# Patient Record
Sex: FEMALE | Race: WHITE | Employment: OTHER | URBAN - NONMETROPOLITAN AREA
[De-identification: names, ages, dates, MRNs, and addresses within clinical notes are randomized per-mention and may not be internally consistent; named-entity substitution may affect disease eponyms.]

---

## 2017-01-13 ENCOUNTER — ANESTHESIA EVENT (OUTPATIENT)
Dept: ENDOSCOPY | Age: 72
End: 2017-01-13
Payer: MEDICARE

## 2017-01-19 ENCOUNTER — SURGERY (OUTPATIENT)
Age: 72
End: 2017-01-19

## 2017-01-19 ENCOUNTER — ANESTHESIA (OUTPATIENT)
Dept: ENDOSCOPY | Age: 72
End: 2017-01-19
Payer: MEDICARE

## 2017-01-19 VITALS
SYSTOLIC BLOOD PRESSURE: 130 MMHG | DIASTOLIC BLOOD PRESSURE: 80 MMHG | OXYGEN SATURATION: 92 % | RESPIRATION RATE: 32 BRPM

## 2017-01-19 PROBLEM — R12 HEARTBURN: Status: ACTIVE | Noted: 2017-01-19

## 2017-01-19 PROCEDURE — 6360000002 HC RX W HCPCS: Performed by: NURSE ANESTHETIST, CERTIFIED REGISTERED

## 2017-01-19 PROCEDURE — 2580000003 HC RX 258: Performed by: NURSE ANESTHETIST, CERTIFIED REGISTERED

## 2017-01-19 PROCEDURE — 2500000003 HC RX 250 WO HCPCS: Performed by: NURSE ANESTHETIST, CERTIFIED REGISTERED

## 2017-01-19 RX ORDER — MIDAZOLAM HYDROCHLORIDE 1 MG/ML
INJECTION INTRAMUSCULAR; INTRAVENOUS PRN
Status: DISCONTINUED | OUTPATIENT
Start: 2017-01-19 | End: 2017-01-19 | Stop reason: SDUPTHER

## 2017-01-19 RX ORDER — LIDOCAINE HYDROCHLORIDE 20 MG/ML
INJECTION, SOLUTION INFILTRATION; PERINEURAL PRN
Status: DISCONTINUED | OUTPATIENT
Start: 2017-01-19 | End: 2017-01-19 | Stop reason: SDUPTHER

## 2017-01-19 RX ORDER — PROPOFOL 10 MG/ML
INJECTION, EMULSION INTRAVENOUS PRN
Status: DISCONTINUED | OUTPATIENT
Start: 2017-01-19 | End: 2017-01-19 | Stop reason: SDUPTHER

## 2017-01-19 RX ADMIN — PROPOFOL 400 MG: 10 INJECTION, EMULSION INTRAVENOUS at 09:57

## 2017-01-19 RX ADMIN — SODIUM CHLORIDE, SODIUM LACTATE, POTASSIUM CHLORIDE, AND CALCIUM CHLORIDE: 600; 310; 30; 20 INJECTION, SOLUTION INTRAVENOUS at 09:55

## 2017-01-19 RX ADMIN — LIDOCAINE HYDROCHLORIDE 40 MG: 20 INJECTION, SOLUTION INFILTRATION; PERINEURAL at 09:57

## 2017-01-19 RX ADMIN — MIDAZOLAM HYDROCHLORIDE 2 MG: 1 INJECTION, SOLUTION INTRAMUSCULAR; INTRAVENOUS at 09:59

## 2017-04-06 ENCOUNTER — APPOINTMENT (OUTPATIENT)
Dept: GENERAL RADIOLOGY | Facility: HOSPITAL | Age: 72
End: 2017-04-06

## 2017-04-06 ENCOUNTER — HOSPITAL ENCOUNTER (INPATIENT)
Facility: HOSPITAL | Age: 72
LOS: 4 days | Discharge: HOME HEALTH | End: 2017-04-10
Attending: EMERGENCY MEDICINE | Admitting: FAMILY MEDICINE

## 2017-04-06 ENCOUNTER — APPOINTMENT (OUTPATIENT)
Dept: CT IMAGING | Facility: HOSPITAL | Age: 72
End: 2017-04-06

## 2017-04-06 DIAGNOSIS — T79.6XXA TRAUMATIC RHABDOMYOLYSIS, INITIAL ENCOUNTER (HCC): Primary | ICD-10-CM

## 2017-04-06 DIAGNOSIS — Z74.09 IMPAIRED FUNCTIONAL MOBILITY AND ACTIVITY TOLERANCE: ICD-10-CM

## 2017-04-06 LAB
ALBUMIN SERPL-MCNC: 3.8 G/DL (ref 3.5–5)
ALBUMIN/GLOB SERPL: 1.1 G/DL (ref 1.1–2.5)
ALP SERPL-CCNC: 107 U/L (ref 24–120)
ALT SERPL W P-5'-P-CCNC: 40 U/L (ref 0–54)
AMYLASE SERPL-CCNC: 47 U/L (ref 30–110)
ANION GAP SERPL CALCULATED.3IONS-SCNC: 20 MMOL/L (ref 4–13)
APTT PPP: 30.9 SECONDS (ref 24.1–34.8)
AST SERPL-CCNC: 113 U/L (ref 7–45)
BASOPHILS # BLD AUTO: 0.03 10*3/MM3 (ref 0–0.2)
BASOPHILS NFR BLD AUTO: 0.2 % (ref 0–2)
BILIRUB SERPL-MCNC: 0.8 MG/DL (ref 0.1–1)
BILIRUB UR QL STRIP: NEGATIVE
BUN BLD-MCNC: 33 MG/DL (ref 5–21)
BUN/CREAT SERPL: 38.4 (ref 7–25)
CALCIUM SPEC-SCNC: 9.4 MG/DL (ref 8.4–10.4)
CHLORIDE SERPL-SCNC: 101 MMOL/L (ref 98–110)
CK SERPL-CCNC: 2778 U/L (ref 0–203)
CLARITY UR: CLEAR
CO2 SERPL-SCNC: 21 MMOL/L (ref 24–31)
COLOR UR: ABNORMAL
CREAT BLD-MCNC: 0.86 MG/DL (ref 0.5–1.4)
D-LACTATE SERPL-SCNC: 1.5 MMOL/L (ref 0.5–2)
DEPRECATED RDW RBC AUTO: 49.2 FL (ref 40–54)
EOSINOPHIL # BLD AUTO: 0.01 10*3/MM3 (ref 0–0.7)
EOSINOPHIL NFR BLD AUTO: 0.1 % (ref 0–4)
ERYTHROCYTE [DISTWIDTH] IN BLOOD BY AUTOMATED COUNT: 13.7 % (ref 12–15)
GFR SERPL CREATININE-BSD FRML MDRD: 65 ML/MIN/1.73
GLOBULIN UR ELPH-MCNC: 3.5 GM/DL
GLUCOSE BLD-MCNC: 75 MG/DL (ref 70–100)
GLUCOSE UR STRIP-MCNC: NEGATIVE MG/DL
HCT VFR BLD AUTO: 41.9 % (ref 37–47)
HGB BLD-MCNC: 15 G/DL (ref 12–16)
HGB UR QL STRIP.AUTO: NEGATIVE
IMM GRANULOCYTES # BLD: 0.05 10*3/MM3 (ref 0–0.03)
IMM GRANULOCYTES NFR BLD: 0.4 % (ref 0–5)
INR PPP: 1.01 (ref 0.91–1.09)
KETONES UR QL STRIP: ABNORMAL
LEUKOCYTE ESTERASE UR QL STRIP.AUTO: NEGATIVE
LIPASE SERPL-CCNC: 27 U/L (ref 23–203)
LYMPHOCYTES # BLD AUTO: 2.05 10*3/MM3 (ref 0.72–4.86)
LYMPHOCYTES NFR BLD AUTO: 16.9 % (ref 15–45)
MCH RBC QN AUTO: 35 PG (ref 28–32)
MCHC RBC AUTO-ENTMCNC: 35.8 G/DL (ref 33–36)
MCV RBC AUTO: 97.9 FL (ref 82–98)
MONOCYTES # BLD AUTO: 1 10*3/MM3 (ref 0.19–1.3)
MONOCYTES NFR BLD AUTO: 8.2 % (ref 4–12)
MYOGLOBIN SERPL-MCNC: 1125 NG/ML (ref 0–110)
NEUTROPHILS # BLD AUTO: 9.01 10*3/MM3 (ref 1.87–8.4)
NEUTROPHILS NFR BLD AUTO: 74.2 % (ref 39–78)
NITRITE UR QL STRIP: NEGATIVE
NT-PROBNP SERPL-MCNC: 389 PG/ML (ref 0–900)
PH UR STRIP.AUTO: <=5 [PH] (ref 5–8)
PLATELET # BLD AUTO: 270 10*3/MM3 (ref 130–400)
PMV BLD AUTO: 10.8 FL (ref 6–12)
POTASSIUM BLD-SCNC: 4.1 MMOL/L (ref 3.5–5.3)
PROCALCITONIN SERPL-MCNC: <0.25 NG/ML
PROT SERPL-MCNC: 7.3 G/DL (ref 6.3–8.7)
PROT UR QL STRIP: NEGATIVE
PROTHROMBIN TIME: 13.6 SECONDS (ref 11.9–14.6)
RBC # BLD AUTO: 4.28 10*6/MM3 (ref 4.2–5.4)
SODIUM BLD-SCNC: 142 MMOL/L (ref 135–145)
SP GR UR STRIP: 1.02 (ref 1–1.03)
TROPONIN I SERPL-MCNC: 0.05 NG/ML (ref 0–0.07)
TROPONIN I SERPL-MCNC: <0.012 NG/ML (ref 0–0.03)
UROBILINOGEN UR QL STRIP: ABNORMAL
WBC NRBC COR # BLD: 12.15 10*3/MM3 (ref 4.8–10.8)

## 2017-04-06 PROCEDURE — 81003 URINALYSIS AUTO W/O SCOPE: CPT | Performed by: EMERGENCY MEDICINE

## 2017-04-06 PROCEDURE — 83874 ASSAY OF MYOGLOBIN: CPT | Performed by: EMERGENCY MEDICINE

## 2017-04-06 PROCEDURE — 82550 ASSAY OF CK (CPK): CPT | Performed by: EMERGENCY MEDICINE

## 2017-04-06 PROCEDURE — 71010 HC CHEST PA OR AP: CPT

## 2017-04-06 PROCEDURE — 94640 AIRWAY INHALATION TREATMENT: CPT

## 2017-04-06 PROCEDURE — 83605 ASSAY OF LACTIC ACID: CPT | Performed by: EMERGENCY MEDICINE

## 2017-04-06 PROCEDURE — 83690 ASSAY OF LIPASE: CPT | Performed by: EMERGENCY MEDICINE

## 2017-04-06 PROCEDURE — 72125 CT NECK SPINE W/O DYE: CPT

## 2017-04-06 PROCEDURE — 85730 THROMBOPLASTIN TIME PARTIAL: CPT | Performed by: EMERGENCY MEDICINE

## 2017-04-06 PROCEDURE — 80053 COMPREHEN METABOLIC PANEL: CPT | Performed by: EMERGENCY MEDICINE

## 2017-04-06 PROCEDURE — 82150 ASSAY OF AMYLASE: CPT | Performed by: EMERGENCY MEDICINE

## 2017-04-06 PROCEDURE — 99285 EMERGENCY DEPT VISIT HI MDM: CPT

## 2017-04-06 PROCEDURE — 84484 ASSAY OF TROPONIN QUANT: CPT

## 2017-04-06 PROCEDURE — 94799 UNLISTED PULMONARY SVC/PX: CPT

## 2017-04-06 PROCEDURE — 93005 ELECTROCARDIOGRAM TRACING: CPT | Performed by: EMERGENCY MEDICINE

## 2017-04-06 PROCEDURE — 36415 COLL VENOUS BLD VENIPUNCTURE: CPT | Performed by: EMERGENCY MEDICINE

## 2017-04-06 PROCEDURE — 70450 CT HEAD/BRAIN W/O DYE: CPT

## 2017-04-06 PROCEDURE — 85610 PROTHROMBIN TIME: CPT | Performed by: EMERGENCY MEDICINE

## 2017-04-06 PROCEDURE — 87040 BLOOD CULTURE FOR BACTERIA: CPT | Performed by: EMERGENCY MEDICINE

## 2017-04-06 PROCEDURE — 84484 ASSAY OF TROPONIN QUANT: CPT | Performed by: EMERGENCY MEDICINE

## 2017-04-06 PROCEDURE — 83880 ASSAY OF NATRIURETIC PEPTIDE: CPT | Performed by: EMERGENCY MEDICINE

## 2017-04-06 PROCEDURE — 93005 ELECTROCARDIOGRAM TRACING: CPT | Performed by: FAMILY MEDICINE

## 2017-04-06 PROCEDURE — 85025 COMPLETE CBC W/AUTO DIFF WBC: CPT | Performed by: EMERGENCY MEDICINE

## 2017-04-06 PROCEDURE — P9612 CATHETERIZE FOR URINE SPEC: HCPCS

## 2017-04-06 PROCEDURE — 84145 PROCALCITONIN (PCT): CPT | Performed by: EMERGENCY MEDICINE

## 2017-04-06 RX ORDER — SODIUM CHLORIDE 0.9 % (FLUSH) 0.9 %
1-10 SYRINGE (ML) INJECTION AS NEEDED
Status: DISCONTINUED | OUTPATIENT
Start: 2017-04-06 | End: 2017-04-10 | Stop reason: HOSPADM

## 2017-04-06 RX ORDER — ALPRAZOLAM 0.25 MG/1
0.25 TABLET ORAL EVERY 8 HOURS PRN
Status: DISCONTINUED | OUTPATIENT
Start: 2017-04-06 | End: 2017-04-07

## 2017-04-06 RX ORDER — ATORVASTATIN CALCIUM 10 MG/1
20 TABLET, FILM COATED ORAL NIGHTLY
Status: DISCONTINUED | OUTPATIENT
Start: 2017-04-06 | End: 2017-04-07

## 2017-04-06 RX ORDER — PANTOPRAZOLE SODIUM 40 MG/1
40 TABLET, DELAYED RELEASE ORAL
Status: DISCONTINUED | OUTPATIENT
Start: 2017-04-07 | End: 2017-04-10 | Stop reason: HOSPADM

## 2017-04-06 RX ORDER — LISINOPRIL 20 MG/1
20 TABLET ORAL
Status: DISCONTINUED | OUTPATIENT
Start: 2017-04-07 | End: 2017-04-07

## 2017-04-06 RX ORDER — PANTOPRAZOLE SODIUM 40 MG/1
40 TABLET, DELAYED RELEASE ORAL DAILY
COMMUNITY
End: 2018-07-20

## 2017-04-06 RX ORDER — AMLODIPINE BESYLATE 5 MG/1
5 TABLET ORAL DAILY
COMMUNITY
End: 2017-04-10 | Stop reason: HOSPADM

## 2017-04-06 RX ORDER — SODIUM CHLORIDE 9 MG/ML
100 INJECTION, SOLUTION INTRAVENOUS CONTINUOUS
Status: DISCONTINUED | OUTPATIENT
Start: 2017-04-06 | End: 2017-04-09

## 2017-04-06 RX ORDER — LISINOPRIL 20 MG/1
20 TABLET ORAL DAILY
COMMUNITY

## 2017-04-06 RX ORDER — ASPIRIN 81 MG/1
81 TABLET ORAL DAILY
Status: DISCONTINUED | OUTPATIENT
Start: 2017-04-07 | End: 2017-04-07

## 2017-04-06 RX ORDER — ALBUTEROL SULFATE 2.5 MG/3ML
2.5 SOLUTION RESPIRATORY (INHALATION) ONCE
Status: COMPLETED | OUTPATIENT
Start: 2017-04-06 | End: 2017-04-06

## 2017-04-06 RX ORDER — FUROSEMIDE 40 MG/1
40 TABLET ORAL EVERY MORNING
COMMUNITY

## 2017-04-06 RX ORDER — METOPROLOL SUCCINATE 50 MG/1
50 TABLET, EXTENDED RELEASE ORAL DAILY
COMMUNITY

## 2017-04-06 RX ORDER — MELOXICAM 15 MG/1
15 TABLET ORAL DAILY
COMMUNITY
End: 2017-04-10 | Stop reason: HOSPADM

## 2017-04-06 RX ORDER — ACETAMINOPHEN 325 MG/1
650 TABLET ORAL EVERY 6 HOURS PRN
Status: DISCONTINUED | OUTPATIENT
Start: 2017-04-06 | End: 2017-04-07

## 2017-04-06 RX ORDER — HYDROCODONE BITARTRATE AND ACETAMINOPHEN 5; 325 MG/1; MG/1
1 TABLET ORAL EVERY 6 HOURS PRN
Status: DISCONTINUED | OUTPATIENT
Start: 2017-04-06 | End: 2017-04-10 | Stop reason: HOSPADM

## 2017-04-06 RX ORDER — CLOPIDOGREL BISULFATE 75 MG/1
75 TABLET ORAL DAILY
Status: DISCONTINUED | OUTPATIENT
Start: 2017-04-07 | End: 2017-04-07

## 2017-04-06 RX ORDER — ASPIRIN 81 MG/1
81 TABLET ORAL DAILY
COMMUNITY

## 2017-04-06 RX ORDER — CLOPIDOGREL BISULFATE 75 MG/1
75 TABLET ORAL DAILY
COMMUNITY
End: 2019-12-06

## 2017-04-06 RX ORDER — METOPROLOL SUCCINATE 50 MG/1
50 TABLET, EXTENDED RELEASE ORAL
Status: DISCONTINUED | OUTPATIENT
Start: 2017-04-07 | End: 2017-04-10 | Stop reason: HOSPADM

## 2017-04-06 RX ORDER — SIMVASTATIN 20 MG
20 TABLET ORAL NIGHTLY
COMMUNITY

## 2017-04-06 RX ORDER — AMLODIPINE BESYLATE 5 MG/1
5 TABLET ORAL
Status: DISCONTINUED | OUTPATIENT
Start: 2017-04-07 | End: 2017-04-07

## 2017-04-06 RX ORDER — ONDANSETRON 2 MG/ML
4 INJECTION INTRAMUSCULAR; INTRAVENOUS EVERY 6 HOURS PRN
Status: DISCONTINUED | OUTPATIENT
Start: 2017-04-06 | End: 2017-04-10 | Stop reason: HOSPADM

## 2017-04-06 RX ORDER — ALBUTEROL SULFATE 90 UG/1
1 AEROSOL, METERED RESPIRATORY (INHALATION) DAILY
COMMUNITY
End: 2019-10-08 | Stop reason: SDUPTHER

## 2017-04-06 RX ORDER — LORAZEPAM 2 MG/1
2 TABLET ORAL 3 TIMES DAILY PRN
Status: ON HOLD | COMMUNITY
End: 2017-04-10

## 2017-04-06 RX ORDER — IPRATROPIUM BROMIDE AND ALBUTEROL SULFATE 2.5; .5 MG/3ML; MG/3ML
3 SOLUTION RESPIRATORY (INHALATION) EVERY 4 HOURS PRN
Status: DISCONTINUED | OUTPATIENT
Start: 2017-04-06 | End: 2017-04-10 | Stop reason: HOSPADM

## 2017-04-06 RX ADMIN — ALPRAZOLAM 0.25 MG: 0.25 TABLET ORAL at 23:37

## 2017-04-06 RX ADMIN — ALBUTEROL SULFATE 2.5 MG: 2.5 SOLUTION RESPIRATORY (INHALATION) at 18:55

## 2017-04-06 RX ADMIN — SODIUM CHLORIDE 500 ML: 0.9 INJECTION, SOLUTION INTRAVENOUS at 17:23

## 2017-04-06 RX ADMIN — SODIUM CHLORIDE 125 ML/HR: 9 INJECTION, SOLUTION INTRAVENOUS at 23:39

## 2017-04-06 NOTE — ED PROVIDER NOTES
Subjective fall  History of Present Illness  The pt was found at home on the floor after having been down for two days.  Her daughter states that she  Usually calls her.  The pt is been calling her daughter regularly since her was taken ill.  She did not call her yesterday but her daughter states she really was not worried.  However today she called Mrs. Mohamud several times and she did not answer her phone.  She thereby became concerned and went to her house.  It is at this time and she noted that her mom was on the floor.  911 was called and the patient was brought to the emergency room for evaluation.  The pt tells me that she has been unable to get up off the floor.  She was able to scoot into the bathroom but could not get onto the toilet when she attempted do so.  She ultimately weight of her daughter to come to her house and find her.    Review of Systems   HENT: Negative.    Eyes: Negative.    Respiratory: Negative.    Cardiovascular: Negative.    Gastrointestinal: Negative.    Endocrine: Negative.    Genitourinary: Negative.    Musculoskeletal: Negative.    Skin: Negative.    Allergic/Immunologic: Negative.    Neurological: Positive for weakness.   Hematological: Negative.    Psychiatric/Behavioral: Negative.    All other systems reviewed and are negative.      Past Medical History:   Diagnosis Date   • Cancer     HODGKINS LYMPHOMA   • Disease of thyroid gland    • History of transfusion    • Hyperlipidemia    • Hypertension        Allergies   Allergen Reactions   • Penicillins        Past Surgical History:   Procedure Laterality Date   • APPENDECTOMY     • APPENDECTOMY     • CHOLECYSTECTOMY     • CORONARY ANGIOPLASTY WITH STENT PLACEMENT     • LIVER BIOPSY     • SPLENECTOMY         History reviewed. No pertinent family history.    Social History     Social History   • Marital status:      Spouse name: N/A   • Number of children: N/A   • Years of education: N/A     Social History Main Topics   •  Smoking status: Current Every Day Smoker     Packs/day: 0.50     Types: Cigarettes   • Smokeless tobacco: None   • Alcohol use No   • Drug use: None   • Sexual activity: Not Asked     Other Topics Concern   • None     Social History Narrative   • None           Objective   Physical Exam   Constitutional: She is oriented to person, place, and time. She appears well-developed and well-nourished.   HENT:   Head: Normocephalic and atraumatic.   Right Ear: External ear normal.   Left Ear: External ear normal.   Nose: Nose normal.   Mouth/Throat: Oropharynx is clear and moist.   Eyes: Conjunctivae and EOM are normal. Pupils are equal, round, and reactive to light. Right eye exhibits no discharge. Left eye exhibits no discharge.   Neck: Normal range of motion. Neck supple. No thyromegaly present.   Cardiovascular: Normal rate, regular rhythm, normal heart sounds and intact distal pulses.  Exam reveals no friction rub.    No murmur heard.  Pulmonary/Chest: Effort normal and breath sounds normal. No respiratory distress.   Abdominal: Soft. Bowel sounds are normal. She exhibits no distension. There is no tenderness.   Musculoskeletal: Normal range of motion. She exhibits no edema or deformity.   Neurological: She is alert and oriented to person, place, and time. She has normal reflexes. No cranial nerve deficit.   Skin: Skin is warm and dry. No rash noted.   Psychiatric: Judgment normal.   Nursing note and vitals reviewed.      Procedures         ED Course  ED Course   Comment By Time   Patient admitted to the service of [facility] in stable condition. Susan Limon DO 04/06 1953   CT scan of C-spine and head negative for acute disease. Susan Limon DO 04/06 1953                  MDM  Number of Diagnoses or Management Options  Traumatic rhabdomyolysis, initial encounter: new and requires workup     Amount and/or Complexity of Data Reviewed  Clinical lab tests: ordered and reviewed  Tests in the radiology section of  CPT®: ordered and reviewed  Discuss the patient with other providers: yes    Risk of Complications, Morbidity, and/or Mortality  Presenting problems: high  Diagnostic procedures: high  Management options: high    Patient Progress  Patient progress: stable      Final diagnoses:   Traumatic rhabdomyolysis, initial encounter            Pilar Martino MD  04/13/17 0637

## 2017-04-07 ENCOUNTER — APPOINTMENT (OUTPATIENT)
Dept: CARDIOLOGY | Facility: HOSPITAL | Age: 72
End: 2017-04-07
Attending: INTERNAL MEDICINE

## 2017-04-07 ENCOUNTER — APPOINTMENT (OUTPATIENT)
Dept: ULTRASOUND IMAGING | Facility: HOSPITAL | Age: 72
End: 2017-04-07

## 2017-04-07 ENCOUNTER — APPOINTMENT (OUTPATIENT)
Dept: MRI IMAGING | Facility: HOSPITAL | Age: 72
End: 2017-04-07

## 2017-04-07 LAB
ANION GAP SERPL CALCULATED.3IONS-SCNC: 12 MMOL/L (ref 4–13)
APAP SERPL-MCNC: <10 MCG/ML (ref 10–30)
BUN BLD-MCNC: 37 MG/DL (ref 5–21)
BUN/CREAT SERPL: 52.9 (ref 7–25)
CALCIUM SPEC-SCNC: 8.4 MG/DL (ref 8.4–10.4)
CHLORIDE SERPL-SCNC: 106 MMOL/L (ref 98–110)
CK SERPL-CCNC: 1512 U/L (ref 0–203)
CO2 SERPL-SCNC: 21 MMOL/L (ref 24–31)
CREAT BLD-MCNC: 0.7 MG/DL (ref 0.5–1.4)
DEPRECATED RDW RBC AUTO: 49 FL (ref 40–54)
ERYTHROCYTE [DISTWIDTH] IN BLOOD BY AUTOMATED COUNT: 13.7 % (ref 12–15)
GFR SERPL CREATININE-BSD FRML MDRD: 82 ML/MIN/1.73
GLUCOSE BLD-MCNC: 83 MG/DL (ref 70–100)
HCT VFR BLD AUTO: 35.7 % (ref 37–47)
HGB BLD-MCNC: 12.3 G/DL (ref 12–16)
MAGNESIUM SERPL-MCNC: 2 MG/DL (ref 1.4–2.2)
MCH RBC QN AUTO: 33.4 PG (ref 28–32)
MCHC RBC AUTO-ENTMCNC: 34.5 G/DL (ref 33–36)
MCV RBC AUTO: 97 FL (ref 82–98)
PLATELET # BLD AUTO: 254 10*3/MM3 (ref 130–400)
PMV BLD AUTO: 11 FL (ref 6–12)
POTASSIUM BLD-SCNC: 3.3 MMOL/L (ref 3.5–5.3)
RBC # BLD AUTO: 3.68 10*6/MM3 (ref 4.2–5.4)
SALICYLATES SERPL-MCNC: <1 MG/DL (ref 15–30)
SODIUM BLD-SCNC: 139 MMOL/L (ref 135–145)
TROPONIN I SERPL-MCNC: <0.012 NG/ML (ref 0–0.03)
TSH SERPL DL<=0.05 MIU/L-ACNC: 1.65 MIU/ML (ref 0.47–4.68)
WBC NRBC COR # BLD: 10.25 10*3/MM3 (ref 4.8–10.8)

## 2017-04-07 PROCEDURE — G8978 MOBILITY CURRENT STATUS: HCPCS

## 2017-04-07 PROCEDURE — 93010 ELECTROCARDIOGRAM REPORT: CPT | Performed by: INTERNAL MEDICINE

## 2017-04-07 PROCEDURE — 99222 1ST HOSP IP/OBS MODERATE 55: CPT | Performed by: NURSE PRACTITIONER

## 2017-04-07 PROCEDURE — 82550 ASSAY OF CK (CPK): CPT | Performed by: INTERNAL MEDICINE

## 2017-04-07 PROCEDURE — 94760 N-INVAS EAR/PLS OXIMETRY 1: CPT

## 2017-04-07 PROCEDURE — 93005 ELECTROCARDIOGRAM TRACING: CPT | Performed by: INTERNAL MEDICINE

## 2017-04-07 PROCEDURE — 80048 BASIC METABOLIC PNL TOTAL CA: CPT | Performed by: INTERNAL MEDICINE

## 2017-04-07 PROCEDURE — 94640 AIRWAY INHALATION TREATMENT: CPT

## 2017-04-07 PROCEDURE — 84443 ASSAY THYROID STIM HORMONE: CPT | Performed by: NURSE PRACTITIONER

## 2017-04-07 PROCEDURE — 80307 DRUG TEST PRSMV CHEM ANLYZR: CPT | Performed by: INTERNAL MEDICINE

## 2017-04-07 PROCEDURE — 84484 ASSAY OF TROPONIN QUANT: CPT | Performed by: INTERNAL MEDICINE

## 2017-04-07 PROCEDURE — 70551 MRI BRAIN STEM W/O DYE: CPT

## 2017-04-07 PROCEDURE — 85027 COMPLETE CBC AUTOMATED: CPT | Performed by: INTERNAL MEDICINE

## 2017-04-07 PROCEDURE — C8929 TTE W OR WO FOL WCON,DOPPLER: HCPCS

## 2017-04-07 PROCEDURE — 93880 EXTRACRANIAL BILAT STUDY: CPT

## 2017-04-07 PROCEDURE — 25010000002 ENOXAPARIN PER 10 MG: Performed by: INTERNAL MEDICINE

## 2017-04-07 PROCEDURE — 97162 PT EVAL MOD COMPLEX 30 MIN: CPT

## 2017-04-07 PROCEDURE — G8979 MOBILITY GOAL STATUS: HCPCS

## 2017-04-07 PROCEDURE — 93306 TTE W/DOPPLER COMPLETE: CPT | Performed by: INTERNAL MEDICINE

## 2017-04-07 PROCEDURE — 83735 ASSAY OF MAGNESIUM: CPT | Performed by: NURSE PRACTITIONER

## 2017-04-07 PROCEDURE — 25010000002 PERFLUTREN (DEFINITY) 8.476 MG IN SODIUM CHLORIDE 10 ML INJECTION: Performed by: INTERNAL MEDICINE

## 2017-04-07 RX ORDER — LISINOPRIL 20 MG/1
20 TABLET ORAL DAILY
Status: DISCONTINUED | OUTPATIENT
Start: 2017-04-07 | End: 2017-04-10 | Stop reason: HOSPADM

## 2017-04-07 RX ORDER — CLOPIDOGREL BISULFATE 75 MG/1
75 TABLET ORAL 3 TIMES WEEKLY
Status: DISCONTINUED | OUTPATIENT
Start: 2017-04-07 | End: 2017-04-10 | Stop reason: HOSPADM

## 2017-04-07 RX ORDER — METOPROLOL TARTRATE 5 MG/5ML
5 INJECTION INTRAVENOUS EVERY 6 HOURS PRN
Status: DISCONTINUED | OUTPATIENT
Start: 2017-04-07 | End: 2017-04-10 | Stop reason: HOSPADM

## 2017-04-07 RX ORDER — ASPIRIN 81 MG/1
81 TABLET ORAL DAILY
Status: DISCONTINUED | OUTPATIENT
Start: 2017-04-07 | End: 2017-04-10 | Stop reason: HOSPADM

## 2017-04-07 RX ORDER — POTASSIUM CHLORIDE 750 MG/1
20 CAPSULE, EXTENDED RELEASE ORAL 2 TIMES DAILY WITH MEALS
Status: DISCONTINUED | OUTPATIENT
Start: 2017-04-07 | End: 2017-04-10 | Stop reason: HOSPADM

## 2017-04-07 RX ORDER — METOPROLOL SUCCINATE 50 MG/1
50 TABLET, EXTENDED RELEASE ORAL DAILY
Status: DISCONTINUED | OUTPATIENT
Start: 2017-04-07 | End: 2017-04-07

## 2017-04-07 RX ORDER — LORAZEPAM 1 MG/1
1 TABLET ORAL 3 TIMES DAILY PRN
Status: DISCONTINUED | OUTPATIENT
Start: 2017-04-07 | End: 2017-04-10 | Stop reason: HOSPADM

## 2017-04-07 RX ORDER — AMLODIPINE BESYLATE 5 MG/1
5 TABLET ORAL DAILY
Status: DISCONTINUED | OUTPATIENT
Start: 2017-04-07 | End: 2017-04-10

## 2017-04-07 RX ORDER — PANTOPRAZOLE SODIUM 40 MG/1
40 TABLET, DELAYED RELEASE ORAL DAILY
Status: DISCONTINUED | OUTPATIENT
Start: 2017-04-07 | End: 2017-04-07

## 2017-04-07 RX ADMIN — HYDROCODONE BITARTRATE AND ACETAMINOPHEN 1 TABLET: 5; 325 TABLET ORAL at 20:43

## 2017-04-07 RX ADMIN — CLOPIDOGREL BISULFATE 75 MG: 75 TABLET, FILM COATED ORAL at 09:56

## 2017-04-07 RX ADMIN — ATORVASTATIN CALCIUM 20 MG: 10 TABLET, FILM COATED ORAL at 00:20

## 2017-04-07 RX ADMIN — POTASSIUM CHLORIDE 20 MEQ: 750 CAPSULE, EXTENDED RELEASE ORAL at 09:56

## 2017-04-07 RX ADMIN — AMLODIPINE BESYLATE 5 MG: 5 TABLET ORAL at 11:02

## 2017-04-07 RX ADMIN — IPRATROPIUM BROMIDE AND ALBUTEROL SULFATE 3 ML: .5; 3 SOLUTION RESPIRATORY (INHALATION) at 04:32

## 2017-04-07 RX ADMIN — LORAZEPAM 1 MG: 1 TABLET ORAL at 16:56

## 2017-04-07 RX ADMIN — METOPROLOL SUCCINATE 50 MG: 50 TABLET, FILM COATED, EXTENDED RELEASE ORAL at 09:56

## 2017-04-07 RX ADMIN — SODIUM CHLORIDE 125 ML/HR: 9 INJECTION, SOLUTION INTRAVENOUS at 16:56

## 2017-04-07 RX ADMIN — POTASSIUM CHLORIDE 20 MEQ: 750 CAPSULE, EXTENDED RELEASE ORAL at 19:07

## 2017-04-07 RX ADMIN — PHENOL 2 SPRAY: 1.5 LIQUID ORAL at 16:47

## 2017-04-07 RX ADMIN — LISINOPRIL 20 MG: 20 TABLET ORAL at 09:56

## 2017-04-07 RX ADMIN — ENOXAPARIN SODIUM 40 MG: 40 INJECTION SUBCUTANEOUS at 09:55

## 2017-04-07 RX ADMIN — SODIUM CHLORIDE 5 ML: 9 INJECTION INTRAMUSCULAR; INTRAVENOUS; SUBCUTANEOUS at 11:11

## 2017-04-07 RX ADMIN — PANTOPRAZOLE SODIUM 40 MG: 40 TABLET, DELAYED RELEASE ORAL at 09:55

## 2017-04-07 RX ADMIN — ASPIRIN 81 MG: 81 TABLET ORAL at 09:56

## 2017-04-07 RX ADMIN — SODIUM CHLORIDE 125 ML/HR: 9 INJECTION, SOLUTION INTRAVENOUS at 22:23

## 2017-04-07 RX ADMIN — SODIUM CHLORIDE 125 ML/HR: 9 INJECTION, SOLUTION INTRAVENOUS at 07:58

## 2017-04-07 NOTE — CONSULTS
Patient Care Team:  Cayetano Crews MD as PCP - General  Cayetano Crews MD as PCP - Family Medicine  Lui Rick MD  REASON FOR REFERRAL: tachycardia   Chief complaint: found down at home     Subjective     Patient is a 71 y.o. female presents after being found down at home. The patient states the last thing she remembers is going to bed Tuesday night. She woke up in the floor. She was later found by her family. She has no recollection of how she ended up on the floor. She admits some recent fatigue. She also admits recent dizziness and pre syncope- she relates this to taking celebrex, and states she stopped this 1 week ago. She admits a chronic cough and mild dyspnea, but there has been no recent change. She denies chest pain, palpitations, edema, or weight gain. She does take xanax at home. She reports stent placement by Dr. Broadbent several years ago. She denies any other cardiac history. She states she only takes plavix every other day due to bruising. She had an essentially normal echo in 2009. Cardiology consultation is obtained this admission for tachycardia. Review of EKGs reveals sinus tachycardia, no acute STT changes, HR up to 150s. She has been without her home beta blocker this admission. She is being treated by the hosptialist team for her rhabdomyolysis. Her rhythm did appear irregular briefly on telemetry- will continue to [facility].     Review of Systems   Review of Systems   Constitutional: Negative for diaphoresis, fatigue, fever and unexpected weight change.   HENT: Negative for nosebleeds.    Respiratory: Positive for cough (chronic ). Negative for apnea, chest tightness and wheezing. Shortness of breath: chronic     Cardiovascular: Negative for chest pain, palpitations and leg swelling.   Gastrointestinal: Negative for abdominal distention, nausea and vomiting.   Genitourinary: Negative for hematuria.   Musculoskeletal: Negative for gait problem.   Skin: Negative for color change.    Neurological: Positive for dizziness, syncope, weakness and light-headedness.       History  Past Medical History:   Diagnosis Date   • Cancer     HODGKINS LYMPHOMA   • Disease of thyroid gland    • History of transfusion    • Hyperlipidemia    • Hypertension      Past Surgical History:   Procedure Laterality Date   • APPENDECTOMY     • APPENDECTOMY     • CHOLECYSTECTOMY     • CORONARY ANGIOPLASTY WITH STENT PLACEMENT     • LIVER BIOPSY     • SPLENECTOMY       History reviewed. No pertinent family history.  Social History   Substance Use Topics   • Smoking status: Current Every Day Smoker     Packs/day: 0.50     Types: Cigarettes   • Smokeless tobacco: None   • Alcohol use No     Prescriptions Prior to Admission   Medication Sig Dispense Refill Last Dose   • albuterol (PROVENTIL HFA;VENTOLIN HFA) 108 (90 BASE) MCG/ACT inhaler Inhale 2 puffs 2 (Two) Times a Day As Needed for Wheezing.   4/4/2017 at 2000   • amLODIPine (NORVASC) 5 MG tablet Take 5 mg by mouth Daily.   4/4/2017 at 0800   • aspirin 81 MG EC tablet Take 81 mg by mouth Daily.   4/4/2017 at 0800   • calcium citrate-vitamin d (CALCITRATE) 315-250 MG-UNIT tablet tablet Take 1 tablet by mouth Daily.   4/4/2017 at 0800   • clopidogrel (PLAVIX) 75 MG tablet Take 75 mg by mouth 3 (Three) Times a Week. Monday, Wednesday, and Friday   4/3/2017 at 0800   • furosemide (LASIX) 20 MG tablet Take 20 mg by mouth Daily As Needed (for swelling).   4/4/2017 at 0800   • lisinopril (PRINIVIL,ZESTRIL) 20 MG tablet Take 20 mg by mouth Daily.   4/4/2017 at 0800   • LORazepam (ATIVAN) 2 MG tablet Take 2 mg by mouth 3 (Three) Times a Day As Needed for Anxiety.   4/4/2017 at 1200   • meloxicam (MOBIC) 15 MG tablet Take 15 mg by mouth Daily.   4/4/2017 at 0800   • metoprolol succinate XL (TOPROL-XL) 50 MG 24 hr tablet Take 50 mg by mouth Daily.   4/4/2017 at 0800   • pantoprazole (PROTONIX) 40 MG EC tablet Take 40 mg by mouth Daily.   4/4/2017 at 0800   • simvastatin (ZOCOR) 20  MG tablet Take 20 mg by mouth Daily.   4/4/2017 at 2000       Current Facility-Administered Medications:   •  amLODIPine (NORVASC) tablet 5 mg, 5 mg, Oral, Daily, Nancie Fam MD  •  aspirin EC tablet 81 mg, 81 mg, Oral, Daily, Nancie Fam MD  •  clopidogrel (PLAVIX) tablet 75 mg, 75 mg, Oral, Once per day on Mon Wed Fri, Nancie Fam MD  •  enoxaparin (LOVENOX) syringe 40 mg, 40 mg, Subcutaneous, Daily, Lui Rick MD  •  HYDROcodone-acetaminophen (NORCO) 5-325 MG per tablet 1 tablet, 1 tablet, Oral, Q6H PRN, Lui Rick MD  •  ipratropium-albuterol (DUO-NEB) nebulizer solution 3 mL, 3 mL, Nebulization, Q4H PRN, Lui Rick MD, 3 mL at 04/07/17 0432  •  lisinopril (PRINIVIL,ZESTRIL) tablet 20 mg, 20 mg, Oral, Daily, Nancie Fam MD  •  LORazepam (ATIVAN) tablet 1 mg, 1 mg, Oral, TID PRN, Nancie Fam MD  •  metoprolol succinate XL (TOPROL-XL) 24 hr tablet 50 mg, 50 mg, Oral, Q24H, Lui Rick MD  •  metoprolol tartrate (LOPRESSOR) injection 5 mg, 5 mg, Intravenous, Q6H PRN, ORLIN Nichols  •  ondansetron (ZOFRAN) injection 4 mg, 4 mg, Intravenous, Q6H PRN, Lui Rick MD  •  pantoprazole (PROTONIX) EC tablet 40 mg, 40 mg, Oral, Q AM, Lui Rick MD  •  potassium chloride (MICRO-K) CR capsule 20 mEq, 20 mEq, Oral, BID With Meals, Lui Rick MD  •  sodium chloride 0.9 % flush 1-10 mL, 1-10 mL, Intravenous, PRN, Lui Rick MD  •  sodium chloride 0.9 % infusion, 125 mL/hr, Intravenous, Continuous, Pilar Martino MD, Last Rate: 125 mL/hr at 04/07/17 0758, 125 mL/hr at 04/07/17 0758  Allergies:  Penicillins    Objective     Vital Signs  Temp:  [97.5 °F (36.4 °C)-99.8 °F (37.7 °C)] 98.4 °F (36.9 °C)  Heart Rate:  [] 109  Resp:  [16-24] 20  BP: (125-164)/(41-79) 144/50    Physical Exam:   Physical Exam   Constitutional: She is oriented to person, place, and time. She appears well-developed and well-nourished. No distress.   HENT:   Head: Normocephalic and atraumatic.   Eyes: Pupils  are equal, round, and reactive to light.   Neck: Normal range of motion. Neck supple. No JVD present. No thyromegaly present.   Cardiovascular: Regular rhythm, normal heart sounds and intact distal pulses.  Tachycardia present.  Exam reveals no gallop and no friction rub.    No murmur heard.  Pulses:       Dorsalis pedis pulses are 2+ on the right side, and 2+ on the left side.   Pulmonary/Chest: Effort normal and breath sounds normal. No respiratory distress. She has no wheezes. She has no rales. She exhibits no tenderness.   Abdominal: Soft. Bowel sounds are normal. She exhibits no distension. There is no tenderness.   Musculoskeletal: Normal range of motion. She exhibits no edema.   Neurological: She is alert and oriented to person, place, and time. No cranial nerve deficit.   Skin: Skin is warm and dry. She is not diaphoretic.   Psychiatric: She has a normal mood and affect. Her behavior is normal.     Results Review:     Lab Results (last 72 hours)     Procedure Component Value Units Date/Time    Urinalysis With / Culture If Indicated [[id number]]  (Abnormal) Collected:  04/06/17 1724    Specimen:  Urine from Urine, Catheter Updated:  04/06/17 1738     Color, UA Dark Yellow (A)     Appearance, UA Clear     pH, UA <=5.0     Specific Gravity, UA 1.017     Glucose, UA Negative     Ketones, UA 40 mg/dL (2+) (A)     Bilirubin, UA Negative     Blood, UA Negative     Protein, UA Negative     Leuk Esterase, UA Negative     Nitrite, UA Negative     Urobilinogen, UA 1.0 E.U./dL    Narrative:       Urine microscopic not indicated.    CBC & Differential [[id number]] Collected:  04/06/17 1743    Specimen:  Blood Updated:  04/06/17 1756    Narrative:       The following orders were created for panel order CBC & Differential.  Procedure                               Abnormality         Status                     ---------                               -----------         ------                     CBC Auto Differential[[id number]]          Abnormal            Final result                 Please view results for these tests on the individual orders.    CBC Auto Differential [[id number]]  (Abnormal) Collected:  04/06/17 1743    Specimen:  Blood Updated:  04/06/17 1756     WBC 12.15 (H) 10*3/mm3      RBC 4.28 10*6/mm3      Hemoglobin 15.0 g/dL      Hematocrit 41.9 %      MCV 97.9 fL      MCH 35.0 (H) pg      MCHC 35.8 g/dL      RDW 13.7 %      RDW-SD 49.2 fl      MPV 10.8 fL      Platelets 270 10*3/mm3      Neutrophil % 74.2 %      Lymphocyte % 16.9 %      Monocyte % 8.2 %      Eosinophil % 0.1 %      Basophil % 0.2 %      Immature Grans % 0.4 %      Neutrophils, Absolute 9.01 (H) 10*3/mm3      Lymphocytes, Absolute 2.05 10*3/mm3      Monocytes, Absolute 1.00 10*3/mm3      Eosinophils, Absolute 0.01 10*3/mm3      Basophils, Absolute 0.03 10*3/mm3      Immature Grans, Absolute 0.05 (H) 10*3/mm3     Protime-INR [[id number]]  (Normal) Collected:  04/06/17 1743    Specimen:  Blood Updated:  04/06/17 1804     Protime 13.6 Seconds      INR 1.01    aPTT [[id number]]  (Normal) Collected:  04/06/17 1743    Specimen:  Blood Updated:  04/06/17 1804     PTT 30.9 seconds     Comprehensive Metabolic Panel [[id number]]  (Abnormal) Collected:  04/06/17 1743    Specimen:  Blood Updated:  04/06/17 1821     Glucose 75 mg/dL      BUN 33 (H) mg/dL      Creatinine 0.86 mg/dL      Sodium 142 mmol/L      Potassium 4.1 mmol/L      Chloride 101 mmol/L      CO2 21.0 (L) mmol/L      Calcium 9.4 mg/dL      Total Protein 7.3 g/dL      Albumin 3.80 g/dL      ALT (SGPT) 40 U/L      AST (SGOT) 113 (H) U/L      Alkaline Phosphatase 107 U/L      Total Bilirubin 0.8 mg/dL      eGFR Non African Amer 65 mL/min/1.73      Globulin 3.5 gm/dL      A/G Ratio 1.1 g/dL      BUN/Creatinine Ratio 38.4 (H)     Anion Gap 20.0 (H) mmol/L     Narrative:       The MDRD GFR formula is only valid for adults with stable renal function between ages 18 and 70.    Amylase [[id number]]  (Normal) Collected:   04/06/17 1743    Specimen:  Blood Updated:  04/06/17 1821     Amylase 47 U/L     Lipase [[id number]]  (Normal) Collected:  04/06/17 1743    Specimen:  Blood Updated:  04/06/17 1821     Lipase 27 U/L     BNP [[id number]]  (Normal) Collected:  04/06/17 1743    Specimen:  Blood Updated:  04/06/17 1826     proBNP 389.0 pg/mL     Myoglobin, Serum [[id number]]  (Abnormal) Collected:  04/06/17 1743    Specimen:  Blood Updated:  04/06/17 1826     Myoglobin 1125.0 (H) ng/mL     CK [[id number]]  (Abnormal) Collected:  04/06/17 1743    Specimen:  Blood Updated:  04/06/17 1830     Creatine Kinase 2778 (H) U/L     Troponin [[id number]]  (Normal) Collected:  04/06/17 1743    Specimen:  Blood Updated:  04/06/17 1835     Troponin I <0.012 ng/mL     Lactic Acid, Plasma [[id number]]  (Normal) Collected:  04/06/17 1743    Specimen:  Blood Updated:  04/06/17 1841     Lactate 1.5 mmol/L     Procalcitonin [[id number]]  (Normal) Collected:  04/06/17 1743    Specimen:  Blood Updated:  04/06/17 1922     Procalcitonin <0.25 ng/mL     Narrative:       SIRS, sepsis, severe sepsis, and septic shock are categorized according to the criteria of the consensus conference of the American College of Chest Physicians/Society of Critical Care Medicine.    PCT < 0.5 ng/mL     Systemic infection (sepsis) is not likely.    PCT >0.5 and < 2.0 ng/mL Systemic infection (sepsis) is possible, but other conditions are known to elevate PCT as well.    PCT > 2.0 ng/mL     Systemic infection (sepsis) is likely, unless other causes are known.      PCT > 10.0 ng/mL    Important systemic inflammatory response, almost exclusively due to severe bacterial sepsis or septic shock.    PCT values of < 0.5 ng/mL do not exclude an infection, because localized infections (without systemic signs) may be associated with such low concentrations, or a systemic infection in its initial stages (<6 hours).  Increased PCT can occur without infection.  PCT concentrations between 0.5 and 2.0 ng/mL  should be interpreted taking into account the patients history.  It is recommended to retest PCT within 6-24 hours if any concentrations < 2.0 ng/mL are obtained.    POC Troponin, Rapid [[id number]]  (Normal) Collected:  04/06/17 2059    Specimen:  Blood Updated:  04/06/17 2115     Troponin I 0.05 ng/mL       Serial Number: [id number]    : [id number]       Blood Culture [[id number]]  (Normal) Collected:  04/06/17 1743    Specimen:  Blood from Arm, Left Updated:  04/07/17 0601     Blood Culture No growth at less than 24 hours    Blood Culture [[id number]]  (Normal) Collected:  04/06/17 1743    Specimen:  Blood from Arm, Right Updated:  04/07/17 0601     Blood Culture No growth at less than 24 hours    Basic Metabolic Panel [[id number]]  (Abnormal) Collected:  04/07/17 0519    Specimen:  Blood Updated:  04/07/17 0619     Glucose 83 mg/dL      BUN 37 (H) mg/dL      Creatinine 0.70 mg/dL      Sodium 139 mmol/L      Potassium 3.3 (L) mmol/L      Chloride 106 mmol/L      CO2 21.0 (L) mmol/L      Calcium 8.4 mg/dL      eGFR Non African Amer 82 mL/min/1.73      BUN/Creatinine Ratio 52.9 (H)     Anion Gap 12.0 mmol/L     Narrative:       The MDRD GFR formula is only valid for adults with stable renal function between ages 18 and 70.    CK [[id number]]  (Abnormal) Collected:  04/07/17 0519    Specimen:  Blood Updated:  04/07/17 0619     Creatine Kinase 1512 (H) U/L     CBC (No Diff) [[id number]]  (Abnormal) Collected:  04/07/17 0519    Specimen:  Blood Updated:  04/07/17 0622     WBC 10.25 10*3/mm3      RBC 3.68 (L) 10*6/mm3      Hemoglobin 12.3 g/dL      Hematocrit 35.7 (L) %      MCV 97.0 fL      MCH 33.4 (H) pg      MCHC 34.5 g/dL      RDW 13.7 %      RDW-SD 49.0 fl      MPV 11.0 fL      Platelets 254 10*3/mm3     Acetaminophen Level [[id number]]  (Abnormal) Collected:  04/07/17 0850    Specimen:  Blood Updated:  04/07/17 0924     Acetaminophen <10.0 (L) mcg/mL     Salicylate Level [[id number]]  (Abnormal) Collected:  04/07/17 0831     Specimen:  Blood Updated:  04/07/17 0924     Salicylate <1.0 (L) mg/dL     Troponin [[id number]]  (Normal) Collected:  04/07/17 0850    Specimen:  Blood Updated:  04/07/17 0934     Troponin I <0.012 ng/mL           Assessment/Plan     Active Problems:     Rhabdomyolysis  Sinus tachycardia currently   Tobacco use  Chronic obstructive pulmonary disease  Coronary artery disease- stent placement per Dr. Broadbent over 1 year ago per patient report  Hypertension  Hyperlipidemia  History of hodgkin's lymphoma     Plan-  Resume home oral beta blocker- patient has not had a dose since admission  PRN IV lopressor for heart rate sustained above 120  Telemetry  Replace potassium-ordered per primary team   Check TSH, mag  Echo pending  On asa plavix (every other day) for CAD history  Statin on hold due to rhabdomyolysis  Minimize beta agonists- no albuterol   Obtain cath report/ cardiology records from Dr. Broadbent   Further recommendations per Dr. Monteiro     I discussed the patients findings and my recommendations with patient and nursing staff.     Mary Sneed, APRN  04/07/17  9:41 AM

## 2017-04-07 NOTE — DISCHARGE PLACEMENT REQUEST
"To: [facility]      From: Antonina Perales [phone]      Kenyon Mohamud (71 y.o. Female)     Date of Birth Social Security Number Address Home Phone MRN    1945  [address]  [facility] [address] [phone] [id number]    Church Marital Status          [employer]        Admission Date Admission Type Admitting Provider Attending Provider Department, Room/Bed    17 Emergency Nancie Fam MD Hall, Alice R, MD [facility] INTENSIVE CARE, I008/    Discharge Date Discharge Disposition Discharge Destination                      Attending Provider: Nancie Fam MD     Allergies:  Penicillins    Isolation:  None   Infection:  None   Code Status:  FULL    Ht:  62\" (157.5 cm)   Wt:  183 lb 8 oz (83.2 kg)    Admission Cmt:  None   Principal Problem:  None                Active Insurance as of 2017     Primary Coverage     Payor Plan Insurance Group Employer/Plan Group    MEDICARE MEDICARE A & B      Payor Plan Address Payor Plan Phone Number Effective From Effective To    [address] [phone] 2009     [place] [address]       Subscriber Name Subscriber Birth Date Member KENYON REID 1945 [id number]           Secondary Coverage     Payor Plan Insurance Group Employer/Plan Group    [employer] [place] [employer] [place]      Payor Plan Address Payor Plan Phone Number Effective From Effective To    [employer] [place] [phone] [address]     [place] [address]       Subscriber Name Subscriber Birth Date Member KENYON REID 1945 [id number]                 Emergency Contacts      (Rel.) Home Phone Work Phone Mobile Phone    Farnc Egan (Daughter) -- -- [phone]        [facility]  [address] [place] [id number]  Phone: [phone]  Fax:         Patient:     Kenyon Mohamud MRN: [id number]   [address] [place] [address] : 1945  SSN:    Phone: [phone] Sex: F "      INSURANCE PAYOR PLAN GROUP # SUBSCRIBER ID   Primary:  Secondary: MEDICARE [employer] [place] [id number]  [id number]   [id number]  [id number]   Admitting Diagnosis: Traumatic rhabdomyolysis, initial encounter [T79.6XXA]  Order Date: Apr 7, 2017               Inpatient Consult to [facility]     (Order ID: [id number])   Diagnosis:       Priority: Routine Expected Date:   Expiration Date:        Interval: Once Count:    Reason for Consult? HOME HEALTH TO EVAL AND TREAT     Specimen Type:   Specimen Source:   Specimen Taken Date:   Specimen Taken Time:                         Verbal Order Mode: Verbal with readback [101]  Authorizing Provider: Nancie Cervantes MD  Authorizing Provider's NPI: [id number]     Order Entered By: Marlena Isaacs RN 4/7/2017 3:05 PM     Electronically signed by: NANCIE CERVANTES 4/7/2017 3:31 PM          Insurance Information                MEDICARE/MEDICARE A & B Phone: [phone]    Subscriber: Josi Mohamud Subscriber#: [id number]    Group#:  Precert#:         [facility] Phone: [phone]    Subscriber: Josi Mohamud Subscriber#: [id number]    Group#:  Precert#:

## 2017-04-07 NOTE — PLAN OF CARE
Problem: Patient Care Overview (Adult)  Goal: Plan of Care Review  Outcome: Ongoing (interventions implemented as appropriate)    04/07/17 0951   Coping/Psychosocial Response Interventions   Plan Of Care Reviewed With patient   Outcome Evaluation   Outcome Summary/Follow up Plan PT eval completed. Pt in a-fib at rest with HR between 109-155 bpm. Pt was min assist for bed mobility and CGA with standing and ambulation. Gait distance limited by HR reaching 188 although pt remained aymptomatic, HR at 153 bpm post activity. Pt instructed to use RW with ambulation. Pt will benefit from continued PT intervention to improve functional strength, endurance, and activity tolerance. Anticipate d/c to home with assist vs home with HH pending progress.          Problem: Inpatient Physical Therapy  Goal: Bed Mobility Goal LTG- PT  Outcome: Ongoing (interventions implemented as appropriate)    04/07/17 0951   Bed Mobility PT LTG   Bed Mobility PT LTG, Date Established 04/07/17   Bed Mobility PT LTG, Time to Achieve by discharge   Bed Mobility PT LTG, Activity Type supine to sit/sit to supine   Bed Mobility PT LTG, [place] Level contact guard assist       Goal: Transfer Training Goal 1 LTG- PT  Outcome: Ongoing (interventions implemented as appropriate)    04/07/17 0951   Transfer Training PT LTG   Transfer Training PT LTG, Date Established 04/07/17   Transfer Training PT LTG, Time to Achieve by discharge   Transfer Training PT LTG, Activity Type sit to stand/stand to sit   Transfer Training PT LTG, [place] Level supervision required   Transfer Training PT LTG, Assist Device walker, rolling       Goal: Gait Training Goal LTG- PT  Outcome: Ongoing (interventions implemented as appropriate)    04/07/17 0951   Gait Training PT LTG   Gait Training Goal PT LTG, Date Established 04/07/17   Gait Training Goal PT LTG, Time to Achieve by discharge   Gait Training Goal PT LTG, [place] Level supervision required   Gait Training  Goal PT LTG, Assist Device walker, rolling   Gait Training Goal PT LTG, Distance to Achieve 150

## 2017-04-07 NOTE — PLAN OF CARE
Problem: Patient Care Overview (Adult)  Goal: Plan of Care Review  Outcome: Ongoing (interventions implemented as appropriate)    04/07/17 0330   Coping/Psychosocial Response Interventions   Plan Of Care Reviewed With patient   Patient Care Overview   Progress progress toward functional goals as expected   Outcome Evaluation   Outcome Summary/Follow up Plan Patient admitted from ER. No complaints of pain as of 0333. Telemetry placed with patient running sinus tachycardia. ERINN hose placed per MD order. IV fluids infusing. Will continue to monitor.        Goal: Adult Individualization and Mutuality  Outcome: Ongoing (interventions implemented as appropriate)    04/07/17 0330   Individualization   Patient Specific Preferences No specific preferences at this time.    Patient Specific Goals No specific goals at this time.    Patient Specific Interventions Patient is given privacy when up to bedside commode.    Mutuality/Individual Preferences   What Anxieties, Fears or Concerns Do You Have About Your Health or Care? None at this time.    What Questions Do You Have About Your Health or Care? None at this time.    What Information Would Help Us Give You More Personalized Care? Patient had a recent fall.        Goal: Discharge Needs Assessment  Outcome: Ongoing (interventions implemented as appropriate)    04/07/17 0330   Discharge Needs Assessment   Concerns To Be Addressed no discharge needs identified   Readmission Within The Last 30 Days no previous admission in last 30 days         Problem: Fall Risk (Adult)  Goal: Identify Related Risk Factors and Signs and Symptoms  Outcome: Ongoing (interventions implemented as appropriate)    04/07/17 0330   Fall Risk   Fall Risk: Related Risk Factors age-related changes;history of falls;environment unfamiliar   Fall Risk: Signs and Symptoms presence of risk factors       Goal: Absence of Falls  Outcome: Ongoing (interventions implemented as appropriate)    04/07/17 0330   Fall  Risk (Adult)   Absence of Falls making progress toward outcome         Problem: Skin Integrity Impairment, Risk/Actual (Adult)  Goal: Identify Related Risk Factors and Signs and Symptoms  Outcome: Ongoing (interventions implemented as appropriate)    04/07/17 0330   Skin Integrity Impairment, Risk/Actual   Skin Integrity Impairment, Risk/Actual: Related Risk Factors age extremes;traumatic injury   Signs and Symptoms (Skin Integrity Impairment) other (see comments)  (Right elbow abrasion)       Goal: Skin Integrity/Wound Healing  Outcome: Ongoing (interventions implemented as appropriate)    04/07/17 0330   Skin Integrity Impairment, Risk/Actual (Adult)   Skin Integrity/Wound Healing making progress toward outcome

## 2017-04-07 NOTE — DISCHARGE PLACEMENT REQUEST
"To: [facility]    From: Antonina Perales [phone]        Kenyon Phillips (71 y.o. Female)     Date of Birth Social Security Number Address Home Phone MRN    1945  [id number]  [facility] [place] [address] [phone] [id number]    Religious Marital Status          Yazidi        Admission Date Admission Type Admitting Provider Attending Provider Department, Room/Bed    4/6/17 Emergency Nancie Fam MD Hall, Alice R, MD [facility] INTENSIVE CARE, I008/1    Discharge Date Discharge Disposition Discharge Destination                      Attending Provider: Nancie Fam MD     Allergies:  Penicillins    Isolation:  None   Infection:  None   Code Status:  FULL    Ht:  62\" (157.5 cm)   Wt:  183 lb 8 oz (83.2 kg)    Admission Cmt:  None   Principal Problem:  None                Active Insurance as of 4/6/2017     Primary Coverage     Payor Plan Insurance Group Employer/Plan Group    MEDICARE MEDICARE A & B      Payor Plan Address Payor Plan Phone Number Effective From Effective To    [address] [phone] 12/1/2009     [place] [address]       Subscriber Name Subscriber Birth Date Member KENYON REID 1945 [id number]           Secondary Coverage     Payor Plan Insurance Group Employer/Plan Group    [employer] [place] [employer] [place]      Payor Plan Address Payor Plan Phone Number Effective From Effective To    [employer] [place] [phone] 12/1/2009     [place] [address]       Subscriber Name Subscriber Birth Date Member ID       KENYON PHILLIPS 1945 [id number]                 Emergency Contacts      (Rel.) Home Phone Work Phone Mobile Phone    Franc Egan (Daughter) -- -- [phone]               History & Physical      H&P signed by Lui Rick MD at 4/7/2017  3:18 AM              TIME: 10:30 p.m.    PRIMARY CARE PHYSICIAN: Cayetano Crews MD    HISTORY OF PRESENT ILLNESS: Ms. Phillips is a 71-year-old  female who presents to [facility] " [facility] after being found down at home. Ms. Mohamud relates that she went to bed 2 nights ago and woke up in the floor the following morning. She was able to crawl to the bathroom but then became entrapped and was unable to call for help. She was found down in her home by her family this afternoon. Ms. Mohamud has no recall of events. She has no idea if she fell or, perhaps, experienced a syncopal episode. She denies pain at this time. In the emergency department, her laboratory and imaging studies proved essentially unremarkable except for evidence rhabdomyolysis. She will require admission to the hospital for further evaluation and treatment. Note that her family relates that she has been experiencing progressive fatigue, malaise, lethargy, and generalized weakness. She has an unsteady gait, but has not been using her walker as directed. Ms. Mohamud relates a long history of recurring respiratory tract infection. She relates that she has been diagnosed with chronic bronchitis by Dr. Crews. I suspect she also has underlying component of COPD due to her lifelong smoking habit. Presently, she is resting comfortably and is in no distress. She has family present. They have purchased Chinese food for the evening.     REVIEW OF SYSTEMS: Otherwise unremarkable from a cardiovascular, pulmonary, gastrointestinal, genitourinary, neurologic, metabolic and constitutional standpoint except as noted. She relates progressive fatigue, malaise, lethargy, and generalized weakness. These are chronic and worsening problems. She has had no fevers, but relates experiencing chills. She has had no sweats. Her appetite is good. Her weight is stable. She has had no chest pain, chest palpitations, shortness of breath, lower extremity edema, orthopnea, cough, wheezing, or hemoptysis. She has had no abdominal pain, nausea, vomiting, diarrhea, constipation, dyspepsia, dysphagia, odynophagia, hematemesis, hematochezia, or melena. Her family  relates that she has a long history of recurring diarrhea due to irritable bowel syndrome. She has had no pelvic pain, flank pain, hematuria, or dysuria. She has had no skin rashes, arthralgias, myalgias, or swollen joints. She has had no headache, confusion, memory deficits. She has had no recent changes in her vision or hearing. She has had no acute motor or sensory deficits. As noted, she has an unsteady gait. She has no tremors.     PAST MEDICAL HISTORY:  1.  Hypertension.   2.  Dyslipidemia.   3.  Hodgkin's lymphoma.   4.  Chronic bronchitis.   5.  COPD.   6.  “Thyroid problems”?  7.  Diarrhea predominant, irritable bowel syndrome.   8.  Anxiety disorder.   9.  Chronic benzodiazepine use.   10.  Coronary artery disease.   11.  Osteoarthritis.     PAST SURGICAL HISTORY:  1.  Status post splenectomy.   2.  Status post liver biopsy.   3.  Status post appendectomy.   4.  Status post cholecystectomy.   5.  Status post cervical lymph node biopsy.   6.  Status post cardiac stent.   7.  Status post bilateral tubal ligation.     ALLERGIES: PENICILLIN.     HOME MEDICATIONS:  1.  Proventil 2 inhalations b.i.d. p.r.n. for shortness of breath and/or wheezing.   2.  Norvasc 5 mg p.o. daily.   3.  Aspirin 81 mg p.o. daily.   4.  Calcitrate 1 p.o. daily.   5.  Plavix 75 mg p.o. 3 times weekly.   6.  Lasix 20 mg p.o. daily.   7.  Lisinopril 20 mg p.o. daily.   8.  Ativan 2 mg p.o. t.i.d. p.r.n. for anxiety.   9.  Mobic 15 mg p.o. daily.   10.  Toprol-XL 50 mg p.o. daily.   11.  Protonix 40 mg p.o. daily.   12.  Simvastatin 20 mg p.o. daily.     SOCIAL HISTORY: Significant for being a resident of [place], [place]. She lives alone. She is . She has 2 sons and a daughter in good health. She is a retired homemaker. She has a 9th grade education. She smokes half to a pack of cigarettes per day and has smoked her entire adult life. She drinks alcohol on occasion. She has no history of illicit drug use. She is [employer]. She has  no recent history of travel outside this region.     She designates her daughter Franc Egan to serve as a surrogate for healthcare matters should such become necessary.     FAMILY HISTORY: Significant for having 5 brothers and a sister. Two brothers are now ; 1  due to lung cancer, a 2nd brother  due to esophageal cancer. Her surviving 3 brothers and a sister are in apparent good health. Her father is  due to aplastic anemia. Her mother is  due to ruptured brain aneurysm.     PHYSICAL EXAMINATION:  VITAL SIGNS: Temperature is 98.6, pulse is 122, respirations are 16 and unlabored, blood pressure is 164/68, O2 saturations 95% breathing ambient air, weight 180 pounds.     GENERAL: This is a 71-year-old  female appearing her documented age. She is resting comfortably in bed. She is in no apparent distress. She is articulate in her speech. She is interactive and cooperative. She proves to be a fairly good historian.     HEAD AND NECK: Essentially unremarkable except as noted. I see no signs of acute trauma. Eyes, nose, and throat appear grossly unremarkable. Sclerae are clear. There is no discharge from the nostrils. Mucous membranes are moist. Neck is supple. She has no cervical or clavicular adenopathy. She has no definite carotid bruits. There are no masses of the head or neck. Neck veins do not appear pathologically distended. Head and neck exam is suboptimal due to the patient's body habitus.     CARDIAC: Reveals S1 and S2 with a regular rhythm. She has no definite murmurs, rubs, or gallops.     LUNGS: Reveals bilateral breath sounds that are clear to auscultation throughout. She has no rales, wheezes, or rhonchi.     ABDOMEN: Reveals bowel sounds to be present. Her abdomen is nontender, nondistended and soft. She is obese.     EXTREMITIES: No lower extremity edema, erythema or calf tenderness.     NEUROLOGIC: Reveals the patient to be awake and alert. She seems oriented to  person, place, time and situation. Cranial nerves II-XII are grossly intact. She exhibits no definite focal, motor or sensory deficits. She seems able to move all extremities without difficulty and at will. Her gait was not tested.     PSYCHIATRIC: Reveals her mood to be stable. Affect seems appropriate. Thought processes are organized in that she is able to answer questions appropriately and provide a coherent history. Speech is fluent. There is no flight of ideas. There are no obvious short-term or long-term memory deficits.     DIAGNOSTIC DATA: Complete metabolic panel is essentially unremarkable except for an AST of 113. BUN is 33.     CBC is likewise unremarkable except for a white blood cell count of 12.2.     Prothrombin time and PTT are unremarkable.     ProBNP is 389.     Amylase and lipase are within normal limits.     Creatinine kinase is 2778. Myoglobin 1125.     Lactic acid is 1.5.     Urinalysis is essentially unremarkable except for trace ketones.     Chest x-ray demonstrates no acute abnormalities.     CT study of the cervical spine demonstrates degenerative changes with no acute abnormalities.     CT of the head without contrast demonstrates atrophy with extensive white matter disease. No acute intracranial abnormalities are noted.     EKG demonstrates sinus rhythm of 117 beats per minute.     IMPRESSION:  1.  Fall due to uncertain causes.  2.  Rhabdomyolysis.   3.  Hypertension.   4.  Dyslipidemia.   5.  Coronary artery disease.   6.  Anxiety disorder.   7.  Chronic benzodiazepine use.     PLAN: At this time, Ms. Mohamud will be admitted to [facility] for further evaluation and treatment. Her admitting diagnoses are as noted. Her condition at this time is judged to be stable. She will be placed on telemetry.     I have asked the nursing staff to obtain vital signs per protocol. She will be confined to bedrest with bathroom privileges with assistance. As noted, she is allergic or  intolerant to PENICILLIN. I have asked the nursing staff to monitor input and output. Daily weights will be obtained. Neurologic checks will be obtained every 4 hours. She will be maintained on a regular diet as tolerated. IV fluids will consist of normal saline at 125 mL/h. Oxygen will be used as needed to maintain her O2 saturations greater than 92%. She is a FULL CODE. Fall precautions are to be instituted.     INITIAL ADMITTING MEDICATIONS:   1.  Lovenox 40 mg subcutaneous daily.   2.  Norvasc 5 mg p.o. daily.   3.  Aspirin 81 mg p.o. daily.   4.  Plavix 75 mg p.o. daily.   5.  Lisinopril 20 mg p.o. daily.   6.  Toprol-XL 50 mg p.o. daily.   7.  Zocor 20 mg p.o. daily.   8.  Protonix 40 mg p.o. daily.   9.  Tylenol 650 mg p.o. q.6 h. p.r.n. for fever and/or discomfort.   10.  Norco 5 mg 1 p.o. q.6 h. p.r.n. for pain.   11.  Xanax 0.25 mg p.o. q.8 h. p.r.n. for anxiety.   12.  DuoNeb 1 unit q.4 h. p.r.n. for shortness of breath.   13.  Zofran 4 mg IV q.6 h. p.r.n. for nausea and vomiting.     I will obtain followup laboratory studies in the morning.     I will obtain a cardiac echocardiogram and carotid Doppler study.     I will continue to follow Ms. Mohamud closely through the night pending return of the hospitalist team in the morning. The nursing staff may call should they have any questions or concerns. Please refer to the medical record for additional information, orders and/or comments.       cc:  SB BOURGEOIS M.D. [id number]  D:  04/06/2017 23:46:44(Eastern Time)  T:  04/07/2017 00:11:04(Eastern Time)  Voice ID:  [id number]/Document ID:  [id number]       Electronically signed by Lui Rick MD at 4/7/2017  3:18 AM        Hospital Medications (active)       Dose Frequency Start End    albuterol (PROVENTIL) nebulizer solution 0.083% 2.5 mg/3mL 2.5 mg Once 4/6/2017 4/6/2017    Sig - Route: Take 2.5 mg by nebulization 1 (One) Time. - Nebulization    Cosign for Ordering: Accepted  by Pilar Martino MD on 4/6/2017  6:19 PM    amLODIPine (NORVASC) tablet 5 mg 5 mg Daily 4/7/2017     Sig - Route: Take 1 tablet by mouth Daily. - Oral    aspirin EC tablet 81 mg 81 mg Daily 4/7/2017     Sig - Route: Take 1 tablet by mouth Daily. - Oral    clopidogrel (PLAVIX) tablet 75 mg 75 mg 3 Times Weekly 4/7/2017     Sig - Route: Take 1 tablet by mouth Once per day on Mon Wed Fri. - Oral    enoxaparin (LOVENOX) syringe 40 mg 40 mg Daily 4/7/2017     Sig - Route: Inject 0.4 mL under the skin Daily. - Subcutaneous    HYDROcodone-acetaminophen (NORCO) 5-325 MG per tablet 1 tablet 1 tablet Every 6 Hours PRN 4/6/2017 4/16/2017    Sig - Route: Take 1 tablet by mouth Every 6 (Six) Hours As Needed for Moderate Pain (4-6). - Oral    ipratropium-albuterol (DUO-NEB) nebulizer solution 3 mL 3 mL Every 4 Hours PRN 4/6/2017     Sig - Route: Take 3 mL by nebulization Every 4 (Four) Hours As Needed for Wheezing or Shortness of Air. - Nebulization    lisinopril (PRINIVIL,ZESTRIL) tablet 20 mg 20 mg Daily 4/7/2017     Sig - Route: Take 1 tablet by mouth Daily. - Oral    LORazepam (ATIVAN) tablet 1 mg 1 mg 3 Times Daily PRN 4/7/2017     Sig - Route: Take 1 tablet by mouth 3 (Three) Times a Day As Needed for Anxiety. - Oral    metoprolol succinate XL (TOPROL-XL) 24 hr tablet 50 mg 50 mg Every 24 Hours Scheduled 4/7/2017     Sig - Route: Take 1 tablet by mouth Daily. - Oral    metoprolol tartrate (LOPRESSOR) injection 5 mg 5 mg Every 6 Hours PRN 4/7/2017     Sig - Route: Infuse 5 mL into a venous catheter Every 6 (Six) Hours As Needed (for HR sustained above 120). - Intravenous    ondansetron (ZOFRAN) injection 4 mg 4 mg Every 6 Hours PRN 4/6/2017     Sig - Route: Infuse 2 mL into a venous catheter Every 6 (Six) Hours As Needed for Nausea or Vomiting. - Intravenous    pantoprazole (PROTONIX) EC tablet 40 mg 40 mg Every Early Morning 4/7/2017     Sig - Route: Take 1 tablet by mouth Every Morning. - Oral    perflutren  (DEFINITY) 8.476 mg in sodium chloride 10 mL injection 5 mL Once 4/7/2017 4/7/2017    Sig - Route: Infuse 5 mL into a venous catheter 1 (One) Time. - Intravenous    phenol (CHLORASEPTIC) 1.4 % liquid 2 spray 2 spray Every 2 Hours PRN 4/7/2017     Sig - Route: Apply 2 sprays to the mouth or throat Every 2 (Two) Hours As Needed for Sore Throat. - Mouth/Throat    potassium chloride (MICRO-K) CR capsule 20 mEq 20 mEq 2 Times Daily With Meals 4/7/2017     Sig - Route: Take 2 capsules by mouth 2 (Two) Times a Day With Meals. - Oral    sodium chloride 0.9 % bolus 500 mL 500 mL Once 4/6/2017 4/6/2017    Sig - Route: Infuse 500 mL into a venous catheter 1 (One) Time. - Intravenous    sodium chloride 0.9 % flush 1-10 mL 1-10 mL As Needed 4/6/2017     Sig - Route: Infuse 1-10 mL into a venous catheter As Needed for Line Care. - Intravenous    sodium chloride 0.9 % infusion 125 mL/hr Continuous 4/6/2017     Sig - Route: Infuse 125 mL/hr into a venous catheter Continuous. - Intravenous    acetaminophen (TYLENOL) tablet 650 mg (Discontinued) 650 mg Every 6 Hours PRN 4/6/2017 4/7/2017    Sig - Route: Take 2 tablets by mouth Every 6 (Six) Hours As Needed for Mild Pain (1-3) or Fever. - Oral    ALPRAZolam (XANAX) tablet 0.25 mg (Discontinued) 0.25 mg Every 8 Hours PRN 4/6/2017 4/7/2017    Sig - Route: Take 1 tablet by mouth Every 8 (Eight) Hours As Needed for Anxiety. - Oral    amLODIPine (NORVASC) tablet 5 mg (Discontinued) 5 mg Every 24 Hours Scheduled 4/7/2017 4/7/2017    Sig - Route: Take 1 tablet by mouth Daily. - Oral    aspirin EC tablet 81 mg (Discontinued) 81 mg Daily 4/7/2017 4/7/2017    Sig - Route: Take 1 tablet by mouth Daily. - Oral    atorvastatin (LIPITOR) tablet 20 mg (Discontinued) 20 mg Nightly 4/6/2017 4/7/2017    Sig - Route: Take 2 tablets by mouth Every Night. - Oral    clopidogrel (PLAVIX) tablet 75 mg (Discontinued) 75 mg Daily 4/7/2017 4/7/2017    Sig - Route: Take 1 tablet by mouth Daily. - Oral     lisinopril (PRINIVIL,ZESTRIL) tablet 20 mg (Discontinued) 20 mg Every 24 Hours Scheduled 2017    Sig - Route: Take 1 tablet by mouth Daily. - Oral    metoprolol succinate XL (TOPROL-XL) 24 hr tablet 50 mg (Discontinued) 50 mg Daily 2017    Sig - Route: Take 1 tablet by mouth Daily. - Oral    pantoprazole (PROTONIX) EC tablet 40 mg (Discontinued) 40 mg Daily 2017    Sig - Route: Take 1 tablet by mouth Daily. - Oral          Orders (last 24 hrs)     Start     Ordered    17 0600  CBC (No Diff)  Morning Draw      17 0802    17 0600  Basic Metabolic Panel  Morning Draw      17 0802    17 0600  CK  Morning Draw      17 0802    17 1519  phenol (CHLORASEPTIC) 1.4 % liquid 2 spray  Every 2 Hours PRN      17 1519    17 1506  Inpatient Consult to Case Management   Once     Provider:  (Not yet assigned)    17 1505    17 1332  PT Plan of Care Cert / Re-Cert  Once     Comments:  Physical Therapy Plan of Care  Initial Certification  Certification Period: 2017 - 2017    Patient Name: Josi Mohamud  : 1945    (T79.6XXA) Traumatic rhabdomyolysis, initial encounter  (primary encounter diagnosis)  (Z74.09) Impaired functional mobility and activity tolerance                  Assessment                 IP PT Goals       17 0951          Bed Mobility PT LTG    Bed Mobility PT LTG, Date Established 17  -SUBHASH (r) TS (t) SUBHASH (c)      Bed Mobility PT LTG, Time to Achieve by discharge  -SUBHASH (r) TS (t) SUBHASH (c)        Bed Mobility PT LTG, Activity Type supine to sit/sit to supine  -SUBHASH (r)   TS (t) SUBHASH (c)      Bed Mobility PT LTG, [place] Level contact guard assist  -SUBHASH (r) TS   (t) SUBHASH (c)      Transfer Training PT LTG    Transfer Training PT LTG, Date Established 17  -SUBHASH (r) TS (t) SUBHASH   (c)      Transfer Training PT LTG, Time to Achieve by discharge  -SUBHASH (r) TS (t) SUBHASH   (c)      Transfer  Training PT LTG, Activity Type sit to stand/stand to sit  -SUBHASH   (r) TS (t) SUBHASH (c)      Transfer Training PT LTG, [place] Level supervision required  -SUBHASH   (r) TS (t) SUBHASH (c)      Transfer Training PT LTG, Assist Device walker, rolling  -SUBHASH (r) TS (t)   SUBHASH (c)      Gait Training PT LTG    Gait Training Goal PT LTG, Date Established 04/07/17  -SUBHASH (r) TS (t) SUBHASH   (c)      Gait Training Goal PT LTG, Time to Achieve by discharge  -SUBHASH (r) TS (t)   SUBHASH (c)      Gait Training Goal PT LTG, [place] Level supervision required  -SUBHASH   (r) TS (t) SUBHASH (c)      Gait Training Goal PT LTG, Assist Device walker, rolling  -SUBHASH (r) TS (t)   SUBHASH (c)      Gait Training Goal PT LTG, Distance to Achieve 150  -SUBHASH (r) TS (t) SUHBASH (c)          User Key  (r) = Recorded By, (t) = Taken By, (c) = Cosigned By    Initials Name Provider Type    SUBHASH Moe, PT DPT Physical Therapist    GREER Bates, PT Student PT Student            PT OP Goals       04/07/17 1000       Time Calculation    PT Goal Re-Cert Due Date 04/17/17  -SUBHASH (r) TS (t) SUBHASH (c)       User Key  (r) = Recorded By, (t) = Taken By, (c) = Cosigned By    Initials Name Provider Type    SUBHASH Moe, PT DPT Physical Therapist    GREER Bates, PT Student PT Student            Plan  PT Plan  PT Frequency: daily, 2 times/day  Predicted Duration of Therapy Intervention (days/wks): until d/c  Planned Therapy Interventions: balance training, bed mobility training, gait training, home exercise program, patient/family education, strengthening, transfer training  Outcome Summary/Follow up Plan: PT eval completed. Pt in a-fib at rest with HR between 109-155 bpm. Pt was min assist for bed mobility and CGA with standing and ambulation. Gait distance limited by HR reaching 188 although pt remained aymptomatic, HR at 153 bpm post activity. Pt instructed to use RW with ambulation. Pt will benefit from continued PT intervention to improve functional strength, endurance, and  activity tolerance. Anticipate d/c to home with assist vs home with HH pending progress.         Alexandre Moe, PT DPT  4/7/2017            By cosigning this order, either electronically or physically, I certify that the therapy services are furnished while this patient is under my care, the services outline above are required by this patient, and will be reviewed every 30 days.        M.D.:__________________________________________ Date: ______________    04/07/17 1332    04/07/17 1145  perflutren (DEFINITY) 8.476 mg in sodium chloride 10 mL injection  Once      04/07/17 1111    04/07/17 1111  Adult Transthoracic Echo Complete With Contrast  Once      04/06/17 2248    04/07/17 1013  TSH  Once      04/07/17 0950    04/07/17 0952  Please obtain cath report/other cardiology testing or notes from Dr. Broadbent. Thanks.  Once     Comments:  Please obtain cath report/other cardiology testing or notes from Dr. Broadbent. Thanks.    04/07/17 0952    04/07/17 0951  Magnesium  Once      04/07/17 0950    04/07/17 0940  metoprolol tartrate (LOPRESSOR) injection 5 mg  Every 6 Hours PRN      04/07/17 0940    04/07/17 0925  ECG 12 Lead  STAT      04/07/17 0924    04/07/17 0900  enoxaparin (LOVENOX) syringe 40 mg  Daily      04/06/17 2153    04/07/17 0900  aspirin EC tablet 81 mg  Daily,   Status:  Discontinued      04/06/17 2248 04/07/17 0900  clopidogrel (PLAVIX) tablet 75 mg  Daily,   Status:  Discontinued      04/06/17 2248 04/07/17 0900  amLODIPine (NORVASC) tablet 5 mg  Every 24 Hours Scheduled,   Status:  Discontinued      04/06/17 2248 04/07/17 0900  lisinopril (PRINIVIL,ZESTRIL) tablet 20 mg  Every 24 Hours Scheduled,   Status:  Discontinued      04/06/17 2248 04/07/17 0900  metoprolol succinate XL (TOPROL-XL) 24 hr tablet 50 mg  Every 24 Hours Scheduled      04/06/17 2248 04/07/17 0900  amLODIPine (NORVASC) tablet 5 mg  Daily      04/07/17 0757    04/07/17 0900  aspirin EC tablet 81 mg  Daily       04/07/17 0757    04/07/17 0900  clopidogrel (PLAVIX) tablet 75 mg  Once per day on Mon Wed Fri 04/07/17 0757    04/07/17 0900  lisinopril (PRINIVIL,ZESTRIL) tablet 20 mg  Daily      04/07/17 0757    04/07/17 0900  metoprolol succinate XL (TOPROL-XL) 24 hr tablet 50 mg  Daily,   Status:  Discontinued      04/07/17 0757    04/07/17 0900  pantoprazole (PROTONIX) EC tablet 40 mg  Daily,   Status:  Discontinued      04/07/17 0757    04/07/17 0802  Acetaminophen Level  STAT      04/07/17 0802    04/07/17 0802  Salicylate Level  STAT      04/07/17 0802    04/07/17 0802  Urine Drug Screen  STAT      04/07/17 0802    04/07/17 0802  MRI Brain Without Contrast  1 Time Imaging      04/07/17 0802    04/07/17 0802  Troponin  STAT      04/07/17 0802    04/07/17 0800  potassium chloride (MICRO-K) CR capsule 20 mEq  2 Times Daily With Meals      04/07/17 0724    04/07/17 0757  LORazepam (ATIVAN) tablet 1 mg  3 Times Daily PRN      04/07/17 0757    04/07/17 0723  Inpatient Consult to Cardiology  Once     Specialty:  Cardiology  Provider:  Jose Monteiro MD    04/07/17 0722    04/07/17 0658  ECG 12 Lead  STAT      04/07/17 0658    04/07/17 0611  ECG 12 Lead  STAT      04/07/17 0611    04/07/17 0600  Basic Metabolic Panel  Morning Draw      04/06/17 1958 04/07/17 0600  CBC (No Diff)  Morning Draw      04/06/17 1958 04/07/17 0600  CK  Morning Draw      04/06/17 1958 04/07/17 0600  pantoprazole (PROTONIX) EC tablet 40 mg  Every Early Morning      04/06/17 2246    04/07/17 0000  Vital Signs  Every 4 Hours     Comments:  Per per hospital policy    04/06/17 2153    04/07/17 0000  Commode Chair      04/07/17 1507    04/06/17 2330  atorvastatin (LIPITOR) tablet 20 mg  Nightly,   Status:  Discontinued      04/06/17 2248    04/06/17 2250  PT Consult: Eval & Treat  Once      04/06/17 2249 04/06/17 2249  Adult Transthoracic Echo Complete  Once,   Status:  Canceled      04/06/17 2248 04/06/17 2249  US Carotid Bilateral  1 Time  Imaging      04/06/17 2248 04/06/17 2245  ALPRAZolam (XANAX) tablet 0.25 mg  Every 8 Hours PRN,   Status:  Discontinued      04/06/17 2245 04/06/17 2200  Strict Intake and Output  Every Hour      04/06/17 2153 04/06/17 2200  Incentive Spirometry  Every 4 Hours While Awake      04/06/17 2153 04/06/17 2154  Weigh patient  Once      04/06/17 2153 04/06/17 2154  Daily Weights  Daily      04/06/17 2153 04/06/17 2154  Fall Precautions  Continuous      04/06/17 2153 04/06/17 2154  Oxygen Therapy-  Continuous      04/06/17 2153 04/06/17 2154  Insert Peripheral IV  Once      04/06/17 2153 04/06/17 2154  Saline Lock & Maintain IV Access  Continuous      04/06/17 2153 04/06/17 2154  Full Code  Continuous      04/06/17 2153 04/06/17 2154  VTE Risk Assessment - Moderate Risk  Once      04/06/17 2153 04/06/17 2154  Place Compression Stockings (TEDs)  Once      04/06/17 2153 04/06/17 2154  Remove & Replace Compression Stockings (TEDS) Daily  Daily      04/06/17 2153 04/06/17 2154  Cardiac Monitoring  Continuous      04/06/17 2153 04/06/17 2154  Activity - Bed Rest With Exceptions (specify)  Until Discontinued      04/06/17 2153 04/06/17 2154  Diet Regular  Diet Effective Now      04/06/17 2153 04/06/17 2153  sodium chloride 0.9 % flush 1-10 mL  As Needed      04/06/17 2153 04/06/17 2116  POC Troponin, Rapid  Once      04/06/17 2115 04/06/17 2023  ECG 12 Lead  Once      04/06/17 2023 04/06/17 2000  Advance Diet As Tolerated  Until Discontinued      04/06/17 1959 04/06/17 1958  acetaminophen (TYLENOL) tablet 650 mg  Every 6 Hours PRN,   Status:  Discontinued      04/06/17 1958 04/06/17 1958  ondansetron (ZOFRAN) injection 4 mg  Every 6 Hours PRN      04/06/17 1958 04/06/17 1958  HYDROcodone-acetaminophen (NORCO) 5-325 MG per tablet 1 tablet  Every 6 Hours PRN      04/06/17 1958 04/06/17 1958  ipratropium-albuterol (DUO-NEB) nebulizer solution 3 mL  Every 4 Hours  PRN      04/06/17 1958    04/06/17 1956  Inpatient Admission  Once      04/06/17 1955 04/06/17 1754  albuterol (PROVENTIL) nebulizer solution 0.083% 2.5 mg/3mL  Once      04/06/17 1752    04/06/17 1753  Troponin  Once      04/06/17 1752    04/06/17 1624  sodium chloride 0.9 % bolus 500 mL  Once      04/06/17 1622    04/06/17 1624  sodium chloride 0.9 % infusion  Continuous      04/06/17 1622    04/06/17 1622  CT Head Without Contrast  1 Time Imaging      04/06/17 1622    04/06/17 1622  CT Cervical Spine Without Contrast  1 Time Imaging      04/06/17 1622    04/06/17 1622  XR Chest 1 View  1 Time Imaging      04/06/17 1622    04/06/17 1622  Apply collar  Once      04/06/17 1622    04/06/17 1622  NPO Diet  Diet Effective Now,   Status:  Canceled      04/06/17 1622    04/06/17 1622  Cardiac monitoring  Once      04/06/17 1622    04/06/17 1622  Pulse Oximetry, Continuous  Per Hospital Policy      04/06/17 1622    04/06/17 1622  Monitor Blood Pressure  Per Hospital Policy      04/06/17 1622    04/06/17 1622  Vital signs  Per Hospital Policy      04/06/17 1622    04/06/17 1622  POCT Troponin, rapid  Now Then Every 3 Hours      04/06/17 1622    04/06/17 1621  CBC & Differential  Once      04/06/17 1622 04/06/17 1621  Comprehensive Metabolic Panel  Once      04/06/17 1622    04/06/17 1621  Protime-INR  Once      04/06/17 1622    04/06/17 1621  aPTT  Once      04/06/17 1622    04/06/17 1621  BNP  Once      04/06/17 1622 04/06/17 1621  Amylase  Once      04/06/17 1622    04/06/17 1621  Lipase  Once      04/06/17 1622    04/06/17 1621  Urinalysis With / Culture If Indicated  Once      04/06/17 1622    04/06/17 1621  CK  Once      04/06/17 1622    04/06/17 1621  Myoglobin, Serum  Once      04/06/17 1622    04/06/17 1621  Procalcitonin  Once      04/06/17 1622    04/06/17 1621  Lactic Acid, Plasma  Once      04/06/17 1622    04/06/17 1621  Blood Culture  Once      04/06/17 1622    04/06/17 1621  Blood Culture  Once       04/06/17 1622    04/06/17 1621  ECG 12 Lead  Once      04/06/17 1622    04/06/17 1621  CBC Auto Differential  PROCEDURE ONCE      04/06/17 1622    Unscheduled  Oxygen Therapy- Nasal Cannula; 2 LPM; Titrate for SPO2: equal to or greater than, 92%  As Needed      04/06/17 1622    --  metoprolol succinate XL (TOPROL-XL) 50 MG 24 hr tablet  Daily      04/06/17 2109    --  albuterol (PROVENTIL HFA;VENTOLIN HFA) 108 (90 BASE) MCG/ACT inhaler  2 Times Daily PRN      04/06/17 2109    --  amLODIPine (NORVASC) 5 MG tablet  Daily      04/06/17 2109    --  lisinopril (PRINIVIL,ZESTRIL) 20 MG tablet  Daily      04/06/17 2109    --  clopidogrel (PLAVIX) 75 MG tablet  3 Times Weekly      04/06/17 2109    --  meloxicam (MOBIC) 15 MG tablet  Daily      04/06/17 2109    --  calcium citrate-vitamin d (CALCITRATE) 315-250 MG-UNIT tablet tablet  Daily      04/06/17 2109    --  aspirin 81 MG EC tablet  Daily      04/06/17 2109    --  furosemide (LASIX) 20 MG tablet  Daily PRN      04/06/17 2109    --  pantoprazole (PROTONIX) 40 MG EC tablet  Daily      04/06/17 2109    --  simvastatin (ZOCOR) 20 MG tablet  Daily      04/06/17 2109    --  LORazepam (ATIVAN) 2 MG tablet  3 Times Daily PRN      04/06/17 2109    --  SCANNED - TELEMETRY        04/06/17 0000          Physical Therapy Notes (all)     No notes of this type exist for this encounter.

## 2017-04-07 NOTE — PROGRESS NOTES
Acute Care - Physical Therapy Initial Evaluation  [facility]     Patient Name: Josi Mohamud  : 1945  MRN: [id number]  Today's Date: 2017   Onset of Illness/Injury or Date of Surgery Date: 17  Date of Referral to PT: 17  Referring Physician: Dr. Rick (Weakness, unsteady gait)      Admit Date: 2017     Visit Dx:    ICD-10-CM ICD-9-CM   1. Traumatic rhabdomyolysis, initial encounter T79.6XXA 958.6   2. Impaired functional mobility and activity tolerance Z74.09 V49.89     Patient Active Problem List   Diagnosis   • Traumatic rhabdomyolysis     Past Medical History:   Diagnosis Date   • Cancer     HODGKINS LYMPHOMA   • Disease of thyroid gland    • History of transfusion    • Hyperlipidemia    • Hypertension      Past Surgical History:   Procedure Laterality Date   • APPENDECTOMY     • APPENDECTOMY     • CHOLECYSTECTOMY     • CORONARY ANGIOPLASTY WITH STENT PLACEMENT     • LIVER BIOPSY     • SPLENECTOMY            PT ASSESSMENT (last 72 hours)      PT Evaluation       17 0817 17 2306    Rehab Evaluation    Document Type evaluation   See MAR  -SUBHASH (r) TS (t) SUBHASH (c)     Subjective Information agree to therapy;complains of;pain;weakness;fatigue;dyspnea  -SUBHASH (r) TS (t) SUBHASH (c)     Patient Effort, Rehab Treatment good  -SUBHASH (r) TS (t) SUBHASH (c)     General Information    Patient Profile Review yes  -SUBHASH (r) TS (t) SUBHASH (c)     Onset of Illness/Injury or Date of Surgery Date 17  -SUBHASH (r) TS (t) SUBHASH (c)     Referring Physician Dr. Rick   Weakness, unsteady gait  -SUBHASH (r) TS (t) SUBHASH (c)     General Observations fowlers, IV, alert talking on the phone  -SUBHASH (r) TS (t) SUBHASH (c)     Pertinent History Of Current Problem Pt found down at home by family and unable to recall events that lead to this; Dx: Traumatic Rhabdomyolysis  -SUBHASH (r) TS (t) SUBHASH (c)     Precautions/Limitations fall precautions  -SUBHASH (r) TS (t) SUBHASH (c)     Prior Level of Function independent:;all household mobility;community  mobility;bathing;dressing;feeding;driving   pt only able to walk short distances  -SUBHASH (r) GREER (t) SUBHASH (c)     Equipment Currently Used at Home walker, rolling;shower chair;cane, straight  -SUBHASH (r) GREER (t) SUBHASH (c)     Plans/Goals Discussed With patient;agreed upon  -SUBHASH (r) GREER (t) SUBHASH (c)     Risks Reviewed patient:;LOB;nausea/vomiting;dizziness;change in vital signs  -SUBHASH (r) GREER (t) SUBHASH (c)     Benefits Reviewed patient:;improve function;increase independence;increase strength;increase balance;increase knowledge  -SUBHASH (r) TS (t) SUBHASH (c)     Barriers to Rehab none identified  -SUBHASH (r) TS (t) SUBHASH (c)     Living Environment    Lives With alone  -SUBHASH (r) GREER (t) SUBHASH (c) alone  -BC    Living Arrangements house  -SUBHASH (r) GREER (t) SUBHASH (c) house  -BC    Home Accessibility bed and bath on same level  -SUBHASH (r) GREER (t) SUBAHSH (c) no concerns  -BC    Stair Railings at Home  none  -BC    Type of Financial/Environmental Concern  none  -BC    Transportation Available  car;family or friend will provide  -BC    Living Environment Comment  n/a  -BC    Clinical Impression    Date of Referral to PT 04/06/17  -SUBHASH (r) GREER (t) SUBHASH (c)     PT Diagnosis Impaired mobility  -SUBHASH (r) GREER (t) SUBHASH (c)     Functional Level At Time Of Evaluation ambulated 60' with RW and CGA  -SUBHASH (r) GREER (t) SUBHASH (c)     Patient/Family Goals Statement to get home  -SUBHASH (r) GREER (t) SUBHASH (c)     Criteria for Skilled Therapeutic Interventions Met yes;treatment indicated  -SUBHASH (r) GREER (t) SUBHASH (c)     Impairments Found (describe specific impairments) gait, locomotion, and balance  -SUBHASH (r) TS (t) SUBHASH (c)     Functional Limitations in Following Categories (Describe Specific Limitations) self-care;home management;community/leisure  -SUBHASH (r) TS (t) SUBHASH (c)     Disability: Inability to Perform Actions/Activities of Required Roles (describe specific disability) community/leisure  -SUBHASH (r) GREER (t) SUBHASH (c)     Rehab Potential good, to achieve stated therapy goals  -SUBHASH (r) GREER (t) SUBHASH (c)     Predicted Duration of Therapy  Intervention (days/wks) until d/c  -SUBHASH (r) TS (t) SUBHASH (c)     Vital Signs    Pre Systolic BP Rehab 153  -SUBHASH (r) TS (t) SUBHASH (c)     Pre Treatment Diastolic   -SUBHASH (r) TS (t) SUBHASH (c)     Post Systolic BP Rehab 125  -SUBHASH (r) TS (t) SUBHASH (c)     Post Treatment Diastolic BP 62  -SUBHASH (r) TS (t) SUBHASH (c)     Pretreatment Heart Rate (beats/min) 109  -SUBHASH (r) TS (t) SUBHASH (c)     Intratreatment Heart Rate (beats/min) 155   150-188 with activity  -SUBHASH (r) TS (t) SUBHASH (c)     Posttreatment Heart Rate (beats/min) 153  -SUBHASH (r) TS (t) SUBHASH (c)     Pre SpO2 (%) 95  -SUBHASH (r) TS (t) SUBHASH (c)     O2 Delivery Pre Treatment room air  -SUBHASH (r) TS (t) SUBHASH (c)     Post SpO2 (%) 96  -SUBHASH (r) TS (t) SUBHASH (c)     O2 Delivery Post Treatment room air  -SUBHASH (r) TS (t) SUBHASH (c)     Pre Patient Position Supine  -SUBHASH (r) TS (t) SUBHASH (c)     Post Patient Position Sitting  -SUBHASH     Pain Assessment    Pain Assessment 0-10  -SUBHASH (r) TS (t) SUBHASH (c)     Pain Score 5  -SUBHASH (r) TS (t) SUBHASH (c)     Pain Location Generalized  -SUBHASH (r) TS (t) SUBHASH (c)     Pain Intervention(s) Repositioned;Ambulation/increased activity  -SUBHASH (r) TS (t) SUBHASH (c)     Response to Interventions tolerated  -SUBHASH (r) TS (t) SUBHASH (c)     Vision Assessment/Intervention    Visual Impairment WFL with corrective lenses   pt reports macular degeneration  -SUBHASH (r) TS (t) SUBHASH (c)     Cognitive Assessment/Intervention    Current Cognitive/Communication Assessment functional  -SUBHASH (r) TS (t) SUBHASH (c)     Orientation Status oriented x 4  -SUBHASH (r) TS (t) SUBHASH (c)     Follows Commands/Answers Questions 100% of the time;able to follow multi-step instructions;needs cueing;needs increased time  -SUBHASH (r) TS (t) SUBHASH (c)     Personal Safety WNL/WFL  -SUBHASH (r) TS (t) SUBHASH (c)     Personal Safety Interventions gait belt;fall prevention program maintained;nonskid shoes/slippers when out of bed;supervised activity  -SUBHASH (r) TS (t) SUBHASH (c)     ROM (Range of Motion)    General ROM Detail B shoulder AROM limited 50%, elbows WFL; B hip flexion AROM limited 50%,  knees and ankles WFL  -SUBHASH (r) TS (t) SUBHASH (c)     MMT (Manual Muscle Testing)    General MMT Assessment Detail B hip flexion 2/5, knee ext 4-/5, ankle DF 4/5, hip abduction 4-/5  -SUBHASH (r) TS (t) SUBHASH (c)     Bed Mobility, Assessment/Treatment    Bed Mobility, Assistive Device bed rails;head of bed elevated  -SUBHASH (r) TS (t) SUBHASH (c)     Bed Mob, Supine to Sit, [place] verbal cues required;minimum assist (75% patient effort)  -SUBHASH (r) TS (t) SUBHASH (c)     Bed Mobility, Safety Issues decreased use of arms for pushing/pulling  -SUBHASH (r) TS (t) SUBHASH (c)     Bed Mobility, Impairments strength decreased  -SUBHASH (r) TS (t) SUBHASH (c)     Bed Mobility, Comment Pt demonstrated adequate static sitting balance on EOB reaching outside of base of support with no LOB  -SUBHASH (r) TS (t) SUBHASH (c)     Transfer Assessment/Treatment    Transfers, Sit-Stand [place] contact guard assist;verbal cues required  -SUBHASH (r) TS (t) SUBHASH (c)     Transfers, Stand-Sit [place] contact guard assist;verbal cues required  -SUBHASH (r) TS (t) SUBHASH (c)     Transfers, Sit-Stand-Sit, Assist Device rolling walker  -SUBHASH (r) TS (t) SUBHASH (c)     Toilet Transfer, [place] contact guard assist;verbal cues required  -SUBHASH (r) TS (t) SUBHASH (c)     Toilet Transfer, Assistive Device rolling walker  -SUBHASH (r) TS (t) SUBHASH (c)     Transfer, Safety Issues step length decreased  -SUBHASH (r) TS (t) SUBHASH (c)     Transfer, Impairments strength decreased  -SUBHASH (r) TS (t) SUBHASH (c)     Gait Assessment/Treatment    Gait, [place] Level verbal cues required;contact guard assist  -SUBHASH (r) TS (t) SUBHASH (c)     Gait, Assistive Device rolling walker  -SUBHASH (r) TS (t) SUBHASH (c)     Gait, Distance (Feet) 60  -SUBHASH (r) TS (t) SUBHASH (c)     Gait, Gait Pattern Analysis swing-to gait  -SUBHASH (r) TS (t) SUBHASH (c)     Gait, Gait Deviations stacey decreased;forward flexed posture;narrow base;step length decreased;stride length decreased;decreased heel strike  -SUBHASH (r) TS (t) SUBHASH (c)     Gait, Safety Issues step length decreased  -SUBHASH (r) TS (t) SUBHASH  (c)     Gait, Impairments strength decreased  -SUBHASH (r) TS (t) SUBHASH (c)     Gait, Comment Gait distance by HR reaching 188 with ambulation, pt remained asymptomatic  -SUBHASH (r) TS (t) SUBHASH (c)     Motor Skills/Interventions    Additional Documentation Balance Skills Training (Group)  -SUBHASH (r) TS (t) SUBHASH (c)     Balance Skills Training    Sitting-Level of Assistance Distant supervision  -SUBHASH (r) TS (t) SUBHASH (c)     Sitting-Balance Support Right upper extremity supported;Left upper extremity supported;Feet supported  -SUBHASH (r) TS (t) SUBHASH (c)     Standing-Level of Assistance Contact guard  -SUBHASH (r) TS (t) SUBHASH (c)     Static Standing Balance Support assistive device  -SUBHASH (r) TS (t) SUBHASH (c)     Gait Balance-Level of Assistance Contact guard  -SUBHASH (r) TS (t) SUBHASH (c)     Gait Balance Support assistive device  -SUBHASH (r) TS (t) SUBHASH (c)     Sensory Assessment/Intervention    Sensory Impairment --   no deficits found  -SUBHASH (r) TS (t) SUBHASH (c)     Positioning and Restraints    Pre-Treatment Position in bed  -SUBHASH (r) TS (t) SUBHASH (c)     Post Treatment Position chair  -SUBHASH (r) TS (t) SUBHASH (c)     In Chair reclined;call light within reach;encouraged to call for assist;patient within staff view  -SUBHASH (r) TS (t) SUBHASH (c)       04/06/17 9979       General Information    Equipment Currently Used at Home walker, rolling;cane, straight  -BC       User Key  (r) = Recorded By, (t) = Taken By, (c) = Cosigned By    Initials Name Provider Type    SUBHASH Moe, PT DPT Physical Therapist    BC Angie Preciado, RN Registered Nurse    GREER Bates, PT Student PT Student          Physical Therapy Education     Title: PT OT SLP Therapies (Active)     Topic: Physical Therapy (Active)     Point: Mobility training (Done)    Learning Progress Summary    Learner Readiness Method Response Comment Documented by Status   Patient Acceptance E VU Pt educated on benefits of mobility and PT plan of care.  04/07/17 0959 Done                      User Key     Initials Effective  Dates Name Provider Type Discipline     03/09/17 -  Javier Bates PT Student PT Student PT                PT Recommendation and Plan  Anticipated Discharge Disposition: home with assist, home with home health  Planned Therapy Interventions: balance training, bed mobility training, gait training, home exercise program, patient/family education, strengthening, transfer training  PT Frequency: daily, 2 times/day  Plan of Care Review  Plan Of Care Reviewed With: patient  Outcome Summary/Follow up Plan: PT eval completed. Pt in a-fib at rest with HR between 109-155 bpm. Pt was min assist for bed mobility and CGA with standing and ambulation. Gait distance limited by HR reaching 188 although pt remained aymptomatic, HR at 153 bpm post activity. Pt instructed to use RW with ambulation. Pt will benefit from continued PT intervention to improve functional strength, endurance, and activity tolerance. Anticipate d/c to home with assist vs home with HH pending progress.           IP PT Goals       04/07/17 0951          Bed Mobility PT LTG    Bed Mobility PT LTG, Date Established 04/07/17  -SUBHASH (r) TS (t) SUBHASH (c)      Bed Mobility PT LTG, Time to Achieve by discharge  -SUBHASH (r) TS (t) SUBHASH (c)      Bed Mobility PT LTG, Activity Type supine to sit/sit to supine  -SUBHASH (r) TS (t) SUBHASH (c)      Bed Mobility PT LTG, [place] Level contact guard assist  -SUBHASH (r) TS (t) SUBHASH (c)      Transfer Training PT LTG    Transfer Training PT LTG, Date Established 04/07/17  -SUBHASH (r) TS (t) SUBHASH (c)      Transfer Training PT LTG, Time to Achieve by discharge  -SUBHASH (r) TS (t) SUBHASH (c)      Transfer Training PT LTG, Activity Type sit to stand/stand to sit  -SUBHASH (r) TS (t) SUBHASH (c)      Transfer Training PT LTG, [place] Level supervision required  -SUBHASH (r) TS (t) SUBHASH (c)      Transfer Training PT LTG, Assist Device walker, rolling  -SUBHASH (r) TS (t) SUBHASH (c)      Gait Training PT LTG    Gait Training Goal PT LTG, Date Established 04/07/17  -SUBHASH (r) TS (t) SUBHASH (c)       Gait Training Goal PT LTG, Time to Achieve by discharge  -SUBHASH (r) TS (t) SUBHASH (c)      Gait Training Goal PT LTG, [place] Level supervision required  -SUBHASH (r) TS (t) SUBHASH (c)      Gait Training Goal PT LTG, Assist Device walker, rolling  -SUBHASH (r) TS (t) SUBHASH (c)      Gait Training Goal PT LTG, Distance to Achieve 150  -SUBHASH (r) TS (t) SUBHASH (c)        User Key  (r) = Recorded By, (t) = Taken By, (c) = Cosigned By    Initials Name Provider Type    SUBHASH Moe, PT DPT Physical Therapist    GREER Bates, PT Student PT Student                Outcome Measures       04/07/17 0817          How much help from another person do you currently need...    Turning from your back to your side while in flat bed without using bedrails? 3  -SUBHASH (r) TS (t) SUBHASH (c)      Moving from lying on back to sitting on the side of a flat bed without bedrails? 3  -SUBHASH (r) TS (t) SUBHASH (c)      Moving to and from a bed to a chair (including a wheelchair)? 3  -SUBHASH (r) TS (t) SUBHASH (c)      Standing up from a chair using your arms (e.g., wheelchair, bedside chair)? 3  -SUBHASH (r) TS (t) SUBHASH (c)      Climbing 3-5 steps with a railing? 2  -SUBHASH (r) TS (t) SUBHASH (c)      To walk in hospital room? 3  -SUBHASH (r) TS (t) SUBHASH (c)      AM-PAC 6 Clicks Score 17  -SUBHASH (r) TS (t)      Functional Assessment    Outcome Measure Options AM-PAC 6 Clicks Basic Mobility (PT)  -SUBHASH (r) TS (t) SUBHASH (c)        User Key  (r) = Recorded By, (t) = Taken By, (c) = Cosigned By    Initials Name Provider Type    SUBHASH Moe, PT DPT Physical Therapist    GREER Bates, PT Student PT Student           Time Calculation:         PT Charges       04/07/17 1000          Time Calculation    Start Time 0817  -SUBHASH (r) TS (t) SUBHASH (c)      Stop Time 0920  -SUBHASH (r) TS (t) SUBHASH (c)      Time Calculation (min) 63 min  -SUBHASH (r) TS (t)      PT Received On 04/07/17  -SUBHASH (r) TS (t) SUBHASH (c)      PT Goal Re-Cert Due Date 04/17/17  -SUBHASH (r) TS (t) SUBHASH (c)        User Key  (r) = Recorded By, (t) = Taken By, (c) =  Cosigned By    Initials Name Provider Type    SUBHASH Moe, PT DPT Physical Therapist    TS Javier Bates, PT Student PT Student          Therapy Charges for Today     Code Description Service Date Service Provider Modifiers Qty    90337344310 HC PT EVAL MOD COMPLEXITY 4 4/7/2017 Javier Bates PT Student GP 1          PT G-Codes  Outcome Measure Options: AM-PAC 6 Clicks Basic Mobility (PT)  Score: 17  Functional Limitation: Mobility: Walking and moving around  Mobility: Walking and Moving Around Current Status (): At least 40 percent but less than 60 percent impaired, limited or restricted  Mobility: Walking and Moving Around Goal Status (): At least 20 percent but less than 40 percent impaired, limited or restricted      Javier Bates PT Student  4/7/2017

## 2017-04-07 NOTE — H&P
TIME: 10:30 p.m.    PRIMARY CARE PHYSICIAN: Cayetano Crews MD    HISTORY OF PRESENT ILLNESS: Ms. Mohamud is a 71-year-old  female who presents to [facility] after being found down at home. Ms. Mohamud relates that she went to bed 2 nights ago and woke up in the floor the following morning. She was able to crawl to the bathroom but then became entrapped and was unable to call for help. She was found down in her home by her family this afternoon. Ms. Mohamud has no recall of events. She has no idea if she fell or, perhaps, experienced a syncopal episode. She denies pain at this time. In the emergency department, her laboratory and imaging studies proved essentially unremarkable except for evidence rhabdomyolysis. She will require admission to the hospital for further evaluation and treatment. Note that her family relates that she has been experiencing progressive fatigue, malaise, lethargy, and generalized weakness. She has an unsteady gait, but has not been using her walker as directed. Ms. Mohamud relates a long history of recurring respiratory tract infection. She relates that she has been diagnosed with chronic bronchitis by Dr. Crews. I suspect she also has underlying component of COPD due to her lifelong smoking habit. Presently, she is resting comfortably and is in no distress. She has family present. They have purchased Chinese food for the evening.     REVIEW OF SYSTEMS: Otherwise unremarkable from a cardiovascular, pulmonary, gastrointestinal, genitourinary, neurologic, metabolic and constitutional standpoint except as noted. She relates progressive fatigue, malaise, lethargy, and generalized weakness. These are chronic and worsening problems. She has had no fevers, but relates experiencing chills. She has had no sweats. Her appetite is good. Her weight is stable. She has had no chest pain, chest palpitations, shortness of breath, lower extremity edema, orthopnea, cough, wheezing, or  hemoptysis. She has had no abdominal pain, nausea, vomiting, diarrhea, constipation, dyspepsia, dysphagia, odynophagia, hematemesis, hematochezia, or melena. Her family relates that she has a long history of recurring diarrhea due to irritable bowel syndrome. She has had no pelvic pain, flank pain, hematuria, or dysuria. She has had no skin rashes, arthralgias, myalgias, or swollen joints. She has had no headache, confusion, memory deficits. She has had no recent changes in her vision or hearing. She has had no acute motor or sensory deficits. As noted, she has an unsteady gait. She has no tremors.     PAST MEDICAL HISTORY:  1.  Hypertension.   2.  Dyslipidemia.   3.  Hodgkin's lymphoma.   4.  Chronic bronchitis.   5.  COPD.   6.  “Thyroid problems”?  7.  Diarrhea predominant, irritable bowel syndrome.   8.  Anxiety disorder.   9.  Chronic benzodiazepine use.   10.  Coronary artery disease.   11.  Osteoarthritis.     PAST SURGICAL HISTORY:  1.  Status post splenectomy.   2.  Status post liver biopsy.   3.  Status post appendectomy.   4.  Status post cholecystectomy.   5.  Status post cervical lymph node biopsy.   6.  Status post cardiac stent.   7.  Status post bilateral tubal ligation.     ALLERGIES: PENICILLIN.     HOME MEDICATIONS:  1.  Proventil 2 inhalations b.i.d. p.r.n. for shortness of breath and/or wheezing.   2.  Norvasc 5 mg p.o. daily.   3.  Aspirin 81 mg p.o. daily.   4.  Calcitrate 1 p.o. daily.   5.  Plavix 75 mg p.o. 3 times weekly.   6.  Lasix 20 mg p.o. daily.   7.  Lisinopril 20 mg p.o. daily.   8.  Ativan 2 mg p.o. t.i.d. p.r.n. for anxiety.   9.  Mobic 15 mg p.o. daily.   10.  Toprol-XL 50 mg p.o. daily.   11.  Protonix 40 mg p.o. daily.   12.  Simvastatin 20 mg p.o. daily.     SOCIAL HISTORY: Significant for being a resident of [place], [place]. She lives alone. She is . She has 2 sons and a daughter in good health. She is a retired homemaker. She has a 9th grade education. She smokes  half to a pack of cigarettes per day and has smoked her entire adult life. She drinks alcohol on occasion. She has no history of illicit drug use. She is [employer]. She has no recent history of travel outside this region.     She designates her daughter Franc Egan to serve as a surrogate for healthcare matters should such become necessary.     FAMILY HISTORY: Significant for having 5 brothers and a sister. Two brothers are now ; 1  due to lung cancer, a 2nd brother  due to esophageal cancer. Her surviving 3 brothers and a sister are in apparent good health. Her father is  due to aplastic anemia. Her mother is  due to ruptured brain aneurysm.     PHYSICAL EXAMINATION:  VITAL SIGNS: Temperature is 98.6, pulse is 122, respirations are 16 and unlabored, blood pressure is 164/68, O2 saturations 95% breathing ambient air, weight 180 pounds.     GENERAL: This is a 71-year-old  female appearing her documented age. She is resting comfortably in bed. She is in no apparent distress. She is articulate in her speech. She is interactive and cooperative. She proves to be a fairly good historian.     HEAD AND NECK: Essentially unremarkable except as noted. I see no signs of acute trauma. Eyes, nose, and throat appear grossly unremarkable. Sclerae are clear. There is no discharge from the nostrils. Mucous membranes are moist. Neck is supple. She has no cervical or clavicular adenopathy. She has no definite carotid bruits. There are no masses of the head or neck. Neck veins do not appear pathologically distended. Head and neck exam is suboptimal due to the patient's body habitus.     CARDIAC: Reveals S1 and S2 with a regular rhythm. She has no definite murmurs, rubs, or gallops.     LUNGS: Reveals bilateral breath sounds that are clear to auscultation throughout. She has no rales, wheezes, or rhonchi.     ABDOMEN: Reveals bowel sounds to be present. Her abdomen is nontender, nondistended and  soft. She is obese.     EXTREMITIES: No lower extremity edema, erythema or calf tenderness.     NEUROLOGIC: Reveals the patient to be awake and alert. She seems oriented to person, place, time and situation. Cranial nerves II-XII are grossly intact. She exhibits no definite focal, motor or sensory deficits. She seems able to move all extremities without difficulty and at will. Her gait was not tested.     PSYCHIATRIC: Reveals her mood to be stable. Affect seems appropriate. Thought processes are organized in that she is able to answer questions appropriately and provide a coherent history. Speech is fluent. There is no flight of ideas. There are no obvious short-term or long-term memory deficits.     DIAGNOSTIC DATA: Complete metabolic panel is essentially unremarkable except for an AST of 113. BUN is 33.     CBC is likewise unremarkable except for a white blood cell count of 12.2.     Prothrombin time and PTT are unremarkable.     ProBNP is 389.     Amylase and lipase are within normal limits.     Creatinine kinase is 2778. Myoglobin 1125.     Lactic acid is 1.5.     Urinalysis is essentially unremarkable except for trace ketones.     Chest x-ray demonstrates no acute abnormalities.     CT study of the cervical spine demonstrates degenerative changes with no acute abnormalities.     CT of the head without contrast demonstrates atrophy with extensive white matter disease. No acute intracranial abnormalities are noted.     EKG demonstrates sinus rhythm of 117 beats per minute.     IMPRESSION:  1.  Fall due to uncertain causes.  2.  Rhabdomyolysis.   3.  Hypertension.   4.  Dyslipidemia.   5.  Coronary artery disease.   6.  Anxiety disorder.   7.  Chronic benzodiazepine use.     PLAN: At this time, Ms. Mohamud will be admitted to [facility] for further evaluation and treatment. Her admitting diagnoses are as noted. Her condition at this time is judged to be stable. She will be placed on telemetry.     I  have asked the nursing staff to obtain vital signs per protocol. She will be confined to bedrest with bathroom privileges with assistance. As noted, she is allergic or intolerant to PENICILLIN. I have asked the nursing staff to monitor input and output. Daily weights will be obtained. Neurologic checks will be obtained every 4 hours. She will be maintained on a regular diet as tolerated. IV fluids will consist of normal saline at 125 mL/h. Oxygen will be used as needed to maintain her O2 saturations greater than 92%. She is a FULL CODE. Fall precautions are to be instituted.     INITIAL ADMITTING MEDICATIONS:   1.  Lovenox 40 mg subcutaneous daily.   2.  Norvasc 5 mg p.o. daily.   3.  Aspirin 81 mg p.o. daily.   4.  Plavix 75 mg p.o. daily.   5.  Lisinopril 20 mg p.o. daily.   6.  Toprol-XL 50 mg p.o. daily.   7.  Zocor 20 mg p.o. daily.   8.  Protonix 40 mg p.o. daily.   9.  Tylenol 650 mg p.o. q.6 h. p.r.n. for fever and/or discomfort.   10.  Norco 5 mg 1 p.o. q.6 h. p.r.n. for pain.   11.  Xanax 0.25 mg p.o. q.8 h. p.r.n. for anxiety.   12.  DuoNeb 1 unit q.4 h. p.r.n. for shortness of breath.   13.  Zofran 4 mg IV q.6 h. p.r.n. for nausea and vomiting.     I will obtain followup laboratory studies in the morning.     I will obtain a cardiac echocardiogram and carotid Doppler study.     I will continue to follow Ms. Mohamud closely through the night pending return of the hospitalist team in the morning. The nursing staff may call should they have any questions or concerns. Please refer to the medical record for additional information, orders and/or comments.       cc:  SB BOURGEOIS M.D. [id number]  D:  04/06/2017 23:46:44(Eastern Time)  T:  04/07/2017 00:11:04(Eastern Time)  Voice ID:  [id number]/Document ID:  [id number]

## 2017-04-07 NOTE — PLAN OF CARE
Problem: Patient Care Overview (Adult)  Goal: Plan of Care Review  Outcome: Ongoing (interventions implemented as appropriate)    04/07/17 0951   Coping/Psychosocial Response Interventions   Plan Of Care Reviewed With patient   Outcome Evaluation   Outcome Summary/Follow up Plan PT eval completed. Pt in a-fib at rest with HR between 109-155 bpm. Pt was min assist for bed mobility and CGA with standing and ambulation. Gait distance limited by HR reaching 188 although pt remained aymptomatic, HR at 153 bpm post activity. Pt instructed to use RW with ambulation. Pt will benefit from continued PT intervention to improve functional strength, endurance, and activity tolerance. Anticipate d/c to home with assist vs home with HH pending progress.          Problem: Inpatient Physical Therapy  Goal: Bed Mobility Goal LTG- PT  Outcome: Ongoing (interventions implemented as appropriate)    04/07/17 0951   Bed Mobility PT LTG   Bed Mobility PT LTG, Date Established 04/07/17   Bed Mobility PT LTG, Time to Achieve by discharge   Bed Mobility PT LTG, Activity Type supine to sit/sit to supine   Bed Mobility PT LTG, [place] Level contact guard assist       Goal: Transfer Training Goal 1 LTG- PT  Outcome: Ongoing (interventions implemented as appropriate)    04/07/17 0951   Transfer Training PT LTG   Transfer Training PT LTG, Date Established 04/07/17   Transfer Training PT LTG, Time to Achieve by discharge   Transfer Training PT LTG, Activity Type sit to stand/stand to sit   Transfer Training PT LTG, [place] Level supervision required   Transfer Training PT LTG, Assist Device walker, rolling       Goal: Gait Training Goal LTG- PT  Outcome: Ongoing (interventions implemented as appropriate)    04/07/17 0951   Gait Training PT LTG   Gait Training Goal PT LTG, Date Established 04/07/17   Gait Training Goal PT LTG, Time to Achieve by discharge   Gait Training Goal PT LTG, [place] Level supervision required   Gait Training  Goal PT LTG, Assist Device walker, rolling   Gait Training Goal PT LTG, Distance to Achieve 150

## 2017-04-07 NOTE — PROGRESS NOTES
[facility] Medicine Services  INPATIENT PROGRESS NOTE    Length of Stay: 1  Date of Admission: 4/6/2017  Primary Care Physician: Cayetano Crews MD    Subjective   Chief Complaint: Follow-up for possible SVT  HPI   Patient in the unit because of concern for SVT.  EKG and telemetry now look more like sinus tach.  She is medically stable.  Denies any chest pain.  Wants to eat.  Asking for sore throat spray.  Cardiology to see.    Review of Systems   All pertinent negatives and positives are as above. All other systems have been reviewed and are negative unless otherwise stated.     Objective    Temp:  [97.5 °F (36.4 °C)-99.8 °F (37.7 °C)] 98.2 °F (36.8 °C)  Heart Rate:  [] 97  Resp:  [16-24] 20  BP: (125-164)/(41-79) 130/54  Physical Exam  Gen.: Patient awake, alert, no distress  Heart: S1-S2, no murmur, gallop, or rub  Lungs: Clear to auscultation bilaterally, normal respiratory effort  Abdomen: Soft, nontender and nondistended, positive bowel sounds, no mass  Extremities: No edema    Results Review:  I have reviewed the labs, radiology results, and diagnostic studies.    Laboratory Data:     Results from last 7 days  Lab Units 04/07/17  0519 04/06/17  1743   WBC 10*3/mm3 10.25 12.15*   HEMOGLOBIN g/dL 12.3 15.0   HEMATOCRIT % 35.7* 41.9   PLATELETS 10*3/mm3 254 270          Results from last 7 days  Lab Units 04/07/17  0519 04/06/17  1743   SODIUM mmol/L 139 142   POTASSIUM mmol/L 3.3* 4.1   CHLORIDE mmol/L 106 101   TOTAL CO2 mmol/L 21.0* 21.0*   BUN mg/dL 37* 33*   CREATININE mg/dL 0.70 0.86   CALCIUM mg/dL 8.4 9.4   BILIRUBIN mg/dL  --  0.8   ALK PHOS U/L  --  107   ALT (SGPT) U/L  --  40   AST (SGOT) U/L  --  113*   GLUCOSE mg/dL 83 75       Culture Data:   Blood Culture   Date Value Ref Range Status   04/06/2017 No growth at less than 24 hours  Preliminary   04/06/2017 No growth at less than 24 hours  Preliminary       Radiology Data:   Imaging Results (last 24  hours)     Procedure Component Value Units Date/Time    XR Chest 1 View [[id number]] Collected:  04/06/17 1637     Updated:  04/06/17 1641    Narrative:       Comparison: 08/23/2016     HISTORY:  Trauma     One-view chest: Upright frontal projection of the chest is obtained. The  lungs are clear with no evidence of acute parenchymal  consolidation. No  pleural effusion or free air is observed. The  mediastinal contours are  within normal limits.   A calcified AP window lymph node is present  related to old healed granulomatous disease.                                                                                                                       Impression:       Impression: No evidence of acute cardiopulmonary disease.  This report was finalized on 04/06/2017 16:38 by Dr. Aric Rosen MD.    CT Cervical Spine Without Contrast [[id number]] Collected:  04/06/17 1821     Updated:  04/06/17 1828    Narrative:       CT CERVICAL SPINE without contrast dated 4/6/2017 5:38 PM CDT     HISTORY: Trauma     COMPARISON: None      DOSE LENGTH PRODUCT: 241 mGy cm     TECHNIQUE: Serial helical tomographic images of the cervical spine were  obtained without the use of intravenous contrast. Additionally, sagittal  and coronal reformatted images were also provided for review.      FINDINGS:   Multilevel degenerative changes are seen in the cervical spine. There is  no evidence of acute fracture or subluxation. The prevertebral soft  tissues are within normal limits. The posterior elements are intact.  Vertebral body heights are maintained.     The visualized skull base is intact. The visualized thorax demonstrates  no acute abnormality.       Impression:       1. Degenerative changes in the cervical spine without evidence of acute  osseous injury.     This report was finalized on 04/06/2017 18:25 by Dr. Frank Pack MD.    CT Head Without Contrast [[id number]] Collected:  04/06/17 1820     Updated:  04/06/17 1835     Narrative:       CT BRAIN WITHOUT CONTRAST 4/6/2017 5:38 PM CDT     HISTORY: Fall.     COMPARISON: None      DLP: 697 mGy cm     TECHNIQUE: Serial axial tomographic images of the brain were obtained  without the use of intravenous contrast.      FINDINGS:   There is atrophy. There are extensive white matter changes in the  subcortical and periventricular white matter. No abnormal extra-axial  blood products are identified on this examination. No mass effect or  midline shift. Atherosclerosis in the intracranial portions of the  carotid arteries.     The surrounding soft tissue structures demonstrate no acute findings.  The visualized osseous structures are without fractures. The mastoid air  cells and paranasal sinuses appear clear.       Impression:       1. Atrophy and extensive white matter changes in the subcortical and  periventricular white matter. This is nonspecific and may be related to  chronic microvascular ischemia.  2. No definite evidence of acute intracranial process. No abnormal  extra-axial blood products.  This report was finalized on 04/06/2017 18:32 by Dr. Frank Pack MD.         Ref. Range 4/6/2017 17:43 4/7/2017 05:19   Creatine Kinase Latest Ref Range: 0 - 203 U/L 2778 (H) 1512 (H)       I have reviewed the patient current medications.     Assessment/Plan     Hospital Problem List     Traumatic rhabdomyolysis        1.traumatic rhabdomyolysis-after fall.  IV fluid hydration, continue to trend CK.  Not sure why she fell-she is on a significant amount of Ativan, will reduce.  Check tox screen, Tylenol/salicylate levels.  Check MRI brain.  Hold statin for now.    2.concern for SVT-looks like sinus tach at this time.  IV fluid hydration.  Cardiology to see, recheck troponin as troponin is negative ×2 so far.  Resume beta blocker    3.hypertension-home meds resumed    4.COPD-no exacerbation, nebs as needed    Patient clinically looks well, to floor if heart rate is under better control    Nancie  TISH Fam MD   04/07/17   7:51 AM

## 2017-04-07 NOTE — PROGRESS NOTES
Discharge Planning Assessment  [facility]     Patient Name: Josi Mohamud  MRN: [id number]  Today's Date: 4/7/2017    Admit Date: 4/6/2017          Discharge Needs Assessment       04/07/17 1539    Living Environment    Lives With alone    Living Arrangements house    Provides Primary Care For no one    Quality Of Family Relationships supportive    Able to Return to Prior Living Arrangements yes    Discharge Needs Assessment    Concerns To Be Addressed care coordination/care conferences;discharge planning concerns    Readmission Within The Last 30 Days no previous admission in last 30 days    Anticipated Changes Related to Illness other (see comments)   Patient will now require assistance at home from family and HH.    Equipment Needed After Discharge commode    Discharge Facility/Level Of Care Needs home with home health    Transportation Available car;family or friend will provide    Current Discharge Risk lives alone    Discharge Planning Comments Spoke to patient regarding discharge plan/needs.  Daughter was at bedside.  It was agreed patient would go home with .  Patient has a great deal of family support.  Patient and daughter have requested a BSC.  An order for BSC has been provided but will need to be arranged closer to discharge as patient is still in ICU.  Order for HH obtained and patient prefers [facility] as her granddaughter works there.  Referral called to Silvia at [facility] and requested records faxed.  Patient's son also spoke with MIKEY and requested phone number for [employer] Whe[employer].  MIKEY provided son with number to [employer].  [facility] will need to be notified of patient's discharge at [phone].            Discharge Plan     None        Discharge Placement     No information found                Demographic Summary     None            Functional Status     None            Psychosocial     None            Abuse/Neglect     None            Legal     None            Substance Abuse      None            Patient Forms     None          CARLOS Mccall

## 2017-04-08 LAB
AMPHET+METHAMPHET UR QL: NEGATIVE
ANION GAP SERPL CALCULATED.3IONS-SCNC: 11 MMOL/L (ref 4–13)
BARBITURATES UR QL SCN: NEGATIVE
BENZODIAZ UR QL SCN: NEGATIVE
BH CV ECHO MEAS - AO MAX PG (FULL): 2 MMHG
BH CV ECHO MEAS - AO MAX PG: 7.1 MMHG
BH CV ECHO MEAS - AO MEAN PG (FULL): 1 MMHG
BH CV ECHO MEAS - AO MEAN PG: 4 MMHG
BH CV ECHO MEAS - AO ROOT AREA (BSA CORRECTED): 1.6
BH CV ECHO MEAS - AO ROOT AREA: 7.1 CM^2
BH CV ECHO MEAS - AO ROOT DIAM: 3 CM
BH CV ECHO MEAS - AO V2 MAX: 133 CM/SEC
BH CV ECHO MEAS - AO V2 MEAN: 97.4 CM/SEC
BH CV ECHO MEAS - AO V2 VTI: 23.2 CM
BH CV ECHO MEAS - AVA(I,A): 2.6 CM^2
BH CV ECHO MEAS - AVA(I,D): 2.6 CM^2
BH CV ECHO MEAS - AVA(V,A): 2.7 CM^2
BH CV ECHO MEAS - AVA(V,D): 2.7 CM^2
BH CV ECHO MEAS - BSA(HAYCOCK): 1.9 M^2
BH CV ECHO MEAS - BSA: 1.8 M^2
BH CV ECHO MEAS - BZI_BMI: 33.5 KILOGRAMS/M^2
BH CV ECHO MEAS - BZI_METRIC_HEIGHT: 157.5 CM
BH CV ECHO MEAS - BZI_METRIC_WEIGHT: 83 KG
BH CV ECHO MEAS - CONTRAST EF 4CH: 65.1 ML/M^2
BH CV ECHO MEAS - EDV(CUBED): 50.2 ML
BH CV ECHO MEAS - EDV(MOD-SP4): 105 ML
BH CV ECHO MEAS - EDV(TEICH): 57.8 ML
BH CV ECHO MEAS - EF(CUBED): 71.4 %
BH CV ECHO MEAS - EF(MOD-SP4): 65.1 %
BH CV ECHO MEAS - EF(TEICH): 64 %
BH CV ECHO MEAS - ESV(CUBED): 14.3 ML
BH CV ECHO MEAS - ESV(MOD-SP4): 36.6 ML
BH CV ECHO MEAS - ESV(TEICH): 20.8 ML
BH CV ECHO MEAS - FS: 34.1 %
BH CV ECHO MEAS - IVS/LVPW: 0.98
BH CV ECHO MEAS - IVSD: 1.2 CM
BH CV ECHO MEAS - LA DIMENSION: 3.8 CM
BH CV ECHO MEAS - LA/AO: 1.3
BH CV ECHO MEAS - LAT PEAK E' VEL: 14.9 CM/SEC
BH CV ECHO MEAS - LV DIASTOLIC VOL/BSA (35-75): 57 ML/M^2
BH CV ECHO MEAS - LV MASS(C)D: 150.4 GRAMS
BH CV ECHO MEAS - LV MASS(C)DI: 81.7 GRAMS/M^2
BH CV ECHO MEAS - LV MAX PG: 5.1 MMHG
BH CV ECHO MEAS - LV MEAN PG: 3 MMHG
BH CV ECHO MEAS - LV SYSTOLIC VOL/BSA (12-30): 19.9 ML/M^2
BH CV ECHO MEAS - LV V1 MAX: 113 CM/SEC
BH CV ECHO MEAS - LV V1 MEAN: 78.6 CM/SEC
BH CV ECHO MEAS - LV V1 VTI: 19 CM
BH CV ECHO MEAS - LVIDD: 3.7 CM
BH CV ECHO MEAS - LVIDS: 2.4 CM
BH CV ECHO MEAS - LVLD AP4: 7.6 CM
BH CV ECHO MEAS - LVLS AP4: 6.5 CM
BH CV ECHO MEAS - LVOT AREA (M): 3.1 CM^2
BH CV ECHO MEAS - LVOT AREA: 3.1 CM^2
BH CV ECHO MEAS - LVOT DIAM: 2 CM
BH CV ECHO MEAS - LVPWD: 1.2 CM
BH CV ECHO MEAS - MED PEAK E' VEL: 9.79 CM/SEC
BH CV ECHO MEAS - MV DEC TIME: 0.11 SEC
BH CV ECHO MEAS - MV E MAX VEL: 134 CM/SEC
BH CV ECHO MEAS - RAP SYSTOLE: 10 MMHG
BH CV ECHO MEAS - RVSP: 32.8 MMHG
BH CV ECHO MEAS - SI(AO): 89.1 ML/M^2
BH CV ECHO MEAS - SI(CUBED): 19.5 ML/M^2
BH CV ECHO MEAS - SI(LVOT): 32.4 ML/M^2
BH CV ECHO MEAS - SI(MOD-SP4): 37.2 ML/M^2
BH CV ECHO MEAS - SI(TEICH): 20.1 ML/M^2
BH CV ECHO MEAS - SV(AO): 164 ML
BH CV ECHO MEAS - SV(CUBED): 35.9 ML
BH CV ECHO MEAS - SV(LVOT): 59.7 ML
BH CV ECHO MEAS - SV(MOD-SP4): 68.4 ML
BH CV ECHO MEAS - SV(TEICH): 37 ML
BH CV ECHO MEAS - TR MAX VEL: 239 CM/SEC
BUN BLD-MCNC: 15 MG/DL (ref 5–21)
BUN/CREAT SERPL: 27.3 (ref 7–25)
CALCIUM SPEC-SCNC: 8.9 MG/DL (ref 8.4–10.4)
CANNABINOIDS SERPL QL: NEGATIVE
CHLORIDE SERPL-SCNC: 109 MMOL/L (ref 98–110)
CK SERPL-CCNC: 1404 U/L (ref 0–203)
CO2 SERPL-SCNC: 25 MMOL/L (ref 24–31)
COCAINE UR QL: NEGATIVE
CREAT BLD-MCNC: 0.55 MG/DL (ref 0.5–1.4)
DEPRECATED RDW RBC AUTO: 49.6 FL (ref 40–54)
ERYTHROCYTE [DISTWIDTH] IN BLOOD BY AUTOMATED COUNT: 13.8 % (ref 12–15)
GFR SERPL CREATININE-BSD FRML MDRD: 109 ML/MIN/1.73
GLUCOSE BLD-MCNC: 73 MG/DL (ref 70–100)
HCT VFR BLD AUTO: 42.6 % (ref 37–47)
HGB BLD-MCNC: 14.8 G/DL (ref 12–16)
LEFT ATRIUM VOLUME INDEX: 20.8 ML/M2
LEFT ATRIUM VOLUME: 38.3 CM3
LV EF 2D ECHO EST: 65 %
MCH RBC QN AUTO: 34.3 PG (ref 28–32)
MCHC RBC AUTO-ENTMCNC: 34.7 G/DL (ref 33–36)
MCV RBC AUTO: 98.8 FL (ref 82–98)
METHADONE UR QL SCN: NEGATIVE
OPIATES UR QL: POSITIVE
PCP UR QL SCN: NEGATIVE
PLATELET # BLD AUTO: 294 10*3/MM3 (ref 130–400)
PMV BLD AUTO: 11.1 FL (ref 6–12)
POTASSIUM BLD-SCNC: 4 MMOL/L (ref 3.5–5.3)
RBC # BLD AUTO: 4.31 10*6/MM3 (ref 4.2–5.4)
SODIUM BLD-SCNC: 145 MMOL/L (ref 135–145)
WBC NRBC COR # BLD: 7.48 10*3/MM3 (ref 4.8–10.8)

## 2017-04-08 PROCEDURE — 80307 DRUG TEST PRSMV CHEM ANLYZR: CPT | Performed by: INTERNAL MEDICINE

## 2017-04-08 PROCEDURE — 99231 SBSQ HOSP IP/OBS SF/LOW 25: CPT | Performed by: INTERNAL MEDICINE

## 2017-04-08 PROCEDURE — 85027 COMPLETE CBC AUTOMATED: CPT | Performed by: INTERNAL MEDICINE

## 2017-04-08 PROCEDURE — 80048 BASIC METABOLIC PNL TOTAL CA: CPT | Performed by: INTERNAL MEDICINE

## 2017-04-08 PROCEDURE — 97110 THERAPEUTIC EXERCISES: CPT

## 2017-04-08 PROCEDURE — 97116 GAIT TRAINING THERAPY: CPT

## 2017-04-08 PROCEDURE — 82550 ASSAY OF CK (CPK): CPT | Performed by: INTERNAL MEDICINE

## 2017-04-08 PROCEDURE — 25010000002 ENOXAPARIN PER 10 MG: Performed by: INTERNAL MEDICINE

## 2017-04-08 RX ORDER — GUAIFENESIN 600 MG/1
1200 TABLET, EXTENDED RELEASE ORAL 2 TIMES DAILY PRN
Status: DISCONTINUED | OUTPATIENT
Start: 2017-04-08 | End: 2017-04-10 | Stop reason: HOSPADM

## 2017-04-08 RX ADMIN — ASPIRIN 81 MG: 81 TABLET ORAL at 09:12

## 2017-04-08 RX ADMIN — PANTOPRAZOLE SODIUM 40 MG: 40 TABLET, DELAYED RELEASE ORAL at 05:29

## 2017-04-08 RX ADMIN — AMLODIPINE BESYLATE 5 MG: 5 TABLET ORAL at 09:12

## 2017-04-08 RX ADMIN — LORAZEPAM 1 MG: 1 TABLET ORAL at 09:17

## 2017-04-08 RX ADMIN — ENOXAPARIN SODIUM 40 MG: 40 INJECTION SUBCUTANEOUS at 09:12

## 2017-04-08 RX ADMIN — POTASSIUM CHLORIDE 20 MEQ: 750 CAPSULE, EXTENDED RELEASE ORAL at 17:08

## 2017-04-08 RX ADMIN — METOPROLOL SUCCINATE 50 MG: 50 TABLET, FILM COATED, EXTENDED RELEASE ORAL at 09:12

## 2017-04-08 RX ADMIN — POTASSIUM CHLORIDE 20 MEQ: 750 CAPSULE, EXTENDED RELEASE ORAL at 09:12

## 2017-04-08 RX ADMIN — SODIUM CHLORIDE 125 ML/HR: 9 INJECTION, SOLUTION INTRAVENOUS at 05:29

## 2017-04-08 RX ADMIN — LISINOPRIL 20 MG: 20 TABLET ORAL at 09:12

## 2017-04-08 NOTE — PLAN OF CARE
Problem: Patient Care Overview (Adult)  Goal: Plan of Care Review  Outcome: Ongoing (interventions implemented as appropriate)    04/08/17 0411   Coping/Psychosocial Response Interventions   Plan Of Care Reviewed With patient   Patient Care Overview   Progress improving   Outcome Evaluation   Outcome Summary/Follow up Plan Oriented patient to room, A/O, ruuningsinus tach on monitor, no c/o pain or discomfort. Cont. with IV fluids, amb to Bathroom x2 with assist of one and a walker. Will continue to monitor

## 2017-04-08 NOTE — PLAN OF CARE
Problem: Patient Care Overview (Adult)  Goal: Plan of Care Review  Outcome: Ongoing (interventions implemented as appropriate)    04/08/17 0414   Coping/Psychosocial Response Interventions   Plan Of Care Reviewed With patient   Patient Care Overview   Progress improving         Problem: Fall Risk (Adult)  Goal: Identify Related Risk Factors and Signs and Symptoms  Outcome: Ongoing (interventions implemented as appropriate)  Goal: Absence of Falls  Outcome: Ongoing (interventions implemented as appropriate)    Problem: Skin Integrity Impairment, Risk/Actual (Adult)  Goal: Identify Related Risk Factors and Signs and Symptoms  Outcome: Ongoing (interventions implemented as appropriate)  Goal: Skin Integrity/Wound Healing  Outcome: Ongoing (interventions implemented as appropriate)

## 2017-04-08 NOTE — PROGRESS NOTES
"[facility], [facility] Progress Note     LOS: 2 days   Patient Care Team:  Cayetano Crews MD as PCP - General  Cayetano Crews MD as PCP - Family Medicine    Chief Complaint:  Syncope, rhabdo, ST    Subjective     Interval History:     Patient was unavailable at time of visit.  D/w nurse.  No cardiac c/o.    Review of Systems:   A 10-point review of systems is obtained and negative except for otherwise mentioned above.    Objective     Vital Sign Min/Max for last 24 hours  Temp  Min: 97.7 °F (36.5 °C)  Max: 98.3 °F (36.8 °C)   BP  Min: 138/79  Max: 166/110   Pulse  Min: 89  Max: 126   Resp  Min: 18  Max: 29   SpO2  Min: 81 %  Max: 97 %   No Data Recorded   Weight  Min: 183 lb (83 kg)  Max: 183 lb (83 kg)     Flowsheet Rows         First Filed Value    Admission Height  62\" (157.5 cm) Documented at 04/06/2017 1619    Admission Weight  180 lb (81.6 kg) Documented at 04/06/2017 1619          Physical Exam:    Results Review:     I reviewed the patient's new clinical results.      Results from last 7 days  Lab Units 04/08/17  0507   WBC 10*3/mm3 7.48   HEMOGLOBIN g/dL 14.8   HEMATOCRIT % 42.6   PLATELETS 10*3/mm3 294       Results from last 7 days  Lab Units 04/08/17  0507   SODIUM mmol/L 145   POTASSIUM mmol/L 4.0   CHLORIDE mmol/L 109   TOTAL CO2 mmol/L 25.0   BUN mg/dL 15   CREATININE mg/dL 0.55   GLUCOSE mg/dL 73   CALCIUM mg/dL 8.9       Results from last 7 days  Lab Units 04/08/17  0507  04/06/17  1743   SODIUM mmol/L 145  < > 142   POTASSIUM mmol/L 4.0  < > 4.1   CHLORIDE mmol/L 109  < > 101   TOTAL CO2 mmol/L 25.0  < > 21.0*   BUN mg/dL 15  < > 33*   CREATININE mg/dL 0.55  < > 0.86   CALCIUM mg/dL 8.9  < > 9.4   BILIRUBIN mg/dL  --   --  0.8   ALK PHOS U/L  --   --  107   ALT (SGPT) U/L  --   --  40   AST (SGOT) U/L  --   --  113*   GLUCOSE mg/dL 73  < > 75   < > = values in this interval not displayed.    Results from last 7 days  Lab Units 04/08/17  0507 " 04/07/17  0850 04/07/17  0519 04/06/17  1743   CK TOTAL U/L 1404*  --  1512* 2778*   TROPONIN I ng/mL  --  <0.012  --  <0.012       Results from last 7 days  Lab Units 04/07/17  0850   TSH mIU/mL 1.650           Medication Review: yes    Assessment/Plan     Active Problems:    Traumatic rhabdomyolysis  Sinus tachycardia  Tobacco use  Chronic obstructive pulmonary disease  Coronary artery disease- stent placement per Dr. Broadbent over 1 year ago per patient report  Hypertension  Hyperlipidemia    Sinus tachycardia noted on telemetry. Continue to monitor.     Kellie Kim PA-C  04/08/17  11:16 AM      Patient was seen, evaluated, and examined with NYDIA.  I agree with assessment and plan as documented.  This note compliments hers.       CC: tachycardia    Vital signs reviewed:  Gen: A&Ox3, NAD  CV: RRR w/o m/r/g  Chest: CTA bilateral, normal respiratory effort    Labs and diagnostic studies reviewed    Assessment:  1.  Tachycardia: Sinus tachycardia only noted from telemetry.  No significant dysrhythmias.  His now normal sinus rhythm.  2.  Rhabdomyolysis  3.  Tobacco abuse  4.  Chronic obstructive pulmonary disease  5.  History of coronary artery disease: Stable      Plan:  - Remained stable from a cardiac standpoint  - Continue current medications  - Please call if additional cardiac issues over the weekend

## 2017-04-08 NOTE — PROGRESS NOTES
Continued Stay Note   Pankaj     Patient Name: Josi Mohamud  MRN: [id number]  Today's Date: 4/8/2017    Admit Date: 4/6/2017          Discharge Plan       04/08/17 1314    Case Management/Social Work Plan    Plan Referral for hh.  Antonina has already set this up with [facility].  They will need to be notified at dc and faxed dc summary/meds.  Phone:  [phone], fax: [phone].  CARLOS Cam.    Patient/Family In Agreement With Plan yes              Discharge Codes     None            CARLOS Flanagan

## 2017-04-08 NOTE — PROGRESS NOTES
[facility] Medicine Services  INPATIENT PROGRESS NOTE    Length of Stay: 2  Date of Admission: 4/6/2017  Primary Care Physician: Cayetano Crews MD    Subjective   Chief Complaint: Follow-up rhabdomyolysis  HPI   Patient sinus rhythm to sinus tach 110s on telemetry. Participating with therapy. Getting up to bathroom, just had bath. Had BM.  Tolerates oral intake.  Patient reports that cardiology is going to put a recorder in her, and that is not mentioned in the formal comments at this time.    Review of Systems   All pertinent negatives and positives are as above. All other systems have been reviewed and are negative unless otherwise stated.     Objective    Temp:  [97.7 °F (36.5 °C)-98.3 °F (36.8 °C)] 98.3 °F (36.8 °C)  Heart Rate:  [] 93  Resp:  [18-29] 18  BP: (138-166)/() 162/73  Physical Exam  Gen.: Patient awake, alert, no distress  Heart: S1-S2, no murmur, gallop, or rub  Lungs: Clear to auscultation bilaterally, normal respiratory effort  Abdomen: Soft, nontender and nondistended, positive bowel sounds, no mass (standing)  Extremities: No edema     Results Review:  I have reviewed the labs, radiology results, and diagnostic studies.    Laboratory Data:     Results from last 7 days  Lab Units 04/08/17  0507 04/07/17 0519 04/06/17  1743   WBC 10*3/mm3 7.48 10.25 12.15*   HEMOGLOBIN g/dL 14.8 12.3 15.0   HEMATOCRIT % 42.6 35.7* 41.9   PLATELETS 10*3/mm3 294 254 270      Ref. Range 4/8/2017 05:07   Creatine Kinase Latest Ref Range: 0 - 203 U/L 1404 (H)          Results from last 7 days  Lab Units 04/08/17  0507 04/07/17  0519 04/06/17  1743   SODIUM mmol/L 145 139 142   POTASSIUM mmol/L 4.0 3.3* 4.1   CHLORIDE mmol/L 109 106 101   TOTAL CO2 mmol/L 25.0 21.0* 21.0*   BUN mg/dL 15 37* 33*   CREATININE mg/dL 0.55 0.70 0.86   CALCIUM mg/dL 8.9 8.4 9.4   BILIRUBIN mg/dL  --   --  0.8   ALK PHOS U/L  --   --  107   ALT (SGPT) U/L  --   --  40   AST (SGOT) U/L  --    --  113*   GLUCOSE mg/dL 73 83 75       Culture Data:   Blood Culture   Date Value Ref Range Status   04/06/2017 No growth at 24 hours  Preliminary   04/06/2017 No growth at 24 hours  Preliminary     ECHO    · Left ventricular wall thickness is consistent with mild concentric hypertrophy.  · Left ventricular function is normal. Estimated EF = 65%.  · Estimated right ventricular systolic pressure from tricuspid regurgitation is normal (<35 mmHg).          I have reviewed the patient current medications.     Assessment/Plan     Hospital Problem List     Traumatic rhabdomyolysis      1.traumatic rhabdomyolysis-after fall. IV fluid hydration, continue to trend CK. Not sure why she fell-she is on a significant amount of Ativan, will reduce.Syncope workup so far.     2.concern for SVT-NSR/ST on tele.Continue beat blocker, Cardio follows.     3.hypertension-home meds resumed, continue to monitor     4.COPD-no exacerbation, nebs as needed    Disposition-hopefully home soon with home health, once rhabdomyolysis significantly resolved and workup negative for cardio.    Nancie Fam MD   04/08/17   2:27 PM

## 2017-04-09 LAB
ANION GAP SERPL CALCULATED.3IONS-SCNC: 11 MMOL/L (ref 4–13)
BUN BLD-MCNC: 10 MG/DL (ref 5–21)
BUN/CREAT SERPL: 16.4 (ref 7–25)
CALCIUM SPEC-SCNC: 9.3 MG/DL (ref 8.4–10.4)
CHLORIDE SERPL-SCNC: 104 MMOL/L (ref 98–110)
CK SERPL-CCNC: 880 U/L (ref 0–203)
CO2 SERPL-SCNC: 28 MMOL/L (ref 24–31)
CREAT BLD-MCNC: 0.61 MG/DL (ref 0.5–1.4)
DEPRECATED RDW RBC AUTO: 49 FL (ref 40–54)
ERYTHROCYTE [DISTWIDTH] IN BLOOD BY AUTOMATED COUNT: 13.6 % (ref 12–15)
GFR SERPL CREATININE-BSD FRML MDRD: 96 ML/MIN/1.73
GLUCOSE BLD-MCNC: 98 MG/DL (ref 70–100)
HCT VFR BLD AUTO: 39.7 % (ref 37–47)
HGB BLD-MCNC: 13.8 G/DL (ref 12–16)
MAGNESIUM SERPL-MCNC: 1.6 MG/DL (ref 1.4–2.2)
MCH RBC QN AUTO: 34.2 PG (ref 28–32)
MCHC RBC AUTO-ENTMCNC: 34.8 G/DL (ref 33–36)
MCV RBC AUTO: 98.5 FL (ref 82–98)
PLATELET # BLD AUTO: 311 10*3/MM3 (ref 130–400)
PMV BLD AUTO: 10.9 FL (ref 6–12)
POTASSIUM BLD-SCNC: 4.1 MMOL/L (ref 3.5–5.3)
RBC # BLD AUTO: 4.03 10*6/MM3 (ref 4.2–5.4)
SODIUM BLD-SCNC: 143 MMOL/L (ref 135–145)
WBC NRBC COR # BLD: 7.33 10*3/MM3 (ref 4.8–10.8)

## 2017-04-09 PROCEDURE — 97116 GAIT TRAINING THERAPY: CPT

## 2017-04-09 PROCEDURE — 80048 BASIC METABOLIC PNL TOTAL CA: CPT | Performed by: INTERNAL MEDICINE

## 2017-04-09 PROCEDURE — 97110 THERAPEUTIC EXERCISES: CPT

## 2017-04-09 PROCEDURE — 83735 ASSAY OF MAGNESIUM: CPT | Performed by: INTERNAL MEDICINE

## 2017-04-09 PROCEDURE — 85027 COMPLETE CBC AUTOMATED: CPT | Performed by: INTERNAL MEDICINE

## 2017-04-09 PROCEDURE — 82550 ASSAY OF CK (CPK): CPT | Performed by: INTERNAL MEDICINE

## 2017-04-09 PROCEDURE — 25010000002 ENOXAPARIN PER 10 MG: Performed by: INTERNAL MEDICINE

## 2017-04-09 RX ORDER — FUROSEMIDE 10 MG/ML
20 INJECTION INTRAMUSCULAR; INTRAVENOUS ONCE
Status: DISCONTINUED | OUTPATIENT
Start: 2017-04-09 | End: 2017-04-09

## 2017-04-09 RX ORDER — FUROSEMIDE 20 MG/1
20 TABLET ORAL ONCE
Status: COMPLETED | OUTPATIENT
Start: 2017-04-09 | End: 2017-04-09

## 2017-04-09 RX ADMIN — ASPIRIN 81 MG: 81 TABLET ORAL at 08:11

## 2017-04-09 RX ADMIN — METOPROLOL SUCCINATE 50 MG: 50 TABLET, FILM COATED, EXTENDED RELEASE ORAL at 08:12

## 2017-04-09 RX ADMIN — FUROSEMIDE 20 MG: 20 TABLET ORAL at 17:44

## 2017-04-09 RX ADMIN — LORAZEPAM 1 MG: 1 TABLET ORAL at 08:20

## 2017-04-09 RX ADMIN — METOPROLOL TARTRATE 5 MG: 5 INJECTION INTRAVENOUS at 00:40

## 2017-04-09 RX ADMIN — ENOXAPARIN SODIUM 40 MG: 40 INJECTION SUBCUTANEOUS at 08:12

## 2017-04-09 RX ADMIN — LISINOPRIL 20 MG: 20 TABLET ORAL at 08:11

## 2017-04-09 RX ADMIN — PANTOPRAZOLE SODIUM 40 MG: 40 TABLET, DELAYED RELEASE ORAL at 06:35

## 2017-04-09 RX ADMIN — SODIUM CHLORIDE 100 ML/HR: 9 INJECTION, SOLUTION INTRAVENOUS at 08:09

## 2017-04-09 RX ADMIN — POTASSIUM CHLORIDE 20 MEQ: 750 CAPSULE, EXTENDED RELEASE ORAL at 17:44

## 2017-04-09 RX ADMIN — AMLODIPINE BESYLATE 5 MG: 5 TABLET ORAL at 08:11

## 2017-04-09 RX ADMIN — POTASSIUM CHLORIDE 20 MEQ: 750 CAPSULE, EXTENDED RELEASE ORAL at 08:11

## 2017-04-09 NOTE — PLAN OF CARE
Problem: Patient Care Overview (Adult)  Goal: Plan of Care Review  Outcome: Ongoing (interventions implemented as appropriate)    04/09/17 1052   Coping/Psychosocial Response Interventions   Plan Of Care Reviewed With patient   Patient Care Overview   Progress progress toward functional goals as expected   Outcome Evaluation   Outcome Summary/Follow up Plan Pt. agreeable to therapy with no c/o's pain. Pt. was stand by assist for supine to sit EOB and sit to stand t/f's. Pt. ambulated 350' with RW and CGA. Required v/cues to increase step length during ambulation. Pt. needed min assist of LE's for EOB to supine t/f. Will continue to benefit from strengthening, endurance, and balance activities.

## 2017-04-09 NOTE — PLAN OF CARE
Problem: Patient Care Overview (Adult)  Goal: Plan of Care Review  Outcome: Ongoing (interventions implemented as appropriate)    04/09/17 2788   Coping/Psychosocial Response Interventions   Plan Of Care Reviewed With patient   Patient Care Overview   Progress improving   Outcome Evaluation   Outcome Summary/Follow up Plan pt denies any pain or discomfort. Maintains saftey. Iv access lost and unable to regain another, MD aware.          Problem: Fall Risk (Adult)  Goal: Identify Related Risk Factors and Signs and Symptoms  Outcome: Ongoing (interventions implemented as appropriate)  Goal: Absence of Falls  Outcome: Ongoing (interventions implemented as appropriate)    Problem: Skin Integrity Impairment, Risk/Actual (Adult)  Goal: Identify Related Risk Factors and Signs and Symptoms  Outcome: Ongoing (interventions implemented as appropriate)  Goal: Skin Integrity/Wound Healing  Outcome: Ongoing (interventions implemented as appropriate)

## 2017-04-09 NOTE — PROGRESS NOTES
[facility] Medicine Services  INPATIENT PROGRESS NOTE    Length of Stay: 3  Date of Admission: 4/6/2017  Primary Care Physician: Cayetano Crews MD    Subjective   Chief Complaint: F/U tachycardia  HPI   Patient has sinus tachycardia last night and is now been in sinus rhythm since then.  Does not feel well.  Eats very well.  Able to bathe self.  Starting to become edematous.    Review of Systems   All pertinent negatives and positives are as above. All other systems have been reviewed and are negative unless otherwise stated.     Objective    Temp:  [97.9 °F (36.6 °C)-98.5 °F (36.9 °C)] 98.3 °F (36.8 °C)  Heart Rate:  [] 95  Resp:  [18-20] 20  BP: (125-168)/() 125/81  Physical Exam  Gen.: Patient awake, alert, no distress  Heart: S1-S2, no murmur, gallop, or rub  Lungs: Clear to auscultation bilaterally, normal respiratory effort  Abdomen: Soft, nontender and nondistended, positive bowel sounds, no mass   Extremities: +1 edema left leg     Results Review:  I have reviewed the labs, radiology results, and diagnostic studies.    Laboratory Data:     Results from last 7 days  Lab Units 04/09/17  0534 04/08/17  0507 04/07/17  0519   WBC 10*3/mm3 7.33 7.48 10.25   HEMOGLOBIN g/dL 13.8 14.8 12.3   HEMATOCRIT % 39.7 42.6 35.7*   PLATELETS 10*3/mm3 311 294 254          Results from last 7 days  Lab Units 04/09/17  0534 04/08/17  0507 04/07/17  0519 04/06/17  1743   SODIUM mmol/L 143 145 139 142   POTASSIUM mmol/L 4.1 4.0 3.3* 4.1   CHLORIDE mmol/L 104 109 106 101   TOTAL CO2 mmol/L 28.0 25.0 21.0* 21.0*   BUN mg/dL 10 15 37* 33*   CREATININE mg/dL 0.61 0.55 0.70 0.86   CALCIUM mg/dL 9.3 8.9 8.4 9.4   BILIRUBIN mg/dL  --   --   --  0.8   ALK PHOS U/L  --   --   --  107   ALT (SGPT) U/L  --   --   --  40   AST (SGOT) U/L  --   --   --  113*   GLUCOSE mg/dL 98 73 83 75       Culture Data:   Blood Culture   Date Value Ref Range Status   04/06/2017 No growth at 2 days   Preliminary   04/06/2017 No growth at 2 days  Preliminary      Ref. Range 4/9/2017 05:34   Creatine Kinase Latest Ref Range: 0 - 203 U/L 880 (H)       I have reviewed the patient current medications.     Assessment/Plan     Hospital Problem List     Traumatic rhabdomyolysis      1.traumatic rhabdomyolysis-after fall. DC IV fluid hydration and dose Lasix x 1, continue to trend CK. Not sure why she fell-she is on a significant amount of Ativan, have reduced.Syncope workup unrevealing so far.      2.concern for SVT-NSR/ST on tele.Continue beat blocker, Cardio follows.      3.hypertension-home meds resumed, continue to monitor      4.COPD-no exacerbation, nebs as needed     Disposition-hopefully home tomorrow with      Nancie Fam MD   04/09/17   3:18 PM

## 2017-04-10 VITALS
RESPIRATION RATE: 18 BRPM | HEIGHT: 62 IN | DIASTOLIC BLOOD PRESSURE: 71 MMHG | HEART RATE: 91 BPM | OXYGEN SATURATION: 91 % | SYSTOLIC BLOOD PRESSURE: 173 MMHG | BODY MASS INDEX: 33.97 KG/M2 | TEMPERATURE: 98.4 F | WEIGHT: 184.6 LBS

## 2017-04-10 LAB
ANION GAP SERPL CALCULATED.3IONS-SCNC: 13 MMOL/L (ref 4–13)
BUN BLD-MCNC: 13 MG/DL (ref 5–21)
BUN/CREAT SERPL: 21.3 (ref 7–25)
CALCIUM SPEC-SCNC: 9.3 MG/DL (ref 8.4–10.4)
CHLORIDE SERPL-SCNC: 101 MMOL/L (ref 98–110)
CK SERPL-CCNC: 341 U/L (ref 0–203)
CO2 SERPL-SCNC: 26 MMOL/L (ref 24–31)
CREAT BLD-MCNC: 0.61 MG/DL (ref 0.5–1.4)
DEPRECATED RDW RBC AUTO: 47.8 FL (ref 40–54)
ERYTHROCYTE [DISTWIDTH] IN BLOOD BY AUTOMATED COUNT: 13.5 % (ref 12–15)
GFR SERPL CREATININE-BSD FRML MDRD: 96 ML/MIN/1.73
GLUCOSE BLD-MCNC: 88 MG/DL (ref 70–100)
HCT VFR BLD AUTO: 36.5 % (ref 37–47)
HGB BLD-MCNC: 12.6 G/DL (ref 12–16)
MAGNESIUM SERPL-MCNC: 1.6 MG/DL (ref 1.4–2.2)
MCH RBC QN AUTO: 33.7 PG (ref 28–32)
MCHC RBC AUTO-ENTMCNC: 34.5 G/DL (ref 33–36)
MCV RBC AUTO: 97.6 FL (ref 82–98)
PLATELET # BLD AUTO: 289 10*3/MM3 (ref 130–400)
PMV BLD AUTO: 10.6 FL (ref 6–12)
POTASSIUM BLD-SCNC: 4.2 MMOL/L (ref 3.5–5.3)
RBC # BLD AUTO: 3.74 10*6/MM3 (ref 4.2–5.4)
S PYO AG THROAT QL: NEGATIVE
SODIUM BLD-SCNC: 140 MMOL/L (ref 135–145)
WBC NRBC COR # BLD: 8.17 10*3/MM3 (ref 4.8–10.8)

## 2017-04-10 PROCEDURE — 83735 ASSAY OF MAGNESIUM: CPT | Performed by: INTERNAL MEDICINE

## 2017-04-10 PROCEDURE — 82550 ASSAY OF CK (CPK): CPT | Performed by: INTERNAL MEDICINE

## 2017-04-10 PROCEDURE — 99233 SBSQ HOSP IP/OBS HIGH 50: CPT | Performed by: NURSE PRACTITIONER

## 2017-04-10 PROCEDURE — 97110 THERAPEUTIC EXERCISES: CPT

## 2017-04-10 PROCEDURE — 97530 THERAPEUTIC ACTIVITIES: CPT

## 2017-04-10 PROCEDURE — 87880 STREP A ASSAY W/OPTIC: CPT | Performed by: FAMILY MEDICINE

## 2017-04-10 PROCEDURE — 80048 BASIC METABOLIC PNL TOTAL CA: CPT | Performed by: INTERNAL MEDICINE

## 2017-04-10 PROCEDURE — 87070 CULTURE OTHR SPECIMN AEROBIC: CPT | Performed by: FAMILY MEDICINE

## 2017-04-10 PROCEDURE — 85027 COMPLETE CBC AUTOMATED: CPT | Performed by: INTERNAL MEDICINE

## 2017-04-10 PROCEDURE — 97116 GAIT TRAINING THERAPY: CPT

## 2017-04-10 PROCEDURE — 25010000002 ENOXAPARIN PER 10 MG: Performed by: INTERNAL MEDICINE

## 2017-04-10 RX ORDER — LORAZEPAM 2 MG/1
1 TABLET ORAL 3 TIMES DAILY PRN
Refills: 0
Start: 2017-04-10 | End: 2019-01-24 | Stop reason: HOSPADM

## 2017-04-10 RX ORDER — AMLODIPINE BESYLATE 10 MG/1
10 TABLET ORAL DAILY
Qty: 30 TABLET | Refills: 0 | Status: ON HOLD | OUTPATIENT
Start: 2017-04-11 | End: 2019-01-23 | Stop reason: DRUGHIGH

## 2017-04-10 RX ORDER — POTASSIUM CHLORIDE 750 MG/1
10 TABLET, FILM COATED, EXTENDED RELEASE ORAL 2 TIMES DAILY
Qty: 30 TABLET | Refills: 0 | Status: ON HOLD | OUTPATIENT
Start: 2017-04-10 | End: 2019-01-23

## 2017-04-10 RX ORDER — LORAZEPAM 1 MG/1
1 TABLET ORAL 3 TIMES DAILY PRN
Qty: 10 TABLET | Refills: 0 | Status: SHIPPED | OUTPATIENT
Start: 2017-04-10 | End: 2017-04-10 | Stop reason: HOSPADM

## 2017-04-10 RX ORDER — GUAIFENESIN 600 MG/1
1200 TABLET, EXTENDED RELEASE ORAL 2 TIMES DAILY PRN
Qty: 15 TABLET | Refills: 0 | Status: SHIPPED | OUTPATIENT
Start: 2017-04-10

## 2017-04-10 RX ORDER — AMLODIPINE BESYLATE 10 MG/1
10 TABLET ORAL DAILY
Status: DISCONTINUED | OUTPATIENT
Start: 2017-04-11 | End: 2017-04-10 | Stop reason: HOSPADM

## 2017-04-10 RX ADMIN — LISINOPRIL 20 MG: 20 TABLET ORAL at 08:39

## 2017-04-10 RX ADMIN — LORAZEPAM 1 MG: 1 TABLET ORAL at 08:42

## 2017-04-10 RX ADMIN — AMLODIPINE BESYLATE 5 MG: 5 TABLET ORAL at 08:39

## 2017-04-10 RX ADMIN — ASPIRIN 81 MG: 81 TABLET ORAL at 08:39

## 2017-04-10 RX ADMIN — POTASSIUM CHLORIDE 20 MEQ: 750 CAPSULE, EXTENDED RELEASE ORAL at 08:39

## 2017-04-10 RX ADMIN — ENOXAPARIN SODIUM 40 MG: 40 INJECTION SUBCUTANEOUS at 08:39

## 2017-04-10 RX ADMIN — PANTOPRAZOLE SODIUM 40 MG: 40 TABLET, DELAYED RELEASE ORAL at 05:35

## 2017-04-10 RX ADMIN — CLOPIDOGREL BISULFATE 75 MG: 75 TABLET, FILM COATED ORAL at 08:39

## 2017-04-10 RX ADMIN — METOPROLOL SUCCINATE 50 MG: 50 TABLET, FILM COATED, EXTENDED RELEASE ORAL at 08:39

## 2017-04-10 NOTE — PROGRESS NOTES
[facility] -  Progress Note     LOS: 4 days   Patient Care Team:  Cayetano Crews MD as PCP - General  Cayetano Crews MD as PCP - Family Medicine    Chief Complaint: syncope    Subjective     Interval History:     Patient Complaints: No current complaints. Denies chest pain, dyspnea, edema, palpitations, syncope, pre syncope         Review of Systems:     Review of Systems   Constitutional: Negative for diaphoresis, fatigue, fever and unexpected weight change.   HENT: Negative for nosebleeds.    Respiratory: Negative for apnea, cough, chest tightness, shortness of breath and wheezing.    Cardiovascular: Negative for chest pain, palpitations and leg swelling.   Gastrointestinal: Negative for abdominal distention, nausea and vomiting.   Genitourinary: Negative for hematuria.   Musculoskeletal: Negative for gait problem.   Skin: Negative for color change.   Neurological: Negative for dizziness, syncope, weakness and light-headedness.     Objective     Vital Sign Min/Max for last 24 hours  Temp  Min: 98.4 °F (36.9 °C)  Max: 99.3 °F (37.4 °C)   BP  Min: 145/72  Max: 176/85   Pulse  Min: 91  Max: 105   Resp  Min: 18  Max: 20   SpO2  Min: 91 %  Max: 96 %   No Data Recorded   Weight  Min: 184 lb 9.6 oz (83.7 kg)  Max: 184 lb 9.6 oz (83.7 kg)     Last Weight    04/09/17  2103   Weight: 184 lb 9.6 oz (83.7 kg)       Physical Exam:    Physical Exam   Constitutional: She is oriented to person, place, and time. She appears well-developed and well-nourished. No distress.   HENT:   Head: Normocephalic and atraumatic.   Eyes: Pupils are equal, round, and reactive to light.   Neck: Normal range of motion. Neck supple. No JVD present. No thyromegaly present.   Cardiovascular: Normal rate, regular rhythm, normal heart sounds and intact distal pulses.  Exam reveals no gallop and no friction rub.    No murmur heard.  Pulses:       Dorsalis pedis pulses are 2+ on the right side, and 2+ on the left side.    Pulmonary/Chest: Effort normal and breath sounds normal. No respiratory distress. She has no wheezes. She has no rales. She exhibits no tenderness.   Abdominal: Soft. Bowel sounds are normal. She exhibits no distension. There is no tenderness.   Musculoskeletal: Normal range of motion. She exhibits no edema.   Neurological: She is alert and oriented to person, place, and time. No cranial nerve deficit.   Skin: Skin is warm and dry. She is not diaphoretic.   Psychiatric: She has a normal mood and affect. Her behavior is normal.     Results Review:   Lab Results (last 72 hours)     Procedure Component Value Units Date/Time    CK [[id number]]  (Abnormal) Collected:  04/08/17 0507    Specimen:  Blood Updated:  04/08/17 0619     Creatine Kinase 1404 (H) U/L     Basic Metabolic Panel [[id number]]  (Abnormal) Collected:  04/08/17 0507    Specimen:  Blood Updated:  04/08/17 0622     Glucose 73 mg/dL      BUN 15 mg/dL      Creatinine 0.55 mg/dL      Sodium 145 mmol/L      Potassium 4.0 mmol/L      Chloride 109 mmol/L      CO2 25.0 mmol/L      Calcium 8.9 mg/dL      eGFR Non African Amer 109 mL/min/1.73      BUN/Creatinine Ratio 27.3 (H)     Anion Gap 11.0 mmol/L     Narrative:       The MDRD GFR formula is only valid for adults with stable renal function between ages 18 and 70.    CBC (No Diff) [[id number]]  (Abnormal) Collected:  04/08/17 0507    Specimen:  Blood Updated:  04/08/17 0658     WBC 7.48 10*3/mm3      RBC 4.31 10*6/mm3      Hemoglobin 14.8 g/dL      Hematocrit 42.6 %      MCV 98.8 (H) fL      MCH 34.3 (H) pg      MCHC 34.7 g/dL      RDW 13.8 %      RDW-SD 49.6 fl      MPV 11.1 fL      Platelets 294 10*3/mm3     Urine Drug Screen [[id number]]  (Abnormal) Collected:  04/08/17 0722    Specimen:  Urine from Urine, Clean Catch Updated:  04/08/17 0851     Amphetamine Screen, Urine Negative     Barbiturates Screen, Urine Negative     Benzodiazepine Screen, Urine Negative     Cocaine Screen, Urine Negative     Methadone  Screen, Urine Negative     Opiate Screen Positive (A)     Phencyclidine (PCP), Urine Negative     THC, Screen, Urine Negative    Narrative:       Negative Thresholds For Drugs Screened in Urine:    Amphetamines          500 ng/ml  Barbiturates          200 ng/ml  Benzodiazepines       200 ng/ml  Cocaine               150 ng/ml  Methadone             150 ng/ml  Opiates               300 ng/ml  Phencyclidine         25 ng/ml  THC                      50 ng/ml    The normal value for all drugs tested is negative. This report includes final unconfirmed screening results.  A positive result by this assay can be, at your request, sent to the Reference Lab for confirmation by gas chromatography. Unconfirmed results must not be used for non-medical purposes, such as employment or legal testing. Clinical consideration should be applied to any drug of abuse test result, particularly when unconfirmed results are used.    CBC (No Diff) [[id number]]  (Abnormal) Collected:  04/09/17 0534    Specimen:  Blood Updated:  04/09/17 0610     WBC 7.33 10*3/mm3      RBC 4.03 (L) 10*6/mm3      Hemoglobin 13.8 g/dL      Hematocrit 39.7 %      MCV 98.5 (H) fL      MCH 34.2 (H) pg      MCHC 34.8 g/dL      RDW 13.6 %      RDW-SD 49.0 fl      MPV 10.9 fL      Platelets 311 10*3/mm3     Magnesium [[id number]]  (Normal) Collected:  04/09/17 0534    Specimen:  Blood Updated:  04/09/17 0622     Magnesium 1.6 mg/dL     Basic Metabolic Panel [[id number]]  (Normal) Collected:  04/09/17 0534    Specimen:  Blood Updated:  04/09/17 0622     Glucose 98 mg/dL      BUN 10 mg/dL      Creatinine 0.61 mg/dL      Sodium 143 mmol/L      Potassium 4.1 mmol/L      Chloride 104 mmol/L      CO2 28.0 mmol/L      Calcium 9.3 mg/dL      eGFR Non African Amer 96 mL/min/1.73      BUN/Creatinine Ratio 16.4     Anion Gap 11.0 mmol/L     Narrative:       The MDRD GFR formula is only valid for adults with stable renal function between ages 18 and 70.    CK [[id number]]  (Abnormal)  Collected:  04/09/17 0534    Specimen:  Blood Updated:  04/09/17 0622     Creatine Kinase 880 (H) U/L     Blood Culture [[id number]]  (Normal) Collected:  04/06/17 1743    Specimen:  Blood from Arm, Left Updated:  04/09/17 1801     Blood Culture No growth at 3 days    Blood Culture [[id number]]  (Normal) Collected:  04/06/17 1743    Specimen:  Blood from Arm, Right Updated:  04/09/17 1801     Blood Culture No growth at 3 days    CBC (No Diff) [[id number]]  (Abnormal) Collected:  04/10/17 0556    Specimen:  Blood Updated:  04/10/17 0619     WBC 8.17 10*3/mm3      RBC 3.74 (L) 10*6/mm3      Hemoglobin 12.6 g/dL      Hematocrit 36.5 (L) %      MCV 97.6 fL      MCH 33.7 (H) pg      MCHC 34.5 g/dL      RDW 13.5 %      RDW-SD 47.8 fl      MPV 10.6 fL      Platelets 289 10*3/mm3     CK [[id number]]  (Abnormal) Collected:  04/10/17 0556    Specimen:  Blood Updated:  04/10/17 0651     Creatine Kinase 341 (H) U/L     Basic Metabolic Panel [[id number]]  (Normal) Collected:  04/10/17 0713    Specimen:  Blood Updated:  04/10/17 0758     Glucose 88 mg/dL      BUN 13 mg/dL      Creatinine 0.61 mg/dL      Sodium 140 mmol/L      Potassium 4.2 mmol/L      Chloride 101 mmol/L      CO2 26.0 mmol/L      Calcium 9.3 mg/dL      eGFR Non African Amer 96 mL/min/1.73      BUN/Creatinine Ratio 21.3     Anion Gap 13.0 mmol/L     Narrative:       The MDRD GFR formula is only valid for adults with stable renal function between ages 18 and 70.    Magnesium [[id number]]  (Normal) Collected:  04/10/17 0713    Specimen:  Blood Updated:  04/10/17 0758     Magnesium 1.6 mg/dL     Throat Culture [[id number]] Collected:  04/10/17 1211    Specimen:  Swab from Throat Updated:  04/10/17 1214              Echo EF Estimated  Lab Results   Component Value Date    ECHOEFEST 65 04/07/2017         Cath Ejection Fraction Quantitative  No results found for: CATHEF        Medication Review: yes  Current Facility-Administered Medications   Medication Dose Route Frequency Provider  Last Rate Last Dose   • amLODIPine (NORVASC) tablet 5 mg  5 mg Oral Daily Nancie Fam MD   5 mg at 04/10/17 0839   • aspirin EC tablet 81 mg  81 mg Oral Daily Nancie Fam MD   81 mg at 04/10/17 0839   • clopidogrel (PLAVIX) tablet 75 mg  75 mg Oral Once per day on Mon Wed Fri Nancie Fam MD   75 mg at 04/10/17 0839   • enoxaparin (LOVENOX) syringe 40 mg  40 mg Subcutaneous Daily Lui Rick MD   40 mg at 04/10/17 0839   • guaiFENesin (MUCINEX) 12 hr tablet 1,200 mg  1,200 mg Oral BID PRN Lui Rick MD       • HYDROcodone-acetaminophen (NORCO) 5-325 MG per tablet 1 tablet  1 tablet Oral Q6H PRN Lui Rick MD   1 tablet at 04/07/17 2043   • ipratropium-albuterol (DUO-NEB) nebulizer solution 3 mL  3 mL Nebulization Q4H PRN Lui Rick MD   3 mL at 04/07/17 0432   • lisinopril (PRINIVIL,ZESTRIL) tablet 20 mg  20 mg Oral Daily Nancie Fam MD   20 mg at 04/10/17 0839   • LORazepam (ATIVAN) tablet 1 mg  1 mg Oral TID PRN Nancie Fam MD   1 mg at 04/10/17 0842   • metoprolol succinate XL (TOPROL-XL) 24 hr tablet 50 mg  50 mg Oral Q24H Lui Rick MD   50 mg at 04/10/17 0839   • metoprolol tartrate (LOPRESSOR) injection 5 mg  5 mg Intravenous Q6H PRN ORLIN Nichols   5 mg at 04/09/17 0040   • ondansetron (ZOFRAN) injection 4 mg  4 mg Intravenous Q6H PRN Lui Rick MD       • pantoprazole (PROTONIX) EC tablet 40 mg  40 mg Oral Q AM Lui Rick MD   40 mg at 04/10/17 0535   • phenol (CHLORASEPTIC) 1.4 % liquid 2 spray  2 spray Mouth/Throat Q2H PRN Nancie Fam MD   2 spray at 04/07/17 1647   • potassium chloride (MICRO-K) CR capsule 20 mEq  20 mEq Oral BID With Meals Lui Rick MD   20 mEq at 04/10/17 0839   • sodium chloride 0.9 % flush 1-10 mL  1-10 mL Intravenous PRN Lui Rick MD             Assessment/Plan      rhabdomyolysis  Sinus tachycardia- heart rates now controlled back on toprol xl  Tobacco use  Chronic obstructive pulmonary disease  History of coronary artery  disease  Hypertension     Plan-  No arrhythmia on telemetry, heart rates now controlled on beta blocker, no significant abnormality on echo. Okay to discharge from cardiac standpoint. Increase amlodipine for blood pressure control. Patient wishes to follow up with Dr. Monteiro- luiz order.     Mary Sneed, ORLIN  04/10/17  2:21 PM

## 2017-04-10 NOTE — PROGRESS NOTES
Continued Stay Note   Pankaj     Patient Name: Josi Mohamud  MRN: [id number]  Today's Date: 4/10/2017    Admit Date: 4/6/2017          Discharge Plan       04/10/17 1549    Case Management/Social Work Plan    Plan Home with [facility]    Patient/Family In Agreement With Plan yes    Additional Comments Pt may get d/c'ed today. Spoke with her and she said she is going to go to her daughter's house in [facility]. [facility] does not go to [facility] so she requested [facility]. Referral called to [facility] [phone] and faxed to [phone]. Pt has an order for bsc and she requests it from [employer]. She said she will just pick it up from there at some point. Faxed that order to them at [phone].              Discharge Codes     None        Expected Discharge Date and Time     Expected Discharge Date Expected Discharge Time    Apr 10, 2017             FLORY León

## 2017-04-10 NOTE — DISCHARGE PLACEMENT REQUEST
"Jennifer Rutledge \[facility]\""  [phone]    Pt will  in the next few days.     Kenyon Phillips (71 y.o. Female)     Date of Birth Social Security Number Address Home Phone MRN    1945  [address]  [facility] [address] [phone] [id number]    Congregational Marital Status          Cheondoism        Admission Date Admission Type Admitting Provider Attending Provider Department, Room/Bed    17 Emergency Angy Peña MD Ruxer, Angy Lei MD [address], 494/1    Discharge Date Discharge Disposition Discharge Destination         Home or Self Care             Attending Provider: Angy Peña MD     Allergies:  Penicillins    Isolation:  None   Infection:  None   Code Status:  FULL    Ht:  62\" (157.5 cm)   Wt:  184 lb 9.6 oz (83.7 kg)    Admission Cmt:  None   Principal Problem:  None                Active Insurance as of 2017     Primary Coverage     Payor Plan Insurance Group Employer/Plan Group    MEDICARE MEDICARE A & B      Payor Plan Address Payor Plan Phone Number Effective From Effective To    [address] [phone] 2009     [place] [address]       Subscriber Name Subscriber Birth Date Member ID       KENYON PHILLIPS 1945 [id number]           Secondary Coverage     Payor Plan Insurance Group Employer/Plan Group    [employer] [place] [employer] [place]      Payor Plan Address Payor Plan Phone Number Effective From Effective To    [employer] [place] [address] [phone] [address]     [place] [address]       Subscriber Name Subscriber Birth Date Member ID       KENYON PHILLIPS 1945 [id number]                 Emergency Contacts      (Rel.) Home Phone Work Phone Mobile Phone    Franc Egan (Daughter) -- -- [phone]         [address] [address]  Phone: [phone]  Fax:  Date Ordered: 2017         Patient: Kenyon Phillips MRN: [id number]   [address]  [facility] [address] [facility] 1945  SSN: " "   Phone: [phone] Sex: F     Weight: 184 lb 9.6 oz (83.7 kg)         Ht Readings from Last 1 Encounters:   04/06/17 62\" (157.5 cm)         Commode Chair (Order ID: [id number])   Diagnosis: Traumatic rhabdomyolysis, initial encounter (T79.6XXA [ICD-10-CM] 958.6 [ICD-9-CM])   Quantity: 1     Equipment:  Bedside Commode Chair w/Fixed Arms  Length of Need (99 Months = Lifetime): 99 Months = Lifetime            Verbal Order Mode: Verbal with readback [101]  Authorizing Provider: Nancie Fam MD  Authorizing Provider's NPI: [id number]     Order Entered By: Marlena Isaacs RN 4/7/2017 3:07 PM     Electronically signed by: NANCIE FAM 4/7/2017 3:31 PM                 History & Physical      H&P signed by Lui Rick MD at 4/7/2017  3:18 AM              TIME: 10:30 p.m.    PRIMARY CARE PHYSICIAN: Cayetano Crews MD    HISTORY OF PRESENT ILLNESS: Ms. Mohamud is a 71-year-old  female who presents to [facility] after being found down at home. Ms. Mohamud relates that she went to bed 2 nights ago and woke up in the floor the following morning. She was able to crawl to the bathroom but then became entrapped and was unable to call for help. She was found down in her home by her family this afternoon. Ms. Mohamud has no recall of events. She has no idea if she fell or, perhaps, experienced a syncopal episode. She denies pain at this time. In the emergency department, her laboratory and imaging studies proved essentially unremarkable except for evidence rhabdomyolysis. She will require admission to the hospital for further evaluation and treatment. Note that her family relates that she has been experiencing progressive fatigue, malaise, lethargy, and generalized weakness. She has an unsteady gait, but has not been using her walker as directed. Ms. Mohamud relates a long history of recurring respiratory tract infection. She relates that she has been diagnosed with chronic bronchitis by Dr." Eleonora. I suspect she also has underlying component of COPD due to her lifelong smoking habit. Presently, she is resting comfortably and is in no distress. She has family present. They have purchased Chinese food for the evening.     REVIEW OF SYSTEMS: Otherwise unremarkable from a cardiovascular, pulmonary, gastrointestinal, genitourinary, neurologic, metabolic and constitutional standpoint except as noted. She relates progressive fatigue, malaise, lethargy, and generalized weakness. These are chronic and worsening problems. She has had no fevers, but relates experiencing chills. She has had no sweats. Her appetite is good. Her weight is stable. She has had no chest pain, chest palpitations, shortness of breath, lower extremity edema, orthopnea, cough, wheezing, or hemoptysis. She has had no abdominal pain, nausea, vomiting, diarrhea, constipation, dyspepsia, dysphagia, odynophagia, hematemesis, hematochezia, or melena. Her family relates that she has a long history of recurring diarrhea due to irritable bowel syndrome. She has had no pelvic pain, flank pain, hematuria, or dysuria. She has had no skin rashes, arthralgias, myalgias, or swollen joints. She has had no headache, confusion, memory deficits. She has had no recent changes in her vision or hearing. She has had no acute motor or sensory deficits. As noted, she has an unsteady gait. She has no tremors.     PAST MEDICAL HISTORY:  1.  Hypertension.   2.  Dyslipidemia.   3.  Hodgkin's lymphoma.   4.  Chronic bronchitis.   5.  COPD.   6.  “Thyroid problems”?  7.  Diarrhea predominant, irritable bowel syndrome.   8.  Anxiety disorder.   9.  Chronic benzodiazepine use.   10.  Coronary artery disease.   11.  Osteoarthritis.     PAST SURGICAL HISTORY:  1.  Status post splenectomy.   2.  Status post liver biopsy.   3.  Status post appendectomy.   4.  Status post cholecystectomy.   5.  Status post cervical lymph node biopsy.   6.  Status post cardiac stent.   7.   Status post bilateral tubal ligation.     ALLERGIES: PENICILLIN.     HOME MEDICATIONS:  1.  Proventil 2 inhalations b.i.d. p.r.n. for shortness of breath and/or wheezing.   2.  Norvasc 5 mg p.o. daily.   3.  Aspirin 81 mg p.o. daily.   4.  Calcitrate 1 p.o. daily.   5.  Plavix 75 mg p.o. 3 times weekly.   6.  Lasix 20 mg p.o. daily.   7.  Lisinopril 20 mg p.o. daily.   8.  Ativan 2 mg p.o. t.i.d. p.r.n. for anxiety.   9.  Mobic 15 mg p.o. daily.   10.  Toprol-XL 50 mg p.o. daily.   11.  Protonix 40 mg p.o. daily.   12.  Simvastatin 20 mg p.o. daily.     SOCIAL HISTORY: Significant for being a resident of [place], [place]. She lives alone. She is . She has 2 sons and a daughter in good health. She is a retired homemaker. She has a 9th grade education. She smokes half to a pack of cigarettes per day and has smoked her entire adult life. She drinks alcohol on occasion. She has no history of illicit drug use. She is [place]. She has no recent history of travel outside this region.     She designates her daughter Franc Egan to serve as a surrogate for healthcare matters should such become necessary.     FAMILY HISTORY: Significant for having 5 brothers and a sister. Two brothers are now ; 1  due to lung cancer, a 2nd brother  due to esophageal cancer. Her surviving 3 brothers and a sister are in apparent good health. Her father is  due to aplastic anemia. Her mother is  due to ruptured brain aneurysm.     PHYSICAL EXAMINATION:  VITAL SIGNS: Temperature is 98.6, pulse is 122, respirations are 16 and unlabored, blood pressure is 164/68, O2 saturations 95% breathing ambient air, weight 180 pounds.     GENERAL: This is a 71-year-old  female appearing her documented age. She is resting comfortably in bed. She is in no apparent distress. She is articulate in her speech. She is interactive and cooperative. She proves to be a fairly good historian.     HEAD AND NECK: Essentially  unremarkable except as noted. I see no signs of acute trauma. Eyes, nose, and throat appear grossly unremarkable. Sclerae are clear. There is no discharge from the nostrils. Mucous membranes are moist. Neck is supple. She has no cervical or clavicular adenopathy. She has no definite carotid bruits. There are no masses of the head or neck. Neck veins do not appear pathologically distended. Head and neck exam is suboptimal due to the patient's body habitus.     CARDIAC: Reveals S1 and S2 with a regular rhythm. She has no definite murmurs, rubs, or gallops.     LUNGS: Reveals bilateral breath sounds that are clear to auscultation throughout. She has no rales, wheezes, or rhonchi.     ABDOMEN: Reveals bowel sounds to be present. Her abdomen is nontender, nondistended and soft. She is obese.     EXTREMITIES: No lower extremity edema, erythema or calf tenderness.     NEUROLOGIC: Reveals the patient to be awake and alert. She seems oriented to person, place, time and situation. Cranial nerves II-XII are grossly intact. She exhibits no definite focal, motor or sensory deficits. She seems able to move all extremities without difficulty and at will. Her gait was not tested.     PSYCHIATRIC: Reveals her mood to be stable. Affect seems appropriate. Thought processes are organized in that she is able to answer questions appropriately and provide a coherent history. Speech is fluent. There is no flight of ideas. There are no obvious short-term or long-term memory deficits.     DIAGNOSTIC DATA: Complete metabolic panel is essentially unremarkable except for an AST of 113. BUN is 33.     CBC is likewise unremarkable except for a white blood cell count of 12.2.     Prothrombin time and PTT are unremarkable.     ProBNP is 389.     Amylase and lipase are within normal limits.     Creatinine kinase is 2778. Myoglobin 1125.     Lactic acid is 1.5.     Urinalysis is essentially unremarkable except for trace ketones.     Chest x-ray  demonstrates no acute abnormalities.     CT study of the cervical spine demonstrates degenerative changes with no acute abnormalities.     CT of the head without contrast demonstrates atrophy with extensive white matter disease. No acute intracranial abnormalities are noted.     EKG demonstrates sinus rhythm of 117 beats per minute.     IMPRESSION:  1.  Fall due to uncertain causes.  2.  Rhabdomyolysis.   3.  Hypertension.   4.  Dyslipidemia.   5.  Coronary artery disease.   6.  Anxiety disorder.   7.  Chronic benzodiazepine use.     PLAN: At this time, Ms. Mohamud will be admitted to [facility] for further evaluation and treatment. Her admitting diagnoses are as noted. Her condition at this time is judged to be stable. She will be placed on telemetry.     I have asked the nursing staff to obtain vital signs per protocol. She will be confined to bedrest with bathroom privileges with assistance. As noted, she is allergic or intolerant to PENICILLIN. I have asked the nursing staff to monitor input and output. Daily weights will be obtained. Neurologic checks will be obtained every 4 hours. She will be maintained on a regular diet as tolerated. IV fluids will consist of normal saline at 125 mL/h. Oxygen will be used as needed to maintain her O2 saturations greater than 92%. She is a FULL CODE. Fall precautions are to be instituted.     INITIAL ADMITTING MEDICATIONS:   1.  Lovenox 40 mg subcutaneous daily.   2.  Norvasc 5 mg p.o. daily.   3.  Aspirin 81 mg p.o. daily.   4.  Plavix 75 mg p.o. daily.   5.  Lisinopril 20 mg p.o. daily.   6.  Toprol-XL 50 mg p.o. daily.   7.  Zocor 20 mg p.o. daily.   8.  Protonix 40 mg p.o. daily.   9.  Tylenol 650 mg p.o. q.6 h. p.r.n. for fever and/or discomfort.   10.  Norco 5 mg 1 p.o. q.6 h. p.r.n. for pain.   11.  Xanax 0.25 mg p.o. q.8 h. p.r.n. for anxiety.   12.  DuoNeb 1 unit q.4 h. p.r.n. for shortness of breath.   13.  Zofran 4 mg IV q.6 h. p.r.n. for nausea and  vomiting.     I will obtain followup laboratory studies in the morning.     I will obtain a cardiac echocardiogram and carotid Doppler study.     I will continue to follow Ms. Mohamud closely through the night pending return of the hospitalist team in the morning. The nursing staff may call should they have any questions or concerns. Please refer to the medical record for additional information, orders and/or comments.       cc:  SB BOURGEOIS M.D. [id number]  D:  04/06/2017 23:46:44(Eastern Time)  T:  04/07/2017 00:11:04(Eastern Time)  Voice ID:  [id number]/Document ID:  [id number]       Electronically signed by Lui Rick MD at 4/7/2017  3:18 AM

## 2017-04-10 NOTE — PLAN OF CARE
Problem: Patient Care Overview (Adult)  Goal: Plan of Care Review  Outcome: Ongoing (interventions implemented as appropriate)    04/10/17 0905   Coping/Psychosocial Response Interventions   Plan Of Care Reviewed With patient   Patient Care Overview   Progress progress toward functional goals is gradual   Outcome Evaluation   Outcome Summary/Follow up Plan Pt supervision for bed mobility, sba sit to stand, sba gait w/RW x 250' x 2 reps.

## 2017-04-10 NOTE — DISCHARGE PLACEMENT REQUEST
"[facility]  [phone]    Pt is going home today.     Kenyon Phillips (71 y.o. Female)     Date of Birth Social Security Number Address Home Phone MRN    1945  [address]  [facility] [address] [phone] [id number]    Scientologist Marital Status          Amish        Admission Date Admission Type Admitting Provider Attending Provider Department, Room/Bed    17 Emergency Angy Peña MD Ruxer, Jena Thomas, MD [address], 494/1    Discharge Date Discharge Disposition Discharge Destination         Home or Self Care             Attending Provider: Angy Peña MD     Allergies:  Penicillins    Isolation:  None   Infection:  None   Code Status:  FULL    Ht:  62\" (157.5 cm)   Wt:  184 lb 9.6 oz (83.7 kg)    Admission Cmt:  None   Principal Problem:  None                Active Insurance as of 2017     Primary Coverage     Payor Plan Insurance Group Employer/Plan Group    MEDICARE MEDICARE A & B      Payor Plan Address Payor Plan Phone Number Effective From Effective To    [address] [phone] [address]     [place] [address]       Subscriber Name Subscriber Birth Date Member ID       KENYON PHILLIPS 1945 [id number]           Secondary Coverage     Payor Plan Insurance Group Employer/Plan Group    [employer] [place] [employer] [place]      Payor Plan Address Payor Plan Phone Number Effective From Effective To    [employer] [place] [phone] [address]     [place] [address]       Subscriber Name Subscriber Birth Date Member ID       KENYON PHILLIPS 1945 [id number]                 Emergency Contacts      (Rel.) Home Phone Work Phone Mobile Phone    Franc Egan (Daughter) -- -- [phone]        [address] [address]  Phone:  [phone]  Fax:          Patient:     Kenyon Phillips MRN:  [id number]   [address] [place] [address] :  1945  SSN:    Phone: [phone] " Sex:  F      INSURANCE PAYOR PLAN GROUP # SUBSCRIBER ID   Primary:  Secondary: MEDICARE [employer] [place] [id number]  [id number]   [id number]  [id number]   Admitting Diagnosis: Traumatic rhabdomyolysis, initial encounter [T79.6XXA]  Order Date:  2017         Inpatient Consult to Case Management        (Order ID: [id number])     Diagnosis:         Priority:  Routine Expected Date:   Expiration Date:        Interval:   Count:    Reason for Consult? HOME HEALTH TO EVAL AND TREAT     Specimen Type:   Specimen Source:   Specimen Taken Date:   Specimen Taken Time:                   Authorizing Provider:Nancie Fam MD  Order Entered By: Marlena Isaacs RN 2017  3:05 PM     Electronically signed by: Nancie Fam MD (NPI: [id number]) 2017  3:05 PM      [address] [address]  Phone:  [phone]  Fax:          Patient:     Josi Mohamud MRN:  [id number]   [address]  [facility] [address] :  1945  SSN:    Phone: [phone] Sex:  F      INSURANCE PAYOR PLAN GROUP # SUBSCRIBER ID   Primary:  Secondary: MEDICARE [employer] [place] [id number]  [id number]   [id number]  [id number]   Admitting Diagnosis: Traumatic rhabdomyolysis, initial encounter [T79.6XXA]  Order Date:  2017         Inpatient Consult to [facility]        (Order ID: [id number])     Diagnosis:         Priority:  Routine Expected Date:   Expiration Date:        Interval:   Count:    Reason for Consult? home health     Specimen Type:   Specimen Source:   Specimen Taken Date:   Specimen Taken Time:                   Authorizing Provider:Nancie Fam MD  Order Entered By: Nancie Fam MD 2017  7:58 PM     Electronically signed by: Nancie Fam MD (NPI: [id number]) 2017  7:58 PM             History & Physical      H&P signed by Lui Rick MD at 2017  3:18 AM              TIME: 10:30 p.m.    PRIMARY CARE PHYSICIAN: Cayetano Crews MD    HISTORY OF  PRESENT ILLNESS: Ms. Mohamud is a 71-year-old  female who presents to [facility] after being found down at home. Ms. oMhamud relates that she went to bed 2 nights ago and woke up in the floor the following morning. She was able to crawl to the bathroom but then became entrapped and was unable to call for help. She was found down in her home by her family this afternoon. Ms. Mohamud has no recall of events. She has no idea if she fell or, perhaps, experienced a syncopal episode. She denies pain at this time. In the emergency department, her laboratory and imaging studies proved essentially unremarkable except for evidence rhabdomyolysis. She will require admission to the hospital for further evaluation and treatment. Note that her family relates that she has been experiencing progressive fatigue, malaise, lethargy, and generalized weakness. She has an unsteady gait, but has not been using her walker as directed. Ms. Mohamud relates a long history of recurring respiratory tract infection. She relates that she has been diagnosed with chronic bronchitis by Dr. Crews. I suspect she also has underlying component of COPD due to her lifelong smoking habit. Presently, she is resting comfortably and is in no distress. She has family present. They have purchased Chinese food for the evening.     REVIEW OF SYSTEMS: Otherwise unremarkable from a cardiovascular, pulmonary, gastrointestinal, genitourinary, neurologic, metabolic and constitutional standpoint except as noted. She relates progressive fatigue, malaise, lethargy, and generalized weakness. These are chronic and worsening problems. She has had no fevers, but relates experiencing chills. She has had no sweats. Her appetite is good. Her weight is stable. She has had no chest pain, chest palpitations, shortness of breath, lower extremity edema, orthopnea, cough, wheezing, or hemoptysis. She has had no abdominal pain, nausea, vomiting, diarrhea,  constipation, dyspepsia, dysphagia, odynophagia, hematemesis, hematochezia, or melena. Her family relates that she has a long history of recurring diarrhea due to irritable bowel syndrome. She has had no pelvic pain, flank pain, hematuria, or dysuria. She has had no skin rashes, arthralgias, myalgias, or swollen joints. She has had no headache, confusion, memory deficits. She has had no recent changes in her vision or hearing. She has had no acute motor or sensory deficits. As noted, she has an unsteady gait. She has no tremors.     PAST MEDICAL HISTORY:  1.  Hypertension.   2.  Dyslipidemia.   3.  Hodgkin's lymphoma.   4.  Chronic bronchitis.   5.  COPD.   6.  “Thyroid problems”?  7.  Diarrhea predominant, irritable bowel syndrome.   8.  Anxiety disorder.   9.  Chronic benzodiazepine use.   10.  Coronary artery disease.   11.  Osteoarthritis.     PAST SURGICAL HISTORY:  1.  Status post splenectomy.   2.  Status post liver biopsy.   3.  Status post appendectomy.   4.  Status post cholecystectomy.   5.  Status post cervical lymph node biopsy.   6.  Status post cardiac stent.   7.  Status post bilateral tubal ligation.     ALLERGIES: PENICILLIN.     HOME MEDICATIONS:  1.  Proventil 2 inhalations b.i.d. p.r.n. for shortness of breath and/or wheezing.   2.  Norvasc 5 mg p.o. daily.   3.  Aspirin 81 mg p.o. daily.   4.  Calcitrate 1 p.o. daily.   5.  Plavix 75 mg p.o. 3 times weekly.   6.  Lasix 20 mg p.o. daily.   7.  Lisinopril 20 mg p.o. daily.   8.  Ativan 2 mg p.o. t.i.d. p.r.n. for anxiety.   9.  Mobic 15 mg p.o. daily.   10.  Toprol-XL 50 mg p.o. daily.   11.  Protonix 40 mg p.o. daily.   12.  Simvastatin 20 mg p.o. daily.     SOCIAL HISTORY: Significant for being a resident of [place], [place]. She lives alone. She is . She has 2 sons and a daughter in good health. She is a retired homemaker. She has a 9th grade education. She smokes half to a pack of cigarettes per day and has smoked her entire adult  life. She drinks alcohol on occasion. She has no history of illicit drug use. She is Baptism. She has no recent history of travel outside this region.     She designates her daughter Franc Egan to serve as a surrogate for healthcare matters should such become necessary.     FAMILY HISTORY: Significant for having 5 brothers and a sister. Two brothers are now ; 1  due to lung cancer, a 2nd brother  due to esophageal cancer. Her surviving 3 brothers and a sister are in apparent good health. Her father is  due to aplastic anemia. Her mother is  due to ruptured brain aneurysm.     PHYSICAL EXAMINATION:  VITAL SIGNS: Temperature is 98.6, pulse is 122, respirations are 16 and unlabored, blood pressure is 164/68, O2 saturations 95% breathing ambient air, weight 180 pounds.     GENERAL: This is a 71-year-old  female appearing her documented age. She is resting comfortably in bed. She is in no apparent distress. She is articulate in her speech. She is interactive and cooperative. She proves to be a fairly good historian.     HEAD AND NECK: Essentially unremarkable except as noted. I see no signs of acute trauma. Eyes, nose, and throat appear grossly unremarkable. Sclerae are clear. There is no discharge from the nostrils. Mucous membranes are moist. Neck is supple. She has no cervical or clavicular adenopathy. She has no definite carotid bruits. There are no masses of the head or neck. Neck veins do not appear pathologically distended. Head and neck exam is suboptimal due to the patient's body habitus.     CARDIAC: Reveals S1 and S2 with a regular rhythm. She has no definite murmurs, rubs, or gallops.     LUNGS: Reveals bilateral breath sounds that are clear to auscultation throughout. She has no rales, wheezes, or rhonchi.     ABDOMEN: Reveals bowel sounds to be present. Her abdomen is nontender, nondistended and soft. She is obese.     EXTREMITIES: No lower extremity edema,  erythema or calf tenderness.     NEUROLOGIC: Reveals the patient to be awake and alert. She seems oriented to person, place, time and situation. Cranial nerves II-XII are grossly intact. She exhibits no definite focal, motor or sensory deficits. She seems able to move all extremities without difficulty and at will. Her gait was not tested.     PSYCHIATRIC: Reveals her mood to be stable. Affect seems appropriate. Thought processes are organized in that she is able to answer questions appropriately and provide a coherent history. Speech is fluent. There is no flight of ideas. There are no obvious short-term or long-term memory deficits.     DIAGNOSTIC DATA: Complete metabolic panel is essentially unremarkable except for an AST of 113. BUN is 33.     CBC is likewise unremarkable except for a white blood cell count of 12.2.     Prothrombin time and PTT are unremarkable.     ProBNP is 389.     Amylase and lipase are within normal limits.     Creatinine kinase is 2778. Myoglobin 1125.     Lactic acid is 1.5.     Urinalysis is essentially unremarkable except for trace ketones.     Chest x-ray demonstrates no acute abnormalities.     CT study of the cervical spine demonstrates degenerative changes with no acute abnormalities.     CT of the head without contrast demonstrates atrophy with extensive white matter disease. No acute intracranial abnormalities are noted.     EKG demonstrates sinus rhythm of 117 beats per minute.     IMPRESSION:  1.  Fall due to uncertain causes.  2.  Rhabdomyolysis.   3.  Hypertension.   4.  Dyslipidemia.   5.  Coronary artery disease.   6.  Anxiety disorder.   7.  Chronic benzodiazepine use.     PLAN: At this time, Ms. Mohamud will be admitted to [facility] for further evaluation and treatment. Her admitting diagnoses are as noted. Her condition at this time is judged to be stable. She will be placed on telemetry.     I have asked the nursing staff to obtain vital signs per protocol.  She will be confined to bedrest with bathroom privileges with assistance. As noted, she is allergic or intolerant to PENICILLIN. I have asked the nursing staff to monitor input and output. Daily weights will be obtained. Neurologic checks will be obtained every 4 hours. She will be maintained on a regular diet as tolerated. IV fluids will consist of normal saline at 125 mL/h. Oxygen will be used as needed to maintain her O2 saturations greater than 92%. She is a FULL CODE. Fall precautions are to be instituted.     INITIAL ADMITTING MEDICATIONS:   1.  Lovenox 40 mg subcutaneous daily.   2.  Norvasc 5 mg p.o. daily.   3.  Aspirin 81 mg p.o. daily.   4.  Plavix 75 mg p.o. daily.   5.  Lisinopril 20 mg p.o. daily.   6.  Toprol-XL 50 mg p.o. daily.   7.  Zocor 20 mg p.o. daily.   8.  Protonix 40 mg p.o. daily.   9.  Tylenol 650 mg p.o. q.6 h. p.r.n. for fever and/or discomfort.   10.  Norco 5 mg 1 p.o. q.6 h. p.r.n. for pain.   11.  Xanax 0.25 mg p.o. q.8 h. p.r.n. for anxiety.   12.  DuoNeb 1 unit q.4 h. p.r.n. for shortness of breath.   13.  Zofran 4 mg IV q.6 h. p.r.n. for nausea and vomiting.     I will obtain followup laboratory studies in the morning.     I will obtain a cardiac echocardiogram and carotid Doppler study.     I will continue to follow Ms. Mohamud closely through the night pending return of the hospitalist team in the morning. The nursing staff may call should they have any questions or concerns. Please refer to the medical record for additional information, orders and/or comments.       cc:  SB BOURGEOIS M.D. [id number]  D:  04/06/2017 23:46:44(Eastern Time)  T:  04/07/2017 00:11:04(Eastern Time)  Voice ID:  [id number]/Document ID:  [id number]       Electronically signed by Lui Rick MD at 4/7/2017  3:18 AM

## 2017-04-10 NOTE — DISCHARGE SUMMARY
[facility] Medicine Services  DISCHARGE SUMMARY       Date of Admission: 4/6/2017  Date of Discharge:  4/10/2017  Primary Care Physician: Cayetano Crews MD    Presenting Problem/History of Present Illness:  Traumatic rhabdomyolysis, initial encounter [T79.6XXA]     Final Discharge Diagnoses:  Hospital Problem List     Traumatic rhabdomyolysis          Consults: cardiology    Procedures Performed: chest XRAY, CT head, CT c spine, MRI brain, carotid US, echo    Pertinent Test Results:   ECHO  · Left ventricular wall thickness is consistent with mild concentric hypertrophy.  · Left ventricular function is normal. Estimated EF = 65%.  · Estimated right ventricular systolic pressure from tricuspid regurgitation is normal (<35 mmHg).    Other images: unremarkable with small vessel white matter disease, less tahn 50 % stenosis on carotids, no acute fracture on cspine,   Rapid strep screen-negative    Chief Complaint on Day of Discharge: sore throat    History of Present Illness on Day of Discharge: Patient states she is having a sore throat that is been an issue for about 3 weeks.  She states her daughter had similar symptoms but hers had resolved.  Her daughter did not require antibiotics.  The patient states that she maybe had fevers and chills but these were several days ago.  She does have a cough.  She states that cough drops and Chloraseptic Spray are not alleviating her current Tums.  She denies any shortness of breath.  She says she is working with physical therapy and feel she is getting stronger.      Hospital Course:  The patient is a 71 y.o. female who presented to [facility] after having a fall and not being found by family for over 1 day.  She had been having progressive weakness.  Presentation to the ER she had mild rhabdo which improved with IV fluids.  Her 2-D echo was unremarkable and her carotids were less than 50% stenosis.  She was running sinus  "tach and this improved with just resuming her home beta blocker.  Cardiology did see her in consultation and she will follow up outpatient with Dr. Monteiro.  She continued to be slightly hypertensive and her amlodipine was recommended to be increased from 5-10.  Patient does continue to complain of cough and sore throat.  She is a heavy tobacco user.  She does have an ongoing chronic cough with chronic bronchitis.  She was not given antibiotics here however was given nebulizer treatments.  She has home inhaler.  She was given Mucinex.  She continued to work with physical therapy and the plan was for her to go home with her daughter for a little while with home health.    Condition on Discharge:  Much improved    Physical Exam on Discharge:  /71 (BP Location: Right arm, Patient Position: Lying)  Pulse 91  Temp 98.4 °F (36.9 °C) (Oral)   Resp 18  Ht 62\" (157.5 cm)  Wt 184 lb 9.6 oz (83.7 kg)  SpO2 91%  BMI 33.76 kg/m2  Physical Exam   Constitutional: She is oriented to person, place, and time. She appears well-developed and well-nourished.   HENT:   Head: Normocephalic and atraumatic.   Mouth/Throat: Oropharynx is clear and moist. No oropharyngeal exudate.   Eyes: Conjunctivae and EOM are normal. Pupils are equal, round, and reactive to light.   Neck: Neck supple. No JVD present. No thyromegaly present.   Cardiovascular: Normal rate, regular rhythm, normal heart sounds and intact distal pulses.  Exam reveals no gallop and no friction rub.    No murmur heard.  Pulmonary/Chest: Effort normal and breath sounds normal. No respiratory distress. She has no wheezes. She has no rales. She exhibits no tenderness.   Abdominal: Soft. Bowel sounds are normal. She exhibits no distension. There is no tenderness. There is no rebound and no guarding.   Musculoskeletal: Normal range of motion. She exhibits no edema, tenderness or deformity.   Lymphadenopathy:     She has no cervical adenopathy.   Neurological: She is alert " and oriented to person, place, and time. She displays normal reflexes. No cranial nerve deficit. She exhibits normal muscle tone.   Skin: Skin is warm and dry. No rash noted.   Psychiatric: She has a normal mood and affect. Her behavior is normal. Judgment and thought content normal.         Discharge Disposition:  Home or Self Care, to daughters home with home health    Discharge Medications:   Josi Mohamud   Home Medication Instructions NU:[id number]    Printed on:04/10/17 1378   Medication Information                      albuterol (PROVENTIL HFA;VENTOLIN HFA) 108 (90 BASE) MCG/ACT inhaler  Inhale 2 puffs 2 (Two) Times a Day As Needed for Wheezing.             amLODIPine (NORVASC) 10 MG tablet  Take 1 tablet by mouth Daily.             aspirin 81 MG EC tablet  Take 81 mg by mouth Daily.             calcium citrate-vitamin d (CALCITRATE) 315-250 MG-UNIT tablet tablet  Take 1 tablet by mouth Daily.             clopidogrel (PLAVIX) 75 MG tablet  Take 75 mg by mouth 3 (Three) Times a Week. Monday, Wednesday, and Friday             furosemide (LASIX) 20 MG tablet  Take 20 mg by mouth Daily As Needed (for swelling).             guaiFENesin (MUCINEX) 600 MG 12 hr tablet  Take 2 tablets by mouth 2 (Two) Times a Day As Needed for Cough.             lisinopril (PRINIVIL,ZESTRIL) 20 MG tablet  Take 20 mg by mouth Daily.             LORazepam (ATIVAN) 2 MG tablet  Take 0.5 tablets by mouth 3 (Three) Times a Day As Needed for Anxiety.             metoprolol succinate XL (TOPROL-XL) 50 MG 24 hr tablet  Take 50 mg by mouth Daily.             pantoprazole (PROTONIX) 40 MG EC tablet  Take 40 mg by mouth Daily.             potassium chloride (K-DUR) 10 MEQ CR tablet  Take 1 tablet by mouth 2 (Two) Times a Day. When using diuretic             simvastatin (ZOCOR) 20 MG tablet  Take 20 mg by mouth Daily.                 Discharge Diet:   Diet Instructions     Diet: Cardiac; Thin Liquids, No Restrictions       Discharge Diet:   Cardiac   Fluid Consistency:  Thin Liquids, No Restrictions                 Activity at Discharge:   Activity Instructions     Activity as Tolerated                     Discharge Care Plan/Instructions:     Follow-up Appointments:   Patient to call and schedule Dr Tellez office for appointment within 3-4 days  Patient to follow up with Dr. Monteiro cardiology    Test Results Pending at Discharge: throat culture    Angy Peña MD  04/10/17  4:07 PM    Time: 35 min

## 2017-04-10 NOTE — PLAN OF CARE
Problem: Patient Care Overview (Adult)  Goal: Plan of Care Review  Outcome: Ongoing (interventions implemented as appropriate)    04/10/17 0208   Coping/Psychosocial Response Interventions   Plan Of Care Reviewed With patient   Patient Care Overview   Progress improving   Outcome Evaluation   Outcome Summary/Follow up Plan Pt rested well this shift. No c/o of pain or discomfort voiced. Respirations equal and unlabored. Will continue to monitor.        Goal: Adult Individualization and Mutuality  Outcome: Ongoing (interventions implemented as appropriate)  Goal: Discharge Needs Assessment  Outcome: Ongoing (interventions implemented as appropriate)    Problem: Fall Risk (Adult)  Goal: Identify Related Risk Factors and Signs and Symptoms  Outcome: Ongoing (interventions implemented as appropriate)  Goal: Absence of Falls  Outcome: Ongoing (interventions implemented as appropriate)    Problem: Skin Integrity Impairment, Risk/Actual (Adult)  Goal: Identify Related Risk Factors and Signs and Symptoms  Outcome: Ongoing (interventions implemented as appropriate)  Goal: Skin Integrity/Wound Healing  Outcome: Ongoing (interventions implemented as appropriate)

## 2017-04-11 LAB
BACTERIA SPEC AEROBE CULT: NORMAL
BACTERIA SPEC AEROBE CULT: NORMAL

## 2017-04-11 NOTE — THERAPY DISCHARGE NOTE
Acute Care - Physical Therapy Discharge Summary  [facility]       Patient Name: Josi Mohamud  : 1945  MRN: [id number]    Today's Date: 2017  Onset of Illness/Injury or Date of Surgery Date: 17    Date of Referral to PT: 17  Referring Physician: Dr. Rick (Weakness, unsteady gait)      Admit Date: 2017      PT Recommendation and Plan    Visit Dx:    ICD-10-CM ICD-9-CM   1. Traumatic rhabdomyolysis, initial encounter T79.6XXA 958.6   2. Impaired functional mobility and activity tolerance Z74.09 V49.89             Outcome Measures       04/10/17 0905 17 1100       How much help from another person do you currently need...    Turning from your back to your side while in flat bed without using bedrails? 4  -LG 3  -CARMEN     Moving from lying on back to sitting on the side of a flat bed without bedrails? 4  -LG 3  -CARMEN     Moving to and from a bed to a chair (including a wheelchair)? 3  -LG 3  -CARMEN     Standing up from a chair using your arms (e.g., wheelchair, bedside chair)? 3  -LG 3  -CARMEN     Climbing 3-5 steps with a railing? 2  -LG 2  -CARMEN     To walk in hospital room? 3  -LG 3  -CARMEN     AM-PAC 6 Clicks Score 19  -LG 17  -CARMEN     Functional Assessment    Outcome Measure Options AM-PAC 6 Clicks Basic Mobility (PT)  -LG        User Key  (r) = Recorded By, (t) = Taken By, (c) = Cosigned By    Initials Name Provider Type     Socrates Brock PTA Physical Therapy Assistant    CARMEN Marin PTA Physical Therapy Assistant                      IP PT Goals       17 0821 17 0951       Bed Mobility PT LTG    Bed Mobility PT LTG, Date Established  17  -SUBHASH (r) TS (t) SUBHASH (c)     Bed Mobility PT LTG, Time to Achieve  by discharge  -SUBHASH (r) TS (t) SUBHASH (c)     Bed Mobility PT LTG, Activity Type  supine to sit/sit to supine  -SUBHASH (r) TS (t) SUBHASH (c)     Bed Mobility PT LTG, [place] Level  contact guard assist  -SUBHASH (r) TS (t) SUBHASH (c)     Bed Mobility PT LTG, Date Goal Reviewed 17   -      Bed Mobility PT LTG, Outcome goal met  -      Transfer Training PT LTG    Transfer Training PT LTG, Date Established  04/07/17  -SUBHASH (r) TS (t) SUBHASH (c)     Transfer Training PT LTG, Time to Achieve  by discharge  -SUBHASH (r) TS (t) SUBHASH (c)     Transfer Training PT LTG, Activity Type  sit to stand/stand to sit  -SUBHASH (r) TS (t) SUBHASH (c)     Transfer Training PT LTG, [place] Level  supervision required  -SUBHASH (r) TS (t) SUBHASH (c)     Transfer Training PT LTG, Assist Device  walker, rolling  -SUBHASH (r) TS (t) SUBHASH (c)     Transfer Training PT  LTG, Date Goal Reviewed 04/11/17  -      Transfer Training PT LTG, Outcome goal not met  -      Transfer Training PT LTG, Reason Goal Not Met discharged from facility  -      Gait Training PT LTG    Gait Training Goal PT LTG, Date Established  04/07/17  -SUBHASH (r) TS (t) SUBHASH (c)     Gait Training Goal PT LTG, Time to Achieve  by discharge  -SUBHASH (r) TS (t) SUBHASH (c)     Gait Training Goal PT LTG, [place] Level  supervision required  -SUBHASH (r) TS (t) SUBHASH (c)     Gait Training Goal PT LTG, Assist Device  walker, rolling  -SUBHASH (r) TS (t) SUBHASH (c)     Gait Training Goal PT LTG, Distance to Achieve  150  -SUBHASH (r) TS (t) SUBHASH (c)     Gait Training Goal PT LTG, Date Goal Reviewed 04/11/17  -      Gait Training Goal PT LTG, Outcome goal not met  -      Gait Training Goal PT LTG, Reason Goal Not Met discharged from facility  -        User Key  (r) = Recorded By, (t) = Taken By, (c) = Cosigned By    Initials Name Provider Type    SUBHASH Moe, PT DPT Physical Therapist     Adeola Nixon, PTA Physical Therapy Assistant    GREER Bates, PT Student PT Student              PT Discharge Summary  Anticipated Discharge Disposition: home  Reason for Discharge: Discharge from facility  Outcomes Achieved: Patient able to partially acheive established goals  Discharge Destination: Home      Adeola Nixon PTA   4/11/2017

## 2017-04-11 NOTE — PLAN OF CARE
Problem: Inpatient Physical Therapy  Goal: Bed Mobility Goal LTG- PT  Outcome: Outcome(s) achieved Date Met:  04/11/17 04/07/17 0951 04/11/17 0821   Bed Mobility PT LTG   Bed Mobility PT LTG, Date Established 04/07/17 --    Bed Mobility PT LTG, Time to Achieve by discharge --    Bed Mobility PT LTG, Activity Type supine to sit/sit to supine --    Bed Mobility PT LTG, [place] Level contact guard assist --    Bed Mobility PT LTG, Date Goal Reviewed --  04/11/17   Bed Mobility PT LTG, Outcome --  goal met       Goal: Transfer Training Goal 1 LTG- PT  Outcome: Unable to achieve outcome(s) by discharge Date Met:  04/11/17 04/07/17 0951 04/11/17 0821   Transfer Training PT LTG   Transfer Training PT LTG, Date Established 04/07/17 --    Transfer Training PT LTG, Time to Achieve by discharge --    Transfer Training PT LTG, Activity Type sit to stand/stand to sit --    Transfer Training PT LTG, [place] Level supervision required --    Transfer Training PT LTG, Assist Device walker, rolling --    Transfer Training PT LTG, Date Goal Reviewed --  04/11/17   Transfer Training PT LTG, Outcome --  goal not met   Transfer Training PT LTG, Reason Goal Not Met --  discharged from facility       Goal: Gait Training Goal LTG- PT  Outcome: Unable to achieve outcome(s) by discharge Date Met:  04/11/17 04/07/17 0951 04/11/17 0821   Gait Training PT LTG   Gait Training Goal PT LTG, Date Established 04/07/17 --    Gait Training Goal PT LTG, Time to Achieve by discharge --    Gait Training Goal PT LTG, [place] Level supervision required --    Gait Training Goal PT LTG, Assist Device walker, rolling --    Gait Training Goal PT LTG, Distance to Achieve 150 --    Gait Training Goal PT LTG, Date Goal Reviewed --  04/11/17   Gait Training Goal PT LTG, Outcome --  goal not met   Gait Training Goal PT LTG, Reason Goal Not Met --  discharged from facility

## 2017-04-12 LAB — BACTERIA SPEC AEROBE CULT: NORMAL

## 2017-05-04 LAB
ANION GAP SERPL CALCULATED.3IONS-SCNC: 15 MMOL/L (ref 7–19)
ATYPICAL LYMPHOCYTE RELATIVE PERCENT: 23 % (ref 0–8)
BASOPHILS ABSOLUTE: 0.1 K/UL (ref 0–0.2)
BASOPHILS RELATIVE PERCENT: 1 % (ref 0–1)
BUN BLDV-MCNC: 19 MG/DL (ref 8–23)
CALCIUM SERPL-MCNC: 8.7 MG/DL (ref 8.8–10.2)
CHLORIDE BLD-SCNC: 104 MMOL/L (ref 98–111)
CO2: 21 MMOL/L (ref 22–29)
CREAT SERPL-MCNC: 0.7 MG/DL (ref 0.5–0.9)
EOSINOPHILS ABSOLUTE: 0 K/UL (ref 0–0.6)
EOSINOPHILS RELATIVE PERCENT: 0 % (ref 0–5)
GFR NON-AFRICAN AMERICAN: >60
GLUCOSE BLD-MCNC: 136 MG/DL (ref 74–109)
HCT VFR BLD CALC: 38.8 % (ref 37–47)
HEMOGLOBIN: 13.5 G/DL (ref 12–16)
LYMPHOCYTES ABSOLUTE: 3.4 K/UL (ref 1.1–4.5)
LYMPHOCYTES RELATIVE PERCENT: 34 % (ref 20–40)
MACROCYTES: ABNORMAL
MCH RBC QN AUTO: 35.5 PG (ref 27–31)
MCHC RBC AUTO-ENTMCNC: 34.8 G/DL (ref 33–37)
MCV RBC AUTO: 102.1 FL (ref 81–99)
METAMYELOCYTES RELATIVE PERCENT: 1 %
MONOCYTES ABSOLUTE: 0.6 K/UL (ref 0–0.9)
MONOCYTES RELATIVE PERCENT: 11 % (ref 0–10)
MYELOCYTE PERCENT: 1 %
NEUTROPHILS ABSOLUTE: 1.8 K/UL (ref 1.5–7.5)
NEUTROPHILS RELATIVE PERCENT: 29 % (ref 50–65)
PDW BLD-RTO: 13.8 % (ref 11.5–14.5)
PLATELET # BLD: 253 K/UL (ref 130–400)
PLATELET SLIDE REVIEW: ADEQUATE
PMV BLD AUTO: 10.6 FL (ref 7.4–10.4)
POTASSIUM SERPL-SCNC: 3.9 MMOL/L (ref 3.5–5)
RBC # BLD: 3.8 M/UL (ref 4.2–5.4)
SODIUM BLD-SCNC: 140 MMOL/L (ref 136–145)
WBC # BLD: 5.9 K/UL (ref 4.8–10.8)

## 2017-05-19 ENCOUNTER — PREP FOR SURGERY (OUTPATIENT)
Dept: CARDIOLOGY | Facility: CLINIC | Age: 72
End: 2017-05-19

## 2017-05-19 ENCOUNTER — OFFICE VISIT (OUTPATIENT)
Dept: CARDIOLOGY | Facility: CLINIC | Age: 72
End: 2017-05-19

## 2017-05-19 VITALS
BODY MASS INDEX: 35.73 KG/M2 | WEIGHT: 182 LBS | HEIGHT: 60 IN | SYSTOLIC BLOOD PRESSURE: 140 MMHG | DIASTOLIC BLOOD PRESSURE: 62 MMHG | HEART RATE: 72 BPM

## 2017-05-19 DIAGNOSIS — I25.119 CORONARY ARTERY DISEASE INVOLVING NATIVE CORONARY ARTERY OF NATIVE HEART WITH ANGINA PECTORIS (HCC): ICD-10-CM

## 2017-05-19 DIAGNOSIS — I50.30 DIASTOLIC CONGESTIVE HEART FAILURE, UNSPECIFIED CONGESTIVE HEART FAILURE CHRONICITY: ICD-10-CM

## 2017-05-19 DIAGNOSIS — R55 SYNCOPE, UNSPECIFIED SYNCOPE TYPE: Primary | ICD-10-CM

## 2017-05-19 DIAGNOSIS — R55 SYNCOPE AND COLLAPSE: Primary | ICD-10-CM

## 2017-05-19 DIAGNOSIS — I10 ESSENTIAL HYPERTENSION: ICD-10-CM

## 2017-05-19 DIAGNOSIS — Z95.5 STENTED CORONARY ARTERY: ICD-10-CM

## 2017-05-19 PROCEDURE — 99214 OFFICE O/P EST MOD 30 MIN: CPT | Performed by: INTERNAL MEDICINE

## 2017-05-19 PROCEDURE — 93000 ELECTROCARDIOGRAM COMPLETE: CPT | Performed by: INTERNAL MEDICINE

## 2017-05-23 ENCOUNTER — HOSPITAL ENCOUNTER (OUTPATIENT)
Facility: HOSPITAL | Age: 72
Setting detail: HOSPITAL OUTPATIENT SURGERY
Discharge: HOME | End: 2017-05-23
Attending: INTERNAL MEDICINE | Admitting: INTERNAL MEDICINE

## 2017-05-23 VITALS
TEMPERATURE: 98.7 F | OXYGEN SATURATION: 99 % | HEART RATE: 69 BPM | DIASTOLIC BLOOD PRESSURE: 62 MMHG | RESPIRATION RATE: 16 BRPM | SYSTOLIC BLOOD PRESSURE: 131 MMHG

## 2017-05-23 DIAGNOSIS — R55 SYNCOPE, UNSPECIFIED SYNCOPE TYPE: ICD-10-CM

## 2017-05-23 PROCEDURE — S0260 H&P FOR SURGERY: HCPCS | Performed by: INTERNAL MEDICINE

## 2017-05-23 PROCEDURE — 33282 PR IMPLANTATION PT-ACTIVATED CARDIAC EVENT RECORDER: CPT | Performed by: INTERNAL MEDICINE

## 2017-05-23 PROCEDURE — C1764 EVENT RECORDER, CARDIAC: HCPCS

## 2017-05-23 PROCEDURE — 33282 HC INSERTION PT ACTIV CARD EVNT REC: CPT | Performed by: INTERNAL MEDICINE

## 2017-05-23 RX ORDER — LIDOCAINE HYDROCHLORIDE 10 MG/ML
INJECTION, SOLUTION INFILTRATION; PERINEURAL
Status: DISCONTINUED
Start: 2017-05-23 | End: 2017-05-23 | Stop reason: HOSPADM

## 2017-05-23 RX ORDER — LIDOCAINE HYDROCHLORIDE 10 MG/ML
INJECTION, SOLUTION EPIDURAL; INFILTRATION; INTRACAUDAL; PERINEURAL AS NEEDED
Status: DISCONTINUED | OUTPATIENT
Start: 2017-05-23 | End: 2017-05-23 | Stop reason: HOSPADM

## 2017-06-02 ENCOUNTER — HOSPITAL ENCOUNTER (OUTPATIENT)
Dept: CARDIOLOGY | Facility: HOSPITAL | Age: 72
Discharge: HOME | End: 2017-06-02
Attending: INTERNAL MEDICINE | Admitting: INTERNAL MEDICINE

## 2017-06-02 VITALS — DIASTOLIC BLOOD PRESSURE: 80 MMHG | HEART RATE: 83 BPM | SYSTOLIC BLOOD PRESSURE: 145 MMHG

## 2017-06-02 DIAGNOSIS — I50.30 DIASTOLIC CONGESTIVE HEART FAILURE, UNSPECIFIED CONGESTIVE HEART FAILURE CHRONICITY: ICD-10-CM

## 2017-06-02 DIAGNOSIS — I25.119 CORONARY ARTERY DISEASE INVOLVING NATIVE CORONARY ARTERY OF NATIVE HEART WITH ANGINA PECTORIS (HCC): ICD-10-CM

## 2017-06-02 DIAGNOSIS — R55 SYNCOPE AND COLLAPSE: ICD-10-CM

## 2017-06-02 PROCEDURE — 25010000002 PERFLUTREN (DEFINITY) 8.476 MG IN SODIUM CHLORIDE 10 ML INJECTION: Performed by: INTERNAL MEDICINE

## 2017-06-02 PROCEDURE — 93352 ADMIN ECG CONTRAST AGENT: CPT | Performed by: INTERNAL MEDICINE

## 2017-06-02 PROCEDURE — 93350 STRESS TTE ONLY: CPT | Performed by: INTERNAL MEDICINE

## 2017-06-02 PROCEDURE — 93017 CV STRESS TEST TRACING ONLY: CPT

## 2017-06-02 PROCEDURE — C8928 TTE W OR W/O FOL W/CON,STRES: HCPCS

## 2017-06-02 PROCEDURE — 25010000003 DOBUTAMINE PER 250 MG: Performed by: INTERNAL MEDICINE

## 2017-06-02 PROCEDURE — 93018 CV STRESS TEST I&R ONLY: CPT | Performed by: INTERNAL MEDICINE

## 2017-06-02 RX ORDER — DOBUTAMINE HYDROCHLORIDE 100 MG/100ML
10-50 INJECTION INTRAVENOUS
Status: DISCONTINUED | OUTPATIENT
Start: 2017-06-02 | End: 2017-06-03 | Stop reason: HOSPADM

## 2017-06-02 RX ADMIN — SODIUM CHLORIDE 10 ML: 9 INJECTION INTRAMUSCULAR; INTRAVENOUS; SUBCUTANEOUS at 12:55

## 2017-06-02 RX ADMIN — DOBUTAMINE HYDROCHLORIDE 30 MCG/KG/MIN: 100 INJECTION INTRAVENOUS at 12:57

## 2017-06-03 LAB
BH CV STRESS BP STAGE 1: NORMAL
BH CV STRESS BP STAGE 2: NORMAL
BH CV STRESS BP STAGE 3: NORMAL
BH CV STRESS DOSE DOBUTAMINE STAGE 1: 10
BH CV STRESS DOSE DOBUTAMINE STAGE 2: 20
BH CV STRESS DOSE DOBUTAMINE STAGE 3: 30
BH CV STRESS DURATION MIN STAGE 1: 3
BH CV STRESS DURATION MIN STAGE 2: 3
BH CV STRESS DURATION MIN STAGE 3: 3
BH CV STRESS DURATION SEC STAGE 1: 0
BH CV STRESS DURATION SEC STAGE 2: 0
BH CV STRESS DURATION SEC STAGE 3: 6
BH CV STRESS ECHO POST STRESS EJECTION FRACTION EF: 65 %
BH CV STRESS HR STAGE 1: 88
BH CV STRESS HR STAGE 2: 110
BH CV STRESS HR STAGE 3: 129
BH CV STRESS PROTOCOL 1: NORMAL
BH CV STRESS RECOVERY BP: NORMAL MMHG
BH CV STRESS RECOVERY HR: 100 BPM
BH CV STRESS STAGE 1: 1
BH CV STRESS STAGE 2: 2
BH CV STRESS STAGE 3: 3
LV EF 2D ECHO EST: 55 %
MAXIMAL PREDICTED HEART RATE: 148 BPM
PERCENT MAX PREDICTED HR: 87.16 %
STRESS BASELINE BP: NORMAL MMHG
STRESS BASELINE HR: 83 BPM
STRESS PERCENT HR: 103 %
STRESS POST EXERCISE DUR MIN: 9 MIN
STRESS POST EXERCISE DUR SEC: 6 SEC
STRESS POST PEAK BP: NORMAL MMHG
STRESS POST PEAK HR: 129 BPM
STRESS TARGET HR: 126 BPM

## 2017-06-30 ENCOUNTER — OFFICE VISIT (OUTPATIENT)
Dept: CARDIOLOGY | Facility: CLINIC | Age: 72
End: 2017-06-30

## 2017-06-30 ENCOUNTER — CLINICAL SUPPORT (OUTPATIENT)
Dept: CARDIOLOGY | Facility: CLINIC | Age: 72
End: 2017-06-30

## 2017-06-30 VITALS
HEIGHT: 60 IN | SYSTOLIC BLOOD PRESSURE: 132 MMHG | DIASTOLIC BLOOD PRESSURE: 80 MMHG | WEIGHT: 186.2 LBS | BODY MASS INDEX: 36.56 KG/M2 | HEART RATE: 75 BPM

## 2017-06-30 DIAGNOSIS — R55 SYNCOPE AND COLLAPSE: ICD-10-CM

## 2017-06-30 DIAGNOSIS — Z95.5 STENTED CORONARY ARTERY: ICD-10-CM

## 2017-06-30 DIAGNOSIS — I25.119 CORONARY ARTERY DISEASE INVOLVING NATIVE CORONARY ARTERY OF NATIVE HEART WITH ANGINA PECTORIS (HCC): Primary | ICD-10-CM

## 2017-06-30 DIAGNOSIS — T79.6XXD TRAUMATIC RHABDOMYOLYSIS, SUBSEQUENT ENCOUNTER: ICD-10-CM

## 2017-06-30 DIAGNOSIS — I10 ESSENTIAL HYPERTENSION: ICD-10-CM

## 2017-06-30 DIAGNOSIS — Z95.818 STATUS POST PLACEMENT OF IMPLANTABLE LOOP RECORDER: Primary | ICD-10-CM

## 2017-06-30 PROBLEM — T79.6XXA TRAUMATIC RHABDOMYOLYSIS: Status: RESOLVED | Noted: 2017-04-06 | Resolved: 2017-06-30

## 2017-06-30 PROCEDURE — 93291 INTERROG DEV EVAL SCRMS IP: CPT | Performed by: INTERNAL MEDICINE

## 2017-06-30 PROCEDURE — 93000 ELECTROCARDIOGRAM COMPLETE: CPT | Performed by: INTERNAL MEDICINE

## 2017-06-30 PROCEDURE — 99024 POSTOP FOLLOW-UP VISIT: CPT | Performed by: INTERNAL MEDICINE

## 2017-06-30 NOTE — PROGRESS NOTES
Josi Mohamud  [id number]  1945  71 y.o.  female    Referring Provider: Cayetano Crews MD    Reason for Follow-up Visit: HTN  Recent syncope  Traumatic rhabdomyolysis  Moderate shortness of breath.  coronary artery disease with prior Stented coronary artery.   Denies any chest pain  Compliant with medications    History of present illness:  Josi Mohaumd is a 71 y.o. yo female with history of HTN who presents today for   Chief Complaint   Patient presents with   • Hypertension     6 wk f/u    .    History  Past Medical History:   Diagnosis Date   • Cancer     HODGKINS LYMPHOMA   • Disease of thyroid gland    • History of transfusion    • Hyperlipidemia    • Hypertension    ,   Past Surgical History:   Procedure Laterality Date   • APPENDECTOMY     • APPENDECTOMY     • CARDIAC CATHETERIZATION     • CARDIAC ELECTROPHYSIOLOGY PROCEDURE N/A 5/23/2017    Procedure: Loop insertion; LINQ;  Surgeon: Jose Monteiro MD;  Location: [facility] CATH INVASIVE LOCATION;  Service:    • CHOLECYSTECTOMY     • CORONARY ANGIOPLASTY WITH STENT PLACEMENT     • LIVER BIOPSY     • SPLENECTOMY     ,   History reviewed. No pertinent family history.,   Social History   Substance Use Topics   • Smoking status: Current Every Day Smoker     Packs/day: 0.25     Types: Cigarettes   • Smokeless tobacco: Never Used   • Alcohol use No   ,     Medications  Current Outpatient Prescriptions   Medication Sig Dispense Refill   • albuterol (PROVENTIL HFA;VENTOLIN HFA) 108 (90 BASE) MCG/ACT inhaler Inhale 2 puffs 2 (Two) Times a Day As Needed for Wheezing.     • amLODIPine (NORVASC) 10 MG tablet Take 1 tablet by mouth Daily. 30 tablet 0   • aspirin 81 MG EC tablet Take 81 mg by mouth Daily.     • calcium citrate-vitamin d (CALCITRATE) 315-250 MG-UNIT tablet tablet Take 1 tablet by mouth Daily.     • clopidogrel (PLAVIX) 75 MG tablet Take 75 mg by mouth 3 (Three) Times a Week. Monday, Wednesday, and Friday     • fluticasone-salmeterol (ADVAIR) 250-50  "MCG/DOSE DISKUS Inhale 2 (Two) Times a Day.     • furosemide (LASIX) 20 MG tablet Take 20 mg by mouth Daily As Needed (for swelling).     • guaiFENesin (MUCINEX) 600 MG 12 hr tablet Take 2 tablets by mouth 2 (Two) Times a Day As Needed for Cough. 15 tablet 0   • lisinopril (PRINIVIL,ZESTRIL) 20 MG tablet Take 20 mg by mouth Daily.     • LORazepam (ATIVAN) 2 MG tablet Take 0.5 tablets by mouth 3 (Three) Times a Day As Needed for Anxiety.  0   • metoprolol succinate XL (TOPROL-XL) 50 MG 24 hr tablet Take 50 mg by mouth Daily.     • pantoprazole (PROTONIX) 40 MG EC tablet Take 40 mg by mouth Daily.     • potassium chloride (K-DUR) 10 MEQ CR tablet Take 1 tablet by mouth 2 (Two) Times a Day. When using diuretic 30 tablet 0   • simvastatin (ZOCOR) 20 MG tablet Take 20 mg by mouth Daily.       No current facility-administered medications for this visit.        Allergies:  Penicillins    Review of Systems  Review of Systems   Constitution: Negative.   HENT: Negative.    Eyes: Negative.    Cardiovascular: Positive for dyspnea on exertion and near-syncope. Negative for chest pain, claudication, cyanosis, irregular heartbeat, leg swelling, orthopnea, palpitations, paroxysmal nocturnal dyspnea and syncope.   Respiratory: Negative.    Endocrine: Negative.    Hematologic/Lymphatic: Negative.    Skin: Negative.    Musculoskeletal: Positive for arthritis and back pain.   Gastrointestinal: Negative for anorexia.   Genitourinary: Negative.    Neurological: Negative.    Psychiatric/Behavioral: Negative.        Objective     Physical Exam:  /80 (BP Location: Left arm, Patient Position: Sitting, Cuff Size: Adult)  Pulse 75  Ht 60\" (152.4 cm)  Wt 186 lb 3.2 oz (84.5 kg)  BMI 36.36 kg/m2  Physical Exam   Constitutional: She appears well-developed.   HENT:   Head: Normocephalic.   Neck: Normal carotid pulses and no JVD present. No tracheal tenderness present. Carotid bruit is not present. No tracheal deviation and no edema " present.   Cardiovascular: Regular rhythm, normal heart sounds and normal pulses.    Pulmonary/Chest: Effort normal. No stridor.   Abdominal: Soft.   Neurological: She is alert. She has normal strength. No cranial nerve deficit or sensory deficit.   Skin: Skin is warm.   Psychiatric: She has a normal mood and affect. Her speech is normal and behavior is normal.       Results Review:       ECG 12 Lead  Date/Time: 6/30/2017 10:06 AM  Performed by: ERMIAS ORDOÑEZ  Authorized by: ERMIAS ORDOÑEZ   Comparison: compared with previous ECG from 5/19/2017  Similar to previous ECG  Rhythm: sinus rhythm  Rate: normal  ST Segments: ST segments normal  T Waves: T waves normal  QRS axis: normal  Clinical impression: normal ECG            Assessment/Plan   Patient Active Problem List   Diagnosis   • Syncope and collapse   • Coronary artery disease involving native coronary artery of native heart with angina pectoris   • Stented coronary artery   • Essential hypertension       No palpitations. No significant pedal edema. Compliant with medications and diet. Latest labs and medications reviewed.  Low risk stress echo with normal LVEF by echo cardiogram.     Plan:  Monitor LINQ    Close follow up with you as scheduled.  Intensive factor modifications.  See order list.    Counseled regarding disease appropriate diet, fluid, caffeine, stimulants and sodium intake as well as importance of compliance to diet, exercise and regular follow up.  Avoid NSAIDS and COX2 inhibitors. Use Acetaminophen PRN.    Return in about 6 months (around 12/30/2017).

## 2017-07-04 NOTE — PROGRESS NOTES
I have reviewed the notes, assessments, and/or procedures performed by Jacque Lo , I concur with her  documentation of  Josi Mohamud.

## 2017-12-11 ENCOUNTER — CLINICAL SUPPORT (OUTPATIENT)
Dept: CARDIOLOGY | Facility: CLINIC | Age: 72
End: 2017-12-11

## 2017-12-11 DIAGNOSIS — Z95.818 STATUS POST PLACEMENT OF IMPLANTABLE LOOP RECORDER: Primary | ICD-10-CM

## 2017-12-11 DIAGNOSIS — R55 SYNCOPE AND COLLAPSE: ICD-10-CM

## 2017-12-11 DIAGNOSIS — R55 SYNCOPE, UNSPECIFIED SYNCOPE TYPE: ICD-10-CM

## 2017-12-11 PROCEDURE — 93298 REM INTERROG DEV EVAL SCRMS: CPT | Performed by: INTERNAL MEDICINE

## 2017-12-11 NOTE — PROGRESS NOTES
Lin Monitoring Service Report    December 11, 2017    Primary Cardiologist:  Thania  Reason for implant:  syncope  Battery:    Events:  There were no automatically detected episodes and there were no  symptomatic episodes documented by the patient.  Changes:  n/a    Follow up:  1 month

## 2017-12-28 NOTE — PROGRESS NOTES
I have reviewed the notes, assessments, and/or procedures performed by Whitney Ennis , I concur with her  documentation of  Josi Mohamud.

## 2018-02-08 ENCOUNTER — OFFICE VISIT (OUTPATIENT)
Dept: CARDIOLOGY | Facility: CLINIC | Age: 73
End: 2018-02-08

## 2018-02-08 VITALS
BODY MASS INDEX: 36.12 KG/M2 | DIASTOLIC BLOOD PRESSURE: 82 MMHG | OXYGEN SATURATION: 96 % | WEIGHT: 184 LBS | SYSTOLIC BLOOD PRESSURE: 146 MMHG | HEIGHT: 60 IN | HEART RATE: 85 BPM

## 2018-02-08 DIAGNOSIS — I25.119 CORONARY ARTERY DISEASE INVOLVING NATIVE CORONARY ARTERY OF NATIVE HEART WITH ANGINA PECTORIS (HCC): Primary | ICD-10-CM

## 2018-02-08 DIAGNOSIS — I10 ESSENTIAL HYPERTENSION: ICD-10-CM

## 2018-02-08 DIAGNOSIS — Z95.5 STENTED CORONARY ARTERY: ICD-10-CM

## 2018-02-08 DIAGNOSIS — R55 SYNCOPE AND COLLAPSE: ICD-10-CM

## 2018-02-08 PROCEDURE — 99214 OFFICE O/P EST MOD 30 MIN: CPT | Performed by: INTERNAL MEDICINE

## 2018-02-08 PROCEDURE — 93000 ELECTROCARDIOGRAM COMPLETE: CPT | Performed by: INTERNAL MEDICINE

## 2018-02-08 NOTE — PROGRESS NOTES
Josi Mohamud  [id number]  1945  72 y.o.  female    Referring Provider: Cayetano Crews MD    Reason for Follow-up Visit:  Routine follow up.  Essential Hypertension   rior syncope  Traumatic rhabdomyolysis    Subjective   Mild chronic   exertional shortness of breath on exertion relieved with rest  No significant cough or wheezing  Advair helps   Going on for several months  No palpitations  No associated chest pain  No significant pedal edema  No fever or chills  No significant expectoration  No hemoptysis  coronary artery disease with prior Stented coronary artery.   Denies any chest pain  Compliant with medications    History of present illness:  Josi Mohamud is a 72 y.o. yo female with history of Essential Hypertension    who presents today for   Chief Complaint   Patient presents with   • Coronary Artery Disease     6 MO F/U    .    History  Past Medical History:   Diagnosis Date   • Cancer     HODGKINS LYMPHOMA   • Coronary artery disease    • Disease of thyroid gland    • History of transfusion    • Hyperlipidemia    • Hypertension    ,   Past Surgical History:   Procedure Laterality Date   • APPENDECTOMY     • APPENDECTOMY     • CARDIAC CATHETERIZATION     • CARDIAC ELECTROPHYSIOLOGY PROCEDURE N/A 5/23/2017    Procedure: Loop insertion; LINQ;  Surgeon: Jose Montiero MD;  Location: [facility] CATH INVASIVE LOCATION;  Service:    • CHOLECYSTECTOMY     • CORONARY ANGIOPLASTY WITH STENT PLACEMENT      X 1    • LIVER BIOPSY     • SPLENECTOMY     ,   History reviewed. No pertinent family history.,   Social History   Substance Use Topics   • Smoking status: Current Every Day Smoker     Packs/day: 0.50     Types: Cigarettes   • Smokeless tobacco: Never Used   • Alcohol use No   ,     Medications  Current Outpatient Prescriptions   Medication Sig Dispense Refill   • albuterol (PROVENTIL HFA;VENTOLIN HFA) 108 (90 BASE) MCG/ACT inhaler Inhale 2 puffs 2 (Two) Times a Day As Needed for Wheezing.     • amLODIPine  (NORVASC) 10 MG tablet Take 1 tablet by mouth Daily. 30 tablet 0   • aspirin 81 MG EC tablet Take 81 mg by mouth Daily.     • calcium citrate-vitamin d (CALCITRATE) 315-250 MG-UNIT tablet tablet Take 1 tablet by mouth Daily.     • clopidogrel (PLAVIX) 75 MG tablet Take 75 mg by mouth 3 (Three) Times a Week. Monday, Wednesday, and Friday     • fluticasone-salmeterol (ADVAIR) 250-50 MCG/DOSE DISKUS Inhale 2 (Two) Times a Day.     • furosemide (LASIX) 20 MG tablet Take 20 mg by mouth Daily As Needed (for swelling).     • guaiFENesin (MUCINEX) 600 MG 12 hr tablet Take 2 tablets by mouth 2 (Two) Times a Day As Needed for Cough. 15 tablet 0   • lisinopril (PRINIVIL,ZESTRIL) 20 MG tablet Take 20 mg by mouth Daily.     • LORazepam (ATIVAN) 2 MG tablet Take 0.5 tablets by mouth 3 (Three) Times a Day As Needed for Anxiety.  0   • metoprolol succinate XL (TOPROL-XL) 50 MG 24 hr tablet Take 50 mg by mouth Daily.     • pantoprazole (PROTONIX) 40 MG EC tablet Take 40 mg by mouth Daily.     • potassium chloride (K-DUR) 10 MEQ CR tablet Take 1 tablet by mouth 2 (Two) Times a Day. When using diuretic 30 tablet 0   • simvastatin (ZOCOR) 20 MG tablet Take 20 mg by mouth Daily.       No current facility-administered medications for this visit.        Allergies:  Penicillins    Review of Systems  Review of Systems   Constitution: Negative.   HENT: Negative.    Eyes: Negative.    Cardiovascular: Positive for dyspnea on exertion and near-syncope. Negative for chest pain, claudication, cyanosis, irregular heartbeat, leg swelling, orthopnea, palpitations, paroxysmal nocturnal dyspnea and syncope.   Respiratory: Negative.    Endocrine: Negative.    Hematologic/Lymphatic: Negative.    Skin: Negative.    Musculoskeletal: Positive for arthritis and back pain.   Gastrointestinal: Negative for anorexia.   Genitourinary: Negative.    Neurological: Negative.    Psychiatric/Behavioral: Negative.        Objective   Vitals:    02/08/18 1120 02/08/18  "1152   BP: 160/80 146/82   Pulse: 85    SpO2: 96%    Weight: 83.5 kg (184 lb)    Height: 152.4 cm (60\")      Physical Exam:  /80  Pulse 85  Ht 152.4 cm (60\")  Wt 83.5 kg (184 lb)  SpO2 96%  BMI 35.94 kg/m2  Physical Exam   Constitutional: She appears well-developed.   HENT:   Head: Normocephalic.   Neck: Normal carotid pulses and no JVD present. No tracheal tenderness present. Carotid bruit is not present. No tracheal deviation and no edema present.   Cardiovascular: Regular rhythm, normal heart sounds and normal pulses.    Pulmonary/Chest: Effort normal. No stridor.   Abdominal: Soft.   Neurological: She is alert. She has normal strength. No cranial nerve deficit or sensory deficit.   Skin: Skin is warm.   Psychiatric: She has a normal mood and affect. Her speech is normal and behavior is normal.       Results Review:       ECG 12 Lead  Date/Time: 2/8/2018 11:44 AM  Performed by: ERMIAS ORDOÑEZ  Authorized by: ERMIAS ORDOÑEZ   Comparison: compared with previous ECG from 6/30/2017  Comparison to previous ECG: left bundle branch block and premature ventricular contractions are new  Rhythm: sinus rhythm  Conduction: left bundle branch block  Clinical impression: abnormal ECG          Results for orders placed during the hospital encounter of 06/02/17   Adult Stress Echo Only    Narrative · Left ventricular systolic function is normal. Estimated EF = 55%.  · . Normal stress echo with no significant echocardiographic evidence for   myocardial ischemia.      Echo 4/17  · Left ventricular wall thickness is consistent with mild concentric hypertrophy.  · Left ventricular function is normal. Estimated EF = 65%.  · Estimated right ventricular systolic pressure from tricuspid regurgitation is normal (<35 mmHg).    Assessment/Plan   Patient Active Problem List   Diagnosis   • Syncope and collapse   • Coronary artery disease involving native coronary artery of native heart with angina pectoris   • Stented coronary artery " "  • Essential hypertension       No palpitations. No significant pedal edema. Compliant with medications and diet. Latest labs and medications reviewed.  Low risk stress echo with normal LVEF by echo cardiogram.   BP well controlled at other times    Plan:    Low salt/ HTN/ Heart healthy carbohydrate restricted cardiac diet as applicable to this patient's current diagnoses.   This handout has relevant information regarding shopping for food, preparing meals, what to eat at restaurants, tracking of food intake, information regarding sodium intake and salt content, how to read food labels, knowing what to eat, tips reagarding physical activity, calorie count and calorie expenditure. What foods to avoid. Information regarding alcoholic drinks along with \"good\" and \"bad\" fats.  Relevant printed educational materials given pertinent to above diagnoses     Monitor for any signs of bleeding including red or dark stools. Fall precautions.   Patient is asked to monitor BP at home or work, several times per month and return with written values at next office visit.   Despite new left bundle branch block no signs of ischamia  Monitor LINQ    Close follow up with you as scheduled.  Intensive factor modifications.  See order list.    Counseled regarding disease appropriate diet, fluid, caffeine, stimulants and sodium intake as well as importance of compliance to diet, exercise and regular follow up.  Avoid NSAIDS and COX2 inhibitors. Use Acetaminophen PRN.    Return in about 6 months (around 8/8/2018).                "

## 2018-03-23 ENCOUNTER — CLINICAL SUPPORT NO REQUIREMENTS (OUTPATIENT)
Dept: CARDIOLOGY | Facility: CLINIC | Age: 73
End: 2018-03-23

## 2018-03-23 DIAGNOSIS — R55 SYNCOPE AND COLLAPSE: ICD-10-CM

## 2018-03-23 PROCEDURE — 93299 PR REM INTERROG ICPMS/SCRMS <30 D TECH REVIEW: CPT | Performed by: PHYSICIAN ASSISTANT

## 2018-03-23 PROCEDURE — 93298 REM INTERROG DEV EVAL SCRMS: CPT | Performed by: PHYSICIAN ASSISTANT

## 2018-04-02 NOTE — PROGRESS NOTES
Agree with assessment and plan of mid level provider as below with any changes if made as noted by  Kellie Mohamud.

## 2018-06-08 ENCOUNTER — CLINICAL SUPPORT (OUTPATIENT)
Dept: CARDIOLOGY | Facility: CLINIC | Age: 73
End: 2018-06-08

## 2018-06-08 DIAGNOSIS — R55 SYNCOPE, UNSPECIFIED SYNCOPE TYPE: ICD-10-CM

## 2018-06-08 DIAGNOSIS — Z95.818 STATUS POST PLACEMENT OF IMPLANTABLE LOOP RECORDER: Primary | ICD-10-CM

## 2018-06-08 PROCEDURE — 93299 PR REM INTERROG ICPMS/SCRMS <30 D TECH REVIEW: CPT | Performed by: INTERNAL MEDICINE

## 2018-06-08 PROCEDURE — 93298 REM INTERROG DEV EVAL SCRMS: CPT | Performed by: INTERNAL MEDICINE

## 2018-06-08 NOTE — PROGRESS NOTES
Linq Report- [facility]    June 8, 2018    Primary Cardiologist:  Thania  Reason for implant:  syncope  Battery:  OK  Events:  No new events  Changes:  None    Follow up:  1 month

## 2018-06-27 ENCOUNTER — TRANSCRIBE ORDERS (OUTPATIENT)
Dept: ADMINISTRATIVE | Facility: HOSPITAL | Age: 73
End: 2018-06-27

## 2018-06-27 DIAGNOSIS — Z87.891 PERSONAL HISTORY OF TOBACCO USE, PRESENTING HAZARDS TO HEALTH: Primary | ICD-10-CM

## 2018-07-06 ENCOUNTER — HOSPITAL ENCOUNTER (OUTPATIENT)
Dept: CT IMAGING | Facility: HOSPITAL | Age: 73
Discharge: HOME | End: 2018-07-06
Admitting: NURSE PRACTITIONER

## 2018-07-06 DIAGNOSIS — Z87.891 PERSONAL HISTORY OF TOBACCO USE, PRESENTING HAZARDS TO HEALTH: ICD-10-CM

## 2018-07-06 PROCEDURE — G0297 LDCT FOR LUNG CA SCREEN: HCPCS

## 2018-07-10 ENCOUNTER — TRANSCRIBE ORDERS (OUTPATIENT)
Dept: ADMINISTRATIVE | Facility: HOSPITAL | Age: 73
End: 2018-07-10

## 2018-07-10 DIAGNOSIS — R91.1 LUNG NODULE: Primary | ICD-10-CM

## 2018-07-16 ENCOUNTER — CLINICAL SUPPORT (OUTPATIENT)
Dept: CARDIOLOGY | Facility: CLINIC | Age: 73
End: 2018-07-16

## 2018-07-16 DIAGNOSIS — R55 SYNCOPE AND COLLAPSE: ICD-10-CM

## 2018-07-16 DIAGNOSIS — Z95.818 STATUS POST PLACEMENT OF IMPLANTABLE LOOP RECORDER: Primary | ICD-10-CM

## 2018-07-16 PROCEDURE — 93298 REM INTERROG DEV EVAL SCRMS: CPT | Performed by: INTERNAL MEDICINE

## 2018-07-16 PROCEDURE — 93299 PR REM INTERROG ICPMS/SCRMS <30 D TECH REVIEW: CPT | Performed by: INTERNAL MEDICINE

## 2018-07-19 ENCOUNTER — HOSPITAL ENCOUNTER (OUTPATIENT)
Dept: CT IMAGING | Facility: HOSPITAL | Age: 73
Discharge: HOME | End: 2018-07-19

## 2018-07-19 ENCOUNTER — HOSPITAL ENCOUNTER (OUTPATIENT)
Dept: CT IMAGING | Facility: HOSPITAL | Age: 73
Discharge: HOME | End: 2018-07-19
Admitting: NURSE PRACTITIONER

## 2018-07-19 DIAGNOSIS — R91.1 LUNG NODULE: ICD-10-CM

## 2018-07-19 PROCEDURE — 78815 PET IMAGE W/CT SKULL-THIGH: CPT

## 2018-07-19 PROCEDURE — A9552 F18 FDG: HCPCS | Performed by: NURSE PRACTITIONER

## 2018-07-19 PROCEDURE — 0 FLUDEOXYGLUCOSE F18 SOLUTION: Performed by: NURSE PRACTITIONER

## 2018-07-19 RX ADMIN — FLUDEOXYGLUCOSE F18 1 DOSE: 300 INJECTION INTRAVENOUS at 09:30

## 2018-07-20 ENCOUNTER — OFFICE VISIT (OUTPATIENT)
Dept: OTOLARYNGOLOGY | Facility: CLINIC | Age: 73
End: 2018-07-20

## 2018-07-20 VITALS
WEIGHT: 190 LBS | TEMPERATURE: 98.3 F | HEIGHT: 59 IN | DIASTOLIC BLOOD PRESSURE: 84 MMHG | BODY MASS INDEX: 38.3 KG/M2 | SYSTOLIC BLOOD PRESSURE: 132 MMHG

## 2018-07-20 DIAGNOSIS — Z79.01 ANTICOAGULATED: ICD-10-CM

## 2018-07-20 DIAGNOSIS — R94.8 ABNORMAL POSITRON EMISSION TOMOGRAPHY (PET) SCAN: Primary | ICD-10-CM

## 2018-07-20 DIAGNOSIS — K21.9 GASTROESOPHAGEAL REFLUX DISEASE, ESOPHAGITIS PRESENCE NOT SPECIFIED: ICD-10-CM

## 2018-07-20 DIAGNOSIS — E04.1 THYROID NODULE: ICD-10-CM

## 2018-07-20 DIAGNOSIS — K11.8 PAROTID MASS: ICD-10-CM

## 2018-07-20 DIAGNOSIS — K21.9 LARYNGOPHARYNGEAL REFLUX (LPR): ICD-10-CM

## 2018-07-20 PROCEDURE — 99204 OFFICE O/P NEW MOD 45 MIN: CPT | Performed by: PHYSICIAN ASSISTANT

## 2018-07-20 PROCEDURE — 31575 DIAGNOSTIC LARYNGOSCOPY: CPT | Performed by: PHYSICIAN ASSISTANT

## 2018-07-20 RX ORDER — OMEPRAZOLE 40 MG/1
40 CAPSULE, DELAYED RELEASE ORAL DAILY
Qty: 30 CAPSULE | Refills: 3 | Status: SHIPPED | OUTPATIENT
Start: 2018-07-20 | End: 2018-08-19

## 2018-07-20 RX ORDER — CELECOXIB 200 MG/1
CAPSULE ORAL
Refills: 5 | Status: ON HOLD | COMMUNITY
Start: 2018-06-25 | End: 2019-01-23

## 2018-07-20 NOTE — PROGRESS NOTES
YOB: 1945  Location: [facility]  Location Address: [address] [place] [address], [facility], [address] [place] [address]  Location Phone: [phone]    Chief Complaint   Patient presents with   • Difficulty Swallowing     PET shows activity around parotid gland   • Shortness of Breath       History of Present Illness  Josi Mohamud is a 72 y.o. female.  Josi Mohamud is here for evaluation of ENT complaints. The patient has had a CT of the chest that showed a lung nodule and possible thyroid nodule. Due to this a PET scan was ordered with results below. In general there was increased uptake in the parotid gland on the left with a nodule and increased asymmetrical uptake in the oropharynx. Patient denies related symptoms, but does state she is short of breath and coughs a lot. She also reports reflux symptoms. She has a long history of tobacco use.    PET CT 2018 8:30 AM CDT     REASON FOR STUDY: Left upper lobe lung nodule, history of Hodgkin's  lymphoma 40 years ago, shortness of breath and cough     COMPARISON: Low-dose CT chest for screening 2018     RADIOPHARMACEUTICAL: 12.67 mCi F-18 FDG intravenously.      Technique: Prior to injection of the radiotracer, the patient's blood  glucose is 101 mg/dL. Following injection and a 60 minute distribution  interval, PET scan is performed from the skull base to the midthigh. Low  dose, noncontrast CT is performed in the same anatomic distribution for  attenuation correction of the PET scan and to assist in localizing the  PET findings.       Radiation dose: DLP 880mGy-cm     FINDINGS:      HEAD AND NECK: The visualized brain demonstrates no definite focal  abnormal FDG activity. Salivary, tonsillar and laryngeal activity  appears ordinary. There is asymmetric uptake in the posterior  oropharynx, with increased uptake on the left demonstrating a maximum  SUV of 5.87. There is increased uptake within a soft tissue nodule in  the left parotid gland which  demonstrates a maximum SUV of 5.74.  Additionally, there is minimal increased uptake in the region of the  right parotid gland demonstrating a maximum SUV of 4.26..      CHEST: There is minimal increased FDG uptake within the spiculated left  upper lobe nodule, demonstrating a maximum SUV of 1.69. There is minimal  increased uptake within a normal-appearing left axillary lymph node,  demonstrating a maximum SUV of 2.32.. Review of the mediastinum reveals  no hypermetabolic lymphadenopathy. Physiologic activity is noted in the  myocardium. No foci of hypermetabolism are appreciated in either breast.     ABDOMEN AND PELVIS: Normal physiologic activity is noted in the liver,  spleen, stomach and bowel. No adrenal hypermetabolism is noted. Urinary  activity is noted in the kidneys and bladder. No FDG-avid  lymphadenopathy is appreciated. An intramuscular lipoma is seen in the  left gluteal muscles.     MUSCULOSKELETAL: No FDG-avid osseous metastases are demonstrated.      IMPRESSION:  1. Some increased uptake within the previously described left upper lobe  nodule, suspicious for malignancy.  2. Abnormal uptake in the posterior left oropharynx, etiology unclear.  Recommend direct visualization for further evaluation.  3. Increased uptake in the parotid glands bilaterally with underlying  nodules possibly reflecting lymph nodes. A neoplastic process cannot be  excluded.     EXAMINATION:  CT CHEST LOW DOSE WO-  7/6/2018 12:04 PM CDT     HISTORY: Lung cancer screening.     COMPARISON: None     DOSE LENGTH PRODUCT: 60 mGy cm. Automated exposure control was also  utilized to decrease patient radiation dose.     TECHNIQUE: Noncontrast CT of the chest was performed using low-dose  protocol.     FINDINGS:      NODULES: There is a 1 cm ill-defined left upper lobe pulmonary nodule on  image 72 of series 4. This is a suspicious nodule. There is a 4 mm left  upper lobe nodule medially on image 34 of series 4. There is a 4  mm  groundglass nodule in the right upper lobe on image 43 of series 4  centrally.     OTHER LUNG FINDINGS: Bullous emphysema.     AIRWAY: The airway is patent.     PLEURA: No mass or fluid collection.     AORTA AND GREAT VESSELS: There is atheromatous disease of the thoracic  aorta and coronary arteries.     HEART AND PERICARDIUM: Heart size is normal. There is a small amount of  fluid in the pericardium is felt to be physiologic.     LYMPH NODES: No pathologic lymphadenopathy is noted.     BONES AND SOFT TISSUES: There is a dense calcification in the lateral  left breast. In the medial left breast, there is a linear radiopaque  density that appears to represent a foreign body. This is not visualized  on a mammogram on 8/23/2016. Correlate with any surgical intervention in  this area. This measures around 3.7 cm in length and 1 cm in short axis  diameter. There is a questionable dominant thyroid nodule on the right  measuring around 2 cm in AP dimension.     UPPER ABDOMEN: No acute findings in the upper abdomen.     IMPRESSION:  1. There is a 1 cm ill-defined left upper lobe pulmonary nodule that  needs further evaluation with PET/CT. This is very concerning for a  small primary neoplasm.  2. Lung-RADS 4: Suspicious.  3. There is a 3.7 x 1 cm radiopaque density in the medial right breast  could be a foreign body/retained surgical drain. This is not present on  a mammogram from 2016. Correlate with any surgical intervention in this  area.  4. Possible dominant nodule in the right thyroid lobe versus artifact  due to the low-dose protocol. Consider follow-up ultrasound of the  thyroid gland.  5. Atheromatous disease of the thoracic aorta and coronary arteries.   Past Medical History:   Diagnosis Date   • Cancer (CMS/HCC)     HODGKINS LYMPHOMA   • Coronary artery disease    • Disease of thyroid gland    • History of transfusion    • Hyperlipidemia    • Hypertension        Past Surgical History:   Procedure Laterality  Date   • APPENDECTOMY     • APPENDECTOMY     • CARDIAC CATHETERIZATION     • CARDIAC ELECTROPHYSIOLOGY PROCEDURE N/A 5/23/2017    Procedure: Loop insertion; LINQ;  Surgeon: Jose Monteiro MD;  Location:  [facility];  Service:    • CHOLECYSTECTOMY     • CORONARY ANGIOPLASTY WITH STENT PLACEMENT      X 1    • LIVER BIOPSY     • SPLENECTOMY         Outpatient Prescriptions Marked as Taking for the 7/20/18 encounter (Office Visit) with ELIAS Jaffe   Medication Sig Dispense Refill   • albuterol (PROVENTIL HFA;VENTOLIN HFA) 108 (90 BASE) MCG/ACT inhaler Inhale 2 puffs 2 (Two) Times a Day As Needed for Wheezing.     • amLODIPine (NORVASC) 10 MG tablet Take 1 tablet by mouth Daily. 30 tablet 0   • aspirin 81 MG EC tablet Take 81 mg by mouth Daily.     • calcium citrate-vitamin d (CALCITRATE) 315-250 MG-UNIT tablet tablet Take 1 tablet by mouth Daily.     • Fluticasone Furoate-Vilanterol (BREO ELLIPTA IN) Inhale.     • fluticasone-salmeterol (ADVAIR) 250-50 MCG/DOSE DISKUS Inhale 2 (Two) Times a Day.     • furosemide (LASIX) 20 MG tablet Take 20 mg by mouth Daily As Needed (for swelling).     • guaiFENesin (MUCINEX) 600 MG 12 hr tablet Take 2 tablets by mouth 2 (Two) Times a Day As Needed for Cough. 15 tablet 0   • lisinopril (PRINIVIL,ZESTRIL) 20 MG tablet Take 20 mg by mouth Daily.     • LORazepam (ATIVAN) 2 MG tablet Take 0.5 tablets by mouth 3 (Three) Times a Day As Needed for Anxiety.  0   • metoprolol succinate XL (TOPROL-XL) 50 MG 24 hr tablet Take 50 mg by mouth Daily.     • potassium chloride (K-DUR) 10 MEQ CR tablet Take 1 tablet by mouth 2 (Two) Times a Day. When using diuretic 30 tablet 0   • simvastatin (ZOCOR) 20 MG tablet Take 20 mg by mouth Daily.     • [DISCONTINUED] pantoprazole (PROTONIX) 40 MG EC tablet Take 40 mg by mouth Daily.         Penicillins    History reviewed. No pertinent family history.    Social History     Social History   • Marital status:      Spouse  name: N/A   • Number of children: N/A   • Years of education: N/A     Occupational History   • Not on file.     Social History Main Topics   • Smoking status: Current Every Day Smoker     Packs/day: 0.50     Types: Cigarettes   • Smokeless tobacco: Never Used   • Alcohol use No   • Drug use: No   • Sexual activity: Defer     Other Topics Concern   • Not on file     Social History Narrative   • No narrative on file       Review of Systems   Constitutional: Negative for activity change, appetite change, chills, diaphoresis, fatigue, fever and unexpected weight change.   HENT: Positive for trouble swallowing. Negative for congestion, dental problem, drooling, ear discharge, ear pain, facial swelling, hearing loss, mouth sores, nosebleeds, postnasal drip, rhinorrhea, sinus pressure, sneezing, sore throat, tinnitus and voice change.         Abnormal PET scan of head and neck   Eyes: Negative.    Respiratory: Positive for cough and shortness of breath.    Cardiovascular: Negative.    Gastrointestinal: Negative.    Endocrine: Negative.    Skin: Negative.    Allergic/Immunologic: Negative for environmental allergies, food allergies and immunocompromised state.   Neurological: Negative.    Hematological: Negative.    Psychiatric/Behavioral: Negative.        Vitals:    07/20/18 1313   BP: 132/84   Temp: 98.3 °F (36.8 °C)       Body mass index is 38.38 kg/m².    Objective     Physical Exam  CONSTITUTIONAL: well nourished, alert, oriented, in no acute distress     COMMUNICATION AND VOICE: able to communicate normally, normal voice quality    HEAD: normocephalic, no lesions, atraumatic, no tenderness, no masses     FACE: appearance normal, no lesions, no tenderness, no deformities, facial motion symmetric    SALIVARY GLANDS: parotid glands with no tenderness, no swelling, no palpable masses, submandibular glands with normal size, nontender    EYES: ocular motility normal, eyelids normal, orbits normal, no proptosis, conjunctiva  normal , pupils equal, round     EARS:  Hearing: response to conversational voice normal bilaterally   External Ears: auricles without lesions  Otoscopic: tympanic membrane appearance normal, no lesions, no perforation, normal mobility, no fluid    NOSE:  External Nose: structure normal, no tenderness on palpation, no nasal discharge, no lesions, no evidence of trauma, nostrils patent   Intranasal Exam: nasal mucosa normal, vestibule within normal limits, inferior turbinate normal, nasal septum midline   Nasopharynx:     ORAL:  Lips: upper and lower lips without lesion   Teeth: dentition within normal limits for age   Gums: gingivae healthy   Oral Mucosa: oral mucosa dryness, no mucosal lesions   Floor of Mouth: Warthin’s duct patent, mucosa normal  Tongue: lingual mucosa normal without lesions, normal tongue mobility   Palate: soft and hard palates with normal mucosa and structure  Oropharynx: oropharyngeal mucosa erythema    HYPOPHARYNX:   LARYNX: epiglottis and arytenoid cartilage within normal limits, vocal cord mucosa normal with normal mobility     NECK: neck appearance normal, no mass,  noted without erythema or tenderness    THYROID: no overt thyromegaly, no tenderness, nodules or mass present on palpation, position midline     LYMPH NODES: no lymphadenopathy    CHEST/RESPIRATORY: respiratory effort normal, normal breath sounds     CARDIOVASCULAR: rate and rhythm normal, extremities without cyanosis or edema      NEUROLOGIC/PSYCHIATRIC: oriented to time, place and person, mood normal, affect appropriate, CN II-XII intact grossly    OPERATIVE NOTE:  Josi Mohamud    DATE OF PROCEDURE: 07/20/2018    PROCEDURE:   Flexible Fiberoptic Laryngoscopy    ANESTHESIA:  None    REASON FOR PROCEDURE:  Procedure was recommend for suspicious clinical behavior  Risks, benefits and alternatives were discussed.      DETAILS of OPERATION:  The patient was seated in the exam chair.  A flexible fiberoptic laryngoscopy was  performed through the oral cavity.  The scope was introduced into the oral cavity and directed to the level of the glottis, examining the structures of the oropharynx, base of tongue, vallecula, supraglottic larynx, glottic larynx, and hypopharynx.      FINDINGS:  Mucosal surfaces:   The mucosal surfaces demonstrated normal mucosa surfaces with moderate inflammation    Base of tongue:  The base of tongue was found to have lymphoid hyperplasia, moderate that is worse on the left extending done the vallecula. No discrete mass, lesion, or ulceration noted.    Epiglottis:  The epiglottis was found to have  no mass or lesion.    Aryepligottic fold:  The AE folds were found to have no mass or lesion.    False Vocal Fold:  The false cords were found to have no mass or lesion.    True Vocal Cord:  The true vocal cords were found to have no mass or lesion.    Arytenoid:   The arytenoids were found to have no mass or lesion.    Hypopharynx:  The hypopharynx was found to have no mass or lesion.    The patient tolerated procedure well.          Assessment/Plan   Problems Addressed this Visit        Respiratory    Laryngopharyngeal reflux (LPR)    Relevant Medications    omeprazole (priLOSEC) 40 MG capsule       Digestive    Gastroesophageal reflux disease    Relevant Medications    omeprazole (priLOSEC) 40 MG capsule       Endocrine    Thyroid nodule    Relevant Orders    US Head Neck Soft Tissue       Hematopoietic and Hemostatic    Anticoagulated       Other    Abnormal positron emission tomography (PET) scan - Primary    Parotid mass    Relevant Orders    US Head Neck Soft Tissue        * Surgery not found *  Orders Placed This Encounter   Procedures   • US Head Neck Soft Tissue     Standing Status:   Future     Standing Expiration Date:   7/21/2019     Order Specific Question:   Reason for Exam:     Answer:   thyroid nodule, parotid mass     Return in about 6 weeks (around 8/31/2018) for Recheck throat and parotid gland  with thyroid ultrasound and parotid scan with Dr. Mcnulty.       Patient Instructions   Will discuss PET scan with Dr. Mcnulty. Will set patient up for follow-up with thyroid and parotid ultrasound. Do not see anything obviously concerning at this time. Advised to discuss PET scan results with the lung with her PCP, but this looks clear.     ##### TOBACCO CESSATION #####   IF YOU SMOKE OR USE TOBACCO PLEASE READ THE FOLLOWING:    Why is smoking bad for me?  Smoking increases the risk of heart disease, lung disease, vascular disease, stroke, and cancer.     If you smoke, STOP!    If you would like more information on quitting smoking, please visit the [employer] website: [email or web address]   This link will provide additional resources including the QUIT line and the Beat the Pack support groups.     For more information:    Quit Now [place]  1-800-QUIT-NOW  https://[place].[employer].org/en-US/  ###### BMI  #####   MyPlate from [employer]  The general, healthful diet is based on the 2010 Dietary Guidelines for Americans. The amount of food you need to eat from each food group depends on your age, sex, and level of physical activity and can be individualized by a dietitian. Go to ChooseMyPlate.gov for more information.  What do I need to know about the MyPlate plan?  · Enjoy your food, but eat less.  · Avoid oversized portions.  ? ½ of your plate should include fruits and vegetables.  ? ¼ of your plate should be grains.  ? ¼ of your plate should be protein.  Grains  · Make at least half of your grains whole grains.  · For a 2,000 calorie daily food plan, eat 6 oz every day.  · 1 oz is about 1 slice bread, 1 cup cereal, or ½ cup cooked rice, cereal, or pasta.  Vegetables  · Make half your plate fruits and vegetables.  · For a 2,000 calorie daily food plan, eat 2½ cups every day.  · 1 cup is about 1 cup raw or cooked vegetables or vegetable juice or 2 cups raw leafy  greens.  Fruits  · Make half your plate fruits and vegetables.  · For a 2,000 calorie daily food plan, eat 2 cups every day.  · 1 cup is about 1 cup fruit or 100% fruit juice or ½ cup dried fruit.  Protein  · For a 2,000 calorie daily food plan, eat 5½ oz every day.  · 1 oz is about 1 oz meat, poultry, or fish, ¼ cup cooked beans, 1 egg, 1 Tbsp peanut butter, or ½ oz nuts or seeds.  Dairy  · Switch to fat-free or low-fat (1%) milk.  · For a 2,000 calorie daily food plan, eat 3 cups every day.  · 1 cup is about 1 cup milk or yogurt or soy milk (soy beverage), 1½ oz natural cheese, or 2 oz processed cheese.  Fats, Oils, and Empty Calories  · Only small amounts of oils are recommended.  · Empty calories are calories from solid fats or added sugars.  · Compare sodium in foods like soup, bread, and frozen meals. Choose the foods with lower numbers.  · Drink water instead of sugary drinks.  What foods can I eat?  Grains  Whole grains such as whole wheat, quinoa, millet, and bulgur. Bread, rolls, and pasta made from whole grains. Brown or wild rice. Hot or cold cereals made from whole grains and without added sugar.  Vegetables  All fresh vegetables, especially fresh red, dark green, or orange vegetables. Peas and beans. Low-sodium frozen or canned vegetables prepared without added salt. Low-sodium vegetable juices.  Fruits  All fresh, frozen, and dried fruits. Canned fruit packed in water or fruit juice without added sugar. Fruit juices without added sugar.  Meats and Other Protein Sources  Boiled, baked, or grilled lean meat trimmed of fat. Skinless poultry. Fresh seafood and shellfish. Canned seafood packed in water. Unsalted nuts and unsalted nut butters. Tofu. Dried beans and pea. Eggs.  Dairy  Low-fat or fat-free milk, yogurt, and cheeses.  Sweets and Desserts  Frozen desserts made from low-fat milk.  Fats and Oils  Olive, peanut, and canola oils and margarine. Salad dressing and mayonnaise made from these  oils.  Other  Soups and casseroles made from allowed ingredients and without added fat or salt.  The items listed above may not be a complete list of recommended foods or beverages. Contact your dietitian for more options.  What foods are not recommended?  Grains  Sweetened, low-fiber cereals. Packaged baked goods. Snack crackers and chips. Cheese crackers, butter crackers, and biscuits. Frozen waffles, sweet breads, doughnuts, pastries, packaged baking mixes, pancakes, cakes, and cookies.  Vegetables  Regular canned or frozen vegetables or vegetables prepared with salt. Canned tomatoes. Canned tomato sauce. Fried vegetables. Vegetables in cream sauce or cheese sauce.  Fruits  Fruits packed in syrup or made with added sugar.  Meats and Other Protein Sources  Marbled or fatty meats such as ribs. Poultry with skin. Fried meats, poultry, eggs, or fish. Sausages, hot dogs, and deli meats such as pastrami, bologna, or salami.  Dairy  Whole milk, cream, cheeses made from whole milk, sour cream. Ice cream or yogurt made from whole milk or with added sugar.  Beverages  For adults, no more than one alcoholic drink per day. Regular soft drinks or other sugary beverages. Juice drinks.  Sweets and Desserts  Sugary or fatty desserts, candy, and other sweets.  Fats and Oils  Solid shortening or partially hydrogenated oils. Solid margarine. Margarine that contains trans fats. Butter.  The items listed above may not be a complete list of foods and beverages to avoid. Contact your dietitian for more information.  This information is not intended to replace advice given to you by your health care provider. Make sure you discuss any questions you have with your health care provider.  Document Released: 01/06/2009 Document Revised: 05/25/2017 Document Reviewed: 11/26/2014  [employer] Patient Education © 2018 [employer]     Calorie Counting for Weight Loss  Calories are units of energy. Your body needs a certain amount of  calories from food to keep you going throughout the day. When you eat more calories than your body needs, your body stores the extra calories as fat. When you eat fewer calories than your body needs, your body burns fat to get the energy it needs.  Calorie counting means keeping track of how many calories you eat and drink each day. Calorie counting can be helpful if you need to lose weight. If you make sure to eat fewer calories than your body needs, you should lose weight. Ask your health care provider what a healthy weight is for you.  For calorie counting to work, you will need to eat the right number of calories in a day in order to lose a healthy amount of weight per week. A dietitian can help you determine how many calories you need in a day and will give you suggestions on how to reach your calorie goal.  · A healthy amount of weight to lose per week is usually 1-2 lb (0.5-0.9 kg). This usually means that your daily calorie intake should be reduced by 500-750 calories.  · Eating 1,200 - 1,500 calories per day can help most women lose weight.  · Eating 1,500 - 1,800 calories per day can help most men lose weight.    What do I need to know about calorie counting?  In order to meet your daily calorie goal, you will need to:  · Find out how many calories are in each food you would like to eat. Try to do this before you eat.  · Decide how much of the food you plan to eat.  · Write down what you ate and how many calories it had. Doing this is called keeping a food log.    To successfully lose weight, it is important to balance calorie counting with a healthy lifestyle that includes regular activity. Aim for 150 minutes of moderate exercise (such as walking) or 75 minutes of vigorous exercise (such as running) each week.  Where do I find calorie information?    The number of calories in a food can be found on a Nutrition Facts label. If a food does not have a Nutrition Facts label, try to look up the calories  online or ask your dietitian for help.  Remember that calories are listed per serving. If you choose to have more than one serving of a food, you will have to multiply the calories per serving by the amount of servings you plan to eat. For example, the label on a package of bread might say that a serving size is 1 slice and that there are 90 calories in a serving. If you eat 1 slice, you will have eaten 90 calories. If you eat 2 slices, you will have eaten 180 calories.  How do I keep a food log?  Immediately after each meal, record the following information in your food log:  · What you ate. Don't forget to include toppings, sauces, and other extras on the food.  · How much you ate. This can be measured in cups, ounces, or number of items.  · How many calories each food and drink had.  · The total number of calories in the meal.    Keep your food log near you, such as in a small notebook in your pocket, or use a mobile ivone or website. Some programs will calculate calories for you and show you how many calories you have left for the day to meet your goal.  What are some calorie counting tips?  · Use your calories on foods and drinks that will fill you up and not leave you hungry:  ? Some examples of foods that fill you up are nuts and nut butters, vegetables, lean proteins, and high-fiber foods like whole grains. High-fiber foods are foods with more than 5 g fiber per serving.  ? Drinks such as sodas, specialty coffee drinks, alcohol, and juices have a lot of calories, yet do not fill you up.  · Eat nutritious foods and avoid empty calories. Empty calories are calories you get from foods or beverages that do not have many vitamins or protein, such as candy, sweets, and soda. It is better to have a nutritious high-calorie food (such as an avocado) than a food with few nutrients (such as a bag of chips).  · Know how many calories are in the foods you eat most often. This will help you calculate calorie counts  "faster.  · Pay attention to calories in drinks. Low-calorie drinks include water and unsweetened drinks.  · Pay attention to nutrition labels for \"low fat\" or \"fat free\" foods. These foods sometimes have the same amount of calories or more calories than the full fat versions. They also often have added sugar, starch, or salt, to make up for flavor that was removed with the fat.  · Find a way of tracking calories that works for you. Get creative. Try different apps or programs if writing down calories does not work for you.  What are some portion control tips?  · Know how many calories are in a serving. This will help you know how many servings of a certain food you can have.  · Use a measuring cup to measure serving sizes. You could also try weighing out portions on a kitchen scale. With time, you will be able to estimate serving sizes for some foods.  · Take some time to put servings of different foods on your favorite plates, bowls, and cups so you know what a serving looks like.  · Try not to eat straight from a bag or box. Doing this can lead to overeating. Put the amount you would like to eat in a cup or on a plate to make sure you are eating the right portion.  · Use smaller plates, glasses, and bowls to prevent overeating.  · Try not to multitask (for example, watch TV or use your computer) while eating. If it is time to eat, sit down at a table and enjoy your food. This will help you to know when you are full. It will also help you to be aware of what you are eating and how much you are eating.  What are tips for following this plan?  Reading food labels  · Check the calorie count compared to the serving size. The serving size may be smaller than what you are used to eating.  · Check the source of the calories. Make sure the food you are eating is high in vitamins and protein and low in saturated and trans fats.  Shopping  · Read nutrition labels while you shop. This will help you make healthy decisions " before you decide to purchase your food.  · Make a grocery list and stick to it.  Cooking  · Try to cook your favorite foods in a healthier way. For example, try baking instead of frying.  · Use low-fat dairy products.  Meal planning  · Use more fruits and vegetables. Half of your plate should be fruits and vegetables.  · Include lean proteins like poultry and fish.  How do I count calories when eating out?  · Ask for smaller portion sizes.  · Consider sharing an entree and sides instead of getting your own entree.  · If you get your own entree, eat only half. Ask for a box at the beginning of your meal and put the rest of your entree in it so you are not tempted to eat it.  · If calories are listed on the menu, choose the lower calorie options.  · Choose dishes that include vegetables, fruits, whole grains, low-fat dairy products, and lean protein.  · Choose items that are boiled, broiled, grilled, or steamed. Stay away from items that are buttered, battered, fried, or served with cream sauce. Items labeled “crispy” are usually fried, unless stated otherwise.  · Choose water, low-fat milk, unsweetened iced tea, or other drinks without added sugar. If you want an alcoholic beverage, choose a lower calorie option such as a glass of wine or light beer.  · Ask for dressings, sauces, and syrups on the side. These are usually high in calories, so you should limit the amount you eat.  · If you want a salad, choose a garden salad and ask for grilled meats. Avoid extra toppings like terry, cheese, or fried items. Ask for the dressing on the side, or ask for olive oil and vinegar or lemon to use as dressing.  · Estimate how many servings of a food you are given. For example, a serving of cooked rice is ½ cup or about the size of half a baseball. Knowing serving sizes will help you be aware of how much food you are eating at restaurants. The list below tells you how big or small some common portion sizes are based on everyday  objects:  ? 1 oz--4 stacked dice.  ? 3 oz--1 deck of cards.  ? 1 tsp--1 die.  ? 1 Tbsp--½ a ping-pong ball.  ? 2 Tbsp--1 ping-pong ball.  ? ½ cup--½ baseball.  ? 1 cup--1 baseball.  Summary  · Calorie counting means keeping track of how many calories you eat and drink each day. If you eat fewer calories than your body needs, you should lose weight.  · A healthy amount of weight to lose per week is usually 1-2 lb (0.5-0.9 kg). This usually means reducing your daily calorie intake by 500-750 calories.  · The number of calories in a food can be found on a Nutrition Facts label. If a food does not have a Nutrition Facts label, try to look up the calories online or ask your dietitian for help.  · Use your calories on foods and drinks that will fill you up, and not on foods and drinks that will leave you hungry.  · Use smaller plates, glasses, and bowls to prevent overeating.  This information is not intended to replace advice given to you by your health care provider. Make sure you discuss any questions you have with your health care provider.  Document Released: 12/18/2006 Document Revised: 11/17/2017 Document Reviewed: 11/17/2017  [employer] Interactive Patient Education © 2018 [employer]     Exercising to Lose Weight  Exercising can help you to lose weight. In order to lose weight through exercise, you need to do vigorous-intensity exercise. You can tell that you are exercising with vigorous intensity if you are breathing very hard and fast and cannot hold a conversation while exercising.  Moderate-intensity exercise helps to maintain your current weight. You can tell that you are exercising at a moderate level if you have a higher heart rate and faster breathing, but you are still able to hold a conversation.  How often should I exercise?  Choose an activity that you enjoy and set realistic goals. Your health care provider can help you to make an activity plan that works for you. Exercise regularly as directed by  your health care provider. This may include:  · Doing resistance training twice each week, such as:  ? Push-ups.  ? Sit-ups.  ? Lifting weights.  ? Using resistance bands.  · Doing a given intensity of exercise for a given amount of time. Choose from these options:  ? 150 minutes of moderate-intensity exercise every week.  ? 75 minutes of vigorous-intensity exercise every week.  ? A mix of moderate-intensity and vigorous-intensity exercise every week.    Children, pregnant women, people who are out of shape, people who are overweight, and older adults may need to consult a health care provider for individual recommendations. If you have any sort of medical condition, be sure to consult your health care provider before starting a new exercise program.  What are some activities that can help me to lose weight?  · Walking at a rate of at least 4.5 miles an hour.  · Jogging or running at a rate of 5 miles per hour.  · Biking at a rate of at least 10 miles per hour.  · Lap swimming.  · Roller-skating or in-line skating.  · Cross-country skiing.  · Vigorous competitive sports, such as football, basketball, and soccer.  · Jumping rope.  · Aerobic dancing.  How can I be more active in my day-to-day activities?  · Use the stairs instead of the elevator.  · Take a walk during your lunch break.  · If you drive, park your car farther away from work or school.  · If you take public transportation, get off one stop early and walk the rest of the way.  · Make all of your phone calls while standing up and walking around.  · Get up, stretch, and walk around every 30 minutes throughout the day.  What guidelines should I follow while exercising?  · Do not exercise so much that you hurt yourself, feel dizzy, or get very short of breath.  · Consult your health care provider prior to starting a new exercise program.  · Wear comfortable clothes and shoes with good support.  · Drink plenty of water while you exercise to prevent dehydration  or heat stroke. Body water is lost during exercise and must be replaced.  · Work out until you breathe faster and your heart beats faster.  This information is not intended to replace advice given to you by your health care provider. Make sure you discuss any questions you have with your health care provider.  Document Released: 01/20/2012 Document Revised: 05/25/2017 Document Reviewed: 05/21/2015  [employer] Interactive Patient Education © 2018 [employer]

## 2018-07-21 PROBLEM — E04.1 THYROID NODULE: Status: ACTIVE | Noted: 2018-07-21

## 2018-07-21 PROBLEM — Z79.01 ANTICOAGULATED: Status: ACTIVE | Noted: 2018-07-21

## 2018-07-21 PROBLEM — K21.9 GASTROESOPHAGEAL REFLUX DISEASE: Status: ACTIVE | Noted: 2018-07-21

## 2018-07-21 PROBLEM — K21.9 LARYNGOPHARYNGEAL REFLUX (LPR): Status: ACTIVE | Noted: 2018-07-21

## 2018-07-21 PROBLEM — K11.8 PAROTID MASS: Status: ACTIVE | Noted: 2018-07-21

## 2018-07-21 PROBLEM — R94.8 ABNORMAL POSITRON EMISSION TOMOGRAPHY (PET) SCAN: Status: ACTIVE | Noted: 2018-07-21

## 2018-07-22 NOTE — PATIENT INSTRUCTIONS
Will discuss PET scan with Dr. Mcnulty. Will set patient up for follow-up with thyroid and parotid ultrasound. Do not see anything obviously concerning at this time. Advised to discuss PET scan results with the lung with her PCP, but this looks clear.     ##### TOBACCO CESSATION #####   IF YOU SMOKE OR USE TOBACCO PLEASE READ THE FOLLOWING:    Why is smoking bad for me?  Smoking increases the risk of heart disease, lung disease, vascular disease, stroke, and cancer.     If you smoke, STOP!    If you would like more information on quitting smoking, please visit the [employer] website: [email or web address]   This link will provide additional resources including the QUIT line and the Beat the Pack support groups.     For more information:    Quit Now [facility]  1-800-QUIT-NOW  [email or web address]  ###### BMI  #####   MyPlate from [employer]  The general, healthful diet is based on the 2010 Dietary Guidelines for Americans. The amount of food you need to eat from each food group depends on your age, sex, and level of physical activity and can be individualized by a dietitian. Go to ChooseMyPlate.gov for more information.  What do I need to know about the MyPlate plan?  · Enjoy your food, but eat less.  · Avoid oversized portions.  ? ½ of your plate should include fruits and vegetables.  ? ¼ of your plate should be grains.  ? ¼ of your plate should be protein.  Grains  · Make at least half of your grains whole grains.  · For a 2,000 calorie daily food plan, eat 6 oz every day.  · 1 oz is about 1 slice bread, 1 cup cereal, or ½ cup cooked rice, cereal, or pasta.  Vegetables  · Make half your plate fruits and vegetables.  · For a 2,000 calorie daily food plan, eat 2½ cups every day.  · 1 cup is about 1 cup raw or cooked vegetables or vegetable juice or 2 cups raw leafy greens.  Fruits  · Make half your plate fruits and vegetables.  · For a 2,000 calorie daily food  plan, eat 2 cups every day.  · 1 cup is about 1 cup fruit or 100% fruit juice or ½ cup dried fruit.  Protein  · For a 2,000 calorie daily food plan, eat 5½ oz every day.  · 1 oz is about 1 oz meat, poultry, or fish, ¼ cup cooked beans, 1 egg, 1 Tbsp peanut butter, or ½ oz nuts or seeds.  Dairy  · Switch to fat-free or low-fat (1%) milk.  · For a 2,000 calorie daily food plan, eat 3 cups every day.  · 1 cup is about 1 cup milk or yogurt or soy milk (soy beverage), 1½ oz natural cheese, or 2 oz processed cheese.  Fats, Oils, and Empty Calories  · Only small amounts of oils are recommended.  · Empty calories are calories from solid fats or added sugars.  · Compare sodium in foods like soup, bread, and frozen meals. Choose the foods with lower numbers.  · Drink water instead of sugary drinks.  What foods can I eat?  Grains  Whole grains such as whole wheat, quinoa, millet, and bulgur. Bread, rolls, and pasta made from whole grains. Brown or wild rice. Hot or cold cereals made from whole grains and without added sugar.  Vegetables  All fresh vegetables, especially fresh red, dark green, or orange vegetables. Peas and beans. Low-sodium frozen or canned vegetables prepared without added salt. Low-sodium vegetable juices.  Fruits  All fresh, frozen, and dried fruits. Canned fruit packed in water or fruit juice without added sugar. Fruit juices without added sugar.  Meats and Other Protein Sources  Boiled, baked, or grilled lean meat trimmed of fat. Skinless poultry. Fresh seafood and shellfish. Canned seafood packed in water. Unsalted nuts and unsalted nut butters. Tofu. Dried beans and pea. Eggs.  Dairy  Low-fat or fat-free milk, yogurt, and cheeses.  Sweets and Desserts  Frozen desserts made from low-fat milk.  Fats and Oils  Olive, peanut, and canola oils and margarine. Salad dressing and mayonnaise made from these oils.  Other  Soups and casseroles made from allowed ingredients and without added fat or salt.  The items  listed above may not be a complete list of recommended foods or beverages. Contact your dietitian for more options.  What foods are not recommended?  Grains  Sweetened, low-fiber cereals. Packaged baked goods. Snack crackers and chips. Cheese crackers, butter crackers, and biscuits. Frozen waffles, sweet breads, doughnuts, pastries, packaged baking mixes, pancakes, cakes, and cookies.  Vegetables  Regular canned or frozen vegetables or vegetables prepared with salt. Canned tomatoes. Canned tomato sauce. Fried vegetables. Vegetables in cream sauce or cheese sauce.  Fruits  Fruits packed in syrup or made with added sugar.  Meats and Other Protein Sources  Marbled or fatty meats such as ribs. Poultry with skin. Fried meats, poultry, eggs, or fish. Sausages, hot dogs, and deli meats such as pastrami, bologna, or salami.  Dairy  Whole milk, cream, cheeses made from whole milk, sour cream. Ice cream or yogurt made from whole milk or with added sugar.  Beverages  For adults, no more than one alcoholic drink per day. Regular soft drinks or other sugary beverages. Juice drinks.  Sweets and Desserts  Sugary or fatty desserts, candy, and other sweets.  Fats and Oils  Solid shortening or partially hydrogenated oils. Solid margarine. Margarine that contains trans fats. Butter.  The items listed above may not be a complete list of foods and beverages to avoid. Contact your dietitian for more information.  This information is not intended to replace advice given to you by your health care provider. Make sure you discuss any questions you have with your health care provider.  Document Released: 01/06/2009 Document Revised: 05/25/2017 Document Reviewed: 11/26/2014  [employer] Interactive Patient Education © 2018 [employer]     Calorie Counting for Weight Loss  Calories are units of energy. Your body needs a certain amount of calories from food to keep you going throughout the day. When you eat more calories than your body needs,  your body stores the extra calories as fat. When you eat fewer calories than your body needs, your body burns fat to get the energy it needs.  Calorie counting means keeping track of how many calories you eat and drink each day. Calorie counting can be helpful if you need to lose weight. If you make sure to eat fewer calories than your body needs, you should lose weight. Ask your health care provider what a healthy weight is for you.  For calorie counting to work, you will need to eat the right number of calories in a day in order to lose a healthy amount of weight per week. A dietitian can help you determine how many calories you need in a day and will give you suggestions on how to reach your calorie goal.  · A healthy amount of weight to lose per week is usually 1-2 lb (0.5-0.9 kg). This usually means that your daily calorie intake should be reduced by 500-750 calories.  · Eating 1,200 - 1,500 calories per day can help most women lose weight.  · Eating 1,500 - 1,800 calories per day can help most men lose weight.    What do I need to know about calorie counting?  In order to meet your daily calorie goal, you will need to:  · Find out how many calories are in each food you would like to eat. Try to do this before you eat.  · Decide how much of the food you plan to eat.  · Write down what you ate and how many calories it had. Doing this is called keeping a food log.    To successfully lose weight, it is important to balance calorie counting with a healthy lifestyle that includes regular activity. Aim for 150 minutes of moderate exercise (such as walking) or 75 minutes of vigorous exercise (such as running) each week.  Where do I find calorie information?    The number of calories in a food can be found on a Nutrition Facts label. If a food does not have a Nutrition Facts label, try to look up the calories online or ask your dietitian for help.  Remember that calories are listed per serving. If you choose to have  more than one serving of a food, you will have to multiply the calories per serving by the amount of servings you plan to eat. For example, the label on a package of bread might say that a serving size is 1 slice and that there are 90 calories in a serving. If you eat 1 slice, you will have eaten 90 calories. If you eat 2 slices, you will have eaten 180 calories.  How do I keep a food log?  Immediately after each meal, record the following information in your food log:  · What you ate. Don't forget to include toppings, sauces, and other extras on the food.  · How much you ate. This can be measured in cups, ounces, or number of items.  · How many calories each food and drink had.  · The total number of calories in the meal.    Keep your food log near you, such as in a small notebook in your pocket, or use a mobile ivone or website. Some programs will calculate calories for you and show you how many calories you have left for the day to meet your goal.  What are some calorie counting tips?  · Use your calories on foods and drinks that will fill you up and not leave you hungry:  ? Some examples of foods that fill you up are nuts and nut butters, vegetables, lean proteins, and high-fiber foods like whole grains. High-fiber foods are foods with more than 5 g fiber per serving.  ? Drinks such as sodas, specialty coffee drinks, alcohol, and juices have a lot of calories, yet do not fill you up.  · Eat nutritious foods and avoid empty calories. Empty calories are calories you get from foods or beverages that do not have many vitamins or protein, such as candy, sweets, and soda. It is better to have a nutritious high-calorie food (such as an avocado) than a food with few nutrients (such as a bag of chips).  · Know how many calories are in the foods you eat most often. This will help you calculate calorie counts faster.  · Pay attention to calories in drinks. Low-calorie drinks include water and unsweetened drinks.  · Pay  "attention to nutrition labels for \"low fat\" or \"fat free\" foods. These foods sometimes have the same amount of calories or more calories than the full fat versions. They also often have added sugar, starch, or salt, to make up for flavor that was removed with the fat.  · Find a way of tracking calories that works for you. Get creative. Try different apps or programs if writing down calories does not work for you.  What are some portion control tips?  · Know how many calories are in a serving. This will help you know how many servings of a certain food you can have.  · Use a measuring cup to measure serving sizes. You could also try weighing out portions on a kitchen scale. With time, you will be able to estimate serving sizes for some foods.  · Take some time to put servings of different foods on your favorite plates, bowls, and cups so you know what a serving looks like.  · Try not to eat straight from a bag or box. Doing this can lead to overeating. Put the amount you would like to eat in a cup or on a plate to make sure you are eating the right portion.  · Use smaller plates, glasses, and bowls to prevent overeating.  · Try not to multitask (for example, watch TV or use your computer) while eating. If it is time to eat, sit down at a table and enjoy your food. This will help you to know when you are full. It will also help you to be aware of what you are eating and how much you are eating.  What are tips for following this plan?  Reading food labels  · Check the calorie count compared to the serving size. The serving size may be smaller than what you are used to eating.  · Check the source of the calories. Make sure the food you are eating is high in vitamins and protein and low in saturated and trans fats.  Shopping  · Read nutrition labels while you shop. This will help you make healthy decisions before you decide to purchase your food.  · Make a grocery list and stick to it.  Cooking  · Try to cook your " favorite foods in a healthier way. For example, try baking instead of frying.  · Use low-fat dairy products.  Meal planning  · Use more fruits and vegetables. Half of your plate should be fruits and vegetables.  · Include lean proteins like poultry and fish.  How do I count calories when eating out?  · Ask for smaller portion sizes.  · Consider sharing an entree and sides instead of getting your own entree.  · If you get your own entree, eat only half. Ask for a box at the beginning of your meal and put the rest of your entree in it so you are not tempted to eat it.  · If calories are listed on the menu, choose the lower calorie options.  · Choose dishes that include vegetables, fruits, whole grains, low-fat dairy products, and lean protein.  · Choose items that are boiled, broiled, grilled, or steamed. Stay away from items that are buttered, battered, fried, or served with cream sauce. Items labeled “crispy” are usually fried, unless stated otherwise.  · Choose water, low-fat milk, unsweetened iced tea, or other drinks without added sugar. If you want an alcoholic beverage, choose a lower calorie option such as a glass of wine or light beer.  · Ask for dressings, sauces, and syrups on the side. These are usually high in calories, so you should limit the amount you eat.  · If you want a salad, choose a garden salad and ask for grilled meats. Avoid extra toppings like terry, cheese, or fried items. Ask for the dressing on the side, or ask for olive oil and vinegar or lemon to use as dressing.  · Estimate how many servings of a food you are given. For example, a serving of cooked rice is ½ cup or about the size of half a baseball. Knowing serving sizes will help you be aware of how much food you are eating at restaurants. The list below tells you how big or small some common portion sizes are based on everyday objects:  ? 1 oz--4 stacked dice.  ? 3 oz--1 deck of cards.  ? 1 tsp--1 die.  ? 1 Tbsp--½ a ping-pong  ball.  ? 2 Tbsp--1 ping-pong ball.  ? ½ cup--½ baseball.  ? 1 cup--1 baseball.  Summary  · Calorie counting means keeping track of how many calories you eat and drink each day. If you eat fewer calories than your body needs, you should lose weight.  · A healthy amount of weight to lose per week is usually 1-2 lb (0.5-0.9 kg). This usually means reducing your daily calorie intake by 500-750 calories.  · The number of calories in a food can be found on a Nutrition Facts label. If a food does not have a Nutrition Facts label, try to look up the calories online or ask your dietitian for help.  · Use your calories on foods and drinks that will fill you up, and not on foods and drinks that will leave you hungry.  · Use smaller plates, glasses, and bowls to prevent overeating.  This information is not intended to replace advice given to you by your health care provider. Make sure you discuss any questions you have with your health care provider.  Document Released: 12/18/2006 Document Revised: 11/17/2017 Document Reviewed: 11/17/2017  [employer] Interactive Patient Education © 2018 [employer]     Exercising to Lose Weight  Exercising can help you to lose weight. In order to lose weight through exercise, you need to do vigorous-intensity exercise. You can tell that you are exercising with vigorous intensity if you are breathing very hard and fast and cannot hold a conversation while exercising.  Moderate-intensity exercise helps to maintain your current weight. You can tell that you are exercising at a moderate level if you have a higher heart rate and faster breathing, but you are still able to hold a conversation.  How often should I exercise?  Choose an activity that you enjoy and set realistic goals. Your health care provider can help you to make an activity plan that works for you. Exercise regularly as directed by your health care provider. This may include:  · Doing resistance training twice each week, such  as:  ? Push-ups.  ? Sit-ups.  ? Lifting weights.  ? Using resistance bands.  · Doing a given intensity of exercise for a given amount of time. Choose from these options:  ? 150 minutes of moderate-intensity exercise every week.  ? 75 minutes of vigorous-intensity exercise every week.  ? A mix of moderate-intensity and vigorous-intensity exercise every week.    Children, pregnant women, people who are out of shape, people who are overweight, and older adults may need to consult a health care provider for individual recommendations. If you have any sort of medical condition, be sure to consult your health care provider before starting a new exercise program.  What are some activities that can help me to lose weight?  · Walking at a rate of at least 4.5 miles an hour.  · Jogging or running at a rate of 5 miles per hour.  · Biking at a rate of at least 10 miles per hour.  · Lap swimming.  · Roller-skating or in-line skating.  · Cross-country skiing.  · Vigorous competitive sports, such as football, basketball, and soccer.  · Jumping rope.  · Aerobic dancing.  How can I be more active in my day-to-day activities?  · Use the stairs instead of the elevator.  · Take a walk during your lunch break.  · If you drive, park your car farther away from work or school.  · If you take public transportation, get off one stop early and walk the rest of the way.  · Make all of your phone calls while standing up and walking around.  · Get up, stretch, and walk around every 30 minutes throughout the day.  What guidelines should I follow while exercising?  · Do not exercise so much that you hurt yourself, feel dizzy, or get very short of breath.  · Consult your health care provider prior to starting a new exercise program.  · Wear comfortable clothes and shoes with good support.  · Drink plenty of water while you exercise to prevent dehydration or heat stroke. Body water is lost during exercise and must be replaced.  · Work out until you  breathe faster and your heart beats faster.  This information is not intended to replace advice given to you by your health care provider. Make sure you discuss any questions you have with your health care provider.  Document Released: 01/20/2012 Document Revised: 05/25/2017 Document Reviewed: 05/21/2015  [employer] Interactive Patient Education © 2018 [employer]

## 2018-07-24 DIAGNOSIS — K11.8 PAROTID MASS: Primary | ICD-10-CM

## 2018-07-24 DIAGNOSIS — C80.1 NEOPLASM /CANCER (HCC): ICD-10-CM

## 2018-07-25 ENCOUNTER — TELEPHONE (OUTPATIENT)
Dept: OTOLARYNGOLOGY | Facility: CLINIC | Age: 73
End: 2018-07-25

## 2018-07-25 NOTE — TELEPHONE ENCOUNTER
7/25/18 Nurse to check with Dr. Crews/Marcello and let me know    7/26/18 Per [place] with Dr. Crews patient is clear to stop aspirin and plavix 7 days prior to procedure. Patient will need to stop celebrex also. Patient notified.

## 2018-08-07 ENCOUNTER — HOSPITAL ENCOUNTER (OUTPATIENT)
Dept: ULTRASOUND IMAGING | Facility: HOSPITAL | Age: 73
Discharge: HOME | End: 2018-08-07
Admitting: PHYSICIAN ASSISTANT

## 2018-08-07 DIAGNOSIS — K11.8 PAROTID MASS: ICD-10-CM

## 2018-08-07 PROCEDURE — 88334 PATH CONSLTJ SURG CYTO XM EA: CPT | Performed by: PHYSICIAN ASSISTANT

## 2018-08-07 PROCEDURE — 76536 US EXAM OF HEAD AND NECK: CPT

## 2018-08-07 PROCEDURE — 88305 TISSUE EXAM BY PATHOLOGIST: CPT | Performed by: PHYSICIAN ASSISTANT

## 2018-08-07 PROCEDURE — 88172 CYTP DX EVAL FNA 1ST EA SITE: CPT | Performed by: PHYSICIAN ASSISTANT

## 2018-08-07 PROCEDURE — 88173 CYTOPATH EVAL FNA REPORT: CPT | Performed by: PHYSICIAN ASSISTANT

## 2018-08-07 PROCEDURE — 88177 CYTP FNA EVAL EA ADDL: CPT | Performed by: PHYSICIAN ASSISTANT

## 2018-08-07 PROCEDURE — 76942 ECHO GUIDE FOR BIOPSY: CPT

## 2018-08-08 ENCOUNTER — OFFICE VISIT (OUTPATIENT)
Dept: CARDIOLOGY | Facility: CLINIC | Age: 73
End: 2018-08-08

## 2018-08-08 VITALS
BODY MASS INDEX: 37.16 KG/M2 | WEIGHT: 184 LBS | SYSTOLIC BLOOD PRESSURE: 132 MMHG | DIASTOLIC BLOOD PRESSURE: 74 MMHG | OXYGEN SATURATION: 94 % | HEART RATE: 79 BPM

## 2018-08-08 DIAGNOSIS — E04.1 THYROID NODULE: ICD-10-CM

## 2018-08-08 DIAGNOSIS — R94.8 ABNORMAL POSITRON EMISSION TOMOGRAPHY (PET) SCAN: ICD-10-CM

## 2018-08-08 DIAGNOSIS — R55 SYNCOPE AND COLLAPSE: Primary | ICD-10-CM

## 2018-08-08 DIAGNOSIS — K21.9 GASTROESOPHAGEAL REFLUX DISEASE WITHOUT ESOPHAGITIS: ICD-10-CM

## 2018-08-08 DIAGNOSIS — I10 ESSENTIAL HYPERTENSION: ICD-10-CM

## 2018-08-08 DIAGNOSIS — Z79.01 ANTICOAGULATED: ICD-10-CM

## 2018-08-08 DIAGNOSIS — I25.119 CORONARY ARTERY DISEASE INVOLVING NATIVE CORONARY ARTERY OF NATIVE HEART WITH ANGINA PECTORIS (HCC): ICD-10-CM

## 2018-08-08 DIAGNOSIS — K21.9 LARYNGOPHARYNGEAL REFLUX (LPR): ICD-10-CM

## 2018-08-08 DIAGNOSIS — Z95.5 STENTED CORONARY ARTERY: ICD-10-CM

## 2018-08-08 DIAGNOSIS — K11.8 PAROTID MASS: ICD-10-CM

## 2018-08-08 PROCEDURE — 93000 ELECTROCARDIOGRAM COMPLETE: CPT | Performed by: INTERNAL MEDICINE

## 2018-08-08 PROCEDURE — 99214 OFFICE O/P EST MOD 30 MIN: CPT | Performed by: INTERNAL MEDICINE

## 2018-08-08 RX ORDER — NITROGLYCERIN 0.4 MG/1
TABLET SUBLINGUAL
Qty: 100 TABLET | Refills: 11 | Status: ON HOLD | OUTPATIENT
Start: 2018-08-08 | End: 2019-01-23

## 2018-08-08 NOTE — PROGRESS NOTES
Josi Mohamud  [id number]  1945  72 y.o.  female    Referring Provider: Jeremías Hernandez APRN    Reason for Follow-up Visit:  Routine follow up.  Essential Hypertension  coronary artery disease with prior Stented coronary artery.   Prior syncope, now resolved   LINQ no arrhythmia  Traumatic rhabdomyolysis in past    Subjective   Mild chronic   exertional shortness of breath on exertion relieved with rest  No significant cough or wheezing  Advair helps   Going on for several months  No palpitations  No associated chest pain  No significant pedal edema  No fever or chills  No significant expectoration  No hemoptysis  Denies any chest pain  Compliant with medications    History of present illness:  Josi Mohamud is a 72 y.o. yo female with history of Essential Hypertension    who presents today for   Chief Complaint   Patient presents with   • Coronary Artery Disease     6 mo f/u   • Shortness of Breath     always short of breath   .    History  Past Medical History:   Diagnosis Date   • Cancer (CMS/HCC)     HODGKINS LYMPHOMA   • Coronary artery disease    • Disease of thyroid gland    • History of transfusion    • Hyperlipidemia    • Hypertension    ,   Past Surgical History:   Procedure Laterality Date   • APPENDECTOMY     • APPENDECTOMY     • CARDIAC CATHETERIZATION     • CARDIAC ELECTROPHYSIOLOGY PROCEDURE N/A 5/23/2017    Procedure: Loop insertion; LINQ;  Surgeon: Jose Monteiro MD;  Location: [facility] INVASIVE LOCATION;  Service:    • CHOLECYSTECTOMY     • CORONARY ANGIOPLASTY WITH STENT PLACEMENT      X 1    • LIVER BIOPSY     • SPLENECTOMY     • US GUIDED FINE NEEDLE ASPIRATION  8/7/2018   ,   History reviewed. No pertinent family history.,   Social History   Substance Use Topics   • Smoking status: Current Every Day Smoker     Packs/day: 0.50     Types: Cigarettes   • Smokeless tobacco: Never Used   • Alcohol use No   ,     Medications  Current Outpatient Prescriptions   Medication Sig  Dispense Refill   • albuterol (PROVENTIL HFA;VENTOLIN HFA) 108 (90 BASE) MCG/ACT inhaler Inhale 2 puffs 2 (Two) Times a Day As Needed for Wheezing.     • amLODIPine (NORVASC) 10 MG tablet Take 1 tablet by mouth Daily. 30 tablet 0   • aspirin 81 MG EC tablet Take 81 mg by mouth Daily.     • calcium citrate-vitamin d (CALCITRATE) 315-250 MG-UNIT tablet tablet Take 1 tablet by mouth Daily.     • celecoxib (CeleBREX) 200 MG capsule TAKE 1 CAPSULE BY MOUTH DAILY AS NEEDED FOR ARTHRITIS  5   • clopidogrel (PLAVIX) 75 MG tablet Take 75 mg by mouth 3 (Three) Times a Week. Monday, Wednesday, and Friday     • Fluticasone Furoate-Vilanterol (BREO ELLIPTA IN) Inhale.     • fluticasone-salmeterol (ADVAIR) 250-50 MCG/DOSE DISKUS Inhale 2 (Two) Times a Day.     • furosemide (LASIX) 20 MG tablet Take 20 mg by mouth Daily As Needed (for swelling).     • guaiFENesin (MUCINEX) 600 MG 12 hr tablet Take 2 tablets by mouth 2 (Two) Times a Day As Needed for Cough. 15 tablet 0   • lisinopril (PRINIVIL,ZESTRIL) 20 MG tablet Take 20 mg by mouth Daily.     • LORazepam (ATIVAN) 2 MG tablet Take 0.5 tablets by mouth 3 (Three) Times a Day As Needed for Anxiety.  0   • metoprolol succinate XL (TOPROL-XL) 50 MG 24 hr tablet Take 50 mg by mouth Daily.     • omeprazole (priLOSEC) 40 MG capsule Take 1 capsule by mouth Daily for 30 days. Take 40 mg by mouth 30 minute-1 hour before the last meal before going to bed 30 capsule 3   • potassium chloride (K-DUR) 10 MEQ CR tablet Take 1 tablet by mouth 2 (Two) Times a Day. When using diuretic 30 tablet 0   • simvastatin (ZOCOR) 20 MG tablet Take 20 mg by mouth Daily.       No current facility-administered medications for this visit.        Allergies:  Penicillins    Review of Systems  Review of Systems   Constitution: Positive for weakness and malaise/fatigue.   HENT: Negative.    Eyes: Negative.    Cardiovascular: Positive for dyspnea on exertion and leg swelling. Negative for chest pain, claudication,  cyanosis, irregular heartbeat, near-syncope, orthopnea, palpitations, paroxysmal nocturnal dyspnea and syncope.   Respiratory: Negative.    Endocrine: Negative.    Hematologic/Lymphatic: Negative.    Skin: Negative.    Musculoskeletal: Positive for arthritis and back pain.   Gastrointestinal: Negative for anorexia.   Genitourinary: Negative.    Psychiatric/Behavioral: Negative.        Objective   Vitals:    08/08/18 1112   BP: 132/74   Pulse: 79   SpO2: 94%   Weight: 83.5 kg (184 lb)     Physical Exam:  /74   Pulse 79   Wt 83.5 kg (184 lb)   SpO2 94%   BMI 37.16 kg/m²   Physical Exam   Constitutional: She appears well-developed.   HENT:   Head: Normocephalic.   Neck: Normal carotid pulses and no JVD present. No tracheal tenderness present. Carotid bruit is not present. No tracheal deviation and no edema present.   Cardiovascular: Regular rhythm, normal heart sounds and normal pulses.    Pulmonary/Chest: Effort normal. No stridor.   Abdominal: Soft.   Neurological: She is alert. She has normal strength. No cranial nerve deficit or sensory deficit.   Skin: Skin is warm.   Psychiatric: She has a normal mood and affect. Her speech is normal and behavior is normal.       Results Review:       ECG 12 Lead  Date/Time: 8/8/2018 11:50 AM  Performed by: ERMIAS ORDOÑEZ  Authorized by: ERMIAS ORDOÑEZ   Comparison: compared with previous ECG from 2/8/2018  Comparison to previous ECG: premature ventricular contractions not seen  Rhythm: sinus rhythm  Rate: normal  Conduction: left bundle branch block  QRS axis: normal            Results for orders placed during the hospital encounter of 06/02/17   Adult Stress Echo Only    Narrative · Left ventricular systolic function is normal. Estimated EF = 55%.  · . Normal stress echo with no significant echocardiographic evidence for   myocardial ischemia.      Echo 4/17  · Left ventricular wall thickness is consistent with mild concentric hypertrophy.  · Left ventricular function is  "normal. Estimated EF = 65%.  · Estimated right ventricular systolic pressure from tricuspid regurgitation is normal (<35 mmHg).    Assessment/Plan   Patient Active Problem List   Diagnosis   • Syncope and collapse   • Coronary artery disease involving native coronary artery of native heart with angina pectoris (CMS/HCC)   • Stented coronary artery   • Essential hypertension   • Abnormal positron emission tomography (PET) scan   • Gastroesophageal reflux disease   • Thyroid nodule   • Parotid mass   • Anticoagulated   • Laryngopharyngeal reflux (LPR)       No palpitations. No significant pedal edema. Compliant with medications and diet. Latest labs and medications reviewed.  Low risk stress echo with normal LVEF by echo cardiogram.   BP well controlled at other times    Plan:    Weigh yourself frequently, at least weekly, preferably daily, call me if more than 2 pounds a day or 5 pounds a week weight gain.  Flexible diuretic dosing     No additional cardiac testing required at this point in time.     Monitor cardiac rhythm device LINQ function regularly per established schedule or PRN     Low salt/ HTN/ Heart healthy carbohydrate restricted cardiac diet as applicable to this patient's current diagnoses.   This handout has relevant information regarding shopping for food, preparing meals, what to eat at restaurants, tracking of food intake, information regarding sodium intake and salt content, how to read food labels, knowing what to eat, tips reagarding physical activity, calorie count and calorie expenditure. What foods to avoid. Information regarding alcoholic drinks along with \"good\" and \"bad\" fats.  Reiterated prior recommendations     The patient is advised to reduce or avoid caffeine or other cardiac stimulants.     The patient was advised that NSAID-type medications have three  very important potential side effects: cardiovascular complications, gastrointestinal irritation including hemorrhage and renal " injuries.  Do not use anti-inflammatories such as Motrin/ibuprofen, Alleve/naprosyn, Mobic and like medications Use Tylenol instead.The patient expresses understanding of these issues and questions were answered.   Monitor for any signs of bleeding including red or dark stools. Fall precautions.     Stop Celebrex    Monitor for any signs of bleeding including red or dark stools. Fall precautions.   Patient is asked to monitor BP at home or work, several times per month and return with written values at next office visit.     Advised staying uptodate with immunizations per established standard guidelines.    Reviewed available prior notes, consults, prior visits, laboratory findings, radiology And cardiology relevant reports. Updated chart as applicable. I have reviewed the patient's medical history in detail and updated the computerized patient record as relevant.    Updated patient regarding any new or relevant abnormalities on review of records or any new findings on physical exam. Mentioned to patient about purpose of visit and desirable health short and long term goals and objectives.    Offered to give patient  a copy of my notes and relevant tests/ prior ECG etc for the patient to review and follow specific advise and relevant findings if any, prognosis, along with my current and future plans.      Questions were encouraged, asked and answered to the patient's  understanding and satisfaction. Questions if any regarding current medications and side effects, need for refills and importance of compliance to medications stressed.    Reviewed available prior notes, consults, prior visits, laboratory findings, radiology and cardiology relevant reports. Updated chart as applicable. I have reviewed the patient's medical history in detail and updated the computerized patient record as relevant.    Updated patient regarding any new or relevant abnormalities on review of records or any new findings on physical exam.  Mentioned to patient about purpose of visit and desirable health short and long term goals and objectives.    Monitor CBC, CMP, TSH (as indicated) and Lipid Panel by primary     S/L NTG PRN for chest pain, call me or go to ER if has to use S/L nitroglycerin   Requested Prescriptions     Signed Prescriptions Disp Refills   • nitroglycerin (NITROSTAT) 0.4 MG SL tablet 100 tablet 11     Si under the tongue as needed for angina, may repeat q5mins for up three doses            Return in about 6 months (around 2019).

## 2018-08-10 ENCOUNTER — TELEPHONE (OUTPATIENT)
Dept: OTOLARYNGOLOGY | Facility: CLINIC | Age: 73
End: 2018-08-10

## 2018-08-10 LAB
CYTO UR: NORMAL
LAB AP CASE REPORT: NORMAL
LAB AP INTRADEPARTMENTAL CONSULT: NORMAL
Lab: NORMAL
PATH REPORT.FINAL DX SPEC: NORMAL
PATH REPORT.GROSS SPEC: NORMAL

## 2018-08-10 NOTE — TELEPHONE ENCOUNTER
----- Message from ELIAS Jaffe sent at 8/10/2018 11:54 AM CDT -----  Please call the patient regarding her abnormal result. 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 Mcnulty to discuss possible surgery.    8/10/18 Patient notified

## 2018-08-17 ENCOUNTER — TRANSCRIBE ORDERS (OUTPATIENT)
Dept: ADMINISTRATIVE | Facility: HOSPITAL | Age: 73
End: 2018-08-17

## 2018-08-17 DIAGNOSIS — Z12.31 ENCOUNTER FOR SCREENING MAMMOGRAM FOR MALIGNANT NEOPLASM OF BREAST: Primary | ICD-10-CM

## 2018-08-24 ENCOUNTER — HOSPITAL ENCOUNTER (OUTPATIENT)
Dept: MAMMOGRAPHY | Facility: HOSPITAL | Age: 73
Discharge: HOME | End: 2018-08-24
Attending: INTERNAL MEDICINE | Admitting: INTERNAL MEDICINE

## 2018-08-24 DIAGNOSIS — Z12.31 ENCOUNTER FOR SCREENING MAMMOGRAM FOR MALIGNANT NEOPLASM OF BREAST: ICD-10-CM

## 2018-08-24 PROCEDURE — 77063 BREAST TOMOSYNTHESIS BI: CPT

## 2018-08-24 PROCEDURE — 77067 SCR MAMMO BI INCL CAD: CPT

## 2018-09-05 NOTE — PROGRESS NOTES
"YOB: 1945  Location: [facility]  Location Address: [address] [place] [address], [facility], [address] [place] [address]  Location Phone: [phone]    Chief Complaint   Patient presents with   • Follow-up     throat, parotid       History of Present Illness  Josi Mohamud is a 72 y.o. female.  Josi Mohamud is here for follow-up evaluation of ENT complaints. The patient has had a CT of the chest that showed a lung nodule and possible thyroid nodule. Due to this a PET scan was ordered with results below. In general there was increased uptake in the parotid gland on the left with a nodule and increased asymmetrical uptake in the oropharynx. Patient denies related symptoms, but does state she is short of breath and coughs a lot. She also reports reflux symptoms. She has a long history of tobacco use. 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 The patient has postnasal drainage and was started on reflux medications during her last appointment. She reports improved reflux.     Fine Needle Aspiration   Order: [id number]   Status:  Final result   Visible to patient:  No (Not Released)   Component 1mo ago   Final Diagnosis   Left parotid nodule, smears (4), ThinPrep preparation (1) and cell block:  A.  Numerous groups of oncocytic cells, primarily arranged in papillary formations.  B.  No cytologic evidence of malignancy.     Comment: Warthin's tumors form papillary groupings of oncocytic cells although the increased numbers of lymphocytes and the\"dirty\" background typically seen in smears of Warthin's tumors are not identified in this specimen.  The findings may still represent a Warthin's tumor if the lymphoid stroma of a Warthin's tumor was not sampled.  Also included in the differential diagnosis would be an oncocytoma or oncocytosis of salivary gland.  Clinical correlation and follow-up are recommended.   Electronically signed by Monika Hernandez MD on 8/10/2018 at " "1148   Gross Description    Received in cytolyt in the laboratory in a container labeled with patient name and  designated as left parotid.  30 mls with bloody needle washings.  4 smears fixed in 95% alcohol, 1 thin prep and 1 cell block made.      Intraoperative Consultation    FNA Left Parotid:        1. Many groups of oncocytic cells.     2. Many groups of oncocytic cells.                        oMnika Hernandez M.D.                         2018   Intradepartmental Consult    Intradepartmental consultation was obtained from Dr. Venita Schultz who concurs with the above diagnostic impressions.   Microscopic Description    Smears and ThinPrep preparation of the fine-needle aspiration of the left parotid nodule reveal many groups of oncocytic cells, often arranged in papillary formations.  Nuclear grooves or nuclear inclusions are not seen.  Blood is seen in the background.  Increased numbers of lymphocytes are not seen and a\" dirty\" background is not seen.  Blood is present in the background.  In the cell block, the oncocytic cells have a tall columnar appearance with an inner more cuboidal cell lining seen.  Cytologic features of malignancy are not seen.   [facility]      Specimen Collected: 18 10:25 Last Resulted: 08/10/18 11:48                PET CT 2018 8:30 AM CDT     REASON FOR STUDY: Left upper lobe lung nodule, history of Hodgkin's  lymphoma 40 years ago, shortness of breath and cough     COMPARISON: Low-dose CT chest for screening 2018     RADIOPHARMACEUTICAL: 12.67 mCi F-18 FDG intravenously.      Technique: Prior to injection of the radiotracer, the patient's blood  glucose is 101 mg/dL. Following injection and a 60 minute distribution  interval, PET scan is performed from the skull base to the midthigh. Low  dose, noncontrast CT is performed in the same anatomic distribution for  attenuation correction of the PET scan and to assist in localizing the  PET findings. "       Radiation dose: DLP 880mGy-cm     FINDINGS:      HEAD AND NECK: The visualized brain demonstrates no definite focal  abnormal FDG activity. Salivary, tonsillar and laryngeal activity  appears ordinary. There is asymmetric uptake in the posterior  oropharynx, with increased uptake on the left demonstrating a maximum  SUV of 5.87. There is increased uptake within a soft tissue nodule in  the left parotid gland which demonstrates a maximum SUV of 5.74.  Additionally, there is minimal increased uptake in the region of the  right parotid gland demonstrating a maximum SUV of 4.26..      CHEST: There is minimal increased FDG uptake within the spiculated left  upper lobe nodule, demonstrating a maximum SUV of 1.69. There is minimal  increased uptake within a normal-appearing left axillary lymph node,  demonstrating a maximum SUV of 2.32.. Review of the mediastinum reveals  no hypermetabolic lymphadenopathy. Physiologic activity is noted in the  myocardium. No foci of hypermetabolism are appreciated in either breast.     ABDOMEN AND PELVIS: Normal physiologic activity is noted in the liver,  spleen, stomach and bowel. No adrenal hypermetabolism is noted. Urinary  activity is noted in the kidneys and bladder. No FDG-avid  lymphadenopathy is appreciated. An intramuscular lipoma is seen in the  left gluteal muscles.     MUSCULOSKELETAL: No FDG-avid osseous metastases are demonstrated.      IMPRESSION:  1. Some increased uptake within the previously described left upper lobe  nodule, suspicious for malignancy.  2. Abnormal uptake in the posterior left oropharynx, etiology unclear.  Recommend direct visualization for further evaluation.  3. Increased uptake in the parotid glands bilaterally with underlying  nodules possibly reflecting lymph nodes. A neoplastic process cannot be  excluded.     EXAMINATION:  CT CHEST LOW DOSE WO-  7/6/2018 12:04 PM CDT     HISTORY: Lung cancer screening.     COMPARISON: None     DOSE LENGTH  PRODUCT: 60 mGy cm. Automated exposure control was also  utilized to decrease patient radiation dose.     TECHNIQUE: Noncontrast CT of the chest was performed using low-dose  protocol.     FINDINGS:      NODULES: There is a 1 cm ill-defined left upper lobe pulmonary nodule on  image 72 of series 4. This is a suspicious nodule. There is a 4 mm left  upper lobe nodule medially on image 34 of series 4. There is a 4 mm  groundglass nodule in the right upper lobe on image 43 of series 4  centrally.     OTHER LUNG FINDINGS: Bullous emphysema.     AIRWAY: The airway is patent.     PLEURA: No mass or fluid collection.     AORTA AND GREAT VESSELS: There is atheromatous disease of the thoracic  aorta and coronary arteries.     HEART AND PERICARDIUM: Heart size is normal. There is a small amount of  fluid in the pericardium is felt to be physiologic.     LYMPH NODES: No pathologic lymphadenopathy is noted.     BONES AND SOFT TISSUES: There is a dense calcification in the lateral  left breast. In the medial left breast, there is a linear radiopaque  density that appears to represent a foreign body. This is not visualized  on a mammogram on 8/23/2016. Correlate with any surgical intervention in  this area. This measures around 3.7 cm in length and 1 cm in short axis  diameter. There is a questionable dominant thyroid nodule on the right  measuring around 2 cm in AP dimension.     UPPER ABDOMEN: No acute findings in the upper abdomen.     IMPRESSION:  1. There is a 1 cm ill-defined left upper lobe pulmonary nodule that  needs further evaluation with PET/CT. This is very concerning for a  small primary neoplasm.  2. Lung-RADS 4: Suspicious.  3. There is a 3.7 x 1 cm radiopaque density in the medial right breast  could be a foreign body/retained surgical drain. This is not present on  a mammogram from 2016. Correlate with any surgical intervention in this  area.  4. Possible dominant nodule in the right thyroid lobe versus  artifact  due to the low-dose protocol. Consider follow-up ultrasound of the  thyroid gland.  5. Atheromatous disease of the thoracic aorta and coronary arteries.   Medical History        Past Medical History:   Diagnosis Date   • Cancer (CMS/HCC)       HODGKINS LYMPHOMA   • Coronary artery disease     • Disease of thyroid gland     • History of transfusion     • Hyperlipidemia     • Hypertension                     Past Medical History:   Diagnosis Date   • Cancer (CMS/HCC)     HODGKINS LYMPHOMA   • Coronary artery disease    • Disease of thyroid gland    • History of transfusion    • Hyperlipidemia    • Hypertension        Past Surgical History:   Procedure Laterality Date   • APPENDECTOMY     • APPENDECTOMY     • CARDIAC CATHETERIZATION     • CARDIAC ELECTROPHYSIOLOGY PROCEDURE N/A 5/23/2017    Procedure: Loop insertion; LINQ;  Surgeon: Joes Monteiro MD;  Location: [facility] CATH INVASIVE LOCATION;  Service:    • CHOLECYSTECTOMY     • CORONARY ANGIOPLASTY WITH STENT PLACEMENT      X 1    • LIVER BIOPSY     • SPLENECTOMY     • US GUIDED FINE NEEDLE ASPIRATION  8/7/2018       Outpatient Prescriptions Marked as Taking for the 9/6/18 encounter (Office Visit) with Se Mcnulty MD   Medication Sig Dispense Refill   • albuterol (PROVENTIL HFA;VENTOLIN HFA) 108 (90 BASE) MCG/ACT inhaler Inhale 2 puffs 2 (Two) Times a Day As Needed for Wheezing.     • amLODIPine (NORVASC) 10 MG tablet Take 1 tablet by mouth Daily. 30 tablet 0   • aspirin 81 MG EC tablet Take 81 mg by mouth Daily.     • calcium citrate-vitamin d (CALCITRATE) 315-250 MG-UNIT tablet tablet Take 1 tablet by mouth Daily.     • celecoxib (CeleBREX) 200 MG capsule TAKE 1 CAPSULE BY MOUTH DAILY AS NEEDED FOR ARTHRITIS  5   • clopidogrel (PLAVIX) 75 MG tablet Take 75 mg by mouth 3 (Three) Times a Week. Monday, Wednesday, and Friday     • Fluticasone Furoate-Vilanterol (BREO ELLIPTA IN) Inhale.     • fluticasone-salmeterol (ADVAIR) 250-50 MCG/DOSE DISKUS Inhale  2 (Two) Times a Day.     • furosemide (LASIX) 20 MG tablet Take 20 mg by mouth Daily As Needed (for swelling).     • guaiFENesin (MUCINEX) 600 MG 12 hr tablet Take 2 tablets by mouth 2 (Two) Times a Day As Needed for Cough. 15 tablet 0   • lisinopril (PRINIVIL,ZESTRIL) 20 MG tablet Take 20 mg by mouth Daily.     • LORazepam (ATIVAN) 2 MG tablet Take 0.5 tablets by mouth 3 (Three) Times a Day As Needed for Anxiety.  0   • metoprolol succinate XL (TOPROL-XL) 50 MG 24 hr tablet Take 50 mg by mouth Daily.     • nitroglycerin (NITROSTAT) 0.4 MG SL tablet 1 under the tongue as needed for angina, may repeat q5mins for up three doses 100 tablet 11   • potassium chloride (K-DUR) 10 MEQ CR tablet Take 1 tablet by mouth 2 (Two) Times a Day. When using diuretic 30 tablet 0   • simvastatin (ZOCOR) 20 MG tablet Take 20 mg by mouth Daily.         Penicillins    Family History   Problem Relation Age of Onset   • Breast cancer Neg Hx        Social History     Social History   • Marital status:      Spouse name: N/A   • Number of children: N/A   • Years of education: N/A     Occupational History   • Not on file.     Social History Main Topics   • Smoking status: Current Every Day Smoker     Packs/day: 0.50     Types: Cigarettes   • Smokeless tobacco: Never Used   • Alcohol use No   • Drug use: No   • Sexual activity: Defer     Other Topics Concern   • Not on file     Social History Narrative   • No narrative on file       Review of Systems   Constitutional: Negative for activity change, appetite change, chills, diaphoresis, fatigue, fever and unexpected weight change.   HENT: Positive for postnasal drip and voice change. Negative for congestion, dental problem, drooling, ear discharge, ear pain, facial swelling, hearing loss, mouth sores, nosebleeds, rhinorrhea, sinus pressure, sneezing, sore throat, tinnitus and trouble swallowing.         Parotid mass   Eyes: Negative.    Respiratory: Negative.    Cardiovascular: Negative.     Gastrointestinal: Negative.    Endocrine: Negative.    Skin: Negative.    Allergic/Immunologic: Positive for environmental allergies. Negative for food allergies and immunocompromised state.   Neurological: Negative.    Hematological: Negative.    Psychiatric/Behavioral: Negative.        Vitals:    09/06/18 1136   BP: 130/86   Temp: 97.8 °F (36.6 °C)       Body mass index is 37.37 kg/m².    Objective     Physical Exam  CONSTITUTIONAL: well nourished, alert, oriented, in no acute distress     COMMUNICATION AND VOICE: able to communicate normally, normal voice quality    HEAD: normocephalic, no lesions, atraumatic, no tenderness, no masses     FACE: appearance normal, no lesions, no tenderness, no deformities, facial motion symmetric    SALIVARY GLANDS: parotid glands with no tenderness, no swelling, no masses, submandibular glands with normal size, nontender    EYES: ocular motility normal, eyelids normal, orbits normal, no proptosis, conjunctiva normal , pupils equal, round     EARS:  Hearing: response to conversational voice normal bilaterally   External Ears: auricles without lesions  Otoscopic: tympanic membrane appearance normal, no lesions, no perforation, normal mobility, no fluid    NOSE:  External Nose: structure normal, no tenderness on palpation, no nasal discharge, no lesions, no evidence of trauma, nostrils patent   Intranasal Exam: nasal mucosa erythema and edema, vestibule within normal limits, inferior turbinate normal, nasal septum midline   Nasopharynx:     ORAL:  Lips: upper and lower lips without lesion   Teeth: dentition within normal limits for age   Gums: gingivae healthy   Oral Mucosa: oral mucosa normal, no mucosal lesions   Floor of Mouth: Warthin’s duct patent, mucosa normal  Tongue: lingual mucosa normal without lesions, normal tongue mobility   Palate: soft and hard palates with normal mucosa and structure  Oropharynx: oropharyngeal mucosa erythema with postnasal drainage, left cryptic  tonsil    NECK: neck appearance normal, no mass,  noted without erythema or tenderness    THYROID: no overt thyromegaly, no tenderness, nodules or mass present on palpation, position midline     LYMPH NODES: no lymphadenopathy    CHEST/RESPIRATORY: respiratory effort normal, normal breath sounds     CARDIOVASCULAR: rate and rhythm normal, extremities without cyanosis or edema      NEUROLOGIC/PSYCHIATRIC: oriented to time, place and person, mood normal, affect appropriate, CN II-XII intact grossly    Assessment/Plan   Problems Addressed this Visit        Cardiovascular and Mediastinum    Essential hypertension       Respiratory    Laryngopharyngeal reflux (LPR)       Digestive    Gastroesophageal reflux disease       Endocrine    Thyroid nodule       Hematopoietic and Hemostatic    Anticoagulated       Other    Abnormal positron emission tomography (PET) scan    Parotid mass - Primary        * Surgery not found *  No orders of the defined types were placed in this encounter.    Return in about 4 months (around 1/6/2019) for Recheck throat, parotid.       Patient Instructions   Discussed options to include surgical and conservative management.     Will recheck in 4 months with Dr. Mcnulty.    Be aware of the signs and symptoms of head and neck cancer including neck mass, persistent sore throat, ear pain, hemoptysis, weight loss and hoarseness. If any of these symptoms occur, call for evaluation.     I advised the patient of the risks in continuing to use tobacco and recommended complete cessation, The inherent risks including the risk of disability, development of a malignancy and/or death was discussed.  The patient indicated understanding.      IF YOU SMOKE OR USE TOBACCO PLEASE READ THE FOLLOWING:    Why is smoking bad for me?  Smoking increases the risk of heart disease, lung disease, vascular disease, stroke, and cancer.     If you smoke, STOP!    If you would like more information on quitting smoking, please visit  the [employer] website: [email or web address]   This link will provide additional resources including the QUIT line and the Beat the Pack support groups.     For more information:    Quit Now [place]  1-800-QUIT-NOW  https://[place].quitlogix.org/en-[place]/    MyPlate from [employer]  The general, healthful diet is based on the 2010 Dietary Guidelines for Americans. The amount of food you need to eat from each food group depends on your age, sex, and level of physical activity and can be individualized by a dietitian. Go to [employer] Corporation[employer]ate.gov for more information.  What do I need to know about the MyPlate plan?  · Enjoy your food, but eat less.  · Avoid oversized portions.  ? ½ of your plate should include fruits and vegetables.  ? ¼ of your plate should be grains.  ? ¼ of your plate should be protein.  Grains  · Make at least half of your grains whole grains.  · For a 2,000 calorie daily food plan, eat 6 oz every day.  · 1 oz is about 1 slice bread, 1 cup cereal, or ½ cup cooked rice, cereal, or pasta.  Vegetables  · Make half your plate fruits and vegetables.  · For a 2,000 calorie daily food plan, eat 2½ cups every day.  · 1 cup is about 1 cup raw or cooked vegetables or vegetable juice or 2 cups raw leafy greens.  Fruits  · Make half your plate fruits and vegetables.  · For a 2,000 calorie daily food plan, eat 2 cups every day.  · 1 cup is about 1 cup fruit or 100% fruit juice or ½ cup dried fruit.  Protein  · For a 2,000 calorie daily food plan, eat 5½ oz every day.  · 1 oz is about 1 oz meat, poultry, or fish, ¼ cup cooked beans, 1 egg, 1 Tbsp peanut butter, or ½ oz nuts or seeds.  Dairy  · Switch to fat-free or low-fat (1%) milk.  · For a 2,000 calorie daily food plan, eat 3 cups every day.  · 1 cup is about 1 cup milk or yogurt or soy milk (soy beverage), 1½ oz natural cheese, or 2 oz processed cheese.  Fats, Oils, and Empty Calories  · Only small amounts of oils are  recommended.  · Empty calories are calories from solid fats or added sugars.  · Compare sodium in foods like soup, bread, and frozen meals. Choose the foods with lower numbers.  · Drink water instead of sugary drinks.  What foods can I eat?  Grains  Whole grains such as whole wheat, quinoa, millet, and bulgur. Bread, rolls, and pasta made from whole grains. Brown or wild rice. Hot or cold cereals made from whole grains and without added sugar.  Vegetables  All fresh vegetables, especially fresh red, dark green, or orange vegetables. Peas and beans. Low-sodium frozen or canned vegetables prepared without added salt. Low-sodium vegetable juices.  Fruits  All fresh, frozen, and dried fruits. Canned fruit packed in water or fruit juice without added sugar. Fruit juices without added sugar.  Meats and Other Protein Sources  Boiled, baked, or grilled lean meat trimmed of fat. Skinless poultry. Fresh seafood and shellfish. Canned seafood packed in water. Unsalted nuts and unsalted nut butters. Tofu. Dried beans and pea. Eggs.  Dairy  Low-fat or fat-free milk, yogurt, and cheeses.  Sweets and Desserts  Frozen desserts made from low-fat milk.  Fats and Oils  Olive, peanut, and canola oils and margarine. Salad dressing and mayonnaise made from these oils.  Other  Soups and casseroles made from allowed ingredients and without added fat or salt.  The items listed above may not be a complete list of recommended foods or beverages. Contact your dietitian for more options.  What foods are not recommended?  Grains  Sweetened, low-fiber cereals. Packaged baked goods. Snack crackers and chips. Cheese crackers, butter crackers, and biscuits. Frozen waffles, sweet breads, doughnuts, pastries, packaged baking mixes, pancakes, cakes, and cookies.  Vegetables  Regular canned or frozen vegetables or vegetables prepared with salt. Canned tomatoes. Canned tomato sauce. Fried vegetables. Vegetables in cream sauce or cheese  sauce.  Fruits  Fruits packed in syrup or made with added sugar.  Meats and Other Protein Sources  Marbled or fatty meats such as ribs. Poultry with skin. Fried meats, poultry, eggs, or fish. Sausages, hot dogs, and deli meats such as pastrami, bologna, or salami.  Dairy  Whole milk, cream, cheeses made from whole milk, sour cream. Ice cream or yogurt made from whole milk or with added sugar.  Beverages  For adults, no more than one alcoholic drink per day. Regular soft drinks or other sugary beverages. Juice drinks.  Sweets and Desserts  Sugary or fatty desserts, candy, and other sweets.  Fats and Oils  Solid shortening or partially hydrogenated oils. Solid margarine. Margarine that contains trans fats. Butter.  The items listed above may not be a complete list of foods and beverages to avoid. Contact your dietitian for more information.  This information is not intended to replace advice given to you by your health care provider. Make sure you discuss any questions you have with your health care provider.  Document Released: 01/06/2009 Document Revised: 05/25/2017 Document Reviewed: 11/26/2014  [employer] Interactive Patient Education © 2018 [employer]     Calorie Counting for Weight Loss  Calories are units of energy. Your body needs a certain amount of calories from food to keep you going throughout the day. When you eat more calories than your body needs, your body stores the extra calories as fat. When you eat fewer calories than your body needs, your body burns fat to get the energy it needs.  Calorie counting means keeping track of how many calories you eat and drink each day. Calorie counting can be helpful if you need to lose weight. If you make sure to eat fewer calories than your body needs, you should lose weight. Ask your health care provider what a healthy weight is for you.  For calorie counting to work, you will need to eat the right number of calories in a day in order to lose a healthy amount of  weight per week. A dietitian can help you determine how many calories you need in a day and will give you suggestions on how to reach your calorie goal.  · A healthy amount of weight to lose per week is usually 1-2 lb (0.5-0.9 kg). This usually means that your daily calorie intake should be reduced by 500-750 calories.  · Eating 1,200 - 1,500 calories per day can help most women lose weight.  · Eating 1,500 - 1,800 calories per day can help most men lose weight.    What do I need to know about calorie counting?  In order to meet your daily calorie goal, you will need to:  · Find out how many calories are in each food you would like to eat. Try to do this before you eat.  · Decide how much of the food you plan to eat.  · Write down what you ate and how many calories it had. Doing this is called keeping a food log.    To successfully lose weight, it is important to balance calorie counting with a healthy lifestyle that includes regular activity. Aim for 150 minutes of moderate exercise (such as walking) or 75 minutes of vigorous exercise (such as running) each week.  Where do I find calorie information?    The number of calories in a food can be found on a Nutrition Facts label. If a food does not have a Nutrition Facts label, try to look up the calories online or ask your dietitian for help.  Remember that calories are listed per serving. If you choose to have more than one serving of a food, you will have to multiply the calories per serving by the amount of servings you plan to eat. For example, the label on a package of bread might say that a serving size is 1 slice and that there are 90 calories in a serving. If you eat 1 slice, you will have eaten 90 calories. If you eat 2 slices, you will have eaten 180 calories.  How do I keep a food log?  Immediately after each meal, record the following information in your food log:  · What you ate. Don't forget to include toppings, sauces, and other extras on the  "food.  · How much you ate. This can be measured in cups, ounces, or number of items.  · How many calories each food and drink had.  · The total number of calories in the meal.    Keep your food log near you, such as in a small notebook in your pocket, or use a mobile ivone or website. Some programs will calculate calories for you and show you how many calories you have left for the day to meet your goal.  What are some calorie counting tips?  · Use your calories on foods and drinks that will fill you up and not leave you hungry:  ? Some examples of foods that fill you up are nuts and nut butters, vegetables, lean proteins, and high-fiber foods like whole grains. High-fiber foods are foods with more than 5 g fiber per serving.  ? Drinks such as sodas, specialty coffee drinks, alcohol, and juices have a lot of calories, yet do not fill you up.  · Eat nutritious foods and avoid empty calories. Empty calories are calories you get from foods or beverages that do not have many vitamins or protein, such as candy, sweets, and soda. It is better to have a nutritious high-calorie food (such as an avocado) than a food with few nutrients (such as a bag of chips).  · Know how many calories are in the foods you eat most often. This will help you calculate calorie counts faster.  · Pay attention to calories in drinks. Low-calorie drinks include water and unsweetened drinks.  · Pay attention to nutrition labels for \"low fat\" or \"fat free\" foods. These foods sometimes have the same amount of calories or more calories than the full fat versions. They also often have added sugar, starch, or salt, to make up for flavor that was removed with the fat.  · Find a way of tracking calories that works for you. Get creative. Try different apps or programs if writing down calories does not work for you.  What are some portion control tips?  · Know how many calories are in a serving. This will help you know how many servings of a certain food you " can have.  · Use a measuring cup to measure serving sizes. You could also try weighing out portions on a kitchen scale. With time, you will be able to estimate serving sizes for some foods.  · Take some time to put servings of different foods on your favorite plates, bowls, and cups so you know what a serving looks like.  · Try not to eat straight from a bag or box. Doing this can lead to overeating. Put the amount you would like to eat in a cup or on a plate to make sure you are eating the right portion.  · Use smaller plates, glasses, and bowls to prevent overeating.  · Try not to multitask (for example, watch TV or use your computer) while eating. If it is time to eat, sit down at a table and enjoy your food. This will help you to know when you are full. It will also help you to be aware of what you are eating and how much you are eating.  What are tips for following this plan?  Reading food labels  · Check the calorie count compared to the serving size. The serving size may be smaller than what you are used to eating.  · Check the source of the calories. Make sure the food you are eating is high in vitamins and protein and low in saturated and trans fats.  Shopping  · Read nutrition labels while you shop. This will help you make healthy decisions before you decide to purchase your food.  · Make a grocery list and stick to it.  Cooking  · Try to cook your favorite foods in a healthier way. For example, try baking instead of frying.  · Use low-fat dairy products.  Meal planning  · Use more fruits and vegetables. Half of your plate should be fruits and vegetables.  · Include lean proteins like poultry and fish.  How do I count calories when eating out?  · Ask for smaller portion sizes.  · Consider sharing an entree and sides instead of getting your own entree.  · If you get your own entree, eat only half. Ask for a box at the beginning of your meal and put the rest of your entree in it so you are not tempted to eat  it.  · If calories are listed on the menu, choose the lower calorie options.  · Choose dishes that include vegetables, fruits, whole grains, low-fat dairy products, and lean protein.  · Choose items that are boiled, broiled, grilled, or steamed. Stay away from items that are buttered, battered, fried, or served with cream sauce. Items labeled “crispy” are usually fried, unless stated otherwise.  · Choose water, low-fat milk, unsweetened iced tea, or other drinks without added sugar. If you want an alcoholic beverage, choose a lower calorie option such as a glass of wine or light beer.  · Ask for dressings, sauces, and syrups on the side. These are usually high in calories, so you should limit the amount you eat.  · If you want a salad, choose a garden salad and ask for grilled meats. Avoid extra toppings like terry, cheese, or fried items. Ask for the dressing on the side, or ask for olive oil and vinegar or lemon to use as dressing.  · Estimate how many servings of a food you are given. For example, a serving of cooked rice is ½ cup or about the size of half a baseball. Knowing serving sizes will help you be aware of how much food you are eating at restaurants. The list below tells you how big or small some common portion sizes are based on everyday objects:  ? 1 oz--4 stacked dice.  ? 3 oz--1 deck of cards.  ? 1 tsp--1 die.  ? 1 Tbsp--½ a ping-pong ball.  ? 2 Tbsp--1 ping-pong ball.  ? ½ cup--½ baseball.  ? 1 cup--1 baseball.  Summary  · Calorie counting means keeping track of how many calories you eat and drink each day. If you eat fewer calories than your body needs, you should lose weight.  · A healthy amount of weight to lose per week is usually 1-2 lb (0.5-0.9 kg). This usually means reducing your daily calorie intake by 500-750 calories.  · The number of calories in a food can be found on a Nutrition Facts label. If a food does not have a Nutrition Facts label, try to look up the calories online or ask your  dietitian for help.  · Use your calories on foods and drinks that will fill you up, and not on foods and drinks that will leave you hungry.  · Use smaller plates, glasses, and bowls to prevent overeating.  This information is not intended to replace advice given to you by your health care provider. Make sure you discuss any questions you have with your health care provider.  Document Released: 12/18/2006 Document Revised: 11/17/2017 Document Reviewed: 11/17/2017  [employer] Patient Education © 2018 [employer]     Exercising to Lose Weight  Exercising can help you to lose weight. In order to lose weight through exercise, you need to do vigorous-intensity exercise. You can tell that you are exercising with vigorous intensity if you are breathing very hard and fast and cannot hold a conversation while exercising.  Moderate-intensity exercise helps to maintain your current weight. You can tell that you are exercising at a moderate level if you have a higher heart rate and faster breathing, but you are still able to hold a conversation.  How often should I exercise?  Choose an activity that you enjoy and set realistic goals. Your health care provider can help you to make an activity plan that works for you. Exercise regularly as directed by your health care provider. This may include:  · Doing resistance training twice each week, such as:  ? Push-ups.  ? Sit-ups.  ? Lifting weights.  ? Using resistance bands.  · Doing a given intensity of exercise for a given amount of time. Choose from these options:  ? 150 minutes of moderate-intensity exercise every week.  ? 75 minutes of vigorous-intensity exercise every week.  ? A mix of moderate-intensity and vigorous-intensity exercise every week.    Children, pregnant women, people who are out of shape, people who are overweight, and older adults may need to consult a health care provider for individual recommendations. If you have any sort of medical condition, be  sure to consult your health care provider before starting a new exercise program.  What are some activities that can help me to lose weight?  · Walking at a rate of at least 4.5 miles an hour.  · Jogging or running at a rate of 5 miles per hour.  · Biking at a rate of at least 10 miles per hour.  · Lap swimming.  · Roller-skating or in-line skating.  · Cross-country skiing.  · Vigorous competitive sports, such as football, basketball, and soccer.  · Jumping rope.  · Aerobic dancing.  How can I be more active in my day-to-day activities?  · Use the stairs instead of the elevator.  · Take a walk during your lunch break.  · If you drive, park your car farther away from work or school.  · If you take public transportation, get off one stop early and walk the rest of the way.  · Make all of your phone calls while standing up and walking around.  · Get up, stretch, and walk around every 30 minutes throughout the day.  What guidelines should I follow while exercising?  · Do not exercise so much that you hurt yourself, feel dizzy, or get very short of breath.  · Consult your health care provider prior to starting a new exercise program.  · Wear comfortable clothes and shoes with good support.  · Drink plenty of water while you exercise to prevent dehydration or heat stroke. Body water is lost during exercise and must be replaced.  · Work out until you breathe faster and your heart beats faster.  This information is not intended to replace advice given to you by your health care provider. Make sure you discuss any questions you have with your health care provider.  Document Released: 01/20/2012 Document Revised: 05/25/2017 Document Reviewed: 05/21/2015  [employer] Patient Education © 2018 [employer]

## 2018-09-06 ENCOUNTER — OFFICE VISIT (OUTPATIENT)
Dept: OTOLARYNGOLOGY | Facility: CLINIC | Age: 73
End: 2018-09-06

## 2018-09-06 VITALS
HEIGHT: 59 IN | SYSTOLIC BLOOD PRESSURE: 130 MMHG | DIASTOLIC BLOOD PRESSURE: 86 MMHG | WEIGHT: 185 LBS | BODY MASS INDEX: 37.29 KG/M2 | TEMPERATURE: 97.8 F

## 2018-09-06 DIAGNOSIS — I10 ESSENTIAL HYPERTENSION: ICD-10-CM

## 2018-09-06 DIAGNOSIS — K21.9 LARYNGOPHARYNGEAL REFLUX (LPR): ICD-10-CM

## 2018-09-06 DIAGNOSIS — R94.8 ABNORMAL POSITRON EMISSION TOMOGRAPHY (PET) SCAN: ICD-10-CM

## 2018-09-06 DIAGNOSIS — K21.9 GASTROESOPHAGEAL REFLUX DISEASE WITHOUT ESOPHAGITIS: ICD-10-CM

## 2018-09-06 DIAGNOSIS — E04.1 THYROID NODULE: ICD-10-CM

## 2018-09-06 DIAGNOSIS — Z79.01 ANTICOAGULATED: ICD-10-CM

## 2018-09-06 DIAGNOSIS — K11.8 PAROTID MASS: Primary | ICD-10-CM

## 2018-09-06 PROCEDURE — 99214 OFFICE O/P EST MOD 30 MIN: CPT | Performed by: PHYSICIAN ASSISTANT

## 2018-09-06 NOTE — PATIENT INSTRUCTIONS
Discussed options to include surgical and conservative management.     Will recheck in 4 months with Dr. Mcnulty.    Be aware of the signs and symptoms of head and neck cancer including neck mass, persistent sore throat, ear pain, hemoptysis, weight loss and hoarseness. If any of these symptoms occur, call for evaluation.     I advised the patient of the risks in continuing to use tobacco and recommended complete cessation, The inherent risks including the risk of disability, development of a malignancy and/or death was discussed.  The patient indicated understanding.      IF YOU SMOKE OR USE TOBACCO PLEASE READ THE FOLLOWING:    Why is smoking bad for me?  Smoking increases the risk of heart disease, lung disease, vascular disease, stroke, and cancer.     If you smoke, STOP!    If you would like more information on quitting smoking, please visit the [employer] website: [email or web address]-[email or web address]/smoke   This link will provide additional resources including the QUIT line and the Beat the Pack support groups.     For more information:    Quit Now [place]  1-800-QUIT-NOW  https://[place].[employer].org/en-[place]/    MyPlate from [employer]  The general, healthful diet is based on the 2010 Dietary Guidelines for Americans. The amount of food you need to eat from each food group depends on your age, sex, and level of physical activity and can be individualized by a dietitian. Go to ChooseMyPlate.gov for more information.  What do I need to know about the MyPlate plan?  · Enjoy your food, but eat less.  · Avoid oversized portions.  ? ½ of your plate should include fruits and vegetables.  ? ¼ of your plate should be grains.  ? ¼ of your plate should be protein.  Grains  · Make at least half of your grains whole grains.  · For a 2,000 calorie daily food plan, eat 6 oz every day.  · 1 oz is about 1 slice bread, 1 cup cereal, or ½ cup cooked rice, cereal, or pasta.  Vegetables  · Make half your plate  fruits and vegetables.  · For a 2,000 calorie daily food plan, eat 2½ cups every day.  · 1 cup is about 1 cup raw or cooked vegetables or vegetable juice or 2 cups raw leafy greens.  Fruits  · Make half your plate fruits and vegetables.  · For a 2,000 calorie daily food plan, eat 2 cups every day.  · 1 cup is about 1 cup fruit or 100% fruit juice or ½ cup dried fruit.  Protein  · For a 2,000 calorie daily food plan, eat 5½ oz every day.  · 1 oz is about 1 oz meat, poultry, or fish, ¼ cup cooked beans, 1 egg, 1 Tbsp peanut butter, or ½ oz nuts or seeds.  Dairy  · Switch to fat-free or low-fat (1%) milk.  · For a 2,000 calorie daily food plan, eat 3 cups every day.  · 1 cup is about 1 cup milk or yogurt or soy milk (soy beverage), 1½ oz natural cheese, or 2 oz processed cheese.  Fats, Oils, and Empty Calories  · Only small amounts of oils are recommended.  · Empty calories are calories from solid fats or added sugars.  · Compare sodium in foods like soup, bread, and frozen meals. Choose the foods with lower numbers.  · Drink water instead of sugary drinks.  What foods can I eat?  Grains  Whole grains such as whole wheat, quinoa, millet, and bulgur. Bread, rolls, and pasta made from whole grains. Brown or wild rice. Hot or cold cereals made from whole grains and without added sugar.  Vegetables  All fresh vegetables, especially fresh red, dark green, or orange vegetables. Peas and beans. Low-sodium frozen or canned vegetables prepared without added salt. Low-sodium vegetable juices.  Fruits  All fresh, frozen, and dried fruits. Canned fruit packed in water or fruit juice without added sugar. Fruit juices without added sugar.  Meats and Other Protein Sources  Boiled, baked, or grilled lean meat trimmed of fat. Skinless poultry. Fresh seafood and shellfish. Canned seafood packed in water. Unsalted nuts and unsalted nut butters. Tofu. Dried beans and pea. Eggs.  Dairy  Low-fat or fat-free milk, yogurt, and  cheeses.  Sweets and Desserts  Frozen desserts made from low-fat milk.  Fats and Oils  Olive, peanut, and canola oils and margarine. Salad dressing and mayonnaise made from these oils.  Other  Soups and casseroles made from allowed ingredients and without added fat or salt.  The items listed above may not be a complete list of recommended foods or beverages. Contact your dietitian for more options.  What foods are not recommended?  Grains  Sweetened, low-fiber cereals. Packaged baked goods. Snack crackers and chips. Cheese crackers, butter crackers, and biscuits. Frozen waffles, sweet breads, doughnuts, pastries, packaged baking mixes, pancakes, cakes, and cookies.  Vegetables  Regular canned or frozen vegetables or vegetables prepared with salt. Canned tomatoes. Canned tomato sauce. Fried vegetables. Vegetables in cream sauce or cheese sauce.  Fruits  Fruits packed in syrup or made with added sugar.  Meats and Other Protein Sources  Marbled or fatty meats such as ribs. Poultry with skin. Fried meats, poultry, eggs, or fish. Sausages, hot dogs, and deli meats such as pastrami, bologna, or salami.  Dairy  Whole milk, cream, cheeses made from whole milk, sour cream. Ice cream or yogurt made from whole milk or with added sugar.  Beverages  For adults, no more than one alcoholic drink per day. Regular soft drinks or other sugary beverages. Juice drinks.  Sweets and Desserts  Sugary or fatty desserts, candy, and other sweets.  Fats and Oils  Solid shortening or partially hydrogenated oils. Solid margarine. Margarine that contains trans fats. Butter.  The items listed above may not be a complete list of foods and beverages to avoid. Contact your dietitian for more information.  This information is not intended to replace advice given to you by your health care provider. Make sure you discuss any questions you have with your health care provider.  Document Released: 01/06/2009 Document Revised: 05/25/2017 Document  Reviewed: 11/26/2014  [employer] Interactive Patient Education © 2018 [employer]     Calorie Counting for Weight Loss  Calories are units of energy. Your body needs a certain amount of calories from food to keep you going throughout the day. When you eat more calories than your body needs, your body stores the extra calories as fat. When you eat fewer calories than your body needs, your body burns fat to get the energy it needs.  Calorie counting means keeping track of how many calories you eat and drink each day. Calorie counting can be helpful if you need to lose weight. If you make sure to eat fewer calories than your body needs, you should lose weight. Ask your health care provider what a healthy weight is for you.  For calorie counting to work, you will need to eat the right number of calories in a day in order to lose a healthy amount of weight per week. A dietitian can help you determine how many calories you need in a day and will give you suggestions on how to reach your calorie goal.  · A healthy amount of weight to lose per week is usually 1-2 lb (0.5-0.9 kg). This usually means that your daily calorie intake should be reduced by 500-750 calories.  · Eating 1,200 - 1,500 calories per day can help most women lose weight.  · Eating 1,500 - 1,800 calories per day can help most men lose weight.    What do I need to know about calorie counting?  In order to meet your daily calorie goal, you will need to:  · Find out how many calories are in each food you would like to eat. Try to do this before you eat.  · Decide how much of the food you plan to eat.  · Write down what you ate and how many calories it had. Doing this is called keeping a food log.    To successfully lose weight, it is important to balance calorie counting with a healthy lifestyle that includes regular activity. Aim for 150 minutes of moderate exercise (such as walking) or 75 minutes of vigorous exercise (such as running) each week.  Where do I  find calorie information?    The number of calories in a food can be found on a Nutrition Facts label. If a food does not have a Nutrition Facts label, try to look up the calories online or ask your dietitian for help.  Remember that calories are listed per serving. If you choose to have more than one serving of a food, you will have to multiply the calories per serving by the amount of servings you plan to eat. For example, the label on a package of bread might say that a serving size is 1 slice and that there are 90 calories in a serving. If you eat 1 slice, you will have eaten 90 calories. If you eat 2 slices, you will have eaten 180 calories.  How do I keep a food log?  Immediately after each meal, record the following information in your food log:  · What you ate. Don't forget to include toppings, sauces, and other extras on the food.  · How much you ate. This can be measured in cups, ounces, or number of items.  · How many calories each food and drink had.  · The total number of calories in the meal.    Keep your food log near you, such as in a small notebook in your pocket, or use a mobile ivone or website. Some programs will calculate calories for you and show you how many calories you have left for the day to meet your goal.  What are some calorie counting tips?  · Use your calories on foods and drinks that will fill you up and not leave you hungry:  ? Some examples of foods that fill you up are nuts and nut butters, vegetables, lean proteins, and high-fiber foods like whole grains. High-fiber foods are foods with more than 5 g fiber per serving.  ? Drinks such as sodas, specialty coffee drinks, alcohol, and juices have a lot of calories, yet do not fill you up.  · Eat nutritious foods and avoid empty calories. Empty calories are calories you get from foods or beverages that do not have many vitamins or protein, such as candy, sweets, and soda. It is better to have a nutritious high-calorie food (such as an  "avocado) than a food with few nutrients (such as a bag of chips).  · Know how many calories are in the foods you eat most often. This will help you calculate calorie counts faster.  · Pay attention to calories in drinks. Low-calorie drinks include water and unsweetened drinks.  · Pay attention to nutrition labels for \"low fat\" or \"fat free\" foods. These foods sometimes have the same amount of calories or more calories than the full fat versions. They also often have added sugar, starch, or salt, to make up for flavor that was removed with the fat.  · Find a way of tracking calories that works for you. Get creative. Try different apps or programs if writing down calories does not work for you.  What are some portion control tips?  · Know how many calories are in a serving. This will help you know how many servings of a certain food you can have.  · Use a measuring cup to measure serving sizes. You could also try weighing out portions on a kitchen scale. With time, you will be able to estimate serving sizes for some foods.  · Take some time to put servings of different foods on your favorite plates, bowls, and cups so you know what a serving looks like.  · Try not to eat straight from a bag or box. Doing this can lead to overeating. Put the amount you would like to eat in a cup or on a plate to make sure you are eating the right portion.  · Use smaller plates, glasses, and bowls to prevent overeating.  · Try not to multitask (for example, watch TV or use your computer) while eating. If it is time to eat, sit down at a table and enjoy your food. This will help you to know when you are full. It will also help you to be aware of what you are eating and how much you are eating.  What are tips for following this plan?  Reading food labels  · Check the calorie count compared to the serving size. The serving size may be smaller than what you are used to eating.  · Check the source of the calories. Make sure the food you are " eating is high in vitamins and protein and low in saturated and trans fats.  Shopping  · Read nutrition labels while you shop. This will help you make healthy decisions before you decide to purchase your food.  · Make a grocery list and stick to it.  Cooking  · Try to cook your favorite foods in a healthier way. For example, try baking instead of frying.  · Use low-fat dairy products.  Meal planning  · Use more fruits and vegetables. Half of your plate should be fruits and vegetables.  · Include lean proteins like poultry and fish.  How do I count calories when eating out?  · Ask for smaller portion sizes.  · Consider sharing an entree and sides instead of getting your own entree.  · If you get your own entree, eat only half. Ask for a box at the beginning of your meal and put the rest of your entree in it so you are not tempted to eat it.  · If calories are listed on the menu, choose the lower calorie options.  · Choose dishes that include vegetables, fruits, whole grains, low-fat dairy products, and lean protein.  · Choose items that are boiled, broiled, grilled, or steamed. Stay away from items that are buttered, battered, fried, or served with cream sauce. Items labeled “crispy” are usually fried, unless stated otherwise.  · Choose water, low-fat milk, unsweetened iced tea, or other drinks without added sugar. If you want an alcoholic beverage, choose a lower calorie option such as a glass of wine or light beer.  · Ask for dressings, sauces, and syrups on the side. These are usually high in calories, so you should limit the amount you eat.  · If you want a salad, choose a garden salad and ask for grilled meats. Avoid extra toppings like terry, cheese, or fried items. Ask for the dressing on the side, or ask for olive oil and vinegar or lemon to use as dressing.  · Estimate how many servings of a food you are given. For example, a serving of cooked rice is ½ cup or about the size of half a baseball. Knowing  serving sizes will help you be aware of how much food you are eating at restaurants. The list below tells you how big or small some common portion sizes are based on everyday objects:  ? 1 oz--4 stacked dice.  ? 3 oz--1 deck of cards.  ? 1 tsp--1 die.  ? 1 Tbsp--½ a ping-pong ball.  ? 2 Tbsp--1 ping-pong ball.  ? ½ cup--½ baseball.  ? 1 cup--1 baseball.  Summary  · Calorie counting means keeping track of how many calories you eat and drink each day. If you eat fewer calories than your body needs, you should lose weight.  · A healthy amount of weight to lose per week is usually 1-2 lb (0.5-0.9 kg). This usually means reducing your daily calorie intake by 500-750 calories.  · The number of calories in a food can be found on a Nutrition Facts label. If a food does not have a Nutrition Facts label, try to look up the calories online or ask your dietitian for help.  · Use your calories on foods and drinks that will fill you up, and not on foods and drinks that will leave you hungry.  · Use smaller plates, glasses, and bowls to prevent overeating.  This information is not intended to replace advice given to you by your health care provider. Make sure you discuss any questions you have with your health care provider.  Document Released: 12/18/2006 Document Revised: 11/17/2017 Document Reviewed: 11/17/2017  [employer] Interactive Patient Education © 2018 [employer]     Exercising to Lose Weight  Exercising can help you to lose weight. In order to lose weight through exercise, you need to do vigorous-intensity exercise. You can tell that you are exercising with vigorous intensity if you are breathing very hard and fast and cannot hold a conversation while exercising.  Moderate-intensity exercise helps to maintain your current weight. You can tell that you are exercising at a moderate level if you have a higher heart rate and faster breathing, but you are still able to hold a conversation.  How often should I  exercise?  Choose an activity that you enjoy and set realistic goals. Your health care provider can help you to make an activity plan that works for you. Exercise regularly as directed by your health care provider. This may include:  · Doing resistance training twice each week, such as:  ? Push-ups.  ? Sit-ups.  ? Lifting weights.  ? Using resistance bands.  · Doing a given intensity of exercise for a given amount of time. Choose from these options:  ? 150 minutes of moderate-intensity exercise every week.  ? 75 minutes of vigorous-intensity exercise every week.  ? A mix of moderate-intensity and vigorous-intensity exercise every week.    Children, pregnant women, people who are out of shape, people who are overweight, and older adults may need to consult a health care provider for individual recommendations. If you have any sort of medical condition, be sure to consult your health care provider before starting a new exercise program.  What are some activities that can help me to lose weight?  · Walking at a rate of at least 4.5 miles an hour.  · Jogging or running at a rate of 5 miles per hour.  · Biking at a rate of at least 10 miles per hour.  · Lap swimming.  · Roller-skating or in-line skating.  · Cross-country skiing.  · Vigorous competitive sports, such as football, basketball, and soccer.  · Jumping rope.  · Aerobic dancing.  How can I be more active in my day-to-day activities?  · Use the stairs instead of the elevator.  · Take a walk during your lunch break.  · If you drive, park your car farther away from work or school.  · If you take public transportation, get off one stop early and walk the rest of the way.  · Make all of your phone calls while standing up and walking around.  · Get up, stretch, and walk around every 30 minutes throughout the day.  What guidelines should I follow while exercising?  · Do not exercise so much that you hurt yourself, feel dizzy, or get very short of breath.  · Consult  your health care provider prior to starting a new exercise program.  · Wear comfortable clothes and shoes with good support.  · Drink plenty of water while you exercise to prevent dehydration or heat stroke. Body water is lost during exercise and must be replaced.  · Work out until you breathe faster and your heart beats faster.  This information is not intended to replace advice given to you by your health care provider. Make sure you discuss any questions you have with your health care provider.  Document Released: 01/20/2012 Document Revised: 05/25/2017 Document Reviewed: 05/21/2015  [employer] Interactive Patient Education © 2018 [employer]

## 2018-09-17 ENCOUNTER — CLINICAL SUPPORT (OUTPATIENT)
Dept: CARDIOLOGY | Facility: CLINIC | Age: 73
End: 2018-09-17

## 2018-09-17 DIAGNOSIS — R55 SYNCOPE AND COLLAPSE: ICD-10-CM

## 2018-09-17 DIAGNOSIS — Z95.818 STATUS POST PLACEMENT OF IMPLANTABLE LOOP RECORDER: Primary | ICD-10-CM

## 2018-09-17 PROCEDURE — 93299 PR REM INTERROG ICPMS/SCRMS <30 D TECH REVIEW: CPT | Performed by: INTERNAL MEDICINE

## 2018-09-17 PROCEDURE — 93298 REM INTERROG DEV EVAL SCRMS: CPT | Performed by: INTERNAL MEDICINE

## 2018-09-17 NOTE — PROGRESS NOTES
[facility]- [facility]     September 17, 2018    Primary Cardiologist:  Thania  Reason for implant:  syncope  Battery:  OK  Events:  No new events  Changes:  None    Follow up:  1 month

## 2018-10-18 ENCOUNTER — CLINICAL SUPPORT (OUTPATIENT)
Dept: CARDIOLOGY | Facility: CLINIC | Age: 73
End: 2018-10-18

## 2018-10-18 DIAGNOSIS — R55 SYNCOPE, UNSPECIFIED SYNCOPE TYPE: ICD-10-CM

## 2018-10-18 DIAGNOSIS — Z95.818 STATUS POST PLACEMENT OF IMPLANTABLE LOOP RECORDER: Primary | ICD-10-CM

## 2018-10-18 PROCEDURE — 93298 REM INTERROG DEV EVAL SCRMS: CPT | Performed by: INTERNAL MEDICINE

## 2018-10-18 PROCEDURE — 93299 PR REM INTERROG ICPMS/SCRMS <30 D TECH REVIEW: CPT | Performed by: INTERNAL MEDICINE

## 2018-10-18 NOTE — PROGRESS NOTES
[facility]- [facility]    October 18, 2018    Primary Cardiologist:  Thania  Reason for implant:  syncope  Battery:  OK  Events:  No new events  Changes:  None    Follow up:  1 month

## 2018-11-19 ENCOUNTER — CLINICAL SUPPORT (OUTPATIENT)
Dept: CARDIOLOGY | Facility: CLINIC | Age: 73
End: 2018-11-19

## 2018-11-19 DIAGNOSIS — R55 SYNCOPE, UNSPECIFIED SYNCOPE TYPE: ICD-10-CM

## 2018-11-19 DIAGNOSIS — Z95.818 STATUS POST PLACEMENT OF IMPLANTABLE LOOP RECORDER: Primary | ICD-10-CM

## 2018-11-19 PROCEDURE — 93299 PR REM INTERROG ICPMS/SCRMS <30 D TECH REVIEW: CPT | Performed by: INTERNAL MEDICINE

## 2018-11-19 PROCEDURE — 93298 REM INTERROG DEV EVAL SCRMS: CPT | Performed by: INTERNAL MEDICINE

## 2018-11-20 NOTE — PROGRESS NOTES
[facility]- [facility]    November 20, 2018    Primary Cardiologist:  Thania  Reason for implant:  syncope  Battery:  Good   Events:  No new events  Changes:  None    Follow up:  1 month

## 2018-12-21 ENCOUNTER — CLINICAL SUPPORT (OUTPATIENT)
Dept: CARDIOLOGY | Facility: CLINIC | Age: 73
End: 2018-12-21

## 2018-12-21 DIAGNOSIS — R55 SYNCOPE, UNSPECIFIED SYNCOPE TYPE: ICD-10-CM

## 2018-12-21 DIAGNOSIS — Z95.818 STATUS POST PLACEMENT OF IMPLANTABLE LOOP RECORDER: Primary | ICD-10-CM

## 2018-12-21 PROCEDURE — 93298 REM INTERROG DEV EVAL SCRMS: CPT | Performed by: INTERNAL MEDICINE

## 2018-12-21 PROCEDURE — 93299 PR REM INTERROG ICPMS/SCRMS <30 D TECH REVIEW: CPT | Performed by: INTERNAL MEDICINE

## 2018-12-21 NOTE — PROGRESS NOTES
[facility]- [facility]    December 21, 2018    Primary Cardiologist:  Thania  Reason for implant:  syncope  Battery:  Good   Events:  No new events noted   Changes:  None    Follow up:  1 month

## 2019-01-22 ENCOUNTER — APPOINTMENT (OUTPATIENT)
Dept: GENERAL RADIOLOGY | Facility: HOSPITAL | Age: 74
End: 2019-01-22

## 2019-01-22 ENCOUNTER — APPOINTMENT (OUTPATIENT)
Dept: CT IMAGING | Facility: HOSPITAL | Age: 74
End: 2019-01-22

## 2019-01-22 ENCOUNTER — HOSPITAL ENCOUNTER (INPATIENT)
Facility: HOSPITAL | Age: 74
LOS: 2 days | Discharge: HOME HEALTH | End: 2019-01-24
Attending: EMERGENCY MEDICINE | Admitting: FAMILY MEDICINE

## 2019-01-22 DIAGNOSIS — K21.9 LARYNGOPHARYNGEAL REFLUX (LPR): ICD-10-CM

## 2019-01-22 DIAGNOSIS — Z78.9 IMPAIRED MOBILITY AND ADLS: ICD-10-CM

## 2019-01-22 DIAGNOSIS — Z95.5 STENTED CORONARY ARTERY: ICD-10-CM

## 2019-01-22 DIAGNOSIS — I25.119 CORONARY ARTERY DISEASE INVOLVING NATIVE CORONARY ARTERY OF NATIVE HEART WITH ANGINA PECTORIS (HCC): ICD-10-CM

## 2019-01-22 DIAGNOSIS — Z79.01 ANTICOAGULATED: ICD-10-CM

## 2019-01-22 DIAGNOSIS — R94.8 ABNORMAL POSITRON EMISSION TOMOGRAPHY (PET) SCAN: ICD-10-CM

## 2019-01-22 DIAGNOSIS — Z74.09 IMPAIRED MOBILITY AND ADLS: ICD-10-CM

## 2019-01-22 DIAGNOSIS — T79.6XXS TRAUMATIC RHABDOMYOLYSIS, SEQUELA: ICD-10-CM

## 2019-01-22 DIAGNOSIS — R55 SYNCOPE AND COLLAPSE: ICD-10-CM

## 2019-01-22 DIAGNOSIS — E04.1 THYROID NODULE: ICD-10-CM

## 2019-01-22 DIAGNOSIS — K21.9 GASTROESOPHAGEAL REFLUX DISEASE WITHOUT ESOPHAGITIS: ICD-10-CM

## 2019-01-22 DIAGNOSIS — T79.6XXA TRAUMATIC RHABDOMYOLYSIS, INITIAL ENCOUNTER (HCC): Primary | ICD-10-CM

## 2019-01-22 DIAGNOSIS — I10 ESSENTIAL HYPERTENSION: ICD-10-CM

## 2019-01-22 DIAGNOSIS — K11.8 PAROTID MASS: ICD-10-CM

## 2019-01-22 LAB
ALBUMIN SERPL-MCNC: 4 G/DL (ref 3.5–5)
ALBUMIN/GLOB SERPL: 1.2 G/DL (ref 1.1–2.5)
ALP SERPL-CCNC: 96 U/L (ref 24–120)
ALT SERPL W P-5'-P-CCNC: 56 U/L (ref 0–54)
ANION GAP SERPL CALCULATED.3IONS-SCNC: 11 MMOL/L (ref 4–13)
AST SERPL-CCNC: 192 U/L (ref 7–45)
BASOPHILS # BLD AUTO: 0.03 10*3/MM3 (ref 0–0.2)
BASOPHILS NFR BLD AUTO: 0.5 % (ref 0–2)
BILIRUB SERPL-MCNC: 0.6 MG/DL (ref 0.1–1)
BUN BLD-MCNC: 31 MG/DL (ref 5–21)
BUN/CREAT SERPL: 42.5 (ref 7–25)
CALCIUM SPEC-SCNC: 9.1 MG/DL (ref 8.4–10.4)
CHLORIDE SERPL-SCNC: 101 MMOL/L (ref 98–110)
CK SERPL-CCNC: 5815 U/L (ref 0–203)
CO2 SERPL-SCNC: 27 MMOL/L (ref 24–31)
CREAT BLD-MCNC: 0.73 MG/DL (ref 0.5–1.4)
DEPRECATED RDW RBC AUTO: 49.6 FL (ref 40–54)
EOSINOPHIL # BLD AUTO: 0.03 10*3/MM3 (ref 0–0.7)
EOSINOPHIL NFR BLD AUTO: 0.5 % (ref 0–4)
ERYTHROCYTE [DISTWIDTH] IN BLOOD BY AUTOMATED COUNT: 13.4 % (ref 12–15)
GFR SERPL CREATININE-BSD FRML MDRD: 78 ML/MIN/1.73
GLOBULIN UR ELPH-MCNC: 3.4 GM/DL
GLUCOSE BLD-MCNC: 108 MG/DL (ref 70–100)
HCT VFR BLD AUTO: 43.9 % (ref 37–47)
HGB BLD-MCNC: 15.6 G/DL (ref 12–16)
IMM GRANULOCYTES # BLD AUTO: 0.02 10*3/MM3 (ref 0–0.03)
IMM GRANULOCYTES NFR BLD AUTO: 0.4 % (ref 0–5)
LYMPHOCYTES # BLD AUTO: 1.53 10*3/MM3 (ref 0.72–4.86)
LYMPHOCYTES NFR BLD AUTO: 27.6 % (ref 15–45)
MAGNESIUM SERPL-MCNC: 2.1 MG/DL (ref 1.4–2.2)
MCH RBC QN AUTO: 35.1 PG (ref 28–32)
MCHC RBC AUTO-ENTMCNC: 35.5 G/DL (ref 33–36)
MCV RBC AUTO: 98.7 FL (ref 82–98)
MONOCYTES # BLD AUTO: 0.8 10*3/MM3 (ref 0.19–1.3)
MONOCYTES NFR BLD AUTO: 14.4 % (ref 4–12)
NEUTROPHILS # BLD AUTO: 3.13 10*3/MM3 (ref 1.87–8.4)
NEUTROPHILS NFR BLD AUTO: 56.6 % (ref 39–78)
NRBC BLD AUTO-RTO: 0 /100 WBC (ref 0–0)
PLATELET # BLD AUTO: 209 10*3/MM3 (ref 130–400)
PMV BLD AUTO: 10.9 FL (ref 6–12)
POTASSIUM BLD-SCNC: 4.5 MMOL/L (ref 3.5–5.3)
PROT SERPL-MCNC: 7.4 G/DL (ref 6.3–8.7)
RBC # BLD AUTO: 4.45 10*6/MM3 (ref 4.2–5.4)
SODIUM BLD-SCNC: 139 MMOL/L (ref 135–145)
TROPONIN I SERPL-MCNC: 0.02 NG/ML (ref 0–0.03)
WBC NRBC COR # BLD: 5.54 10*3/MM3 (ref 4.8–10.8)

## 2019-01-22 PROCEDURE — 99284 EMERGENCY DEPT VISIT MOD MDM: CPT

## 2019-01-22 PROCEDURE — 71045 X-RAY EXAM CHEST 1 VIEW: CPT

## 2019-01-22 PROCEDURE — 83735 ASSAY OF MAGNESIUM: CPT | Performed by: EMERGENCY MEDICINE

## 2019-01-22 PROCEDURE — 70450 CT HEAD/BRAIN W/O DYE: CPT

## 2019-01-22 PROCEDURE — 85025 COMPLETE CBC W/AUTO DIFF WBC: CPT | Performed by: EMERGENCY MEDICINE

## 2019-01-22 PROCEDURE — 80053 COMPREHEN METABOLIC PANEL: CPT | Performed by: EMERGENCY MEDICINE

## 2019-01-22 PROCEDURE — 82550 ASSAY OF CK (CPK): CPT | Performed by: EMERGENCY MEDICINE

## 2019-01-22 PROCEDURE — 93005 ELECTROCARDIOGRAM TRACING: CPT | Performed by: EMERGENCY MEDICINE

## 2019-01-22 PROCEDURE — 73502 X-RAY EXAM HIP UNI 2-3 VIEWS: CPT

## 2019-01-22 PROCEDURE — 93010 ELECTROCARDIOGRAM REPORT: CPT | Performed by: INTERNAL MEDICINE

## 2019-01-22 PROCEDURE — 84484 ASSAY OF TROPONIN QUANT: CPT | Performed by: EMERGENCY MEDICINE

## 2019-01-22 RX ORDER — SODIUM CHLORIDE 0.9 % (FLUSH) 0.9 %
10 SYRINGE (ML) INJECTION AS NEEDED
Status: DISCONTINUED | OUTPATIENT
Start: 2019-01-22 | End: 2019-01-24 | Stop reason: HOSPADM

## 2019-01-22 RX ORDER — ASPIRIN 81 MG/1
81 TABLET ORAL DAILY
Status: DISCONTINUED | OUTPATIENT
Start: 2019-01-23 | End: 2019-01-24 | Stop reason: HOSPADM

## 2019-01-22 RX ORDER — FUROSEMIDE 20 MG/1
20 TABLET ORAL DAILY PRN
Status: DISCONTINUED | OUTPATIENT
Start: 2019-01-22 | End: 2019-01-24 | Stop reason: HOSPADM

## 2019-01-22 RX ORDER — ONDANSETRON 2 MG/ML
4 INJECTION INTRAMUSCULAR; INTRAVENOUS EVERY 6 HOURS PRN
Status: DISCONTINUED | OUTPATIENT
Start: 2019-01-22 | End: 2019-01-24 | Stop reason: HOSPADM

## 2019-01-22 RX ORDER — SODIUM CHLORIDE 9 MG/ML
125 INJECTION, SOLUTION INTRAVENOUS CONTINUOUS
Status: DISCONTINUED | OUTPATIENT
Start: 2019-01-22 | End: 2019-01-24 | Stop reason: HOSPADM

## 2019-01-22 RX ORDER — LISINOPRIL 20 MG/1
20 TABLET ORAL DAILY
Status: DISCONTINUED | OUTPATIENT
Start: 2019-01-23 | End: 2019-01-24 | Stop reason: HOSPADM

## 2019-01-22 RX ORDER — CLOPIDOGREL BISULFATE 75 MG/1
75 TABLET ORAL 3 TIMES WEEKLY
Status: DISCONTINUED | OUTPATIENT
Start: 2019-01-23 | End: 2019-01-24 | Stop reason: HOSPADM

## 2019-01-22 RX ORDER — AMLODIPINE BESYLATE 10 MG/1
10 TABLET ORAL DAILY
Status: DISCONTINUED | OUTPATIENT
Start: 2019-01-23 | End: 2019-01-24 | Stop reason: HOSPADM

## 2019-01-22 RX ORDER — SODIUM CHLORIDE 0.9 % (FLUSH) 0.9 %
3-10 SYRINGE (ML) INJECTION AS NEEDED
Status: DISCONTINUED | OUTPATIENT
Start: 2019-01-22 | End: 2019-01-24 | Stop reason: HOSPADM

## 2019-01-22 RX ORDER — POTASSIUM CHLORIDE 750 MG/1
10 CAPSULE, EXTENDED RELEASE ORAL DAILY
Status: DISCONTINUED | OUTPATIENT
Start: 2019-01-23 | End: 2019-01-24 | Stop reason: HOSPADM

## 2019-01-22 RX ORDER — METOPROLOL SUCCINATE 50 MG/1
50 TABLET, EXTENDED RELEASE ORAL DAILY
Status: DISCONTINUED | OUTPATIENT
Start: 2019-01-23 | End: 2019-01-24 | Stop reason: HOSPADM

## 2019-01-22 RX ORDER — BUDESONIDE AND FORMOTEROL FUMARATE DIHYDRATE 160; 4.5 UG/1; UG/1
2 AEROSOL RESPIRATORY (INHALATION)
Status: DISCONTINUED | OUTPATIENT
Start: 2019-01-23 | End: 2019-01-24 | Stop reason: HOSPADM

## 2019-01-22 RX ORDER — GUAIFENESIN 600 MG/1
1200 TABLET, EXTENDED RELEASE ORAL 2 TIMES DAILY PRN
Status: DISCONTINUED | OUTPATIENT
Start: 2019-01-22 | End: 2019-01-24 | Stop reason: HOSPADM

## 2019-01-22 RX ORDER — LORAZEPAM 1 MG/1
1 TABLET ORAL 3 TIMES DAILY PRN
Status: DISCONTINUED | OUTPATIENT
Start: 2019-01-22 | End: 2019-01-24 | Stop reason: HOSPADM

## 2019-01-22 RX ORDER — DOCUSATE SODIUM 100 MG/1
100 CAPSULE, LIQUID FILLED ORAL 2 TIMES DAILY PRN
Status: DISCONTINUED | OUTPATIENT
Start: 2019-01-22 | End: 2019-01-24 | Stop reason: HOSPADM

## 2019-01-22 RX ORDER — SODIUM CHLORIDE 0.9 % (FLUSH) 0.9 %
3 SYRINGE (ML) INJECTION EVERY 12 HOURS SCHEDULED
Status: DISCONTINUED | OUTPATIENT
Start: 2019-01-23 | End: 2019-01-24 | Stop reason: HOSPADM

## 2019-01-22 RX ADMIN — SODIUM CHLORIDE 125 ML/HR: 9 INJECTION, SOLUTION INTRAVENOUS at 22:30

## 2019-01-23 ENCOUNTER — CLINICAL SUPPORT (OUTPATIENT)
Dept: CARDIOLOGY | Facility: CLINIC | Age: 74
End: 2019-01-23

## 2019-01-23 ENCOUNTER — APPOINTMENT (OUTPATIENT)
Dept: ULTRASOUND IMAGING | Facility: HOSPITAL | Age: 74
End: 2019-01-23

## 2019-01-23 DIAGNOSIS — R55 SYNCOPE, UNSPECIFIED SYNCOPE TYPE: ICD-10-CM

## 2019-01-23 DIAGNOSIS — Z95.818 STATUS POST PLACEMENT OF IMPLANTABLE LOOP RECORDER: Primary | ICD-10-CM

## 2019-01-23 LAB
ALBUMIN SERPL-MCNC: 3.1 G/DL (ref 3.5–5)
ALBUMIN/GLOB SERPL: 1.1 G/DL (ref 1.1–2.5)
ALP SERPL-CCNC: 79 U/L (ref 24–120)
ALT SERPL W P-5'-P-CCNC: 52 U/L (ref 0–54)
ANION GAP SERPL CALCULATED.3IONS-SCNC: 11 MMOL/L (ref 4–13)
AST SERPL-CCNC: 151 U/L (ref 7–45)
BASOPHILS # BLD AUTO: 0.05 10*3/MM3 (ref 0–0.2)
BASOPHILS NFR BLD AUTO: 1 % (ref 0–2)
BILIRUB SERPL-MCNC: 0.3 MG/DL (ref 0.1–1)
BUN BLD-MCNC: 33 MG/DL (ref 5–21)
BUN/CREAT SERPL: 50.8 (ref 7–25)
CALCIUM SPEC-SCNC: 8.3 MG/DL (ref 8.4–10.4)
CHLORIDE SERPL-SCNC: 104 MMOL/L (ref 98–110)
CK SERPL-CCNC: 3979 U/L (ref 0–203)
CO2 SERPL-SCNC: 23 MMOL/L (ref 24–31)
CREAT BLD-MCNC: 0.65 MG/DL (ref 0.5–1.4)
DEPRECATED RDW RBC AUTO: 49.2 FL (ref 40–54)
EOSINOPHIL # BLD AUTO: 0.01 10*3/MM3 (ref 0–0.7)
EOSINOPHIL NFR BLD AUTO: 0.2 % (ref 0–4)
ERYTHROCYTE [DISTWIDTH] IN BLOOD BY AUTOMATED COUNT: 13.5 % (ref 12–15)
GFR SERPL CREATININE-BSD FRML MDRD: 89 ML/MIN/1.73
GLOBULIN UR ELPH-MCNC: 2.9 GM/DL
GLUCOSE BLD-MCNC: 72 MG/DL (ref 70–100)
HAV IGM SERPL QL IA: NEGATIVE
HBV CORE IGM SERPL QL IA: NEGATIVE
HBV SURFACE AG SERPL QL IA: NEGATIVE
HCT VFR BLD AUTO: 38.8 % (ref 37–47)
HCV AB SER DONR QL: NEGATIVE
HCV S/C RATIO: 0.08 (ref 0–0.99)
HGB BLD-MCNC: 13.8 G/DL (ref 12–16)
IMM GRANULOCYTES # BLD AUTO: 0.01 10*3/MM3 (ref 0–0.03)
IMM GRANULOCYTES NFR BLD AUTO: 0.2 % (ref 0–5)
LYMPHOCYTES # BLD AUTO: 2.65 10*3/MM3 (ref 0.72–4.86)
LYMPHOCYTES NFR BLD AUTO: 50.9 % (ref 15–45)
MCH RBC QN AUTO: 35.1 PG (ref 28–32)
MCHC RBC AUTO-ENTMCNC: 35.6 G/DL (ref 33–36)
MCV RBC AUTO: 98.7 FL (ref 82–98)
MONOCYTES # BLD AUTO: 0.82 10*3/MM3 (ref 0.19–1.3)
MONOCYTES NFR BLD AUTO: 15.7 % (ref 4–12)
MYOGLOBIN SERPL-MCNC: 351.8 NG/ML (ref 0–110)
NEUTROPHILS # BLD AUTO: 1.67 10*3/MM3 (ref 1.87–8.4)
NEUTROPHILS NFR BLD AUTO: 32 % (ref 39–78)
NRBC BLD AUTO-RTO: 0 /100 WBC (ref 0–0)
PLATELET # BLD AUTO: 195 10*3/MM3 (ref 130–400)
PMV BLD AUTO: 10.6 FL (ref 6–12)
POTASSIUM BLD-SCNC: 3.5 MMOL/L (ref 3.5–5.3)
PROT SERPL-MCNC: 6 G/DL (ref 6.3–8.7)
RBC # BLD AUTO: 3.93 10*6/MM3 (ref 4.2–5.4)
SODIUM BLD-SCNC: 138 MMOL/L (ref 135–145)
WBC NRBC COR # BLD: 5.21 10*3/MM3 (ref 4.8–10.8)

## 2019-01-23 PROCEDURE — 97165 OT EVAL LOW COMPLEX 30 MIN: CPT

## 2019-01-23 PROCEDURE — 25010000002 ENOXAPARIN PER 10 MG: Performed by: FAMILY MEDICINE

## 2019-01-23 PROCEDURE — 83874 ASSAY OF MYOGLOBIN: CPT | Performed by: FAMILY MEDICINE

## 2019-01-23 PROCEDURE — 80074 ACUTE HEPATITIS PANEL: CPT | Performed by: FAMILY MEDICINE

## 2019-01-23 PROCEDURE — 94640 AIRWAY INHALATION TREATMENT: CPT

## 2019-01-23 PROCEDURE — 94760 N-INVAS EAR/PLS OXIMETRY 1: CPT

## 2019-01-23 PROCEDURE — 80053 COMPREHEN METABOLIC PANEL: CPT | Performed by: FAMILY MEDICINE

## 2019-01-23 PROCEDURE — 94799 UNLISTED PULMONARY SVC/PX: CPT

## 2019-01-23 PROCEDURE — 76705 ECHO EXAM OF ABDOMEN: CPT

## 2019-01-23 PROCEDURE — 93299 PR REM INTERROG ICPMS/SCRMS <30 D TECH REVIEW: CPT | Performed by: INTERNAL MEDICINE

## 2019-01-23 PROCEDURE — 85025 COMPLETE CBC W/AUTO DIFF WBC: CPT | Performed by: FAMILY MEDICINE

## 2019-01-23 PROCEDURE — 93298 REM INTERROG DEV EVAL SCRMS: CPT | Performed by: INTERNAL MEDICINE

## 2019-01-23 PROCEDURE — 82550 ASSAY OF CK (CPK): CPT | Performed by: FAMILY MEDICINE

## 2019-01-23 RX ORDER — AMLODIPINE BESYLATE 5 MG/1
5 TABLET ORAL DAILY
Status: ON HOLD | COMMUNITY
End: 2021-01-01

## 2019-01-23 RX ORDER — POTASSIUM CHLORIDE 750 MG/1
10 TABLET, FILM COATED, EXTENDED RELEASE ORAL 2 TIMES DAILY
COMMUNITY
End: 2020-08-20 | Stop reason: HOSPADM

## 2019-01-23 RX ORDER — IPRATROPIUM BROMIDE AND ALBUTEROL SULFATE 2.5; .5 MG/3ML; MG/3ML
3 SOLUTION RESPIRATORY (INHALATION)
Status: DISCONTINUED | OUTPATIENT
Start: 2019-01-23 | End: 2019-01-24 | Stop reason: HOSPADM

## 2019-01-23 RX ORDER — NITROGLYCERIN 0.4 MG/1
0.4 TABLET SUBLINGUAL
COMMUNITY

## 2019-01-23 RX ORDER — CELECOXIB 200 MG/1
200 CAPSULE ORAL DAILY PRN
COMMUNITY
End: 2019-01-24 | Stop reason: HOSPADM

## 2019-01-23 RX ORDER — OMEPRAZOLE 40 MG/1
40 CAPSULE, DELAYED RELEASE ORAL DAILY
COMMUNITY
End: 2019-04-19

## 2019-01-23 RX ADMIN — BUDESONIDE AND FORMOTEROL FUMARATE DIHYDRATE 2 PUFF: 160; 4.5 AEROSOL RESPIRATORY (INHALATION) at 19:23

## 2019-01-23 RX ADMIN — AMLODIPINE BESYLATE 10 MG: 10 TABLET ORAL at 10:48

## 2019-01-23 RX ADMIN — IPRATROPIUM BROMIDE AND ALBUTEROL SULFATE 3 ML: 2.5; .5 SOLUTION RESPIRATORY (INHALATION) at 19:22

## 2019-01-23 RX ADMIN — POTASSIUM CHLORIDE 10 MEQ: 750 CAPSULE, EXTENDED RELEASE ORAL at 10:48

## 2019-01-23 RX ADMIN — IPRATROPIUM BROMIDE AND ALBUTEROL SULFATE 3 ML: 2.5; .5 SOLUTION RESPIRATORY (INHALATION) at 15:09

## 2019-01-23 RX ADMIN — ASPIRIN 81 MG: 81 TABLET, DELAYED RELEASE ORAL at 10:48

## 2019-01-23 RX ADMIN — SODIUM CHLORIDE 125 ML/HR: 9 INJECTION, SOLUTION INTRAVENOUS at 13:59

## 2019-01-23 RX ADMIN — ENOXAPARIN SODIUM 40 MG: 40 INJECTION SUBCUTANEOUS at 10:48

## 2019-01-23 RX ADMIN — SODIUM CHLORIDE 125 ML/HR: 9 INJECTION, SOLUTION INTRAVENOUS at 05:33

## 2019-01-23 RX ADMIN — BUDESONIDE AND FORMOTEROL FUMARATE DIHYDRATE 2 PUFF: 160; 4.5 AEROSOL RESPIRATORY (INHALATION) at 08:02

## 2019-01-23 RX ADMIN — METOPROLOL SUCCINATE 50 MG: 50 TABLET, FILM COATED, EXTENDED RELEASE ORAL at 10:48

## 2019-01-23 RX ADMIN — LISINOPRIL 20 MG: 20 TABLET ORAL at 10:48

## 2019-01-23 RX ADMIN — CLOPIDOGREL 75 MG: 75 TABLET, FILM COATED ORAL at 10:48

## 2019-01-24 VITALS
WEIGHT: 186.7 LBS | HEIGHT: 60 IN | OXYGEN SATURATION: 97 % | SYSTOLIC BLOOD PRESSURE: 161 MMHG | RESPIRATION RATE: 16 BRPM | BODY MASS INDEX: 36.66 KG/M2 | DIASTOLIC BLOOD PRESSURE: 60 MMHG | TEMPERATURE: 98 F | HEART RATE: 94 BPM

## 2019-01-24 LAB
ALBUMIN SERPL-MCNC: 3.3 G/DL (ref 3.5–5)
ALBUMIN/GLOB SERPL: 1.1 G/DL (ref 1.1–2.5)
ALP SERPL-CCNC: 72 U/L (ref 24–120)
ALT SERPL W P-5'-P-CCNC: 54 U/L (ref 0–54)
ANION GAP SERPL CALCULATED.3IONS-SCNC: 10 MMOL/L (ref 4–13)
ARTICHOKE IGE QN: 119 MG/DL (ref 0–99)
AST SERPL-CCNC: 125 U/L (ref 7–45)
BASOPHILS # BLD AUTO: 0.03 10*3/MM3 (ref 0–0.2)
BASOPHILS NFR BLD AUTO: 0.6 % (ref 0–2)
BILIRUB SERPL-MCNC: 0.4 MG/DL (ref 0.1–1)
BUN BLD-MCNC: 15 MG/DL (ref 5–21)
BUN/CREAT SERPL: 25.9 (ref 7–25)
CALCIUM SPEC-SCNC: 8.2 MG/DL (ref 8.4–10.4)
CHLORIDE SERPL-SCNC: 106 MMOL/L (ref 98–110)
CHOLEST SERPL-MCNC: 168 MG/DL (ref 130–200)
CO2 SERPL-SCNC: 24 MMOL/L (ref 24–31)
CREAT BLD-MCNC: 0.58 MG/DL (ref 0.5–1.4)
DEPRECATED RDW RBC AUTO: 49.3 FL (ref 40–54)
EOSINOPHIL # BLD AUTO: 0.02 10*3/MM3 (ref 0–0.7)
EOSINOPHIL NFR BLD AUTO: 0.4 % (ref 0–4)
ERYTHROCYTE [DISTWIDTH] IN BLOOD BY AUTOMATED COUNT: 13.6 % (ref 12–15)
GFR SERPL CREATININE-BSD FRML MDRD: 102 ML/MIN/1.73
GLOBULIN UR ELPH-MCNC: 2.9 GM/DL
GLUCOSE BLD-MCNC: 89 MG/DL (ref 70–100)
HBA1C MFR BLD: 5.7 %
HCT VFR BLD AUTO: 36.4 % (ref 37–47)
HDLC SERPL-MCNC: 32 MG/DL
HGB BLD-MCNC: 12.9 G/DL (ref 12–16)
IMM GRANULOCYTES # BLD AUTO: 0 10*3/MM3 (ref 0–0.03)
IMM GRANULOCYTES NFR BLD AUTO: 0 % (ref 0–5)
LDLC/HDLC SERPL: 3.61 {RATIO}
LYMPHOCYTES # BLD AUTO: 2.54 10*3/MM3 (ref 0.72–4.86)
LYMPHOCYTES NFR BLD AUTO: 52.8 % (ref 15–45)
MCH RBC QN AUTO: 35 PG (ref 28–32)
MCHC RBC AUTO-ENTMCNC: 35.4 G/DL (ref 33–36)
MCV RBC AUTO: 98.6 FL (ref 82–98)
MONOCYTES # BLD AUTO: 0.68 10*3/MM3 (ref 0.19–1.3)
MONOCYTES NFR BLD AUTO: 14.1 % (ref 4–12)
MYOGLOBIN SERPL-MCNC: 211.9 NG/ML (ref 0–110)
NEUTROPHILS # BLD AUTO: 1.54 10*3/MM3 (ref 1.87–8.4)
NEUTROPHILS NFR BLD AUTO: 32.1 % (ref 39–78)
NRBC BLD AUTO-RTO: 0 /100 WBC (ref 0–0)
PLATELET # BLD AUTO: 189 10*3/MM3 (ref 130–400)
PMV BLD AUTO: 10.8 FL (ref 6–12)
POTASSIUM BLD-SCNC: 3.2 MMOL/L (ref 3.5–5.3)
PROT SERPL-MCNC: 6.2 G/DL (ref 6.3–8.7)
RBC # BLD AUTO: 3.69 10*6/MM3 (ref 4.2–5.4)
SODIUM BLD-SCNC: 140 MMOL/L (ref 135–145)
TRIGL SERPL-MCNC: 102 MG/DL (ref 0–149)
TSH SERPL DL<=0.05 MIU/L-ACNC: 3.83 MIU/ML (ref 0.47–4.68)
WBC NRBC COR # BLD: 4.81 10*3/MM3 (ref 4.8–10.8)

## 2019-01-24 PROCEDURE — 97535 SELF CARE MNGMENT TRAINING: CPT

## 2019-01-24 PROCEDURE — C1751 CATH, INF, PER/CENT/MIDLINE: HCPCS

## 2019-01-24 PROCEDURE — 02HV33Z INSERTION OF INFUSION DEVICE INTO SUPERIOR VENA CAVA, PERCUTANEOUS APPROACH: ICD-10-PCS | Performed by: FAMILY MEDICINE

## 2019-01-24 PROCEDURE — 80053 COMPREHEN METABOLIC PANEL: CPT | Performed by: FAMILY MEDICINE

## 2019-01-24 PROCEDURE — 83036 HEMOGLOBIN GLYCOSYLATED A1C: CPT | Performed by: FAMILY MEDICINE

## 2019-01-24 PROCEDURE — 83874 ASSAY OF MYOGLOBIN: CPT | Performed by: FAMILY MEDICINE

## 2019-01-24 PROCEDURE — 85025 COMPLETE CBC W/AUTO DIFF WBC: CPT | Performed by: FAMILY MEDICINE

## 2019-01-24 PROCEDURE — 36410 VNPNXR 3YR/> PHY/QHP DX/THER: CPT

## 2019-01-24 PROCEDURE — 80061 LIPID PANEL: CPT | Performed by: FAMILY MEDICINE

## 2019-01-24 PROCEDURE — 25010000002 ENOXAPARIN PER 10 MG: Performed by: FAMILY MEDICINE

## 2019-01-24 PROCEDURE — 94760 N-INVAS EAR/PLS OXIMETRY 1: CPT

## 2019-01-24 PROCEDURE — 84443 ASSAY THYROID STIM HORMONE: CPT | Performed by: FAMILY MEDICINE

## 2019-01-24 PROCEDURE — 94799 UNLISTED PULMONARY SVC/PX: CPT

## 2019-01-24 RX ORDER — LIDOCAINE HYDROCHLORIDE 10 MG/ML
1 INJECTION, SOLUTION EPIDURAL; INFILTRATION; INTRACAUDAL; PERINEURAL ONCE
Status: COMPLETED | OUTPATIENT
Start: 2019-01-24 | End: 2019-01-24

## 2019-01-24 RX ORDER — POTASSIUM CHLORIDE 750 MG/1
40 CAPSULE, EXTENDED RELEASE ORAL ONCE
Status: COMPLETED | OUTPATIENT
Start: 2019-01-24 | End: 2019-01-24

## 2019-01-24 RX ADMIN — METOPROLOL SUCCINATE 50 MG: 50 TABLET, FILM COATED, EXTENDED RELEASE ORAL at 08:48

## 2019-01-24 RX ADMIN — POTASSIUM CHLORIDE 10 MEQ: 750 CAPSULE, EXTENDED RELEASE ORAL at 08:48

## 2019-01-24 RX ADMIN — LIDOCAINE HYDROCHLORIDE 1 ML: 10 INJECTION, SOLUTION EPIDURAL; INFILTRATION; INTRACAUDAL; PERINEURAL at 10:56

## 2019-01-24 RX ADMIN — ASPIRIN 81 MG: 81 TABLET, DELAYED RELEASE ORAL at 08:48

## 2019-01-24 RX ADMIN — IPRATROPIUM BROMIDE AND ALBUTEROL SULFATE 3 ML: 2.5; .5 SOLUTION RESPIRATORY (INHALATION) at 14:34

## 2019-01-24 RX ADMIN — IPRATROPIUM BROMIDE AND ALBUTEROL SULFATE 3 ML: 2.5; .5 SOLUTION RESPIRATORY (INHALATION) at 06:25

## 2019-01-24 RX ADMIN — POTASSIUM CHLORIDE 40 MEQ: 750 CAPSULE, EXTENDED RELEASE ORAL at 16:00

## 2019-01-24 RX ADMIN — BUDESONIDE AND FORMOTEROL FUMARATE DIHYDRATE 2 PUFF: 160; 4.5 AEROSOL RESPIRATORY (INHALATION) at 06:32

## 2019-01-24 RX ADMIN — AMLODIPINE BESYLATE 10 MG: 10 TABLET ORAL at 08:48

## 2019-01-24 RX ADMIN — LISINOPRIL 20 MG: 20 TABLET ORAL at 08:48

## 2019-01-24 RX ADMIN — ENOXAPARIN SODIUM 40 MG: 40 INJECTION SUBCUTANEOUS at 08:48

## 2019-02-04 ENCOUNTER — TRANSCRIBE ORDERS (OUTPATIENT)
Dept: ADMINISTRATIVE | Facility: HOSPITAL | Age: 74
End: 2019-02-04

## 2019-02-04 ENCOUNTER — HOSPITAL ENCOUNTER (OUTPATIENT)
Dept: GENERAL RADIOLOGY | Facility: HOSPITAL | Age: 74
Discharge: HOME | End: 2019-02-04
Admitting: PHYSICIAN ASSISTANT

## 2019-02-04 DIAGNOSIS — M79.671 PAIN IN RIGHT FOOT: Primary | ICD-10-CM

## 2019-02-04 PROCEDURE — 73630 X-RAY EXAM OF FOOT: CPT

## 2019-02-04 NOTE — PROGRESS NOTES
"YOB: 1945  Location: [facility]  Location Address: [address] [place] [address], [facility], [address] [place] [address]  Location Phone: [phone]    Chief Complaint   Patient presents with   • Follow-up     Parotid, thyroid       History of Present Illness  Josi Mohamud is a 72 y.o. female.  Josi Mohamud is here for follow-up.  Patient had FNA of the parotid gland on 18 with results of benign Warthin's tumor.   The patient has had a CT of the chest that showed a lung nodule and possible thyroid nodule. Due to this a PET scan was ordered with results below. In general,  there was increased uptake in the parotid gland on the left with a nodule and increased asymmetrical uptake in the oropharynx. Patient states she is doing well in regards to parotid. She denies swelling, ear pain and facial numbness and pain. She does have dry mouth. She has recently been hospitalized with fall.     She denies any complaints with respect to her salivary glands\parotids.    Fine Needle Aspiration   Order: [id number]   Status:  Final result   Visible to patient:  No (Not Released)   Component 1mo ago   Final Diagnosis   Left parotid nodule, smears (4), ThinPrep preparation (1) and cell block:  A.  Numerous groups of oncocytic cells, primarily arranged in papillary formations.  B.  No cytologic evidence of malignancy.     Comment: Warthin's tumors form papillary groupings of oncocytic cells although the increased numbers of lymphocytes and the\"dirty\" background typically seen in smears of Warthin's tumors are not identified in this specimen.  The findings may still represent a Warthin's tumor if the lymphoid stroma of a Warthin's tumor was not sampled.  Also included in the differential diagnosis would be an oncocytoma or oncocytosis of salivary gland.  Clinical correlation and follow-up are recommended.   Electronically signed by Monika Hernandez MD on 8/10/2018 at 114   Gross Description     Received in cytolyt in the " "laboratory in a container labeled with patient name and  designated as left parotid.  30 mls with bloody needle washings.  4 smears fixed in 95% alcohol, 1 thin prep and 1 cell block made.      Intraoperative Consultation     FNA Left Parotid:        1. Many groups of oncocytic cells.     2. Many groups of oncocytic cells.                        Monika Hernandez M.D.                         2018   Intradepartmental Consult     Intradepartmental consultation was obtained from Dr. Venita Schultz who concurs with the above diagnostic impressions.   Microscopic Description     Smears and ThinPrep preparation of the fine-needle aspiration of the left parotid nodule reveal many groups of oncocytic cells, often arranged in papillary formations.  Nuclear grooves or nuclear inclusions are not seen.  Blood is seen in the background.  Increased numbers of lymphocytes are not seen and a\" dirty\" background is not seen.  Blood is present in the background.  In the cell block, the oncocytic cells have a tall columnar appearance with an inner more cuboidal cell lining seen.  Cytologic features of malignancy are not seen.       [facility]       Specimen Collected: 18 10:25 Last Resulted: 08/10/18 11:48                  PET CT 2018 8:30 AM CDT     REASON FOR STUDY: Left upper lobe lung nodule, history of Hodgkin's  lymphoma 40 years ago, shortness of breath and cough     COMPARISON: Low-dose CT chest for screening 2018     RADIOPHARMACEUTICAL: 12.67 mCi F-18 FDG intravenously.      Technique: Prior to injection of the radiotracer, the patient's blood  glucose is 101 mg/dL. Following injection and a 60 minute distribution  interval, PET scan is performed from the skull base to the midthigh. Low  dose, noncontrast CT is performed in the same anatomic distribution for  attenuation correction of the PET scan and to assist in localizing the  PET findings.       Radiation dose: DLP " 880mGy-cm     FINDINGS:      HEAD AND NECK: The visualized brain demonstrates no definite focal  abnormal FDG activity. Salivary, tonsillar and laryngeal activity  appears ordinary. There is asymmetric uptake in the posterior  oropharynx, with increased uptake on the left demonstrating a maximum  SUV of 5.87. There is increased uptake within a soft tissue nodule in  the left parotid gland which demonstrates a maximum SUV of 5.74.  Additionally, there is minimal increased uptake in the region of the  right parotid gland demonstrating a maximum SUV of 4.26..      CHEST: There is minimal increased FDG uptake within the spiculated left  upper lobe nodule, demonstrating a maximum SUV of 1.69. There is minimal  increased uptake within a normal-appearing left axillary lymph node,  demonstrating a maximum SUV of 2.32.. Review of the mediastinum reveals  no hypermetabolic lymphadenopathy. Physiologic activity is noted in the  myocardium. No foci of hypermetabolism are appreciated in either breast.     ABDOMEN AND PELVIS: Normal physiologic activity is noted in the liver,  spleen, stomach and bowel. No adrenal hypermetabolism is noted. Urinary  activity is noted in the kidneys and bladder. No FDG-avid  lymphadenopathy is appreciated. An intramuscular lipoma is seen in the  left gluteal muscles.     MUSCULOSKELETAL: No FDG-avid osseous metastases are demonstrated.      IMPRESSION:  1. Some increased uptake within the previously described left upper lobe  nodule, suspicious for malignancy.  2. Abnormal uptake in the posterior left oropharynx, etiology unclear.  Recommend direct visualization for further evaluation.  3. Increased uptake in the parotid glands bilaterally with underlying  nodules possibly reflecting lymph nodes. A neoplastic process cannot be  excluded.     EXAMINATION:  CT CHEST LOW DOSE WO-  7/6/2018 12:04 PM CDT     HISTORY: Lung cancer screening.     COMPARISON: None     DOSE LENGTH PRODUCT: 60 mGy cm.  Automated exposure control was also  utilized to decrease patient radiation dose.     TECHNIQUE: Noncontrast CT of the chest was performed using low-dose  protocol.     FINDINGS:      NODULES: There is a 1 cm ill-defined left upper lobe pulmonary nodule on  image 72 of series 4. This is a suspicious nodule. There is a 4 mm left  upper lobe nodule medially on image 34 of series 4. There is a 4 mm  groundglass nodule in the right upper lobe on image 43 of series 4  centrally.     OTHER LUNG FINDINGS: Bullous emphysema.     AIRWAY: The airway is patent.     PLEURA: No mass or fluid collection.     AORTA AND GREAT VESSELS: There is atheromatous disease of the thoracic  aorta and coronary arteries.     HEART AND PERICARDIUM: Heart size is normal. There is a small amount of  fluid in the pericardium is felt to be physiologic.     LYMPH NODES: No pathologic lymphadenopathy is noted.     BONES AND SOFT TISSUES: There is a dense calcification in the lateral  left breast. In the medial left breast, there is a linear radiopaque  density that appears to represent a foreign body. This is not visualized  on a mammogram on 8/23/2016. Correlate with any surgical intervention in  this area. This measures around 3.7 cm in length and 1 cm in short axis  diameter. There is a questionable dominant thyroid nodule on the right  measuring around 2 cm in AP dimension.     UPPER ABDOMEN: No acute findings in the upper abdomen.     IMPRESSION:  1. There is a 1 cm ill-defined left upper lobe pulmonary nodule that  needs further evaluation with PET/CT. This is very concerning for a  small primary neoplasm.  2. Lung-RADS 4: Suspicious.  3. There is a 3.7 x 1 cm radiopaque density in the medial right breast  could be a foreign body/retained surgical drain. This is not present on  a mammogram from 2016. Correlate with any surgical intervention in this  area.  4. Possible dominant nodule in the right thyroid lobe versus artifact  due to the  low-dose protocol. Consider follow-up ultrasound of the  thyroid gland.  5. Atheromatous disease of the thoracic aorta and coronary arteries.              Past Medical History:   Diagnosis Date   • Cancer (CMS/HCC)     HODGKINS LYMPHOMA   • COPD (chronic obstructive pulmonary disease) (CMS/HCC)    • Coronary artery disease    • History of transfusion    • Hyperlipidemia    • Hypertension        Past Surgical History:   Procedure Laterality Date   • APPENDECTOMY     • APPENDECTOMY     • CARDIAC CATHETERIZATION     • CARDIAC ELECTROPHYSIOLOGY PROCEDURE N/A 5/23/2017    Procedure: Loop insertion; LINQ;  Surgeon: Jose Monteiro MD;  Location: [facility] CATH INVASIVE LOCATION;  Service:    • CHOLECYSTECTOMY     • CORONARY ANGIOPLASTY WITH STENT PLACEMENT      X 1    • LIVER BIOPSY     • SPLENECTOMY     • US GUIDED FINE NEEDLE ASPIRATION  8/7/2018       Outpatient Medications Marked as Taking for the 2/5/19 encounter (Office Visit) with Se Mcnulty MD   Medication Sig Dispense Refill   • albuterol (PROVENTIL HFA;VENTOLIN HFA) 108 (90 BASE) MCG/ACT inhaler Inhale 1 puff Daily.     • amLODIPine (NORVASC) 5 MG tablet Take 5 mg by mouth Daily.     • aspirin 81 MG EC tablet Take 81 mg by mouth Daily.     • calcium citrate-vitamin d (CALCITRATE) 315-250 MG-UNIT tablet tablet Take 1 tablet by mouth Daily.     • clopidogrel (PLAVIX) 75 MG tablet Take 75 mg by mouth Daily.     • fluticasone-salmeterol (ADVAIR) 250-50 MCG/DOSE DISKUS Inhale 1 puff 2 (Two) Times a Day.     • furosemide (LASIX) 20 MG tablet Take 20 mg by mouth Daily As Needed (for swelling).     • guaiFENesin (MUCINEX) 600 MG 12 hr tablet Take 2 tablets by mouth 2 (Two) Times a Day As Needed for Cough. 15 tablet 0   • lisinopril (PRINIVIL,ZESTRIL) 20 MG tablet Take 20 mg by mouth Daily.     • metoprolol succinate XL (TOPROL-XL) 50 MG 24 hr tablet Take 50 mg by mouth Daily.     • nitroglycerin (NITROSTAT) 0.4 MG SL tablet Place 0.4 mg under the tongue Every 5  (Five) Minutes As Needed for Chest Pain. Take no more than 3 doses in 15 minutes.     • potassium chloride (K-DUR) 10 MEQ CR tablet Take 10 mEq by mouth 2 (Two) Times a Day.     • simvastatin (ZOCOR) 20 MG tablet Take 20 mg by mouth Daily.     • tiotropium (SPIRIVA) 18 MCG per inhalation capsule Place 1 capsule into inhaler and inhale Daily.         Penicillins    Family History   Problem Relation Age of Onset   • Esophageal cancer Brother    • Lung cancer Brother    • Breast cancer Neg Hx        Social History     Socioeconomic History   • Marital status:      Spouse name: Not on file   • Number of children: Not on file   • Years of education: Not on file   • Highest education level: Not on file   Social Needs   • Financial resource strain: Not on file   • Food insecurity - worry: Not on file   • Food insecurity - inability: Not on file   • Transportation needs - medical: Not on file   • Transportation needs - non-medical: Not on file   Occupational History   • Not on file   Tobacco Use   • Smoking status: Former Smoker     Packs/day: 0.50     Types: Cigarettes     Last attempt to quit: 2019     Years since quittin.0   • Smokeless tobacco: Never Used   Substance and Sexual Activity   • Alcohol use: No   • Drug use: No   • Sexual activity: Defer   Other Topics Concern   • Not on file   Social History Narrative   • Not on file       Review of Systems   Constitutional: Negative.    HENT:        Dry mouth   Eyes: Negative.    Respiratory: Negative.    Cardiovascular: Negative.    Gastrointestinal: Negative.    Endocrine: Negative.    Genitourinary: Negative.    Musculoskeletal: Negative.    Skin: Negative.    Allergic/Immunologic: Negative.    Psychiatric/Behavioral: Negative.        Vitals:    19 1135   BP: 155/89   Pulse: 81   Temp: 97.5 °F (36.4 °C)       Body mass index is 36.13 kg/m².    Objective     Physical Exam  CONSTITUTIONAL: Obese, alert, oriented, in no acute distress     COMMUNICATION  AND VOICE: able to communicate normally, normal voice quality    HEAD: normocephalic, no lesions, atraumatic, no tenderness, no masses     FACE: appearance normal, no lesions, no tenderness, no deformities, facial motion symmetric    SALIVARY GLANDS: parotid glands with no tenderness, no swelling, no masses appreciated even to bimanual palpation bilaterally, submandibular glands with normal size, nontender    EYES: ocular motility normal, eyelids normal, orbits normal, no proptosis, conjunctiva normal , pupils equal, round     EARS:  Hearing: response to conversational voice normal bilaterally   External Ears: auricles without lesions  Otoscopic: tympanic membrane appearance normal, no lesions, no perforation, normal mobility, no fluid    NOSE:  External Nose: structure normal, no tenderness on palpation, no nasal discharge, no lesions, no evidence of trauma, nostrils patent   Intranasal Exam: nasal mucosa normal, vestibule within normal limits, inferior turbinate normal, nasal septum midline   Nasopharynx:     ORAL:  Lips: upper and lower lips without lesion   Teeth:    Gums: gingivae healthy   Oral Mucosa: oral mucosa normal, no mucosal lesions   Floor of Mouth: Warthin’s duct patent, mucosa normal  Tongue: lingual mucosa normal without lesions, normal tongue mobility   Palate: soft and hard palates with normal mucosa and structure  Oropharynx: oropharyngeal mucosa normal    HYPOPHARYNX:   LARYNX:     NECK: neck appearance normal, no mass,  noted without erythema or tenderness    THYROID: no overt thyromegaly, no tenderness, nodules or mass present on palpation, position midline     LYMPH NODES: no lymphadenopathy    CHEST/RESPIRATORY: respiratory effort normal, normal breath sounds     CARDIOVASCULAR: rate and rhythm normal, extremities without cyanosis or edema      NEUROLOGIC/PSYCHIATRIC: oriented to time, place and person, mood normal, affect appropriate, CN II-XII intact grossly    Assessment/Plan   Josi jay  seen today for follow-up.    Diagnoses and all orders for this visit:    Abnormal positron emission tomography (PET) scan    Parotid neoplasm      * Surgery not found *  No orders of the defined types were placed in this encounter.    Return in about 6 months (around 8/5/2019).       Patient Instructions   If serious consideration is given to a possible left parotidectomy-would probably require needle localization secondary to the inability to feel the mass    It is my consideration at this time in light of the pathological evaluation of the fine-needle aspirate to take an expectant course.    We will follow-up in 6 months and she was told to call or return for any problems but particularly palpation of a mass.        MyPlate from [employer]  The general, healthful diet is based on the 2010 Dietary Guidelines for Americans. The amount of food you need to eat from each food group depends on your age, sex, and level of physical activity and can be individualized by a dietitian. Go to ChooseMyPlate.gov for more information.  What do I need to know about the MyPlate plan?  · Enjoy your food, but eat less.  · Avoid oversized portions.  ? ½ of your plate should include fruits and vegetables.  ? ¼ of your plate should be grains.  ? ¼ of your plate should be protein.  Grains  · Make at least half of your grains whole grains.  · For a 2,000 calorie daily food plan, eat 6 oz every day.  · 1 oz is about 1 slice bread, 1 cup cereal, or ½ cup cooked rice, cereal, or pasta.  Vegetables  · Make half your plate fruits and vegetables.  · For a 2,000 calorie daily food plan, eat 2½ cups every day.  · 1 cup is about 1 cup raw or cooked vegetables or vegetable juice or 2 cups raw leafy greens.  Fruits  · Make half your plate fruits and vegetables.  · For a 2,000 calorie daily food plan, eat 2 cups every day.  · 1 cup is about 1 cup fruit or 100% fruit juice or ½ cup dried fruit.  Protein  · For a 2,000 calorie daily food plan, eat 5½ oz  every day.  · 1 oz is about 1 oz meat, poultry, or fish, ¼ cup cooked beans, 1 egg, 1 Tbsp peanut butter, or ½ oz nuts or seeds.  Dairy  · Switch to fat-free or low-fat (1%) milk.  · For a 2,000 calorie daily food plan, eat 3 cups every day.  · 1 cup is about 1 cup milk or yogurt or soy milk (soy beverage), 1½ oz natural cheese, or 2 oz processed cheese.  Fats, Oils, and Empty Calories  · Only small amounts of oils are recommended.  · Empty calories are calories from solid fats or added sugars.  · Compare sodium in foods like soup, bread, and frozen meals. Choose the foods with lower numbers.  · Drink water instead of sugary drinks.  What foods can I eat?  Grains  Whole grains such as whole wheat, quinoa, millet, and bulgur. Bread, rolls, and pasta made from whole grains. Brown or wild rice. Hot or cold cereals made from whole grains and without added sugar.  Vegetables  All fresh vegetables, especially fresh red, dark green, or orange vegetables. Peas and beans. Low-sodium frozen or canned vegetables prepared without added salt. Low-sodium vegetable juices.  Fruits  All fresh, frozen, and dried fruits. Canned fruit packed in water or fruit juice without added sugar. Fruit juices without added sugar.  Meats and Other Protein Sources  Boiled, baked, or grilled lean meat trimmed of fat. Skinless poultry. Fresh seafood and shellfish. Canned seafood packed in water. Unsalted nuts and unsalted nut butters. Tofu. Dried beans and pea. Eggs.  Dairy  Low-fat or fat-free milk, yogurt, and cheeses.  Sweets and Desserts  Frozen desserts made from low-fat milk.  Fats and Oils  Olive, peanut, and canola oils and margarine. Salad dressing and mayonnaise made from these oils.  Other  Soups and casseroles made from allowed ingredients and without added fat or salt.  The items listed above may not be a complete list of recommended foods or beverages. Contact your dietitian for more options.  What foods are not  recommended?  Grains  Sweetened, low-fiber cereals. Packaged baked goods. Snack crackers and chips. Cheese crackers, butter crackers, and biscuits. Frozen waffles, sweet breads, doughnuts, pastries, packaged baking mixes, pancakes, cakes, and cookies.  Vegetables  Regular canned or frozen vegetables or vegetables prepared with salt. Canned tomatoes. Canned tomato sauce. Fried vegetables. Vegetables in cream sauce or cheese sauce.  Fruits  Fruits packed in syrup or made with added sugar.  Meats and Other Protein Sources  Marbled or fatty meats such as ribs. Poultry with skin. Fried meats, poultry, eggs, or fish. Sausages, hot dogs, and deli meats such as pastrami, bologna, or salami.  Dairy  Whole milk, cream, cheeses made from whole milk, sour cream. Ice cream or yogurt made from whole milk or with added sugar.  Beverages  For adults, no more than one alcoholic drink per day. Regular soft drinks or other sugary beverages. Juice drinks.  Sweets and Desserts  Sugary or fatty desserts, candy, and other sweets.  Fats and Oils  Solid shortening or partially hydrogenated oils. Solid margarine. Margarine that contains trans fats. Butter.  The items listed above may not be a complete list of foods and beverages to avoid. Contact your dietitian for more information.  This information is not intended to replace advice given to you by your health care provider. Make sure you discuss any questions you have with your health care provider.  Document Released: 01/06/2009 Document Revised: 05/25/2017 Document Reviewed: 11/26/2014  [employer] Patient Education © 2018 [employer]     Calorie Counting for Weight Loss  Calories are units of energy. Your body needs a certain amount of calories from food to keep you going throughout the day. When you eat more calories than your body needs, your body stores the extra calories as fat. When you eat fewer calories than your body needs, your body burns fat to get the energy it  needs.  Calorie counting means keeping track of how many calories you eat and drink each day. Calorie counting can be helpful if you need to lose weight. If you make sure to eat fewer calories than your body needs, you should lose weight. Ask your health care provider what a healthy weight is for you.  For calorie counting to work, you will need to eat the right number of calories in a day in order to lose a healthy amount of weight per week. A dietitian can help you determine how many calories you need in a day and will give you suggestions on how to reach your calorie goal.  · A healthy amount of weight to lose per week is usually 1-2 lb (0.5-0.9 kg). This usually means that your daily calorie intake should be reduced by 500-750 calories.  · Eating 1,200 - 1,500 calories per day can help most women lose weight.  · Eating 1,500 - 1,800 calories per day can help most men lose weight.    What do I need to know about calorie counting?  In order to meet your daily calorie goal, you will need to:  · Find out how many calories are in each food you would like to eat. Try to do this before you eat.  · Decide how much of the food you plan to eat.  · Write down what you ate and how many calories it had. Doing this is called keeping a food log.    To successfully lose weight, it is important to balance calorie counting with a healthy lifestyle that includes regular activity. Aim for 150 minutes of moderate exercise (such as walking) or 75 minutes of vigorous exercise (such as running) each week.  Where do I find calorie information?    The number of calories in a food can be found on a Nutrition Facts label. If a food does not have a Nutrition Facts label, try to look up the calories online or ask your dietitian for help.  Remember that calories are listed per serving. If you choose to have more than one serving of a food, you will have to multiply the calories per serving by the amount of servings you plan to eat. For  "example, the label on a package of bread might say that a serving size is 1 slice and that there are 90 calories in a serving. If you eat 1 slice, you will have eaten 90 calories. If you eat 2 slices, you will have eaten 180 calories.  How do I keep a food log?  Immediately after each meal, record the following information in your food log:  · What you ate. Don't forget to include toppings, sauces, and other extras on the food.  · How much you ate. This can be measured in cups, ounces, or number of items.  · How many calories each food and drink had.  · The total number of calories in the meal.    Keep your food log near you, such as in a small notebook in your pocket, or use a mobile ivone or website. Some programs will calculate calories for you and show you how many calories you have left for the day to meet your goal.  What are some calorie counting tips?  · Use your calories on foods and drinks that will fill you up and not leave you hungry:  ? Some examples of foods that fill you up are nuts and nut butters, vegetables, lean proteins, and high-fiber foods like whole grains. High-fiber foods are foods with more than 5 g fiber per serving.  ? Drinks such as sodas, specialty coffee drinks, alcohol, and juices have a lot of calories, yet do not fill you up.  · Eat nutritious foods and avoid empty calories. Empty calories are calories you get from foods or beverages that do not have many vitamins or protein, such as candy, sweets, and soda. It is better to have a nutritious high-calorie food (such as an avocado) than a food with few nutrients (such as a bag of chips).  · Know how many calories are in the foods you eat most often. This will help you calculate calorie counts faster.  · Pay attention to calories in drinks. Low-calorie drinks include water and unsweetened drinks.  · Pay attention to nutrition labels for \"low fat\" or \"fat free\" foods. These foods sometimes have the same amount of calories or more calories " than the full fat versions. They also often have added sugar, starch, or salt, to make up for flavor that was removed with the fat.  · Find a way of tracking calories that works for you. Get creative. Try different apps or programs if writing down calories does not work for you.  What are some portion control tips?  · Know how many calories are in a serving. This will help you know how many servings of a certain food you can have.  · Use a measuring cup to measure serving sizes. You could also try weighing out portions on a kitchen scale. With time, you will be able to estimate serving sizes for some foods.  · Take some time to put servings of different foods on your favorite plates, bowls, and cups so you know what a serving looks like.  · Try not to eat straight from a bag or box. Doing this can lead to overeating. Put the amount you would like to eat in a cup or on a plate to make sure you are eating the right portion.  · Use smaller plates, glasses, and bowls to prevent overeating.  · Try not to multitask (for example, watch TV or use your computer) while eating. If it is time to eat, sit down at a table and enjoy your food. This will help you to know when you are full. It will also help you to be aware of what you are eating and how much you are eating.  What are tips for following this plan?  Reading food labels  · Check the calorie count compared to the serving size. The serving size may be smaller than what you are used to eating.  · Check the source of the calories. Make sure the food you are eating is high in vitamins and protein and low in saturated and trans fats.  Shopping  · Read nutrition labels while you shop. This will help you make healthy decisions before you decide to purchase your food.  · Make a grocery list and stick to it.  Cooking  · Try to cook your favorite foods in a healthier way. For example, try baking instead of frying.  · Use low-fat dairy products.  Meal planning  · Use more fruits  and vegetables. Half of your plate should be fruits and vegetables.  · Include lean proteins like poultry and fish.  How do I count calories when eating out?  · Ask for smaller portion sizes.  · Consider sharing an entree and sides instead of getting your own entree.  · If you get your own entree, eat only half. Ask for a box at the beginning of your meal and put the rest of your entree in it so you are not tempted to eat it.  · If calories are listed on the menu, choose the lower calorie options.  · Choose dishes that include vegetables, fruits, whole grains, low-fat dairy products, and lean protein.  · Choose items that are boiled, broiled, grilled, or steamed. Stay away from items that are buttered, battered, fried, or served with cream sauce. Items labeled “crispy” are usually fried, unless stated otherwise.  · Choose water, low-fat milk, unsweetened iced tea, or other drinks without added sugar. If you want an alcoholic beverage, choose a lower calorie option such as a glass of wine or light beer.  · Ask for dressings, sauces, and syrups on the side. These are usually high in calories, so you should limit the amount you eat.  · If you want a salad, choose a garden salad and ask for grilled meats. Avoid extra toppings like terry, cheese, or fried items. Ask for the dressing on the side, or ask for olive oil and vinegar or lemon to use as dressing.  · Estimate how many servings of a food you are given. For example, a serving of cooked rice is ½ cup or about the size of half a baseball. Knowing serving sizes will help you be aware of how much food you are eating at restaurants. The list below tells you how big or small some common portion sizes are based on everyday objects:  ? 1 oz--4 stacked dice.  ? 3 oz--1 deck of cards.  ? 1 tsp--1 die.  ? 1 Tbsp--½ a ping-pong ball.  ? 2 Tbsp--1 ping-pong ball.  ? ½ cup--½ baseball.  ? 1 cup--1 baseball.  Summary  · Calorie counting means keeping track of how many calories  you eat and drink each day. If you eat fewer calories than your body needs, you should lose weight.  · A healthy amount of weight to lose per week is usually 1-2 lb (0.5-0.9 kg). This usually means reducing your daily calorie intake by 500-750 calories.  · The number of calories in a food can be found on a Nutrition Facts label. If a food does not have a Nutrition Facts label, try to look up the calories online or ask your dietitian for help.  · Use your calories on foods and drinks that will fill you up, and not on foods and drinks that will leave you hungry.  · Use smaller plates, glasses, and bowls to prevent overeating.  This information is not intended to replace advice given to you by your health care provider. Make sure you discuss any questions you have with your health care provider.  Document Released: 12/18/2006 Document Revised: 11/17/2017 Document Reviewed: 11/17/2017  [employer] Interactive Patient Education © 2018 [employer]     Exercising to Lose Weight  Exercising can help you to lose weight. In order to lose weight through exercise, you need to do vigorous-intensity exercise. You can tell that you are exercising with vigorous intensity if you are breathing very hard and fast and cannot hold a conversation while exercising.  Moderate-intensity exercise helps to maintain your current weight. You can tell that you are exercising at a moderate level if you have a higher heart rate and faster breathing, but you are still able to hold a conversation.  How often should I exercise?  Choose an activity that you enjoy and set realistic goals. Your health care provider can help you to make an activity plan that works for you. Exercise regularly as directed by your health care provider. This may include:  · Doing resistance training twice each week, such as:  ? Push-ups.  ? Sit-ups.  ? Lifting weights.  ? Using resistance bands.  · Doing a given intensity of exercise for a given amount of time. Choose from  these options:  ? 150 minutes of moderate-intensity exercise every week.  ? 75 minutes of vigorous-intensity exercise every week.  ? A mix of moderate-intensity and vigorous-intensity exercise every week.    Children, pregnant women, people who are out of shape, people who are overweight, and older adults may need to consult a health care provider for individual recommendations. If you have any sort of medical condition, be sure to consult your health care provider before starting a new exercise program.  What are some activities that can help me to lose weight?  · Walking at a rate of at least 4.5 miles an hour.  · Jogging or running at a rate of 5 miles per hour.  · Biking at a rate of at least 10 miles per hour.  · Lap swimming.  · Roller-skating or in-line skating.  · Cross-country skiing.  · Vigorous competitive sports, such as football, basketball, and soccer.  · Jumping rope.  · Aerobic dancing.  How can I be more active in my day-to-day activities?  · Use the stairs instead of the elevator.  · Take a walk during your lunch break.  · If you drive, park your car farther away from work or school.  · If you take public transportation, get off one stop early and walk the rest of the way.  · Make all of your phone calls while standing up and walking around.  · Get up, stretch, and walk around every 30 minutes throughout the day.  What guidelines should I follow while exercising?  · Do not exercise so much that you hurt yourself, feel dizzy, or get very short of breath.  · Consult your health care provider prior to starting a new exercise program.  · Wear comfortable clothes and shoes with good support.  · Drink plenty of water while you exercise to prevent dehydration or heat stroke. Body water is lost during exercise and must be replaced.  · Work out until you breathe faster and your heart beats faster.  This information is not intended to replace advice given to you by your health care provider. Make sure you  discuss any questions you have with your health care provider.  Document Released: 01/20/2012 Document Revised: 05/25/2017 Document Reviewed: 05/21/2015  [employer] Interactive Patient Education © 2018 [employer]

## 2019-02-05 ENCOUNTER — OFFICE VISIT (OUTPATIENT)
Dept: OTOLARYNGOLOGY | Facility: CLINIC | Age: 74
End: 2019-02-05

## 2019-02-05 VITALS
HEART RATE: 81 BPM | WEIGHT: 185 LBS | TEMPERATURE: 97.5 F | DIASTOLIC BLOOD PRESSURE: 89 MMHG | SYSTOLIC BLOOD PRESSURE: 155 MMHG | HEIGHT: 60 IN | BODY MASS INDEX: 36.32 KG/M2

## 2019-02-05 DIAGNOSIS — D49.0 PAROTID NEOPLASM: ICD-10-CM

## 2019-02-05 DIAGNOSIS — R94.8 ABNORMAL POSITRON EMISSION TOMOGRAPHY (PET) SCAN: Primary | ICD-10-CM

## 2019-02-05 PROCEDURE — 99213 OFFICE O/P EST LOW 20 MIN: CPT | Performed by: OTOLARYNGOLOGY

## 2019-02-05 NOTE — PATIENT INSTRUCTIONS
If serious consideration is given to a possible left parotidectomy-would probably require needle localization secondary to the inability to feel the mass    It is my consideration at this time in light of the pathological evaluation of the fine-needle aspirate to take an expectant course.    We will follow-up in 6 months and she was told to call or return for any problems but particularly palpation of a mass.        MyPlate from [facility]  The general, healthful diet is based on the 2010 Dietary Guidelines for Americans. The amount of food you need to eat from each food group depends on your age, sex, and level of physical activity and can be individualized by a dietitian. Go to ChooseMyPlate.gov for more information.  What do I need to know about the MyPlate plan?  · Enjoy your food, but eat less.  · Avoid oversized portions.  ? ½ of your plate should include fruits and vegetables.  ? ¼ of your plate should be grains.  ? ¼ of your plate should be protein.  Grains  · Make at least half of your grains whole grains.  · For a 2,000 calorie daily food plan, eat 6 oz every day.  · 1 oz is about 1 slice bread, 1 cup cereal, or ½ cup cooked rice, cereal, or pasta.  Vegetables  · Make half your plate fruits and vegetables.  · For a 2,000 calorie daily food plan, eat 2½ cups every day.  · 1 cup is about 1 cup raw or cooked vegetables or vegetable juice or 2 cups raw leafy greens.  Fruits  · Make half your plate fruits and vegetables.  · For a 2,000 calorie daily food plan, eat 2 cups every day.  · 1 cup is about 1 cup fruit or 100% fruit juice or ½ cup dried fruit.  Protein  · For a 2,000 calorie daily food plan, eat 5½ oz every day.  · 1 oz is about 1 oz meat, poultry, or fish, ¼ cup cooked beans, 1 egg, 1 Tbsp peanut butter, or ½ oz nuts or seeds.  Dairy  · Switch to fat-free or low-fat (1%) milk.  · For a 2,000 calorie daily food plan, eat 3 cups every day.  · 1 cup is about 1 cup milk or yogurt or soy milk (soy beverage), 1½  oz natural cheese, or 2 oz processed cheese.  Fats, Oils, and Empty Calories  · Only small amounts of oils are recommended.  · Empty calories are calories from solid fats or added sugars.  · Compare sodium in foods like soup, bread, and frozen meals. Choose the foods with lower numbers.  · Drink water instead of sugary drinks.  What foods can I eat?  Grains  Whole grains such as whole wheat, quinoa, millet, and bulgur. Bread, rolls, and pasta made from whole grains. Brown or wild rice. Hot or cold cereals made from whole grains and without added sugar.  Vegetables  All fresh vegetables, especially fresh red, dark green, or orange vegetables. Peas and beans. Low-sodium frozen or canned vegetables prepared without added salt. Low-sodium vegetable juices.  Fruits  All fresh, frozen, and dried fruits. Canned fruit packed in water or fruit juice without added sugar. Fruit juices without added sugar.  Meats and Other Protein Sources  Boiled, baked, or grilled lean meat trimmed of fat. Skinless poultry. Fresh seafood and shellfish. Canned seafood packed in water. Unsalted nuts and unsalted nut butters. Tofu. Dried beans and pea. Eggs.  Dairy  Low-fat or fat-free milk, yogurt, and cheeses.  Sweets and Desserts  Frozen desserts made from low-fat milk.  Fats and Oils  Olive, peanut, and canola oils and margarine. Salad dressing and mayonnaise made from these oils.  Other  Soups and casseroles made from allowed ingredients and without added fat or salt.  The items listed above may not be a complete list of recommended foods or beverages. Contact your dietitian for more options.  What foods are not recommended?  Grains  Sweetened, low-fiber cereals. Packaged baked goods. Snack crackers and chips. Cheese crackers, butter crackers, and biscuits. Frozen waffles, sweet breads, doughnuts, pastries, packaged baking mixes, pancakes, cakes, and cookies.  Vegetables  Regular canned or frozen vegetables or vegetables prepared with salt.  Canned tomatoes. Canned tomato sauce. Fried vegetables. Vegetables in cream sauce or cheese sauce.  Fruits  Fruits packed in syrup or made with added sugar.  Meats and Other Protein Sources  Marbled or fatty meats such as ribs. Poultry with skin. Fried meats, poultry, eggs, or fish. Sausages, hot dogs, and deli meats such as pastrami, bologna, or salami.  Dairy  Whole milk, cream, cheeses made from whole milk, sour cream. Ice cream or yogurt made from whole milk or with added sugar.  Beverages  For adults, no more than one alcoholic drink per day. Regular soft drinks or other sugary beverages. Juice drinks.  Sweets and Desserts  Sugary or fatty desserts, candy, and other sweets.  Fats and Oils  Solid shortening or partially hydrogenated oils. Solid margarine. Margarine that contains trans fats. Butter.  The items listed above may not be a complete list of foods and beverages to avoid. Contact your dietitian for more information.  This information is not intended to replace advice given to you by your health care provider. Make sure you discuss any questions you have with your health care provider.  Document Released: 01/06/2009 Document Revised: 05/25/2017 Document Reviewed: 11/26/2014  [employer] Interactive Patient Education © 2018 [employer]     Calorie Counting for Weight Loss  Calories are units of energy. Your body needs a certain amount of calories from food to keep you going throughout the day. When you eat more calories than your body needs, your body stores the extra calories as fat. When you eat fewer calories than your body needs, your body burns fat to get the energy it needs.  Calorie counting means keeping track of how many calories you eat and drink each day. Calorie counting can be helpful if you need to lose weight. If you make sure to eat fewer calories than your body needs, you should lose weight. Ask your health care provider what a healthy weight is for you.  For calorie counting to work, you  will need to eat the right number of calories in a day in order to lose a healthy amount of weight per week. A dietitian can help you determine how many calories you need in a day and will give you suggestions on how to reach your calorie goal.  · A healthy amount of weight to lose per week is usually 1-2 lb (0.5-0.9 kg). This usually means that your daily calorie intake should be reduced by 500-750 calories.  · Eating 1,200 - 1,500 calories per day can help most women lose weight.  · Eating 1,500 - 1,800 calories per day can help most men lose weight.    What do I need to know about calorie counting?  In order to meet your daily calorie goal, you will need to:  · Find out how many calories are in each food you would like to eat. Try to do this before you eat.  · Decide how much of the food you plan to eat.  · Write down what you ate and how many calories it had. Doing this is called keeping a food log.    To successfully lose weight, it is important to balance calorie counting with a healthy lifestyle that includes regular activity. Aim for 150 minutes of moderate exercise (such as walking) or 75 minutes of vigorous exercise (such as running) each week.  Where do I find calorie information?    The number of calories in a food can be found on a Nutrition Facts label. If a food does not have a Nutrition Facts label, try to look up the calories online or ask your dietitian for help.  Remember that calories are listed per serving. If you choose to have more than one serving of a food, you will have to multiply the calories per serving by the amount of servings you plan to eat. For example, the label on a package of bread might say that a serving size is 1 slice and that there are 90 calories in a serving. If you eat 1 slice, you will have eaten 90 calories. If you eat 2 slices, you will have eaten 180 calories.  How do I keep a food log?  Immediately after each meal, record the following information in your food  "log:  · What you ate. Don't forget to include toppings, sauces, and other extras on the food.  · How much you ate. This can be measured in cups, ounces, or number of items.  · How many calories each food and drink had.  · The total number of calories in the meal.    Keep your food log near you, such as in a small notebook in your pocket, or use a mobile ivone or website. Some programs will calculate calories for you and show you how many calories you have left for the day to meet your goal.  What are some calorie counting tips?  · Use your calories on foods and drinks that will fill you up and not leave you hungry:  ? Some examples of foods that fill you up are nuts and nut butters, vegetables, lean proteins, and high-fiber foods like whole grains. High-fiber foods are foods with more than 5 g fiber per serving.  ? Drinks such as sodas, specialty coffee drinks, alcohol, and juices have a lot of calories, yet do not fill you up.  · Eat nutritious foods and avoid empty calories. Empty calories are calories you get from foods or beverages that do not have many vitamins or protein, such as candy, sweets, and soda. It is better to have a nutritious high-calorie food (such as an avocado) than a food with few nutrients (such as a bag of chips).  · Know how many calories are in the foods you eat most often. This will help you calculate calorie counts faster.  · Pay attention to calories in drinks. Low-calorie drinks include water and unsweetened drinks.  · Pay attention to nutrition labels for \"low fat\" or \"fat free\" foods. These foods sometimes have the same amount of calories or more calories than the full fat versions. They also often have added sugar, starch, or salt, to make up for flavor that was removed with the fat.  · Find a way of tracking calories that works for you. Get creative. Try different apps or programs if writing down calories does not work for you.  What are some portion control tips?  · Know how many " calories are in a serving. This will help you know how many servings of a certain food you can have.  · Use a measuring cup to measure serving sizes. You could also try weighing out portions on a kitchen scale. With time, you will be able to estimate serving sizes for some foods.  · Take some time to put servings of different foods on your favorite plates, bowls, and cups so you know what a serving looks like.  · Try not to eat straight from a bag or box. Doing this can lead to overeating. Put the amount you would like to eat in a cup or on a plate to make sure you are eating the right portion.  · Use smaller plates, glasses, and bowls to prevent overeating.  · Try not to multitask (for example, watch TV or use your computer) while eating. If it is time to eat, sit down at a table and enjoy your food. This will help you to know when you are full. It will also help you to be aware of what you are eating and how much you are eating.  What are tips for following this plan?  Reading food labels  · Check the calorie count compared to the serving size. The serving size may be smaller than what you are used to eating.  · Check the source of the calories. Make sure the food you are eating is high in vitamins and protein and low in saturated and trans fats.  Shopping  · Read nutrition labels while you shop. This will help you make healthy decisions before you decide to purchase your food.  · Make a grocery list and stick to it.  Cooking  · Try to cook your favorite foods in a healthier way. For example, try baking instead of frying.  · Use low-fat dairy products.  Meal planning  · Use more fruits and vegetables. Half of your plate should be fruits and vegetables.  · Include lean proteins like poultry and fish.  How do I count calories when eating out?  · Ask for smaller portion sizes.  · Consider sharing an entree and sides instead of getting your own entree.  · If you get your own entree, eat only half. Ask for a box at the  beginning of your meal and put the rest of your entree in it so you are not tempted to eat it.  · If calories are listed on the menu, choose the lower calorie options.  · Choose dishes that include vegetables, fruits, whole grains, low-fat dairy products, and lean protein.  · Choose items that are boiled, broiled, grilled, or steamed. Stay away from items that are buttered, battered, fried, or served with cream sauce. Items labeled “crispy” are usually fried, unless stated otherwise.  · Choose water, low-fat milk, unsweetened iced tea, or other drinks without added sugar. If you want an alcoholic beverage, choose a lower calorie option such as a glass of wine or light beer.  · Ask for dressings, sauces, and syrups on the side. These are usually high in calories, so you should limit the amount you eat.  · If you want a salad, choose a garden salad and ask for grilled meats. Avoid extra toppings like terry, cheese, or fried items. Ask for the dressing on the side, or ask for olive oil and vinegar or lemon to use as dressing.  · Estimate how many servings of a food you are given. For example, a serving of cooked rice is ½ cup or about the size of half a baseball. Knowing serving sizes will help you be aware of how much food you are eating at restaurants. The list below tells you how big or small some common portion sizes are based on everyday objects:  ? 1 oz--4 stacked dice.  ? 3 oz--1 deck of cards.  ? 1 tsp--1 die.  ? 1 Tbsp--½ a ping-pong ball.  ? 2 Tbsp--1 ping-pong ball.  ? ½ cup--½ baseball.  ? 1 cup--1 baseball.  Summary  · Calorie counting means keeping track of how many calories you eat and drink each day. If you eat fewer calories than your body needs, you should lose weight.  · A healthy amount of weight to lose per week is usually 1-2 lb (0.5-0.9 kg). This usually means reducing your daily calorie intake by 500-750 calories.  · The number of calories in a food can be found on a Nutrition Facts label. If a  food does not have a Nutrition Facts label, try to look up the calories online or ask your dietitian for help.  · Use your calories on foods and drinks that will fill you up, and not on foods and drinks that will leave you hungry.  · Use smaller plates, glasses, and bowls to prevent overeating.  This information is not intended to replace advice given to you by your health care provider. Make sure you discuss any questions you have with your health care provider.  Document Released: 12/18/2006 Document Revised: 11/17/2017 Document Reviewed: 11/17/2017  [employer] Interactive Patient Education © 2018 [employer]     Exercising to Lose Weight  Exercising can help you to lose weight. In order to lose weight through exercise, you need to do vigorous-intensity exercise. You can tell that you are exercising with vigorous intensity if you are breathing very hard and fast and cannot hold a conversation while exercising.  Moderate-intensity exercise helps to maintain your current weight. You can tell that you are exercising at a moderate level if you have a higher heart rate and faster breathing, but you are still able to hold a conversation.  How often should I exercise?  Choose an activity that you enjoy and set realistic goals. Your health care provider can help you to make an activity plan that works for you. Exercise regularly as directed by your health care provider. This may include:  · Doing resistance training twice each week, such as:  ? Push-ups.  ? Sit-ups.  ? Lifting weights.  ? Using resistance bands.  · Doing a given intensity of exercise for a given amount of time. Choose from these options:  ? 150 minutes of moderate-intensity exercise every week.  ? 75 minutes of vigorous-intensity exercise every week.  ? A mix of moderate-intensity and vigorous-intensity exercise every week.    Children, pregnant women, people who are out of shape, people who are overweight, and older adults may need to consult a health  care provider for individual recommendations. If you have any sort of medical condition, be sure to consult your health care provider before starting a new exercise program.  What are some activities that can help me to lose weight?  · Walking at a rate of at least 4.5 miles an hour.  · Jogging or running at a rate of 5 miles per hour.  · Biking at a rate of at least 10 miles per hour.  · Lap swimming.  · Roller-skating or in-line skating.  · Cross-country skiing.  · Vigorous competitive sports, such as football, basketball, and soccer.  · Jumping rope.  · Aerobic dancing.  How can I be more active in my day-to-day activities?  · Use the stairs instead of the elevator.  · Take a walk during your lunch break.  · If you drive, park your car farther away from work or school.  · If you take public transportation, get off one stop early and walk the rest of the way.  · Make all of your phone calls while standing up and walking around.  · Get up, stretch, and walk around every 30 minutes throughout the day.  What guidelines should I follow while exercising?  · Do not exercise so much that you hurt yourself, feel dizzy, or get very short of breath.  · Consult your health care provider prior to starting a new exercise program.  · Wear comfortable clothes and shoes with good support.  · Drink plenty of water while you exercise to prevent dehydration or heat stroke. Body water is lost during exercise and must be replaced.  · Work out until you breathe faster and your heart beats faster.  This information is not intended to replace advice given to you by your health care provider. Make sure you discuss any questions you have with your health care provider.  Document Released: 01/20/2012 Document Revised: 05/25/2017 Document Reviewed: 05/21/2015  [employer] Interactive Patient Education © 2018 [employer]

## 2019-02-25 ENCOUNTER — DOCUMENTATION (OUTPATIENT)
Dept: CT IMAGING | Facility: HOSPITAL | Age: 74
End: 2019-02-25

## 2019-02-25 NOTE — PROGRESS NOTES
Regarding recommended follow CT chest for nodules: Per email communication from Teri Peacock- provider contacted 1-14-19, patient lost to follow-up.

## 2019-03-12 ENCOUNTER — HOSPITAL ENCOUNTER (OUTPATIENT)
Dept: GENERAL RADIOLOGY | Facility: HOSPITAL | Age: 74
Discharge: HOME | End: 2019-03-12
Admitting: INTERNAL MEDICINE

## 2019-03-12 ENCOUNTER — TRANSCRIBE ORDERS (OUTPATIENT)
Dept: ADMINISTRATIVE | Facility: HOSPITAL | Age: 74
End: 2019-03-12

## 2019-03-12 DIAGNOSIS — M25.552 PAIN IN LEFT HIP: Primary | ICD-10-CM

## 2019-03-12 DIAGNOSIS — Z78.0 POST-MENOPAUSAL: ICD-10-CM

## 2019-03-12 DIAGNOSIS — Z12.39 SCREENING BREAST EXAMINATION: Primary | ICD-10-CM

## 2019-03-12 PROCEDURE — 73502 X-RAY EXAM HIP UNI 2-3 VIEWS: CPT

## 2019-03-28 ENCOUNTER — CLINICAL SUPPORT (OUTPATIENT)
Dept: CARDIOLOGY | Facility: CLINIC | Age: 74
End: 2019-03-28

## 2019-03-28 DIAGNOSIS — R55 SYNCOPE AND COLLAPSE: ICD-10-CM

## 2019-03-28 DIAGNOSIS — Z95.818 STATUS POST PLACEMENT OF IMPLANTABLE LOOP RECORDER: Primary | ICD-10-CM

## 2019-03-28 PROCEDURE — 93299 PR REM INTERROG ICPMS/SCRMS <30 D TECH REVIEW: CPT | Performed by: INTERNAL MEDICINE

## 2019-03-28 PROCEDURE — 93298 REM INTERROG DEV EVAL SCRMS: CPT | Performed by: INTERNAL MEDICINE

## 2019-04-03 ENCOUNTER — TELEPHONE (OUTPATIENT)
Dept: CARDIOLOGY | Facility: CLINIC | Age: 74
End: 2019-04-03

## 2019-04-03 NOTE — TELEPHONE ENCOUNTER
PT NEEDS CLEARANCE FOR CATARACT SURGERY. SHE WILL BE HAVING THIS AT [facility]. THEY DO NOT NEED TO STOP BLOOD THINNERS.    PT LOV 8/2018 - PT HAS A LOOP (PLACED 2017) & CAD    PLEASE ADVISE

## 2019-04-19 ENCOUNTER — CLINICAL SUPPORT NO REQUIREMENTS (OUTPATIENT)
Dept: CARDIOLOGY | Facility: CLINIC | Age: 74
End: 2019-04-19

## 2019-04-19 ENCOUNTER — OFFICE VISIT (OUTPATIENT)
Dept: CARDIOLOGY | Facility: CLINIC | Age: 74
End: 2019-04-19

## 2019-04-19 VITALS
DIASTOLIC BLOOD PRESSURE: 70 MMHG | HEIGHT: 60 IN | SYSTOLIC BLOOD PRESSURE: 110 MMHG | WEIGHT: 183 LBS | BODY MASS INDEX: 35.93 KG/M2 | HEART RATE: 75 BPM | OXYGEN SATURATION: 98 %

## 2019-04-19 DIAGNOSIS — R55 SYNCOPE AND COLLAPSE: ICD-10-CM

## 2019-04-19 DIAGNOSIS — Z95.5 STENTED CORONARY ARTERY: ICD-10-CM

## 2019-04-19 DIAGNOSIS — Z79.01 ANTICOAGULATED: ICD-10-CM

## 2019-04-19 DIAGNOSIS — Z95.818 STATUS POST PLACEMENT OF IMPLANTABLE LOOP RECORDER: Primary | ICD-10-CM

## 2019-04-19 DIAGNOSIS — I25.119 CORONARY ARTERY DISEASE INVOLVING NATIVE CORONARY ARTERY OF NATIVE HEART WITH ANGINA PECTORIS (HCC): ICD-10-CM

## 2019-04-19 DIAGNOSIS — K21.9 GASTROESOPHAGEAL REFLUX DISEASE, ESOPHAGITIS PRESENCE NOT SPECIFIED: ICD-10-CM

## 2019-04-19 DIAGNOSIS — E04.1 THYROID NODULE: ICD-10-CM

## 2019-04-19 DIAGNOSIS — K21.9 LARYNGOPHARYNGEAL REFLUX (LPR): ICD-10-CM

## 2019-04-19 DIAGNOSIS — D49.0 PAROTID NEOPLASM: ICD-10-CM

## 2019-04-19 DIAGNOSIS — T79.6XXS TRAUMATIC RHABDOMYOLYSIS, SEQUELA: Primary | ICD-10-CM

## 2019-04-19 DIAGNOSIS — R94.8 ABNORMAL POSITRON EMISSION TOMOGRAPHY (PET) SCAN: ICD-10-CM

## 2019-04-19 DIAGNOSIS — I10 ESSENTIAL HYPERTENSION: ICD-10-CM

## 2019-04-19 PROCEDURE — 99214 OFFICE O/P EST MOD 30 MIN: CPT | Performed by: INTERNAL MEDICINE

## 2019-04-19 PROCEDURE — 93000 ELECTROCARDIOGRAM COMPLETE: CPT | Performed by: INTERNAL MEDICINE

## 2019-04-19 RX ORDER — PANTOPRAZOLE SODIUM 40 MG/1
40 TABLET, DELAYED RELEASE ORAL
COMMUNITY
Start: 2016-08-15 | End: 2019-09-12

## 2019-04-19 NOTE — PROGRESS NOTES
Josi Mohamud  [id number]  1945  73 y.o.  female    Referring Provider: Jeremías Hernandez APRN    Reason for  Visit: Here for routine follow up   Prior syncope    Subjective    Mild chronic exertional shortness of breath on exertion relieved with rest  No significant cough or wheezing  Going on for several months or longer    Occasional palpitations, once every several weeks lasting for less than 1 minute  Associated symptoms of dizziness, weakness,   No chest pain or excessive shortness of breath      No associated chest pain  No significant pedal edema    No fever or chills  No significant expectoration    No hemoptysis  Prior syncope x 2 , woke up on floor  Has LINQ, no bradycardia or pauses  CT negative for cerebrovascular accident   No receeding palpitations, no incontinence of urine or stools, no preceeding chest pain. No aura.  No seizure like activity     Repeat LINQ check today shows no significant arrhythmia    Abnormal PET scan due for follow up Dr Noel      History of present illness:  Josi Mohamud is a 73 y.o. yo female with history of Essential Hypertension   Hyperlipidemia  who presents today for   Chief Complaint   Patient presents with   • Coronary Artery Disease     8 mo f/u   • Loss of Consciousness   .    History  Past Medical History:   Diagnosis Date   • Cancer (CMS/HCC)     HODGKINS LYMPHOMA   • COPD (chronic obstructive pulmonary disease) (CMS/HCC)    • Coronary artery disease    • History of transfusion    • Hyperlipidemia    • Hypertension    ,   Past Surgical History:   Procedure Laterality Date   • APPENDECTOMY     • APPENDECTOMY     • CARDIAC CATHETERIZATION     • CARDIAC ELECTROPHYSIOLOGY PROCEDURE N/A 5/23/2017    Procedure: Loop insertion; LINQ;  Surgeon: Jose Monteiro MD;  Location: [facility] CATH INVASIVE LOCATION;  Service:    • CHOLECYSTECTOMY     • CORONARY ANGIOPLASTY WITH STENT PLACEMENT      X 1    • LIVER BIOPSY     • SPLENECTOMY     • US GUIDED FINE NEEDLE  ASPIRATION  2018   ,   Family History   Problem Relation Age of Onset   • Esophageal cancer Brother    • Lung cancer Brother    • Breast cancer Neg Hx    ,   Social History     Tobacco Use   • Smoking status: Former Smoker     Packs/day: 0.50     Years: 57.00     Pack years: 28.50     Types: Cigarettes     Last attempt to quit: 2019     Years since quittin.2   • Smokeless tobacco: Never Used   Substance Use Topics   • Alcohol use: No   • Drug use: No   ,     Medications  Current Outpatient Medications   Medication Sig Dispense Refill   • albuterol (PROVENTIL HFA;VENTOLIN HFA) 108 (90 BASE) MCG/ACT inhaler Inhale 1 puff Daily.     • amLODIPine (NORVASC) 5 MG tablet Take 5 mg by mouth Daily.     • aspirin 81 MG EC tablet Take 81 mg by mouth Daily.     • calcium citrate-vitamin d (CALCITRATE) 315-250 MG-UNIT tablet tablet Take 1 tablet by mouth Daily.     • clopidogrel (PLAVIX) 75 MG tablet Take 75 mg by mouth Daily.     • fluticasone-salmeterol (ADVAIR) 250-50 MCG/DOSE DISKUS Inhale 1 puff 2 (Two) Times a Day.     • furosemide (LASIX) 20 MG tablet Take 20 mg by mouth Daily As Needed (for swelling).     • guaiFENesin (MUCINEX) 600 MG 12 hr tablet Take 2 tablets by mouth 2 (Two) Times a Day As Needed for Cough. 15 tablet 0   • lisinopril (PRINIVIL,ZESTRIL) 20 MG tablet Take 20 mg by mouth Daily.     • metoprolol succinate XL (TOPROL-XL) 50 MG 24 hr tablet Take 50 mg by mouth Daily.     • nitroglycerin (NITROSTAT) 0.4 MG SL tablet Place 0.4 mg under the tongue Every 5 (Five) Minutes As Needed for Chest Pain. Take no more than 3 doses in 15 minutes.     • pantoprazole (PROTONIX) 40 MG EC tablet Take 40 mg by mouth.     • potassium chloride (K-DUR) 10 MEQ CR tablet Take 10 mEq by mouth 2 (Two) Times a Day.     • simvastatin (ZOCOR) 20 MG tablet Take 20 mg by mouth Daily.     • tiotropium (SPIRIVA) 18 MCG per inhalation capsule Place 1 capsule into inhaler and inhale Daily.       No current facility-administered  "medications for this visit.        Allergies:  Penicillins    Review of Systems  Review of Systems   Constitution: Positive for weakness and malaise/fatigue.   HENT: Negative.    Eyes: Negative.    Cardiovascular: Positive for dyspnea on exertion. Negative for chest pain, claudication, cyanosis, irregular heartbeat, leg swelling, near-syncope, orthopnea, palpitations, paroxysmal nocturnal dyspnea and syncope.   Respiratory: Negative.    Endocrine: Negative.    Hematologic/Lymphatic: Negative.    Skin: Negative.    Musculoskeletal: Positive for arthritis, joint pain and stiffness.   Gastrointestinal: Negative for anorexia.   Genitourinary: Negative.    Psychiatric/Behavioral: Negative.        Objective     Physical Exam:  /70   Pulse 75   Ht 152.4 cm (60\")   Wt 83 kg (183 lb)   SpO2 98%   BMI 35.74 kg/m²     Physical Exam   Constitutional: She appears well-developed.   HENT:   Head: Normocephalic.   Neck: Normal carotid pulses and no JVD present. No tracheal tenderness present. Carotid bruit is not present. No tracheal deviation and no edema present.   Cardiovascular: Regular rhythm and normal pulses.   Murmur heard.   Systolic murmur is present with a grade of 2/6.  Pulmonary/Chest: Effort normal. No stridor.   Abdominal: Soft.   Neurological: She is alert. She has normal strength. No cranial nerve deficit or sensory deficit.   Skin: Skin is warm.   Psychiatric: She has a normal mood and affect. Her speech is normal and behavior is normal.       Results Review:       ECG 12 Lead  Date/Time: 4/19/2019 11:48 AM  Performed by: Jose Monteiro MD  Authorized by: Jose Monteiro MD   Comparison: compared with previous ECG from 1/22/2019  Similar to previous ECG  Rhythm: sinus rhythm  Rate: normal  Conduction: left bundle branch block    Clinical impression: abnormal EKG            Assessment/Plan   Josi was seen today for coronary artery disease and loss of consciousness.    Diagnoses and all orders for this " visit:    Traumatic rhabdomyolysis, sequela    Thyroid nodule    Syncope and collapse    Stented coronary artery    Parotid neoplasm    Laryngopharyngeal reflux (LPR)    Gastroesophageal reflux disease, esophagitis presence not specified    Essential hypertension    Coronary artery disease involving native coronary artery of native heart with angina pectoris (CMS/HCC)    Anticoagulated    Abnormal positron emission tomography (PET) scan    Other orders  -     ECG 12 Lead           Plan    Monitor cardiac rhythm device LINQ function regularly per established schedule or PRN     ____________________________________________________________________________________________________________________________________________  Health maintenance and recommendations      Low salt/ HTN/ Heart healthy carbohydrate restricted cardiac diet   The patient is advised to reduce or avoid caffeine or other cardiac stimulants.     Minimize or avoid  NSAID-type medications        Monitor for any signs of bleeding including red or dark stools. Fall precautions.        Advised staying uptodate with immunizations per established standard guidelines.      Offered to give patient  a copy of my notes       Questions were encouraged, asked and answered to the patient's  understanding and satisfaction. Questions if any regarding current medications and side effects, need for refills and importance of compliance to medications stressed.    Reviewed available prior notes, consults, prior visits, laboratory findings, radiology and cardiology relevant reports. Updated chart as applicable. I have reviewed the patient's medical history in detail and updated the computerized patient record as relevant.      Updated patient regarding any new or relevant abnormalities on review of records or any new findings on physical exam. Mentioned to patient about purpose of visit and desirable health short and long term goals and objectives.    Primary to monitor CBC  CMP Lipid panel and TSH as applicable    ___________________________________________________________________________________________________________________________________________          Return in about 3 months (around 7/19/2019).

## 2019-04-22 ENCOUNTER — TELEPHONE (OUTPATIENT)
Dept: PULMONOLOGY | Facility: CLINIC | Age: 74
End: 2019-04-22

## 2019-04-22 DIAGNOSIS — Z87.891 PERSONAL HISTORY OF TOBACCO USE, PRESENTING HAZARDS TO HEALTH: Primary | ICD-10-CM

## 2019-04-22 NOTE — TELEPHONE ENCOUNTER
She needs a f/u LDCT of the chest. I've looked through Epic and it appears that she has never seen ENT though we referred her there due to findings on the PET from last year....she no showed that appointment. She also has no f/u appointment with me or Dr. Frazier? I guess we first need to determine if the patient even wants this scan and if so, she needs an appointment.

## 2019-04-22 NOTE — TELEPHONE ENCOUNTER
Lidia, Navigation Nurse at [facility] said pt is due for follow scan.  She last had a PET scan done 07/19/18 but canceled her OV 07/31/19 and was to call back to reschedule according to last TE in [facility]. What scan needs to be ordered for her?  She said she was due in June.

## 2019-04-23 NOTE — TELEPHONE ENCOUNTER
I'll order it for June. Schedule her to see Dr. Frazier at the end of June or early July when he's back from vacation please.

## 2019-04-23 NOTE — TELEPHONE ENCOUNTER
PT DOES WANTS TO PROCEED WITH LDCT.  WILL YOU PLACE AN ORDER?  DO YOU WANT HER SCHEDULED BACK WITH YOU OR DR. MATUTE?

## 2019-04-24 NOTE — TELEPHONE ENCOUNTER
Spoke to pt.  I informed her that I will get her scheduled for her LDCT the end of June and make her a follow up appt with Dr. Frazier on 07/08/19 at 3:30 pm.  Appt reminder mailed to pt.

## 2019-05-05 NOTE — PROGRESS NOTES
I have reviewed the notes, assessments, and/or procedures performed by  Eliz Cohen RN, I concur with her  documentation of Josi Mohamud.

## 2019-05-30 ENCOUNTER — TELEPHONE (OUTPATIENT)
Dept: CARDIOLOGY | Facility: CLINIC | Age: 74
End: 2019-05-30

## 2019-05-30 NOTE — TELEPHONE ENCOUNTER
SPOKE WITH PATIENT ABOUT RESETTING HER CARELINK MONITOR. SHE VERBALLY AGREED TO SEND IN A MANUAL TRANSMISSION.

## 2019-05-31 ENCOUNTER — CLINICAL SUPPORT (OUTPATIENT)
Dept: CARDIOLOGY | Facility: CLINIC | Age: 74
End: 2019-05-31
Payer: MEDICARE

## 2019-05-31 DIAGNOSIS — R55 SYNCOPE AND COLLAPSE: ICD-10-CM

## 2019-05-31 PROCEDURE — 93298 REM INTERROG DEV EVAL SCRMS: CPT | Performed by: PHYSICIAN ASSISTANT

## 2019-06-03 PROBLEM — I48.92 ATRIAL FLUTTER WITH RAPID VENTRICULAR RESPONSE (HCC): Status: ACTIVE | Noted: 2019-06-03

## 2019-06-03 NOTE — PROGRESS NOTES
Linq Report- [facility]    June 2, 2019    Primary Cardiologist:  Thania  Reason for implant:  syncope  Battery:  ok  Events:  1 episode of AF noted.  No strips available to correlate.  Changes:  n/a    Follow up:  1 month

## 2019-06-03 NOTE — PROGRESS NOTES
I have reviewed the notes, assessments, and/or procedures performed by  Kellie Kim PA-C  , I concur with her  documentation  of Josi Mohamud.

## 2019-06-11 ENCOUNTER — OFFICE VISIT (OUTPATIENT)
Dept: NEUROLOGY | Facility: CLINIC | Age: 74
End: 2019-06-11

## 2019-06-11 ENCOUNTER — TRANSCRIBE ORDERS (OUTPATIENT)
Dept: ADMINISTRATIVE | Facility: HOSPITAL | Age: 74
End: 2019-06-11

## 2019-06-11 ENCOUNTER — DOCUMENTATION (OUTPATIENT)
Dept: CARDIOLOGY | Facility: CLINIC | Age: 74
End: 2019-06-11

## 2019-06-11 VITALS
SYSTOLIC BLOOD PRESSURE: 130 MMHG | DIASTOLIC BLOOD PRESSURE: 80 MMHG | HEIGHT: 60 IN | RESPIRATION RATE: 18 BRPM | BODY MASS INDEX: 37.3 KG/M2 | WEIGHT: 190 LBS | HEART RATE: 84 BPM

## 2019-06-11 DIAGNOSIS — R40.4 EPISODE OF ALTERED CONSCIOUSNESS: Primary | ICD-10-CM

## 2019-06-11 DIAGNOSIS — R26.89 IMBALANCE: ICD-10-CM

## 2019-06-11 DIAGNOSIS — R06.02 SHORTNESS OF BREATH: ICD-10-CM

## 2019-06-11 DIAGNOSIS — R91.1 PULMONARY NODULE: Primary | ICD-10-CM

## 2019-06-11 PROCEDURE — 99204 OFFICE O/P NEW MOD 45 MIN: CPT | Performed by: PSYCHIATRY & NEUROLOGY

## 2019-06-11 RX ORDER — DIPHENHYDRAMINE HCL 25 MG
25 CAPSULE ORAL EVERY 6 HOURS PRN
Status: ON HOLD | COMMUNITY
End: 2020-03-09

## 2019-06-11 RX ORDER — LORAZEPAM 2 MG/1
1 TABLET ORAL 3 TIMES DAILY PRN
COMMUNITY
End: 2020-08-20 | Stop reason: HOSPADM

## 2019-06-11 NOTE — PROGRESS NOTES
Subjective   Josi Mohamud, 1945, is a female who is being seen today for   Chief Complaint   Patient presents with   • Loss of Consciousness       HISTORY OF PRESENT ILLNESS: Patient seen for episodes of syncope.  Patient had first episode in April 2017.  Patient had most recent episode in January 2019.  She had no warning.  She was sitting in chair and ended up on the floor.  She lives by herself and apparently got to the emergency room where she was in rhabdomyolysis.  CT head scan done at that time noncontributory.  On the first event April 2017 patient had an MRI of the brain showing no acute process but some white matter degeneration.  Patient had noninvasive carotids at that time noncontributory.  Patient had a month after the April 2017 event a Spectraseis recorder placed by Dr. Monteiro and Zofia at Dr. Monteiro office has reviewed this and is MRI compatible.  Patient on the second event had bladder and bowel incontinence but no tongue biting.  Patient was checked several years ago for sleep apnea and did not have that according to her history but has COPD and shortness of breath.  Patient has a cardiac stent placed several years ago.  Patient with the last event fractured her right foot and was put in a shoe which she does not wear consistently and apparently Dr. Prasad follows that.  CPK is not been checked as far as I can determine since hospitalization.  Patient has had a recent CBC and CMP noncontributory lipid profile and TSH also noncontributory.  Patient is on aspirin and Plavix.  Patient describes some occipital type headaches frequently taking care of by Tylenol    REVIEW OF SYSTEMS:   GENERAL: Blood pressure today is 130/80 left arm seated in same standing with pulse 84 and regular  PULMONARY: As above  CVS: Patient had chest pain for 5 minutes 3 weeks ago and did not seek any medical help for that.  GASTROINTESTINAL: No acute GI distress  GENITOURINARY: No acute  distress  GYN: No acute GYN  distress  MUSCULOSKELETAL: As above  HEENT: No acute vision or hearing change.  Patient had cataract surgery recently both eyes  ENDOCRINE: Patient not diabetic  PSYCHIATRIC: No acute psychiatric symptoms  HEMATOLOGY: No anemia   SKIN: No acute skin changes  Family history reviewed and noncontributory.  No history of seizures  Social history: Patient denies smoking or drinking alcohol or use of drugs    PHYSICAL EXAMINATION:    GENERAL: No acute distress.  Patient with her shoes off makes a noise walking with her right foot being flat on the floor with suction sound  CRANIUM: Normocephalic/atraumatic  HEENT:        EYES: EOMs intact without nystagmus and fields full to confrontation.  No acute fundic abnormalities and pupils equal round reactive to light.       EARS: Tympanic membranes normal and hears tuning fork bilaterally but says left ear less than right and feels like the left ear is full       THROAT: No oropharynx abnormalities         NECK: No bruit/no lymphadenopathy  CHEST: No acute cardiopulmonary abnormalities by auscultation  ABDOMEN: Nondistended  EXTREMITIES: Pulses symmetrical  NEURO: Patient alert and follows commands without difficulty  SPEECH: Normal    CRANIAL NERVES: Motor/sensory about the face normal and symmetric    MOTOR STRENGTH: Motor strength upper and lower extremities normal  STATION AND GAIT: Gait is wide-based and patient tends to use a rolling walker at times.  Romberg negative  CEREBELLAR: Finger-nose and heel-to-shin normal  SENSORY: Patient has decreased pin and vibration distal proximal lower extremities ankles bilaterally otherwise normal pin and vibration throughout  REFLEXES: Present but decreased reflexes throughout upper and lower extremities no clonus or Babinski      ASSESSMENT AND PLAN: Patient with episodes of loss of consciousness.  Patient does drive.  Patient warned about driving precautions and other seizures/safety precautions.  Patient is to get MRI brain and MRA  brain noting the link recorder (again which by Dr. Monteiro office is compatible to MRI).  Patient to get EEG.  Patient to get noninvasive carotids and further blood work.  Patient to get overnight continuous oximetry on room air.Patient's Body mass index is 37.11 kg/m². BMI is above normal parameters. Recommendations include: referral to primary care.      Josi was seen today for loss of consciousness.    Diagnoses and all orders for this visit:    Episode of altered consciousness  -     EEG; Future  -     Magnesium; Future  -     Sedimentation Rate; Future  -     T4, Free; Future  -     Vitamin B12; Future  -     Folate; Future  -     MRI Angiogram Head Without Contrast; Future  -     MRI Brain Without Contrast; Future  -     CK; Future    Imbalance  -     US Carotid Bilateral; Future    Shortness of breath  -     Overnight Sleep Oximetry Study; Future    Other orders  -     Cancel: CBC & Differential; Future  -     Cancel: Comprehensive Metabolic Panel; Future  -     Cancel: Lipid Panel; Future        Dictated utilizing Dragon voice recognition software

## 2019-06-11 NOTE — PROGRESS NOTES
Olga from Dr. Glasgow office called inquiring about patient's implanted LINQ monitor and asked if it was MRI compatible.  Per [employer] guidelines, the Reveal LINQ [id number] is MRI compatible.  Both Olga and Dr. Glasgow provided with this information.

## 2019-06-11 NOTE — PATIENT INSTRUCTIONS
Driving precautions.  Patient to be taking showers not baths.  Patient not to be climbing or working at heights are working with sharp cutting tools.  Patient not to be swimming by self or getting in hot tub by self.  Patient not be working over hot fire/stove/water.  Patient to get with PCP about weight and diet controlled

## 2019-06-13 ENCOUNTER — HOSPITAL ENCOUNTER (OUTPATIENT)
Dept: CT IMAGING | Facility: HOSPITAL | Age: 74
Discharge: HOME | End: 2019-06-13
Admitting: PHYSICIAN ASSISTANT

## 2019-06-13 DIAGNOSIS — R91.1 PULMONARY NODULE: ICD-10-CM

## 2019-06-13 PROCEDURE — 71250 CT THORAX DX C-: CPT

## 2019-06-17 ENCOUNTER — TRANSCRIBE ORDERS (OUTPATIENT)
Dept: ADMINISTRATIVE | Facility: HOSPITAL | Age: 74
End: 2019-06-17

## 2019-06-17 DIAGNOSIS — E04.1 THYROID NODULE: Primary | ICD-10-CM

## 2019-06-20 ENCOUNTER — HOSPITAL ENCOUNTER (OUTPATIENT)
Dept: ULTRASOUND IMAGING | Facility: HOSPITAL | Age: 74
Discharge: HOME | End: 2019-06-20
Admitting: PHYSICIAN ASSISTANT

## 2019-06-20 DIAGNOSIS — E04.1 THYROID NODULE: ICD-10-CM

## 2019-06-20 PROCEDURE — 76536 US EXAM OF HEAD AND NECK: CPT

## 2019-06-28 ENCOUNTER — HOSPITAL ENCOUNTER (OUTPATIENT)
Dept: CT IMAGING | Facility: HOSPITAL | Age: 74
End: 2019-06-28

## 2019-06-28 ENCOUNTER — APPOINTMENT (OUTPATIENT)
Dept: MRI IMAGING | Facility: HOSPITAL | Age: 74
End: 2019-06-28

## 2019-06-28 ENCOUNTER — HOSPITAL ENCOUNTER (OUTPATIENT)
Dept: NEUROLOGY | Facility: HOSPITAL | Age: 74
Discharge: HOME | End: 2019-06-28
Admitting: PSYCHIATRY & NEUROLOGY

## 2019-06-28 DIAGNOSIS — R40.4 EPISODE OF ALTERED CONSCIOUSNESS: ICD-10-CM

## 2019-06-28 PROCEDURE — 95816 EEG AWAKE AND DROWSY: CPT

## 2019-06-28 PROCEDURE — 95813 EEG EXTND MNTR 61-119 MIN: CPT | Performed by: PSYCHIATRY & NEUROLOGY

## 2019-07-01 ENCOUNTER — HOSPITAL ENCOUNTER (OUTPATIENT)
Dept: ULTRASOUND IMAGING | Facility: HOSPITAL | Age: 74
Discharge: HOME | End: 2019-07-01

## 2019-07-01 ENCOUNTER — HOSPITAL ENCOUNTER (OUTPATIENT)
Dept: MRI IMAGING | Facility: HOSPITAL | Age: 74
Discharge: HOME | End: 2019-07-01
Admitting: PSYCHIATRY & NEUROLOGY

## 2019-07-01 DIAGNOSIS — R40.4 EPISODE OF ALTERED CONSCIOUSNESS: ICD-10-CM

## 2019-07-01 DIAGNOSIS — R26.89 IMBALANCE: ICD-10-CM

## 2019-07-01 PROCEDURE — 93880 EXTRACRANIAL BILAT STUDY: CPT

## 2019-07-01 PROCEDURE — 70544 MR ANGIOGRAPHY HEAD W/O DYE: CPT

## 2019-07-01 PROCEDURE — 93880 EXTRACRANIAL BILAT STUDY: CPT | Performed by: SURGERY

## 2019-07-02 ENCOUNTER — DOCUMENTATION (OUTPATIENT)
Dept: NEUROLOGY | Facility: CLINIC | Age: 74
End: 2019-07-02

## 2019-07-02 DIAGNOSIS — I65.22 STENOSIS OF LEFT CAROTID ARTERY: ICD-10-CM

## 2019-07-02 DIAGNOSIS — R40.4 EPISODE OF ALTERED CONSCIOUSNESS: Primary | ICD-10-CM

## 2019-07-02 NOTE — PROGRESS NOTES
I have called the patient to discuss her MRI appointment.  I did ask if she had a problem with her appointment on June 28th as she left without being seen.  She states she doesn't remember being here on the 28th for a MRI.  I did tell her it was important for her to get this done and she is in agreement.  I did get her rescheduled for July 3rd at 11 am.  I did let her know.  She states she will be there for the MRI.

## 2019-07-03 ENCOUNTER — HOSPITAL ENCOUNTER (OUTPATIENT)
Dept: MRI IMAGING | Facility: HOSPITAL | Age: 74
Discharge: HOME | End: 2019-07-03
Admitting: PSYCHIATRY & NEUROLOGY

## 2019-07-03 ENCOUNTER — CLINICAL SUPPORT (OUTPATIENT)
Dept: CARDIOLOGY | Facility: CLINIC | Age: 74
End: 2019-07-03

## 2019-07-03 DIAGNOSIS — R55 SYNCOPE, UNSPECIFIED SYNCOPE TYPE: ICD-10-CM

## 2019-07-03 DIAGNOSIS — Z95.818 STATUS POST PLACEMENT OF IMPLANTABLE LOOP RECORDER: Primary | ICD-10-CM

## 2019-07-03 DIAGNOSIS — R40.4 EPISODE OF ALTERED CONSCIOUSNESS: ICD-10-CM

## 2019-07-03 PROCEDURE — 70551 MRI BRAIN STEM W/O DYE: CPT

## 2019-07-03 PROCEDURE — 93298 REM INTERROG DEV EVAL SCRMS: CPT | Performed by: INTERNAL MEDICINE

## 2019-07-03 PROCEDURE — 93299 PR REM INTERROG ICPMS/SCRMS <30 D TECH REVIEW: CPT | Performed by: INTERNAL MEDICINE

## 2019-07-03 NOTE — PROGRESS NOTES
Linq Report- [facility]    July 3, 2019    Primary Cardiologist:  Thania  Reason for implant:  syncope  Battery:  Good  Events:  No new events or sx noted  Changes:  None    Follow up:  1 month

## 2019-07-08 ENCOUNTER — OFFICE VISIT (OUTPATIENT)
Dept: PULMONOLOGY | Facility: CLINIC | Age: 74
End: 2019-07-08

## 2019-07-08 VITALS
HEART RATE: 90 BPM | SYSTOLIC BLOOD PRESSURE: 140 MMHG | OXYGEN SATURATION: 99 % | HEIGHT: 60 IN | WEIGHT: 191 LBS | BODY MASS INDEX: 37.5 KG/M2 | DIASTOLIC BLOOD PRESSURE: 90 MMHG

## 2019-07-08 DIAGNOSIS — J45.909 PERSISTENT ASTHMA WITHOUT COMPLICATION, UNSPECIFIED ASTHMA SEVERITY: ICD-10-CM

## 2019-07-08 DIAGNOSIS — J44.9 COPD, MODERATE (HCC): ICD-10-CM

## 2019-07-08 DIAGNOSIS — E66.3 OVERWEIGHT: ICD-10-CM

## 2019-07-08 DIAGNOSIS — F17.210 CIGARETTE NICOTINE DEPENDENCE WITHOUT COMPLICATION: ICD-10-CM

## 2019-07-08 DIAGNOSIS — R91.8 LUNG NODULES: Primary | ICD-10-CM

## 2019-07-08 PROCEDURE — 99214 OFFICE O/P EST MOD 30 MIN: CPT | Performed by: INTERNAL MEDICINE

## 2019-07-08 NOTE — PATIENT INSTRUCTIONS
I did advise the patient and her daughter that her 2 small lung nodules have shown a slight increase in size.  I went over the CAT scan with him and provide him the report.  I went over options including follow-up CT scan with possible empiric radiation if the nodules continue to increase in size, navigational bronchoscopy, which may not be diagnostic or even thoracic surgery referral for consideration of a left upper lobectomy. At present the patient and her daughter are in agreement with a conservative approach with a repeat CT in 3 months, with possible empiric radiation therapy if the nodules increased in size further.

## 2019-07-09 NOTE — PROGRESS NOTES
Subjective   Josi Mohamud is a 73 y.o. female.     Chief Complaint   Patient presents with   • F/u of Lung Nodule      No My Sticky Note on file.    History of Present Illness   Patient is a coming by her daughter to review the results of her recent chest CT.  She did have evidence of slight enlargement of her 2 lung nodules in the left upper lobe.  One is located more peripherally in the lingula and the other is located more superiorly and medially in the upper division.  I did advise her that these could represent slow-growing malignant lesions.  I do not think a needle biopsy would be good of option because the risk of pneumothorax although the lingular lesion would probably be accessible.  Navigational bronchoscopy would be another option but there would be a chance of a false negative result.  The definitive procedure would be thoracic surgery referral for resection with most likely a left upper lobectomy and she is not interested in surgery.  Based on her age and other comorbidities I think that is reasonable to avoid surgery.  I told her my recommendation would be a follow-up CT short-term in 3 months and if the lesions increase in size then consider empiric radiation therapy and she and her daughter are in total agreement with this plan.  Again even if she were to have a confirmed malignant process ongoing, I think she would be a less than ideal candidate for a potential curative procedure which again would require left upper lobectomy and she states she is not interested in undergoing any surgical evaluation.    Medical/Family/Social History   has a past medical history of Cancer (CMS/HCC), COPD (chronic obstructive pulmonary disease) (CMS/HCC), Coronary artery disease, History of transfusion, Hyperlipidemia, Hypertension, and Persistent asthma without complication (7/8/2019).   has a past surgical history that includes Appendectomy; Cholecystectomy; Splenectomy, total; Liver biopsy; Appendectomy; Cardiac  electrophysiology procedure (N/A, 5/23/2017); Cardiac catheterization; US Guided Fine Needle Aspiration (8/7/2018); and Coronary angioplasty with stent.  family history includes Esophageal cancer in her brother; Lung cancer in her brother.   reports that she has been smoking cigarettes.  She has a 57.00 pack-year smoking history. She has never used smokeless tobacco. She reports that she does not drink alcohol or use drugs.  Allergies   Allergen Reactions   • Penicillins Rash     Medications    Current Outpatient Medications:   •  albuterol (PROVENTIL HFA;VENTOLIN HFA) 108 (90 BASE) MCG/ACT inhaler, Inhale 1 puff Daily., Disp: , Rfl:   •  amLODIPine (NORVASC) 5 MG tablet, Take 5 mg by mouth Daily., Disp: , Rfl:   •  aspirin 81 MG EC tablet, Take 81 mg by mouth Daily., Disp: , Rfl:   •  calcium citrate-vitamin d (CALCITRATE) 315-250 MG-UNIT tablet tablet, Take 1 tablet by mouth Daily., Disp: , Rfl:   •  clopidogrel (PLAVIX) 75 MG tablet, Take 75 mg by mouth Daily., Disp: , Rfl:   •  diphenhydrAMINE (BENADRYL) 25 mg capsule, Take 25 mg by mouth Every 6 (Six) Hours As Needed for Itching., Disp: , Rfl:   •  fluticasone-salmeterol (ADVAIR) 250-50 MCG/DOSE DISKUS, Inhale 1 puff 2 (Two) Times a Day., Disp: , Rfl:   •  furosemide (LASIX) 20 MG tablet, Take 20 mg by mouth Daily As Needed (for swelling)., Disp: , Rfl:   •  guaiFENesin (MUCINEX) 600 MG 12 hr tablet, Take 2 tablets by mouth 2 (Two) Times a Day As Needed for Cough., Disp: 15 tablet, Rfl: 0  •  lisinopril (PRINIVIL,ZESTRIL) 20 MG tablet, Take 20 mg by mouth Daily., Disp: , Rfl:   •  LORazepam (ATIVAN) 2 MG tablet, Take 2 mg by mouth. Take 1/2 tab by mouth BID, Disp: , Rfl:   •  metoprolol succinate XL (TOPROL-XL) 50 MG 24 hr tablet, Take 50 mg by mouth Daily., Disp: , Rfl:   •  nitroglycerin (NITROSTAT) 0.4 MG SL tablet, Place 0.4 mg under the tongue Every 5 (Five) Minutes As Needed for Chest Pain. Take no more than 3 doses in 15 minutes., Disp: , Rfl:   •   "pantoprazole (PROTONIX) 40 MG EC tablet, Take 40 mg by mouth., Disp: , Rfl:   •  potassium chloride (K-DUR) 10 MEQ CR tablet, Take 10 mEq by mouth 2 (Two) Times a Day., Disp: , Rfl:   •  simvastatin (ZOCOR) 20 MG tablet, Take 20 mg by mouth Daily., Disp: , Rfl:   •  tiotropium (SPIRIVA) 18 MCG per inhalation capsule, Place 1 capsule into inhaler and inhale Daily., Disp: , Rfl:     Review of Systems   Constitutional: Positive for fatigue. Negative for chills and fever.   HENT: Positive for postnasal drip. Negative for congestion.    Eyes: Negative for visual disturbance.   Respiratory: Positive for cough, shortness of breath and wheezing.    Cardiovascular: Positive for leg swelling. Negative for chest pain.   Gastrointestinal: Positive for GERD. Negative for diarrhea, nausea and vomiting.   Genitourinary: Negative for difficulty urinating.   Musculoskeletal: Positive for arthralgias, back pain, joint swelling and myalgias.   Skin: Negative for rash.   Neurological: Positive for tremors and weakness. Negative for dizziness and speech difficulty.   Hematological: Negative for adenopathy.   Psychiatric/Behavioral: The patient is nervous/anxious.      ------------------------------------  Objective   /90   Pulse 90   Ht 152.4 cm (60\")   Wt 86.6 kg (191 lb)   SpO2 99% Comment: Ra  Breastfeeding? No   BMI 37.30 kg/m²   Physical Exam   Constitutional: She is oriented to person, place, and time. She appears well-developed.   She is an obese white female who appears in no acute distress.   HENT:   Head: Normocephalic and atraumatic.   Eyes: EOM are normal. Pupils are equal, round, and reactive to light.   Neck: Normal range of motion. Neck supple.   Cardiovascular: Normal rate, regular rhythm and normal heart sounds.   Pulmonary/Chest: Effort normal.   Breath sounds are diminished.   Abdominal: Soft.   Musculoskeletal: Normal range of motion. She exhibits edema.   She has trace edema of the calves. "   Neurological: She is alert and oriented to person, place, and time.   Skin: Skin is warm and dry.   Psychiatric: She has a normal mood and affect.   Nursing note and vitals reviewed.    Ct Chest Without Contrast    Result Date: 6/13/2019  Impression: 1. There are 2 pulmonary nodules increased in size. While these could be infectious/inflammatory, they are concerning for malignancy. If not previously performed, consider tissue diagnosis. 2. There is a 2 cm low-density lesion in the right thyroid. If not previously performed an outside facility, consider dedicated thyroid ultrasound. 3. Coronary artery calcifications. This report was finalized on 06/13/2019 14:44 by Dr Ally Tierney MD.    Mri Brain Without Contrast    Result Date: 7/3/2019  Impression: 1.  No acute intracranial abnormalities. 2.  Moderate chronic microvascular changes and mild generalized cerebral volume loss. This report was finalized on 07/03/2019 13:06 by Dr. Pinky Snow MD.    Us Thyroid    Result Date: 6/20/2019  Impression: 1. Nodules within each lobe of the thyroid gland as described above. 2. Mild overall enlargement of the thyroid gland with heterogeneous echotexture..  This report was finalized on 06/20/2019 12:45 by Dr. Aric Rosen MD.    Us Carotid Bilateral    Result Date: 7/1/2019  Impression: Impression: 1. There is less than 50% stenosis of the right internal carotid artery. 2. There is less than 50% stenosis of the left internal carotid artery. 3. Antegrade flow is demonstrated in bilateral vertebral arteries.  Comments: Bilateral carotid vertebral arterial duplex scan was performed.  Grayscale imaging shows intimal thickening and calcified elements at the carotid bifurcation. The right internal carotid artery peak systolic velocity is 93.5 cm/sec. The end-diastolic velocity is 23.6 cm/sec. The right ICA/CCA ratio is approximately 1.16 . These findings correlate with less than 50% stenosis of the right internal carotid  artery.  Grayscale imaging shows intimal thickening and calcified elements at the carotid bifurcation. The left internal carotid artery peak systolic velocity is 72.3 cm/sec. The end-diastolic velocity is 24.4 cm/sec. The left ICA/CCA ratio is approximately 0.49 . These findings correlate with less than 50% stenosis of the left internal carotid artery.  Antegrade flow is demonstrated in bilateral vertebral arteries. Greater than 50% stenosis of the left common carotid artery. Greater than 50% stenosis of the proximal right external carotid artery. This report was finalized on 07/01/2019 15:44 by Dr. Matthias Murphy MD.       Pulmonary Functions Testing Results:  No results found for: FEV1, FVC, OGP3BKS, TLC, DLCO     Assessment/Plan   Josi was seen today for f/u of lung nodule.    Diagnoses and all orders for this visit:    Lung nodules  -     CT Chest Without Contrast; Future    COPD, moderate (CMS/HCC)    Persistent asthma without complication, unspecified asthma severity    Overweight    Cigarette nicotine dependence without complication      Patient's Body mass index is 37.3 kg/m². BMI is above normal parameters. Recommendations include: referral to primary care.      Again, will get a follow-up CT in about 3 months and see her back shortly thereafter.  If either of the nodules show any significant increase in size I would then recommend empiric radiation therapy and not a surgical approach.  She is counseled regarding smoking cessation.

## 2019-07-10 ENCOUNTER — TELEPHONE (OUTPATIENT)
Dept: VASCULAR SURGERY | Facility: CLINIC | Age: 74
End: 2019-07-10

## 2019-07-16 ENCOUNTER — OFFICE VISIT (OUTPATIENT)
Dept: VASCULAR SURGERY | Facility: CLINIC | Age: 74
End: 2019-07-16

## 2019-07-16 ENCOUNTER — LAB (OUTPATIENT)
Dept: LAB | Facility: HOSPITAL | Age: 74
End: 2019-07-16

## 2019-07-16 VITALS
HEART RATE: 78 BPM | SYSTOLIC BLOOD PRESSURE: 134 MMHG | HEIGHT: 60 IN | WEIGHT: 187 LBS | BODY MASS INDEX: 36.71 KG/M2 | OXYGEN SATURATION: 98 % | DIASTOLIC BLOOD PRESSURE: 90 MMHG

## 2019-07-16 DIAGNOSIS — I65.23 BILATERAL CAROTID ARTERY STENOSIS: Primary | ICD-10-CM

## 2019-07-16 DIAGNOSIS — R55 SYNCOPE AND COLLAPSE: ICD-10-CM

## 2019-07-16 DIAGNOSIS — I10 ESSENTIAL HYPERTENSION: ICD-10-CM

## 2019-07-16 DIAGNOSIS — R40.4 EPISODE OF ALTERED CONSCIOUSNESS: ICD-10-CM

## 2019-07-16 LAB
CK SERPL-CCNC: 49 U/L (ref 0–203)
ERYTHROCYTE [SEDIMENTATION RATE] IN BLOOD: 12 MM/HR (ref 0–20)
FOLATE SERPL-MCNC: 16.6 NG/ML (ref 4.78–24.2)
MAGNESIUM SERPL-MCNC: 2 MG/DL (ref 1.4–2.2)
T4 FREE SERPL-MCNC: 1.45 NG/DL (ref 0.78–2.19)
VIT B12 BLD-MCNC: 411 PG/ML (ref 239–931)

## 2019-07-16 PROCEDURE — 82550 ASSAY OF CK (CPK): CPT | Performed by: PSYCHIATRY & NEUROLOGY

## 2019-07-16 PROCEDURE — 84439 ASSAY OF FREE THYROXINE: CPT | Performed by: PSYCHIATRY & NEUROLOGY

## 2019-07-16 PROCEDURE — 85651 RBC SED RATE NONAUTOMATED: CPT | Performed by: PSYCHIATRY & NEUROLOGY

## 2019-07-16 PROCEDURE — 83735 ASSAY OF MAGNESIUM: CPT | Performed by: PSYCHIATRY & NEUROLOGY

## 2019-07-16 PROCEDURE — 82746 ASSAY OF FOLIC ACID SERUM: CPT | Performed by: PSYCHIATRY & NEUROLOGY

## 2019-07-16 PROCEDURE — 82607 VITAMIN B-12: CPT | Performed by: PSYCHIATRY & NEUROLOGY

## 2019-07-16 PROCEDURE — 99204 OFFICE O/P NEW MOD 45 MIN: CPT | Performed by: SURGERY

## 2019-07-16 NOTE — PROGRESS NOTES
07/16/2019      Jose Glasgow MD  [address]  [place] [address]    Josi Mohamud  1945    Chief Complaint   Patient presents with   • Establish Care     Left common carotid stenosis with episode of altered consciousness.  Referred by Dr. Glasgow.        Dear Jose Glasgow MD    HPI  I had the pleasure of seeing your patient Josi Mohamud in the office today.  Thank you kindly for this consultation.  As you recall, Josi Mohamud is a 73 y.o.  female who you are currently following for episodes of syncope.  She had her most recent one in January 2019.  She remembers sitting in a chair and then ending up on the floor.  She denies any dizziness or room spinning or any palpitations.  She had noninvasive testing performed in which I personally reviewed.  She is also being followed by Dr. Monteiro as well.  She denies any classic strokelike symptoms.      Past Medical History:   Diagnosis Date   • Cancer (CMS/HCC)     HODGKINS LYMPHOMA   • COPD (chronic obstructive pulmonary disease) (CMS/HCC)    • Coronary artery disease    • History of transfusion    • Hyperlipidemia    • Hypertension    • Persistent asthma without complication 7/8/2019       Past Surgical History:   Procedure Laterality Date   • APPENDECTOMY     • APPENDECTOMY     • CARDIAC CATHETERIZATION     • CARDIAC ELECTROPHYSIOLOGY PROCEDURE N/A 5/23/2017    Procedure: Loop insertion; LINQ;  Surgeon: Jose Monteiro MD;  Location: [facility] INVASIVE LOCATION;  Service:    • CHOLECYSTECTOMY     • CORONARY ANGIOPLASTY WITH STENT PLACEMENT      X 1    • LIVER BIOPSY     • SPLENECTOMY     • US GUIDED FINE NEEDLE ASPIRATION  8/7/2018       Family History   Problem Relation Age of Onset   • Esophageal cancer Brother    • Lung cancer Brother    • Breast cancer Neg Hx        Social History     Socioeconomic History   • Marital status:      Spouse name: Not on file   • Number of children: Not on file   • Years of education: Not on file    • Highest education level: Not on file   Tobacco Use   • Smoking status: Former Smoker     Packs/day: 0.00     Years: 57.00     Pack years: 0.00   • Smokeless tobacco: Never Used   • Tobacco comment: Pt started patches 7/15/19.   Substance and Sexual Activity   • Alcohol use: No   • Drug use: No   • Sexual activity: Defer       Allergies   Allergen Reactions   • Penicillins Rash         Current Outpatient Medications:   •  albuterol (PROVENTIL HFA;VENTOLIN HFA) 108 (90 BASE) MCG/ACT inhaler, Inhale 1 puff Daily., Disp: , Rfl:   •  amLODIPine (NORVASC) 5 MG tablet, Take 5 mg by mouth Daily., Disp: , Rfl:   •  aspirin 81 MG EC tablet, Take 81 mg by mouth Daily., Disp: , Rfl:   •  calcium citrate-vitamin d (CALCITRATE) 315-250 MG-UNIT tablet tablet, Take 1 tablet by mouth Daily., Disp: , Rfl:   •  clopidogrel (PLAVIX) 75 MG tablet, Take 75 mg by mouth Daily., Disp: , Rfl:   •  diphenhydrAMINE (BENADRYL) 25 mg capsule, Take 25 mg by mouth Every 6 (Six) Hours As Needed for Itching., Disp: , Rfl:   •  fluticasone-salmeterol (ADVAIR) 250-50 MCG/DOSE DISKUS, Inhale 1 puff 2 (Two) Times a Day., Disp: , Rfl:   •  furosemide (LASIX) 20 MG tablet, Take 20 mg by mouth Daily As Needed (for swelling)., Disp: , Rfl:   •  guaiFENesin (MUCINEX) 600 MG 12 hr tablet, Take 2 tablets by mouth 2 (Two) Times a Day As Needed for Cough., Disp: 15 tablet, Rfl: 0  •  lisinopril (PRINIVIL,ZESTRIL) 20 MG tablet, Take 20 mg by mouth Daily., Disp: , Rfl:   •  LORazepam (ATIVAN) 2 MG tablet, Take 2 mg by mouth. Take 1/2 tab by mouth BID, Disp: , Rfl:   •  metoprolol succinate XL (TOPROL-XL) 50 MG 24 hr tablet, Take 50 mg by mouth Daily., Disp: , Rfl:   •  nitroglycerin (NITROSTAT) 0.4 MG SL tablet, Place 0.4 mg under the tongue Every 5 (Five) Minutes As Needed for Chest Pain. Take no more than 3 doses in 15 minutes., Disp: , Rfl:   •  pantoprazole (PROTONIX) 40 MG EC tablet, Take 40 mg by mouth., Disp: , Rfl:   •  potassium chloride (K-DUR) 10  "MEQ CR tablet, Take 10 mEq by mouth 2 (Two) Times a Day., Disp: , Rfl:   •  simvastatin (ZOCOR) 20 MG tablet, Take 20 mg by mouth Daily., Disp: , Rfl:   •  tiotropium (SPIRIVA) 18 MCG per inhalation capsule, Place 1 capsule into inhaler and inhale Daily., Disp: , Rfl:     Review of Systems   Constitutional: Negative.    HENT: Negative.    Eyes: Negative.    Respiratory: Negative.    Cardiovascular: Negative.    Gastrointestinal: Negative.    Endocrine: Negative.    Genitourinary: Negative.    Musculoskeletal: Negative.    Skin: Negative.    Allergic/Immunologic: Negative.    Neurological: Positive for syncope.   Hematological: Negative.    Psychiatric/Behavioral: Negative.    All other systems reviewed and are negative.    /90   Pulse 78   Ht 152.4 cm (60\")   Wt 84.8 kg (187 lb)   SpO2 98%   BMI 36.52 kg/m²     Physical Exam   Constitutional: She is oriented to person, place, and time. She appears well-developed and well-nourished.   HENT:   Head: Normocephalic and atraumatic.   Eyes: Pupils are equal, round, and reactive to light. No scleral icterus.   Neck: Neck supple. No JVD present. Carotid bruit is not present. No thyromegaly present.   Cardiovascular: Normal rate, regular rhythm and normal heart sounds.   Pulses:       Carotid pulses are 2+ on the right side, and 2+ on the left side.       Femoral pulses are 2+ on the right side, and 2+ on the left side.       Popliteal pulses are 2+ on the right side, and 2+ on the left side.        Dorsalis pedis pulses are 2+ on the right side, and 2+ on the left side.        Posterior tibial pulses are 2+ on the right side, and 2+ on the left side.   Pulmonary/Chest: Effort normal and breath sounds normal.   Abdominal: Soft. Bowel sounds are normal. She exhibits no distension, no abdominal bruit and no mass. There is no hepatosplenomegaly. There is no tenderness.   Musculoskeletal: Normal range of motion. She exhibits no edema.   Lymphadenopathy:     She has no " cervical adenopathy.   Neurological: She is alert and oriented to person, place, and time. She has normal strength. No cranial nerve deficit or sensory deficit.   Skin: Skin is warm, dry and intact.   Psychiatric: She has a normal mood and affect.   Nursing note and vitals reviewed.    Diagnostic data:  Mri Angiogram Head Without Contrast    Result Date: 7/1/2019  Narrative: EXAMINATION: MRI ANGIOGRAM HEAD WO CONTRAST-  7/1/2019 1:19 PM CDT  HISTORY: syncope; R40.4-Transient alteration of awareness  Routine MRA protocol with 3 dimensional TOF imaging plus MIP reconstruction.  Symmetric and normally patent distal internal carotid arteries.  Bilateral posterior communicating arteries.  Normal and symmetric anterior and middle cerebral arteries.  Distal posterior cerebral arteries are normal and symmetric.  No aneurysm or high-grade stenosis. No arterial occlusion.  Summary: 1. No intracranial arterial abnormality is seen. This report was finalized on 07/01/2019 14:23 by Dr. Taj Chawla MD.    Mri Brain Without Contrast    Result Date: 7/3/2019  Narrative: EXAM: MR BRAIN WITHOUT IV CONTRAST 07/03/2019  COMPARISON: None  HISTORY: 73 years-old Female. Syncope  TECHNIQUE: Routine pulse sequences of the brain were obtained without IV contrast.  REPORT: Mild to moderate generalized cerebral volume loss. Moderate chronic microvascular changes. There is no evidence of mass-effect or midline shift. No reduced diffusivity is demonstrated. The brainstem, sella, pituitary, cerebellum are unremarkable. The basal cisterns are preserved.  No acute osseous lesion. Thinning of the lenses, reflecting cataract versus prior cataract surgery.  The flow voids are preserved.   Significant noncoalescent left and moderate right mastoid effusions. The paranasal sinuses are free of obstructive mucosal disease.        Impression: 1.  No acute intracranial abnormalities. 2.  Moderate chronic microvascular changes and mild generalized cerebral  volume loss. This report was finalized on 07/03/2019 13:06 by Dr. Pinky Snow MD.    Us Thyroid    Result Date: 6/20/2019  Narrative: Ultrasound thyroid 6/20/2019 9:59 AM CDT  REASON FOR EXAM: THYROID NODULE; E04.1-Nontoxic single thyroid nodule   COMPARISON: NONE.  TECHNIQUE: Multiple longitudinal and transverse real-time sonographic images of the thyroid are obtained.  FINDINGS: The right lobe of the thyroid measures 4.0 x 2.1 x 2.4 cm. The left lobe of the thyroid measures 4.3 x 1.3 x 1.4 cm. The thyroid isthmus measures 0.4 cm in thickness.  The thyroid is mildly heterogeneous in echogenicity.  Within the right lobe there is a mildly hyperechoic solid nodule measuring 3.0 x 1.1 x 2.3 cm in size. There within the lower pole of the left lobe there is a 1.1 x 1.2 x 1.0 cm solid nodule. Color Doppler demonstrates appropriate flow.      Impression: 1. Nodules within each lobe of the thyroid gland as described above. 2. Mild overall enlargement of the thyroid gland with heterogeneous echotexture..  This report was finalized on 06/20/2019 12:45 by Dr. Aric Rosen MD.    Us Carotid Bilateral    Result Date: 7/1/2019  Narrative: History: Carotid occlusive disease      Impression: Impression: 1. There is less than 50% stenosis of the right internal carotid artery. 2. There is less than 50% stenosis of the left internal carotid artery. 3. Antegrade flow is demonstrated in bilateral vertebral arteries.  Comments: Bilateral carotid vertebral arterial duplex scan was performed.  Grayscale imaging shows intimal thickening and calcified elements at the carotid bifurcation. The right internal carotid artery peak systolic velocity is 93.5 cm/sec. The end-diastolic velocity is 23.6 cm/sec. The right ICA/CCA ratio is approximately 1.16 . These findings correlate with less than 50% stenosis of the right internal carotid artery.  Grayscale imaging shows intimal thickening and calcified elements at the carotid bifurcation.  The left internal carotid artery peak systolic velocity is 72.3 cm/sec. The end-diastolic velocity is 24.4 cm/sec. The left ICA/CCA ratio is approximately 0.49 . These findings correlate with less than 50% stenosis of the left internal carotid artery.  Antegrade flow is demonstrated in bilateral vertebral arteries. Greater than 50% stenosis of the left common carotid artery. Greater than 50% stenosis of the proximal right external carotid artery. This report was finalized on 07/01/2019 15:44 by Dr. Matthias Murphy MD.          Patient Active Problem List   Diagnosis   • Syncope and collapse   • Coronary artery disease involving native coronary artery of native heart with angina pectoris (CMS/HCC)   • Stented coronary artery   • Essential hypertension   • Abnormal positron emission tomography (PET) scan   • Gastroesophageal reflux disease   • Thyroid nodule   • Parotid neoplasm   • Anticoagulated   • Laryngopharyngeal reflux (LPR)   • Traumatic rhabdomyolysis (CMS/HCC)   • Atrial flutter with rapid ventricular response (CMS/HCC) on LINQ ? BRIEF   • Lung nodules   • COPD, moderate (CMS/HCC)   • Persistent asthma without complication   • Overweight   • Cigarette nicotine dependence without complication        Diagnosis Plan   1. Bilateral carotid artery stenosis     2. Essential hypertension     3. Syncope and collapse         Plan: After thoroughly evaluating Josi Mohamud, I believe the best course of action is to remain conservative from a vascular standpoint.  The patient is not currently having any strokelike symptoms and she has very minimal carotid disease bilaterally.  I did discuss vascular risk factors as they pertain to the progression of vascular disease including controlling hypertension. The patient is to continue taking their medications as previously discussed.  I will see her back in 1 years time with repeat noninvasive testing for continued surveillance.  This was all discussed in full with complete  understanding.  Thank you for allowing me to participate in the care of your patient.  Please do not hesitate to call with any questions or concerns.  We will keep you aware of any further encounters with Josi Mohamud.        Sincerely yours,         Nicholas Leahy, Jeremías Pascal APRN

## 2019-07-18 ENCOUNTER — TELEPHONE (OUTPATIENT)
Dept: NEUROSURGERY | Age: 74
End: 2019-07-18

## 2019-07-18 NOTE — PLAN OF CARE
Problem: Patient Care Overview (Adult)  Goal: Plan of Care Review  Outcome: Ongoing (interventions implemented as appropriate)    04/08/17 1127   Coping/Psychosocial Response Interventions   Plan Of Care Reviewed With patient   Patient Care Overview   Progress progress toward functional goals as expected   Outcome Evaluation   Outcome Summary/Follow up Plan Pt. performs bed mobility and transfers with CGA. Amb 230' with RWX & CGA. Worked thru 20 reps of LE ex's. Will benefit from further strengthening and safety activities.            Clofazimine Pregnancy And Lactation Text: This medication is Pregnancy Category C and isn't considered safe during pregnancy. It is excreted in breast milk.

## 2019-07-19 ENCOUNTER — OFFICE VISIT (OUTPATIENT)
Dept: CARDIOLOGY | Facility: CLINIC | Age: 74
End: 2019-07-19

## 2019-07-19 VITALS
OXYGEN SATURATION: 95 % | SYSTOLIC BLOOD PRESSURE: 146 MMHG | HEIGHT: 60 IN | BODY MASS INDEX: 37.11 KG/M2 | HEART RATE: 84 BPM | DIASTOLIC BLOOD PRESSURE: 78 MMHG | WEIGHT: 189 LBS

## 2019-07-19 DIAGNOSIS — I25.119 CORONARY ARTERY DISEASE INVOLVING NATIVE CORONARY ARTERY OF NATIVE HEART WITH ANGINA PECTORIS (HCC): ICD-10-CM

## 2019-07-19 DIAGNOSIS — F17.210 CIGARETTE NICOTINE DEPENDENCE WITHOUT COMPLICATION: ICD-10-CM

## 2019-07-19 DIAGNOSIS — K21.9 GASTROESOPHAGEAL REFLUX DISEASE, ESOPHAGITIS PRESENCE NOT SPECIFIED: ICD-10-CM

## 2019-07-19 DIAGNOSIS — Z95.5 STENTED CORONARY ARTERY: ICD-10-CM

## 2019-07-19 DIAGNOSIS — Z79.01 ANTICOAGULATED: ICD-10-CM

## 2019-07-19 DIAGNOSIS — R94.8 ABNORMAL POSITRON EMISSION TOMOGRAPHY (PET) SCAN: ICD-10-CM

## 2019-07-19 DIAGNOSIS — R91.8 LUNG NODULES: Primary | ICD-10-CM

## 2019-07-19 DIAGNOSIS — T79.6XXS TRAUMATIC RHABDOMYOLYSIS, SEQUELA: ICD-10-CM

## 2019-07-19 DIAGNOSIS — I48.92 ATRIAL FLUTTER WITH RAPID VENTRICULAR RESPONSE (HCC): ICD-10-CM

## 2019-07-19 DIAGNOSIS — R07.9 CHEST PAIN IN ADULT: ICD-10-CM

## 2019-07-19 PROCEDURE — 93000 ELECTROCARDIOGRAM COMPLETE: CPT | Performed by: INTERNAL MEDICINE

## 2019-07-19 PROCEDURE — 99214 OFFICE O/P EST MOD 30 MIN: CPT | Performed by: INTERNAL MEDICINE

## 2019-07-19 NOTE — PROGRESS NOTES
Josi Mohamud  [id number]  1945  73 y.o.  female    Referring Provider: Jeremías Hernandez APRN    Reason for  Visit: Here for routine follow up   Prior syncope  Lung nodule  planned for radiation  Still smoking   Was told cannot stand chemotherapy    Subjective    Mild chronic exertional shortness of breath on exertion relieved with rest  No significant cough or wheezing  Going on for several months or longer    No palpitations  No associated chest pain  No significant pedal edema      Recent 1 episode of substernal chest pain at rest that lasted 5 minutes  No nausea or sweating  Spontaneously resolved   No recurrence of chest pain     No fever or chills  No significant expectoration    No hemoptysis  No presyncope or syncope     Joint pain in small, medium and large joints   Chronic low back pain        History of present illness:  Josi Mohamud is a 73 y.o. yo female with history of Essential Hypertension   Hyperlipidemia  who presents today for   Chief Complaint   Patient presents with   • Coronary Artery Disease     3 MO F/U   • Chest Pain   • Shortness of Breath   • Dizziness   .    History  Past Medical History:   Diagnosis Date   • Cancer (CMS/HCC)     H[facility]GK[facility] LYMPHOMA   • COPD (chronic obstructive pulmonary disease) (CMS/HCC)    • Coronary artery disease    • History of transfusion    • Hyperlipidemia    • Hypertension    • Persistent asthma without complication 7/8/2019   ,   Past Surgical History:   Procedure Laterality Date   • APPENDECTOMY     • APPENDECTOMY     • CARDIAC CATHETERIZATION     • CARDIAC ELECTROPHYSIOLOGY PROCEDURE N/A 5/23/2017    Procedure: Loop insertion; LINQ;  Surgeon: Jose Monteiro MD;  Location: [facility] INVASIVE LOCATION;  Service:    • CHOLECYSTECTOMY     • CORONARY ANGIOPLASTY WITH STENT PLACEMENT      X 1    • LIVER BIOPSY     • SPLENECTOMY     • US GUIDED FINE NEEDLE ASPIRATION  8/7/2018   ,   Family History   Problem Relation Age of Onset   • Esophageal cancer  Brother    • Lung cancer Brother    • Breast cancer Neg Hx    ,   Social History     Tobacco Use   • Smoking status: Former Smoker     Packs/day: 0.00     Years: 57.00     Pack years: 0.00   • Smokeless tobacco: Never Used   • Tobacco comment: Pt started patches 7/15/19.   Substance Use Topics   • Alcohol use: No   • Drug use: No   ,     Medications  Current Outpatient Medications   Medication Sig Dispense Refill   • albuterol (PROVENTIL HFA;VENTOLIN HFA) 108 (90 BASE) MCG/ACT inhaler Inhale 1 puff Daily.     • amLODIPine (NORVASC) 5 MG tablet Take 5 mg by mouth Daily.     • aspirin 81 MG EC tablet Take 81 mg by mouth Daily.     • calcium citrate-vitamin d (CALCITRATE) 315-250 MG-UNIT tablet tablet Take 1 tablet by mouth Daily.     • clopidogrel (PLAVIX) 75 MG tablet Take 75 mg by mouth Daily.     • diphenhydrAMINE (BENADRYL) 25 mg capsule Take 25 mg by mouth Every 6 (Six) Hours As Needed for Itching.     • fluticasone-salmeterol (ADVAIR) 250-50 MCG/DOSE DISKUS Inhale 1 puff 2 (Two) Times a Day.     • furosemide (LASIX) 20 MG tablet Take 20 mg by mouth Daily As Needed (for swelling).     • guaiFENesin (MUCINEX) 600 MG 12 hr tablet Take 2 tablets by mouth 2 (Two) Times a Day As Needed for Cough. 15 tablet 0   • lisinopril (PRINIVIL,ZESTRIL) 20 MG tablet Take 20 mg by mouth Daily.     • LORazepam (ATIVAN) 2 MG tablet Take 2 mg by mouth. Take 1/2 tab by mouth BID     • metoprolol succinate XL (TOPROL-XL) 50 MG 24 hr tablet Take 50 mg by mouth Daily.     • nitroglycerin (NITROSTAT) 0.4 MG SL tablet Place 0.4 mg under the tongue Every 5 (Five) Minutes As Needed for Chest Pain. Take no more than 3 doses in 15 minutes.     • pantoprazole (PROTONIX) 40 MG EC tablet Take 40 mg by mouth.     • potassium chloride (K-DUR) 10 MEQ CR tablet Take 10 mEq by mouth 2 (Two) Times a Day.     • simvastatin (ZOCOR) 20 MG tablet Take 20 mg by mouth Daily.     • tiotropium (SPIRIVA) 18 MCG per inhalation capsule Place 1 capsule into  "inhaler and inhale Daily.       No current facility-administered medications for this visit.        Allergies:  Penicillins    Review of Systems  Review of Systems   Constitution: Positive for weakness and malaise/fatigue.   HENT: Negative.    Eyes: Negative.    Cardiovascular: Positive for chest pain and dyspnea on exertion. Negative for claudication, cyanosis, irregular heartbeat, leg swelling, near-syncope, orthopnea, palpitations, paroxysmal nocturnal dyspnea and syncope.   Respiratory: Negative.    Endocrine: Negative.    Hematologic/Lymphatic: Negative.    Skin: Negative.    Musculoskeletal: Positive for arthritis, joint pain and stiffness.   Gastrointestinal: Negative for anorexia.   Genitourinary: Negative.    Psychiatric/Behavioral: Negative.        Objective     Physical Exam:  /78   Pulse 84   Ht 152.4 cm (60\")   Wt 85.7 kg (189 lb)   SpO2 95%   BMI 36.91 kg/m²     Physical Exam   Constitutional: She appears well-developed.   HENT:   Head: Normocephalic.   Neck: Normal carotid pulses and no JVD present. No tracheal tenderness present. Carotid bruit is not present. No tracheal deviation and no edema present.   Cardiovascular: Regular rhythm and normal pulses.   Murmur heard.   Systolic murmur is present with a grade of 2/6.  Pulmonary/Chest: Effort normal. No stridor.   Abdominal: Soft.   Neurological: She is alert. She has normal strength. No cranial nerve deficit or sensory deficit.   Skin: Skin is warm.   Psychiatric: She has a normal mood and affect. Her speech is normal and behavior is normal.       Results Review:      Results for orders placed during the hospital encounter of 06/02/17   Adult Stress Echo Only    Narrative · Left ventricular systolic function is normal. Estimated EF = 55%.  · . Normal stress echo with no significant echocardiographic evidence for   myocardial ischemia.              ECG 12 Lead  Date/Time: 7/19/2019 12:46 PM  Performed by: Jose Monteiro MD  Authorized by: " Jose Monteiro MD   Comparison: compared with previous ECG from 4/19/2019  Similar to previous ECG  Rhythm: sinus rhythm  Rate: normal  Conduction: left bundle branch block    Clinical impression: abnormal EKG            Assessment/Plan   Josi was seen today for coronary artery disease, chest pain, shortness of breath and dizziness.    Diagnoses and all orders for this visit:    Lung nodules    Gastroesophageal reflux disease, esophagitis presence not specified    Coronary artery disease involving native coronary artery of native heart with angina pectoris (CMS/HCC)    Cigarette nicotine dependence without complication    Atrial flutter with rapid ventricular response (CMS/HCC) on LINQ ? BRIEF    Anticoagulated    Abnormal positron emission tomography (PET) scan    Stented coronary artery    Traumatic rhabdomyolysis, sequela    Other orders  -     ECG 12 Lead           Plan    Monitor cardiac rhythm device LINQ function regularly per established schedule or PRN     Conservative medical therapy per patient. Risks, benefits and alternatives explained. The patient understands and wishes to proceed with only conservative therapy.  The patient expressively declined any non invasive testing or treatment       ____________________________________________________________________________________________________________________________________________  Health maintenance and recommendations      Low salt/ HTN/ Heart healthy carbohydrate restricted cardiac diet   The patient is advised to reduce or avoid caffeine or other cardiac stimulants.     Minimize or avoid  NSAID-type medications        Monitor for any signs of bleeding including red or dark stools. Fall precautions.        Advised staying uptodate with immunizations per established standard guidelines.      Offered to give patient  a copy of my notes       Questions were encouraged, asked and answered to the patient's  understanding and satisfaction. Questions if any  regarding current medications and side effects, need for refills and importance of compliance to medications stressed.    Reviewed available prior notes, consults, prior visits, laboratory findings, radiology and cardiology relevant reports. Updated chart as applicable. I have reviewed the patient's medical history in detail and updated the computerized patient record as relevant.      Updated patient regarding any new or relevant abnormalities on review of records or any new findings on physical exam. Mentioned to patient about purpose of visit and desirable health short and long term goals and objectives.    Primary to monitor CBC CMP Lipid panel and TSH as applicable    ___________________________________________________________________________________________________________________________________________          Return in about 6 months (around 1/19/2020).

## 2019-08-05 ENCOUNTER — CLINICAL SUPPORT (OUTPATIENT)
Dept: CARDIOLOGY | Facility: CLINIC | Age: 74
End: 2019-08-05

## 2019-08-05 DIAGNOSIS — R55 SYNCOPE, UNSPECIFIED SYNCOPE TYPE: ICD-10-CM

## 2019-08-05 DIAGNOSIS — Z95.818 STATUS POST PLACEMENT OF IMPLANTABLE LOOP RECORDER: Primary | ICD-10-CM

## 2019-08-05 PROCEDURE — 93299 PR REM INTERROG ICPMS/SCRMS <30 D TECH REVIEW: CPT | Performed by: INTERNAL MEDICINE

## 2019-08-05 PROCEDURE — 93298 REM INTERROG DEV EVAL SCRMS: CPT | Performed by: INTERNAL MEDICINE

## 2019-08-05 NOTE — PROGRESS NOTES
Linq Report- [facility]    August 5, 2019    Primary Cardiologist:  Thania  Reason for implant:  syncope  Battery:  Good  Events:  No new events or sx noted  Changes:  None    Follow up:  1 month

## 2019-08-26 ENCOUNTER — APPOINTMENT (OUTPATIENT)
Dept: MAMMOGRAPHY | Facility: HOSPITAL | Age: 74
End: 2019-08-26

## 2019-08-26 ENCOUNTER — APPOINTMENT (OUTPATIENT)
Dept: BONE DENSITY | Facility: HOSPITAL | Age: 74
End: 2019-08-26

## 2019-08-26 ENCOUNTER — OFFICE VISIT (OUTPATIENT)
Dept: NEUROSURGERY | Age: 74
End: 2019-08-26
Payer: MEDICARE

## 2019-08-26 VITALS
DIASTOLIC BLOOD PRESSURE: 80 MMHG | HEIGHT: 60 IN | BODY MASS INDEX: 37.69 KG/M2 | SYSTOLIC BLOOD PRESSURE: 164 MMHG | OXYGEN SATURATION: 96 % | WEIGHT: 192 LBS | HEART RATE: 85 BPM

## 2019-08-26 DIAGNOSIS — R56.9 CONVULSIONS, UNSPECIFIED CONVULSION TYPE (HCC): Primary | ICD-10-CM

## 2019-08-26 PROCEDURE — 1090F PRES/ABSN URINE INCON ASSESS: CPT | Performed by: NURSE PRACTITIONER

## 2019-08-26 PROCEDURE — 99214 OFFICE O/P EST MOD 30 MIN: CPT | Performed by: NURSE PRACTITIONER

## 2019-08-26 PROCEDURE — G8400 PT W/DXA NO RESULTS DOC: HCPCS | Performed by: NURSE PRACTITIONER

## 2019-08-26 PROCEDURE — 3017F COLORECTAL CA SCREEN DOC REV: CPT | Performed by: NURSE PRACTITIONER

## 2019-08-26 PROCEDURE — G8417 CALC BMI ABV UP PARAM F/U: HCPCS | Performed by: NURSE PRACTITIONER

## 2019-08-26 PROCEDURE — G8427 DOCREV CUR MEDS BY ELIG CLIN: HCPCS | Performed by: NURSE PRACTITIONER

## 2019-08-26 PROCEDURE — 4040F PNEUMOC VAC/ADMIN/RCVD: CPT | Performed by: NURSE PRACTITIONER

## 2019-08-26 PROCEDURE — 1123F ACP DISCUSS/DSCN MKR DOCD: CPT | Performed by: NURSE PRACTITIONER

## 2019-08-26 PROCEDURE — 4004F PT TOBACCO SCREEN RCVD TLK: CPT | Performed by: NURSE PRACTITIONER

## 2019-08-26 RX ORDER — ACETAMINOPHEN 500 MG
500 TABLET ORAL EVERY 6 HOURS PRN
COMMUNITY

## 2019-08-26 RX ORDER — OMEPRAZOLE 40 MG/1
40 CAPSULE, DELAYED RELEASE ORAL DAILY
COMMUNITY
End: 2019-09-25 | Stop reason: ALTCHOICE

## 2019-08-26 RX ORDER — POTASSIUM CHLORIDE 750 MG/1
10 TABLET, FILM COATED, EXTENDED RELEASE ORAL 2 TIMES DAILY
COMMUNITY

## 2019-08-26 NOTE — PROGRESS NOTES
REVIEW OF SYSTEMS    Constitutional: []Fever []Sweat []Chills [] Recent Injury [x] Denies all unless marked  HEENT:[]Headache  [] Head Injury/Hearing Loss  [] Sore Throat  [] Ear Ache/Dizziness  [x] Denies all unless marked  Spine:  [] Neck pain  [] Back pain  [] Sciaticia  [x] Denies all unless marked  Cardiovascular:[]Heart Disease []Chest Pain [] Palpitations  [x] Denies all unless marked  Pulmonary: []Shortness of Breath []Cough   [x] Denies all unless marke  Gastrointestinal: []Nausea  []Vomiting  []Abdominal Pain  []Constipation  []Diarrhea  []Dark Bloody Stools  [x] Denies all unless marked  Psychiatric/Behavioral:[] Depression [] Anxiety [x] Denies all unless marked  Genitourinary:   [] Frequency  [] Urgency  [] Incontinence [] Pain with Urination  [x] Denies all unless marked  Extremities: []Pain  []Swelling  [x] Denies all unless marked  Musculoskeletal: [] Muscle Pain  [] Joint Pain  [] Arthritis [] Muscle Cramps [] Muscle Twitches  [x] Denies all unless marked  Sleep: [] Insomnia [] Snoring [] Restless Legs [] Sleep Apnea  [] Daytime Sleepiness  [x] Denies all unless marked  Skin:[] Rash [] Skin Discoloration [x] Denies all unless marked   Neurological: []Visual Disturbance/Memory Loss [x] Loss of Balance [] Slurred Speech/Weakness [] Seizures  [x] Vertigo/Dizziness [] Denies all unless marked

## 2019-08-27 ENCOUNTER — HOSPITAL ENCOUNTER (OUTPATIENT)
Dept: BONE DENSITY | Facility: HOSPITAL | Age: 74
Discharge: HOME | End: 2019-08-27

## 2019-08-27 ENCOUNTER — HOSPITAL ENCOUNTER (OUTPATIENT)
Dept: MAMMOGRAPHY | Facility: HOSPITAL | Age: 74
Discharge: HOME | End: 2019-08-27
Admitting: INTERNAL MEDICINE

## 2019-08-27 DIAGNOSIS — Z78.0 POST-MENOPAUSAL: ICD-10-CM

## 2019-08-27 DIAGNOSIS — Z12.39 SCREENING BREAST EXAMINATION: ICD-10-CM

## 2019-08-27 PROCEDURE — 77063 BREAST TOMOSYNTHESIS BI: CPT

## 2019-08-27 PROCEDURE — 77080 DXA BONE DENSITY AXIAL: CPT

## 2019-08-27 PROCEDURE — 77067 SCR MAMMO BI INCL CAD: CPT

## 2019-09-03 ENCOUNTER — OFFICE VISIT (OUTPATIENT)
Dept: OTOLARYNGOLOGY | Facility: CLINIC | Age: 74
End: 2019-09-03

## 2019-09-03 VITALS
DIASTOLIC BLOOD PRESSURE: 84 MMHG | WEIGHT: 190 LBS | BODY MASS INDEX: 37.3 KG/M2 | SYSTOLIC BLOOD PRESSURE: 155 MMHG | HEART RATE: 92 BPM | TEMPERATURE: 98.8 F | HEIGHT: 60 IN

## 2019-09-03 DIAGNOSIS — E04.2 MULTIPLE THYROID NODULES: ICD-10-CM

## 2019-09-03 DIAGNOSIS — D49.0 PAROTID NEOPLASM: Primary | ICD-10-CM

## 2019-09-03 DIAGNOSIS — Z79.01 ANTICOAGULATED: ICD-10-CM

## 2019-09-03 PROCEDURE — 99214 OFFICE O/P EST MOD 30 MIN: CPT | Performed by: OTOLARYNGOLOGY

## 2019-09-03 NOTE — PROGRESS NOTES
"YOB: 1945  Location: [facility]  Location Address: [address] [place] [address], [facility], [address] [place] [address]  Location Phone: [phone]    Chief Complaint   Patient presents with   • Follow-up       History of Present Illness  Josi Mohamud is a 72 y.o. female.  Josi Mohamud is here for follow-up evaluation of parotid nodules and thyroid after ultrasound.  Patient had FNA of the parotid gland on 18 with results of benign Warthin's tumor.   The patient has had a CT of the chest that showed a lung nodule and possible thyroid nodule. Due to this a PET scan was ordered with results below. In general,  there was increased uptake in the parotid gland on the left with a nodule and increased asymmetrical uptake in the oropharynx. Patient states she is doing well in regards to parotid. She denies swelling, ear pain and facial numbness and pain. She does have dry mouth. She has recently been hospitalized with fall.      Recent thyroid ultrasound indicates a 3 cm nodule in the right thyroid and a 1.1 cm nodule in the left thyroid. This was indicated on previous scans, but not measured. The patient denies related thyroid problems. She has not had a thyroid FNA in the past.    She denies any complaints with respect to her salivary glands\parotids.     Fine Needle Aspiration   Order: [id number]   Status:  Final result   Visible to patient:  No (Not Released)        Component 1mo ago    Final Diagnosis    Left parotid nodule, smears (4), ThinPrep preparation (1) and cell block:  A.  Numerous groups of oncocytic cells, primarily arranged in papillary formations.  B.  No cytologic evidence of malignancy.     Comment: Warthin's tumors form papillary groupings of oncocytic cells although the increased numbers of lymphocytes and the\"dirty\" background typically seen in smears of Warthin's tumors are not identified in this specimen.  The findings may still represent a Warthin's tumor if the lymphoid stroma of a " "Warthin's tumor was not sampled.  Also included in the differential diagnosis would be an oncocytoma or oncocytosis of salivary gland.  Clinical correlation and follow-up are recommended.    Electronically signed by Monika Hernandez MD on 8/10/2018 at 1148    Gross Description      Received in cytolyt in the laboratory in a container labeled with patient name and  designated as left parotid.  30 mls with bloody needle washings.  4 smears fixed in 95% alcohol, 1 thin prep and 1 cell block made.       Intraoperative Consultation      FNA Left Parotid:        1. Many groups of oncocytic cells.     2. Many groups of oncocytic cells.                        Monika Hernandez M.D.                         2018    Intradepartmental Consult      Intradepartmental consultation was obtained from Dr. Venita Schultz who concurs with the above diagnostic impressions.    Microscopic Description      Smears and ThinPrep preparation of the fine-needle aspiration of the left parotid nodule reveal many groups of oncocytic cells, often arranged in papillary formations.  Nuclear grooves or nuclear inclusions are not seen.  Blood is seen in the background.  Increased numbers of lymphocytes are not seen and a\" dirty\" background is not seen.  Blood is present in the background.  In the cell block, the oncocytic cells have a tall columnar appearance with an inner more cuboidal cell lining seen.  Cytologic features of malignancy are not seen.          [facility]         Specimen Collected: 18 10:25 Last Resulted: 08/10/18 11:48                  PET CT 2018 8:30 AM CDT     REASON FOR STUDY: Left upper lobe lung nodule, history of Hodgkin's  lymphoma 40 years ago, shortness of breath and cough     COMPARISON: Low-dose CT chest for screening 2018     RADIOPHARMACEUTICAL: 12.67 mCi F-18 FDG intravenously.      Technique: Prior to injection of the radiotracer, the patient's blood  glucose is 101 mg/dL. " Following injection and a 60 minute distribution  interval, PET scan is performed from the skull base to the midthigh. Low  dose, noncontrast CT is performed in the same anatomic distribution for  attenuation correction of the PET scan and to assist in localizing the  PET findings.       Radiation dose: DLP 880mGy-cm     FINDINGS:      HEAD AND NECK: The visualized brain demonstrates no definite focal  abnormal FDG activity. Salivary, tonsillar and laryngeal activity  appears ordinary. There is asymmetric uptake in the posterior  oropharynx, with increased uptake on the left demonstrating a maximum  SUV of 5.87. There is increased uptake within a soft tissue nodule in  the left parotid gland which demonstrates a maximum SUV of 5.74.  Additionally, there is minimal increased uptake in the region of the  right parotid gland demonstrating a maximum SUV of 4.26..      CHEST: There is minimal increased FDG uptake within the spiculated left  upper lobe nodule, demonstrating a maximum SUV of 1.69. There is minimal  increased uptake within a normal-appearing left axillary lymph node,  demonstrating a maximum SUV of 2.32.. Review of the mediastinum reveals  no hypermetabolic lymphadenopathy. Physiologic activity is noted in the  myocardium. No foci of hypermetabolism are appreciated in either breast.     ABDOMEN AND PELVIS: Normal physiologic activity is noted in the liver,  spleen, stomach and bowel. No adrenal hypermetabolism is noted. Urinary  activity is noted in the kidneys and bladder. No FDG-avid  lymphadenopathy is appreciated. An intramuscular lipoma is seen in the  left gluteal muscles.     MUSCULOSKELETAL: No FDG-avid osseous metastases are demonstrated.      IMPRESSION:  1. Some increased uptake within the previously described left upper lobe  nodule, suspicious for malignancy.  2. Abnormal uptake in the posterior left oropharynx, etiology unclear.  Recommend direct visualization for further evaluation.  3.  Increased uptake in the parotid glands bilaterally with underlying  nodules possibly reflecting lymph nodes. A neoplastic process cannot be  excluded.     EXAMINATION:  CT CHEST LOW DOSE WO-  7/6/2018 12:04 PM CDT     HISTORY: Lung cancer screening.     COMPARISON: None     DOSE LENGTH PRODUCT: 60 mGy cm. Automated exposure control was also  utilized to decrease patient radiation dose.     TECHNIQUE: Noncontrast CT of the chest was performed using low-dose  protocol.     FINDINGS:      NODULES: There is a 1 cm ill-defined left upper lobe pulmonary nodule on  image 72 of series 4. This is a suspicious nodule. There is a 4 mm left  upper lobe nodule medially on image 34 of series 4. There is a 4 mm  groundglass nodule in the right upper lobe on image 43 of series 4  centrally.     OTHER LUNG FINDINGS: Bullous emphysema.     AIRWAY: The airway is patent.     PLEURA: No mass or fluid collection.     AORTA AND GREAT VESSELS: There is atheromatous disease of the thoracic  aorta and coronary arteries.     HEART AND PERICARDIUM: Heart size is normal. There is a small amount of  fluid in the pericardium is felt to be physiologic.     LYMPH NODES: No pathologic lymphadenopathy is noted.     BONES AND SOFT TISSUES: There is a dense calcification in the lateral  left breast. In the medial left breast, there is a linear radiopaque  density that appears to represent a foreign body. This is not visualized  on a mammogram on 8/23/2016. Correlate with any surgical intervention in  this area. This measures around 3.7 cm in length and 1 cm in short axis  diameter. There is a questionable dominant thyroid nodule on the right  measuring around 2 cm in AP dimension.     UPPER ABDOMEN: No acute findings in the upper abdomen.     IMPRESSION:  1. There is a 1 cm ill-defined left upper lobe pulmonary nodule that  needs further evaluation with PET/CT. This is very concerning for a  small primary neoplasm.  2. Lung-RADS 4: Suspicious.  3.  There is a 3.7 x 1 cm radiopaque density in the medial right breast  could be a foreign body/retained surgical drain. This is not present on  a mammogram from 2016. Correlate with any surgical intervention in this  area.  4. Possible dominant nodule in the right thyroid lobe versus artifact  due to the low-dose protocol. Consider follow-up ultrasound of the  thyroid gland.  5. Atheromatous disease of the thoracic aorta and coronary arteries.           Study Result     Ultrasound thyroid 6/20/2019 9:59 AM CDT     REASON FOR EXAM: THYROID NODULE; E04.1-Nontoxic single thyroid nodule       COMPARISON: NONE.      TECHNIQUE: Multiple longitudinal and transverse real-time sonographic  images of the thyroid are obtained.      FINDINGS:   The right lobe of the thyroid measures 4.0 x 2.1 x 2.4 cm. The left lobe  of the thyroid measures 4.3 x 1.3 x 1.4 cm. The thyroid isthmus measures  0.4 cm in thickness.      The thyroid is mildly heterogeneous in echogenicity.      Within the right lobe there is a mildly hyperechoic solid nodule  measuring 3.0 x 1.1 x 2.3 cm in size. There within the lower pole of the  left lobe there is a 1.1 x 1.2 x 1.0 cm solid nodule. Color Doppler  demonstrates appropriate flow.      IMPRESSION:  1. Nodules within each lobe of the thyroid gland as described above.  2. Mild overall enlargement of the thyroid gland with heterogeneous  echotexture..     This report was finalized on 06/20/2019 12:45 by Dr. Aric Rosen MD.                    Past Medical History:   Diagnosis Date   • Cancer (CMS/HCC)     HODGKINS LYMPHOMA   • COPD (chronic obstructive pulmonary disease) (CMS/HCC)    • Coronary artery disease    • History of transfusion    • Hyperlipidemia    • Hypertension    • Persistent asthma without complication 7/8/2019       Past Surgical History:   Procedure Laterality Date   • APPENDECTOMY     • APPENDECTOMY     • CARDIAC CATHETERIZATION     • CARDIAC ELECTROPHYSIOLOGY PROCEDURE N/A 5/23/2017     Procedure: Loop insertion; LINQ;  Surgeon: Jose Monteiro MD;  Location:  [facility];  Service:    • CHOLECYSTECTOMY     • CORONARY ANGIOPLASTY WITH STENT PLACEMENT      X 1    • LIVER BIOPSY     • SPLENECTOMY     • US GUIDED FINE NEEDLE ASPIRATION  8/7/2018       Outpatient Medications Marked as Taking for the 9/3/19 encounter (Office Visit) with Se Mcnulty MD   Medication Sig Dispense Refill   • albuterol (PROVENTIL HFA;VENTOLIN HFA) 108 (90 BASE) MCG/ACT inhaler Inhale 1 puff Daily.     • amLODIPine (NORVASC) 5 MG tablet Take 5 mg by mouth Daily.     • aspirin 81 MG EC tablet Take 81 mg by mouth Daily.     • calcium citrate-vitamin d (CALCITRATE) 315-250 MG-UNIT tablet tablet Take 1 tablet by mouth Daily.     • clopidogrel (PLAVIX) 75 MG tablet Take 75 mg by mouth Daily.     • diphenhydrAMINE (BENADRYL) 25 mg capsule Take 25 mg by mouth Every 6 (Six) Hours As Needed for Itching.     • fluticasone-salmeterol (ADVAIR) 250-50 MCG/DOSE DISKUS Inhale 1 puff 2 (Two) Times a Day.     • furosemide (LASIX) 20 MG tablet Take 20 mg by mouth Daily As Needed (for swelling).     • guaiFENesin (MUCINEX) 600 MG 12 hr tablet Take 2 tablets by mouth 2 (Two) Times a Day As Needed for Cough. 15 tablet 0   • lisinopril (PRINIVIL,ZESTRIL) 20 MG tablet Take 20 mg by mouth Daily.     • LORazepam (ATIVAN) 2 MG tablet Take 2 mg by mouth. Take 1/2 tab by mouth BID     • metoprolol succinate XL (TOPROL-XL) 50 MG 24 hr tablet Take 50 mg by mouth Daily.     • nitroglycerin (NITROSTAT) 0.4 MG SL tablet Place 0.4 mg under the tongue Every 5 (Five) Minutes As Needed for Chest Pain. Take no more than 3 doses in 15 minutes.     • pantoprazole (PROTONIX) 40 MG EC tablet Take 40 mg by mouth.     • potassium chloride (K-DUR) 10 MEQ CR tablet Take 10 mEq by mouth 2 (Two) Times a Day.     • simvastatin (ZOCOR) 20 MG tablet Take 20 mg by mouth Daily.     • tiotropium (SPIRIVA) 18 MCG per inhalation capsule Place 1 capsule  into inhaler and inhale Daily.         Penicillins    Family History   Problem Relation Age of Onset   • Esophageal cancer Brother    • Lung cancer Brother    • Breast cancer Neg Hx        Social History     Socioeconomic History   • Marital status:      Spouse name: Not on file   • Number of children: Not on file   • Years of education: Not on file   • Highest education level: Not on file   Tobacco Use   • Smoking status: Former Smoker     Packs/day: 0.00     Years: 57.00     Pack years: 0.00   • Smokeless tobacco: Never Used   • Tobacco comment: Pt started patches 7/15/19.   Substance and Sexual Activity   • Alcohol use: No   • Drug use: No   • Sexual activity: Defer       Review of Systems   Constitutional: Negative for activity change, appetite change, chills, diaphoresis, fatigue, fever and unexpected weight change.   HENT: Negative for congestion, dental problem, drooling, ear discharge, ear pain, facial swelling, hearing loss, mouth sores, nosebleeds, postnasal drip, rhinorrhea, sinus pressure, sneezing, sore throat, tinnitus, trouble swallowing and voice change.         Thyroid nodules, stable parotid nodule   Eyes: Negative.    Respiratory: Negative.    Cardiovascular: Negative.    Gastrointestinal:        GERD   Endocrine: Negative.    Skin: Negative.    Allergic/Immunologic: Negative for environmental allergies, food allergies and immunocompromised state.   Neurological: Negative.    Hematological: Negative.    Psychiatric/Behavioral: Negative.        Vitals:    09/03/19 1516   BP: 155/84   Pulse: 92   Temp: 98.8 °F (37.1 °C)       Body mass index is 37.11 kg/m².    Objective     Physical Exam  CONSTITUTIONAL: well nourished, alert, oriented, in no acute distress     COMMUNICATION AND VOICE: able to communicate normally, normal voice quality    HEAD: normocephalic, no lesions, atraumatic, no tenderness, no masses     FACE: appearance normal, no lesions, no tenderness, no deformities, facial motion  symmetric    SALIVARY GLANDS: parotid glands with no tenderness, no swelling, no masses, submandibular glands with normal size, nontender    EYES: ocular motility normal, eyelids normal, orbits normal, no proptosis, conjunctiva normal , pupils equal, round     EARS:  Hearing: response to conversational voice normal bilaterally   External Ears: auricles without lesions  Otoscopic: tympanic membrane appearance normal, no lesions, no perforation, normal mobility, no fluid    NOSE:  External Nose: structure normal, no tenderness on palpation, no nasal discharge, no lesions, no evidence of trauma, nostrils patent   Intranasal Exam: nasal mucosa normal, vestibule within normal limits, inferior turbinate normal, nasal septum midline   Nasopharynx:     ORAL:  Lips: upper and lower lips without lesion   Teeth: dentition within normal limits for age   Gums: gingivae healthy   Oral Mucosa: oral mucosa normal, no mucosal lesions   Floor of Mouth: Warthin’s duct patent, mucosa normal  Tongue: lingual mucosa normal without lesions, normal tongue mobility   Palate: soft and hard palates with normal mucosa and structure  Oropharynx: oropharyngeal mucosa normal     NECK: neck appearance normal, no mass,  noted without erythema or tenderness    THYROID: no overt thyromegaly, no tenderness, 3 cm nodule noted on the right, 1.1 cm nodule on left noted on ultrasound, position midline     LYMPH NODES: no lymphadenopathy    CHEST/RESPIRATORY: respiratory effort normal, normal breath sounds     CARDIOVASCULAR: rate and rhythm normal, extremities without cyanosis or edema      NEUROLOGIC/PSYCHIATRIC: oriented to time, place and person, mood normal, affect appropriate, CN II-XII intact grossly    Assessment/Plan   Josi was seen today for follow-up.    Diagnoses and all orders for this visit:    Parotid neoplasm    Anticoagulated    Multiple thyroid nodules  -     US Guided Thyroid Biopsy; Future  -     lab 534 - Miscellaneous Test;  Standing      * Surgery not found *  Orders Placed This Encounter   Procedures   • US Guided Thyroid Biopsy     Standing Status:   Future     Standing Expiration Date:   9/11/2020     Order Specific Question:   Reason for Exam:     Answer:   FNA of right 3 cm nodule and left 1.1 cm nodule     Return in about 6 weeks (around 10/15/2019) for Recheck parotid and thyroid nodules after FNA with audiogram.       Patient Instructions   Will set patient up for thyroid FNA and follow-up in six weeks.    Will need to get clearance to stop Plavix 3 days before biopsy.

## 2019-09-03 NOTE — PATIENT INSTRUCTIONS
Will set patient up for thyroid FNA and follow-up in six weeks.    Will need to get clearance to stop Plavix 3 days before biopsy.

## 2019-09-04 ENCOUNTER — TELEPHONE (OUTPATIENT)
Dept: OTOLARYNGOLOGY | Facility: CLINIC | Age: 74
End: 2019-09-04

## 2019-09-04 NOTE — PROGRESS NOTES
Se Mcnulty MD   I have seen and/or examined Josi ORR Cade and have reviewed the notes, assessments, and/or procedures and I concur with this documentation.    Se Mcnulty MD  9/4/2019  3:17 PM

## 2019-09-04 NOTE — TELEPHONE ENCOUNTER
Patient needs a fine needle aspiration of thyroid nodule. I need clearance to stop aspirin 7 days prior and plavix 3 days prior to procedure. Please address. Thank you!

## 2019-09-04 NOTE — TELEPHONE ENCOUNTER
Okay to stop Plavix for 3 days prior to procedure  Do not stop aspirin as patient has known coronary stent    If aspirin has to be stopped then need a letter from the surgeon  If aspirin is stopped would require Lovenox bridging

## 2019-09-09 ENCOUNTER — CLINICAL SUPPORT (OUTPATIENT)
Dept: CARDIOLOGY | Facility: CLINIC | Age: 74
End: 2019-09-09

## 2019-09-09 DIAGNOSIS — R55 SYNCOPE AND COLLAPSE: ICD-10-CM

## 2019-09-09 PROCEDURE — 93298 REM INTERROG DEV EVAL SCRMS: CPT | Performed by: PHYSICIAN ASSISTANT

## 2019-09-09 PROCEDURE — 93299 PR REM INTERROG ICPMS/SCRMS <30 D TECH REVIEW: CPT | Performed by: PHYSICIAN ASSISTANT

## 2019-09-10 ENCOUNTER — TRANSCRIBE ORDERS (OUTPATIENT)
Dept: ADMINISTRATIVE | Facility: HOSPITAL | Age: 74
End: 2019-09-10

## 2019-09-10 DIAGNOSIS — R91.1 PULMONARY NODULE: Primary | ICD-10-CM

## 2019-09-11 NOTE — TELEPHONE ENCOUNTER
Ordered Lovenox 1 mg/kg subcutaneously every 12 hours for 1 week.  Can stop aspirin a week prior to procedure and can stop Plavix 5 days prior to procedure.  Resume aspirin as soon as possible after procedure and Plavix when feasible

## 2019-09-12 ENCOUNTER — OFFICE VISIT (OUTPATIENT)
Dept: NEUROLOGY | Facility: CLINIC | Age: 74
End: 2019-09-12

## 2019-09-12 VITALS
OXYGEN SATURATION: 97 % | HEART RATE: 97 BPM | SYSTOLIC BLOOD PRESSURE: 136 MMHG | HEIGHT: 60 IN | DIASTOLIC BLOOD PRESSURE: 82 MMHG | WEIGHT: 186 LBS | BODY MASS INDEX: 36.52 KG/M2

## 2019-09-12 DIAGNOSIS — R55 SYNCOPE AND COLLAPSE: ICD-10-CM

## 2019-09-12 DIAGNOSIS — R41.3 IMPAIRED MEMORY: Primary | ICD-10-CM

## 2019-09-12 DIAGNOSIS — I10 ESSENTIAL HYPERTENSION: ICD-10-CM

## 2019-09-12 PROCEDURE — 99214 OFFICE O/P EST MOD 30 MIN: CPT | Performed by: CLINICAL NURSE SPECIALIST

## 2019-09-12 RX ORDER — OMEPRAZOLE 40 MG/1
40 CAPSULE, DELAYED RELEASE ORAL DAILY
Refills: 5 | COMMUNITY
Start: 2019-09-10

## 2019-09-12 RX ORDER — PREDNISONE 10 MG/1
TABLET ORAL
Refills: 0 | COMMUNITY
Start: 2019-09-10 | End: 2019-10-10

## 2019-09-12 RX ORDER — LEVOFLOXACIN 500 MG/1
500 TABLET, FILM COATED ORAL DAILY
Refills: 0 | COMMUNITY
Start: 2019-09-10 | End: 2019-10-10
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
## 2019-09-12 NOTE — PATIENT INSTRUCTIONS
Epilepsy  Epilepsy is a condition in which a person has repeated seizures over time. A seizure is a sudden burst of abnormal electrical and chemical activity in the brain. Seizures can cause a change in attention, behavior, or the ability to remain awake and alert (altered mental status).  Epilepsy increases a person's risk of falls, accidents, and injury. It can also lead to complications, including:  · Depression.  · Poor memory.  · Sudden unexplained death in epilepsy (SUDEP). This complication is rare, and its cause is not known.  Most people with epilepsy lead normal lives.  What are the causes?  This condition may be caused by:  · A head injury.  · An injury that happens at birth.  · A high fever during childhood.  · A stroke.  · Bleeding that goes into or around the brain.  · Certain medicines and drugs.  · Having too little oxygen for a long period of time.  · Abnormal brain development.  · Certain infections, such as meningitis and encephalitis.  · Brain tumors.  · Conditions that are passed along from parent to child (are hereditary).  What are the signs or symptoms?  Symptoms of a seizure vary greatly from person to person. They include:  · Convulsions.  · Stiffening of the body.  · Involuntary movements of the arms or legs.  · Loss of consciousness.  · Breathing problems.  · Falling suddenly.  · Confusion.  · Head nodding.  · Eye blinking or fluttering.  · Lip smacking.  · Drooling.  · Rapid eye movements.  · Grunting.  · Loss of bladder control and bowel control.  · Staring.  · Unresponsiveness.  Some people have symptoms right before a seizure happens (aura) and right after a seizure happens. Symptoms of an aura include:  · Fear or anxiety.  · Nausea.  · Feeling like the room is spinning (vertigo).  · A feeling of having seen or heard something before (dona vu).  · Odd tastes or smells.  · Changes in vision, such as seeing flashing lights or spots.  Symptoms that follow a seizure  include:  · Confusion.  · Sleepiness.  · Headache.  How is this diagnosed?  This condition is diagnosed based on:  · Your symptoms.  · Your medical history.  · A physical exam.  · A neurological exam. A neurological exam is similar to a physical exam. It involves checking your strength, reflexes, coordination, and sensations.  · Tests, such as:  ? An electroencephalogram (EEG). This is a painless test that creates a diagram of your brain waves.  ? An MRI of the brain.  ? A CT scan of the brain.  ? A lumbar puncture, also called a spinal tap.  ? Blood tests to check for signs of infection or abnormal blood chemistry.  How is this treated?  There is no cure for this condition, but treatment can help control seizures. Treatment may involve:  · Taking medicines to control seizures. These include medicines to prevent seizures and medicines to stop seizures as they occur.  · Having a device called a vagus nerve stimulator implanted in the chest. The device sends electrical impulses to the vagus nerve and to the brain to prevent seizures. This treatment may be recommended if medicines do not help.  · Brain surgery. There are several kinds of surgeries that may be done to stop seizures from happening or to reduce how often seizures happen.  · Having regular blood tests. You may need to have blood tests regularly to check that you are getting the right amount of medicine.  Once this condition has been diagnosed, it is important to begin treatment as soon as possible. For some people, epilepsy eventually goes away.  Follow these instructions at home:  Medicines    · Take over-the-counter and prescription medicines only as told by your health care provider.  · Avoid any substances that may prevent your medicine from working properly, such as alcohol.  Activity  · Get enough rest. Lack of sleep can make seizures more likely to occur.  · Follow instructions from your health care provider about driving, swimming, and doing any  other activities that would be dangerous if you had a seizure.  Educating others  Teach friends and family what to do if you have a seizure. They should:  · Lay you on the ground to prevent a fall.  · Cushion your head and body.  · Loosen any tight clothing around your neck.  · Turn you on your side. If vomiting occurs, this helps keep your airway clear.  · Stay with you until you recover.  · Not hold you down. Holding you down will not stop the seizure.  · Not put anything in your mouth.  · Know whether or not you need emergency care.  General instructions  · Avoid anything that has ever triggered a seizure for you.  · Keep a seizure diary. Record what you remember about each seizure, especially anything that might have triggered the seizure.  · Keep all follow-up visits as told by your health care provider. This is important.  Contact a health care provider if:  · Your seizure pattern changes.  · You have symptoms of infection or another illness. This might increase your risk of having a seizure.  Get help right away if:  · You have a seizure that does not stop after 5 minutes.  · You have several seizures in a row without a complete recovery in between seizures.  · You have a seizure that makes it harder to breathe.  · You have a seizure that is different from previous seizures.  · You have a seizure that leaves you unable to speak or use a part of your body.  · You did not wake up immediately after a seizure.  This information is not intended to replace advice given to you by your health care provider. Make sure you discuss any questions you have with your health care provider.  Document Released: 12/18/2006 Document Revised: 07/15/2017 Document Reviewed: 06/27/2017  [employer] Interactive Patient Education © 2019 [employer]

## 2019-09-17 ENCOUNTER — APPOINTMENT (OUTPATIENT)
Dept: CT IMAGING | Facility: HOSPITAL | Age: 74
End: 2019-09-17

## 2019-09-17 ENCOUNTER — HOSPITAL ENCOUNTER (OUTPATIENT)
Dept: CT IMAGING | Facility: HOSPITAL | Age: 74
Discharge: HOME | End: 2019-09-17
Admitting: PHYSICIAN ASSISTANT

## 2019-09-17 DIAGNOSIS — R91.1 PULMONARY NODULE: ICD-10-CM

## 2019-09-17 PROCEDURE — 71250 CT THORAX DX C-: CPT

## 2019-09-18 NOTE — TELEPHONE ENCOUNTER
I have called the patient today and went over the date for the thyroid biopsy and the dates she needs to stop aspirin and plavix and start lovenox. I had previously  given the instructions to patient and the daughter. Patient states she has all the instructions. She did state she has already stopped her meds. I have told her to restart everything and not to stop anything outside of our schedule.

## 2019-09-18 NOTE — TELEPHONE ENCOUNTER
SHANA - PT CALLED THE OFFICE STATING SHE DIDN'T KNOW HOW TO PROCEED WITH LOVENOX OR BLOOD THINNERS. I SEE WHERE IT SAYS YOU CONTACTED HER EXPLAINING THIS, BUT PT SAYS IT MAY HAVE BEEN HER DAUGHTER. SHE IS UNSURE WITH PROCEDURE THE LOVENOX IS FOR    I AM UNSURE OF HER SURGERY DATE, BUT CAN YOU PLEASE TOUCH BASE WITH PT? DOESN'T SOUND LIKE SHES STARTED LOVENOX YET (DR ORDOÑEZ WANTS THAT EVERY 12 HOURS FOR 1 WEEK PRIOR) & I DIDN'T WANT TO TELL HER TO BEGIN INJECTIONS IF SOMETHING HAD CHANGED

## 2019-09-19 ENCOUNTER — TRANSCRIBE ORDERS (OUTPATIENT)
Dept: ADMINISTRATIVE | Facility: HOSPITAL | Age: 74
End: 2019-09-19

## 2019-09-19 DIAGNOSIS — R91.1 LUNG NODULE: Primary | ICD-10-CM

## 2019-09-19 DIAGNOSIS — E04.1 NONTOXIC UNINODULAR GOITER: ICD-10-CM

## 2019-09-23 ENCOUNTER — HOSPITAL ENCOUNTER (OUTPATIENT)
Dept: CT IMAGING | Facility: HOSPITAL | Age: 74
Discharge: HOME | End: 2019-09-23

## 2019-09-23 ENCOUNTER — TELEPHONE (OUTPATIENT)
Dept: CARDIOLOGY | Facility: CLINIC | Age: 74
End: 2019-09-23

## 2019-09-23 ENCOUNTER — HOSPITAL ENCOUNTER (OUTPATIENT)
Dept: ULTRASOUND IMAGING | Facility: HOSPITAL | Age: 74
Discharge: HOME | End: 2019-09-23
Admitting: INTERNAL MEDICINE

## 2019-09-23 DIAGNOSIS — E04.1 NONTOXIC UNINODULAR GOITER: ICD-10-CM

## 2019-09-23 DIAGNOSIS — R91.1 LUNG NODULE: ICD-10-CM

## 2019-09-23 PROCEDURE — A9552 F18 FDG: HCPCS | Performed by: INTERNAL MEDICINE

## 2019-09-23 PROCEDURE — 78815 PET IMAGE W/CT SKULL-THIGH: CPT

## 2019-09-23 PROCEDURE — 0 FLUDEOXYGLUCOSE F18 SOLUTION: Performed by: INTERNAL MEDICINE

## 2019-09-23 RX ADMIN — FLUDEOXYGLUCOSE F18 1 DOSE: 300 INJECTION INTRAVENOUS at 12:23

## 2019-09-23 NOTE — TELEPHONE ENCOUNTER
PT TRIED TO  THE LOVENOX INJECTIONS AND THEY ARE $ 200.00 SHE CAN NOT AFFORD THIS     PLEASE ADVISE

## 2019-09-25 ENCOUNTER — TELEPHONE (OUTPATIENT)
Dept: PULMONOLOGY | Facility: CLINIC | Age: 74
End: 2019-09-25

## 2019-09-25 ENCOUNTER — HOSPITAL ENCOUNTER (OUTPATIENT)
Dept: NEUROLOGY | Age: 74
Setting detail: OBSERVATION
Discharge: HOME | End: 2019-09-26
Attending: PSYCHIATRY & NEUROLOGY | Admitting: PSYCHIATRY & NEUROLOGY
Payer: MEDICARE

## 2019-09-25 ENCOUNTER — TELEPHONE (OUTPATIENT)
Dept: NEUROSURGERY | Age: 74
End: 2019-09-25

## 2019-09-25 DIAGNOSIS — R91.8 LUNG NODULES: Primary | ICD-10-CM

## 2019-09-25 DIAGNOSIS — F17.210 CIGARETTE NICOTINE DEPENDENCE WITHOUT COMPLICATION: ICD-10-CM

## 2019-09-25 DIAGNOSIS — R94.2 ABNORMAL PET OF LEFT LUNG: ICD-10-CM

## 2019-09-25 PROBLEM — R56.9 CONVULSIONS (HCC): Status: ACTIVE | Noted: 2019-09-25

## 2019-09-25 PROCEDURE — 99219 PR INITIAL OBSERVATION CARE/DAY 50 MINUTES: CPT | Performed by: PSYCHIATRY & NEUROLOGY

## 2019-09-25 PROCEDURE — G0378 HOSPITAL OBSERVATION PER HR: HCPCS

## 2019-09-25 PROCEDURE — 96372 THER/PROPH/DIAG INJ SC/IM: CPT

## 2019-09-25 PROCEDURE — 94640 AIRWAY INHALATION TREATMENT: CPT

## 2019-09-25 PROCEDURE — 6370000000 HC RX 637 (ALT 250 FOR IP): Performed by: PSYCHIATRY & NEUROLOGY

## 2019-09-25 PROCEDURE — 6360000002 HC RX W HCPCS: Performed by: PSYCHIATRY & NEUROLOGY

## 2019-09-25 PROCEDURE — 95951 PR EEG MONITORING/VIDEORECORD: CPT | Performed by: PSYCHIATRY & NEUROLOGY

## 2019-09-25 PROCEDURE — 95951 HC EEG MONITORING VIDEO RECORDING: CPT

## 2019-09-25 RX ORDER — METOPROLOL SUCCINATE 50 MG/1
50 TABLET, EXTENDED RELEASE ORAL DAILY
Status: DISCONTINUED | OUTPATIENT
Start: 2019-09-25 | End: 2019-09-26 | Stop reason: HOSPADM

## 2019-09-25 RX ORDER — ASPIRIN 81 MG/1
81 TABLET ORAL DAILY
Status: DISCONTINUED | OUTPATIENT
Start: 2019-09-25 | End: 2019-09-25

## 2019-09-25 RX ORDER — ACETAMINOPHEN 500 MG
500 TABLET ORAL EVERY 6 HOURS PRN
Status: DISCONTINUED | OUTPATIENT
Start: 2019-09-25 | End: 2019-09-26 | Stop reason: HOSPADM

## 2019-09-25 RX ORDER — LORAZEPAM 0.5 MG/1
0.5 TABLET ORAL EVERY 8 HOURS PRN
Status: DISCONTINUED | OUTPATIENT
Start: 2019-09-25 | End: 2019-09-25

## 2019-09-25 RX ORDER — FUROSEMIDE 20 MG/1
20 TABLET ORAL DAILY PRN
Status: DISCONTINUED | OUTPATIENT
Start: 2019-09-25 | End: 2019-09-26 | Stop reason: HOSPADM

## 2019-09-25 RX ORDER — LORAZEPAM 0.5 MG/1
0.5 TABLET ORAL EVERY 8 HOURS PRN
Status: DISCONTINUED | OUTPATIENT
Start: 2019-09-25 | End: 2019-09-26 | Stop reason: HOSPADM

## 2019-09-25 RX ORDER — ASPIRIN 81 MG/1
81 TABLET, CHEWABLE ORAL DAILY
Status: DISCONTINUED | OUTPATIENT
Start: 2019-09-25 | End: 2019-09-26 | Stop reason: HOSPADM

## 2019-09-25 RX ORDER — SODIUM CHLORIDE 0.9 % (FLUSH) 0.9 %
10 SYRINGE (ML) INJECTION PRN
Status: DISCONTINUED | OUTPATIENT
Start: 2019-09-25 | End: 2019-09-26 | Stop reason: HOSPADM

## 2019-09-25 RX ORDER — DIPHENHYDRAMINE HCL 25 MG
25 TABLET ORAL EVERY 6 HOURS PRN
COMMUNITY

## 2019-09-25 RX ORDER — SIMVASTATIN 20 MG
20 TABLET ORAL NIGHTLY
Status: DISCONTINUED | OUTPATIENT
Start: 2019-09-25 | End: 2019-09-26 | Stop reason: HOSPADM

## 2019-09-25 RX ORDER — LISINOPRIL 20 MG/1
20 TABLET ORAL DAILY
Status: DISCONTINUED | OUTPATIENT
Start: 2019-09-25 | End: 2019-09-26 | Stop reason: HOSPADM

## 2019-09-25 RX ORDER — CALCIUM CARBONATE 200(500)MG
1 TABLET,CHEWABLE ORAL DAILY
COMMUNITY

## 2019-09-25 RX ORDER — AMLODIPINE BESYLATE 5 MG/1
5 TABLET ORAL DAILY
Status: DISCONTINUED | OUTPATIENT
Start: 2019-09-25 | End: 2019-09-26 | Stop reason: HOSPADM

## 2019-09-25 RX ORDER — SODIUM CHLORIDE 0.9 % (FLUSH) 0.9 %
10 SYRINGE (ML) INJECTION EVERY 12 HOURS SCHEDULED
Status: DISCONTINUED | OUTPATIENT
Start: 2019-09-25 | End: 2019-09-26 | Stop reason: HOSPADM

## 2019-09-25 RX ORDER — CLOPIDOGREL BISULFATE 75 MG/1
75 TABLET ORAL DAILY
Status: DISCONTINUED | OUTPATIENT
Start: 2019-09-25 | End: 2019-09-26 | Stop reason: HOSPADM

## 2019-09-25 RX ORDER — BUDESONIDE 0.5 MG/2ML
0.5 INHALANT ORAL 2 TIMES DAILY
Status: DISCONTINUED | OUTPATIENT
Start: 2019-09-25 | End: 2019-09-26 | Stop reason: HOSPADM

## 2019-09-25 RX ORDER — ONDANSETRON 2 MG/ML
4 INJECTION INTRAMUSCULAR; INTRAVENOUS EVERY 6 HOURS PRN
Status: DISCONTINUED | OUTPATIENT
Start: 2019-09-25 | End: 2019-09-26 | Stop reason: HOSPADM

## 2019-09-25 RX ORDER — LORAZEPAM 2 MG/ML
1 INJECTION INTRAMUSCULAR PRN
Status: DISCONTINUED | OUTPATIENT
Start: 2019-09-25 | End: 2019-09-26 | Stop reason: HOSPADM

## 2019-09-25 RX ORDER — OMEPRAZOLE 20 MG/1
40 CAPSULE, DELAYED RELEASE ORAL DAILY
Status: ON HOLD | COMMUNITY
End: 2019-09-26 | Stop reason: HOSPADM

## 2019-09-25 RX ORDER — DOCUSATE SODIUM 100 MG/1
100 CAPSULE, LIQUID FILLED ORAL DAILY
Status: DISCONTINUED | OUTPATIENT
Start: 2019-09-25 | End: 2019-09-26 | Stop reason: HOSPADM

## 2019-09-25 RX ORDER — DIPHENHYDRAMINE HCL 25 MG
25 TABLET ORAL EVERY 6 HOURS PRN
Status: DISCONTINUED | OUTPATIENT
Start: 2019-09-25 | End: 2019-09-26 | Stop reason: HOSPADM

## 2019-09-25 RX ORDER — CALCIUM CARBONATE 200(500)MG
1 TABLET,CHEWABLE ORAL DAILY
Status: DISCONTINUED | OUTPATIENT
Start: 2019-09-25 | End: 2019-09-26 | Stop reason: HOSPADM

## 2019-09-25 RX ORDER — ALBUTEROL SULFATE 90 UG/1
2 AEROSOL, METERED RESPIRATORY (INHALATION) 4 TIMES DAILY
Status: DISCONTINUED | OUTPATIENT
Start: 2019-09-25 | End: 2019-09-25 | Stop reason: SDUPTHER

## 2019-09-25 RX ORDER — POTASSIUM CHLORIDE 750 MG/1
10 TABLET, EXTENDED RELEASE ORAL 2 TIMES DAILY
Status: DISCONTINUED | OUTPATIENT
Start: 2019-09-25 | End: 2019-09-26 | Stop reason: HOSPADM

## 2019-09-25 RX ORDER — ALBUTEROL SULFATE 2.5 MG/3ML
2.5 SOLUTION RESPIRATORY (INHALATION) 4 TIMES DAILY
Status: DISCONTINUED | OUTPATIENT
Start: 2019-09-25 | End: 2019-09-26 | Stop reason: HOSPADM

## 2019-09-25 RX ADMIN — DOCUSATE SODIUM 100 MG: 100 CAPSULE, LIQUID FILLED ORAL at 21:00

## 2019-09-25 RX ADMIN — POTASSIUM CHLORIDE 10 MEQ: 750 TABLET, EXTENDED RELEASE ORAL at 21:00

## 2019-09-25 RX ADMIN — BUDESONIDE 500 MCG: 0.5 INHALANT RESPIRATORY (INHALATION) at 19:47

## 2019-09-25 RX ADMIN — ALBUTEROL SULFATE 2.5 MG: 2.5 SOLUTION RESPIRATORY (INHALATION) at 19:47

## 2019-09-25 RX ADMIN — ALBUTEROL SULFATE 2.5 MG: 2.5 SOLUTION RESPIRATORY (INHALATION) at 15:08

## 2019-09-25 RX ADMIN — ENOXAPARIN SODIUM 40 MG: 40 INJECTION SUBCUTANEOUS at 14:00

## 2019-09-25 RX ADMIN — LORAZEPAM 0.5 MG: 0.5 TABLET ORAL at 21:05

## 2019-09-25 RX ADMIN — Medication 20 MG: at 21:00

## 2019-09-25 ASSESSMENT — PAIN SCALES - GENERAL: PAINLEVEL_OUTOF10: 0

## 2019-09-25 NOTE — TELEPHONE ENCOUNTER
If cannot afford Lovenox then okay not to take it.  Patient needs to understand small risk of stroke

## 2019-09-25 NOTE — PROGRESS NOTES
4 Eyes Skin Assessment    Marissa Knowles is being assessed upon: Admission    I agree that I, Shine Wing, along with Jana Linda (either 2 RN's or 1 LPN and 1 RN) have performed a thorough Head to Toe Skin Assessment on the patient. ALL assessment sites listed below have been assessed. Areas assessed by both nurses:     [x]   Head, Face, and Ears   [x]   Shoulders, Back, and Chest  [x]   Arms, Elbows, and Hands   [x]   Coccyx, Sacrum, and Ischium  [x]   Legs, Feet, and Heels    Does the Patient have Skin Breakdown? No    Keven Prevention initiated: Yes  Wound Care Orders initiated: No    WOC nurse consulted for Pressure Injury (Stage 3,4, Unstageable, DTI, NWPT, and Complex wounds) and New or Established Ostomies: No  Feet swollen. Both hands with purple discoloration.       Primary Nurse eSignature: Shine Wign RN on 9/25/2019 at 10:54 AM      Co-Signer eSignature: Electronically signed by Jana Linda RN on 9/25/19 at 11:11 AM

## 2019-09-25 NOTE — TELEPHONE ENCOUNTER
I received a PET scan report indicating activity in the patient's lung nodules.  My plan is been to get a follow-up chest CT and if they were increasing in size consider empiric radiation therapy.  She is scheduled to see you in early October and again I think the options if she is going to consider treatment would be empiric radiation.

## 2019-09-26 ENCOUNTER — TELEPHONE (OUTPATIENT)
Dept: NEUROSURGERY | Age: 74
End: 2019-09-26

## 2019-09-26 VITALS
BODY MASS INDEX: 34.96 KG/M2 | TEMPERATURE: 97.2 F | OXYGEN SATURATION: 93 % | SYSTOLIC BLOOD PRESSURE: 128 MMHG | RESPIRATION RATE: 12 BRPM | DIASTOLIC BLOOD PRESSURE: 65 MMHG | HEART RATE: 81 BPM | HEIGHT: 62 IN | WEIGHT: 190 LBS

## 2019-09-26 PROCEDURE — 6360000002 HC RX W HCPCS: Performed by: PSYCHIATRY & NEUROLOGY

## 2019-09-26 PROCEDURE — G0378 HOSPITAL OBSERVATION PER HR: HCPCS

## 2019-09-26 PROCEDURE — 6370000000 HC RX 637 (ALT 250 FOR IP): Performed by: PSYCHIATRY & NEUROLOGY

## 2019-09-26 PROCEDURE — 99217 PR OBSERVATION CARE DISCHARGE MANAGEMENT: CPT | Performed by: PSYCHIATRY & NEUROLOGY

## 2019-09-26 PROCEDURE — 95951 PR EEG MONITORING/VIDEORECORD: CPT | Performed by: PSYCHIATRY & NEUROLOGY

## 2019-09-26 PROCEDURE — 94640 AIRWAY INHALATION TREATMENT: CPT

## 2019-09-26 RX ADMIN — FUROSEMIDE 20 MG: 20 TABLET ORAL at 08:35

## 2019-09-26 RX ADMIN — CLOPIDOGREL BISULFATE 75 MG: 75 TABLET ORAL at 08:35

## 2019-09-26 RX ADMIN — Medication 500 MG: at 08:35

## 2019-09-26 RX ADMIN — Medication 500 MG: at 00:18

## 2019-09-26 RX ADMIN — ALBUTEROL SULFATE 2.5 MG: 2.5 SOLUTION RESPIRATORY (INHALATION) at 07:20

## 2019-09-26 RX ADMIN — POTASSIUM CHLORIDE 10 MEQ: 750 TABLET, EXTENDED RELEASE ORAL at 08:35

## 2019-09-26 RX ADMIN — ASPIRIN 81 MG: 81 TABLET, CHEWABLE ORAL at 08:36

## 2019-09-26 RX ADMIN — METOPROLOL SUCCINATE 50 MG: 50 TABLET, EXTENDED RELEASE ORAL at 08:36

## 2019-09-26 RX ADMIN — ALBUTEROL SULFATE 2.5 MG: 2.5 SOLUTION RESPIRATORY (INHALATION) at 11:21

## 2019-09-26 RX ADMIN — AMLODIPINE BESYLATE 5 MG: 5 TABLET ORAL at 08:36

## 2019-09-26 RX ADMIN — LISINOPRIL 20 MG: 20 TABLET ORAL at 08:35

## 2019-09-26 RX ADMIN — DOCUSATE SODIUM 100 MG: 100 CAPSULE, LIQUID FILLED ORAL at 08:35

## 2019-09-26 RX ADMIN — Medication 500 MG: at 00:20

## 2019-09-26 RX ADMIN — BUDESONIDE 500 MCG: 0.5 INHALANT RESPIRATORY (INHALATION) at 07:19

## 2019-09-26 RX ADMIN — Medication 25 MG: at 08:35

## 2019-09-26 ASSESSMENT — PAIN SCALES - GENERAL
PAINLEVEL_OUTOF10: 2
PAINLEVEL_OUTOF10: 10
PAINLEVEL_OUTOF10: 0
PAINLEVEL_OUTOF10: 0

## 2019-09-26 ASSESSMENT — PAIN DESCRIPTION - FREQUENCY
FREQUENCY: CONTINUOUS
FREQUENCY: CONTINUOUS

## 2019-09-26 ASSESSMENT — PAIN DESCRIPTION - LOCATION
LOCATION: FOOT

## 2019-09-26 ASSESSMENT — PAIN DESCRIPTION - PROGRESSION
CLINICAL_PROGRESSION: GRADUALLY IMPROVING
CLINICAL_PROGRESSION: GRADUALLY IMPROVING

## 2019-09-26 ASSESSMENT — PAIN DESCRIPTION - ORIENTATION
ORIENTATION: RIGHT;LEFT

## 2019-09-26 ASSESSMENT — PAIN DESCRIPTION - DESCRIPTORS
DESCRIPTORS: CONSTANT

## 2019-09-26 ASSESSMENT — PAIN DESCRIPTION - PAIN TYPE
TYPE: ACUTE PAIN

## 2019-09-26 ASSESSMENT — PAIN DESCRIPTION - ONSET
ONSET: GRADUAL
ONSET: GRADUAL

## 2019-09-26 ASSESSMENT — PAIN - FUNCTIONAL ASSESSMENT
PAIN_FUNCTIONAL_ASSESSMENT: ACTIVITIES ARE NOT PREVENTED
PAIN_FUNCTIONAL_ASSESSMENT: ACTIVITIES ARE NOT PREVENTED

## 2019-09-26 NOTE — TELEPHONE ENCOUNTER
The patient needs to be referred to Dr. Silas GUILLEN.  I called the number on the patient's chart and it went to a voicemail for a person name Desmond Mohamud who is not listed as her emergency contact but I did leave a message asking either the patient or 1 of the patient's emergency contacts to please return a phone call to this office to discuss recent test results.  The patient also needs to see an ENT provider but it appears that she has had multiple cancellations with ENT and now has a pending appointment on 10-29-19.

## 2019-09-26 NOTE — TELEPHONE ENCOUNTER
The patient's daughter Lagay called back and I reviewed the results of the PET scan with her.  In reviewing epic, it appears that Dr. Mcnulty is aware of the PET findings and the patient is being worked up in regards to the parotid nodules and the tonsillar uptake.  I recommend a referral to Dr. Rosen regarding the pulmonary nodules and the patient and her daughter are agreeable.  They will keep follow-up with me on 10-8-19.

## 2019-09-26 NOTE — TELEPHONE ENCOUNTER
Patient is needing to set up a two week EMU follow up with Dr.jimmy collins. Manjinder Delarosa requests that office return their call. The best time to reach her is Anytime. Thank you.

## 2019-09-27 ENCOUNTER — TELEPHONE (OUTPATIENT)
Dept: PULMONOLOGY | Facility: CLINIC | Age: 74
End: 2019-09-27

## 2019-09-27 NOTE — PROGRESS NOTES
[facility] Report- [facility]    September 26, 2019    Primary Cardiologist:  Thania  Reason for implant:  syncope  Battery:  ok  Events:  One episode of AF noted and lasted nearly 3 hours.  Pt is on ASA and Plavix.  Changes:  n/a    Follow up:  1 month

## 2019-09-27 NOTE — TELEPHONE ENCOUNTER
Patient called and stated that she hasn't heard back from Dr. Dial and was wondering if she was a good candidate for radiation.

## 2019-09-27 NOTE — TELEPHONE ENCOUNTER
It's Dr. Rosen. Please ask Leida if she got my referral (that I just did it yesterday) for radiation oncology.  And then if 1 of you could call call the patient back and confirm that the referral has been sent and tell her that it will take a couple of days before she hears back from them.

## 2019-09-28 NOTE — PROGRESS NOTES
I have reviewed the notes, assessments, and/or procedures performed by  Kellie Kim PA-C  , I concur with her  documentation of Josi Mohamud.  WILL DISCUSS ANTICOAGULATION FOR PAF, WILL SEE SOON IN OFFICE

## 2019-09-30 ENCOUNTER — APPOINTMENT (OUTPATIENT)
Dept: ULTRASOUND IMAGING | Facility: HOSPITAL | Age: 74
End: 2019-09-30

## 2019-09-30 ENCOUNTER — HOSPITAL ENCOUNTER (OUTPATIENT)
Dept: CT IMAGING | Facility: HOSPITAL | Age: 74
Discharge: HOME | End: 2019-09-30

## 2019-09-30 DIAGNOSIS — R91.8 LUNG NODULES: ICD-10-CM

## 2019-10-07 ENCOUNTER — TELEPHONE (OUTPATIENT)
Dept: CARDIOLOGY | Facility: CLINIC | Age: 74
End: 2019-10-07

## 2019-10-07 ENCOUNTER — OFFICE VISIT (OUTPATIENT)
Dept: CARDIOLOGY | Facility: CLINIC | Age: 74
End: 2019-10-07

## 2019-10-07 VITALS
BODY MASS INDEX: 36.71 KG/M2 | HEIGHT: 60 IN | SYSTOLIC BLOOD PRESSURE: 122 MMHG | HEART RATE: 82 BPM | DIASTOLIC BLOOD PRESSURE: 60 MMHG | OXYGEN SATURATION: 96 % | WEIGHT: 187 LBS

## 2019-10-07 DIAGNOSIS — K21.9 GASTROESOPHAGEAL REFLUX DISEASE, ESOPHAGITIS PRESENCE NOT SPECIFIED: ICD-10-CM

## 2019-10-07 DIAGNOSIS — I48.92 ATRIAL FLUTTER WITH RAPID VENTRICULAR RESPONSE (HCC): ICD-10-CM

## 2019-10-07 DIAGNOSIS — I10 ESSENTIAL HYPERTENSION: ICD-10-CM

## 2019-10-07 DIAGNOSIS — J44.9 COPD, MODERATE (HCC): ICD-10-CM

## 2019-10-07 DIAGNOSIS — E04.1 THYROID NODULE: ICD-10-CM

## 2019-10-07 DIAGNOSIS — R94.8 ABNORMAL POSITRON EMISSION TOMOGRAPHY (PET) SCAN: Primary | ICD-10-CM

## 2019-10-07 DIAGNOSIS — E66.3 OVERWEIGHT: ICD-10-CM

## 2019-10-07 DIAGNOSIS — F17.210 CIGARETTE NICOTINE DEPENDENCE WITHOUT COMPLICATION: ICD-10-CM

## 2019-10-07 DIAGNOSIS — J45.909 PERSISTENT ASTHMA WITHOUT COMPLICATION, UNSPECIFIED ASTHMA SEVERITY: ICD-10-CM

## 2019-10-07 DIAGNOSIS — Z95.5 STENTED CORONARY ARTERY: ICD-10-CM

## 2019-10-07 DIAGNOSIS — I25.119 CORONARY ARTERY DISEASE INVOLVING NATIVE CORONARY ARTERY OF NATIVE HEART WITH ANGINA PECTORIS (HCC): ICD-10-CM

## 2019-10-07 DIAGNOSIS — R91.8 LUNG NODULES: ICD-10-CM

## 2019-10-07 DIAGNOSIS — I48.0 PAF (PAROXYSMAL ATRIAL FIBRILLATION) (HCC): ICD-10-CM

## 2019-10-07 DIAGNOSIS — R55 SYNCOPE AND COLLAPSE: ICD-10-CM

## 2019-10-07 DIAGNOSIS — D49.0 PAROTID NEOPLASM: ICD-10-CM

## 2019-10-07 PROCEDURE — 93000 ELECTROCARDIOGRAM COMPLETE: CPT | Performed by: INTERNAL MEDICINE

## 2019-10-07 PROCEDURE — 99214 OFFICE O/P EST MOD 30 MIN: CPT | Performed by: INTERNAL MEDICINE

## 2019-10-07 RX ORDER — ACETAMINOPHEN 500 MG
500 TABLET ORAL EVERY 6 HOURS PRN
COMMUNITY
End: 2020-08-20 | Stop reason: HOSPADM

## 2019-10-07 NOTE — TELEPHONE ENCOUNTER
I have went over the instructions multiple times with both patient and daughter. I have told them what Dr. Monteiro said in regards to when she can stop meds and when to start Lovenox. I rescheduled her biospy once because she did not  the Lovenox due to not understanding what it was for and the cost. I went over the instructions and need for Lovenox again. I am not sure whether the patient has a memory/dementia issue, it is not documented in her history. I honestly don't mean that in a negative way because I really am becoming concerned about her.  I have told the daughter also and neither carry out the instructions as told.     Dr. Monteiro stated in a previous conversation to stop aspirin one week- 7 days prior to biopsy and start lovenox that day. Then stop plavix 5 days prior to biopsy. It is documented in 9/4/2019 telephone call and that is what I have told daughter and patient. I'm sorry that you are having to deal with this.

## 2019-10-07 NOTE — PROGRESS NOTES
ORLIN Wilkinson  [facility]   Respiratory Disease Clinic  [address] [place] [address]  Phone: [phone]  Fax: [phone]     Josi Mohamud is a 73 y.o. female.   CC:   Chief Complaint   Patient presents with   • Follow up lung nodules        HPI: She comes in today accompanied by her niece.  She has a history of lung nodules with recent positive PET activity from last month.  There were multiple bilateral hypermetabolic parotid nodules and she is scheduled for a biopsy with ENT later this month.  There is a lesser degree of uptake in left upper lobe lung nodules.  I made a referral to radiation oncology and the patient is scheduled to see Dr. Rosen in 2 days.  She also has a history of moderate COPD with asthma overlap and a long smoking history.  She is having a lot of issues with anxiety related to all of her health issues.  She comes in today with Lovenox injections and has no idea how to administer them to herself.  She was taken off of Plavix and aspirin at Dr. Monteiro's office and is supposed to start Lovenox injections today.  I demonstrated how to load the injection needle and gave her first Lovenox injection in the office today.  I told the patient and her niece her that I am concerned about the patient being able to give these injections herself because of the arthritic changes in the patient's hands.  I recommended to the patient that she have a family member help her with this.  She will apparently be on Lovenox until after her biopsy is done by ENT later this month.    She has multiple other complaints today including head and chest congestion, decreased hearing and a feeling of pressure in her ears.  On exam, the patient has a left middle ear effusion and a very congested sounding cough.  She was recently treated with a round of Levaquin from her PCPs office without relief.  She is due for an updated flu shot but I told the patient I recommend she wait until  she is feeling better.    The following portions of the patient's history were reviewed and updated as appropriate: allergies, current medications, past family history, past medical history, past social history, past surgical history and problem list.    Past Medical History:   Diagnosis Date   • Cancer (CMS/HCC)     HODGKINS LYMPHOMA   • COPD (chronic obstructive pulmonary disease) (CMS/HCC)    • Coronary artery disease    • History of transfusion    • Hyperlipidemia    • Hypertension    • Persistent asthma without complication 7/8/2019       Family History   Problem Relation Age of Onset   • Esophageal cancer Brother    • Lung cancer Brother    • Breast cancer Neg Hx        Social History     Socioeconomic History   • Marital status:      Spouse name: Not on file   • Number of children: Not on file   • Years of education: Not on file   • Highest education level: Not on file   Tobacco Use   • Smoking status: Current Every Day Smoker     Packs/day: 0.00     Years: 57.00     Pack years: 0.00   • Smokeless tobacco: Never Used   • Tobacco comment: Pt started patches 7/15/19.   Substance and Sexual Activity   • Alcohol use: No   • Drug use: No   • Sexual activity: Defer       Review of Systems   Constitutional: Negative for appetite change, chills, fatigue and fever.   HENT: Positive for congestion and sinus pressure. Negative for swollen glands, trouble swallowing and voice change.    Eyes: Negative for visual disturbance.   Respiratory: Positive for cough and shortness of breath. Negative for wheezing.    Cardiovascular: Positive for leg swelling. Negative for chest pain.   Gastrointestinal: Negative for abdominal distention, abdominal pain, nausea and vomiting.   Genitourinary: Negative.    Musculoskeletal: Negative for gait problem and myalgias.   Skin: Negative.    Neurological: Negative for weakness.   Hematological: Negative.    Psychiatric/Behavioral: Positive for stress. The patient is nervous/anxious.   "      /80   Pulse 88   Ht 152.4 cm (60\")   Wt 88 kg (194 lb)   SpO2 96% Comment: RA  Breastfeeding? No   BMI 37.89 kg/m²     Physical Exam   Constitutional: She is oriented to person, place, and time. She appears well-developed and well-nourished. No distress. She is obese.  HENT:   Head: Normocephalic and atraumatic.   Left Ear: Tympanic membrane is injected. A middle ear effusion is present.   Mouth/Throat: Posterior oropharyngeal erythema present.   Eyes: Pupils are equal, round, and reactive to light. No scleral icterus.   Neck: Normal range of motion. Neck supple.   Cardiovascular: Normal rate, regular rhythm, S1 normal and S2 normal.   Pulmonary/Chest: Effort normal. No tachypnea. She has decreased breath sounds. She has no wheezes. She has no rhonchi. She has no rales.   Congested sounding cough   Abdominal: Soft. Bowel sounds are normal. She exhibits no distension.   Musculoskeletal: Normal range of motion. She exhibits edema (2-3+ BLE) and deformity (Arthritic changes to fingers and hands).   Uses a rolling walker    Lymphadenopathy:     She has no cervical adenopathy.   Neurological: She is alert and oriented to person, place, and time.   Skin: Skin is warm and dry. No rash noted. No cyanosis. Nails show no clubbing.   Psychiatric: Her speech is normal and behavior is normal. Her mood appears anxious.   Vitals reviewed.      Josi was seen today for follow up lung nodules.    Diagnoses and all orders for this visit:    Abnormal PET of left lung    COPD exacerbation (CMS/[facility])  -     albuterol sulfate  (90 Base) MCG/ACT inhaler; Inhale 1 puff Daily.  -     fluticasone-salmeterol (ADVAIR) 250-50 MCG/DOSE DISKUS; Inhale 1 puff 2 (Two) Times a Day.  -     tiotropium bromide monohydrate (SPIRIVA RESPIMAT) 2.5 MCG/ACT aerosol solution inhaler; Inhale 2 puffs Daily.  -     methylPREDNISolone acetate (DEPO-medrol) injection 80 mg  -     doxycycline (VIBRAMYCIN) 100 MG capsule; Take 1 capsule by " mouth 2 (Two) Times a Day.    Lung nodules    Smoker    Class 2 drug-induced obesity with body mass index (BMI) of 36.0 to 36.9 in adult, unspecified whether serious comorbidity present    Injection education, encounter for    Acute effusion of left ear  -     doxycycline (VIBRAMYCIN) 100 MG capsule; Take 1 capsule by mouth 2 (Two) Times a Day.      Patient's Body mass index is 37.89 kg/m². BMI is above normal parameters. Recommendations include: referral to primary care.      Follow-up/Plan: Education on injecting Lovenox was provided to the patient and her niece Kathryn. All of her pulmonary medications were refilled and I recommend she start back on Mucinex twice a day.  A prescription for doxycycline was sent to the patient's pharmacy and she also received a Depo-Medrol injection in the office today.  She is scheduled to see Dr. Rosen this Thursday regarding possible radiation treatment based on the results of her recent PET scan from 9-23-19.  Return to clinic to see Dr. Frazier in approximately 3 months.    Jeremías Hernandez, APRN  10/8/2019  3:17 PM

## 2019-10-07 NOTE — TELEPHONE ENCOUNTER
SHANA - PT & HER DAUGHTER WERE IN OFFICE TODAY SEEING DR ORDOÑEZ. PT SAYS THAT SHE STOPPED HER PLAVIX ON 10/4/19 & THAT SHE WAS TOLD TO HOLD IT 10 DAYS PRIOR TO HER UPCOMING THYROID BIOPSY(SCHEDULED FOR 10/14/19). SHE IS STILL TAKING ASA.    SHE HAD HER VIALS OF LOVENOX WITH HER IN OFFICE & STATES THAT SHE DID NOT KNOW WHEN TO START THEM OR HOW TO USE THEM.    DR ORODÑEZ ASKED THAT I CONTACT YOU IN REGARDS TO CLARIFICATION ON THIS MATTER. FROM OUR PREVIOUS CONVERSATION & WHAT THE PT UNDERSTANDS, WE SEEM TO BE GETTING TWO DIFFERENT SETS OF INSTRUCTIONS. DR ORDOÑEZ TOLD THE PT TODAY THAT WE CAN GIVE & INSTRUCT HER ON HOW TO USE HER FIRST LOVENOX INJECTION IF NEEDED.    PLEASE SPECIFY IF PT NEEDS TO STOP BOTH PLAVIX & ASA & WHEN.   ALSO, WHEN TO BEGIN & STOP HER LOVENOX.

## 2019-10-07 NOTE — PROGRESS NOTES
Josi Mohamud  [id number]  1945  73 y.o.  female    Referring Provider: Cayetano Crews MD    Reason for  Visit: Being seen sooner   Prior syncope  Lung nodule  planned for radiation  Still smoking   Was told cannot stand chemotherapy  Paroxysmal atrial fibrillation   preoperative cardiovascular clearance for thyroid biopsy    Subjective        Feels  the same overall  No new events or complaints since last visit   Overall the patient feels no major change from baseline symptoms   Similar symptoms as during last visit     Mild chronic exertional shortness of breath on exertion relieved with rest  No significant cough or wheezing  Going on for several months or longer    No palpitations  No associated chest pain  Mild pedal edema responds to PO diuretics    No further chest pain    No fever or chills  No significant expectoration    No hemoptysis  No presyncope or syncope     Joint pain in small, medium and large joints   Chronic low back pain        History of present illness:  Josi Mohamud is a 73 y.o. yo female with history of Essential Hypertension   Hyperlipidemia  who presents today for   Chief Complaint   Patient presents with   • Coronary Artery Disease     3 MO F/U   • Pre-op Exam     THYROID BIOPSY SCHEDULED FOR 10/14/19 - PT HAS LOVENOX - WAS TOLD TO HOLD PLAVIX X 10 DAYS PRIOR (PT HAS STOPPED PLAVIX) - NEEDING LOVENOX DIRECTIONS   .    History  Past Medical History:   Diagnosis Date   • Cancer (CMS/HCC)     HODGKINS LYMPHOMA   • COPD (chronic obstructive pulmonary disease) (CMS/HCC)    • Coronary artery disease    • History of transfusion    • Hyperlipidemia    • Hypertension    • Persistent asthma without complication 7/8/2019   ,   Past Surgical History:   Procedure Laterality Date   • APPENDECTOMY     • APPENDECTOMY     • CARDIAC CATHETERIZATION     • CARDIAC ELECTROPHYSIOLOGY PROCEDURE N/A 5/23/2017    Procedure: Loop insertion; LINQ;  Surgeon: Jose Monteiro MD;  Location: [facility]  INVASIVE LOCATION;  Service:    • CHOLECYSTECTOMY     • CORONARY ANGIOPLASTY WITH STENT PLACEMENT      X 1    • LIVER BIOPSY     • SPLENECTOMY     • US GUIDED FINE NEEDLE ASPIRATION  8/7/2018   ,   Family History   Problem Relation Age of Onset   • Esophageal cancer Brother    • Lung cancer Brother    • Breast cancer Neg Hx    ,   Social History     Tobacco Use   • Smoking status: Former Smoker     Packs/day: 0.00     Years: 57.00     Pack years: 0.00   • Smokeless tobacco: Never Used   • Tobacco comment: Pt started patches 7/15/19.   Substance Use Topics   • Alcohol use: No   • Drug use: No   ,     Medications  Current Outpatient Medications   Medication Sig Dispense Refill   • acetaminophen (TYLENOL) 500 MG tablet Take 500 mg by mouth Every 6 (Six) Hours As Needed for Mild Pain .     • albuterol (PROVENTIL HFA;VENTOLIN HFA) 108 (90 BASE) MCG/ACT inhaler Inhale 1 puff Daily.     • amLODIPine (NORVASC) 5 MG tablet Take 5 mg by mouth Daily.     • aspirin 81 MG EC tablet Take 81 mg by mouth Daily.     • calcium citrate-vitamin d (CALCITRATE) 315-250 MG-UNIT tablet tablet Take 1 tablet by mouth Daily.     • clopidogrel (PLAVIX) 75 MG tablet Take 75 mg by mouth Daily.     • diphenhydrAMINE (BENADRYL) 25 mg capsule Take 25 mg by mouth Every 6 (Six) Hours As Needed for Itching.     • fluticasone-salmeterol (ADVAIR) 250-50 MCG/DOSE DISKUS Inhale 1 puff 2 (Two) Times a Day.     • furosemide (LASIX) 20 MG tablet Take 20 mg by mouth Daily As Needed (for swelling).     • lisinopril (PRINIVIL,ZESTRIL) 20 MG tablet Take 20 mg by mouth Daily.     • LORazepam (ATIVAN) 2 MG tablet Take 1 mg by mouth 2 (Two) Times a Day. Take 1/2 tab by mouth BID      • metoprolol succinate XL (TOPROL-XL) 50 MG 24 hr tablet Take 50 mg by mouth Daily.     • nitroglycerin (NITROSTAT) 0.4 MG SL tablet Place 0.4 mg under the tongue Every 5 (Five) Minutes As Needed for Chest Pain. Take no more than 3 doses in 15 minutes.     • omeprazole (priLOSEC) 40 MG  "capsule Take 40 mg by mouth Daily.  5   • potassium chloride (K-DUR) 10 MEQ CR tablet Take 10 mEq by mouth. 3 TIMES A WEEK     • simvastatin (ZOCOR) 20 MG tablet Take 20 mg by mouth Daily.     • tiotropium (SPIRIVA) 18 MCG per inhalation capsule Place 1 capsule into inhaler and inhale Daily.     • guaiFENesin (MUCINEX) 600 MG 12 hr tablet Take 2 tablets by mouth 2 (Two) Times a Day As Needed for Cough. 15 tablet 0   • levoFLOXacin (LEVAQUIN) 500 MG tablet Take 500 mg by mouth Daily.  0   • predniSONE (DELTASONE) 10 MG tablet TAKE ONE TABLET BY MOUTH THREE TIMES A DAY FOR 3 DAYS THEN 1 TABLET 2 TIMES A DAY FOR 3 DAYS THEN 1 TABLET DAILY FOR 3 DAYS THEN STOP  0     No current facility-administered medications for this visit.        Allergies:  Penicillins    Review of Systems  Review of Systems   Constitution: Positive for weakness and malaise/fatigue.   HENT: Negative.    Eyes: Negative.    Cardiovascular: Positive for dyspnea on exertion and leg swelling. Negative for chest pain, claudication, cyanosis, irregular heartbeat, near-syncope, orthopnea, palpitations, paroxysmal nocturnal dyspnea and syncope.   Respiratory: Negative.    Endocrine: Negative.    Hematologic/Lymphatic: Negative.    Skin: Negative.    Musculoskeletal: Positive for arthritis, joint pain and stiffness.   Gastrointestinal: Negative for anorexia.   Genitourinary: Negative.    Psychiatric/Behavioral: Negative.        Objective     Physical Exam:  /60   Pulse 82   Ht 152.4 cm (60\")   Wt 84.8 kg (187 lb)   SpO2 96%   BMI 36.52 kg/m²     Physical Exam   Constitutional: She appears well-developed.   HENT:   Head: Normocephalic.   Neck: Normal carotid pulses and no JVD present. No tracheal tenderness present. Carotid bruit is not present. No tracheal deviation and no edema present.   Cardiovascular: Regular rhythm and normal pulses.   Murmur heard.   Systolic murmur is present with a grade of 2/6.  Pulmonary/Chest: Effort normal. No stridor. "   Abdominal: Soft. She exhibits no distension. There is no hepatosplenomegaly. There is no tenderness.     Vascular Status -  Her right foot exhibits abnormal foot edema. Her left foot exhibits abnormal foot edema.  Neurological: She is alert. She has normal strength. No cranial nerve deficit or sensory deficit.   Skin: Skin is warm.   Psychiatric: She has a normal mood and affect. Her speech is normal and behavior is normal.       Results Review:      Results for orders placed during the hospital encounter of 06/02/17   Adult Stress Echo Only    Narrative · Left ventricular systolic function is normal. Estimated EF = 55%.  · . Normal stress echo with no significant echocardiographic evidence for   myocardial ischemia.              ECG 12 Lead  Date/Time: 10/7/2019 11:29 AM  Performed by: Jose Monteiro MD  Authorized by: Jose Monteiro MD   Comparison: compared with previous ECG from 7/19/2019  Similar to previous ECG  Rhythm: sinus rhythm  Rate: normal  Conduction: left bundle branch block  QRS axis: normal    Clinical impression: abnormal EKG            Assessment/Plan   Josi was seen today for coronary artery disease and pre-op exam.    Diagnoses and all orders for this visit:    Abnormal positron emission tomography (PET) scan    Essential hypertension    Coronary artery disease involving native coronary artery of native heart with angina pectoris (CMS/HCC)    Stented coronary artery    Syncope and collapse    Cigarette nicotine dependence without complication    Persistent asthma without complication, unspecified asthma severity    Lung nodules    COPD, moderate (CMS/HCC)    Overweight    Gastroesophageal reflux disease, esophagitis presence not specified    Thyroid nodule    Parotid neoplasm    Atrial flutter with rapid ventricular response (CMS/HCC) on LINQ ? BRIEF    PAF (paroxysmal atrial fibrillation) (CMS/HCC)    Other orders  -     ECG 12 Lead           Plan    Monitor cardiac rhythm device LINQ function  regularly per established schedule or PRN     Conservative medical therapy per patient. Risks, benefits and alternatives explained. The patient understands and wishes to proceed with only conservative therapy.  The patient expressively declined any non invasive testing or treatment      Patient was told to stop Plavix and Aspirin 10 days prior to procedure  She will stop by the ENT MD office   Lovenox bridging prescribed     Will need to be on anticoagulation in future    Weigh yourself frequently, at least weekly, preferably daily, call me if more than 2 pounds a day or 5 pounds a week weight gain.  Flexible diuretic dosing     ____________________________________________________________________________________________________________________________________________  Health maintenance and recommendations      Similar recommendations as last visit       Offered to give patient  a copy of my notes       Questions were encouraged, asked and answered to the patient's  understanding and satisfaction. Questions if any regarding current medications and side effects, need for refills and importance of compliance to medications stressed.    Reviewed available prior notes, consults, prior visits, laboratory findings, radiology and cardiology relevant reports. Updated chart as applicable. I have reviewed the patient's medical history in detail and updated the computerized patient record as relevant.      Updated patient regarding any new or relevant abnormalities on review of records or any new findings on physical exam. Mentioned to patient about purpose of visit and desirable health short and long term goals and objectives.    Primary to monitor CBC CMP Lipid panel and TSH as applicable    ___________________________________________________________________________________________________________________________________________          Return in about 6 weeks (around 11/18/2019).

## 2019-10-08 ENCOUNTER — OFFICE VISIT (OUTPATIENT)
Dept: PULMONOLOGY | Facility: CLINIC | Age: 74
End: 2019-10-08

## 2019-10-08 VITALS
HEIGHT: 60 IN | HEART RATE: 88 BPM | SYSTOLIC BLOOD PRESSURE: 140 MMHG | DIASTOLIC BLOOD PRESSURE: 80 MMHG | OXYGEN SATURATION: 96 % | BODY MASS INDEX: 38.09 KG/M2 | WEIGHT: 194 LBS

## 2019-10-08 DIAGNOSIS — R94.2 ABNORMAL PET OF LEFT LUNG: Primary | ICD-10-CM

## 2019-10-08 DIAGNOSIS — J44.1 COPD EXACERBATION (HCC): ICD-10-CM

## 2019-10-08 DIAGNOSIS — R91.8 LUNG NODULES: ICD-10-CM

## 2019-10-08 DIAGNOSIS — E66.1 CLASS 2 DRUG-INDUCED OBESITY WITH BODY MASS INDEX (BMI) OF 36.0 TO 36.9 IN ADULT, UNSPECIFIED WHETHER SERIOUS COMORBIDITY PRESENT: ICD-10-CM

## 2019-10-08 DIAGNOSIS — F17.200 SMOKER: ICD-10-CM

## 2019-10-08 DIAGNOSIS — Z71.89 INJECTION EDUCATION, ENCOUNTER FOR: ICD-10-CM

## 2019-10-08 DIAGNOSIS — H65.192 ACUTE EFFUSION OF LEFT EAR: ICD-10-CM

## 2019-10-08 PROCEDURE — 99214 OFFICE O/P EST MOD 30 MIN: CPT | Performed by: NURSE PRACTITIONER

## 2019-10-08 PROCEDURE — 96372 THER/PROPH/DIAG INJ SC/IM: CPT | Performed by: NURSE PRACTITIONER

## 2019-10-08 RX ORDER — DOXYCYCLINE HYCLATE 100 MG/1
100 CAPSULE ORAL 2 TIMES DAILY
Qty: 20 CAPSULE | Refills: 0 | Status: SHIPPED | OUTPATIENT
Start: 2019-10-08 | End: 2019-10-28

## 2019-10-08 RX ORDER — METHYLPREDNISOLONE ACETATE 80 MG/ML
80 INJECTION, SUSPENSION INTRA-ARTICULAR; INTRALESIONAL; INTRAMUSCULAR; SOFT TISSUE ONCE
Status: COMPLETED | OUTPATIENT
Start: 2019-10-08 | End: 2019-10-08

## 2019-10-08 RX ORDER — ALBUTEROL SULFATE 90 UG/1
1 AEROSOL, METERED RESPIRATORY (INHALATION) DAILY
Qty: 1 INHALER | Refills: 6 | Status: SHIPPED | OUTPATIENT
Start: 2019-10-08 | End: 2020-08-12 | Stop reason: SDUPTHER

## 2019-10-08 RX ADMIN — METHYLPREDNISOLONE ACETATE 80 MG: 80 INJECTION, SUSPENSION INTRA-ARTICULAR; INTRALESIONAL; INTRAMUSCULAR; SOFT TISSUE at 12:11

## 2019-10-08 NOTE — PATIENT INSTRUCTIONS
Enoxaparin injection  What is this medicine?  ENOXAPARIN (ee nox a PA rin) is used after knee, hip, or abdominal surgeries to prevent blood clotting. It is also used to treat existing blood clots in the lungs or in the veins.  This medicine may be used for other purposes; ask your health care provider or pharmacist if you have questions.  COMMON BRAND NAME(S): Lovenox  What should I tell my health care provider before I take this medicine?  They need to know if you have any of these conditions:  -bleeding disorders, hemorrhage, or hemophilia  -infection of the heart or heart valves  -kidney or liver disease  -previous stroke  -prosthetic heart valve  -recent surgery or delivery of a baby  -ulcer in the stomach or intestine, diverticulitis, or other bowel disease  -an unusual or allergic reaction to enoxaparin, heparin, pork or pork products, other medicines, foods, dyes, or preservatives  -pregnant or trying to get pregnant  -breast-feeding  How should I use this medicine?  This medicine is for injection under the skin. It is usually given by a health-care professional. You or a family member may be trained on how to give the injections. If you are to give yourself injections, make sure you understand how to use the syringe, measure the dose if necessary, and give the injection. To avoid bruising, do not rub the site where this medicine has been injected. Do not take your medicine more often than directed. Do not stop taking except on the advice of your doctor or health care professional.  Make sure you receive a puncture-resistant container to dispose of the needles and syringes once you have finished with them. Do not reuse these items. Return the container to your doctor or health care professional for proper disposal.  Talk to your pediatrician regarding the use of this medicine in children. Special care may be needed.  Overdosage: If you think you have taken too much of this medicine contact a poison control  center or emergency room at once.  NOTE: This medicine is only for you. Do not share this medicine with others.  What if I miss a dose?  If you miss a dose, take it as soon as you can. If it is almost time for your next dose, take only that dose. Do not take double or extra doses.  What may interact with this medicine?  -aspirin and aspirin-like medicines  -certain medicines that treat or prevent blood clots  -dipyridamole  -NSAIDs, medicines for pain and inflammation, like ibuprofen or naproxen  This list may not describe all possible interactions. Give your health care provider a list of all the medicines, herbs, non-prescription drugs, or dietary supplements you use. Also tell them if you smoke, drink alcohol, or use illegal drugs. Some items may interact with your medicine.  What should I watch for while using this medicine?  Visit your healthcare professional for regular checks on your progress. You may need blood work done while you are taking this medicine. Your condition will be monitored carefully while you are receiving this medicine. It is important not to miss any appointments.  If you are going to need surgery or other procedure, tell your healthcare professional that you are using this medicine.  Using this medicine for a long time may weaken your bones and increase the risk of bone fractures.  Avoid sports and activities that might cause injury while you are using this medicine. Severe falls or injuries can cause unseen bleeding. Be careful when using sharp tools or knives. Consider using an electric razor. Take special care brushing or flossing your teeth. Report any injuries, bruising, or red spots on the skin to your healthcare professional.  Wear a medical ID bracelet or chain. Carry a card that describes your disease and details of your medicine and dosage times.  What side effects may I notice from receiving this medicine?  Side effects that you should report to your doctor or health care  professional as soon as possible:  -allergic reactions like skin rash, itching or hives, swelling of the face, lips, or tongue  -bone pain  -signs and symptoms of bleeding such as bloody or black, tarry stools; red or dark-brown urine; spitting up blood or brown material that looks like coffee grounds; red spots on the skin; unusual bruising or bleeding from the eye, gums, or nose  -signs and symptoms of a blood clot such as chest pain; shortness of breath; pain, swelling, or warmth in the leg  -signs and symptoms of a stroke such as changes in vision; confusion; trouble speaking or understanding; severe headaches; sudden numbness or weakness of the face, arm or leg; trouble walking; dizziness; loss of coordination  Side effects that usually do not require medical attention (report to your doctor or health care professional if they continue or are bothersome):  -hair loss  -pain, redness, or irritation at site where injected  This list may not describe all possible side effects. Call your doctor for medical advice about side effects. You may report side effects to FDA at 0-771-FDA-3179.  Where should I keep my medicine?  Keep out of the reach of children.  Store at room temperature between 15 and 30 degrees C (59 and 86 degrees F). Do not freeze. If your injections have been specially prepared, you may need to store them in the refrigerator. Ask your pharmacist. Throw away any unused medicine after the expiration date.  NOTE: This sheet is a summary. It may not cover all possible information. If you have questions about this medicine, talk to your doctor, pharmacist, or health care provider.  © 2019 [employer]/Gold [employer] (2018-12-13 11:25:34)

## 2019-10-08 NOTE — TELEPHONE ENCOUNTER
Hold Plavix 5 days prior to procedure  Hold aspirin 1 week prior to procedure  Start Lovenox 1 week prior to procedure and hold 36 hours prior to procedure

## 2019-10-08 NOTE — TELEPHONE ENCOUNTER
PLACED A CALL TO PT/DAUGHTER REGARDING DIRECTIONS.    PER DR ORDOÑEZ, PT NEEDS TO START LOVENOX TODAY (BID) & STOP ASA. PT HAS BEEN OFF OF PLAVIX SINCE 10/4/19. SURGERY IS SCHEDULED FOR 10/14/19.    SHE NEEDS TO STOP LOVENOX ON 10/12/19 (36 HOURS PRIOR TO SX).    PT & DAUGHTER STATE THAT THEY UNDERSTAND DIRECTIONS WITH ASA & LOVENOX/PLAVIX.    (PT & DAUGHTER STATE THAT DR BACK OFFICE GAVE HER THE FIRST LOVENOX INJECTION TODAY IN OFFICE. I AM UNABLE TO SEE THIS INFO IN THE OFFICE NOTE AT THIS TIME - I HAVE LEFT A MESSAGE FOR THEM REQUESTING CLARIFICATION.)

## 2019-10-09 PROBLEM — F17.200 CURRENT EVERY DAY SMOKER: Status: ACTIVE | Noted: 2019-10-09

## 2019-10-09 PROBLEM — Z71.9 ENCOUNTER FOR CONSULTATION: Status: ACTIVE | Noted: 2019-10-09

## 2019-10-09 NOTE — TELEPHONE ENCOUNTER
As discussed okay to start Lovenox coverage bridging as of both aspirin and Plavix.  Plavix was stopped already and aspirin has been stopped a week prior to procedure

## 2019-10-09 NOTE — PROGRESS NOTES
[facility]  MD Felecia Horner, ERIKN, PA-C  ____________________________________________________________               [facility]  Department of Radiation Oncology  [address] [place] [id number]  Office:  [phone]  Fax: [phone]    DATE:  10/10/2019    PATIENT:   Josi Mohamud 1945                                   MEDICAL RECORD #:  [id number]                                                       REASON FOR CONSULTATION:  Josi Mohamud is a very pleasant 73 y.o. female that has been referred to our clinic to discuss empiric radiotherapy recommendations for enlarging lung lesions by CT/PET. History of Hodgkins Lymphoma per her report in 1982. On 09/23/2019, PET Scan revealed the 2 lung nodules more cranial and medial CELI pulmonary nodule measures up to 0.9 cm, displacing measured SUV max 2.3. The larger, more inferior lateral CELI nodule displays SUV max 2.2. This measures approximately 1.7 cm. . Reports fatigue, cough, SOB, wheezing, constipation, and diarrhea. Uses O2 at night. Uses rolling walker for assistance with ambulation. Denies activity change, appetite change, unexpected weight change, choking, chest tightness, stridor, nausea/vomiting, rectal pain, light-headedness, weakness, and headaches. She follows .            HISTORY OF PRESENT ILLNESS  06/13/2019 -CT Chest without contrast:  • Left upper lobe spiculated pulmonary nodule measures 1.2 cm on axial series 4, image 69, previously 1.0 cm.   • The left upper lobe 0.9 cm pulmonary nodule on series 4 image 27, previously 0.4 cm on 07/06/2018.  • There is no mediastinal or hilar lymphadenopathy by CT size criteria.   • Small hiatal hernia.   • There appears to be a 2 cm low-density nodule in the right thyroid.   • Probable implanted cardiac loop recorder at the left midline chest.      07/08/2019 - Appointment with :  • She did have evidence of slight enlargement  of her 2 lung nodules in the left upper lobe.    • One is located more peripherally in the lingula and the other is located more superiorly and medially in the upper division.    • I did advise her that these could represent slow-growing malignant lesions.    Recommendations:  • I do not think a needle biopsy would be good of option because the risk of pneumothorax although the lingular lesion would probably be accessible.    • Navigational bronchoscopy would be another option but there would be a chance of a false negative result.    • The definitive procedure would be thoracic surgery referral for resection with most likely a left upper lobectomy and she is not interested in surgery.  Based on her age and other comorbidities I think that is reasonable to avoid surgery.    • I told her my recommendation would be a follow-up CT short-term in 3 months and if the lesions increase in size then consider empiric radiation therapy and she and her daughter are in total agreement with this plan.     09/17/2019 - CT Chest without contrast:  • 2 subcentimeter nodules again noted in the right lobe of thyroid gland..  • Centrilobular emphysematous changes noted in the right and left lung.   • 2 nodules are again noted in the left lung.   • On image 27 9 mm nodule is noted.   • On image 66 and 1.2 mm nodule is present. This slightly increased in size and neoplasm should be excluded. This is not amenable to percutaneous biopsy.  • No pleural effusion is present.     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  • Right-sided nodule displays SUV max of 5.4 with the left-sided nodule displaying SUV max of 6.5. 2 additional right parotid nodules more inferiorly, displacing a more low level radiotracer uptake.   • Asymmetric radiotracer uptake at the left tonsil, measuring 4.5 cm SUV max.   • The more cranial and medial left upper lobe pulmonary nodule  measures up to 0.9 cm, displacing measured SUV max 2.3.   • The larger, more inferior lateral left upper lobe nodule displays SUV max 2.2. This measures approximately 1.7 cm.   • Review of the mediastinum reveals no hypermetabolic lymphadenopathy.      09/25/2019 - Documentation per :  • I received a PET scan report indicating activity in the patient's lung nodules.    • My plan is been to get a follow-up chest CT and if they were increasing in size consider empiric radiation therapy.    • She is scheduled follow up in early October and again I think the options if she is going to consider treatment would be empiric radiation.    09/25/2019 - Documentation per :  • The patient's daughter Lagdevan called back and I reviewed the results of the PET scan with her.    • In reviewing epic, it appears that Dr. Mcnulty is aware of the PET findings and the patient is being worked up in regards to the parotid nodules and the tonsillar uptake.    • I recommend a referral to Dr. Rosen regarding the pulmonary nodules and the patient and her daughter are agreeable.    • They will keep follow-up with me on 10-8-19.    10/08/2019 - Appointment with :  • There were multiple bilateral hypermetabolic parotid nodules and she is scheduled for a biopsy with ENT later this month.    • Education on injecting Lovenox was provided to the patient and her niece Kathryn.   • All of her pulmonary medications were refilled and I recommend she start back on Mucinex twice a day.    • A prescription for doxycycline was sent to the patient's pharmacy and she also received a Depo-Medrol injection in the office today.   •  She is scheduled to see Dr. Rosen this Thursday regarding possible radiation treatment based on the results of her recent PET scan from 9-23-19.    • Return to clinic to see Dr. Frazier in approximately 3 months.     10/14/2019 - US guided thyroid bilateral biopsy per .            History obtained from   PATIENT, FAMILY and CHART    PAST MEDICAL HISTORY   Past Medical History:   Diagnosis Date   • Cancer (CMS/HCC)     HODGKINS LYMPHOMA   • COPD (chronic obstructive pulmonary disease) (CMS/HCC)    • Coronary artery disease    • History of transfusion    • Hyperlipidemia    • Hypertension    • Persistent asthma without complication 7/8/2019      PAST SURGICAL HISTORY   Past Surgical History:   Procedure Laterality Date   • APPENDECTOMY     • APPENDECTOMY     • CARDIAC CATHETERIZATION     • CARDIAC ELECTROPHYSIOLOGY PROCEDURE N/A 5/23/2017    Procedure: Loop insertion; LINQ;  Surgeon: Jose Monteiro MD;  Location: [facility] CATH INVASIVE LOCATION;  Service:    • CHOLECYSTECTOMY     • CORONARY ANGIOPLASTY WITH STENT PLACEMENT      X 1    • LIVER BIOPSY     • SPLENECTOMY     • US GUIDED FINE NEEDLE ASPIRATION  8/7/2018      FAMILY HISTORY  family history includes Esophageal cancer in her brother; Lung cancer in her brother.     SOCIAL HISTORY   Social History     Tobacco Use   • Smoking status: Current Every Day Smoker     Packs/day: 0.00     Years: 57.00     Pack years: 0.00   • Smokeless tobacco: Never Used   • Tobacco comment: Pt started patches 7/15/19.   Substance Use Topics   • Alcohol use: No   • Drug use: No      ALLERGIES  Penicillins     MEDICATIONS   Current Outpatient Medications   Medication Sig Dispense Refill   • acetaminophen (TYLENOL) 500 MG tablet Take 500 mg by mouth Every 6 (Six) Hours As Needed for Mild Pain .     • albuterol sulfate  (90 Base) MCG/ACT inhaler Inhale 1 puff Daily. 1 inhaler 6   • amLODIPine (NORVASC) 5 MG tablet Take 5 mg by mouth Daily.     • calcium citrate-vitamin d (CALCITRATE) 315-250 MG-UNIT tablet tablet Take 1 tablet by mouth Daily.     • diphenhydrAMINE (BENADRYL) 25 mg capsule Take 25 mg by mouth Every 6 (Six) Hours As Needed for Itching.     • fluticasone-salmeterol (ADVAIR) 250-50 MCG/DOSE DISKUS Inhale 1 puff 2 (Two) Times a Day. 1 each 6   • furosemide (LASIX) 20 MG  tablet Take 20 mg by mouth Daily As Needed (for swelling).     • guaiFENesin (MUCINEX) 600 MG 12 hr tablet Take 2 tablets by mouth 2 (Two) Times a Day As Needed for Cough. 15 tablet 0   • lisinopril (PRINIVIL,ZESTRIL) 20 MG tablet Take 20 mg by mouth Daily.     • LORazepam (ATIVAN) 2 MG tablet Take 1 mg by mouth 2 (Two) Times a Day. Take 1/2 tab by mouth BID      • metoprolol succinate XL (TOPROL-XL) 50 MG 24 hr tablet Take 50 mg by mouth Daily.     • nitroglycerin (NITROSTAT) 0.4 MG SL tablet Place 0.4 mg under the tongue Every 5 (Five) Minutes As Needed for Chest Pain. Take no more than 3 doses in 15 minutes.     • omeprazole (priLOSEC) 40 MG capsule Take 40 mg by mouth Daily.  5   • potassium chloride (K-DUR) 10 MEQ CR tablet Take 10 mEq by mouth. 3 TIMES A WEEK     • simvastatin (ZOCOR) 20 MG tablet Take 20 mg by mouth Daily.     • tiotropium bromide monohydrate (SPIRIVA RESPIMAT) 2.5 MCG/ACT aerosol solution inhaler Inhale 2 puffs Daily. 1 inhaler 6   • aspirin 81 MG EC tablet Take 81 mg by mouth Daily.     • clopidogrel (PLAVIX) 75 MG tablet Take 75 mg by mouth Daily.       No current facility-administered medications for this visit.       The following portions of the patient's history were reviewed and updated as appropriate: allergies, current medications, past family history, past medical history, past social history, past surgical history and problem list.    REVIEW OF SYSTEMS  Review of Systems   Constitutional: Positive for fatigue. Negative for activity change, appetite change, chills, diaphoresis, fever and unexpected weight change.   HENT: Negative.         Currently with ear infection   Eyes: Negative.    Respiratory: Positive for cough, shortness of breath and wheezing. Negative for apnea, choking, chest tightness and stridor.         Oxygen at night   Nebulizer   Cardiovascular: Negative.         Care Linq Recorder   Gastrointestinal: Positive for constipation and diarrhea. Negative for abdominal  "distention, abdominal pain, anal bleeding, blood in stool, nausea, rectal pain and vomiting.   Endocrine: Negative.    Genitourinary: Negative.    Musculoskeletal: Negative.         Arthritis  Osteoporosis  Uses rolling walker   Skin: Negative.    Allergic/Immunologic: Negative.    Neurological: Negative.    Hematological: Negative.    Psychiatric/Behavioral: Negative.         Anxious/ sadness; depressed     PHYSICAL EXAM  VITAL SIGNS:   Vitals:    10/10/19 1510   BP: 138/76   Weight: 85.3 kg (188 lb)   Height: 152.4 cm (60\")   PainSc: 0-No pain     General:  Alert and oriented, no acute distress, well developed, Vitals reviewed.  Head/Neck:  Normocephalic, without obvious abnormality, atraumatic.  The trachea is midline.   Nose/Sinuses:  Nares normal. Septum midline. Mucosa normal. No drainage or sinus tenderness.  Mouth/Throat:  Mucosa moist, no lesions; pharynx without erythema, edema or exudate.  Neck:  supple, no mass, non-tender  Eyes: No gross abnormalities,  no scleral jaundice or erythema.    Ears: Ears appear intact with no abnormalities noted, hearing grossly normal  Chest:  Respiratory efforts are normal and unlabored, chest is clear to auscultation although diminished in lower lobes.  Cardiovascular: Regular rate and rhythm without murmurs, rubs, or gallops.   Abdomen:  Soft, non-tender, normal bowel sounds; no noted organomegaly or masses. no CVA tenderness   Extremities:  LOPEZ well, warm to touch, no evidence of cyanosis or edema.  Lymphatics:  No evidence of adenopathy in the cervical or supraclavicular areas.  Skin: No suspicious lesions or rashes of concern, skin color, texture, turgor are normal  Neurologic:  Alert and oriented, gait normal, strength and sensation grossly normal  Psych: Mood and affect are appropriate for circumstance. Eye contact is appropriate.     Performance Status: ECOG (0) Fully active, able to carry on all predisease performance without restriction    Clinical Quality " Measures  Josi Mohamud reports a pain score of 0. Given her pain assessment as noted, treatment options were discussed and the following options were decided upon as a follow-up plan to address the patient's pain: no plan given, no pain.  -Advanced Care Planning Care plan discussed, no care plan provided  -Body Mass Index Screening and Follow-Up Plan Patient's Body mass index is 36.72 kg/m². BMI is above normal parameters. Recommendations include: educational material.  -Tobacco Use: Screening and Cessation Intervention Social History    Tobacco Use      Smoking status: Current Every Day Smoker        Packs/day: 0.00        Years: 57.00        Pack years: 0      Smokeless tobacco: Never Used      Tobacco comment: Pt started patches 7/15/19.   Smoking cessation information given in after visit summary.    ASSESSMENT AND PLAN  1. Malignant neoplasm of upper lobe of left lung (CMS/HCC)    2. Abnormal positron emission tomography (PET) scan    3. Hx of Hodgkin's lymphoma    4. COPD, moderate (CMS/HCC)    5. Current every day smoker    6. Encounter for consultation      RECOMMENDATIONS: Josi Mohamud has a history of Hodgkins Lymphoma per her report in 1982, is being referred to our office today to discuss stereotactic radiotherapy recommendations by Dr. Harjinder Frazier for enlarging lung nodules. On 09/23/2019, PET Scan revealed the 2 lung nodules more cranial and medial CELI pulmonary nodule measures up to 0.9 cm, displacing measured SUV max 2.3. The larger, more inferior lateral CELI nodule displays SUV max 2.2. This measures approximately 1.7 cm. 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 Dr Mcnulty is managing the ENT findings, plans for biopsy on the 14th of this month.     The indications and rationale of radiation therapy according to the NCCN Guidelines to the lung has been discussed with the patient and her family today. I have extensively reviewed the risks, benefits and alternatives of therapy with them. The risks of radiation therapy includes but is not limited to radiation induced pulmonary fibrosis, progression of disease in spite of therapy with either local or systemic failure.     I have seen, examined and reviewed this patient's medication list, appropriate labs and imaging studies, reviewed relevant medical records and other physicians notes and discussed the goals and plans of care with the patient and family and answered all questions.   After careful consideration of the available clinical diagnostic data and extensive examination and evaluation of the patient, it is my recommendation that this patient be treated with stereotactic radiation therapy to the two enlarging lung nodules, will simulate treatement fields today, final course to be determined with planning.     The patient and her family verbalizes understanding of this discussion, voice no further questions and wish to proceed with recommended therapy.     Josi ORR Cade is a current cigarettes user.  She currently smokes 1 pack of cigarettes per day for a duration of 57 years. I have educated her on the risk of diseases from using tobacco products such as cancer, COPD and heart diease. I advised her to quit and she is not willing to quit. I spent >10 minutes counseling the patient.    Thank you for allowing me to assist in this patients care.     Todays appointment time was spent in counseling and coordination of care as follows: diagnosis, intent of treatment discussing radiation therapy specifics: logistics, possible and probable side effects and after effects, staging of cancer, standard of care  in for this stage of this cancer and treatment options  Sean Rosen III, MD  10/10/2019

## 2019-10-10 ENCOUNTER — CONSULT (OUTPATIENT)
Dept: RADIATION ONCOLOGY | Facility: HOSPITAL | Age: 74
End: 2019-10-10

## 2019-10-10 ENCOUNTER — CLINICAL SUPPORT (OUTPATIENT)
Dept: CARDIOLOGY | Facility: CLINIC | Age: 74
End: 2019-10-10

## 2019-10-10 ENCOUNTER — HOSPITAL ENCOUNTER (OUTPATIENT)
Dept: RADIATION ONCOLOGY | Facility: HOSPITAL | Age: 74
Setting detail: RADIATION/ONCOLOGY SERIES
End: 2019-10-10

## 2019-10-10 VITALS
BODY MASS INDEX: 36.91 KG/M2 | DIASTOLIC BLOOD PRESSURE: 76 MMHG | HEIGHT: 60 IN | WEIGHT: 188 LBS | SYSTOLIC BLOOD PRESSURE: 138 MMHG

## 2019-10-10 DIAGNOSIS — R55 SYNCOPE AND COLLAPSE: ICD-10-CM

## 2019-10-10 DIAGNOSIS — Z71.9 ENCOUNTER FOR CONSULTATION: ICD-10-CM

## 2019-10-10 DIAGNOSIS — F17.200 CURRENT EVERY DAY SMOKER: ICD-10-CM

## 2019-10-10 DIAGNOSIS — Z85.71 HX OF HODGKIN'S LYMPHOMA: ICD-10-CM

## 2019-10-10 DIAGNOSIS — C34.12 MALIGNANT NEOPLASM OF UPPER LOBE OF LEFT LUNG (HCC): Primary | ICD-10-CM

## 2019-10-10 DIAGNOSIS — Z95.818 STATUS POST PLACEMENT OF IMPLANTABLE LOOP RECORDER: Primary | ICD-10-CM

## 2019-10-10 DIAGNOSIS — R94.8 ABNORMAL POSITRON EMISSION TOMOGRAPHY (PET) SCAN: ICD-10-CM

## 2019-10-10 DIAGNOSIS — J44.9 COPD, MODERATE (HCC): ICD-10-CM

## 2019-10-10 PROCEDURE — 93298 REM INTERROG DEV EVAL SCRMS: CPT | Performed by: INTERNAL MEDICINE

## 2019-10-10 PROCEDURE — 77334 RADIATION TREATMENT AID(S): CPT | Performed by: RADIOLOGY

## 2019-10-10 PROCEDURE — 93299 PR REM INTERROG ICPMS/SCRMS <30 D TECH REVIEW: CPT | Performed by: INTERNAL MEDICINE

## 2019-10-10 NOTE — PROGRESS NOTES
Linq Report- [facility]    October 10, 2019    Primary Cardiologist:  Thania  Reason for implant:  syncope  Battery:  Good  Events:  No new events or sx noted   Changes:  N/A    Follow up:  1 month

## 2019-10-10 NOTE — PATIENT INSTRUCTIONS
1)  Return to Dr. Rosen in about 3-4 weeks to start radiation to lung nodules  2)  We will call you with a time to start radiation.

## 2019-10-11 PROBLEM — Z85.71 HX OF HODGKIN'S LYMPHOMA: Status: ACTIVE | Noted: 2019-10-11

## 2019-10-11 PROBLEM — C34.12 MALIGNANT NEOPLASM OF UPPER LOBE OF LEFT LUNG (HCC): Status: ACTIVE | Noted: 2019-07-08

## 2019-10-14 ENCOUNTER — HOSPITAL ENCOUNTER (OUTPATIENT)
Dept: ULTRASOUND IMAGING | Facility: HOSPITAL | Age: 74
Discharge: HOME | End: 2019-10-14
Admitting: PHYSICIAN ASSISTANT

## 2019-10-14 DIAGNOSIS — E04.2 MULTIPLE THYROID NODULES: ICD-10-CM

## 2019-10-14 PROCEDURE — 88177 CYTP FNA EVAL EA ADDL: CPT | Performed by: PHYSICIAN ASSISTANT

## 2019-10-14 PROCEDURE — 88112 CYTOPATH CELL ENHANCE TECH: CPT | Performed by: PHYSICIAN ASSISTANT

## 2019-10-14 PROCEDURE — 76942 ECHO GUIDE FOR BIOPSY: CPT

## 2019-10-14 PROCEDURE — 88172 CYTP DX EVAL FNA 1ST EA SITE: CPT | Performed by: PHYSICIAN ASSISTANT

## 2019-10-14 PROCEDURE — 88334 PATH CONSLTJ SURG CYTO XM EA: CPT | Performed by: PHYSICIAN ASSISTANT

## 2019-10-21 ENCOUNTER — TELEPHONE (OUTPATIENT)
Dept: OTOLARYNGOLOGY | Facility: CLINIC | Age: 74
End: 2019-10-21

## 2019-10-25 ENCOUNTER — TELEPHONE (OUTPATIENT)
Dept: OTOLARYNGOLOGY | Facility: CLINIC | Age: 74
End: 2019-10-25

## 2019-10-25 LAB
CYTO UR: NORMAL
LAB AP CASE REPORT: NORMAL
Lab: NORMAL
PATH REPORT.FINAL DX SPEC: NORMAL
PATH REPORT.GROSS SPEC: NORMAL

## 2019-10-28 ENCOUNTER — OFFICE VISIT (OUTPATIENT)
Dept: NEUROLOGY | Facility: CLINIC | Age: 74
End: 2019-10-28

## 2019-10-28 VITALS
SYSTOLIC BLOOD PRESSURE: 130 MMHG | DIASTOLIC BLOOD PRESSURE: 72 MMHG | WEIGHT: 192 LBS | BODY MASS INDEX: 37.69 KG/M2 | RESPIRATION RATE: 18 BRPM | HEART RATE: 78 BPM | HEIGHT: 60 IN

## 2019-10-28 DIAGNOSIS — R40.4 EPISODE OF ALTERED CONSCIOUSNESS: Primary | ICD-10-CM

## 2019-10-28 DIAGNOSIS — R41.3 MEMORY DIFFICULTY: ICD-10-CM

## 2019-10-28 PROCEDURE — 99214 OFFICE O/P EST MOD 30 MIN: CPT | Performed by: PSYCHIATRY & NEUROLOGY

## 2019-10-28 NOTE — PROGRESS NOTES
YOB: 1945  Location: [facility]  Location Address: [address] [place] [address], [facility], [address] [place] [address]  Location Phone: [phone]    Chief Complaint   Patient presents with   • Follow-up       History of Present Illness  Josi Mohamud is a 72 y.o. female.  Josi Mohamud is here for follow-up evaluation of parotid nodules and thyroid after ultrasound.  Patient had FNA of the parotid gland on 18 with results of benign Warthin's tumor. Recent FNA of right thyroid nodules indicated a [place] III with ThyGeNext testing indicating a 5% chance of malignancy. The patient has had a CT of the chest that showed a lung nodule and possible thyroid nodule. Due to this a PET scan was ordered with results below. In general,  there was increased uptake in the parotid gland on the left with a nodule and increased asymmetrical uptake in the oropharynx. Patient states she is doing well in regards to parotid. She denies swelling, ear pain and facial numbness and pain. She does have dry mouth. The patient reports she still can't hear well and the symptoms are worse in the left ear. She has a history of PE tube placement in the past. She has not had a recent audiogram, tympanogram was Type C right and Type B normal volume left today.     She denies any complaints with respect to her salivary glands\parotids.    Result     PET CT 2019 12:13 PM CDT     Reason for study: R91.1; R91.1-Solitary pulmonary nodule     Comparison: PET scan dated 2018. CT scan dated 2019. CT scan  dated 2019.     Radiopharmaceutical: 13.04 mCi F-18 FDG intravenously.      Technique: Prior to injection of the radiotracer, the patient's blood  glucose is 87 mg/dL. Following injection and a 60 minute distribution  interval, PET scan is performed from the skull base to the midthigh. Low  dose, noncontrast CT is performed in the same anatomic distribution for  attenuation correction of the PET scan and to assist in  localizing the  PET findings.       FINDINGS:      HEAD AND NECK: The visualized brain demonstrates no definite focal  abnormal FDG activity. Bilateral hypermetabolic parotid nodules.  Right-sided nodule displays SUV max of 5.4 with the left-sided nodule  displaying SUV max of 6.5. 2 additional right parotid nodules more  inferiorly, displacing a more low level radiotracer uptake. Asymmetric  radiotracer uptake at the left tonsil, measuring 4.5 cm SUV max. No  definite cervical adenopathy.     CHEST: The more cranial and medial left upper lobe pulmonary nodule  measures up to 0.9 cm, displacing measured SUV max 2.3. The larger, more  inferior lateral left upper lobe nodule displays SUV max 2.2. This  measures approximately 1.7 cm. Review of the mediastinum reveals no  hypermetabolic lymphadenopathy. Physiologic activity is noted in the  myocardium. No foci of hypermetabolism are appreciated in either breast  or either axilla.      ABDOMEN AND PELVIS: Normal physiologic activity is noted in the liver,  spleen, stomach and bowel. No adrenal hypermetabolism is noted. Urinary  activity is noted in the kidneys and bladder. No FDG-avid  lymphadenopathy is appreciated.      MUSCULOSKELETAL: There is a large but stable intramuscular fat density  lesion in the left gluteal compartment. No radiotracer uptake within  this fat density lesion although there is a mild uptake identified along  its medial aspect. Suspect this is more related to the mass effect from  the lesion on adjacent structures as opposed to any aggressive nature of  the lesion itself.     IMPRESSION:  1. Known left upper lobe pulmonary nodules both display FDG uptake, with  both being suspicious for malignancy.  2. No hypermetabolic adenopathy.  3. 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  4. Asymmetric radiotracer uptake in the left tonsil. Consider direct  visualization to help exclude  oropharyngeal squamous carcinoma.        This report was finalized on 09/23/2019 14:42 by Dr Rizwan Rodriguez,          Fine Needle Aspiration: [id number]   Order: [id number]   Status:  Edited Result - FINAL   Visible to patient:  No (Not Released) Next appt:  10/29/2019 at 10:00 AM in Otolaryngology (Se Mcnulty MD)   Component    Case Report   Medical Cytology Report                           Case: [id number]                                  Authorizing Provider:  Ramon Pederson PA  Collected:           10/14/2019 10:12 AM           Ordering Location:     [facility]  Received:            10/14/2019 11:34 AM           Pathologist:           Korey Hoang MD                                                      Specimens:   1) - Thyroid, right                                                                                  2) - Thyroid, left                                                                         Final Diagnosis   1.  Thyroid, right thyroid fine-needle aspiration, smears and ThinPrep: Adequate numbers of follicular groups with a focal microfollicular pattern and focal cytologic atypia, consistent with follicular lesion of uncertain significance (FLUS), [place] category 3.     2.  Thyroid, left thyroid fine-needle aspiration, smears and ThinPrep: Adequate numbers of benign-appearing follicular groups admixed with colloid and blood, consistent with benign follicular nodule, [place] category 2.   Addendum electronically signed by Korey Hoang MD on 10/25/2019 at 2059   Electronically signed by Korey Hoang MD on 10/15/2019 at 1118               Study Result     Ultrasound thyroid 6/20/2019 9:59 AM CDT     REASON FOR EXAM: THYROID NODULE; E04.1-Nontoxic single thyroid nodule       COMPARISON: NONE.      TECHNIQUE: Multiple longitudinal and transverse real-time sonographic  images of the thyroid are obtained.      FINDINGS:   The right lobe of the  thyroid measures 4.0 x 2.1 x 2.4 cm. The left lobe  of the thyroid measures 4.3 x 1.3 x 1.4 cm. The thyroid isthmus measures  0.4 cm in thickness.      The thyroid is mildly heterogeneous in echogenicity.      Within the right lobe there is a mildly hyperechoic solid nodule  measuring 3.0 x 1.1 x 2.3 cm in size. There within the lower pole of the  left lobe there is a 1.1 x 1.2 x 1.0 cm solid nodule. Color Doppler  demonstrates appropriate flow.      IMPRESSION:  1. Nodules within each lobe of the thyroid gland as described above.  2. Mild overall enlargement of the thyroid gland with heterogeneous  echotexture..     This report was finalized on 06/20/2019 12:45 by Dr. Aric Rosen,           Past Medical History:   Diagnosis Date   • Cancer (CMS/HCC)     HODGKINS LYMPHOMA   • COPD (chronic obstructive pulmonary disease) (CMS/HCC)    • Coronary artery disease    • History of transfusion    • Hyperlipidemia    • Hypertension    • Persistent asthma without complication 7/8/2019       Past Surgical History:   Procedure Laterality Date   • APPENDECTOMY     • APPENDECTOMY     • CARDIAC CATHETERIZATION     • CARDIAC ELECTROPHYSIOLOGY PROCEDURE N/A 5/23/2017    Procedure: Loop insertion; LINQ;  Surgeon: Jose Monteiro MD;  Location: [facility] CATH INVASIVE LOCATION;  Service:    • CHOLECYSTECTOMY     • CORONARY ANGIOPLASTY WITH STENT PLACEMENT      X 1    • LIVER BIOPSY     • SPLENECTOMY     • US GUIDED FINE NEEDLE ASPIRATION  8/7/2018       Outpatient Medications Marked as Taking for the 10/29/19 encounter (Office Visit) with Se Mcnulty MD   Medication Sig Dispense Refill   • acetaminophen (TYLENOL) 500 MG tablet Take 500 mg by mouth Every 6 (Six) Hours As Needed for Mild Pain .     • albuterol sulfate  (90 Base) MCG/ACT inhaler Inhale 1 puff Daily. 1 inhaler 6   • amLODIPine (NORVASC) 5 MG tablet Take 5 mg by mouth Daily.     • aspirin 81 MG EC tablet Take 81 mg by mouth Daily.     • calcium  citrate-vitamin d (CALCITRATE) 315-250 MG-UNIT tablet tablet Take 1 tablet by mouth Daily.     • clopidogrel (PLAVIX) 75 MG tablet Take 75 mg by mouth Daily.     • diphenhydrAMINE (BENADRYL) 25 mg capsule Take 25 mg by mouth Every 6 (Six) Hours As Needed for Itching.     • fluticasone-salmeterol (ADVAIR) 250-50 MCG/DOSE DISKUS Inhale 1 puff 2 (Two) Times a Day. 1 each 6   • furosemide (LASIX) 20 MG tablet Take 20 mg by mouth Daily As Needed (for swelling).     • guaiFENesin (MUCINEX) 600 MG 12 hr tablet Take 2 tablets by mouth 2 (Two) Times a Day As Needed for Cough. 15 tablet 0   • lisinopril (PRINIVIL,ZESTRIL) 20 MG tablet Take 20 mg by mouth Daily.     • LORazepam (ATIVAN) 2 MG tablet Take 1 mg by mouth 2 (Two) Times a Day. Take 1/2 tab by mouth BID      • metoprolol succinate XL (TOPROL-XL) 50 MG 24 hr tablet Take 50 mg by mouth Daily.     • nitroglycerin (NITROSTAT) 0.4 MG SL tablet Place 0.4 mg under the tongue Every 5 (Five) Minutes As Needed for Chest Pain. Take no more than 3 doses in 15 minutes.     • omeprazole (priLOSEC) 40 MG capsule Take 40 mg by mouth Daily.  5   • potassium chloride (K-DUR) 10 MEQ CR tablet Take 10 mEq by mouth. 3 TIMES A WEEK     • simvastatin (ZOCOR) 20 MG tablet Take 20 mg by mouth Daily.     • tiotropium bromide monohydrate (SPIRIVA RESPIMAT) 2.5 MCG/ACT aerosol solution inhaler Inhale 2 puffs Daily. 1 inhaler 6       Penicillins    Family History   Problem Relation Age of Onset   • Esophageal cancer Brother    • Lung cancer Brother    • Breast cancer Neg Hx        Social History     Socioeconomic History   • Marital status:      Spouse name: Not on file   • Number of children: 1   • Years of education: Not on file   • Highest education level: Not on file   Tobacco Use   • Smoking status: Current Every Day Smoker     Packs/day: 0.50     Years: 57.00     Pack years: 28.50   • Smokeless tobacco: Never Used   • Tobacco comment: Pt started patches 7/15/19.   Substance and  Sexual Activity   • Alcohol use: No   • Drug use: No   • Sexual activity: Defer       Review of Systems   Constitutional: Negative for activity change, appetite change, chills, diaphoresis, fatigue, fever and unexpected weight change.   HENT: Positive for hearing loss (ear pressure/fullness/muffled hearing). Negative for congestion, dental problem, drooling, ear discharge, ear pain, facial swelling, mouth sores, nosebleeds, postnasal drip, rhinorrhea, sinus pressure, sneezing, sore throat, tinnitus, trouble swallowing and voice change.         Thyroid nodules  Parotid nodules   Eyes: Negative.    Respiratory: Negative.    Cardiovascular: Negative.    Gastrointestinal: Negative.    Endocrine: Negative.    Skin: Negative.    Allergic/Immunologic: Negative for environmental allergies, food allergies and immunocompromised state.   Neurological: Negative.    Hematological: Negative.    Psychiatric/Behavioral: Negative.        Vitals:    10/29/19 1006   BP: 150/92   Pulse: 80   Temp: 98.8 °F (37.1 °C)       Body mass index is 36.25 kg/m².    Objective     Physical Exam  CONSTITUTIONAL: well nourished, alert, oriented, in no acute distress     COMMUNICATION AND VOICE: able to communicate normally, normal voice quality    HEAD: normocephalic, no lesions, atraumatic, no tenderness, no masses     FACE: appearance normal, no lesions, no tenderness, no deformities, facial motion symmetric    SALIVARY GLANDS: parotid glands with no tenderness, no swelling, no masses, submandibular glands with normal size, nontender    EYES: ocular motility normal, eyelids normal, orbits normal, no proptosis, conjunctiva normal , pupils equal, round     EARS:  Hearing: response to conversational voice normal bilaterally   External Ears: auricles without lesions  Otoscopic: right tympanic membrane appearance normal, no lesions, no perforation, normal mobility, no fluid; left tympanic membrane appearance dull, no lesions, no perforation, decreased  mobility with effusion    NOSE:  External Nose: structure normal, no tenderness on palpation, no nasal discharge, no lesions, no evidence of trauma, nostrils patent   Intranasal Exam: nasal mucosa normal, vestibule within normal limits, inferior turbinate normal, nasal septum midline     ORAL:  Lips: upper and lower lips without lesion   Teeth: dentition within normal limits for age   Gums: gingivae healthy   Oral Mucosa: oral mucosa normal, no mucosal lesions   Floor of Mouth: Warthin’s duct patent, mucosa normal  Tongue: lingual mucosa normal without lesions, normal tongue mobility   Palate: soft and hard palates with normal mucosa and structure  Oropharynx: oropharyngeal mucosa normal, bilateral tonsillar cysts, soft to palpation, larger on the left    NECK: neck appearance normal, no mass,  noted without erythema or tenderness    THYROID: no overt thyromegaly, no tenderness, right 3 cm nodule, left 1.1 cm nodule, position midline     LYMPH NODES: no lymphadenopathy    CHEST/RESPIRATORY: respiratory effort normal, normal breath sounds     CARDIOVASCULAR: rate and rhythm normal, extremities without cyanosis or edema      NEUROLOGIC/PSYCHIATRIC: oriented to time, place and person, mood normal, affect appropriate, CN II-XII intact grossly    Assessment/Plan   Josi was seen today for follow-up.    Diagnoses and all orders for this visit:    Multiple thyroid nodules    Anticoagulated    Current every day smoker    Parotid neoplasm    Dysfunction of both eustachian tubes  -     Case Request; Standing  -     Case Request    Left chronic serous otitis media  -     Case Request; Standing  -     Case Request    Laryngopharyngeal reflux (LPR)    Gastroesophageal reflux disease, esophagitis presence not specified    Other orders  -     Follow Anesthesia Guidelines / Standing Orders; Future  -     Provide NPO Instructions to Patient; Future  -     Follow Anesthesia Guidelines / Standing Orders; Standing  -     Verify NPO  Status; Standing  -     Obtain Informed Consent; Standing  -     SCD (Sequential Compression Device) - Place on Patient in Pre-Op; Standing  -     Have Patient Void Prior to Procedure; Standing  -     Obtain informed consent      LEFT MYRINGOTOMY WITH INSERTION OF EAR TUBE WITH RIGHT EAR EXAM UNDER ANESTHESIA (Bilateral)  Orders Placed This Encounter   Procedures   • Follow Anesthesia Guidelines / Standing Orders     Standing Status:   Future     Standing Expiration Date:   10/29/2020   • Provide NPO Instructions to Patient     Standing Status:   Future   • Obtain informed consent     Order Specific Question:   Informed Consent Given For     Answer:   LEFT MYRINGOTOMY WITH INSERTION OF EAR TUBE WITH RIGHT EAR EXAM UNDER ANESTHESIA     Return for Follow-up post-operatively as directed.       Patient Instructions   LEFT MYRINGOTOMY TUBE INSERTION: The risks and benefits of myringotomy tube insertion were explained including but not limited to pain, aural fullness, bleeding, infection, risks of the anesthesia, persistent tympanic membrane perforation, chronic otorrhea, early and late extrusion, and the possibility for the need of reinsertion after extrusion. Alternatives were discussed. The patient/parents demonstrated understanding of these risks. Questions were asked appropriately answered.      For more information:  Quit Now [place]  1-800-QUIT-NOW  [email or web address]       Dr. Mcnulty examined patient and agrees with assessment and plan.

## 2019-10-28 NOTE — PROGRESS NOTES
Subjective   Josi Mohamud, 1945, is a female who is being seen today for   Chief Complaint   Patient presents with   • Headache     Loss of consciousness       HISTORY OF PRESENT ILLNESS: Extended follow-up.  Patient with EMU evaluation to did not show any active seizure activity.  Patient was supposed to follow-up with Dr. veliz but did not.  Patient says she is driving.  I told her that she should not be driving and reviewed also safety precautions.  Patient is having some memory difficulties as previously discussed and checking MMSE again today.  Patient is to get formal memory testing.  Patient having some problems with her hearing with infection in 1 ear.  Patient has not had any falls she says.      REVIEW OF SYSTEMS:   GENERAL: Blood pressure today is 130/72 left arm seated in same standing with pulse 78  PULMONARY: Patient wears oxygen at night  CVS: No acute chest pain or palpitation  GASTROINTESTINAL: No acute GI distress   GENITOURINARY: No acute  distress  GYN: No acute GYN distress  MUSCULOSKELETAL: No acute musculoskeletal symptoms  HEENT: No acute vision change  ENDOCRINE: No acute endocrine symptoms and patient apparently never has had high or low blood sugars  PSYCHIATRIC: No acute psychiatric symptoms  HEMATOLOGY: Patient is anemic  SKIN: No acute skin changes  Family history reviewed and otherwise noncontributory  Social history: Patient does smoke.  Patient denies alcohol or drug use    PHYSICAL EXAMINATION:    GENERAL: MMSE is 28 at 30  CRANIUM: Normocephalic/atraumatic  HEENT:       EYES: No EOM abnormalities.  Fields are full to confrontation.  No acute fundic abnormalities.  Pupils equal round reactive to light.       EARS: Tympanic membranes normal and no acute changes appreciated.  Hears tuning fork bilaterally       THROAT: No oropharynx abnormalities       NECK: No bruit/no lymphadenopathy  CHEST: No acute cardiopulmonary abnormalities by auscultation  ABDOMEN: Nondistended    EXTREMITIES: Pulses symmetrical  NEURO: Patient alert and follows commands with some difficulty  SPEECH: Normal    CRANIAL NERVES: Motor/sensory about the face normal symmetric    MOTOR STRENGTH: Motor strength upper and lower extremities normal  STATION AND GAIT: Gait somewhat antalgic but no tendency to fall and Romberg negative  CEREBELLAR: Finger-nose and heel shin normal  SENSORY: Decreased pin and vibration distal proximal lower extremities and ankles bilaterally otherwise normal pin and vibration throughout  REFLEXES: Decreased reflexes throughout upper lower extremities no clonus or Babinski    ASSESSMENT AND PLAN: Patient memory difficulty and episode of altered consciousness.  Patient still not to be driving and safety precautions as previously discussed need to be followed.  I spent 25 minutes with this patient with 15 minutes counseling.Patient's Body mass index is 37.5 kg/m². BMI is above normal parameters. Recommendations include: referral to primary care.      Josi was seen today for headache.    Diagnoses and all orders for this visit:    Episode of altered consciousness    Memory difficulty  -     Ambulatory Referral to Speech Therapy        Dictated utilizing Dragon voice recognition software

## 2019-10-28 NOTE — PATIENT INSTRUCTIONS
No driving until released.  Safety precautions of no climbing no use of sharp cutting tools no work over hot fire water or grill or stove.  Patient have fall precautions safety precautions also as previously discussed.  Patient to get with PCP about weight and diet control.  Patient to get with Dr. Gillespie about follow-up appointment

## 2019-10-29 ENCOUNTER — OFFICE VISIT (OUTPATIENT)
Dept: OTOLARYNGOLOGY | Facility: CLINIC | Age: 74
End: 2019-10-29

## 2019-10-29 VITALS
SYSTOLIC BLOOD PRESSURE: 150 MMHG | BODY MASS INDEX: 36.44 KG/M2 | HEIGHT: 60 IN | TEMPERATURE: 98.8 F | WEIGHT: 185.6 LBS | HEART RATE: 80 BPM | DIASTOLIC BLOOD PRESSURE: 92 MMHG

## 2019-10-29 DIAGNOSIS — E04.2 MULTIPLE THYROID NODULES: Primary | ICD-10-CM

## 2019-10-29 DIAGNOSIS — K21.9 GASTROESOPHAGEAL REFLUX DISEASE, ESOPHAGITIS PRESENCE NOT SPECIFIED: ICD-10-CM

## 2019-10-29 DIAGNOSIS — H69.83 DYSFUNCTION OF BOTH EUSTACHIAN TUBES: ICD-10-CM

## 2019-10-29 DIAGNOSIS — D49.0 PAROTID NEOPLASM: ICD-10-CM

## 2019-10-29 DIAGNOSIS — F17.200 CURRENT EVERY DAY SMOKER: ICD-10-CM

## 2019-10-29 DIAGNOSIS — K21.9 LARYNGOPHARYNGEAL REFLUX (LPR): ICD-10-CM

## 2019-10-29 DIAGNOSIS — H65.22 LEFT CHRONIC SEROUS OTITIS MEDIA: ICD-10-CM

## 2019-10-29 DIAGNOSIS — Z79.01 ANTICOAGULATED: ICD-10-CM

## 2019-10-29 PROBLEM — H69.93 DYSFUNCTION OF BOTH EUSTACHIAN TUBES: Status: ACTIVE | Noted: 2019-10-29

## 2019-10-29 PROCEDURE — 77338 DESIGN MLC DEVICE FOR IMRT: CPT | Performed by: RADIOLOGY

## 2019-10-29 PROCEDURE — 99214 OFFICE O/P EST MOD 30 MIN: CPT | Performed by: PHYSICIAN ASSISTANT

## 2019-10-29 PROCEDURE — 77293 RESPIRATOR MOTION MGMT SIMUL: CPT | Performed by: RADIOLOGY

## 2019-10-29 PROCEDURE — 77300 RADIATION THERAPY DOSE PLAN: CPT | Performed by: RADIOLOGY

## 2019-10-29 PROCEDURE — 77301 RADIOTHERAPY DOSE PLAN IMRT: CPT | Performed by: RADIOLOGY

## 2019-10-29 NOTE — PATIENT INSTRUCTIONS
LEFT MYRINGOTOMY TUBE INSERTION: The risks and benefits of myringotomy tube insertion were explained including but not limited to pain, aural fullness, bleeding, infection, risks of the anesthesia, persistent tympanic membrane perforation, chronic otorrhea, early and late extrusion, and the possibility for the need of reinsertion after extrusion. Alternatives were discussed. The patient/parents demonstrated understanding of these risks. Questions were asked appropriately answered.      For more information:  [employer]uit Now [place]  1-800-QUIT-NOW  [email or web address]       Dr. Mcnulty examined patient and agrees with assessment and plan.

## 2019-11-01 ENCOUNTER — HOSPITAL ENCOUNTER (OUTPATIENT)
Dept: RADIATION ONCOLOGY | Facility: HOSPITAL | Age: 74
Setting detail: RADIATION/ONCOLOGY SERIES
End: 2019-11-01

## 2019-11-12 ENCOUNTER — HOSPITAL ENCOUNTER (OUTPATIENT)
Dept: RADIATION ONCOLOGY | Facility: HOSPITAL | Age: 74
Setting detail: RADIATION/ONCOLOGY SERIES
Discharge: HOME | End: 2019-11-12

## 2019-11-12 ENCOUNTER — CLINICAL SUPPORT (OUTPATIENT)
Dept: CARDIOLOGY | Facility: CLINIC | Age: 74
End: 2019-11-12

## 2019-11-12 DIAGNOSIS — R55 SYNCOPE AND COLLAPSE: ICD-10-CM

## 2019-11-12 PROCEDURE — 77373 STRTCTC BDY RAD THER TX DLVR: CPT | Performed by: RADIOLOGY

## 2019-11-12 PROCEDURE — 93299 PR REM INTERROG ICPMS/SCRMS <30 D TECH REVIEW: CPT | Performed by: PHYSICIAN ASSISTANT

## 2019-11-12 PROCEDURE — 93298 REM INTERROG DEV EVAL SCRMS: CPT | Performed by: PHYSICIAN ASSISTANT

## 2019-11-13 ENCOUNTER — OFFICE VISIT (OUTPATIENT)
Dept: NEUROSURGERY | Age: 74
End: 2019-11-13
Payer: MEDICARE

## 2019-11-13 VITALS
SYSTOLIC BLOOD PRESSURE: 156 MMHG | WEIGHT: 191 LBS | DIASTOLIC BLOOD PRESSURE: 88 MMHG | BODY MASS INDEX: 37.5 KG/M2 | OXYGEN SATURATION: 95 % | HEIGHT: 60 IN | HEART RATE: 89 BPM

## 2019-11-13 DIAGNOSIS — R56.9 CONVULSIONS, UNSPECIFIED CONVULSION TYPE (HCC): Primary | ICD-10-CM

## 2019-11-13 PROCEDURE — 99213 OFFICE O/P EST LOW 20 MIN: CPT | Performed by: NURSE PRACTITIONER

## 2019-11-13 PROCEDURE — G8484 FLU IMMUNIZE NO ADMIN: HCPCS | Performed by: NURSE PRACTITIONER

## 2019-11-13 PROCEDURE — G8400 PT W/DXA NO RESULTS DOC: HCPCS | Performed by: NURSE PRACTITIONER

## 2019-11-13 PROCEDURE — 4004F PT TOBACCO SCREEN RCVD TLK: CPT | Performed by: NURSE PRACTITIONER

## 2019-11-13 PROCEDURE — G8417 CALC BMI ABV UP PARAM F/U: HCPCS | Performed by: NURSE PRACTITIONER

## 2019-11-13 PROCEDURE — 1123F ACP DISCUSS/DSCN MKR DOCD: CPT | Performed by: NURSE PRACTITIONER

## 2019-11-13 PROCEDURE — 4040F PNEUMOC VAC/ADMIN/RCVD: CPT | Performed by: NURSE PRACTITIONER

## 2019-11-13 PROCEDURE — 1090F PRES/ABSN URINE INCON ASSESS: CPT | Performed by: NURSE PRACTITIONER

## 2019-11-13 PROCEDURE — 3017F COLORECTAL CA SCREEN DOC REV: CPT | Performed by: NURSE PRACTITIONER

## 2019-11-13 PROCEDURE — G8427 DOCREV CUR MEDS BY ELIG CLIN: HCPCS | Performed by: NURSE PRACTITIONER

## 2019-11-13 RX ORDER — LEVETIRACETAM 500 MG/1
500 TABLET ORAL 2 TIMES DAILY
Qty: 60 TABLET | Refills: 2 | Status: SHIPPED | OUTPATIENT
Start: 2019-11-13 | End: 2020-02-13 | Stop reason: SDUPTHER

## 2019-11-14 ENCOUNTER — HOSPITAL ENCOUNTER (OUTPATIENT)
Dept: RADIATION ONCOLOGY | Facility: HOSPITAL | Age: 74
Discharge: HOME | End: 2019-11-14

## 2019-11-14 PROCEDURE — 77373 STRTCTC BDY RAD THER TX DLVR: CPT | Performed by: RADIOLOGY

## 2019-11-18 ENCOUNTER — HOSPITAL ENCOUNTER (OUTPATIENT)
Dept: RADIATION ONCOLOGY | Facility: HOSPITAL | Age: 74
Discharge: HOME | End: 2019-11-18

## 2019-11-18 PROCEDURE — 77373 STRTCTC BDY RAD THER TX DLVR: CPT | Performed by: RADIOLOGY

## 2019-11-20 PROCEDURE — 77336 RADIATION PHYSICS CONSULT: CPT | Performed by: RADIOLOGY

## 2019-11-20 PROCEDURE — 77373 STRTCTC BDY RAD THER TX DLVR: CPT | Performed by: RADIOLOGY

## 2019-11-22 ENCOUNTER — ANESTHESIA EVENT (OUTPATIENT)
Dept: PERIOP | Facility: HOSPITAL | Age: 74
End: 2019-11-22

## 2019-11-22 ENCOUNTER — ANESTHESIA (OUTPATIENT)
Dept: PERIOP | Facility: HOSPITAL | Age: 74
End: 2019-11-22

## 2019-11-22 ENCOUNTER — HOSPITAL ENCOUNTER (OUTPATIENT)
Facility: HOSPITAL | Age: 74
Setting detail: HOSPITAL OUTPATIENT SURGERY
Discharge: HOME | End: 2019-11-22
Attending: OTOLARYNGOLOGY | Admitting: OTOLARYNGOLOGY

## 2019-11-22 VITALS
HEIGHT: 59 IN | WEIGHT: 191.8 LBS | RESPIRATION RATE: 20 BRPM | OXYGEN SATURATION: 96 % | HEART RATE: 80 BPM | DIASTOLIC BLOOD PRESSURE: 60 MMHG | SYSTOLIC BLOOD PRESSURE: 149 MMHG | TEMPERATURE: 97.7 F | BODY MASS INDEX: 38.67 KG/M2

## 2019-11-22 LAB
ANION GAP SERPL CALCULATED.3IONS-SCNC: 9 MMOL/L (ref 5–15)
BUN BLD-MCNC: 15 MG/DL (ref 8–23)
BUN/CREAT SERPL: 24.2 (ref 7–25)
CALCIUM SPEC-SCNC: 9.5 MG/DL (ref 8.6–10.5)
CHLORIDE SERPL-SCNC: 106 MMOL/L (ref 98–107)
CO2 SERPL-SCNC: 27 MMOL/L (ref 22–29)
CREAT BLD-MCNC: 0.62 MG/DL (ref 0.57–1)
DEPRECATED RDW RBC AUTO: 48.1 FL (ref 37–54)
ERYTHROCYTE [DISTWIDTH] IN BLOOD BY AUTOMATED COUNT: 13.2 % (ref 12.3–15.4)
GFR SERPL CREATININE-BSD FRML MDRD: 94 ML/MIN/1.73
GLUCOSE BLD-MCNC: 91 MG/DL (ref 65–99)
HCT VFR BLD AUTO: 39.9 % (ref 34–46.6)
HGB BLD-MCNC: 13.9 G/DL (ref 12–15.9)
MCH RBC QN AUTO: 34.8 PG (ref 26.6–33)
MCHC RBC AUTO-ENTMCNC: 34.8 G/DL (ref 31.5–35.7)
MCV RBC AUTO: 100 FL (ref 79–97)
PLATELET # BLD AUTO: 264 10*3/MM3 (ref 140–450)
PMV BLD AUTO: 9.9 FL (ref 6–12)
POTASSIUM BLD-SCNC: 3.7 MMOL/L (ref 3.5–5.2)
RBC # BLD AUTO: 3.99 10*6/MM3 (ref 3.77–5.28)
SODIUM BLD-SCNC: 142 MMOL/L (ref 136–145)
WBC NRBC COR # BLD: 3.84 10*3/MM3 (ref 3.4–10.8)

## 2019-11-22 PROCEDURE — 80048 BASIC METABOLIC PNL TOTAL CA: CPT | Performed by: OTOLARYNGOLOGY

## 2019-11-22 PROCEDURE — 85027 COMPLETE CBC AUTOMATED: CPT | Performed by: OTOLARYNGOLOGY

## 2019-11-22 PROCEDURE — 25010000002 PROPOFOL 10 MG/ML EMULSION: Performed by: NURSE ANESTHETIST, CERTIFIED REGISTERED

## 2019-11-22 PROCEDURE — 69436 CREATE EARDRUM OPENING: CPT | Performed by: OTOLARYNGOLOGY

## 2019-11-22 DEVICE — TB EAR DURAVENT SIL ID 1.27MM IF 1.37MM BLU: Type: IMPLANTABLE DEVICE | Status: FUNCTIONAL

## 2019-11-22 RX ORDER — CIPROFLOXACIN AND DEXAMETHASONE 3; 1 MG/ML; MG/ML
SUSPENSION/ DROPS AURICULAR (OTIC) AS NEEDED
Status: DISCONTINUED | OUTPATIENT
Start: 2019-11-22 | End: 2019-11-22 | Stop reason: HOSPADM

## 2019-11-22 RX ORDER — OXYCODONE AND ACETAMINOPHEN 10; 325 MG/1; MG/1
1 TABLET ORAL ONCE AS NEEDED
Status: CANCELLED | OUTPATIENT
Start: 2019-11-22

## 2019-11-22 RX ORDER — ONDANSETRON 2 MG/ML
4 INJECTION INTRAMUSCULAR; INTRAVENOUS ONCE AS NEEDED
Status: CANCELLED | OUTPATIENT
Start: 2019-11-22

## 2019-11-22 RX ORDER — SODIUM CHLORIDE 0.9 % (FLUSH) 0.9 %
3 SYRINGE (ML) INJECTION EVERY 12 HOURS SCHEDULED
Status: DISCONTINUED | OUTPATIENT
Start: 2019-11-22 | End: 2019-11-22 | Stop reason: HOSPADM

## 2019-11-22 RX ORDER — FENTANYL CITRATE 50 UG/ML
25 INJECTION, SOLUTION INTRAMUSCULAR; INTRAVENOUS AS NEEDED
Status: CANCELLED | OUTPATIENT
Start: 2019-11-22

## 2019-11-22 RX ORDER — NALOXONE HCL 0.4 MG/ML
0.4 VIAL (ML) INJECTION AS NEEDED
Status: CANCELLED | OUTPATIENT
Start: 2019-11-22

## 2019-11-22 RX ORDER — SODIUM CHLORIDE 0.9 % (FLUSH) 0.9 %
3 SYRINGE (ML) INJECTION AS NEEDED
Status: DISCONTINUED | OUTPATIENT
Start: 2019-11-22 | End: 2019-11-22 | Stop reason: HOSPADM

## 2019-11-22 RX ORDER — SODIUM CHLORIDE, SODIUM LACTATE, POTASSIUM CHLORIDE, CALCIUM CHLORIDE 600; 310; 30; 20 MG/100ML; MG/100ML; MG/100ML; MG/100ML
100 INJECTION, SOLUTION INTRAVENOUS CONTINUOUS
Status: DISCONTINUED | OUTPATIENT
Start: 2019-11-22 | End: 2019-11-22 | Stop reason: HOSPADM

## 2019-11-22 RX ORDER — ACETAMINOPHEN 500 MG
1000 TABLET ORAL ONCE
Status: COMPLETED | OUTPATIENT
Start: 2019-11-22 | End: 2019-11-22

## 2019-11-22 RX ORDER — IPRATROPIUM BROMIDE AND ALBUTEROL SULFATE 2.5; .5 MG/3ML; MG/3ML
3 SOLUTION RESPIRATORY (INHALATION)
Status: DISCONTINUED | OUTPATIENT
Start: 2019-11-22 | End: 2019-11-22 | Stop reason: HOSPADM

## 2019-11-22 RX ORDER — OXYCODONE AND ACETAMINOPHEN 7.5; 325 MG/1; MG/1
2 TABLET ORAL EVERY 4 HOURS PRN
Status: CANCELLED | OUTPATIENT
Start: 2019-11-22 | End: 2019-12-02

## 2019-11-22 RX ORDER — LABETALOL HYDROCHLORIDE 5 MG/ML
5 INJECTION, SOLUTION INTRAVENOUS
Status: CANCELLED | OUTPATIENT
Start: 2019-11-22

## 2019-11-22 RX ORDER — SODIUM CHLORIDE, SODIUM LACTATE, POTASSIUM CHLORIDE, CALCIUM CHLORIDE 600; 310; 30; 20 MG/100ML; MG/100ML; MG/100ML; MG/100ML
1000 INJECTION, SOLUTION INTRAVENOUS CONTINUOUS
Status: DISCONTINUED | OUTPATIENT
Start: 2019-11-22 | End: 2019-11-22 | Stop reason: HOSPADM

## 2019-11-22 RX ORDER — SODIUM CHLORIDE 0.9 % (FLUSH) 0.9 %
3-10 SYRINGE (ML) INJECTION AS NEEDED
Status: DISCONTINUED | OUTPATIENT
Start: 2019-11-22 | End: 2019-11-22 | Stop reason: HOSPADM

## 2019-11-22 RX ORDER — METOCLOPRAMIDE HYDROCHLORIDE 5 MG/ML
5 INJECTION INTRAMUSCULAR; INTRAVENOUS
Status: CANCELLED | OUTPATIENT
Start: 2019-11-22

## 2019-11-22 RX ORDER — IPRATROPIUM BROMIDE AND ALBUTEROL SULFATE 2.5; .5 MG/3ML; MG/3ML
3 SOLUTION RESPIRATORY (INHALATION) ONCE AS NEEDED
Status: CANCELLED | OUTPATIENT
Start: 2019-11-22

## 2019-11-22 RX ORDER — PROPOFOL 10 MG/ML
VIAL (ML) INTRAVENOUS AS NEEDED
Status: DISCONTINUED | OUTPATIENT
Start: 2019-11-22 | End: 2019-11-22 | Stop reason: SURG

## 2019-11-22 RX ADMIN — PROPOFOL 100 MG: 10 INJECTION, EMULSION INTRAVENOUS at 07:43

## 2019-11-22 RX ADMIN — LIDOCAINE HYDROCHLORIDE 40 MG: 20 INJECTION, SOLUTION INTRAVENOUS at 07:43

## 2019-11-22 RX ADMIN — ACETAMINOPHEN 1000 MG: 500 TABLET, FILM COATED ORAL at 07:33

## 2019-11-22 RX ADMIN — SODIUM CHLORIDE, POTASSIUM CHLORIDE, SODIUM LACTATE AND CALCIUM CHLORIDE 1000 ML: 600; 310; 30; 20 INJECTION, SOLUTION INTRAVENOUS at 06:40

## 2019-11-22 RX ADMIN — IPRATROPIUM BROMIDE AND ALBUTEROL SULFATE 3 ML: 2.5; .5 SOLUTION RESPIRATORY (INHALATION) at 07:33

## 2019-11-22 NOTE — OP NOTE
[facility]  OPERATIVE REPORT    Josi Mohamud  11/22/2019    Pre-op Diagnosis:   Dysfunction of both eustachian tubes [H69.83]  Left chronic serous otitis media [H65.22]    Post-op Diagnosis:     Dysfunction of both eustachian tubes [H69.83]  Left chronic serous otitis media [H65.22]    Procedure/CPT® Codes:  LEFT MYRINGOTOMY WITH INSERTION OF EAR TUBE  RIGHT EAR EXAM UNDER ANESTHESIA    Surgeon(s):  Se Mcnulty MD    Anesthesia:   General Mask Anesthesia    Estimated Blood Loss:   None    Specimens:                None      Drains:   None    Findings:  As below    Complications:  None    Procedure Description:  The patient was taken back to the operating room, placed in the supine position and under General Mask Anesthesia the patient was prepped and draped in the usual fashion.      A speculum was introduced in the left external auditory canal and visualization undertaken with the Leica operating microscope.  A moderate amount of cerumen was removed with a cerumen loop and an anterior inferior myringotomy incision was made with the myringotomy knife.  A large serous effusion was suctioned from the middle ear space.  The middle ear mucosa appeared mildly edematous.  A Duravent tube was placed into the myringotomy site without difficulty and Ciprodex Drops added to the external auditory canal.  A cotton ball was added to the meatus.  Attention was turned to the opposite ear where an intact TM was noted with a well ventilated middle ear space.    The procedure was subsequently terminated.  The patient tolerated the procedure well without complictions.   The patient subsequently was transported to the Post Anesthesia Care Unit in stable condition.       Se Mcnulty MD     Date: 11/22/2019  Time: 7:30 AM

## 2019-11-22 NOTE — DISCHARGE INSTRUCTIONS
YOUR NEXT PAIN MEDICATION IS DUE AT______________        Moderate Conscious Sedation, Adult, Care After  Refer to this sheet in the next few weeks. These instructions provide you with information on caring for yourself after your procedure. Your health care provider may also give you more specific instructions. Your treatment has been planned according to current medical practices, but problems sometimes occur. Call your health care provider if you have any problems or questions after your procedure.  WHAT TO EXPECT AFTER THE PROCEDURE    After your procedure:  · You may feel sleepy, clumsy, and have poor balance for several hours.  · Vomiting may occur if you eat too soon after the procedure.  HOME CARE INSTRUCTIONS  · Do not participate in any activities where you could become injured for at least 24 hours. Do not:  ¨ Drive.  ¨ Swim.  ¨ Ride a bicycle.  ¨ Operate heavy machinery.  ¨ Cook.  ¨ Use power tools.  ¨ Climb ladders.  ¨ Work from a high place.  · Do not make important decisions or sign legal documents until you are improved.  · If you vomit, drink water, juice, or soup when you can drink without vomiting. Make sure you have little or no nausea before eating solid foods.  · Only take over-the-counter or prescription medicines for pain, discomfort, or fever as directed by your health care provider.  · Make sure you and your family fully understand everything about the medicines given to you, including what side effects may occur.  · You should not drink alcohol, take sleeping pills, or take medicines that cause drowsiness for at least 24 hours.  · If you smoke, do not smoke without supervision.  · If you are feeling better, you may resume normal activities 24 hours after you were sedated.  · Keep all appointments with your health care provider.  SEEK MEDICAL CARE IF:  · Your skin is pale or bluish in color.  · You continue to feel nauseous or vomit.  · Your pain is getting worse and is not helped by  medicine.  · You have bleeding or swelling.  · You are still sleepy or feeling clumsy after 24 hours.  SEEK IMMEDIATE MEDICAL CARE IF:  · You develop a rash.  · You have difficulty breathing.  · You develop any type of allergic problem.  · You have a fever.  MAKE SURE YOU:  · Understand these instructions.  · Will watch your condition.  · Will get help right away if you are not doing well or get worse.     This information is not intended to replace advice given to you by your health care provider. Make sure you discuss any questions you have with your health care provider.     Document Released: 10/08/2014 Document Revised: 01/08/2016 Document Reviewed: 10/08/2014  [employer] Interactive Patient Education ©2016 [employer]         CALL YOUR PHYSICIAN IF YOU EXPERIENCE  INCREASED PAIN NOT HELPED BY YOUR PAIN MEDICATION.        Fall Prevention in the Home      Falls can cause injuries. They can happen to people of all ages. There are many things you can do to make your home safe and to help prevent falls.    WHAT CAN I DO ON THE OUTSIDE OF MY HOME?  · Regularly fix the edges of walkways and driveways and fix any cracks.  · Remove anything that might make you trip as you walk through a door, such as a raised step or threshold.  · Trim any bushes or trees on the path to your home.  · Use bright outdoor lighting.  · Clear any walking paths of anything that might make someone trip, such as rocks or tools.  · Regularly check to see if handrails are loose or broken. Make sure that both sides of any steps have handrails.  · Any raised decks and porches should have guardrails on the edges.  · Have any leaves, snow, or ice cleared regularly.  · Use sand or salt on walking paths during winter.  · Clean up any spills in your garage right away. This includes oil or grease spills.  WHAT CAN I DO IN THE BATHROOM?    · Use night lights.  · Install grab bars by the toilet and in the tub and shower. Do not use towel bars as grab  bars.  · Use non-skid mats or decals in the tub or shower.  · If you need to sit down in the shower, use a plastic, non-slip stool.  · Keep the floor dry. Clean up any water that spills on the floor as soon as it happens.  · Remove soap buildup in the tub or shower regularly.  · Attach bath mats securely with double-sided non-slip rug tape.  · Do not have throw rugs and other things on the floor that can make you trip.  WHAT CAN I DO IN THE BEDROOM?  · Use night lights.  · Make sure that you have a light by your bed that is easy to reach.  · Do not use any sheets or blankets that are too big for your bed. They should not hang down onto the floor.  · Have a firm chair that has side arms. You can use this for support while you get dressed.  · Do not have throw rugs and other things on the floor that can make you trip.  WHAT CAN I DO IN THE KITCHEN?  · Clean up any spills right away.  · Avoid walking on wet floors.  · Keep items that you use a lot in easy-to-reach places.  · If you need to reach something above you, use a strong step stool that has a grab bar.  · Keep electrical cords out of the way.  · Do not use floor polish or wax that makes floors slippery. If you must use wax, use non-skid floor wax.  · Do not have throw rugs and other things on the floor that can make you trip.  WHAT CAN I DO WITH MY STAIRS?  · Do not leave any items on the stairs.  · Make sure that there are handrails on both sides of the stairs and use them. Fix handrails that are broken or loose. Make sure that handrails are as long as the stairways.  · Check any carpeting to make sure that it is firmly attached to the stairs. Fix any carpet that is loose or worn.  · Avoid having throw rugs at the top or bottom of the stairs. If you do have throw rugs, attach them to the floor with carpet tape.  · Make sure that you have a light switch at the top of the stairs and the bottom of the stairs. If you do not have them, ask someone to add them for  you.  WHAT ELSE CAN I DO TO HELP PREVENT FALLS?  · Wear shoes that:  ¨ Do not have high heels.  ¨ Have rubber bottoms.  ¨ Are comfortable and fit you well.  ¨ Are closed at the toe. Do not wear sandals.  · If you use a stepladder:  ¨ Make sure that it is fully opened. Do not climb a closed stepladder.  ¨ Make sure that both sides of the stepladder are locked into place.  ¨ Ask someone to hold it for you, if possible.  · Clearly leslie and make sure that you can see:  ¨ Any grab bars or handrails.  ¨ First and last steps.  ¨ Where the edge of each step is.  · Use tools that help you move around (mobility aids) if they are needed. These include:  ¨ Canes.  ¨ Walkers.  ¨ Scooters.  ¨ Crutches.  · Turn on the lights when you go into a dark area. Replace any light bulbs as soon as they burn out.  · Set up your furniture so you have a clear path. Avoid moving your furniture around.  · If any of your floors are uneven, fix them.  · If there are any pets around you, be aware of where they are.  · Review your medicines with your doctor. Some medicines can make you feel dizzy. This can increase your chance of falling.  Ask your doctor what other things that you can do to help prevent falls.     This information is not intended to replace advice given to you by your health care provider. Make sure you discuss any questions you have with your health care provider.     Document Released: 10/14/2010 Document Revised: 05/03/2016 Document Reviewed: 01/22/2016  [employer]vier Interactive Patient Education ©2016 [employer]     PATIENT/FAMILY/RESPONSIBLE PARTY VERBALIZES UNDERSTANDING OF ABOVE EDUCATION.  COPY OF PAIN SCALE GIVEN AND REVIEWED WITH VERBALIZED UNDERSTANDING.

## 2019-11-22 NOTE — ANESTHESIA PREPROCEDURE EVALUATION
Anesthesia Evaluation     NPO Solid Status: > 8 hours  NPO Liquid Status: > 8 hours           Airway   TM distance: >3 FB  Neck ROM: full  No difficulty expected  Dental      Pulmonary    (+) lung cancer, COPD, asthma,home oxygen,   Cardiovascular - normal exam    ECG reviewed    (+) hypertension, CAD, dysrhythmias, angina,       Neuro/Psych  GI/Hepatic/Renal/Endo    (+)  GERD,      Musculoskeletal     Abdominal  - normal exam   Substance History      OB/GYN          Other                        Anesthesia Plan    ASA 3     MAC     intravenous induction     Anesthetic plan, all risks, benefits, and alternatives have been provided, discussed and informed consent has been obtained with: patient.

## 2019-11-22 NOTE — ANESTHESIA POSTPROCEDURE EVALUATION
"Patient: Josi Mohamud    Procedure Summary     Date:  11/22/19 Room / Location:   [facility] 02 /  [facility]    Anesthesia Start:  0742 Anesthesia Stop:  0758    Procedure:  LEFT MYRINGOTOMY WITH INSERTION OF EAR TUBE WITH RIGHT EAR EXAM UNDER ANESTHESIA (Left Ear) Diagnosis:       Dysfunction of both eustachian tubes      Left chronic serous otitis media      (Dysfunction of both eustachian tubes [H69.83])      (Left chronic serous otitis media [H65.22])    Surgeon:  Se Mcnulty MD Provider:  Ramon Logan CRNA    Anesthesia Type:  MAC ASA Status:  3          Anesthesia Type: MAC  Last vitals  BP   145/66 (11/22/19 0615)   Temp   98.2 °F (36.8 °C) (11/22/19 0615)   Pulse   76 (11/22/19 0725)   Resp   16 (11/22/19 0615)     SpO2   96 % (11/22/19 0725)     Post Anesthesia Care and Evaluation    Patient location during evaluation: PHASE II  Patient participation: complete - patient participated  Level of consciousness: awake and alert  Pain management: adequate  Airway patency: patent  Anesthetic complications: No anesthetic complications    Cardiovascular status: acceptable  Respiratory status: acceptable  Hydration status: acceptable    Comments: Blood pressure 145/66, pulse 76, temperature 98.2 °F (36.8 °C), temperature source Temporal, resp. rate 16, height 150 cm (59.06\"), weight 87 kg (191 lb 12.8 oz), SpO2 96 %, not currently breastfeeding.    Pt discharged from PACU based on bessie score >8      "

## 2019-11-25 ENCOUNTER — HOSPITAL ENCOUNTER (OUTPATIENT)
Dept: RADIATION ONCOLOGY | Facility: HOSPITAL | Age: 74
Discharge: HOME | End: 2019-11-25

## 2019-11-25 PROCEDURE — 77373 STRTCTC BDY RAD THER TX DLVR: CPT | Performed by: RADIOLOGY

## 2019-12-03 NOTE — PROGRESS NOTES
I have reviewed the notes, assessments, and/or procedures performed by  Kellie Kim PA-C  , I concur with her  documentation  of Josi Mohamud.  Will be seen sooner in office for anticoagulation discussion as known Paroxysmal atrial fibrillation

## 2019-12-04 ENCOUNTER — TELEPHONE (OUTPATIENT)
Dept: CARDIOLOGY | Facility: CLINIC | Age: 74
End: 2019-12-04

## 2019-12-06 ENCOUNTER — OFFICE VISIT (OUTPATIENT)
Dept: CARDIOLOGY | Facility: CLINIC | Age: 74
End: 2019-12-06

## 2019-12-06 VITALS
OXYGEN SATURATION: 96 % | WEIGHT: 191 LBS | HEART RATE: 107 BPM | DIASTOLIC BLOOD PRESSURE: 70 MMHG | SYSTOLIC BLOOD PRESSURE: 130 MMHG | BODY MASS INDEX: 38.51 KG/M2

## 2019-12-06 DIAGNOSIS — I25.119 CORONARY ARTERY DISEASE INVOLVING NATIVE CORONARY ARTERY OF NATIVE HEART WITH ANGINA PECTORIS (HCC): ICD-10-CM

## 2019-12-06 DIAGNOSIS — T79.6XXS TRAUMATIC RHABDOMYOLYSIS, SEQUELA: ICD-10-CM

## 2019-12-06 DIAGNOSIS — D49.0 PAROTID NEOPLASM: ICD-10-CM

## 2019-12-06 DIAGNOSIS — J44.9 COPD, MODERATE (HCC): ICD-10-CM

## 2019-12-06 DIAGNOSIS — R55 SYNCOPE AND COLLAPSE: ICD-10-CM

## 2019-12-06 DIAGNOSIS — C34.12 MALIGNANT NEOPLASM OF UPPER LOBE OF LEFT LUNG (HCC): ICD-10-CM

## 2019-12-06 DIAGNOSIS — E04.1 THYROID NODULE: ICD-10-CM

## 2019-12-06 DIAGNOSIS — F17.210 CIGARETTE NICOTINE DEPENDENCE WITHOUT COMPLICATION: ICD-10-CM

## 2019-12-06 DIAGNOSIS — R94.8 ABNORMAL POSITRON EMISSION TOMOGRAPHY (PET) SCAN: ICD-10-CM

## 2019-12-06 DIAGNOSIS — I48.0 PAF (PAROXYSMAL ATRIAL FIBRILLATION) (HCC): ICD-10-CM

## 2019-12-06 DIAGNOSIS — Z95.5 STENTED CORONARY ARTERY: ICD-10-CM

## 2019-12-06 DIAGNOSIS — Z79.01 ANTICOAGULATED: ICD-10-CM

## 2019-12-06 DIAGNOSIS — F17.200 CURRENT EVERY DAY SMOKER: ICD-10-CM

## 2019-12-06 DIAGNOSIS — I48.92 ATRIAL FLUTTER WITH RAPID VENTRICULAR RESPONSE (HCC): Primary | ICD-10-CM

## 2019-12-06 PROCEDURE — 99214 OFFICE O/P EST MOD 30 MIN: CPT | Performed by: INTERNAL MEDICINE

## 2019-12-06 RX ORDER — LEVETIRACETAM 500 MG/1
500 TABLET ORAL
COMMUNITY
Start: 2019-11-13 | End: 2019-12-23 | Stop reason: SDUPTHER

## 2019-12-06 NOTE — PROGRESS NOTES
Josi Mohamud  [id number]  1945  74 y.o.  female    Referring Provider: Cayetano Crews MD    Reason for  Visit: Being seen sooner   Prior syncope  Lung nodule  planned for radiation  Still smoking   Was told cannot stand chemotherapy  Paroxysmal atrial fibrillation on LINQ   preoperative cardiovascular clearance for thyroid biopsy in past s/p thyroid biopsy and no malignancy found   Had ear tubes inserted  coronary artery disease stented coronary artery > 5 years ago    Subjective      Feels  the same overall  No new events or complaints since last visit   Overall the patient feels no major change from baseline symptoms   Similar symptoms as during last visit     Mild chronic exertional shortness of breath on exertion relieved with rest  No significant cough or wheezing  Going on for several months or longer    No palpitations  No associated chest pain  Mild pedal edema responds to PO diuretics    No further chest pain    No fever or chills  No significant expectoration    No hemoptysis  No presyncope or syncope     Joint pain in small, medium and large joints   Chronic low back pain      Still smoking     History of present illness:  Josi Mohamud is a 74 y.o. yo female with history of Essential Hypertension   Hyperlipidemia  who presents today for   Chief Complaint   Patient presents with   • Atrial Flutter     6 WEEK FOLLOW UP - RESULTS    • Edema     FEET ANKLES & LEGS    .    History  Past Medical History:   Diagnosis Date   • Cancer (CMS/HCC)     H[facility] LYMPHOMA   • COPD (chronic obstructive pulmonary disease) (CMS/HCC)    • Coronary artery disease    • GERD (gastroesophageal reflux disease)    • History of transfusion    • Hyperlipidemia    • Hypertension    • PAF (paroxysmal atrial fibrillation) (CMS/HCC)    • Persistent asthma without complication 7/8/2019   ,   Past Surgical History:   Procedure Laterality Date   • APPENDECTOMY     • APPENDECTOMY     • CARDIAC CATHETERIZATION     • CARDIAC  ELECTROPHYSIOLOGY PROCEDURE N/A 5/23/2017    Procedure: Loop insertion; LINQ;  Surgeon: Jose Monteiro MD;  Location:  [facility];  Service:    • CHOLECYSTECTOMY     • CORONARY ANGIOPLASTY WITH STENT PLACEMENT      X 1    • LIVER BIOPSY     • MYRINGOTOMY W/ TUBES Left 11/22/2019    Procedure: LEFT MYRINGOTOMY WITH INSERTION OF EAR TUBE WITH RIGHT EAR EXAM UNDER ANESTHESIA;  Surgeon: Se Mcnulty MD;  Location:  [facility] OR;  Service: ENT   • SPLENECTOMY     • US GUIDED FINE NEEDLE ASPIRATION  8/7/2018   ,   Family History   Problem Relation Age of Onset   • Esophageal cancer Brother    • Lung cancer Brother    • Breast cancer Neg Hx    ,   Social History     Tobacco Use   • Smoking status: Current Every Day Smoker     Packs/day: 0.50     Years: 57.00     Pack years: 28.50   • Smokeless tobacco: Never Used   • Tobacco comment: Pt started patches 7/15/19.   Substance Use Topics   • Alcohol use: No   • Drug use: No   ,     Medications  Current Outpatient Medications   Medication Sig Dispense Refill   • acetaminophen (TYLENOL) 500 MG tablet Take 500 mg by mouth Every 6 (Six) Hours As Needed for Mild Pain .     • albuterol sulfate  (90 Base) MCG/ACT inhaler Inhale 1 puff Daily. 1 inhaler 6   • amLODIPine (NORVASC) 5 MG tablet Take 5 mg by mouth Daily.     • aspirin 81 MG EC tablet Take 81 mg by mouth Daily.     • calcium citrate-vitamin d (CALCITRATE) 315-250 MG-UNIT tablet tablet Take 1 tablet by mouth Daily.     • diphenhydrAMINE (BENADRYL) 25 mg capsule Take 25 mg by mouth Every 6 (Six) Hours As Needed for Itching.     • fluticasone-salmeterol (ADVAIR) 250-50 MCG/DOSE DISKUS Inhale 1 puff 2 (Two) Times a Day. 1 each 6   • furosemide (LASIX) 20 MG tablet Take 20 mg by mouth Daily As Needed (for swelling).     • guaiFENesin (MUCINEX) 600 MG 12 hr tablet Take 2 tablets by mouth 2 (Two) Times a Day As Needed for Cough. 15 tablet 0   • levETIRAcetam (KEPPRA) 500 MG tablet Take 500 mg by mouth.      • lisinopril (PRINIVIL,ZESTRIL) 20 MG tablet Take 20 mg by mouth Daily.     • LORazepam (ATIVAN) 2 MG tablet Take 1 mg by mouth 2 (Two) Times a Day. Take 1/2 tab by mouth BID      • metoprolol succinate XL (TOPROL-XL) 50 MG 24 hr tablet Take 50 mg by mouth Daily.     • nitroglycerin (NITROSTAT) 0.4 MG SL tablet Place 0.4 mg under the tongue Every 5 (Five) Minutes As Needed for Chest Pain. Take no more than 3 doses in 15 minutes.     • omeprazole (priLOSEC) 40 MG capsule Take 40 mg by mouth Daily.  5   • potassium chloride (K-DUR) 10 MEQ CR tablet Take 10 mEq by mouth. 3 TIMES A WEEK     • simvastatin (ZOCOR) 20 MG tablet Take 20 mg by mouth Daily.     • tiotropium bromide monohydrate (SPIRIVA RESPIMAT) 2.5 MCG/ACT aerosol solution inhaler Inhale 2 puffs Daily. 1 inhaler 6   • apixaban (ELIQUIS) 5 MG tablet tablet Take 1 tablet by mouth Every 12 (Twelve) Hours. 60 tablet 11     No current facility-administered medications for this visit.        Allergies:  Penicillins    Review of Systems  Review of Systems   Constitution: Positive for weakness and malaise/fatigue.   HENT: Negative.    Eyes: Negative.    Cardiovascular: Positive for dyspnea on exertion and leg swelling. Negative for chest pain, claudication, cyanosis, irregular heartbeat, near-syncope, orthopnea, palpitations, paroxysmal nocturnal dyspnea and syncope.   Respiratory: Negative.    Endocrine: Negative.    Hematologic/Lymphatic: Negative.    Skin: Negative.    Musculoskeletal: Positive for arthritis, joint pain and stiffness.   Gastrointestinal: Negative for anorexia.   Genitourinary: Negative.    Psychiatric/Behavioral: Negative.        Objective     Physical Exam:  /70 (BP Location: Left arm, Patient Position: Sitting, Cuff Size: Adult)   Pulse 107   Wt 86.6 kg (191 lb)   LMP  (LMP Unknown)   SpO2 96%   BMI 38.51 kg/m²     Physical Exam   Constitutional: She appears well-developed.   HENT:   Head: Normocephalic.   Neck: Normal carotid  pulses and no JVD present. No tracheal tenderness present. Carotid bruit is not present. No tracheal deviation and no edema present.   Cardiovascular: Regular rhythm and normal pulses.   Murmur heard.   Systolic murmur is present with a grade of 2/6.  Pulmonary/Chest: Effort normal. No stridor.   Abdominal: Soft. She exhibits no distension. There is no hepatosplenomegaly. There is no tenderness.     Vascular Status -  Her right foot exhibits abnormal foot edema. Her left foot exhibits abnormal foot edema.  Neurological: She is alert. She has normal strength. No cranial nerve deficit or sensory deficit.   Skin: Skin is warm.   Psychiatric: She has a normal mood and affect. Her speech is normal and behavior is normal.       Results Review:      Results for orders placed during the hospital encounter of 06/02/17   Adult Stress Echo Only    Narrative · Left ventricular systolic function is normal. Estimated EF = 55%.  · . Normal stress echo with no significant echocardiographic evidence for   myocardial ischemia.            Procedures    Assessment/Plan   Josi was seen today for atrial flutter and edema.    Diagnoses and all orders for this visit:    Atrial flutter with rapid ventricular response (CMS/HCC) on LINQ ? BRIEF  -     Adult Transthoracic Echo Complete W/ Cont if Necessary Per Protocol; Future    Anticoagulated    Abnormal positron emission tomography (PET) scan    Cigarette nicotine dependence without complication    COPD, moderate (CMS/HCC)    Coronary artery disease involving native coronary artery of native heart with angina pectoris (CMS/HCC)    Current every day smoker    PAF (paroxysmal atrial fibrillation) (CMS/HCC)    Malignant neoplasm of upper lobe of left lung (CMS/HCC)    Parotid neoplasm    Stented coronary artery    Syncope and collapse  -     Adult Transthoracic Echo Complete W/ Cont if Necessary Per Protocol; Future    Thyroid nodule    Traumatic rhabdomyolysis, sequela    Other orders  -      apixaban (ELIQUIS) 5 MG tablet tablet; Take 1 tablet by mouth Every 12 (Twelve) Hours.           Plan    Monitor cardiac rhythm device LINQ function regularly per established schedule or PRN       Weigh yourself frequently, at least weekly, preferably daily, call me if more than 2 pounds a day or 5 pounds a week weight gain.  Flexible diuretic dosing     Requested Prescriptions     Signed Prescriptions Disp Refills   • apixaban (ELIQUIS) 5 MG tablet tablet 60 tablet 11     Sig: Take 1 tablet by mouth Every 12 (Twelve) Hours.      Orders Placed This Encounter   Procedures   • Adult Transthoracic Echo Complete W/ Cont if Necessary Per Protocol     Standing Status:   Future     Standing Expiration Date:   12/5/2020     Order Specific Question:   Reason for exam?     Answer:   Dyspnea     Order Specific Question:   Reason for exam?     Answer:   Arrhythmia        ____________________________________________________________________________________________________________________________________________  Health maintenance and recommendations      Similar recommendations as last visit       Offered to give patient  a copy of my notes       Questions were encouraged, asked and answered to the patient's  understanding and satisfaction. Questions if any regarding current medications and side effects, need for refills and importance of compliance to medications stressed.    Reviewed available prior notes, consults, prior visits, laboratory findings, radiology and cardiology relevant reports. Updated chart as applicable. I have reviewed the patient's medical history in detail and updated the computerized patient record as relevant.      Updated patient regarding any new or relevant abnormalities on review of records or any new findings on physical exam. Mentioned to patient about purpose of visit and desirable health short and long term goals and objectives.    Primary to monitor CBC CMP Lipid panel and TSH as  applicable    ___________________________________________________________________________________________________________________________________________          Return in about 4 months (around 4/6/2020).

## 2019-12-11 ENCOUNTER — APPOINTMENT (OUTPATIENT)
Dept: CARDIOLOGY | Facility: HOSPITAL | Age: 74
End: 2019-12-11

## 2019-12-16 ENCOUNTER — CLINICAL SUPPORT (OUTPATIENT)
Dept: CARDIOLOGY | Facility: CLINIC | Age: 74
End: 2019-12-16

## 2019-12-16 DIAGNOSIS — R55 SYNCOPE, UNSPECIFIED SYNCOPE TYPE: ICD-10-CM

## 2019-12-16 DIAGNOSIS — Z95.818 STATUS POST PLACEMENT OF IMPLANTABLE LOOP RECORDER: Primary | ICD-10-CM

## 2019-12-16 PROCEDURE — 93299 PR REM INTERROG ICPMS/SCRMS <30 D TECH REVIEW: CPT | Performed by: INTERNAL MEDICINE

## 2019-12-16 PROCEDURE — 93298 REM INTERROG DEV EVAL SCRMS: CPT | Performed by: INTERNAL MEDICINE

## 2019-12-16 NOTE — PROGRESS NOTES
Linq Report- Remote/[facility]    December 16, 2019    Primary Cardiologist:  Thania  Reason for implant:  syncope  Battery:  Good  Events:  No new events or sx noted  Changes:  N/A    Follow up:  1 month

## 2019-12-17 ENCOUNTER — HOSPITAL ENCOUNTER (OUTPATIENT)
Dept: CARDIOLOGY | Facility: HOSPITAL | Age: 74
Discharge: HOME | End: 2019-12-17
Admitting: INTERNAL MEDICINE

## 2019-12-17 VITALS
SYSTOLIC BLOOD PRESSURE: 155 MMHG | WEIGHT: 190.92 LBS | HEIGHT: 59 IN | DIASTOLIC BLOOD PRESSURE: 72 MMHG | BODY MASS INDEX: 38.49 KG/M2

## 2019-12-17 DIAGNOSIS — I48.92 ATRIAL FLUTTER WITH RAPID VENTRICULAR RESPONSE (HCC): ICD-10-CM

## 2019-12-17 DIAGNOSIS — R55 SYNCOPE AND COLLAPSE: ICD-10-CM

## 2019-12-17 PROCEDURE — 93306 TTE W/DOPPLER COMPLETE: CPT | Performed by: INTERNAL MEDICINE

## 2019-12-17 PROCEDURE — 93306 TTE W/DOPPLER COMPLETE: CPT

## 2019-12-18 ENCOUNTER — APPOINTMENT (OUTPATIENT)
Dept: CARDIOLOGY | Facility: HOSPITAL | Age: 74
End: 2019-12-18

## 2019-12-19 LAB
BH CV ECHO MEAS - AO MAX PG (FULL): 3.1 MMHG
BH CV ECHO MEAS - AO MAX PG: 7.8 MMHG
BH CV ECHO MEAS - AO MEAN PG (FULL): 1 MMHG
BH CV ECHO MEAS - AO MEAN PG: 4 MMHG
BH CV ECHO MEAS - AO ROOT AREA (BSA CORRECTED): 1.4
BH CV ECHO MEAS - AO ROOT AREA: 5.3 CM^2
BH CV ECHO MEAS - AO ROOT DIAM: 2.6 CM
BH CV ECHO MEAS - AO V2 MAX: 140 CM/SEC
BH CV ECHO MEAS - AO V2 MEAN: 93.3 CM/SEC
BH CV ECHO MEAS - AO V2 VTI: 30.3 CM
BH CV ECHO MEAS - AVA(I,A): 2.1 CM^2
BH CV ECHO MEAS - AVA(I,D): 2.1 CM^2
BH CV ECHO MEAS - AVA(V,A): 2 CM^2
BH CV ECHO MEAS - AVA(V,D): 2 CM^2
BH CV ECHO MEAS - BSA(HAYCOCK): 1.9 M^2
BH CV ECHO MEAS - BSA: 1.8 M^2
BH CV ECHO MEAS - BZI_BMI: 38.6 KILOGRAMS/M^2
BH CV ECHO MEAS - BZI_METRIC_HEIGHT: 149.9 CM
BH CV ECHO MEAS - BZI_METRIC_WEIGHT: 86.6 KG
BH CV ECHO MEAS - EDV(CUBED): 109.9 ML
BH CV ECHO MEAS - EDV(MOD-SP4): 112 ML
BH CV ECHO MEAS - EDV(TEICH): 107 ML
BH CV ECHO MEAS - EF(CUBED): 65.2 %
BH CV ECHO MEAS - EF(MOD-SP4): 61.1 %
BH CV ECHO MEAS - EF(TEICH): 56.6 %
BH CV ECHO MEAS - ESV(CUBED): 38.3 ML
BH CV ECHO MEAS - ESV(MOD-SP4): 43.6 ML
BH CV ECHO MEAS - ESV(TEICH): 46.4 ML
BH CV ECHO MEAS - FS: 29.6 %
BH CV ECHO MEAS - IVS/LVPW: 0.96
BH CV ECHO MEAS - IVSD: 1.2 CM
BH CV ECHO MEAS - LA DIMENSION: 4 CM
BH CV ECHO MEAS - LA/AO: 1.5
BH CV ECHO MEAS - LAT PEAK E' VEL: 5.9 CM/SEC
BH CV ECHO MEAS - LV DIASTOLIC VOL/BSA (35-75): 61.9 ML/M^2
BH CV ECHO MEAS - LV MASS(C)D: 232.8 GRAMS
BH CV ECHO MEAS - LV MASS(C)DI: 128.8 GRAMS/M^2
BH CV ECHO MEAS - LV MAX PG: 4.8 MMHG
BH CV ECHO MEAS - LV MEAN PG: 3 MMHG
BH CV ECHO MEAS - LV SYSTOLIC VOL/BSA (12-30): 24.1 ML/M^2
BH CV ECHO MEAS - LV V1 MAX: 109 CM/SEC
BH CV ECHO MEAS - LV V1 MEAN: 72.1 CM/SEC
BH CV ECHO MEAS - LV V1 VTI: 25.3 CM
BH CV ECHO MEAS - LVIDD: 4.8 CM
BH CV ECHO MEAS - LVIDS: 3.4 CM
BH CV ECHO MEAS - LVLD AP4: 8.2 CM
BH CV ECHO MEAS - LVLS AP4: 6.2 CM
BH CV ECHO MEAS - LVOT AREA (M): 2.5 CM^2
BH CV ECHO MEAS - LVOT AREA: 2.5 CM^2
BH CV ECHO MEAS - LVOT DIAM: 1.8 CM
BH CV ECHO MEAS - LVPWD: 1.3 CM
BH CV ECHO MEAS - MED PEAK E' VEL: 4.68 CM/SEC
BH CV ECHO MEAS - MV A MAX VEL: 124 CM/SEC
BH CV ECHO MEAS - MV DEC TIME: 0.17 SEC
BH CV ECHO MEAS - MV E MAX VEL: 80.2 CM/SEC
BH CV ECHO MEAS - MV E/A: 0.65
BH CV ECHO MEAS - SI(AO): 89 ML/M^2
BH CV ECHO MEAS - SI(CUBED): 39.6 ML/M^2
BH CV ECHO MEAS - SI(LVOT): 35.6 ML/M^2
BH CV ECHO MEAS - SI(MOD-SP4): 37.8 ML/M^2
BH CV ECHO MEAS - SI(TEICH): 33.5 ML/M^2
BH CV ECHO MEAS - SV(AO): 160.9 ML
BH CV ECHO MEAS - SV(CUBED): 71.6 ML
BH CV ECHO MEAS - SV(LVOT): 64.4 ML
BH CV ECHO MEAS - SV(MOD-SP4): 68.4 ML
BH CV ECHO MEAS - SV(TEICH): 60.6 ML
BH CV ECHO MEASUREMENTS AVERAGE E/E' RATIO: 15.16
LEFT ATRIUM VOLUME INDEX: 31.8 ML/M2
LEFT ATRIUM VOLUME: 57.6 CM3
LV EF 2D ECHO EST: 60 %
MAXIMAL PREDICTED HEART RATE: 145 BPM
STRESS TARGET HR: 123 BPM

## 2019-12-23 ENCOUNTER — TELEPHONE (OUTPATIENT)
Dept: NEUROLOGY | Facility: CLINIC | Age: 74
End: 2019-12-23

## 2019-12-23 ENCOUNTER — OFFICE VISIT (OUTPATIENT)
Dept: NEUROLOGY | Facility: CLINIC | Age: 74
End: 2019-12-23

## 2019-12-23 VITALS
HEART RATE: 72 BPM | DIASTOLIC BLOOD PRESSURE: 70 MMHG | WEIGHT: 187 LBS | HEIGHT: 59 IN | SYSTOLIC BLOOD PRESSURE: 120 MMHG | BODY MASS INDEX: 37.7 KG/M2 | RESPIRATION RATE: 18 BRPM

## 2019-12-23 DIAGNOSIS — R40.4 EPISODE OF ALTERED CONSCIOUSNESS: Primary | ICD-10-CM

## 2019-12-23 DIAGNOSIS — R41.3 MEMORY DIFFICULTY: ICD-10-CM

## 2019-12-23 DIAGNOSIS — G62.9 POLYNEUROPATHY: ICD-10-CM

## 2019-12-23 PROCEDURE — 99214 OFFICE O/P EST MOD 30 MIN: CPT | Performed by: PSYCHIATRY & NEUROLOGY

## 2019-12-23 RX ORDER — LEVETIRACETAM 500 MG/1
TABLET ORAL
Qty: 30 TABLET | Refills: 5 | Status: SHIPPED | OUTPATIENT
Start: 2019-12-23 | End: 2019-12-23 | Stop reason: SDUPTHER

## 2019-12-23 RX ORDER — LEVETIRACETAM 500 MG/1
TABLET ORAL
Qty: 60 TABLET | Refills: 5 | Status: ON HOLD | OUTPATIENT
Start: 2019-12-23 | End: 2020-03-09

## 2019-12-23 NOTE — TELEPHONE ENCOUNTER
I called [facility] to clarify what dose of Keppra the patient is on.  We did send in to [facility] Keppra 500 mg 1 daily.  The patient has been taking Keppra 500 mg 1 tab by mouth bid.  I have ask the Pharmacist to discontinue the script for Keppra 500 mg 1 daily and change the script to Keppra 500 mg 1 po bid #60 with 5 refills.

## 2019-12-23 NOTE — PROGRESS NOTES
Subjective   Josi Mohamud, 1945, is a female who is being seen today for   Chief Complaint   Patient presents with   • Neurologic Problem     loss of conciousness       HISTORY OF PRESENT ILLNESS: Extended follow-up.  Patient saw Dr. Gillespie who did EMU testing and saw a left temporal slowing but no active seizure activity.  Patient was started on Keppra 500 mg twice daily which she is tolerated.  Patient has had no further episodes of altered consciousness and headaches are gone.  Family is not seeing any significant memory problems and speech evaluation was not done.  A tube was recently placed in her left ear for problems there.  Patient apparently lives by herself and the family is trying to get her to another situation    REVIEW OF SYSTEMS:   GENERAL: Blood pressure 120/70 sitting and same standing with pulse 72.  PULMONARY: No acute respiratory difficulty but apparently patient being followed for cancer in her lung  CVS: No acute chest pain or palpitation  GASTROINTESTINAL: No acute GI distress  GENITOURINARY: No acute  distress  GYN: No acute GYN distress  MUSCULOSKELETAL: Chronic back problems  HEENT: As above  ENDOCRINE: No acute endocrine symptoms  PSYCHIATRIC: No acute psychiatric symptoms  HEMATOLOGY: No anemia  SKIN: No acute skin changes except her callus on her right foot which she has a Band-Aid down and I reviewed that.  There is some swelling around it and she is to get with her PCP about that  Family history reviewed and otherwise noncontributory  Social history: Patient does smoke.  Patient denies alcohol use or drug use    PHYSICAL EXAMINATION:    GENERAL: No acute distress  CRANIUM: Normal cephalic/atraumatic  HEENT:        EYES: No acute fundic abnormalities.  Pupils equal round reactive to light.  EOMs intact without nystagmus and fields full to confrontation       EARS: Tympanic membrane on the left has the tube but hears tuning fork bilaterally       THROAT: No oropharynx  abnormalities and swallowing normally by history       NECK: No bruits/no lymphadenopathy  CHEST: No acute cardiopulmonary abnormalities by auscultation  ABDOMEN: Nondistended  EXTREMITIES: Pulses symmetrical at dorsalis pedis  NEURO: MMSE is 28 of 30  SPEECH: Normal    CRANIAL NERVES:  Motor/sensory about the face normal    MOTOR STRENGTH: Motor strength grossly intact upper and lower extremities without cogwheeling or rigidity  STATION AND GAIT: Gait is wide-based and she uses a walker at home.  Romberg negative  CEREBELLAR: No acute cerebellar abnormalities  SENSORY: Patient has decreased pin and vibration distal proximal lower extremities ankles bilaterally and some decrease in pin and vibration upper extremities to mid palm bilaterally  REFLEXES: Absent reflexes throughout upper and lower extremity without clonus or Babinski      ASSESSMENT AND PLAN: Patient with history of episode of altered consciousness with left temporal slowing as above.  Patient has been started on Keppra and continuing that.  Seizure precautions to be reviewed and patient not to be driving.  I discussed this in detail with patient's daughter who is with her.  I spent 25 minutes with this patient with 15 minutes counseling.Patient's Body mass index is 37.77 kg/m². BMI is above normal parameters. Recommendations include: referral to primary care.      Josi was seen today for neurologic problem.    Diagnoses and all orders for this visit:    Episode of altered consciousness  -     Levetiracetam Level (Keppra); Future  -     CBC & Differential; Future  -     Comprehensive Metabolic Panel; Future    Memory difficulty    Polyneuropathy    Other orders  -     Discontinue: levETIRAcetam (KEPPRA) 500 MG tablet; Take one tab by mouth daily.  -     levETIRAcetam (KEPPRA) 500 MG tablet; 1 p.o. twice daily        Dictated utilizing Dragon voice recognition software

## 2019-12-23 NOTE — PATIENT INSTRUCTIONS
Continue no driving.  Patient to have seizure precautions of no climbing and no use of sharp cutting tools and take showers or observed baths.  Patient not to be working over hot fire/stove/water.  Patient to get blood work with next oncology blood work scheduled.  Patient not to be up without assist or assistive device

## 2019-12-31 ENCOUNTER — HOSPITAL ENCOUNTER (OUTPATIENT)
Dept: GENERAL RADIOLOGY | Facility: HOSPITAL | Age: 74
Discharge: HOME | End: 2019-12-31
Admitting: NURSE PRACTITIONER

## 2019-12-31 PROCEDURE — 71046 X-RAY EXAM CHEST 2 VIEWS: CPT

## 2019-12-31 PROCEDURE — 85025 COMPLETE CBC W/AUTO DIFF WBC: CPT | Performed by: NURSE PRACTITIONER

## 2019-12-31 PROCEDURE — 80053 COMPREHEN METABOLIC PANEL: CPT | Performed by: NURSE PRACTITIONER

## 2020-01-01 ENCOUNTER — LAB REQUISITION (OUTPATIENT)
Dept: LAB | Facility: HOSPITAL | Age: 75
End: 2020-01-01

## 2020-01-01 ENCOUNTER — HOSPITAL ENCOUNTER (OUTPATIENT)
Dept: RADIATION ONCOLOGY | Facility: HOSPITAL | Age: 75
Setting detail: RADIATION/ONCOLOGY SERIES
End: 2020-01-01

## 2020-01-01 DIAGNOSIS — Z00.00 ENCOUNTER FOR GENERAL ADULT MEDICAL EXAMINATION WITHOUT ABNORMAL FINDINGS: ICD-10-CM

## 2020-01-01 LAB
BILIRUB UR QL STRIP: NEGATIVE
CLARITY UR: CLEAR
COLOR UR: YELLOW
GLUCOSE UR STRIP-MCNC: NEGATIVE MG/DL
HGB UR QL STRIP.AUTO: NEGATIVE
KETONES UR QL STRIP: NEGATIVE
LEUKOCYTE ESTERASE UR QL STRIP.AUTO: NEGATIVE
LEVETIRACETAM SERPL-MCNC: 27.3 UG/ML (ref 10–40)
NITRITE UR QL STRIP: NEGATIVE
PH UR STRIP.AUTO: 6 [PH] (ref 5–8)
PROT UR QL STRIP: NEGATIVE
SP GR UR STRIP: 1.01 (ref 1–1.03)
UROBILINOGEN UR QL STRIP: NORMAL

## 2020-01-01 PROCEDURE — 36415 COLL VENOUS BLD VENIPUNCTURE: CPT | Performed by: INTERNAL MEDICINE

## 2020-01-01 PROCEDURE — 81003 URINALYSIS AUTO W/O SCOPE: CPT | Performed by: NURSE PRACTITIONER

## 2020-01-01 PROCEDURE — 80177 DRUG SCRN QUAN LEVETIRACETAM: CPT | Performed by: INTERNAL MEDICINE

## 2020-01-06 NOTE — PROGRESS NOTES
YOB: 1945  Location: [facility]  Location Address: [address] [place] [address], [facility], [address] [place] [address]  Location Phone: [phone]    No chief complaint on file.      History of Present Illness  Josi Mohamud is a 74 y.o. female.  Josi Mohamud is status post left myringotomy tube with exam under anesthesia on 19   Past Medical History:   Diagnosis Date   • Cancer (CMS/HCC)     HODGKINS LYMPHOMA   • COPD (chronic obstructive pulmonary disease) (CMS/HCC)    • Coronary artery disease    • GERD (gastroesophageal reflux disease)    • History of transfusion    • Hyperlipidemia    • Hypertension    • PAF (paroxysmal atrial fibrillation) (CMS/[facility])    • Persistent asthma without complication 2019       Past Surgical History:   Procedure Laterality Date   • APPENDECTOMY     • APPENDECTOMY     • CARDIAC CATHETERIZATION     • CARDIAC ELECTROPHYSIOLOGY PROCEDURE N/A 2017    Procedure: Loop insertion; LINQ;  Surgeon: Jose Monteiro MD;  Location: [facility] CATH INVASIVE LOCATION;  Service:    • CHOLECYSTECTOMY     • CORONARY ANGIOPLASTY WITH STENT PLACEMENT      X 1    • LIVER BIOPSY     • MYRINGOTOMY W/ TUBES Left 2019    Procedure: LEFT MYRINGOTOMY WITH INSERTION OF EAR TUBE WITH RIGHT EAR EXAM UNDER ANESTHESIA;  Surgeon: Se Mcnulty MD;  Location: [facility] OR;  Service: ENT   • SPLENECTOMY     • US GUIDED FINE NEEDLE ASPIRATION  2018       No outpatient medications have been marked as taking for the 20 encounter (Appointment) with Se Mcnulty MD.       Penicillins    Family History   Problem Relation Age of Onset   • Esophageal cancer Brother    • Lung cancer Brother    • Breast cancer Neg Hx        Social History     Socioeconomic History   • Marital status:      Spouse name: Not on file   • Number of children: 1   • Years of education: Not on file   • Highest education level: Not on file   Tobacco Use   • Smoking status: Current Every Day Smoker      Packs/day: 0.50     Years: 57.00     Pack years: 28.50   • Smokeless tobacco: Never Used   • Tobacco comment: Pt started patches 7/15/19.   Substance and Sexual Activity   • Alcohol use: No   • Drug use: No   • Sexual activity: Defer       Review of Systems    There were no vitals filed for this visit.    There is no height or weight on file to calculate BMI.    Objective     Physical Exam  CONSTITUTIONAL: well nourished, alert, oriented, in no acute distress     COMMUNICATION AND VOICE: able to communicate normally, normal voice quality    HEAD: normocephalic, no lesions, atraumatic, no tenderness, no masses     FACE: appearance normal, no lesions, no tenderness, no deformities, facial motion symmetric    SALIVARY GLANDS: parotid glands with no tenderness, no swelling, no masses, submandibular glands with normal size, nontender    EYES: ocular motility normal, eyelids normal, orbits normal, no proptosis, conjunctiva normal , pupils equal, round     EARS:  Hearing: response to conversational voice normal bilaterally   External Ears: auricles without lesions  Otoscopic: tympanic membrane appearance normal, no lesions, no perforation, normal mobility, no fluid    NOSE:  External Nose: structure normal, no tenderness on palpation, no nasal discharge, no lesions, no evidence of trauma, nostrils patent   Intranasal Exam: nasal mucosa normal, vestibule within normal limits, inferior turbinate normal, nasal septum midline   Nasopharynx:     ORAL:  Lips: upper and lower lips without lesion   Teeth: dentition within normal limits for age   Gums: gingivae healthy   Oral Mucosa: oral mucosa normal, no mucosal lesions   Floor of Mouth: Warthin’s duct patent, mucosa normal  Tongue: lingual mucosa normal without lesions, normal tongue mobility   Palate: soft and hard palates with normal mucosa and structure  Oropharynx: oropharyngeal mucosa normal    HYPOPHARYNX:   LARYNX: epiglottis and arytenoid cartilage within normal limits,  vocal cord mucosa normal with normal mobility     NECK: neck appearance normal, no mass,  noted without erythema or tenderness    THYROID: no overt thyromegaly, no tenderness, nodules or mass present on palpation, position midline     LYMPH NODES: no lymphadenopathy    CHEST/RESPIRATORY: respiratory effort normal, normal breath sounds     CARDIOVASCULAR: rate and rhythm normal, extremities without cyanosis or edema      NEUROLOGIC/PSYCHIATRIC: oriented to time, place and person, mood normal, affect appropriate, CN II-XII intact grossly    Assessment/Plan   {Assess/PlanSmartLinks:78318}  * Surgery not found *  No orders of the defined types were placed in this encounter.    No follow-ups on file.       There are no Patient Instructions on file for this visit.

## 2020-01-07 ENCOUNTER — PROCEDURE VISIT (OUTPATIENT)
Dept: OTOLARYNGOLOGY | Facility: CLINIC | Age: 75
End: 2020-01-07

## 2020-01-07 ENCOUNTER — OFFICE VISIT (OUTPATIENT)
Dept: OTOLARYNGOLOGY | Facility: CLINIC | Age: 75
End: 2020-01-07

## 2020-01-07 VITALS
TEMPERATURE: 97.9 F | HEART RATE: 85 BPM | SYSTOLIC BLOOD PRESSURE: 158 MMHG | WEIGHT: 190 LBS | BODY MASS INDEX: 38.38 KG/M2 | DIASTOLIC BLOOD PRESSURE: 86 MMHG

## 2020-01-07 DIAGNOSIS — H65.22 LEFT CHRONIC SEROUS OTITIS MEDIA: ICD-10-CM

## 2020-01-07 DIAGNOSIS — H69.83 DYSFUNCTION OF BOTH EUSTACHIAN TUBES: Primary | ICD-10-CM

## 2020-01-07 PROCEDURE — 99213 OFFICE O/P EST LOW 20 MIN: CPT | Performed by: PHYSICIAN ASSISTANT

## 2020-01-07 PROCEDURE — 92567 TYMPANOMETRY: CPT | Performed by: AUDIOLOGIST-HEARING AID FITTER

## 2020-01-07 RX ORDER — NEOMYCIN SULFATE, POLYMYXIN B SULFATE AND HYDROCORTISONE 10; 3.5; 1 MG/ML; MG/ML; [USP'U]/ML
3 SUSPENSION/ DROPS AURICULAR (OTIC) 4 TIMES DAILY
Qty: 10 ML | Refills: 0 | Status: SHIPPED | OUTPATIENT
Start: 2020-01-07 | End: 2020-01-14

## 2020-01-09 NOTE — PROGRESS NOTES
ELIAS Jaffe   Chief complaint: follow-up myringotomy tubes     HPI  Josi Mohamud is a 74 y.o. female who presents status post myringotomy tube insertion. She has had problems with otalgia. The symptoms are localized to the left ear. The patient has had variable symptoms. The symptoms have been intermittant for the last several weeks The symptoms are aggravated by  no identifiable factors. The symptoms are improved by no identifiable factors.    Review of Systems   HENT: as per HPI  CONSTITUTIONAL: No fever, chills  GI: no nausea or vomiting    Past History:  Past medical and surgical history, family history and social history reviewed and updated when appropriate.  Current medications and allergies reviewed and updated when appropriate.  Allergies:  Penicillins        Vital Signs      Vitals:    01/07/20 1144   BP: 158/86   Pulse: 85   Temp: 97.9 °F (36.6 °C)   TempSrc: Temporal   Weight: 86.2 kg (190 lb)       Physical Exam   CONSTITUTIONAL: well nourished, well-developed, alert, oriented, in no acute distress   COMMUNICATION AND VOICE: able to communicate normally for age, normal voice quality  HEAD: normocephalic, no lesions, atraumatic, no tenderness, no masses   FACE: appearance normal, no lesions, no tenderness, no deformities, facial motion symmetric  EYES: ocular motility normal, eyelids normal, orbits normal, no proptosis, conjunctiva normal , pupils equal, round   EARS:  Hearing: response to conversational voice normal bilaterally   External Ears: auricles without lesions  Otoscopic:   EXTERNAL EAR CANALS: normal ear canals without stenosis or significant cerumen  RIGHT TYMPANIC MEMBRANE: tympanic membrane appearance normal, no lesions, no perforation, normal mobility, no fluid  LEFT TYMPANIC MEMBRANE: left myringotomy tube in place, dry, obstructed with bloody crusting, with cerumen at the base of the tube,  NOSE:  External Nose: structure normal, no tenderness on palpation, no nasal  discharge, no lesions, no evidence of trauma, nostrils patent   ORAL:  Lips: upper and lower lips without lesion   NECK: neck appearance normal  CHEST/RESPIRATORY: respiratory effort normal, normal chest excursion  CARDIOVASCULAR: extremities without cyanosis or edema   NEUROLOGIC/PSYCHIATRIC: oriented appropriately, mood normal, affect appropriate, CN II-XII intact grossly       I have reviewed the patients old records in the chart.       Assessment:   1. Dysfunction of both eustachian tubes    2. Left chronic serous otitis media           Plan:     Dry ear precautions.  Call for problems, especially ear pain or pressure, ear drainage, fever, or decreased hearing.     I discussed the patient's findings and my recommendations and answered questions.     Return in about 2 weeks (around 1/21/2020) for Recheck left ear with audiogram.     ELIAS Jaffe  01/09/20  9:06 AM

## 2020-01-09 NOTE — PATIENT INSTRUCTIONS
Will start ear drops in the left ear to attempt to soften bloody cerumen and clear PE tube. Recheck in 2 weeks with audiogram.

## 2020-01-14 ENCOUNTER — OFFICE VISIT (OUTPATIENT)
Dept: PULMONOLOGY | Facility: CLINIC | Age: 75
End: 2020-01-14

## 2020-01-14 VITALS
WEIGHT: 193 LBS | HEIGHT: 59 IN | BODY MASS INDEX: 38.91 KG/M2 | HEART RATE: 90 BPM | SYSTOLIC BLOOD PRESSURE: 142 MMHG | OXYGEN SATURATION: 94 % | DIASTOLIC BLOOD PRESSURE: 80 MMHG

## 2020-01-14 DIAGNOSIS — G47.34 NOCTURNAL HYPOXIA: ICD-10-CM

## 2020-01-14 DIAGNOSIS — F17.210 CIGARETTE NICOTINE DEPENDENCE WITHOUT COMPLICATION: ICD-10-CM

## 2020-01-14 DIAGNOSIS — C34.12 MALIGNANT NEOPLASM OF UPPER LOBE OF LEFT LUNG (HCC): ICD-10-CM

## 2020-01-14 DIAGNOSIS — R94.8 ABNORMAL POSITRON EMISSION TOMOGRAPHY (PET) SCAN: Primary | ICD-10-CM

## 2020-01-14 DIAGNOSIS — E66.01 MORBIDLY OBESE (HCC): ICD-10-CM

## 2020-01-14 DIAGNOSIS — J44.9 COPD, MODERATE (HCC): ICD-10-CM

## 2020-01-14 DIAGNOSIS — I48.0 PAF (PAROXYSMAL ATRIAL FIBRILLATION) (HCC): ICD-10-CM

## 2020-01-14 PROCEDURE — 99214 OFFICE O/P EST MOD 30 MIN: CPT | Performed by: INTERNAL MEDICINE

## 2020-01-14 NOTE — PATIENT INSTRUCTIONS
The patient is receiving empiric radiation therapy for her lung nodules.  She is on oxygen at night but not during the day.  She has some problems with peripheral edema and I told her to follow-up with her cardiologist and primary care physician on this.  She has had some nosebleeds and has a difficult time tolerating the modification of her oxygen system I told her at least try some saline nasal sprays.  Also at this point her Spiriva is no longer going to be well covered through her insurance plan with significant increase in out-of-pocket cost.  I told that options to this would be Tudorza or Incruse and she can check and see if any of these will be better covered.  I will see her back in 6 months otherwise with a flow volume loop on return.  She states Dr. Pabon will be performing a follow-up CAT scan of her chest prior to her next visit to see him.

## 2020-01-15 NOTE — PROGRESS NOTES
Subjective   Josi Mohamud is a 74 y.o. female.     Chief Complaint   Patient presents with   • Lung Nodule      No My Sticky Note on file.    History of Present Illness   The patient returns today for follow-up.  She did have a needle biopsy of her thyroid back in October which was negative for malignancy.  She is receiving empiric radiation therapy to her lung nodule.  She is scheduled for some follow-up scans per Dr. Rosen.  She is having a lot of problems with peripheral edema.  She also injured her right lower calf and has a dressing present there.  I did advise her to follow-up with her primary care physician on this.  She does have oxygen therapy to wear at night.  Told her that she can get a pulse oximeter to make sure she does not have O2 desaturation during the day for which she might benefit from oxygen therapy but her O2 sat at rest today was in the mid 90s at 94%.    Medical/Family/Social History   has a past medical history of Bronchitis, Cancer (CMS/HCC), COPD (chronic obstructive pulmonary disease) (CMS/HCC), Coronary artery disease, GERD (gastroesophageal reflux disease), History of transfusion, Hyperlipidemia, Hypertension, PAF (paroxysmal atrial fibrillation) (CMS/HCC), and Persistent asthma without complication (7/8/2019).   has a past surgical history that includes Appendectomy; Cholecystectomy; Splenectomy, total; Liver biopsy; Appendectomy; Cardiac electrophysiology procedure (N/A, 5/23/2017); Cardiac catheterization; US Guided Fine Needle Aspiration (8/7/2018); Coronary angioplasty with stent; and Myringotomy w/ tubes (Left, 11/22/2019).  family history includes Esophageal cancer in her brother; Lung cancer in her brother.   reports that she has been smoking. She has a 28.50 pack-year smoking history. She has never used smokeless tobacco. She reports that she does not drink alcohol or use drugs.  Allergies   Allergen Reactions   • Penicillins Rash     Medications    Current Outpatient  Medications:   •  acetaminophen (TYLENOL) 500 MG tablet, Take 500 mg by mouth Every 6 (Six) Hours As Needed for Mild Pain ., Disp: , Rfl:   •  albuterol sulfate  (90 Base) MCG/ACT inhaler, Inhale 1 puff Daily., Disp: 1 inhaler, Rfl: 6  •  amLODIPine (NORVASC) 5 MG tablet, Take 5 mg by mouth Daily., Disp: , Rfl:   •  apixaban (ELIQUIS) 5 MG tablet tablet, Take 1 tablet by mouth Every 12 (Twelve) Hours., Disp: 60 tablet, Rfl: 11  •  aspirin 81 MG EC tablet, Take 81 mg by mouth Daily., Disp: , Rfl:   •  calcium citrate-vitamin d (CALCITRATE) 315-250 MG-UNIT tablet tablet, Take 1 tablet by mouth Daily., Disp: , Rfl:   •  diphenhydrAMINE (BENADRYL) 25 mg capsule, Take 25 mg by mouth Every 6 (Six) Hours As Needed for Itching., Disp: , Rfl:   •  fluticasone-salmeterol (ADVAIR) 250-50 MCG/DOSE DISKUS, Inhale 1 puff 2 (Two) Times a Day., Disp: 1 each, Rfl: 6  •  furosemide (LASIX) 20 MG tablet, Take 20 mg by mouth Daily As Needed (for swelling)., Disp: , Rfl:   •  guaiFENesin (MUCINEX) 600 MG 12 hr tablet, Take 2 tablets by mouth 2 (Two) Times a Day As Needed for Cough., Disp: 15 tablet, Rfl: 0  •  levETIRAcetam (KEPPRA) 500 MG tablet, 1 p.o. twice daily, Disp: 60 tablet, Rfl: 5  •  lisinopril (PRINIVIL,ZESTRIL) 20 MG tablet, Take 20 mg by mouth Daily., Disp: , Rfl:   •  LORazepam (ATIVAN) 2 MG tablet, Take 1 mg by mouth 2 (Two) Times a Day. Take 1/2 tab by mouth BID , Disp: , Rfl:   •  metoprolol succinate XL (TOPROL-XL) 50 MG 24 hr tablet, Take 50 mg by mouth Daily., Disp: , Rfl:   •  neomycin-polymyxin-hydrocortisone (CORTISPORIN) 3.5-51001-0 otic suspension, Administer 3 drops into ear(s) as directed by provider 4 (Four) Times a Day for 7 days., Disp: 10 mL, Rfl: 0  •  nitroglycerin (NITROSTAT) 0.4 MG SL tablet, Place 0.4 mg under the tongue Every 5 (Five) Minutes As Needed for Chest Pain. Take no more than 3 doses in 15 minutes., Disp: , Rfl:   •  omeprazole (priLOSEC) 40 MG capsule, Take 40 mg by mouth Daily.,  "Disp: , Rfl: 5  •  potassium chloride (K-DUR) 10 MEQ CR tablet, Take 10 mEq by mouth. 3 TIMES A WEEK, Disp: , Rfl:   •  simvastatin (ZOCOR) 20 MG tablet, Take 20 mg by mouth Daily., Disp: , Rfl:   •  tiotropium bromide monohydrate (SPIRIVA RESPIMAT) 2.5 MCG/ACT aerosol solution inhaler, Inhale 2 puffs Daily., Disp: 1 inhaler, Rfl: 6    Review of Systems   Constitutional: Positive for fatigue. Negative for chills and fever.   HENT: Positive for congestion and sinus pressure.    Eyes: Negative for visual disturbance.   Respiratory: Positive for cough and shortness of breath.    Cardiovascular: Positive for leg swelling. Negative for chest pain.   Gastrointestinal: Negative for diarrhea, nausea and vomiting.   Genitourinary: Negative for difficulty urinating.   Musculoskeletal: Negative for arthralgias.   Skin: Negative for rash.   Neurological: Negative for dizziness and speech difficulty.   Psychiatric/Behavioral: The patient is not nervous/anxious.      ------------------------------------  Objective   /80   Pulse 90   Ht 149.9 cm (59\")   Wt 87.5 kg (193 lb)   LMP  (LMP Unknown)   SpO2 94% Comment: RA  Breastfeeding No   BMI 38.98 kg/m²   Physical Exam   Constitutional: She is oriented to person, place, and time. She appears well-developed.   She is an obese white female.  She ambulates with the assistance of a walker with wheels.   HENT:   Head: Normocephalic and atraumatic.   Eyes: Pupils are equal, round, and reactive to light. EOM are normal.   Neck: Normal range of motion. Neck supple.   Cardiovascular: Normal rate.   Heart sounds are distant.   Pulmonary/Chest: Effort normal.   Breath sounds are diminished.   Abdominal: Soft.   Musculoskeletal: Normal range of motion. She exhibits edema.   She has 2+ to 3+ edema the calves.   Neurological: She is alert and oriented to person, place, and time.   Skin: Skin is warm and dry.   She has an excoriation just above her right ankle medially with a dressing " in place.   Psychiatric: She has a normal mood and affect.   Nursing note and vitals reviewed.    Xr Chest Pa & Lateral    Result Date: 12/31/2019  Impression: Chronic lung changes without evidence of acute cardiopulmonary process. This report was finalized on 12/31/2019 12:49 by Dr. Willem Desai MD.  I did review her most recent x-ray showed no acute process.      Assessment/Plan   Josi was seen today for lung nodule.    Diagnoses and all orders for this visit:    Abnormal positron emission tomography (PET) scan    Malignant neoplasm of upper lobe of left lung (CMS/HCC)    COPD, moderate (CMS/HCC)    PAF (paroxysmal atrial fibrillation) (CMS/HCC)    Nocturnal hypoxia    Cigarette nicotine dependence without complication    Morbidly obese (CMS/HCC)      Patient's Body mass index is 38.98 kg/m². BMI is above normal parameters. Recommendations include: referral to primary care.      She is again receiving empiric radiation therapy for presumed lung cancer.  Apparently her Spiriva will not be covered well in the future and I told her I had no samples today but if we did have any samples available we will be glad to provide.  I did write out for the patient and her family member that options would be the Incruse Ellipta and the Tudorza Pressair and she can see if either these will be better covered but again I no samples of any long-acting anticholinergics at this time.  She is encouraged regarding smoking cessation and I will see her back in 6 months with a flow volume loop on return.  She will follow-up with Dr. Crews and her cardiologist regarding her issues with peripheral edema.

## 2020-01-17 ENCOUNTER — CLINICAL SUPPORT (OUTPATIENT)
Dept: CARDIOLOGY | Facility: CLINIC | Age: 75
End: 2020-01-17

## 2020-01-17 DIAGNOSIS — R55 SYNCOPE AND COLLAPSE: ICD-10-CM

## 2020-01-17 PROCEDURE — 93298 REM INTERROG DEV EVAL SCRMS: CPT | Performed by: PHYSICIAN ASSISTANT

## 2020-01-17 PROCEDURE — G2066 INTER DEVC REMOTE 30D: HCPCS | Performed by: PHYSICIAN ASSISTANT

## 2020-01-23 ENCOUNTER — PROCEDURE VISIT (OUTPATIENT)
Dept: OTOLARYNGOLOGY | Facility: CLINIC | Age: 75
End: 2020-01-23

## 2020-01-23 ENCOUNTER — OFFICE VISIT (OUTPATIENT)
Dept: OTOLARYNGOLOGY | Facility: CLINIC | Age: 75
End: 2020-01-23

## 2020-01-23 VITALS
SYSTOLIC BLOOD PRESSURE: 129 MMHG | BODY MASS INDEX: 37.89 KG/M2 | WEIGHT: 193 LBS | HEIGHT: 60 IN | HEART RATE: 83 BPM | DIASTOLIC BLOOD PRESSURE: 64 MMHG | TEMPERATURE: 98.1 F

## 2020-01-23 DIAGNOSIS — J35.8 TONSILLAR CYST: ICD-10-CM

## 2020-01-23 DIAGNOSIS — H69.81 DYSFUNCTION OF RIGHT EUSTACHIAN TUBE: ICD-10-CM

## 2020-01-23 DIAGNOSIS — H90.A31 MIXED CONDUCTIVE AND SENSORINEURAL HEARING LOSS OF RIGHT EAR WITH RESTRICTED HEARING OF LEFT EAR: Primary | ICD-10-CM

## 2020-01-23 DIAGNOSIS — H69.83 DYSFUNCTION OF BOTH EUSTACHIAN TUBES: ICD-10-CM

## 2020-01-23 DIAGNOSIS — H65.22 LEFT CHRONIC SEROUS OTITIS MEDIA: Primary | ICD-10-CM

## 2020-01-23 DIAGNOSIS — F17.200 CURRENT EVERY DAY SMOKER: ICD-10-CM

## 2020-01-23 PROCEDURE — 99213 OFFICE O/P EST LOW 20 MIN: CPT | Performed by: PHYSICIAN ASSISTANT

## 2020-01-23 NOTE — PATIENT INSTRUCTIONS
Recheck in six months. If symptoms develop call for sooner follow-up.    For more information:  Quit Now [facility]  1-800-QUIT-NOW  [email or web address]

## 2020-01-23 NOTE — PROGRESS NOTES
" ELIAS Jaffe   Chief complaint: follow-up myringotomy tubes     HPI  Josi Mohamud is a 74 y.o. female who presents status post myringotomy tube insertion. She has had no current problems and reports that her hearing has improved. No complaints today. It was noted on a previous exam that the patient has a left tonsil cyst.        Review of Systems   HENT: as per HPI  CONSTITUTIONAL: No fever, chills  GI: no nausea or vomiting    Past History:  Past medical and surgical history, family history and social history reviewed and updated when appropriate.  Current medications and allergies reviewed and updated when appropriate.  Allergies:  Penicillins        Vital Signs  Temp:  [98.1 °F (36.7 °C)] 98.1 °F (36.7 °C)  Heart Rate:  [83] 83  BP: (129)/(64) 129/64   Vitals:    01/23/20 1052   BP: 129/64   Pulse: 83   Temp: 98.1 °F (36.7 °C)   TempSrc: Temporal   Weight: 87.5 kg (193 lb)   Height: 152.4 cm (60\")       Physical Exam   CONSTITUTIONAL: well nourished, well-developed, alert, oriented, in no acute distress   COMMUNICATION AND VOICE: able to communicate normally for age, normal voice quality  HEAD: normocephalic, no lesions, atraumatic, no tenderness, no masses   FACE: appearance normal, no lesions, no tenderness, no deformities, facial motion symmetric  EYES: ocular motility normal, eyelids normal, orbits normal, no proptosis, conjunctiva normal , pupils equal, round   EARS:  Hearing: response to conversational voice normal bilaterally   External Ears: auricles without lesions  Otoscopic:   EXTERNAL EAR CANALS: normal ear canals without stenosis or significant cerumen  RIGHT TYMPANIC MEMBRANE: tympanic membrane appearance normal, no lesions, no perforation, normal mobility, no fluid  LEFT TYMPANIC MEMBRANE: left myringotomy tube in place, dry, and patent  NOSE:  External Nose: structure normal, no tenderness on palpation, no nasal discharge, no lesions, no evidence of trauma, nostrils patent "   ORAL:  Lips: upper and lower lips without lesion   Oropharyngeal area: left 4 mm tonsil cyst noted, stable from previous exam  NECK: neck appearance normal  CHEST/RESPIRATORY: respiratory effort normal, normal chest excursion  CARDIOVASCULAR: extremities without cyanosis or edema   NEUROLOGIC/PSYCHIATRIC: oriented appropriately, mood normal, affect appropriate, CN II-XII intact grossly       I have reviewed the patients old records in the chart.       Assessment:   1. Left chronic serous otitis media    2. Dysfunction of both eustachian tubes    3. Tonsillar cyst    4. Current every day smoker           Plan:     Dry ear precautions.  Call for problems, especially ear pain or pressure, ear drainage, fever, or decreased hearing.     Recheck in six months. If symptoms develop call for sooner follow-up.    Return in about 6 months (around 7/23/2020) for Recheck ears and left tonsil cyst.     ELIAS Jaffe  01/23/20  11:08 AM

## 2020-01-24 DIAGNOSIS — C34.12 MALIGNANT NEOPLASM OF UPPER LOBE OF LEFT LUNG (HCC): Primary | ICD-10-CM

## 2020-01-24 DIAGNOSIS — J44.9 COPD, MODERATE (HCC): ICD-10-CM

## 2020-01-24 DIAGNOSIS — Z85.71 HX OF HODGKIN'S LYMPHOMA: ICD-10-CM

## 2020-01-24 DIAGNOSIS — Z92.3 STATUS POST STEREOTACTIC RADIOSURGERY: ICD-10-CM

## 2020-01-30 ENCOUNTER — HOSPITAL ENCOUNTER (OUTPATIENT)
Dept: CT IMAGING | Facility: HOSPITAL | Age: 75
Discharge: HOME | End: 2020-01-30
Admitting: PHYSICIAN ASSISTANT

## 2020-01-30 DIAGNOSIS — J44.9 COPD, MODERATE (HCC): ICD-10-CM

## 2020-01-30 DIAGNOSIS — Z85.71 HX OF HODGKIN'S LYMPHOMA: ICD-10-CM

## 2020-01-30 DIAGNOSIS — Z92.3 STATUS POST STEREOTACTIC RADIOSURGERY: ICD-10-CM

## 2020-01-30 DIAGNOSIS — C34.12 MALIGNANT NEOPLASM OF UPPER LOBE OF LEFT LUNG (HCC): ICD-10-CM

## 2020-01-30 PROCEDURE — 71250 CT THORAX DX C-: CPT

## 2020-02-13 ENCOUNTER — HOSPITAL ENCOUNTER (OUTPATIENT)
Dept: GENERAL RADIOLOGY | Age: 75
Discharge: HOME | End: 2020-02-13
Payer: MEDICARE

## 2020-02-13 ENCOUNTER — OFFICE VISIT (OUTPATIENT)
Dept: NEUROSURGERY | Age: 75
End: 2020-02-13
Payer: MEDICARE

## 2020-02-13 VITALS
SYSTOLIC BLOOD PRESSURE: 130 MMHG | WEIGHT: 190 LBS | HEIGHT: 59 IN | BODY MASS INDEX: 38.3 KG/M2 | HEART RATE: 94 BPM | OXYGEN SATURATION: 93 % | DIASTOLIC BLOOD PRESSURE: 84 MMHG

## 2020-02-13 PROCEDURE — G8400 PT W/DXA NO RESULTS DOC: HCPCS | Performed by: NURSE PRACTITIONER

## 2020-02-13 PROCEDURE — 99213 OFFICE O/P EST LOW 20 MIN: CPT | Performed by: NURSE PRACTITIONER

## 2020-02-13 PROCEDURE — 1123F ACP DISCUSS/DSCN MKR DOCD: CPT | Performed by: NURSE PRACTITIONER

## 2020-02-13 PROCEDURE — G8417 CALC BMI ABV UP PARAM F/U: HCPCS | Performed by: NURSE PRACTITIONER

## 2020-02-13 PROCEDURE — 72100 X-RAY EXAM L-S SPINE 2/3 VWS: CPT

## 2020-02-13 PROCEDURE — 4040F PNEUMOC VAC/ADMIN/RCVD: CPT | Performed by: NURSE PRACTITIONER

## 2020-02-13 PROCEDURE — G8427 DOCREV CUR MEDS BY ELIG CLIN: HCPCS | Performed by: NURSE PRACTITIONER

## 2020-02-13 PROCEDURE — 1090F PRES/ABSN URINE INCON ASSESS: CPT | Performed by: NURSE PRACTITIONER

## 2020-02-13 PROCEDURE — 4004F PT TOBACCO SCREEN RCVD TLK: CPT | Performed by: NURSE PRACTITIONER

## 2020-02-13 PROCEDURE — G8484 FLU IMMUNIZE NO ADMIN: HCPCS | Performed by: NURSE PRACTITIONER

## 2020-02-13 PROCEDURE — 3017F COLORECTAL CA SCREEN DOC REV: CPT | Performed by: NURSE PRACTITIONER

## 2020-02-13 RX ORDER — LEVETIRACETAM 500 MG/1
500 TABLET ORAL 2 TIMES DAILY
Qty: 60 TABLET | Refills: 5 | Status: SHIPPED | OUTPATIENT
Start: 2020-02-13 | End: 2020-06-15 | Stop reason: SDUPTHER

## 2020-02-13 NOTE — PROGRESS NOTES
Not on file     Relationship status: Not on file    Intimate partner violence:     Fear of current or ex partner: Not on file     Emotionally abused: Not on file     Physically abused: Not on file     Forced sexual activity: Not on file   Other Topics Concern    Not on file   Social History Narrative    Not on file       Current Outpatient Medications   Medication Sig Dispense Refill    OXYGEN Inhale 2 mLs into the lungs nightly      levETIRAcetam (KEPPRA) 500 MG tablet Take 1 tablet by mouth 2 times daily 60 tablet 5    PROAIR  (90 Base) MCG/ACT inhaler Inhale 2 puffs into the lungs 4 times daily 1 Inhaler 3    calcium carbonate (TUMS) 500 MG chewable tablet Take 1 tablet by mouth daily      diphenhydrAMINE (BENADRYL) 25 MG tablet Take 25 mg by mouth every 6 hours as needed for Itching      tiotropium (SPIRIVA RESPIMAT) 2.5 MCG/ACT AERS inhaler Inhale 2 puffs into the lungs daily      potassium chloride (KLOR-CON) 10 MEQ extended release tablet Take 10 mEq by mouth 2 times daily Only take on days patient takes lasix      acetaminophen (TYLENOL) 500 MG tablet Take 500 mg by mouth every 6 hours as needed for Pain      amLODIPine (NORVASC) 5 MG tablet Take 5 mg by mouth daily       ADVAIR DISKUS 250-50 MCG/DOSE AEPB Inhale 1 puff into the lungs 2 times daily       furosemide (LASIX) 20 MG tablet Take 20 mg by mouth as needed       lisinopril (PRINIVIL;ZESTRIL) 10 MG tablet Take 20 mg by mouth daily       LORazepam (ATIVAN) 1 MG tablet Take 1 mg by mouth every 8 hours as needed       metoprolol succinate (TOPROL XL) 50 MG extended release tablet Take 50 mg by mouth daily       simvastatin (ZOCOR) 20 MG tablet Take 20 mg by mouth nightly       ASPIRIN PO Take 81 mg by mouth daily       clopidogrel (PLAVIX) 75 MG tablet Take 75 mg by mouth daily        No current facility-administered medications for this visit.         Allergies   Allergen Reactions    Pcn [Penicillins]        REVIEW OF BUN 19 2017    CREATININE 0.7 2017    GLUCOSE 136 (H) 2017    CALCIUM 8.7 (L) 2017    LABGLOM >60 2017     No results found for: CHOL, TRIG, HDL, LDLCALC  No results found for: TSH, T4FREE  No results found for: CRP, SEDRATE     Reviewed Dr. Jesus Alaniz records     MRI brain (2019)- moderate  and mild generalized cerebral volume loss     MRA head (2019)- less than 50% stenosis bilaterally     EEG (2019)- frontal temporal slowing on the left     Video EEG (2019)- intermittent left temporal slowing, no overt epileptiform abnormalities. No events captured    ASSESSMENT:    Danyelle Mills is a 75y.o. year old female here for follow up of possible seizures, abnormal EEG. Started on [address] given description of events (incontinence, admission for rhabdomyolysis following events and negative cardiac work up) and EEG abnormalities. Doing well from this standpoint, no further events noted. MRI brain with , vascular work up negative. Loop recorder in place but nothing noted on this. She is noting more lower back pain today, will plan for x-rays. Following with radiation oncology and pulmonology for lung cancer. ICD-10-CM    1. Convulsions, unspecified convulsion type ([address] R56.9    2. Chronic bilateral low back pain without sciatica M54.5 XR LUMBAR SPINE (2-3 VIEWS)    G89.29        PLAN:  1. Keppra 500mg BID. Discussed side effects with patient. 2. Lumbar spine x-rays   3. Continue follow up with radiation team/pulmonology  4. Epilepsy precautions and seizure first aid discussed. No driving, heights, swimming, tub baths, open flames, or heavy machinery. 5. Return in about 4 months (around 2020) for follow up, sooner if worsening. SYBIL Huston    Note:  A total of >50% (>8 minutes) of 15 minutes was spent discussing the pathophysiology and treatment and/or coordination of care of the above diagnoses.     This dictation was generated by voice recognition computer software. Although all attempts are made to edit the dictation for accuracy, there may be errors in the transcription that are not intended.

## 2020-02-13 NOTE — PROGRESS NOTES
REVIEW OF SYSTEMS    Constitutional: []Fever []Sweat []Chills [] Recent Injury [x] Denies all unless marked  HEENT:[]Headache  [] Head Injury/Hearing Loss  [] Sore Throat  [] Ear Ache/Dizziness  [x] Denies all unless marked  Spine:  [] Neck pain  [] Back pain  [] Sciaticia  [x] Denies all unless marked  Cardiovascular:[]Heart Disease []Chest Pain [] Palpitations  [x] Denies all unless marked  Pulmonary: []Shortness of Breath []Cough   [x] Denies all unless marke  Gastrointestinal: []Nausea  []Vomiting  []Abdominal Pain  []Constipation  []Diarrhea  []Dark Bloody Stools  [x] Denies all unless marked  Psychiatric/Behavioral:[] Depression [] Anxiety [x] Denies all unless marked  Genitourinary:   [] Frequency  [] Urgency  [] Incontinence [] Pain with Urination  [x] Denies all unless marked  Extremities: []Pain  []Swelling  [x] Denies all unless marked  Musculoskeletal: [] Muscle Pain  [] Joint Pain  [] Arthritis [] Muscle Cramps [] Muscle Twitches  [x] Denies all unless marked  Sleep: [] Insomnia [] Snoring [] Restless Legs [] Sleep Apnea  [] Daytime Sleepiness  [x] Denies all unless marked  Skin:[] Rash [] Skin Discoloration [x] Denies all unless marked   Neurological: []Visual Disturbance/Memory Loss [] Loss of Balance [] Slurred Speech/Weakness [] Seizures  [] Vertigo/Dizziness [x] Denies all unless marked

## 2020-02-17 ENCOUNTER — TELEPHONE (OUTPATIENT)
Dept: NEUROSURGERY | Age: 75
End: 2020-02-17

## 2020-02-17 NOTE — TELEPHONE ENCOUNTER
----- Message from SYBIL Lau sent at 2/14/2020  4:27 PM CST -----  Placing x-ray for lumbar spine flex/ex, can you call patient and let her know to go get that done sometime next week? Thanks. Called and spoke with the patient. She voiced understanding to all.

## 2020-02-19 ENCOUNTER — HOSPITAL ENCOUNTER (OUTPATIENT)
Dept: GENERAL RADIOLOGY | Age: 75
Discharge: HOME | End: 2020-02-19
Payer: MEDICARE

## 2020-02-19 ENCOUNTER — CLINICAL SUPPORT (OUTPATIENT)
Dept: CARDIOLOGY | Facility: CLINIC | Age: 75
End: 2020-02-19

## 2020-02-19 DIAGNOSIS — R55 SYNCOPE, UNSPECIFIED SYNCOPE TYPE: ICD-10-CM

## 2020-02-19 DIAGNOSIS — Z95.818 STATUS POST PLACEMENT OF IMPLANTABLE LOOP RECORDER: Primary | ICD-10-CM

## 2020-02-19 PROCEDURE — G2066 INTER DEVC REMOTE 30D: HCPCS | Performed by: INTERNAL MEDICINE

## 2020-02-19 PROCEDURE — 93298 REM INTERROG DEV EVAL SCRMS: CPT | Performed by: INTERNAL MEDICINE

## 2020-02-19 PROCEDURE — 72120 X-RAY BEND ONLY L-S SPINE: CPT

## 2020-02-24 ENCOUNTER — HOSPITAL ENCOUNTER (OUTPATIENT)
Dept: RADIATION ONCOLOGY | Facility: HOSPITAL | Age: 75
Setting detail: RADIATION/ONCOLOGY SERIES
End: 2020-02-24

## 2020-02-24 ENCOUNTER — OFFICE VISIT (OUTPATIENT)
Dept: RADIATION ONCOLOGY | Facility: HOSPITAL | Age: 75
End: 2020-02-24

## 2020-02-24 VITALS
WEIGHT: 190 LBS | SYSTOLIC BLOOD PRESSURE: 150 MMHG | BODY MASS INDEX: 37.3 KG/M2 | HEIGHT: 60 IN | DIASTOLIC BLOOD PRESSURE: 76 MMHG

## 2020-02-24 DIAGNOSIS — F17.200 CURRENT EVERY DAY SMOKER: ICD-10-CM

## 2020-02-24 DIAGNOSIS — N28.1 RENAL CYST: ICD-10-CM

## 2020-02-24 DIAGNOSIS — Z92.3 STATUS POST STEREOTACTIC RADIOSURGERY: ICD-10-CM

## 2020-02-24 DIAGNOSIS — J44.9 COPD, MODERATE (HCC): ICD-10-CM

## 2020-02-24 DIAGNOSIS — C34.12 MALIGNANT NEOPLASM OF UPPER LOBE OF LEFT LUNG (HCC): Primary | ICD-10-CM

## 2020-02-24 DIAGNOSIS — Z79.01 ANTICOAGULATED: ICD-10-CM

## 2020-02-24 DIAGNOSIS — Z85.71 HX OF HODGKIN'S LYMPHOMA: ICD-10-CM

## 2020-02-24 PROCEDURE — G0463 HOSPITAL OUTPT CLINIC VISIT: HCPCS | Performed by: RADIOLOGY

## 2020-03-02 ENCOUNTER — HOSPITAL ENCOUNTER (OUTPATIENT)
Dept: ULTRASOUND IMAGING | Facility: HOSPITAL | Age: 75
Discharge: HOME | End: 2020-03-02
Admitting: PHYSICIAN ASSISTANT

## 2020-03-02 DIAGNOSIS — N28.1 RENAL CYST: ICD-10-CM

## 2020-03-02 PROCEDURE — 87070 CULTURE OTHR SPECIMN AEROBIC: CPT | Performed by: NURSE PRACTITIONER

## 2020-03-02 PROCEDURE — 87205 SMEAR GRAM STAIN: CPT | Performed by: NURSE PRACTITIONER

## 2020-03-02 PROCEDURE — 76775 US EXAM ABDO BACK WALL LIM: CPT

## 2020-03-02 PROCEDURE — 87147 CULTURE TYPE IMMUNOLOGIC: CPT | Performed by: NURSE PRACTITIONER

## 2020-03-02 PROCEDURE — 85025 COMPLETE CBC W/AUTO DIFF WBC: CPT | Performed by: NURSE PRACTITIONER

## 2020-03-07 ENCOUNTER — APPOINTMENT (OUTPATIENT)
Dept: GENERAL RADIOLOGY | Facility: HOSPITAL | Age: 75
End: 2020-03-07

## 2020-03-07 ENCOUNTER — HOSPITAL ENCOUNTER (INPATIENT)
Facility: HOSPITAL | Age: 75
LOS: 3 days | Discharge: HOME | End: 2020-03-10
Attending: INTERNAL MEDICINE | Admitting: INTERNAL MEDICINE

## 2020-03-07 ENCOUNTER — APPOINTMENT (OUTPATIENT)
Dept: ULTRASOUND IMAGING | Facility: HOSPITAL | Age: 75
End: 2020-03-07

## 2020-03-07 DIAGNOSIS — Z74.09 IMPAIRED FUNCTIONAL MOBILITY, BALANCE, GAIT, AND ENDURANCE: ICD-10-CM

## 2020-03-07 DIAGNOSIS — Z78.9 DECREASED ACTIVITIES OF DAILY LIVING (ADL): ICD-10-CM

## 2020-03-07 DIAGNOSIS — L03.115 CELLULITIS OF RIGHT LOWER EXTREMITY: Primary | ICD-10-CM

## 2020-03-07 DIAGNOSIS — J44.1 COPD WITH ACUTE EXACERBATION (HCC): ICD-10-CM

## 2020-03-07 LAB
ALBUMIN SERPL-MCNC: 3.7 G/DL (ref 3.5–5.2)
ALBUMIN/GLOB SERPL: 1 G/DL
ALP SERPL-CCNC: 99 U/L (ref 39–117)
ALT SERPL W P-5'-P-CCNC: 13 U/L (ref 1–33)
ANION GAP SERPL CALCULATED.3IONS-SCNC: 13 MMOL/L (ref 5–15)
APTT PPP: 31.6 SECONDS (ref 24.1–35)
AST SERPL-CCNC: 14 U/L (ref 1–32)
BACTERIA UR QL AUTO: ABNORMAL /HPF
BASOPHILS # BLD AUTO: 0.02 10*3/MM3 (ref 0–0.2)
BASOPHILS NFR BLD AUTO: 0.1 % (ref 0–1.5)
BILIRUB SERPL-MCNC: 0.3 MG/DL (ref 0.2–1.2)
BILIRUB UR QL STRIP: NEGATIVE
BUN BLD-MCNC: 23 MG/DL (ref 8–23)
BUN/CREAT SERPL: 28.4 (ref 7–25)
CALCIUM SPEC-SCNC: 8.6 MG/DL (ref 8.6–10.5)
CHLORIDE SERPL-SCNC: 100 MMOL/L (ref 98–107)
CLARITY UR: CLEAR
CO2 SERPL-SCNC: 24 MMOL/L (ref 22–29)
COLOR UR: YELLOW
CREAT BLD-MCNC: 0.81 MG/DL (ref 0.57–1)
D-LACTATE SERPL-SCNC: 2 MMOL/L (ref 0.5–2)
DEPRECATED RDW RBC AUTO: 49.2 FL (ref 37–54)
EOSINOPHIL # BLD AUTO: 0.03 10*3/MM3 (ref 0–0.4)
EOSINOPHIL NFR BLD AUTO: 0.2 % (ref 0.3–6.2)
ERYTHROCYTE [DISTWIDTH] IN BLOOD BY AUTOMATED COUNT: 13.2 % (ref 12.3–15.4)
GFR SERPL CREATININE-BSD FRML MDRD: 69 ML/MIN/1.73
GLOBULIN UR ELPH-MCNC: 3.6 GM/DL
GLUCOSE BLD-MCNC: 127 MG/DL (ref 65–99)
GLUCOSE UR STRIP-MCNC: NEGATIVE MG/DL
HCT VFR BLD AUTO: 42 % (ref 34–46.6)
HGB BLD-MCNC: 14.8 G/DL (ref 12–15.9)
HGB UR QL STRIP.AUTO: NEGATIVE
HYALINE CASTS UR QL AUTO: ABNORMAL /LPF
IMM GRANULOCYTES # BLD AUTO: 0.07 10*3/MM3 (ref 0–0.05)
IMM GRANULOCYTES NFR BLD AUTO: 0.5 % (ref 0–0.5)
INR PPP: 1.06 (ref 0.91–1.09)
KETONES UR QL STRIP: NEGATIVE
LEUKOCYTE ESTERASE UR QL STRIP.AUTO: ABNORMAL
LYMPHOCYTES # BLD AUTO: 2.63 10*3/MM3 (ref 0.7–3.1)
LYMPHOCYTES NFR BLD AUTO: 18.4 % (ref 19.6–45.3)
MCH RBC QN AUTO: 35.7 PG (ref 26.6–33)
MCHC RBC AUTO-ENTMCNC: 35.2 G/DL (ref 31.5–35.7)
MCV RBC AUTO: 101.4 FL (ref 79–97)
MONOCYTES # BLD AUTO: 1.28 10*3/MM3 (ref 0.1–0.9)
MONOCYTES NFR BLD AUTO: 9 % (ref 5–12)
NEUTROPHILS # BLD AUTO: 10.26 10*3/MM3 (ref 1.7–7)
NEUTROPHILS NFR BLD AUTO: 71.8 % (ref 42.7–76)
NITRITE UR QL STRIP: NEGATIVE
NRBC BLD AUTO-RTO: 0 /100 WBC (ref 0–0.2)
PH UR STRIP.AUTO: 5.5 [PH] (ref 5–8)
PLATELET # BLD AUTO: 366 10*3/MM3 (ref 140–450)
PMV BLD AUTO: 9.8 FL (ref 6–12)
POTASSIUM BLD-SCNC: 3.8 MMOL/L (ref 3.5–5.2)
PROCALCITONIN SERPL-MCNC: 0.04 NG/ML (ref 0.1–0.25)
PROT SERPL-MCNC: 7.3 G/DL (ref 6–8.5)
PROT UR QL STRIP: NEGATIVE
PROTHROMBIN TIME: 13.7 SECONDS (ref 11.9–14.6)
RBC # BLD AUTO: 4.14 10*6/MM3 (ref 3.77–5.28)
RBC # UR: ABNORMAL /HPF
REF LAB TEST METHOD: ABNORMAL
SODIUM BLD-SCNC: 137 MMOL/L (ref 136–145)
SP GR UR STRIP: 1.02 (ref 1–1.03)
SQUAMOUS #/AREA URNS HPF: ABNORMAL /HPF
TROPONIN T SERPL-MCNC: <0.01 NG/ML (ref 0–0.03)
UROBILINOGEN UR QL STRIP: ABNORMAL
WBC NRBC COR # BLD: 14.29 10*3/MM3 (ref 3.4–10.8)
WBC UR QL AUTO: ABNORMAL /HPF

## 2020-03-07 PROCEDURE — 99285 EMERGENCY DEPT VISIT HI MDM: CPT

## 2020-03-07 PROCEDURE — 84443 ASSAY THYROID STIM HORMONE: CPT | Performed by: PHYSICIAN ASSISTANT

## 2020-03-07 PROCEDURE — 87040 BLOOD CULTURE FOR BACTERIA: CPT | Performed by: PHYSICIAN ASSISTANT

## 2020-03-07 PROCEDURE — 73630 X-RAY EXAM OF FOOT: CPT

## 2020-03-07 PROCEDURE — 83880 ASSAY OF NATRIURETIC PEPTIDE: CPT | Performed by: PHYSICIAN ASSISTANT

## 2020-03-07 PROCEDURE — 93005 ELECTROCARDIOGRAM TRACING: CPT | Performed by: PHYSICIAN ASSISTANT

## 2020-03-07 PROCEDURE — 83605 ASSAY OF LACTIC ACID: CPT | Performed by: PHYSICIAN ASSISTANT

## 2020-03-07 PROCEDURE — 81001 URINALYSIS AUTO W/SCOPE: CPT | Performed by: PHYSICIAN ASSISTANT

## 2020-03-07 PROCEDURE — 85730 THROMBOPLASTIN TIME PARTIAL: CPT | Performed by: PHYSICIAN ASSISTANT

## 2020-03-07 PROCEDURE — 25010000002 VANCOMYCIN 1 G RECONSTITUTED SOLUTION: Performed by: PHYSICIAN ASSISTANT

## 2020-03-07 PROCEDURE — 93971 EXTREMITY STUDY: CPT

## 2020-03-07 PROCEDURE — 73590 X-RAY EXAM OF LOWER LEG: CPT

## 2020-03-07 PROCEDURE — 80053 COMPREHEN METABOLIC PANEL: CPT | Performed by: PHYSICIAN ASSISTANT

## 2020-03-07 PROCEDURE — 25010000002 CEFEPIME PER 500 MG: Performed by: PHYSICIAN ASSISTANT

## 2020-03-07 PROCEDURE — 84145 PROCALCITONIN (PCT): CPT | Performed by: PHYSICIAN ASSISTANT

## 2020-03-07 PROCEDURE — 85610 PROTHROMBIN TIME: CPT | Performed by: PHYSICIAN ASSISTANT

## 2020-03-07 PROCEDURE — 84484 ASSAY OF TROPONIN QUANT: CPT | Performed by: PHYSICIAN ASSISTANT

## 2020-03-07 PROCEDURE — 36415 COLL VENOUS BLD VENIPUNCTURE: CPT | Performed by: PHYSICIAN ASSISTANT

## 2020-03-07 PROCEDURE — 93971 EXTREMITY STUDY: CPT | Performed by: SURGERY

## 2020-03-07 PROCEDURE — 93010 ELECTROCARDIOGRAM REPORT: CPT | Performed by: INTERNAL MEDICINE

## 2020-03-07 PROCEDURE — 85025 COMPLETE CBC W/AUTO DIFF WBC: CPT | Performed by: PHYSICIAN ASSISTANT

## 2020-03-07 RX ORDER — LABETALOL HYDROCHLORIDE 5 MG/ML
10 INJECTION, SOLUTION INTRAVENOUS ONCE
Status: COMPLETED | OUTPATIENT
Start: 2020-03-07 | End: 2020-03-08

## 2020-03-07 RX ADMIN — VANCOMYCIN HYDROCHLORIDE 1000 MG: 1 INJECTION, POWDER, LYOPHILIZED, FOR SOLUTION INTRAVENOUS at 18:50

## 2020-03-07 RX ADMIN — CEFEPIME HYDROCHLORIDE 2 G: 2 INJECTION, POWDER, FOR SOLUTION INTRAVENOUS at 18:03

## 2020-03-07 RX ADMIN — SODIUM CHLORIDE 1000 ML: 9 INJECTION, SOLUTION INTRAVENOUS at 23:10

## 2020-03-08 ENCOUNTER — APPOINTMENT (OUTPATIENT)
Dept: GENERAL RADIOLOGY | Facility: HOSPITAL | Age: 75
End: 2020-03-08

## 2020-03-08 PROBLEM — J44.1 COPD WITH ACUTE EXACERBATION (HCC): Status: ACTIVE | Noted: 2020-03-08

## 2020-03-08 LAB
ALBUMIN SERPL-MCNC: 3.3 G/DL (ref 3.5–5.2)
ALBUMIN/GLOB SERPL: 1.1 G/DL
ALP SERPL-CCNC: 84 U/L (ref 39–117)
ALT SERPL W P-5'-P-CCNC: 13 U/L (ref 1–33)
ANION GAP SERPL CALCULATED.3IONS-SCNC: 12 MMOL/L (ref 5–15)
AST SERPL-CCNC: 18 U/L (ref 1–32)
BASOPHILS # BLD AUTO: 0.02 10*3/MM3 (ref 0–0.2)
BASOPHILS NFR BLD AUTO: 0.2 % (ref 0–1.5)
BILIRUB SERPL-MCNC: 0.4 MG/DL (ref 0.2–1.2)
BUN BLD-MCNC: 20 MG/DL (ref 8–23)
BUN/CREAT SERPL: 25.3 (ref 7–25)
CALCIUM SPEC-SCNC: 8.2 MG/DL (ref 8.6–10.5)
CHLORIDE SERPL-SCNC: 105 MMOL/L (ref 98–107)
CO2 SERPL-SCNC: 22 MMOL/L (ref 22–29)
CREAT BLD-MCNC: 0.79 MG/DL (ref 0.57–1)
CRP SERPL-MCNC: 6.47 MG/DL (ref 0–0.5)
D DIMER PPP FEU-MCNC: 0.35 MG/L (FEU) (ref 0–0.5)
DEPRECATED RDW RBC AUTO: 49.5 FL (ref 37–54)
EOSINOPHIL # BLD AUTO: 0.02 10*3/MM3 (ref 0–0.4)
EOSINOPHIL NFR BLD AUTO: 0.2 % (ref 0.3–6.2)
ERYTHROCYTE [DISTWIDTH] IN BLOOD BY AUTOMATED COUNT: 13.3 % (ref 12.3–15.4)
GFR SERPL CREATININE-BSD FRML MDRD: 71 ML/MIN/1.73
GLOBULIN UR ELPH-MCNC: 3.1 GM/DL
GLUCOSE BLD-MCNC: 119 MG/DL (ref 65–99)
HCT VFR BLD AUTO: 39.1 % (ref 34–46.6)
HGB BLD-MCNC: 13.5 G/DL (ref 12–15.9)
HOLD SPECIMEN: NORMAL
IMM GRANULOCYTES # BLD AUTO: 0.07 10*3/MM3 (ref 0–0.05)
IMM GRANULOCYTES NFR BLD AUTO: 0.6 % (ref 0–0.5)
LYMPHOCYTES # BLD AUTO: 1.73 10*3/MM3 (ref 0.7–3.1)
LYMPHOCYTES NFR BLD AUTO: 15.3 % (ref 19.6–45.3)
MCH RBC QN AUTO: 34.9 PG (ref 26.6–33)
MCHC RBC AUTO-ENTMCNC: 34.5 G/DL (ref 31.5–35.7)
MCV RBC AUTO: 101 FL (ref 79–97)
MONOCYTES # BLD AUTO: 0.96 10*3/MM3 (ref 0.1–0.9)
MONOCYTES NFR BLD AUTO: 8.5 % (ref 5–12)
NEUTROPHILS # BLD AUTO: 8.52 10*3/MM3 (ref 1.7–7)
NEUTROPHILS NFR BLD AUTO: 75.2 % (ref 42.7–76)
NRBC BLD AUTO-RTO: 0 /100 WBC (ref 0–0.2)
NT-PROBNP SERPL-MCNC: 853 PG/ML (ref 5–1800)
PLATELET # BLD AUTO: 305 10*3/MM3 (ref 140–450)
PMV BLD AUTO: 10.2 FL (ref 6–12)
POTASSIUM BLD-SCNC: 3.7 MMOL/L (ref 3.5–5.2)
PROT SERPL-MCNC: 6.4 G/DL (ref 6–8.5)
RBC # BLD AUTO: 3.87 10*6/MM3 (ref 3.77–5.28)
SODIUM BLD-SCNC: 139 MMOL/L (ref 136–145)
TSH SERPL DL<=0.05 MIU/L-ACNC: 1.97 UIU/ML (ref 0.27–4.2)
WBC NRBC COR # BLD: 11.32 10*3/MM3 (ref 3.4–10.8)
WHOLE BLOOD HOLD SPECIMEN: NORMAL

## 2020-03-08 PROCEDURE — 85025 COMPLETE CBC W/AUTO DIFF WBC: CPT | Performed by: INTERNAL MEDICINE

## 2020-03-08 PROCEDURE — 25010000002 FUROSEMIDE PER 20 MG: Performed by: INTERNAL MEDICINE

## 2020-03-08 PROCEDURE — 94640 AIRWAY INHALATION TREATMENT: CPT

## 2020-03-08 PROCEDURE — 94799 UNLISTED PULMONARY SVC/PX: CPT

## 2020-03-08 PROCEDURE — 25010000002 MEROPENEM PER 100 MG: Performed by: INTERNAL MEDICINE

## 2020-03-08 PROCEDURE — 25010000002 METHYLPREDNISOLONE PER 40 MG: Performed by: INTERNAL MEDICINE

## 2020-03-08 PROCEDURE — 71045 X-RAY EXAM CHEST 1 VIEW: CPT

## 2020-03-08 PROCEDURE — 80053 COMPREHEN METABOLIC PANEL: CPT | Performed by: INTERNAL MEDICINE

## 2020-03-08 PROCEDURE — 85379 FIBRIN DEGRADATION QUANT: CPT | Performed by: PHYSICIAN ASSISTANT

## 2020-03-08 PROCEDURE — 86140 C-REACTIVE PROTEIN: CPT | Performed by: INTERNAL MEDICINE

## 2020-03-08 RX ORDER — FUROSEMIDE 20 MG/1
20 TABLET ORAL DAILY PRN
Status: DISCONTINUED | OUTPATIENT
Start: 2020-03-08 | End: 2020-03-10 | Stop reason: HOSPADM

## 2020-03-08 RX ORDER — POTASSIUM CHLORIDE 750 MG/1
10 CAPSULE, EXTENDED RELEASE ORAL EVERY OTHER DAY
Status: DISCONTINUED | OUTPATIENT
Start: 2020-03-08 | End: 2020-03-10 | Stop reason: HOSPADM

## 2020-03-08 RX ORDER — GUAIFENESIN 600 MG/1
1200 TABLET, EXTENDED RELEASE ORAL EVERY 12 HOURS SCHEDULED
Status: DISCONTINUED | OUTPATIENT
Start: 2020-03-08 | End: 2020-03-10 | Stop reason: HOSPADM

## 2020-03-08 RX ORDER — LORAZEPAM 1 MG/1
1 TABLET ORAL 2 TIMES DAILY
Status: DISCONTINUED | OUTPATIENT
Start: 2020-03-08 | End: 2020-03-10 | Stop reason: HOSPADM

## 2020-03-08 RX ORDER — GUAIFENESIN 600 MG/1
1200 TABLET, EXTENDED RELEASE ORAL 2 TIMES DAILY PRN
Status: DISCONTINUED | OUTPATIENT
Start: 2020-03-08 | End: 2020-03-08 | Stop reason: SDUPTHER

## 2020-03-08 RX ORDER — AMLODIPINE BESYLATE 5 MG/1
5 TABLET ORAL DAILY
Status: DISCONTINUED | OUTPATIENT
Start: 2020-03-08 | End: 2020-03-10 | Stop reason: HOSPADM

## 2020-03-08 RX ORDER — METHYLPREDNISOLONE SODIUM SUCCINATE 40 MG/ML
40 INJECTION, POWDER, LYOPHILIZED, FOR SOLUTION INTRAMUSCULAR; INTRAVENOUS EVERY 12 HOURS
Status: DISCONTINUED | OUTPATIENT
Start: 2020-03-08 | End: 2020-03-09

## 2020-03-08 RX ORDER — ACETAMINOPHEN 500 MG
1000 TABLET ORAL ONCE
Status: COMPLETED | OUTPATIENT
Start: 2020-03-08 | End: 2020-03-08

## 2020-03-08 RX ORDER — ONDANSETRON 2 MG/ML
4 INJECTION INTRAMUSCULAR; INTRAVENOUS EVERY 6 HOURS PRN
Status: DISCONTINUED | OUTPATIENT
Start: 2020-03-08 | End: 2020-03-10 | Stop reason: HOSPADM

## 2020-03-08 RX ORDER — SODIUM CHLORIDE 0.9 % (FLUSH) 0.9 %
10 SYRINGE (ML) INJECTION EVERY 12 HOURS SCHEDULED
Status: DISCONTINUED | OUTPATIENT
Start: 2020-03-08 | End: 2020-03-10 | Stop reason: HOSPADM

## 2020-03-08 RX ORDER — LISINOPRIL 20 MG/1
20 TABLET ORAL DAILY
Status: DISCONTINUED | OUTPATIENT
Start: 2020-03-08 | End: 2020-03-10 | Stop reason: HOSPADM

## 2020-03-08 RX ORDER — ATORVASTATIN CALCIUM 10 MG/1
10 TABLET, FILM COATED ORAL DAILY
Status: DISCONTINUED | OUTPATIENT
Start: 2020-03-08 | End: 2020-03-10 | Stop reason: HOSPADM

## 2020-03-08 RX ORDER — NITROGLYCERIN 0.4 MG/1
0.4 TABLET SUBLINGUAL
Status: DISCONTINUED | OUTPATIENT
Start: 2020-03-08 | End: 2020-03-10 | Stop reason: HOSPADM

## 2020-03-08 RX ORDER — IPRATROPIUM BROMIDE AND ALBUTEROL SULFATE 2.5; .5 MG/3ML; MG/3ML
3 SOLUTION RESPIRATORY (INHALATION)
Status: DISCONTINUED | OUTPATIENT
Start: 2020-03-08 | End: 2020-03-08

## 2020-03-08 RX ORDER — FUROSEMIDE 10 MG/ML
40 INJECTION INTRAMUSCULAR; INTRAVENOUS ONCE
Status: COMPLETED | OUTPATIENT
Start: 2020-03-08 | End: 2020-03-08

## 2020-03-08 RX ORDER — SODIUM CHLORIDE 0.9 % (FLUSH) 0.9 %
10 SYRINGE (ML) INJECTION AS NEEDED
Status: DISCONTINUED | OUTPATIENT
Start: 2020-03-08 | End: 2020-03-10 | Stop reason: HOSPADM

## 2020-03-08 RX ORDER — DIPHENHYDRAMINE HCL 25 MG
25 CAPSULE ORAL EVERY 6 HOURS PRN
Status: DISCONTINUED | OUTPATIENT
Start: 2020-03-08 | End: 2020-03-10 | Stop reason: HOSPADM

## 2020-03-08 RX ORDER — METOPROLOL TARTRATE 5 MG/5ML
5 INJECTION INTRAVENOUS ONCE
Status: COMPLETED | OUTPATIENT
Start: 2020-03-08 | End: 2020-03-08

## 2020-03-08 RX ORDER — METOPROLOL SUCCINATE 50 MG/1
50 TABLET, EXTENDED RELEASE ORAL DAILY
Status: DISCONTINUED | OUTPATIENT
Start: 2020-03-08 | End: 2020-03-10 | Stop reason: HOSPADM

## 2020-03-08 RX ORDER — BUDESONIDE AND FORMOTEROL FUMARATE DIHYDRATE 160; 4.5 UG/1; UG/1
2 AEROSOL RESPIRATORY (INHALATION)
Status: DISCONTINUED | OUTPATIENT
Start: 2020-03-08 | End: 2020-03-10 | Stop reason: HOSPADM

## 2020-03-08 RX ORDER — LEVETIRACETAM 500 MG/1
500 TABLET ORAL EVERY 12 HOURS SCHEDULED
Status: DISCONTINUED | OUTPATIENT
Start: 2020-03-08 | End: 2020-03-10 | Stop reason: HOSPADM

## 2020-03-08 RX ORDER — ASPIRIN 81 MG/1
81 TABLET ORAL DAILY
Status: DISCONTINUED | OUTPATIENT
Start: 2020-03-08 | End: 2020-03-10 | Stop reason: HOSPADM

## 2020-03-08 RX ORDER — SODIUM CHLORIDE 9 MG/ML
75 INJECTION, SOLUTION INTRAVENOUS CONTINUOUS
Status: DISCONTINUED | OUTPATIENT
Start: 2020-03-08 | End: 2020-03-08

## 2020-03-08 RX ORDER — PANTOPRAZOLE SODIUM 40 MG/1
40 TABLET, DELAYED RELEASE ORAL
Status: DISCONTINUED | OUTPATIENT
Start: 2020-03-08 | End: 2020-03-10 | Stop reason: HOSPADM

## 2020-03-08 RX ADMIN — MEROPENEM 500 MG: 500 INJECTION, POWDER, FOR SOLUTION INTRAVENOUS at 04:08

## 2020-03-08 RX ADMIN — LEVETIRACETAM 500 MG: 500 TABLET, FILM COATED ORAL at 22:13

## 2020-03-08 RX ADMIN — IPRATROPIUM BROMIDE 0.5 MG: 0.5 SOLUTION RESPIRATORY (INHALATION) at 21:07

## 2020-03-08 RX ADMIN — PANTOPRAZOLE SODIUM 40 MG: 40 TABLET, DELAYED RELEASE ORAL at 06:21

## 2020-03-08 RX ADMIN — GUAIFENESIN 1200 MG: 600 TABLET, EXTENDED RELEASE ORAL at 08:50

## 2020-03-08 RX ADMIN — SODIUM CHLORIDE, PRESERVATIVE FREE 10 ML: 5 INJECTION INTRAVENOUS at 22:14

## 2020-03-08 RX ADMIN — FUROSEMIDE 40 MG: 10 INJECTION, SOLUTION INTRAMUSCULAR; INTRAVENOUS at 01:50

## 2020-03-08 RX ADMIN — METHYLPREDNISOLONE SODIUM SUCCINATE 40 MG: 40 INJECTION, POWDER, FOR SOLUTION INTRAMUSCULAR; INTRAVENOUS at 03:46

## 2020-03-08 RX ADMIN — METHYLPREDNISOLONE SODIUM SUCCINATE 40 MG: 40 INJECTION, POWDER, FOR SOLUTION INTRAMUSCULAR; INTRAVENOUS at 15:56

## 2020-03-08 RX ADMIN — MEROPENEM 500 MG: 500 INJECTION, POWDER, FOR SOLUTION INTRAVENOUS at 22:14

## 2020-03-08 RX ADMIN — APIXABAN 5 MG: 5 TABLET, FILM COATED ORAL at 08:50

## 2020-03-08 RX ADMIN — MUPIROCIN: 20 OINTMENT TOPICAL at 22:14

## 2020-03-08 RX ADMIN — BUDESONIDE AND FORMOTEROL FUMARATE DIHYDRATE 2 PUFF: 160; 4.5 AEROSOL RESPIRATORY (INHALATION) at 21:07

## 2020-03-08 RX ADMIN — LORAZEPAM 1 MG: 1 TABLET ORAL at 22:13

## 2020-03-08 RX ADMIN — SODIUM CHLORIDE 75 ML/HR: 9 INJECTION, SOLUTION INTRAVENOUS at 03:30

## 2020-03-08 RX ADMIN — MEROPENEM 500 MG: 500 INJECTION, POWDER, FOR SOLUTION INTRAVENOUS at 12:12

## 2020-03-08 RX ADMIN — ACETAMINOPHEN 1000 MG: 500 TABLET, FILM COATED ORAL at 01:13

## 2020-03-08 RX ADMIN — METOPROLOL SUCCINATE 50 MG: 50 TABLET, EXTENDED RELEASE ORAL at 08:50

## 2020-03-08 RX ADMIN — ALBUTEROL SULFATE 2.5 MG: 2.5 SOLUTION RESPIRATORY (INHALATION) at 21:07

## 2020-03-08 RX ADMIN — ATORVASTATIN CALCIUM 10 MG: 10 TABLET, FILM COATED ORAL at 08:50

## 2020-03-08 RX ADMIN — MUPIROCIN: 20 OINTMENT TOPICAL at 08:52

## 2020-03-08 RX ADMIN — POTASSIUM CHLORIDE 10 MEQ: 750 CAPSULE, EXTENDED RELEASE ORAL at 08:50

## 2020-03-08 RX ADMIN — LORAZEPAM 1 MG: 1 TABLET ORAL at 08:50

## 2020-03-08 RX ADMIN — IPRATROPIUM BROMIDE 0.5 MG: 0.5 SOLUTION RESPIRATORY (INHALATION) at 11:22

## 2020-03-08 RX ADMIN — APIXABAN 5 MG: 5 TABLET, FILM COATED ORAL at 22:13

## 2020-03-08 RX ADMIN — BUDESONIDE AND FORMOTEROL FUMARATE DIHYDRATE 2 PUFF: 160; 4.5 AEROSOL RESPIRATORY (INHALATION) at 10:29

## 2020-03-08 RX ADMIN — ALBUTEROL SULFATE 1.25 MG: 2.5 SOLUTION RESPIRATORY (INHALATION) at 07:58

## 2020-03-08 RX ADMIN — ASPIRIN 81 MG: 81 TABLET ORAL at 09:06

## 2020-03-08 RX ADMIN — LISINOPRIL 20 MG: 20 TABLET ORAL at 08:51

## 2020-03-08 RX ADMIN — LEVETIRACETAM 500 MG: 500 TABLET, FILM COATED ORAL at 08:50

## 2020-03-08 RX ADMIN — IPRATROPIUM BROMIDE 0.5 MG: 0.5 SOLUTION RESPIRATORY (INHALATION) at 07:58

## 2020-03-08 RX ADMIN — ALBUTEROL SULFATE 1.25 MG: 2.5 SOLUTION RESPIRATORY (INHALATION) at 11:22

## 2020-03-08 RX ADMIN — MEROPENEM 500 MG: 500 INJECTION, POWDER, FOR SOLUTION INTRAVENOUS at 15:56

## 2020-03-08 RX ADMIN — ALBUTEROL SULFATE 1.25 MG: 2.5 SOLUTION RESPIRATORY (INHALATION) at 15:30

## 2020-03-08 RX ADMIN — LABETALOL HYDROCHLORIDE 10 MG: 5 INJECTION INTRAVENOUS at 00:00

## 2020-03-08 RX ADMIN — IPRATROPIUM BROMIDE 0.5 MG: 0.5 SOLUTION RESPIRATORY (INHALATION) at 15:30

## 2020-03-08 RX ADMIN — GUAIFENESIN 1200 MG: 600 TABLET, EXTENDED RELEASE ORAL at 22:13

## 2020-03-08 RX ADMIN — METOPROLOL TARTRATE 5 MG: 5 INJECTION INTRAVENOUS at 00:22

## 2020-03-08 RX ADMIN — AMLODIPINE BESYLATE 5 MG: 5 TABLET ORAL at 08:50
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
## 2020-03-08 NOTE — PROGRESS NOTES
[facility] Medicine Services  INPATIENT PROGRESS NOTE    Patient Name: Josi Mohamud  Date of Admission: 3/7/2020  Today's Date: 03/08/20  Length of Stay: 1  Primary Care Physician: Cayetano Crews MD    Subjective   Chief Complaint: Right lower extremity redness and pain  HPI   Patient states her right lower extremity is already improved from yesterday.  She states the pain is better.  She also states that her breathing is improved from yesterday.  She denies having any current shortness of breath or chest pain.        Review of Systems     All pertinent negatives and positives are as above. All other systems have been reviewed and are negative unless otherwise stated.     Objective    Temp:  [98.2 °F (36.8 °C)-99.3 °F (37.4 °C)] 98.9 °F (37.2 °C)  Heart Rate:  [] 75  Resp:  [16-29] 16  BP: (111-166)/(52-89) 135/56  Physical Exam   Constitutional: She is oriented to person, place, and time. She appears well-developed and well-nourished.   HENT:   Head: Normocephalic and atraumatic.   Eyes: Pupils are equal, round, and reactive to light. Conjunctivae and EOM are normal.   Neck: Neck supple. No JVD present.   Cardiovascular: Normal rate, normal heart sounds and intact distal pulses. Exam reveals no gallop and no friction rub.   No murmur heard.  Irregular but not rapid   Pulmonary/Chest: Effort normal. No respiratory distress. She has no wheezes. She has no rales. She exhibits no tenderness.   Occasional scattered wheeze.  No distress wearing her home oxygen requirement currently speaking to me in complete sentences with no difficulty   Abdominal: Soft. Bowel sounds are normal. She exhibits no distension. There is no tenderness. There is no rebound and no guarding.   Musculoskeletal: Normal range of motion. She exhibits no edema, tenderness or deformity.   Neurological: She is alert and oriented to person, place, and time. She displays normal reflexes. No cranial nerve  deficit. She exhibits normal muscle tone.   Skin: Skin is warm and dry. No rash noted.   Right lower extremity with swelling mostly just around the ankle and the foot with mild erythema.  She does have an area of healing type wound on her left inner shin type region.   Psychiatric: She has a normal mood and affect. Her behavior is normal. Judgment and thought content normal.           Results Review:  I have reviewed the labs, radiology results, and diagnostic studies.    Laboratory Data:   Results from last 7 days   Lab Units 03/08/20  0522 03/07/20  1714 03/02/20  1212   WBC 10*3/mm3 11.32* 14.29* 5.87   HEMOGLOBIN g/dL 13.5 14.8 15.2   HEMATOCRIT % 39.1 42.0 44.4   PLATELETS 10*3/mm3 305 366 345        Results from last 7 days   Lab Units 03/08/20  0522 03/07/20  1755   SODIUM mmol/L 139 137   POTASSIUM mmol/L 3.7 3.8   CHLORIDE mmol/L 105 100   CO2 mmol/L 22.0 24.0   BUN mg/dL 20 23   CREATININE mg/dL 0.79 0.81   CALCIUM mg/dL 8.2* 8.6   BILIRUBIN mg/dL 0.4 0.3   ALK PHOS U/L 84 99   ALT (SGPT) U/L 13 13   AST (SGOT) U/L 18 14   GLUCOSE mg/dL 119* 127*       Culture Data:   Blood Culture   Date Value Ref Range Status   03/07/2020 No growth at less than 24 hours  Preliminary   03/07/2020 No growth at less than 24 hours  Preliminary     Wound Culture   Date Value Ref Range Status   03/02/2020 Rare Staphylococcus, coagulase negative (A)  Final     Comment:     Call if further workup needed.         Radiology Data:   Imaging Results (Last 24 Hours)     Procedure Component Value Units Date/Time    XR Chest 1 View [[id number]] Collected:  03/08/20 0645     Updated:  03/08/20 0650    Narrative:       XR CHEST 1 VW-     Indication: soa; L03.115-Cellulitis of right lower limb     Comparison: 12/31/2019     Findings:     Patient is rotated which slightly limits evaluation. The cardiac  silhouette is stable in size. No pleural effusion, pneumothorax, or  focal consolidation. Mild chronic coarsening of the  interstitium.  Implantable loop recorder. Surgical clip at the RIGHT neck base. No  aggressive osseous lesions.       Impression:       Impression:     No acute findings.  This report was finalized on 03/08/2020 06:47 by Dr. Iftikhar Prieto MD.    US Venous Doppler Lower Extremity Right (duplex) [[id number]] Resulted:  03/07/20 2018     Updated:  03/07/20 2019    XR Foot 3+ View Right [[id number]] Collected:  03/07/20 1729     Updated:  03/07/20 1734    Narrative:       EXAMINATION:  XR FOOT 3+ VW RIGHT-  3/7/2020 5:03 PM CST     HISTORY: pain, swelling      COMPARISON: 02/04/2019 right foot radiographs     TECHNIQUE: 3 views: AP lateral oblique projection imaging     FINDINGS:      There is marked soft tissue swelling along the dorsum of the foot.     There is osteoporosis.     There is hallucis valgus deformity.     The bony structures are intact without acute fractures.     Again identified is mild cortical irregularity involving the proximal  shafts of the third and fourth metatarsals that were described  previously suggesting old stress fractures.     Inferior calcaneal spurring identified.       Impression:       1. Soft tissue swelling.  2. No acute osseous abnormality.  This report was finalized on 03/07/2020 17:30 by Dr. Marcello Rutledge MD.    XR Tibia Fibula 2 View Right [[id number]] Collected:  03/07/20 1728     Updated:  03/07/20 1732    Narrative:       EXAMINATION:  XR TIBIA FIBULA 2 VW RIGHT-  3/7/2020 5:06 PM CST     HISTORY: pain, swelling      COMPARISON: No comparison study.     TECHNIQUE: 2 views, 4 images: AP and lateral projection imaging     FINDINGS:      The knee and ankle joints are maintained without fracture.     There are no focal blastic or lytic lesions or periostitis.     Soft tissue swelling suspected. There is no abnormal soft tissue gas  identified.     Atherosclerotic calcifications noted.       Impression:       1. No acute osseous abnormality.  This report was finalized on 03/07/2020  17:29 by Dr. Marcello Rutledge MD.          I have reviewed the patient's current medications.     Assessment/Plan     Active Hospital Problems    Diagnosis   • COPD with acute exacerbation (CMS/HCC)   • Cellulitis of right lower extremity   • Paroxysmal atrial fibrillation with rapid ventricular response (CMS/HCC)       1. Continue IV kelvin for now, she states it is much better  2. D/c IVFs  3. Continue steroids and nebs  4. Continue home eliquis and BB for afib, currently doing  Much better  5. Wears home 02              Discharge Planning: I expect the patient to be discharged to home in 2-3 days.    Angy Peña MD   03/08/20   1:34 PM

## 2020-03-08 NOTE — PLAN OF CARE
"  Problem: Patient Care Overview  Goal: Plan of Care Review  Outcome: Ongoing (interventions implemented as appropriate)  Flowsheets (Taken 3/8/2020 0401)  Progress: no change  Plan of Care Reviewed With: patient  Outcome Summary: New admit to 484 from ER for cellulitis of RLE.  Pt reports increased swelling & redness over past couple of days; has been treated on outpatient basis.  Had issues with elevated HR while in ER but has since resolved with adminstraiton of medications; pt states she does have a problem with this \"at times\".  No c/o pain.  IV fluids & abx administered as ordered.  O2 at 2L NC which patient wears at night while at home.  VSS. Bed alarm in place.  Will continue to monitor.     "

## 2020-03-08 NOTE — ED PROVIDER NOTES
Subjective   History of Present Illness  74-year-old female presents with a chief complaint of right lower extremity pain and pain redness.  Patient provided lightheaded previously in the bathtub and the patient was seen about this 5 days ago in urgent care and was treated for cellulitis since then the redness and swelling has gotten worse.  She had been taking clindamycin she is been afebrile.  No cough chest pain shortness of breath nausea vomiting diarrhea or abdominal pain.  Review of Systems   All other systems reviewed and are negative.      Past Medical History:   Diagnosis Date   • Bronchitis    • COPD (chronic obstructive pulmonary disease) (CMS/[facility])    • Coronary artery disease    • GERD (gastroesophageal reflux disease)    • History of transfusion    • Hyperlipidemia    • Hypertension    • PAF (paroxysmal atrial fibrillation) (CMS/[facility])    • Persistent asthma without complication 7/8/2019   • Seizures (CMS/[facility])        Allergies   Allergen Reactions   • Penicillins Rash       Past Surgical History:   Procedure Laterality Date   • APPENDECTOMY     • APPENDECTOMY     • CARDIAC CATHETERIZATION     • CARDIAC ELECTROPHYSIOLOGY PROCEDURE N/A 5/23/2017    Procedure: Loop insertion; LINQ;  Surgeon: Jose Monteiro MD;  Location: [facility] CATH INVASIVE LOCATION;  Service:    • CHOLECYSTECTOMY     • CORONARY ANGIOPLASTY WITH STENT PLACEMENT      X 1    • LIVER BIOPSY     • MYRINGOTOMY W/ TUBES Left 11/22/2019    Procedure: LEFT MYRINGOTOMY WITH INSERTION OF EAR TUBE WITH RIGHT EAR EXAM UNDER ANESTHESIA;  Surgeon: Se Mcnulty MD;  Location: [facility] OR;  Service: ENT   • SPLENECTOMY     • US GUIDED FINE NEEDLE ASPIRATION  8/7/2018       Family History   Problem Relation Age of Onset   • Esophageal cancer Brother    • Lung cancer Brother    • Breast cancer Neg Hx        Social History     Socioeconomic History   • Marital status:      Spouse name: Not on file   • Number of children: 1   • Years of education:  Not on file   • Highest education level: Not on file   Tobacco Use   • Smoking status: Current Every Day Smoker     Packs/day: 0.50     Years: 58.00     Pack years: 29.00     Start date: 1961   • Smokeless tobacco: Never Used   Substance and Sexual Activity   • Alcohol use: No   • Drug use: No   • Sexual activity: Defer           Objective   Physical Exam   Constitutional: She is oriented to person, place, and time. She appears well-developed and well-nourished.   HENT:   Head: Normocephalic and atraumatic.   Eyes: Pupils are equal, round, and reactive to light. EOM are normal.   Neck: Normal range of motion. Neck supple.   Cardiovascular: Normal rate and regular rhythm.   Pulmonary/Chest: Effort normal and breath sounds normal.   Abdominal: Soft. Bowel sounds are normal.   Musculoskeletal: Normal range of motion. She exhibits tenderness.   RLE erythema and pedal edema    Neurological: She is alert and oriented to person, place, and time. No cranial nerve deficit. Coordination normal.   Skin: Skin is warm. Capillary refill takes less than 2 seconds.   Psychiatric: She has a normal mood and affect. Her behavior is normal.   Nursing note and vitals reviewed.      Procedures           ED Course  ED Course as of Mar 14 2116   Sun Mar 08, 2020   0045 Heart Rate: 114 [RW]      ED Course User Index  [RW] Darnell Reyes MD           Labs Reviewed   COMPREHENSIVE METABOLIC PANEL - Abnormal; Notable for the following components:       Result Value    Glucose 127 (*)     BUN/Creatinine Ratio 28.4 (*)     All other components within normal limits    Narrative:     GFR Normal >60  Chronic Kidney Disease <60  Kidney Failure <15     URINALYSIS W/ CULTURE IF INDICATED - Abnormal; Notable for the following components:    Leuk Esterase, UA Trace (*)     All other components within normal limits   PROCALCITONIN - Abnormal; Notable for the following components:    Procalcitonin 0.04 (*)     All other components within normal  "limits    Narrative:     As a Marker for Sepsis (Non-Neonates):   1. <0.5 ng/mL represents a low risk of severe sepsis and/or septic shock.  1. >2 ng/mL represents a high risk of severe sepsis and/or septic shock.    As a Marker for Lower Respiratory Tract Infections that require antibiotic therapy:  PCT on Admission     Antibiotic Therapy             6-12 Hrs later  > 0.5                Strongly Recommended            >0.25 - <0.5         Recommended  0.1 - 0.25           Discouraged                   Remeasure/reassess PCT  <0.1                 Strongly Discouraged          Remeasure/reassess PCT      As 28 day mortality risk marker: \"Change in Procalcitonin Result\" (> 80 % or <=80 %) if Day 0 (or Day 1) and Day 4 values are available. Refer to http://[email or web address] CityPower Electronicspct-calculator.com/   Change in PCT <=80 %   A decrease of PCT levels below or equal to 80 % defines a positive change in PCT test result representing a higher risk for 28-day all-cause mortality of patients diagnosed with severe sepsis or septic shock.  Change in PCT > 80 %   A decrease of PCT levels of more than 80 % defines a negative change in PCT result representing a lower risk for 28-day all-cause mortality of patients diagnosed with severe sepsis or septic shock.                Results may be falsely decreased if patient taking Biotin.    CBC WITH AUTO DIFFERENTIAL - Abnormal; Notable for the following components:    WBC 14.29 (*)     .4 (*)     MCH 35.7 (*)     Lymphocyte % 18.4 (*)     Eosinophil % 0.2 (*)     Neutrophils, Absolute 10.26 (*)     Monocytes, Absolute 1.28 (*)     Immature Grans, Absolute 0.07 (*)     All other components within normal limits   URINALYSIS, MICROSCOPIC ONLY - Abnormal; Notable for the following components:    RBC, UA 3-5 (*)     WBC, UA 0-2 (*)     Squamous Epithelial Cells, UA 3-6 (*)     All other components within normal limits   CBC WITH AUTO DIFFERENTIAL - Abnormal; Notable for the following components: "    WBC 11.32 (*)     .0 (*)     MCH 34.9 (*)     Lymphocyte % 15.3 (*)     Eosinophil % 0.2 (*)     Immature Grans % 0.6 (*)     Neutrophils, Absolute 8.52 (*)     Monocytes, Absolute 0.96 (*)     Immature Grans, Absolute 0.07 (*)     All other components within normal limits   COMPREHENSIVE METABOLIC PANEL - Abnormal; Notable for the following components:    Glucose 119 (*)     Calcium 8.2 (*)     Albumin 3.30 (*)     BUN/Creatinine Ratio 25.3 (*)     All other components within normal limits    Narrative:     GFR Normal >60  Chronic Kidney Disease <60  Kidney Failure <15     C-REACTIVE PROTEIN - Abnormal; Notable for the following components:    C-Reactive Protein 6.47 (*)     All other components within normal limits   BASIC METABOLIC PANEL - Abnormal; Notable for the following components:    Glucose 159 (*)     BUN 24 (*)     Sodium 135 (*)     BUN/Creatinine Ratio 32.4 (*)     All other components within normal limits    Narrative:     GFR Normal >60  Chronic Kidney Disease <60  Kidney Failure <15     CBC WITH AUTO DIFFERENTIAL - Abnormal; Notable for the following components:    WBC 14.16 (*)     RBC 3.69 (*)     .5 (*)     MCH 35.2 (*)     Neutrophil % 85.6 (*)     Lymphocyte % 8.9 (*)     Monocyte % 4.3 (*)     Eosinophil % 0.0 (*)     Immature Grans % 1.1 (*)     Neutrophils, Absolute 12.12 (*)     Immature Grans, Absolute 0.15 (*)     All other components within normal limits   CBC (NO DIFF) - Abnormal; Notable for the following components:    WBC 10.82 (*)     .3 (*)     MCH 34.7 (*)     All other components within normal limits   COMPREHENSIVE METABOLIC PANEL - Abnormal; Notable for the following components:    Glucose 138 (*)     BUN 24 (*)     CO2 21.0 (*)     BUN/Creatinine Ratio 32.9 (*)     All other components within normal limits    Narrative:     GFR Normal >60  Chronic Kidney Disease <60  Kidney Failure <15     PROTIME-INR - Normal   APTT - Normal   LACTIC ACID, PLASMA -  Normal   TROPONIN (IN-HOUSE) - Normal    Narrative:     Troponin T Reference Range:  <= 0.03 ng/mL-   Negative for AMI  >0.03 ng/mL-     Abnormal for myocardial necrosis.  Clinicians would have to utilize clinical acumen, EKG, Troponin and serial changes to determine if it is an Acute Myocardial Infarction or myocardial injury due to an underlying chronic condition.       Results may be falsely decreased if patient taking Biotin.     TSH - Normal   D-DIMER, QUANTITATIVE - Normal    Narrative:     Reference Range is 0-0.50 mg/L FEU. However, results <0.50 mg/L FEU tends to rule out DVT or PE. Results >0.50 mg/L FEU are not useful in predicting absence or presence of DVT or PE.     BNP (IN-HOUSE) - Normal    Narrative:     Among patients with dyspnea, NT-proBNP is highly sensitive for the detection of acute congestive heart failure. In addition NT-proBNP of <300 pg/ml effectively rules out acute congestive heart failure with 99% negative predictive value.    Results may be falsely decreased if patient taking Biotin.     BLOOD CULTURE WITH JC   BLOOD CULTURE WITH JC   CBC AND DIFFERENTIAL    Narrative:     The following orders were created for panel order CBC & Differential.  Procedure                               Abnormality         Status                     ---------                               -----------         ------                     CBC Auto Differential[[id number]]        Abnormal            Final result                 Please view results for these tests on the individual orders.   EXTRA TUBES    Narrative:     The following orders were created for panel order Extra Tubes.  Procedure                               Abnormality         Status                     ---------                               -----------         ------                     Lavender Top[[id number]]                                     Final result                 Please view results for these tests on the individual orders.    LAVENDER TOP   EXTRA TUBES    Narrative:     The following orders were created for panel order Extra Tubes.  Procedure                               Abnormality         Status                     ---------                               -----------         ------                     Green Top (Gel)[[id number]]                                  Final result                 Please view results for these tests on the individual orders.   GREEN TOP   CBC AND DIFFERENTIAL    Narrative:     The following orders were created for panel order CBC & Differential.  Procedure                               Abnormality         Status                     ---------                               -----------         ------                     CBC Auto Differential[[id number]]        Abnormal            Final result                 Please view results for these tests on the individual orders.     XR Chest 1 View   Final Result   Impression:       No acute findings.   This report was finalized on 03/08/2020 06:47 by Dr. Iftikhar Prieto MD.      US Venous Doppler Lower Extremity Right (duplex)   Final Result   Impression: There is no evidence of deep venous thrombosis or   superficial thrombophlebitis of the right lower extremity.       Comments: Right lower extremity venous duplex exam was performed using   color Doppler flow, Doppler wave form analysis, and grayscale imaging,   with and without compression. There is no evidence of deep venous   thrombosis of the common femoral, superficial femoral, popliteal,   posterior tibial, and peroneal veins. There is no thrombus identified in   the saphenofemoral junction or the greater saphenous vein.       This report was finalized on 03/09/2020 08:01 by Dr. Matthias Murphy MD.      XR Tibia Fibula 2 View Right   Final Result   1. No acute osseous abnormality.   This report was finalized on 03/07/2020 17:29 by Dr. Marcello Rutledge MD.      XR Foot 3+ View Right   Final Result   1. Soft tissue  swelling.   2. No acute osseous abnormality.   This report was finalized on 03/07/2020 17:30 by Dr. Marcello Rutledge MD.                                        MDM  Number of Diagnoses or Management Options  Diagnosis management comments: Cellulitis, failed outpatient therapy, will admit        Amount and/or Complexity of Data Reviewed  Clinical lab tests: ordered and reviewed  Tests in the radiology section of CPT®: ordered and reviewed  Tests in the medicine section of CPT®: reviewed and ordered    Risk of Complications, Morbidity, and/or Mortality  Presenting problems: moderate  Diagnostic procedures: moderate  Management options: moderate    Patient Progress  Patient progress: stable      Final diagnoses:   Cellulitis of right lower extremity            Chris Jarrell PA-C  03/07/20 2337       Chris Jarrell PA-C  03/14/20 3840

## 2020-03-08 NOTE — PLAN OF CARE
Problem: Patient Care Overview  Goal: Plan of Care Review  Outcome: Ongoing (interventions implemented as appropriate)  Flowsheets (Taken 3/8/2020 1726)  Plan of Care Reviewed With: patient; family  Outcome Summary: VSS. No c/o voiced this shift. Continuous IVF's d/c'd this shift. IV antibiotics continue. NSR per telemetry. Patient states productive cough with clear sputum. No s/s respiratory distress. Pt. states that her right foot is feeling better today and she is able to put some weight on it. Pt. has been up to [facility] with assist of 1. Family here this shift to visit. Safety maintained. Call light within reach. Will continue to monitor.

## 2020-03-08 NOTE — PROGRESS NOTES
Discharge Planning Assessment  [facility]     Patient Name: Josi Mohamud  MRN: [id number]  Today's Date: 3/8/2020    Admit Date: 3/7/2020    Discharge Needs Assessment     Row Name 03/08/20 1120       Living Environment    Lives With  child(rd), adult    Name(s) of Who Lives With Patient  Franc     Current Living Arrangements  home/apartment/condo    Primary Care Provided by  self    Provides Primary Care For  no one    Family Caregiver if Needed  child(rd), adult    Quality of Family Relationships  helpful;involved;supportive    Able to Return to Prior Arrangements  yes       Resource/Environmental Concerns    Resource/Environmental Concerns  none    Transportation Concerns  car, none       Transition Planning    Patient/Family Anticipates Transition to  home with family    Patient/Family Anticipated Services at Transition  none    Transportation Anticipated  family or friend will provide       Discharge Needs Assessment    Readmission Within the Last 30 Days  no previous admission in last 30 days    Concerns to be Addressed  no discharge needs identified;denies needs/concerns at this time    Equipment Currently Used at Home  oxygen;walker, rolling;wheelchair;shower chair Pt is unknown at this time what company provides o2    Anticipated Changes Related to Illness  none    Equipment Needed After Discharge  none    Discharge Coordination/Progress  Pt has RX coverage and a PCP. Pt lived at home with her daughter, Franc, prior to admission and plans on returning when medically stable. Pt is home alone while her daughter works during the day. Pt states that she has all DME needed at home including O2 but she is unsure what company provides her o2. Pt denies any needs for HH at this time and states that she has no concerns. MIKEY spoke with MAGALY Quiroz who states that she has gotten pt up to [facility] and pt did fine with this. PT/OT has been ordered to eval pt. MIKEY will wait for evals to be completed to determine proper  discharge planning/disposition.         Discharge Plan    No documentation.       Destination      Coordination has not been started for this encounter.      Durable Medical Equipment      Coordination has not been started for this encounter.      Dialysis/Infusion      Coordination has not been started for this encounter.      Home Medical Care      Coordination has not been started for this encounter.      Therapy      Coordination has not been started for this encounter.      Community Resources      Coordination has not been started for this encounter.          Demographic Summary    No documentation.       Functional Status    No documentation.       Psychosocial    No documentation.       Abuse/Neglect    No documentation.       Legal    No documentation.       Substance Abuse    No documentation.       Patient Forms    No documentation.           Angela Delgadillo

## 2020-03-08 NOTE — CONSULTS
Pharmacy Dosing Service  Antimicrobials  Merrem    Assessment/Action/Plan:  Initiated Merrem 500mg IV once, followed by Merrem 500mg IV every 6 hours (extended infusion). Current end date: 3/10/20     Subjective:  Josi Mohamud is a 74 y.o. female currently being treated for SSTI.  Day 1 of 3.    Objective:  Estimated Creatinine Clearance: 61.2 mL/min (by C-G formula based on SCr of 0.81 mg/dL).   Lab Results   Component Value Date    CREATININE 0.81 03/07/2020    CREATININE 0.57 12/31/2019    CREATININE 0.62 11/22/2019     Lab Results   Component Value Date    WBC 14.29 (H) 03/07/2020     Temp Readings from Last 1 Encounters:   03/08/20 99.3 °F (37.4 °C) (Oral)       Culture Results:  Microbiology Results (last 10 days)       Procedure Component Value - Date/Time    Wound Culture - Wound, Leg, Right [[id number]]  (Abnormal) Collected:  03/02/20 1212    Lab Status:  Final result Specimen:  Wound from Leg, Right Updated:  03/04/20 0734     Wound Culture Rare Staphylococcus, coagulase negative     Comment: Call if further workup needed.          Gram Stain No WBCs or organisms seen          Current Antimicrobials:   Anti-Infectives (From admission, onward)      Ordered     Dose/Rate Route Frequency Start Stop    03/08/20 0339  meropenem (MERREM) 500mg/100 mL 0.9% NS IVPB (mbp)     Ordering Provider:  Yessica Quintero, DO    500 mg  over 3 Hours Intravenous Every 6 Hours 03/08/20 1030 03/11/20 1029    03/08/20 0339  meropenem (MERREM) 500mg/100 mL 0.9% NS IVPB (mbp)     Ordering Provider:  Yessica Quintero DO    500 mg  over 30 Minutes Intravenous Once 03/08/20 0430      03/07/20 1636  vancomycin 1 g/250 mL 0.9% NS (vial-mate)     Ordering Provider:  Chris Jarrell PA-C    1,000 mg Intravenous Once 03/07/20 1638 03/07/20 2006    03/07/20 1636  cefepime (MAXIPIME) 2 g/100 mL 0.9% NS (mbp)     Ordering Provider:  Chrystal, Chris Rom, PA-C    2 g  200 mL/hr over 30 Minutes Intravenous Once 03/07/20 7189  03/07/20 1833            Sanam Lopes, [facility]  03/08/20 3:49 AM

## 2020-03-08 NOTE — PROGRESS NOTES
Patient was seen and examined.  Right lower extremity is slightly swollen however minimally red.  She states is much better than even last night.  She states her breathing has significantly improved and she currently appears comfortable on 2 L.  She is on IV meropenem.  States she is eating and drinking well and she does have a history of CHF.  I will go ahead and stop her fluids at this time and monitor her ins and outs as well as her a.m. labs.

## 2020-03-09 ENCOUNTER — TRANSCRIBE ORDERS (OUTPATIENT)
Dept: ADMINISTRATIVE | Facility: HOSPITAL | Age: 75
End: 2020-03-09

## 2020-03-09 DIAGNOSIS — J44.9 CHRONIC OBSTRUCTIVE PULMONARY DISEASE, UNSPECIFIED COPD TYPE (HCC): Primary | ICD-10-CM

## 2020-03-09 PROBLEM — D72.829 LEUKOCYTOSIS: Status: ACTIVE | Noted: 2020-03-09

## 2020-03-09 PROBLEM — R60.0 LOWER EXTREMITY EDEMA: Status: ACTIVE | Noted: 2020-03-09

## 2020-03-09 PROBLEM — D75.89 MACROCYTOSIS: Status: ACTIVE | Noted: 2020-03-09

## 2020-03-09 PROBLEM — Z72.0 TOBACCO ABUSE: Status: ACTIVE | Noted: 2020-03-09

## 2020-03-09 PROBLEM — E66.9 OBESITY (BMI 30-39.9): Status: ACTIVE | Noted: 2020-03-09

## 2020-03-09 LAB
ANION GAP SERPL CALCULATED.3IONS-SCNC: 10 MMOL/L (ref 5–15)
BASOPHILS # BLD AUTO: 0.02 10*3/MM3 (ref 0–0.2)
BASOPHILS NFR BLD AUTO: 0.1 % (ref 0–1.5)
BUN BLD-MCNC: 24 MG/DL (ref 8–23)
BUN/CREAT SERPL: 32.4 (ref 7–25)
CALCIUM SPEC-SCNC: 9.1 MG/DL (ref 8.6–10.5)
CHLORIDE SERPL-SCNC: 102 MMOL/L (ref 98–107)
CO2 SERPL-SCNC: 23 MMOL/L (ref 22–29)
CREAT BLD-MCNC: 0.74 MG/DL (ref 0.57–1)
DEPRECATED RDW RBC AUTO: 48.6 FL (ref 37–54)
EOSINOPHIL # BLD AUTO: 0 10*3/MM3 (ref 0–0.4)
EOSINOPHIL NFR BLD AUTO: 0 % (ref 0.3–6.2)
ERYTHROCYTE [DISTWIDTH] IN BLOOD BY AUTOMATED COUNT: 13.1 % (ref 12.3–15.4)
GFR SERPL CREATININE-BSD FRML MDRD: 77 ML/MIN/1.73
GLUCOSE BLD-MCNC: 159 MG/DL (ref 65–99)
HCT VFR BLD AUTO: 37.1 % (ref 34–46.6)
HGB BLD-MCNC: 13 G/DL (ref 12–15.9)
IMM GRANULOCYTES # BLD AUTO: 0.15 10*3/MM3 (ref 0–0.05)
IMM GRANULOCYTES NFR BLD AUTO: 1.1 % (ref 0–0.5)
LYMPHOCYTES # BLD AUTO: 1.26 10*3/MM3 (ref 0.7–3.1)
LYMPHOCYTES NFR BLD AUTO: 8.9 % (ref 19.6–45.3)
MCH RBC QN AUTO: 35.2 PG (ref 26.6–33)
MCHC RBC AUTO-ENTMCNC: 35 G/DL (ref 31.5–35.7)
MCV RBC AUTO: 100.5 FL (ref 79–97)
MONOCYTES # BLD AUTO: 0.61 10*3/MM3 (ref 0.1–0.9)
MONOCYTES NFR BLD AUTO: 4.3 % (ref 5–12)
NEUTROPHILS # BLD AUTO: 12.12 10*3/MM3 (ref 1.7–7)
NEUTROPHILS NFR BLD AUTO: 85.6 % (ref 42.7–76)
NRBC BLD AUTO-RTO: 0 /100 WBC (ref 0–0.2)
PLATELET # BLD AUTO: 307 10*3/MM3 (ref 140–450)
PMV BLD AUTO: 9.9 FL (ref 6–12)
POTASSIUM BLD-SCNC: 4.3 MMOL/L (ref 3.5–5.2)
RBC # BLD AUTO: 3.69 10*6/MM3 (ref 3.77–5.28)
SODIUM BLD-SCNC: 135 MMOL/L (ref 136–145)
WBC NRBC COR # BLD: 14.16 10*3/MM3 (ref 3.4–10.8)

## 2020-03-09 PROCEDURE — 25010000002 MEROPENEM PER 100 MG: Performed by: INTERNAL MEDICINE

## 2020-03-09 PROCEDURE — 94664 DEMO&/EVAL PT USE INHALER: CPT

## 2020-03-09 PROCEDURE — 29580 STRAPPING UNNA BOOT: CPT

## 2020-03-09 PROCEDURE — 97166 OT EVAL MOD COMPLEX 45 MIN: CPT

## 2020-03-09 PROCEDURE — 63710000001 PREDNISONE PER 1 MG: Performed by: INTERNAL MEDICINE

## 2020-03-09 PROCEDURE — 85025 COMPLETE CBC W/AUTO DIFF WBC: CPT | Performed by: FAMILY MEDICINE

## 2020-03-09 PROCEDURE — 94799 UNLISTED PULMONARY SVC/PX: CPT

## 2020-03-09 PROCEDURE — 80048 BASIC METABOLIC PNL TOTAL CA: CPT | Performed by: FAMILY MEDICINE

## 2020-03-09 PROCEDURE — 97116 GAIT TRAINING THERAPY: CPT

## 2020-03-09 PROCEDURE — 97162 PT EVAL MOD COMPLEX 30 MIN: CPT

## 2020-03-09 PROCEDURE — 99406 BEHAV CHNG SMOKING 3-10 MIN: CPT

## 2020-03-09 RX ORDER — IPRATROPIUM BROMIDE AND ALBUTEROL SULFATE 2.5; .5 MG/3ML; MG/3ML
3 SOLUTION RESPIRATORY (INHALATION) EVERY 4 HOURS PRN
Status: DISCONTINUED | OUTPATIENT
Start: 2020-03-09 | End: 2020-03-10 | Stop reason: HOSPADM

## 2020-03-09 RX ORDER — ACETAMINOPHEN 325 MG/1
650 TABLET ORAL EVERY 6 HOURS PRN
Status: DISCONTINUED | OUTPATIENT
Start: 2020-03-09 | End: 2020-03-10 | Stop reason: HOSPADM

## 2020-03-09 RX ORDER — PREDNISONE 20 MG/1
40 TABLET ORAL
Status: DISCONTINUED | OUTPATIENT
Start: 2020-03-09 | End: 2020-03-10 | Stop reason: HOSPADM

## 2020-03-09 RX ORDER — LEVETIRACETAM 500 MG/1
500 TABLET ORAL 2 TIMES DAILY
COMMUNITY

## 2020-03-09 RX ORDER — LEVOFLOXACIN 5 MG/ML
500 INJECTION, SOLUTION INTRAVENOUS EVERY 24 HOURS
Status: DISCONTINUED | OUTPATIENT
Start: 2020-03-09 | End: 2020-03-09

## 2020-03-09 RX ORDER — DOXYCYCLINE 100 MG/1
100 TABLET ORAL EVERY 12 HOURS SCHEDULED
Status: DISCONTINUED | OUTPATIENT
Start: 2020-03-09 | End: 2020-03-10 | Stop reason: HOSPADM

## 2020-03-09 RX ORDER — LEVOFLOXACIN 500 MG/1
500 TABLET, FILM COATED ORAL EVERY 24 HOURS
Status: DISCONTINUED | OUTPATIENT
Start: 2020-03-09 | End: 2020-03-10 | Stop reason: HOSPADM

## 2020-03-09 RX ADMIN — GUAIFENESIN 1200 MG: 600 TABLET, EXTENDED RELEASE ORAL at 08:44

## 2020-03-09 RX ADMIN — ALBUTEROL SULFATE 1.25 MG: 2.5 SOLUTION RESPIRATORY (INHALATION) at 07:50

## 2020-03-09 RX ADMIN — DOXYCYCLINE 100 MG: 100 TABLET, FILM COATED ORAL at 20:56

## 2020-03-09 RX ADMIN — BUDESONIDE AND FORMOTEROL FUMARATE DIHYDRATE 2 PUFF: 160; 4.5 AEROSOL RESPIRATORY (INHALATION) at 20:24

## 2020-03-09 RX ADMIN — BUDESONIDE AND FORMOTEROL FUMARATE DIHYDRATE 2 PUFF: 160; 4.5 AEROSOL RESPIRATORY (INHALATION) at 07:50

## 2020-03-09 RX ADMIN — MEROPENEM 500 MG: 500 INJECTION, POWDER, FOR SOLUTION INTRAVENOUS at 08:44

## 2020-03-09 RX ADMIN — LEVETIRACETAM 500 MG: 500 TABLET, FILM COATED ORAL at 20:56

## 2020-03-09 RX ADMIN — APIXABAN 5 MG: 5 TABLET, FILM COATED ORAL at 08:44

## 2020-03-09 RX ADMIN — PANTOPRAZOLE SODIUM 40 MG: 40 TABLET, DELAYED RELEASE ORAL at 06:17

## 2020-03-09 RX ADMIN — PREDNISONE 40 MG: 20 TABLET ORAL at 11:05

## 2020-03-09 RX ADMIN — METOPROLOL SUCCINATE 50 MG: 50 TABLET, EXTENDED RELEASE ORAL at 08:44

## 2020-03-09 RX ADMIN — LORAZEPAM 1 MG: 1 TABLET ORAL at 20:56

## 2020-03-09 RX ADMIN — ACETAMINOPHEN 650 MG: 325 TABLET, FILM COATED ORAL at 01:35

## 2020-03-09 RX ADMIN — LORAZEPAM 1 MG: 1 TABLET ORAL at 08:44

## 2020-03-09 RX ADMIN — LEVETIRACETAM 500 MG: 500 TABLET, FILM COATED ORAL at 08:44

## 2020-03-09 RX ADMIN — IPRATROPIUM BROMIDE 0.5 MG: 0.5 SOLUTION RESPIRATORY (INHALATION) at 07:50

## 2020-03-09 RX ADMIN — LISINOPRIL 20 MG: 20 TABLET ORAL at 08:44

## 2020-03-09 RX ADMIN — DOXYCYCLINE 100 MG: 100 TABLET, FILM COATED ORAL at 13:21

## 2020-03-09 RX ADMIN — APIXABAN 5 MG: 5 TABLET, FILM COATED ORAL at 20:56

## 2020-03-09 RX ADMIN — DOXYCYCLINE 100 MG: 100 INJECTION, POWDER, LYOPHILIZED, FOR SOLUTION INTRAVENOUS at 11:04

## 2020-03-09 RX ADMIN — ASPIRIN 81 MG: 81 TABLET ORAL at 08:44

## 2020-03-09 RX ADMIN — ATORVASTATIN CALCIUM 10 MG: 10 TABLET, FILM COATED ORAL at 08:44

## 2020-03-09 RX ADMIN — AMLODIPINE BESYLATE 5 MG: 5 TABLET ORAL at 08:44

## 2020-03-09 RX ADMIN — MUPIROCIN: 20 OINTMENT TOPICAL at 08:44

## 2020-03-09 RX ADMIN — SODIUM CHLORIDE, PRESERVATIVE FREE 10 ML: 5 INJECTION INTRAVENOUS at 08:44

## 2020-03-09 RX ADMIN — LEVOFLOXACIN 500 MG: 500 TABLET, FILM COATED ORAL at 13:21

## 2020-03-09 RX ADMIN — GUAIFENESIN 1200 MG: 600 TABLET, EXTENDED RELEASE ORAL at 20:56

## 2020-03-09 NOTE — PLAN OF CARE
"  Problem: Patient Care Overview  Goal: Plan of Care Review  Flowsheets (Taken 3/9/2020 1100)  Outcome Summary: PT placed R Unna boot per MD order. PT placed small piece of adaptic over R medial LE scab that is intact. Then covered with unna layer and coban layer. Capillary refill less than 3 seconds. The patient reports post unna wrapping that the compression feels \"good.\" PT will continue to check the unna boot daily and will change in 5-7days/PRN based on MD recommendations. I anticipate she will d.c home with HH management of unna boot to R LE.     "

## 2020-03-09 NOTE — THERAPY TREATMENT NOTE
Acute Care - Physical Therapy Treatment Note  [facility]     Patient Name: Josi Mohamud  : 1945  MRN: [id number]  Today's Date: 3/9/2020  Onset of Illness/Injury or Date of Surgery: 20  Date of Referral to PT: 20  Referring Physician: Dr Keller(REBEL Mckay)    Admit Date: 3/7/2020    Visit Dx:    ICD-10-CM ICD-9-CM   1. Cellulitis of right lower extremity L03.115 682.6   2. Decreased activities of daily living (ADL) Z78.9 V49.89   3. Impaired functional mobility, balance, gait, and endurance Z74.09 V49.89     Patient Active Problem List   Diagnosis   • Syncope and collapse   • Coronary artery disease involving native coronary artery of native heart with angina pectoris (CMS/HCC)   • Stented coronary artery   • Essential hypertension   • Abnormal positron emission tomography (PET) scan   • Gastroesophageal reflux disease   • Thyroid nodule   • Parotid neoplasm   • Anticoagulated   • Laryngopharyngeal reflux (LPR)   • Traumatic rhabdomyolysis (CMS/HCC)   • Atrial flutter with rapid ventricular response (CMS/HCC) on LINQ ? BRIEF   • Malignant neoplasm of upper lobe of left lung (CMS/HCC)   • COPD, moderate (CMS/HCC)   • Persistent asthma without complication   • Overweight   • Cigarette nicotine dependence without complication   • Multiple thyroid nodules   • Paroxysmal atrial fibrillation with rapid ventricular response (CMS/HCC)   • Hx of Hodgkin's lymphoma   • Dysfunction of both eustachian tubes   • Left chronic serous otitis media   • Morbidly obese (CMS/HCC)   • Tonsillar cyst   • Status post stereotactic radiosurgery   • Current every day smoker   • Renal cyst   • Cellulitis of right lower extremity   • COPD with acute exacerbation (CMS/HCC)   • Macrocytosis without anemia   • Obesity (BMI 30-39.9)   • Tobacco abuse   • Lower extremity edema, right side   • Leukocytosis       Therapy Treatment    Rehabilitation Treatment Summary     Row Name 20 1450             Treatment  Time/Intention    Discipline  physical therapy assistant  -AE      Document Type  therapy note (daily note)  -AE      Subjective Information  complains of;weakness  -AE      Existing Precautions/Restrictions  fall R LE tutu boot  -AE      Recorded by [AE] Inga Taveras [facility] 03/09/20 1538      Row Name 03/09/20 1450             Bed Mobility Assessment/Treatment    Supine-Sit [place] (Bed Mobility)  supervision  -AE      Recorded by [AE] Inga Taveras [facility] 03/09/20 1538      Row Name 03/09/20 1450             Sit-Stand Transfer    Sit-Stand [place] (Transfers)  contact guard  -AE      Recorded by [AE] Inga Taveras [facility] 03/09/20 1538      Row Name 03/09/20 1450             Gait/Stairs Assessment/Training    [place] Level (Gait)  contact guard  -AE      Assistive Device (Gait)  walker, front-wheeled  -AE      Distance in Feet (Gait)  180  -AE      Deviations/Abnormal Patterns (Gait)  base of support, wide  -AE      Bilateral Gait Deviations  forward flexed posture  -AE      Recorded by [AE] Inga Taveras [facility] 03/09/20 1538      Row Name 03/09/20 1450             Therapeutic Exercise    Lower Extremity (Therapeutic Exercise)  LAQ (long arc quad), bilateral  -AE      Lower Extremity Range of Motion (Therapeutic Exercise)  ankle dorsiflexion/plantar flexion, bilateral  -AE      Exercise Type (Therapeutic Exercise)  AROM (active range of motion)  -AE      Position (Therapeutic Exercise)  seated  -AE      Sets/Reps (Therapeutic Exercise)  20  -AE      Recorded by [AE] Inga Taveras PTA 03/09/20 1538      Row Name 03/09/20 1450             Positioning and Restraints    Pre-Treatment Position  in bed  -AE      Post Treatment Position  chair  -AE      In Chair  sitting;call light within reach;reclined notified RN  -AE      Recorded by [AE] Inga Taveras [facility] 03/09/20 1538      Row Name 03/09/20 1450             Pain Scale: Numbers Pre/Post-Treatment    Pain Scale: Numbers, Pretreatment  0/10 - no  pain  -AE      Pain Scale: Numbers, Post-Treatment  0/10 - no pain  -AE      Recorded by [AE] Inga Taveras, PTA 03/09/20 1538      Row Name                Wound 03/09/20 1100 Right lower leg    Wound - Properties Group Date first assessed: 03/09/20 [SUBHASH] Time first assessed: 1100 [SUBHASH] Present on Hospital Admission: Y [SUBHASH] Side: Right [SUBHASH] Orientation: lower [SUBHASH] Location: leg [SUBHASH] Recorded by:  [SUBHASH] Alexandre Moe, PT DPT 03/09/20 1134      User Key  (r) = Recorded By, (t) = Taken By, (c) = Cosigned By    Initials Name Effective Dates Discipline    AE Inga Taveras, PTA 06/22/15 -  PT    SUBHASH Alexandre Moe, PT DPT 08/02/16 -  PT          Wound 03/09/20 1100 Right lower leg (Active)   Wound Image   3/9/2020 11:00 AM   Dressing Appearance open to air 3/9/2020 11:00 AM   Base pink;red 3/9/2020 11:00 AM   Periwound intact;edematous;swelling;pink;redness 3/9/2020 11:00 AM   Drainage Amount none 3/9/2020 11:00 AM   Care, Wound tutu boot 3/9/2020 11:00 AM       Rehab Goal Summary     Row Name 03/09/20 1100 03/09/20 0920 03/09/20 0812       Physical Therapy Goals    Wound Care Goal Selection (PT)  wound care, PT goal 1  -SUBHASH  --  --       Transfer Goal 1 (PT)    Activity/Assistive Device (Transfer Goal 1, PT)  --  --  sit-to-stand/stand-to-sit;bed-to-chair/chair-to-bed;toilet;shower chair  -SUBHASH (r) TK (t) SUBHASH (c)    [place] Level/Cues Needed (Transfer Goal 1, PT)  --  --  standby assist  -SUBHASH (r) TK (t) SUBHASH (c)    Time Frame (Transfer Goal 1, PT)  --  --  long term goal (LTG);10 days  -SUBHASH (r) TK (t) SUBHASH (c)    Progress/Outcome (Transfer Goal 1, PT)  --  --  goal ongoing  -SUBHASH (r) TK (t) SUBHASH (c)       Gait Training Goal 1 (PT)    Activity/Assistive Device (Gait Training Goal 1, PT)  --  --  gait (walking locomotion);assistive device use;decrease asymmetrical patterns;increase endurance/gait distance;improve balance and speed;decrease fall risk;walker, rolling  -SUBHASH (r) TK (t) SUBHASH (c)    [place] Level (Gait Training  Goal 1, PT)  --  --  standby assist  -SUBHASH (r) TK (t) SUBHASH (c)    Distance (Gait Goal 1, PT)  --  --  250 ft w/o rest breaks  -SUBHASH (r) TK (t) SUBHASH (c)    Time Frame (Gait Training Goal 1, PT)  --  --  long term goal (LTG);10 days  -SUBHASH (r) TK (t) SUBHASH (c)    Progress/Outcome (Gait Training Goal 1, PT)  --  --  goal ongoing  -SUBHASH (r) TK (t) SUBHASH (c)       Wound Care Goal 1 (PT)    Wound Care Goal 1 (PT)  R LE unna boot will stay intact for 5-7 days/PRN with no adverse reactions.   -SUBHASH  --  --    Time Frame (Wound Care Goal 1, PT)  long term goal (LTG);10 days  -SUBHASH  --  --    Progress/Outcome (Wound Care Goal 1, PT)  goal ongoing  -SUBHASH  --  --       Patient Education Goal (PT)    Activity (Patient Education Goal, PT)  --  --  Pt will improve on ability to recognize need for assistance for safe home management.   -SUBHASH (r) TK (t) SUBHASH (c)    [place]/Cues/Accuracy (Memory Goal 2, PT)  --  --  independent  -SUBHASH (r) TK (t) SUBHASH (c)    Time Frame (Patient Education Goal, PT)  --  --  long term goal (LTG);10 days  -SUBHASH (r) TK (t) SUBHASH (c)    Progress/Outcome (Patient Education Goal, PT)  --  --  goal ongoing  -SUBHASH (r) TK (t) SUBHASH (c)       Occupational Therapy Goals    Transfer Goal Selection (OT)  --  transfer, OT goal 1  -MW  --    Bathing Goal Selection (OT)  --  bathing, OT goal 1  -MW  --    Dressing Goal Selection (OT)  --  dressing, OT goal 1  -MW  --       Transfer Goal 1 (OT)    Activity/Assistive Device (Transfer Goal 1, OT)  --  toilet;tub;shower chair  -MW  --    [place] Level/Cues Needed (Transfer Goal 1, OT)  --  supervision required  -MW  --    Time Frame (Transfer Goal 1, OT)  --  long term goal (LTG);by discharge  -MW  --    Progress/Outcome (Transfer Goal 1, OT)  --  goal ongoing  -MW  --       Bathing Goal 1 (OT)    Activity/Assistive Device (Bathing Goal 1, OT)  --  bathing skills, all;shower chair  -MW  --    [place] Level/Cues Needed (Bathing Goal 1, OT)  --  supervision required  -MW  --    Time Frame (Bathing  Goal 1, OT)  --  long term goal (LTG);by discharge  -MW  --    Progress/Outcomes (Bathing Goal 1, OT)  --  goal ongoing  -MW  --       Dressing Goal 1 (OT)    Activity/Assistive Device (Dressing Goal 1, OT)  --  lower body dressing;sock-aid  -MW  --    [place]/Cues Needed (Dressing Goal 1, OT)  --  supervision required  -MW  --    Time Frame (Dressing Goal 1, OT)  --  long term goal (LTG);by discharge  -MW  --    Progress/Outcome (Dressing Goal 1, OT)  --  goal ongoing  -MW  --      User Key  (r) = Recorded By, (t) = Taken By, (c) = Cosigned By    Initials Name Provider Type Discipline    Alexandre Francis, PT DPT Physical Therapist PT    Cydney Boucher, OTR/L Occupational Therapist OT    Jeremy Conrad, PT Student PT Student PT              PT Recommendation and Plan        Outcome Measures     Row Name 03/09/20 1000             How much help from another is currently needed...    Putting on and taking off regular lower body clothing?  3  -MW      Bathing (including washing, rinsing, and drying)  3  -MW      Toileting (which includes using toilet bed pan or urinal)  3  -MW      Putting on and taking off regular upper body clothing  3  -MW      Taking care of personal grooming (such as brushing teeth)  4  -MW      Eating meals  4  -MW      AM-PAC 6 Clicks Score (OT)  20  -MW         Functional Assessment    Outcome Measure Options  AM-PAC 6 Clicks Daily Activity (OT)  -MW        User Key  (r) = Recorded By, (t) = Taken By, (c) = Cosigned By    Initials Name Provider Type    Cydney Boucher, OTR/L Occupational Therapist         Time Calculation:   PT Charges     Row Name 03/09/20 1538 03/09/20 1149 03/09/20 1027       Time Calculation    Start Time  1450  -AE  1100  -SUBHASH  0930  -SUBHASH (r) TK (t) SUBHASH (c)    Stop Time  1505  -AE  1130  -SUBHASH  1017  -SUBHASH (r) TK (t) SUBHASH (c)    Time Calculation (min)  15 min  -AE  30 min  -SUBHASH  47 min  -SUBHASH (r) TK (t)    PT Received On  03/09/20  -AE  03/09/20  -SUBHASH  03/09/20   -SUBHASH (r) TK (t) SUBHASH (c)    PT Goal Re-Cert Due Date  03/19/20  -AE  03/19/20  -SUBHASH  03/19/20  -SUBHASH (r) TK (t) SUBHASH (c)       Time Calculation- PT    Total Timed Code Minutes- PT  15 minute(s)  -AE  --  --       Timed Charges    05592 - Gait Training Minutes   15  -AE  --  --      User Key  (r) = Recorded By, (t) = Taken By, (c) = Cosigned By    Initials Name Provider Type    AE Inga Taveras, PTA Physical Therapy Assistant    Alexandre Francis, PT DPT Physical Therapist    TK Jeremy Batista, PT Student PT Student        Therapy Charges for Today     Code Description Service Date Service Provider Modifiers Qty    [id number] HC GAIT TRAINING EA 15 MIN 3/9/2020 Inga Taveras PTA GP 1          PT G-Codes  Outcome Measure Options: AM-PAC 6 Clicks Daily Activity (OT)  AM-PAC 6 Clicks Score (PT): 18  AM-PAC 6 Clicks Score (OT): 20    Inga Taveras PTA  3/9/2020

## 2020-03-09 NOTE — PLAN OF CARE
Problem: Patient Care Overview  Goal: Plan of Care Review  3/9/2020 1158 by Alexandre Moe, PT DPT  Flowsheets (Taken 3/9/2020 0812)  Outcome Summary: PT eval complete. Pt is able to perform bed mobility w/ only supervision, STS and amb w/ CGA. Pt demonstrates deficits in strength, mobility, and endurance and will benefit from skilled PT to improve deficits. Recommend d/c to home w/ assist and HH PT/OT services to improve ability to navigate home.

## 2020-03-09 NOTE — THERAPY WOUND CARE TREATMENT
Acute Care - Wound/Debridement .  [facility]     Patient Name: Josi Mohamud  : 1945  MRN: [id number]  Today's Date: 3/9/2020  Onset of Illness/Injury or Date of Surgery: 20  Date of Referral to PT: 20   Referring Physician: Dr Keller(REBEL Mckay)      Admit Date: 3/7/2020    Visit Dx:    ICD-10-CM ICD-9-CM   1. Cellulitis of right lower extremity L03.115 682.6   2. Decreased activities of daily living (ADL) Z78.9 V49.89   3. Impaired functional mobility, balance, gait, and endurance Z74.09 V49.89       Patient Active Problem List   Diagnosis   • Syncope and collapse   • Coronary artery disease involving native coronary artery of native heart with angina pectoris (CMS/HCC)   • Stented coronary artery   • Essential hypertension   • Abnormal positron emission tomography (PET) scan   • Gastroesophageal reflux disease   • Thyroid nodule   • Parotid neoplasm   • Anticoagulated   • Laryngopharyngeal reflux (LPR)   • Traumatic rhabdomyolysis (CMS/HCC)   • Atrial flutter with rapid ventricular response (CMS/HCC) on LINQ ? BRIEF   • Malignant neoplasm of upper lobe of left lung (CMS/HCC)   • COPD, moderate (CMS/HCC)   • Persistent asthma without complication   • Overweight   • Cigarette nicotine dependence without complication   • Multiple thyroid nodules   • Paroxysmal atrial fibrillation with rapid ventricular response (CMS/HCC)   • Hx of Hodgkin's lymphoma   • Dysfunction of both eustachian tubes   • Left chronic serous otitis media   • Morbidly obese (CMS/HCC)   • Tonsillar cyst   • Status post stereotactic radiosurgery   • Current every day smoker   • Renal cyst   • Cellulitis of right lower extremity   • COPD with acute exacerbation (CMS/HCC)   • Macrocytosis without anemia   • Obesity (BMI 30-39.9)   • Tobacco abuse   • Lower extremity edema, right side   • Leukocytosis        Past Medical History:   Diagnosis Date   • Bronchitis    • COPD (chronic obstructive pulmonary disease) (CMS/HCC)    •  Coronary artery disease    • GERD (gastroesophageal reflux disease)    • History of transfusion    • Hyperlipidemia    • Hypertension    • PAF (paroxysmal atrial fibrillation) (CMS/HCC)    • Persistent asthma without complication 7/8/2019   • Seizures (CMS/HCC)         Past Surgical History:   Procedure Laterality Date   • APPENDECTOMY     • APPENDECTOMY     • CARDIAC CATHETERIZATION     • CARDIAC ELECTROPHYSIOLOGY PROCEDURE N/A 5/23/2017    Procedure: Loop insertion; LINQ;  Surgeon: Jose Monteiro MD;  Location:  [facility];  Service:    • CHOLECYSTECTOMY     • CORONARY ANGIOPLASTY WITH STENT PLACEMENT      X 1    • LIVER BIOPSY     • MYRINGOTOMY W/ TUBES Left 11/22/2019    Procedure: LEFT MYRINGOTOMY WITH INSERTION OF EAR TUBE WITH RIGHT EAR EXAM UNDER ANESTHESIA;  Surgeon: Se Mcnulty MD;  Location: [facility] OR;  Service: ENT   • SPLENECTOMY     • US GUIDED FINE NEEDLE ASPIRATION  8/7/2018           Wound 03/09/20 1100 Right lower leg (Active)   Wound Image   3/9/2020 11:00 AM   Dressing Appearance open to air 3/9/2020 11:00 AM   Base pink;red 3/9/2020 11:00 AM   Periwound intact;edematous;swelling;pink;redness 3/9/2020 11:00 AM   Drainage Amount none 3/9/2020 11:00 AM   Care, Wound tutu boot 3/9/2020 11:00 AM         WOUND DEBRIDEMENT                        PT ASSESSMENT (last 12 hours)      Physical Therapy Evaluation     Row Name 03/09/20 1100          PT Evaluation Time/Intention    Subjective Information  complains of;swelling R LE edema  -SUBHASH     Document Type  wound care  -SUBHASH     Mode of Treatment  physical therapy  -SUBHASH     Comment  R BART HELMS     Row Name 03/09/20 1100          General Information    Patient Profile Reviewed?  yes  -SUBHASH     Onset of Illness/Injury or Date of Surgery  03/07/20  -SUBHASH     Referring Physician  Dr Arianna Damon     Patient Observations  alert;cooperative;agree to therapy  -SUBHASH     Patient/Family Observations  daughter in room initially  but stepped out during unna placement  -SUBHASH     General Observations of Patient  alert, fowlers in bed with B LE elevated.   -SUBHASH     Pertinent History of Current Functional Problem  pt is a 75y/o F who presents w/cc: RLE pain and redness w/ dec in ROM of R ankle.  PMH: COPD,CAD, seizures, and CHF.Dx:  Cellulitis of RLE and COPD w/ exacerbation  -SUBHASH     Existing Precautions/Restrictions  fall R LE unna boot  -SUBHASH     Risks Reviewed  patient:;increased discomfort  -SUBHASH     Benefits Reviewed  patient:;decrease pain;improve skin integrity;increase knowledge  -SUBHASH     Barriers to Rehab  medically complex;previous functional deficit;physical barrier  -SUBHASH     Row Name 03/09/20 1100          Pain Assessment    Additional Documentation  Pain Scale: Numbers Pre/Post-Treatment (Group)  -SUBHASH     Row Name 03/09/20 1100          Pain Scale: Numbers Pre/Post-Treatment    Pain Scale: Numbers, Pretreatment  0/10 - no pain  -SUBHASH     Row Name 03/09/20 1100          Edema Assessment & Management    Additional Documentation  -- R LE edema  -SUBHASH     Row Name 03/09/20 1100          Health Promotion    Additional Documentation  Wound (LDA)  -SUBHASH     Row Name 03/09/20 1100          Wound 03/09/20 1100 Right lower leg    Wound - Properties Group Date first assessed: 03/09/20  -SUBHASH Time first assessed: 1100  -SUBHASH Present on Hospital Admission: Y  -SUBHASH Side: Right  -SUBHASH Orientation: lower  -SUBHASH Location: leg  -SUBHASH    Wound Image  Images linked: 1  -SUBHASH     Dressing Appearance  open to air  -SUBHASH     Base  pink;red 1 dry/intact scab to R medial LE, 2x2 cm.   -SUBHASH     Periwound  intact;edematous;swelling;pink;redness  -SUBHASH     Drainage Amount  none  -SUBHASH     Care, Wound  tutu boot 1 adaptic over R LE medial scab, Unna layer then coban layer  -SUBHASH     Dressing Care, Wound  -- capillary refill less than 3 seconds.   -SUBHASH     Row Name 03/09/20 1100          Coping    Observed Emotional State  accepting;cooperative  -SUBHASH     Row Name 03/09/20 1100          Plan of Care  "Review    Plan of Care Reviewed With  patient  -SUBHASH     Outcome Summary  PT placed R Unna boot per MD order. PT placed small piece of adaptic over R medial LE scab that is intact. Then covered with unna layer and coban layer. Capillary refill less than 3 seconds. The patient reports post unna wrapping that the compression feels \"good.\" PT will continue to check the unna boot daily and will change in 5-7days/PRN based on MD recommendations. I anticipate she will d.c home with HH management of unna boot to R LE.  -SUBHASH     Row Name 03/09/20 1100          Physical Therapy Clinical Impression    Date of Referral to PT  03/09/20  -SUBHASH     Patient/Family Goals Statement (PT Clinical Impression)  decreased edema R LE  -SUBHASH     Criteria for Skilled Interventions Met (PT Clinical Impression)  yes;treatment indicated  -SUBHASH     Impairments Found (describe specific impairments)  integumentary integrity  -SUBHASH     Rehab Potential (PT Clinical Summary)  good, to achieve stated therapy goals  -SUBHASH     Predicted Duration of Therapy (PT)  until d/c   -SUBHASH     Care Plan Review (PT)  evaluation/treatment results reviewed;risks/benefits reviewed;current/potential barriers reviewed;patient/other agree to care plan  -SUBHASH     Row Name 03/09/20 1100          Physical Therapy Goals    Wound Care Goal Selection (PT)  wound care, PT goal 1  -SUBHASH     Additional Documentation  Wound Care Goal Selection (PT) (Row)  -SUBHASH     Row Name 03/09/20 1100          Wound Care Goal 1 (PT)    Wound Care Goal 1 (PT)  R LE unna boot will stay intact for 5-7 days/PRN with no adverse reactions.   -SUBHASH     Time Frame (Wound Care Goal 1, PT)  long term goal (LTG);10 days  -SUBHASH     Progress/Outcome (Wound Care Goal 1, PT)  goal ongoing  -SUBHASH     Row Name 03/09/20 1100          Positioning and Restraints    Pre-Treatment Position  in bed  -SUBHASH     Post Treatment Position  bed  -SUBHASH     In Bed  notified nsg;fowlers;call light within reach;encouraged to call for assist;legs elevated  " "-SUBHASH       User Key  (r) = Recorded By, (t) = Taken By, (c) = Cosigned By    Initials Name Provider Type    Alexandre Francis, PT DPT Physical Therapist            Recommendation and Plan  Anticipated Equipment Needs at Discharge (PT): (P) reacher, dressing equipment(for assistance w/ safe dressing. )  Anticipated Discharge Disposition (PT): home with assist, home with home health  Planned Therapy Interventions (PT Eval): bed mobility training, transfer training, gait training, balance training, home exercise program, patient/family education, postural re-education, strengthening, wound care  Therapy Frequency (PT Clinical Impression): (change every 5-7 days/PRN, check daily)  Plan of Care Reviewed With: patient       Outcome Summary/Treatment Plan (PT)  Anticipated Discharge Disposition (PT): home with assist, home with home health   Outcome Summary: PT placed R Unna boot per MD order. PT placed small piece of adaptic over R medial LE scab that is intact. Then covered with unna layer and coban layer. Capillary refill less than 3 seconds. The patient reports post unna wrapping that the compression feels \"good.\" PT will continue to check the unna boot daily and will change in 5-7days/PRN based on MD recommendations. I anticipate she will d.c home with HH management of unna boot to R LE.  Plan of Care Reviewed With: patient      Outcome Measures     Row Name 03/09/20 1000             How much help from another is currently needed...    Putting on and taking off regular lower body clothing?  3  -MW      Bathing (including washing, rinsing, and drying)  3  -MW      Toileting (which includes using toilet bed pan or urinal)  3  -MW      Putting on and taking off regular upper body clothing  3  -MW      Taking care of personal grooming (such as brushing teeth)  4  -MW      Eating meals  4  -MW      AM-PAC 6 Clicks Score (OT)  20  -MW         Functional Assessment    Outcome Measure Options  AM-PAC 6 Clicks Daily Activity " (OT)  -MW        User Key  (r) = Recorded By, (t) = Taken By, (c) = Cosigned By    Initials Name Provider Type    MW Cydney iDxon, OTR/L Occupational Therapist            Time Calculation  PT Charges     Row Name 03/09/20 1149 03/09/20 1027          Time Calculation    Start Time  1100  -SUBHASH  0930  (Pended)   -TK     Stop Time  1130  -SUBHASH  1017  (Pended)   -TK     Time Calculation (min)  30 min  -SUBHASH  47 min  (Pended)   -TK     PT Received On  03/09/20  -SUBHASH  03/09/20  (Pended)   -TK     PT Goal Re-Cert Due Date  03/19/20  -SUBHASH  03/19/20  (Pended)   -TK       User Key  (r) = Recorded By, (t) = Taken By, (c) = Cosigned By    Initials Name Provider Type    Alexandre Francis, PT DPT Physical Therapist    TK Jeremy Batista, PT Student PT Student            Therapy Charges for Today     Code Description Service Date Service Provider Modifiers Qty    79010598415 HC PT STAPPING UNNA BOOT 3/9/2020 Alexandre Moe, PT DPT GP 1            PT G-Codes  Outcome Measure Options: AM-PAC 6 Clicks Daily Activity (OT)  AM-PAC 6 Clicks Score (PT): (P) 18  AM-PAC 6 Clicks Score (OT): 20       Alexandre Moe, PT DPT  3/9/2020

## 2020-03-09 NOTE — PLAN OF CARE
Problem: Patient Care Overview  Goal: Plan of Care Review  Outcome: Ongoing (interventions implemented as appropriate)  Flowsheets (Taken 3/9/2020 0619)  Progress: no change  Plan of Care Reviewed With: patient  Outcome Summary: PRN Tylenol administered for c/o pain in RLE, good results. One run of IV abx administered this shift. Unable to administer second dose of IV abx or IV steroids at this time due to loss of IV access. Unable to start another IV, VAT team consult ordered. Pt resting well. VSS. Safety maintained. Will continue to monitor.

## 2020-03-09 NOTE — PLAN OF CARE
"  Problem: Patient Care Overview  Goal: Plan of Care Review  Flowsheets  Taken 3/9/2020 0920 by Cydney Dixon, OTR/L  Progress: no change  Taken 3/9/2020 1100 by Alexandre Moe PT DPT  Plan of Care Reviewed With: patient  Outcome Summary: PT placed R Unna boot per MD order. PT placed small piece of adaptic over R medial LE scab that is intact. Then covered with unna layer and coban layer. Capillary refill less than 3 seconds. The patient reports post unna wrapping that the compression feels \"good.\" PT will continue to check the unna boot daily and will change in 5-7days/PRN based on MD recommendations. I anticipate she will d.c home with HH management of unna boot to R LE.  Note:   Pt has very poor venous access. Iv was started by vat but it has already infiltrated. Contacted Dr Keller. All meds changed to po and plan to d/c pt home tomorrow. Ivette boot placed by pt for swelling and pain. Vss. Safety maintained. Will continue to monitor.     "

## 2020-03-09 NOTE — PLAN OF CARE
Problem: Patient Care Overview  Goal: Plan of Care Review  Outcome: Ongoing (interventions implemented as appropriate)  Flowsheets (Taken 3/9/2020 0920)  Progress: no change  Plan of Care Reviewed With: patient  Outcome Summary: OT eval completed. Pt performs bed mobility with S, stands CGA and amb to end of yanez and back to bed. Requires assistance and increased effort for LB dressing and SBA for toileting tasks except for modA to don brief. Pt demos decreased endurance and strength, as well as RLE swelling, limiting independence and safety with ADL particularly LB ADL and standing tasks. OT indicated to address these deficits to increase independence and safety. Anticipate d/c home w assist and HH.

## 2020-03-09 NOTE — THERAPY EVALUATION
Acute Care - Occupational Therapy Initial Evaluation  [facility]     Patient Name: Josi Mohamud  : 1945  MRN: [id number]  Today's Date: 3/9/2020  Onset of Illness/Injury or Date of Surgery: 20  Date of Referral to OT: 20  Referring Physician: Dr. Keller    Admit Date: 3/7/2020       ICD-10-CM ICD-9-CM   1. Cellulitis of right lower extremity L03.115 682.6   2. Decreased activities of daily living (ADL) Z78.9 V49.89     Patient Active Problem List   Diagnosis   • Syncope and collapse   • Coronary artery disease involving native coronary artery of native heart with angina pectoris (CMS/HCC)   • Stented coronary artery   • Essential hypertension   • Abnormal positron emission tomography (PET) scan   • Gastroesophageal reflux disease   • Thyroid nodule   • Parotid neoplasm   • Anticoagulated   • Laryngopharyngeal reflux (LPR)   • Traumatic rhabdomyolysis (CMS/HCC)   • Atrial flutter with rapid ventricular response (CMS/HCC) on LINQ ? BRIEF   • Malignant neoplasm of upper lobe of left lung (CMS/HCC)   • COPD, moderate (CMS/HCC)   • Persistent asthma without complication   • Overweight   • Cigarette nicotine dependence without complication   • Multiple thyroid nodules   • Paroxysmal atrial fibrillation with rapid ventricular response (CMS/HCC)   • Hx of Hodgkin's lymphoma   • Dysfunction of both eustachian tubes   • Left chronic serous otitis media   • Morbidly obese (CMS/HCC)   • Tonsillar cyst   • Status post stereotactic radiosurgery   • Current every day smoker   • Renal cyst   • Cellulitis of right lower extremity   • COPD with acute exacerbation (CMS/HCC)   • Macrocytosis without anemia   • Obesity (BMI 30-39.9)   • Tobacco abuse   • Lower extremity edema, right side   • Leukocytosis     Past Medical History:   Diagnosis Date   • Bronchitis    • COPD (chronic obstructive pulmonary disease) (CMS/HCC)    • Coronary artery disease    • GERD (gastroesophageal reflux disease)    • History of  transfusion    • Hyperlipidemia    • Hypertension    • PAF (paroxysmal atrial fibrillation) (CMS/HCC)    • Persistent asthma without complication 7/8/2019   • Seizures (CMS/HCC)      Past Surgical History:   Procedure Laterality Date   • APPENDECTOMY     • APPENDECTOMY     • CARDIAC CATHETERIZATION     • CARDIAC ELECTROPHYSIOLOGY PROCEDURE N/A 5/23/2017    Procedure: Loop insertion; LINQ;  Surgeon: Jose Monteiro MD;  Location: [facility] CATH INVASIVE LOCATION;  Service:    • CHOLECYSTECTOMY     • CORONARY ANGIOPLASTY WITH STENT PLACEMENT      X 1    • LIVER BIOPSY     • MYRINGOTOMY W/ TUBES Left 11/22/2019    Procedure: LEFT MYRINGOTOMY WITH INSERTION OF EAR TUBE WITH RIGHT EAR EXAM UNDER ANESTHESIA;  Surgeon: Se Mcnulty MD;  Location: [facility] OR;  Service: ENT   • SPLENECTOMY     • US GUIDED FINE NEEDLE ASPIRATION  8/7/2018          OT ASSESSMENT FLOWSHEET (last 12 hours)      Occupational Therapy Evaluation     Row Name 03/09/20 0920                   OT Evaluation Time/Intention    Subjective Information  complains of;swelling  -MW        Document Type  evaluation  -MW        Mode of Treatment  occupational therapy  -MW           General Information    Patient Profile Reviewed?  yes  -MW        Onset of Illness/Injury or Date of Surgery  03/07/20  -        Referring Physician  Dr. Keller  -        Patient Observations  alert;cooperative;agree to therapy  -MW        Patient/Family Observations  family present initially, leave at start of eval  -MW        General Observations of Patient  awake and alert in fowlers, no apparent distress, RLE swollen  -MW        Prior Level of Function  independent:;all household mobility;ADL's;dependent:;driving;shopping  -MW        Equipment Currently Used at Home  rollator;wheelchair;cane, straight;shower chair  -MW        Pertinent History of Current Functional Problem  pt is a 73y/o F who presents w/cc: RLE pain and redness w/ dec in ROM of R ankle.  PMH: COPD,CAD,  seizures, and CHF.Dx:  Cellulitis of RLE and COPD w/ exacerbation  -MW        Existing Precautions/Restrictions  fall oxygen at night  -MW        Limitations/Impairments  -- R LE swollen, sensitive to touch  -MW        Barriers to Rehab  physical barrier;previous functional deficit  -MW           Relationship/Environment    Lives With  child(rd), adult;grandchild(rd)  -MW           Resource/Environmental Concerns    Current Living Arrangements  home/apartment/condo  -MW           Home Main Entrance    Number of Stairs, Main Entrance  two  -MW        Stair Railings, Main Entrance  none  -MW        Stairs Comment, Main Entrance  one to landing and one into the home, uses rollator for steps  -MW           Cognitive Assessment/Interventions    Additional Documentation  Cognitive Assessment/Intervention (Group)  -MW           Cognitive Assessment/Intervention- PT/OT    Orientation Status (Cognition)  oriented x 4  -MW        Follows Commands (Cognition)  WFL  -MW        Cognitive Function (Cognitive)  WFL  -MW        Personal Safety Interventions  fall prevention program maintained;gait belt;muscle strengthening facilitated;nonskid shoes/slippers when out of bed;supervised activity  -MW           Safety Issues, Functional Mobility    Impairments Affecting Function (Mobility)  endurance/activity tolerance;pain;strength  -MW           Bed Mobility Assessment/Treatment    Bed Mobility Assessment/Treatment  scooting/bridging;supine-sit;sit-supine  -MW        Scooting/Bridging [place] (Bed Mobility)  supervision  -MW        Supine-Sit [place] (Bed Mobility)  supervision  -MW        Sit-Supine [place] (Bed Mobility)  supervision  -MW        Assistive Device (Bed Mobility)  head of bed elevated for sup<>sit only  -MW           Functional Mobility    Functional Mobility- Ind. Level  contact guard assist  -MW        Functional Mobility- Device  rolling walker  -        Functional Mobility- Comment  amb to end  of yanez and back to room, amb to BR and back to bed  -           Transfer Assessment/Treatment    Transfer Assessment/Treatment  sit-stand transfer;stand-sit transfer;toilet transfer  -           Sit-Stand Transfer    Sit-Stand [place] (Transfers)  contact guard  -           Stand-Sit Transfer    Stand-Sit [place] (Transfers)  supervision  -           Toilet Transfer    Type (Toilet Transfer)  stand pivot/stand step  -        [place] Level (Toilet Transfer)  contact guard  -        Assistive Device (Toilet Transfer)  commode;walker, front-wheeled  -           ADL Assessment/Intervention    BADL Assessment/Intervention  lower body dressing;toileting  -           Lower Body Dressing Assessment/Training    Lower Body Dressing [place] Level  don;socks;set up;supervision  -        Lower Body Dressing Position  edge of bed sitting  -        Comment (Lower Body Dressing)  increased time and effort required 2' to swelling, would benefit from ed on sock aid  -           Toileting Assessment/Training    [place] Level (Toileting)  toileting skills;set up;supervision;change pad/brief;moderate assist (50% patient effort)  -        Assistive Devices (Toileting)  commode;grab bar/safety frame  -        Toileting Position  supported sitting  -MW        Comment (Toileting)  requried assist to thread legs into brief  -MW           BADL Safety/Performance    Impairments, BADL Safety/Performance  balance;endurance/activity tolerance;pain;strength  -MW           General ROM    GENERAL ROM COMMENTS  AROM WFL BUE  -MW           MMT (Manual Muscle Testing)    General MMT Comments  BUE grossly 4+/5  -MW           Motor Assessment/Interventions    Additional Documentation  Balance (Group)  -MW           Balance    Balance  static sitting balance;static standing balance  -MW           Static Sitting Balance    Level of [place] (Unsupported Sitting, Static Balance)  supervision  -         Sitting Position (Unsupported Sitting, Static Balance)  sitting on edge of bed  -MW           Static Standing Balance    Level of [place] (Supported Standing, Static Balance)  contact guard assist;supervision  -MW        Assistive Device Utilized (Supported Standing, Static Balance)  walker, rolling  -           Sensory Assessment/Intervention    Sensory General Assessment  no sensation deficits identified BUE  -MW           Positioning and Restraints    Pre-Treatment Position  in bed  -MW        Post Treatment Position  bed  -MW        In Bed  fowlers;call light within reach;encouraged to call for assist;side rails up x2;RLE elevated  -MW           Pain Assessment    Additional Documentation  Pain Scale: Numbers Pre/Post-Treatment (Group)  -MW           Pain Scale: Numbers Pre/Post-Treatment    Pain Scale: Numbers, Pretreatment  0/10 - no pain  -MW        Pain Scale: Numbers, Post-Treatment  0/10 - no pain  -MW        Pre/Post Treatment Pain Comment  c/o pain with touch or pressure to dorsal aspect of R foot  -MW           Plan of Care Review    Plan of Care Reviewed With  patient  -MW        Progress  no change  -MW        Outcome Summary  OT eval completed. Pt performs bed mobility with S, stands CGA and amb to end of yanez and back to bed. Requires assistance and increased effort for LB dressing and SBA for toileting tasks. Pt demos decreased endurance and strength, as well as RLE swelling, limiting independence and safety with ADL particularly LB ADL and standing tasks. OT indicated to address these deficits to increase independence and safety. Anticipate d/c home w assist and HH.   -MW           Clinical Impression (OT)    Date of Referral to OT  03/08/20  -MW        OT Diagnosis  decreased ADL  -MW        Prognosis (OT Eval)  good  -MW        Patient/Family Goals Statement (OT Eval)  return home w HH  -MW        Criteria for Skilled Therapeutic Interventions Met (OT Eval)  yes;treatment indicated   -MW        Rehab Potential (OT Eval)  good, to achieve stated therapy goals  -MW        Therapy Frequency (OT Eval)  3 times/wk  -MW        Predicted Duration of Therapy Intervention (Therapy Eval)  until dc  -MW        Care Plan Review (OT)  evaluation/treatment results reviewed;care plan/treatment goals reviewed;patient/other agree to care plan;risks/benefits reviewed;current/potential barriers reviewed  -MW        Anticipated Discharge Disposition (OT)  home with assist;home with home health  -MW           Planned OT Interventions    Planned Therapy Interventions (OT Eval)  activity tolerance training;BADL retraining;functional balance retraining;occupation/activity based interventions;patient/caregiver education/training;transfer/mobility retraining  -           OT Goals    Transfer Goal Selection (OT)  transfer, OT goal 1  -MW        Bathing Goal Selection (OT)  bathing, OT goal 1  -MW        Dressing Goal Selection (OT)  dressing, OT goal 1  -MW           Transfer Goal 1 (OT)    Activity/Assistive Device (Transfer Goal 1, OT)  toilet;tub;shower chair  -MW        [place] Level/Cues Needed (Transfer Goal 1, OT)  supervision required  -MW        Time Frame (Transfer Goal 1, OT)  long term goal (LTG);by discharge  -MW        Progress/Outcome (Transfer Goal 1, OT)  goal ongoing  -MW           Bathing Goal 1 (OT)    Activity/Assistive Device (Bathing Goal 1, OT)  bathing skills, all;shower chair  -MW        [place] Level/Cues Needed (Bathing Goal 1, OT)  supervision required  -MW        Time Frame (Bathing Goal 1, OT)  long term goal (LTG);by discharge  -MW        Progress/Outcomes (Bathing Goal 1, OT)  goal ongoing  -MW           Dressing Goal 1 (OT)    Activity/Assistive Device (Dressing Goal 1, OT)  lower body dressing;sock-aid  -MW        [place]/Cues Needed (Dressing Goal 1, OT)  supervision required  -MW        Time Frame (Dressing Goal 1, OT)  long term goal (LTG);by discharge  -MW         Progress/Outcome (Dressing Goal 1, OT)  goal ongoing  -MW           Living Environment    Home Accessibility  stairs to enter home;tub/shower is not walk in  -MW          User Key  (r) = Recorded By, (t) = Taken By, (c) = Cosigned By    Initials Name Effective Dates    MW Cydney Dixon, OTR/L 08/28/18 -                OT Recommendation and Plan  Outcome Summary/Treatment Plan (OT)  Anticipated Discharge Disposition (OT): home with assist, home with home health  Planned Therapy Interventions (OT Eval): activity tolerance training, BADL retraining, functional balance retraining, occupation/activity based interventions, patient/caregiver education/training, transfer/mobility retraining  Therapy Frequency (OT Eval): 3 times/wk  Plan of Care Review  Plan of Care Reviewed With: patient  Plan of Care Reviewed With: patient  Outcome Summary: OT eval completed. Pt performs bed mobility with S, stands CGA and amb to end of yanez and back to bed. Requires assistance and increased effort for LB dressing and SBA for toileting tasks. Pt demos decreased endurance and strength, as well as RLE swelling, limiting independence and safety with ADL particularly LB ADL and standing tasks. OT indicated to address these deficits to increase independence and safety. Anticipate d/c home w assist and HH.     Outcome Measures     Row Name 03/09/20 1000             How much help from another is currently needed...    Putting on and taking off regular lower body clothing?  3  -MW      Bathing (including washing, rinsing, and drying)  3  -MW      Toileting (which includes using toilet bed pan or urinal)  3  -MW      Putting on and taking off regular upper body clothing  3  -MW      Taking care of personal grooming (such as brushing teeth)  4  -MW      Eating meals  4  -MW      AM-PAC 6 Clicks Score (OT)  20  -MW         Functional Assessment    Outcome Measure Options  AM-PAC 6 Clicks Daily Activity (OT)  -MW        User Key  (r) = Recorded By,  (t) = Taken By, (c) = Cosigned By    Initials Name Provider Type    Cydney Boucher, OTR/L Occupational Therapist          Time Calculation:   Time Calculation- OT     Row Name 03/09/20 1018             Time Calculation- OT    OT Start Time  0914  -MW      OT Stop Time  1012  -MW      OT Time Calculation (min)  58 min  -MW      OT Received On  03/09/20  -MW      OT Goal Re-Cert Due Date  03/19/20  -MW        User Key  (r) = Recorded By, (t) = Taken By, (c) = Cosigned By    Initials Name Provider Type     Cydney Dixon, OTR/L Occupational Therapist        Therapy Charges for Today     Code Description Service Date Service Provider Modifiers Qty    86143200206 HC OT EVAL MOD COMPLEXITY 4 3/9/2020 Cydney Dixon OTR/L GO 1               Cydney Dixon OTR/L  3/9/2020

## 2020-03-09 NOTE — PROGRESS NOTES
[facility] Medicine Services  INPATIENT PROGRESS NOTE    Patient Name: Josi Mohamud  Date of Admission: 3/7/2020  Today's Date: 03/09/20  Length of Stay: 2  Primary Care Physician: Cayetano Crews MD    Subjective   Chief Complaint: right foot pain   HPI     Patient was seen and examined at bedside.  Patient indicates that she is continued to have some right foot pain.  Patient denies any nausea vomiting or diarrhea.  The cellulitis and erythema have improved, however the patient has extensive swelling.  The ultrasound of her right lower extremity was reviewed and found to be negative for DVT or superficial thrombophlebitis.        Review of Systems   Constitutional: Negative for chills, fatigue and fever.   Respiratory: Negative for cough and shortness of breath.    Cardiovascular: Negative for chest pain and palpitations.   Gastrointestinal: Negative for abdominal distention, abdominal pain, constipation, diarrhea, nausea and vomiting.   Musculoskeletal: Positive for myalgias. Negative for arthralgias.      All pertinent negatives and positives are as above. All other systems have been reviewed and are negative unless otherwise stated.     Objective    Temp:  [97.4 °F (36.3 °C)-98.9 °F (37.2 °C)] 98.1 °F (36.7 °C)  Heart Rate:  [72-89] 89  Resp:  [16-18] 18  BP: (135-154)/(56-65) 142/65  Physical Exam   Constitutional: She is oriented to person, place, and time. No distress.   HENT:   Head: Normocephalic and atraumatic.   Eyes: Conjunctivae are normal. No scleral icterus.   Neck: Neck supple. No JVD present.   Cardiovascular: Normal rate, regular rhythm and intact distal pulses.   Murmur heard.  Pulmonary/Chest: Effort normal and breath sounds normal. No stridor. No respiratory distress.   Abdominal: Soft. Bowel sounds are normal. She exhibits no distension and no mass. There is no tenderness. There is no guarding.   Musculoskeletal: She exhibits edema.   Neurological:  She is alert and oriented to person, place, and time.   Skin: She is not diaphoretic. There is erythema.   Psychiatric: She has a normal mood and affect. Her behavior is normal.   Nursing note and vitals reviewed.          Results Review:  I have reviewed the labs, radiology results, and diagnostic studies.    Laboratory Data:   Results from last 7 days   Lab Units 03/09/20  0428 03/08/20  0522 03/07/20  1714   WBC 10*3/mm3 14.16* 11.32* 14.29*   HEMOGLOBIN g/dL 13.0 13.5 14.8   HEMATOCRIT % 37.1 39.1 42.0   PLATELETS 10*3/mm3 307 305 366        Results from last 7 days   Lab Units 03/09/20  0428 03/08/20 0522 03/07/20  1755   SODIUM mmol/L 135* 139 137   POTASSIUM mmol/L 4.3 3.7 3.8   CHLORIDE mmol/L 102 105 100   CO2 mmol/L 23.0 22.0 24.0   BUN mg/dL 24* 20 23   CREATININE mg/dL 0.74 0.79 0.81   CALCIUM mg/dL 9.1 8.2* 8.6   BILIRUBIN mg/dL  --  0.4 0.3   ALK PHOS U/L  --  84 99   ALT (SGPT) U/L  --  13 13   AST (SGOT) U/L  --  18 14   GLUCOSE mg/dL 159* 119* 127*       Culture Data:   Blood Culture   Date Value Ref Range Status   03/07/2020 No growth at 24 hours  Preliminary   03/07/2020 No growth at 24 hours  Preliminary     Wound Culture   Date Value Ref Range Status   03/02/2020 Rare Staphylococcus, coagulase negative (A)  Final     Comment:     Call if further workup needed.         Radiology Data:   Imaging Results (Last 24 Hours)     Procedure Component Value Units Date/Time    US Venous Doppler Lower Extremity Right (duplex) [[id number]] Collected:  03/09/20 0801     Updated:  03/09/20 0805    Narrative:       History: Right lower extremity pain, swelling       Impression:       Impression: There is no evidence of deep venous thrombosis or  superficial thrombophlebitis of the right lower extremity.     Comments: Right lower extremity venous duplex exam was performed using  color Doppler flow, Doppler wave form analysis, and grayscale imaging,  with and without compression. There is no evidence of deep  venous  thrombosis of the common femoral, superficial femoral, popliteal,  posterior tibial, and peroneal veins. There is no thrombus identified in  the saphenofemoral junction or the greater saphenous vein.     This report was finalized on 03/09/2020 08:01 by Dr. Matthias Murphy MD.          I have reviewed the patient's current medications.     Assessment/Plan     Active Hospital Problems    Diagnosis   • Macrocytosis without anemia   • Obesity (BMI 30-39.9)   • Tobacco abuse   • Lower extremity edema, right side   • Leukocytosis   • COPD with acute exacerbation (CMS/HCC)   • Cellulitis of right lower extremity   • Paroxysmal atrial fibrillation with rapid ventricular response (CMS/HCC)   • Essential hypertension       Plan:  1.  Discontinue meropenem, switch to doxycycline and levofloxacin  2.  Physical therapy consult for Denice albert  3.  Ambulate the patient  4.  DC IV Solu-Medrol, switch to p.o. prednisone 40 mg  5.  Transition to duo nebs to every 4 hours scheduled to every 4 hours as needed  6.  Reviewed ultrasound of the right lower extremity, no signs of superficial thrombophlebitis or DVT  7.  Continue anticoagulation, rate controlled for her atrial fibrillation  8.  Discontinue telemetry  9.  A.m. labs reviewed, showed leukocytosis that increased from 11-14.  My mild macrocytosis, elevated glucose, and mildly low sodium.  Otherwise labs are unremarkable and stable.  There is a slight increase in the BUN creatinine ratio, likely due to steroids.  Patient does not appear hypovolemic.              Discharge Planning: I expect the patient to be discharged to home in 1-2 days.    Jose Keller MD   03/09/20   9:09 AM

## 2020-03-09 NOTE — THERAPY EVALUATION
Patient Name: Josi Mohamud  : 1945    MRN: [id number]                              Today's Date: 3/9/2020       Admit Date: 3/7/2020    Visit Dx:     ICD-10-CM ICD-9-CM   1. Cellulitis of right lower extremity L03.115 682.6   2. Decreased activities of daily living (ADL) Z78.9 V49.89   3. Impaired functional mobility, balance, gait, and endurance Z74.09 V49.89     Patient Active Problem List   Diagnosis   • Syncope and collapse   • Coronary artery disease involving native coronary artery of native heart with angina pectoris (CMS/HCC)   • Stented coronary artery   • Essential hypertension   • Abnormal positron emission tomography (PET) scan   • Gastroesophageal reflux disease   • Thyroid nodule   • Parotid neoplasm   • Anticoagulated   • Laryngopharyngeal reflux (LPR)   • Traumatic rhabdomyolysis (CMS/HCC)   • Atrial flutter with rapid ventricular response (CMS/HCC) on LINQ ? BRIEF   • Malignant neoplasm of upper lobe of left lung (CMS/HCC)   • COPD, moderate (CMS/HCC)   • Persistent asthma without complication   • Overweight   • Cigarette nicotine dependence without complication   • Multiple thyroid nodules   • Paroxysmal atrial fibrillation with rapid ventricular response (CMS/HCC)   • Hx of Hodgkin's lymphoma   • Dysfunction of both eustachian tubes   • Left chronic serous otitis media   • Morbidly obese (CMS/HCC)   • Tonsillar cyst   • Status post stereotactic radiosurgery   • Current every day smoker   • Renal cyst   • Cellulitis of right lower extremity   • COPD with acute exacerbation (CMS/HCC)   • Macrocytosis without anemia   • Obesity (BMI 30-39.9)   • Tobacco abuse   • Lower extremity edema, right side   • Leukocytosis     Past Medical History:   Diagnosis Date   • Bronchitis    • COPD (chronic obstructive pulmonary disease) (CMS/HCC)    • Coronary artery disease    • GERD (gastroesophageal reflux disease)    • History of transfusion    • Hyperlipidemia    • Hypertension    • PAF (paroxysmal  atrial fibrillation) (CMS/HCC)    • Persistent asthma without complication 7/8/2019   • Seizures (CMS/HCC)      Past Surgical History:   Procedure Laterality Date   • APPENDECTOMY     • APPENDECTOMY     • CARDIAC CATHETERIZATION     • CARDIAC ELECTROPHYSIOLOGY PROCEDURE N/A 5/23/2017    Procedure: Loop insertion; LINQ;  Surgeon: Jose Monteiro MD;  Location:  [facility];  Service:    • CHOLECYSTECTOMY     • CORONARY ANGIOPLASTY WITH STENT PLACEMENT      X 1    • LIVER BIOPSY     • MYRINGOTOMY W/ TUBES Left 11/22/2019    Procedure: LEFT MYRINGOTOMY WITH INSERTION OF EAR TUBE WITH RIGHT EAR EXAM UNDER ANESTHESIA;  Surgeon: Se Mcnulty MD;  Location: [facility] OR;  Service: ENT   • SPLENECTOMY     • US GUIDED FINE NEEDLE ASPIRATION  8/7/2018     General Information     Row Name 03/09/20 0812          PT Evaluation Time/Intention    Document Type  evaluation pt is a 73y/o F who presents w/cc: RLE pain and redness w/ dec in ROM of R ankle.  PMH: COPD,CAD, seizures, and CHF.Dx:  Cellulitis of RLE and COPD w/ exacerbation. See MAR.   -SUBHASH (r) TK (t) SUBHASH (c)     Mode of Treatment  physical therapy  -SUBHASH (r) TK (t) SUBHASH (c)     Row Name 03/09/20 0812          General Information    Patient Profile Reviewed?  yes  -SUBHASH (r) TK (t) SUBHASH (c)     Prior Level of Function  independent:;all household mobility;ADL's;dependent:;driving;shopping  -SUBHASH (r) TK (t) SUBHASH (c)     Existing Precautions/Restrictions  fall  -SUBHASH (r) TK (t) SUBHASH (c)     Barriers to Rehab  physical barrier;previous functional deficit;medically complex  -SUBHASH (r) TK (t) SUBHASH (c)     Row Name 03/09/20 0812          Relationship/Environment    Lives With  child(rd), adult;grandchild(rd)  -SUBHASH (r) TK (t) SUBHASH (c)     Name(s) of Who Lives With Patient  [place]  -SUBHASH (r) TK (t) SUBHASH (c)     Row Name 03/09/20 0812          Resource/Environmental Concerns    Current Living Arrangements  home/apartment/condo  -SUBHASH (r) TK (t) SUBHASH (c)     Row Name 03/09/20 0812          Home  Main Entrance    Number of Stairs, Main Entrance  two  -SUBHASH (r) TK (t) SUBHASH (c)     Stair Railings, Main Entrance  none  -SUBHASH (r) TK (t) SUBHASH (c)     Row Name 03/09/20 0812          Cognitive Assessment/Intervention- PT/OT    Orientation Status (Cognition)  oriented x 4  -SUBHASH (r) TK (t) SUBHASH (c)     Personal Safety Interventions  fall prevention program maintained;nonskid shoes/slippers when out of bed;muscle strengthening facilitated;gait belt;supervised activity;toileting scheduled  -SUBHASH (r) TK (t) SUBHASH (c)     Row Name 03/09/20 0812          Safety Issues, Functional Mobility    Safety Issues Affecting Function (Mobility)  safety precaution awareness  -SUBHASH (r) TK (t) SUBHASH (c)     Impairments Affecting Function (Mobility)  endurance/activity tolerance;pain;strength  -SUBHASH (r) TK (t) SUBHASH (c)       User Key  (r) = Recorded By, (t) = Taken By, (c) = Cosigned By    Initials Name Provider Type    Alexandre Francis, PT DPT Physical Therapist    Jeremy Conrad, PT Student PT Student        Mobility     Row Name 03/09/20 0812          Bed Mobility Assessment/Treatment    Bed Mobility Assessment/Treatment  scooting/bridging;supine-sit;sit-supine  -SUBHASH (r) TK (t) SUBHASH (c)     Scooting/Bridging [place] (Bed Mobility)  supervision  -SUBHASH (r) TK (t) SUBHASH (c)     Supine-Sit [place] (Bed Mobility)  supervision  -SUBHASH (r) TK (t) SUBHASH (c)     Sit-Supine [place] (Bed Mobility)  supervision  -SUBHASH (r) TK (t) SUBHASH (c)     Assistive Device (Bed Mobility)  head of bed elevated  -SUBHASH (r) TK (t) SUBHASH (c)     Row Name 03/09/20 0812          Sit-Stand Transfer    Sit-Stand [place] (Transfers)  contact guard  -SUBHASH (r) TK (t) SUBHASH (c)     Assistive Device (Sit-Stand Transfers)  walker, front-wheeled  -SUBHASH (r) TK (t) SUBHASH (c)     Row Name 03/09/20 0812          Gait/Stairs Assessment/Training    Gait/Stairs Assessment/Training  gait/ambulation independence;gait/ambulation assistive device;gait deviations;distance ambulated  -SUBHASH (r) TK (t) SUBHASH (c)      [place] Level (Gait)  contact guard  -SUBHASH (r) TK (t) SUBHASH (c)     Assistive Device (Gait)  walker, front-wheeled  -SUBHASH (r) TK (t) SUBHASH (c)     Distance in Feet (Gait)  150 ft  -SUBHASH (r) TK (t) SUBHASH (c)     Deviations/Abnormal Patterns (Gait)  base of support, wide;stride length decreased;gait speed decreased  -SUBHASH (r) TK (t) SUBHASH (c)       User Key  (r) = Recorded By, (t) = Taken By, (c) = Cosigned By    Initials Name Provider Type    Alexandre Francis, PT DPT Physical Therapist    TK Jeremy Batista, JOSE MANUEL Student PT Student        Obj/Interventions     Row Name 03/09/20 0812          General ROM    GENERAL ROM COMMENTS  BLE AROM WFL  -SUBHASH (r) TK (t) SUBHASH (c)     [facility] Name 03/09/20 0812          MMT (Manual Muscle Testing)    General MMT Comments  BLE strength dec to 4-/5.   -SUBHASH (r) TK (t) SUBHASH (c)     Row Name 03/09/20 0812          Static Sitting Balance    Level of [place] (Unsupported Sitting, Static Balance)  supervision  -SUBHASH (r) TK (t) SUBHASH (c)     Sitting Position (Unsupported Sitting, Static Balance)  sitting on edge of bed  -SUBHASH (r) TK (t) SUBHASH (c)     Time Able to Maintain Position (Unsupported Sitting, Static Balance)  1 to 2 minutes  -SUBHASH (r) TK (t) SUBHASH (c)     Row Name 03/09/20 0812          Dynamic Sitting Balance    Level of [place], Reaches Outside Midline (Sitting, Dynamic Balance)  contact guard assist  -SUBHASH (r) TK (t) SUBHASH (c)     Sitting Position, Reaches Outside Midline (Sitting, Dynamic Balance)  sitting on edge of bed  -SUBHASH (r) TK (t) SUBHASH (c)     Comment, Reaches Outside Midline (Sitting, Dynamic Balance)  donning socks  -SUBHASH (r) TK (t) SUBHASH (c)     Row Name 03/09/20 0812          Static Standing Balance    Level of [place] (Supported Standing, Static Balance)  contact guard assist;supervision  -SUBHASH (r) TK (t) SUBHASH (c)     Time Able to Maintain Position (Supported Standing, Static Balance)  45 to 60 seconds  -SUBHASH (r) TK (t) SUBHASH (c)     Assistive Device Utilized (Supported Standing, Static Balance)  walker,  rolling  -SUBHASH (r) TK (t) SUBHASH (c)     Row Name 03/09/20 0812          Sensory Assessment/Intervention    Sensory General Assessment  no sensation deficits identified  -SUBHASH (r) TK (t) SUBHASH (c)       User Key  (r) = Recorded By, (t) = Taken By, (c) = Cosigned By    Initials Name Provider Type    Alexandre Francis, PT DPT Physical Therapist    Jeremy Conrad, PT Student PT Student        Goals/Plan     Row Name 03/09/20 0812          Transfer Goal 1 (PT)    Activity/Assistive Device (Transfer Goal 1, PT)  sit-to-stand/stand-to-sit;bed-to-chair/chair-to-bed;toilet;shower chair  -SUBHASH (r) TK (t) SUBHASH (c)     [place] Level/Cues Needed (Transfer Goal 1, PT)  standby assist  -SUBHASH (r) TK (t) SUBHASH (c)     Time Frame (Transfer Goal 1, PT)  long term goal (LTG);10 days  -SUBHASH (r) TK (t) SUBHASH (c)     Progress/Outcome (Transfer Goal 1, PT)  goal ongoing  -SUBHASH (r) TK (t) SUBHASH (c)     Row Name 03/09/20 0812          Gait Training Goal 1 (PT)    Activity/Assistive Device (Gait Training Goal 1, PT)  gait (walking locomotion);assistive device use;decrease asymmetrical patterns;increase endurance/gait distance;improve balance and speed;decrease fall risk;walker, rolling  -SUBHASH (r) TK (t) SUBHASH (c)     [place] Level (Gait Training Goal 1, PT)  standby assist  -SUBHASH (r) TK (t) SUBHASH (c)     Distance (Gait Goal 1, PT)  250 ft w/o rest breaks  -SUBHASH (r) TK (t) SUBHASH (c)     Time Frame (Gait Training Goal 1, PT)  long term goal (LTG);10 days  -SUBHASH (r) TK (t) SUBHASH (c)     Progress/Outcome (Gait Training Goal 1, PT)  goal ongoing  -SUBHASH (r) TK (t) SUBHASH (c)     Row Name 03/09/20 0812          Patient Education Goal (PT)    Activity (Patient Education Goal, PT)  Pt will improve on ability to recognize need for assistance for safe home management.   -SUBHASH (r) TK (t) SUBHASH (c)     [place]/Cues/Accuracy (Memory Goal 2, PT)  independent  -SUBHASH (r) TK (t) SUBHASH (c)     Time Frame (Patient Education Goal, PT)  long term goal (LTG);10 days  -SUBHASH (r) TK (t) SUBHASH (c)      Progress/Outcome (Patient Education Goal, PT)  goal ongoing  -SUBHASH (r) TK (t) SUBHASH (c)       User Key  (r) = Recorded By, (t) = Taken By, (c) = Cosigned By    Initials Name Provider Type    Alexandre Francis, PT DPT Physical Therapist    Jeremy Conrad, PT Student PT Student        Clinical Impression     Row Name 03/09/20 0812          Pain Scale: Numbers Pre/Post-Treatment    Pain Scale: Numbers, Pretreatment  0/10 - no pain  -SUBHASH (r) TK (t) SUBHASH (c)     Pain Scale: Numbers, Post-Treatment  0/10 - no pain  -SUBHASH (r) TK (t) SUBHASH (c)     Pre/Post Treatment Pain Comment  c/o pain w/ light touch on dorsal R foot  -SUBHASH (r) TK (t) SUBHASH (c)     Row Name 03/09/20 0812          Plan of Care Review    Plan of Care Reviewed With  patient  -SUBHASH (r) TK (t) SUBHASH (c)     Progress  no change  -SUBHASH (r) TK (t) SUBHASH (c)     Row Name 03/09/20 0812          Physical Therapy Clinical Impression    Patient/Family Goals Statement (PT Clinical Impression)  return home  -SUBHASH (r) TK (t) SUBHASH (c)     Criteria for Skilled Interventions Met (PT Clinical Impression)  yes;treatment indicated  -SUBHASH (r) TK (t) SUBHASH (c)     Rehab Potential (PT Clinical Summary)  good, to achieve stated therapy goals  -SUBHASH (r) TK (t) SUBHASH (c)     Predicted Duration of Therapy (PT)  10 days or until d/c  -SUBHASH (r) TK (t) SUBHASH (c)     Row Name 03/09/20 0812          Positioning and Restraints    Pre-Treatment Position  in bed  -SUBHASH (r) TK (t) SUBHASH (c)     Post Treatment Position  bed  -SUBHASH (r) TK (t) SUBHASH (c)     In Bed  fowlers;call light within reach;encouraged to call for assist;side rails up x2;RLE elevated  -SUBHASH (r) TK (t) SUBHASH (c)       User Key  (r) = Recorded By, (t) = Taken By, (c) = Cosigned By    Initials Name Provider Type    Alexandre Francis, PT DPT Physical Therapist    Jeremy Conrad, PT Student PT Student        Outcome Measures     Row Name 03/09/20 0812          How much help from another person do you currently need...    Turning from your back to your side while in flat bed  without using bedrails?  4  -SUBHASH (r) TK (t) SUBHASH (c)     Moving from lying on back to sitting on the side of a flat bed without bedrails?  3  -SUBHASH (r) TK (t) SUBHASH (c)     Moving to and from a bed to a chair (including a wheelchair)?  3  -SUBHASH (r) TK (t) SUBHASH (c)     Standing up from a chair using your arms (e.g., wheelchair, bedside chair)?  3  -SUBHASH (r) TK (t) SUBHASH (c)     Climbing 3-5 steps with a railing?  2  -SUBHASH (r) TK (t) SUBHASH (c)     To walk in hospital room?  3  -SUBHASH (r) TK (t) SUBHASH (c)     AM-PAC 6 Clicks Score (PT)  18  -SUBHASH (r) TK (t)     Row Name 03/09/20 0812          Functional Assessment    Outcome Measure Options  AM-PAC 6 Clicks Basic Mobility (PT)  -SUBHASH (r) TK (t) SUBHASH (c)       User Key  (r) = Recorded By, (t) = Taken By, (c) = Cosigned By    Initials Name Provider Type    Alexandre Francis, PT DPT Physical Therapist    Jeremy Conrad, PT Student PT Student          PT Recommendation and Plan  Planned Therapy Interventions (PT Eval): balance training, gait training, home exercise program, postural re-education, patient/family education, neuromuscular re-education, strengthening, transfer training  Outcome Summary/Treatment Plan (PT)  Anticipated Equipment Needs at Discharge (PT): reacher, dressing equipment(for assistance w/ safe dressing. )  Anticipated Discharge Disposition (PT): home with assist, home with home health  Plan of Care Reviewed With: patient  Progress: no change     Time Calculation:   PT Charges     Row Name 03/09/20 1149 03/09/20 1027          Time Calculation    Start Time  1100  -SUBHASH  0930  -SUBHASH (r) TK (t) SUBHASH (c)     Stop Time  1130  -SUBHASH  1017  -SUBHASH (r) TK (t) SUBHASH (c)     Time Calculation (min)  30 min  -SUBHASH  47 min  -SUBHASH (r) TK (t)     PT Received On  03/09/20  -SUBHASH  03/09/20  -SUBHASH (r) TK (t) SUBHASH (c)     PT Goal Re-Cert Due Date  03/19/20  -SUBHASH  03/19/20  -SUBHASH (r) TK (t) SUBHASH (c)       User Key  (r) = Recorded By, (t) = Taken By, (c) = Cosigned By    Initials Name Provider Type    Alexandre Francis, PT  DPT Physical Therapist    TK Jeremy Batista, PT Student PT Student        Therapy Charges for Today     Code Description Service Date Service Provider Modifiers Qty    [id number] HC PT EVAL MOD COMPLEXITY 3 3/9/2020 Jeremy Batista, PT Student GP 1          PT G-Codes  Outcome Measure Options: AM-PAC 6 Clicks Daily Activity (OT)  AM-PAC 6 Clicks Score (PT): 18  AM-PAC 6 Clicks Score (OT): 20    Jeremy Batista PT Student  3/9/2020

## 2020-03-10 VITALS
HEART RATE: 79 BPM | DIASTOLIC BLOOD PRESSURE: 62 MMHG | TEMPERATURE: 97.6 F | SYSTOLIC BLOOD PRESSURE: 142 MMHG | BODY MASS INDEX: 39.81 KG/M2 | HEIGHT: 59 IN | OXYGEN SATURATION: 97 % | RESPIRATION RATE: 16 BRPM | WEIGHT: 197.5 LBS

## 2020-03-10 LAB
ALBUMIN SERPL-MCNC: 3.7 G/DL (ref 3.5–5.2)
ALBUMIN/GLOB SERPL: 1 G/DL
ALP SERPL-CCNC: 87 U/L (ref 39–117)
ALT SERPL W P-5'-P-CCNC: 17 U/L (ref 1–33)
ANION GAP SERPL CALCULATED.3IONS-SCNC: 10 MMOL/L (ref 5–15)
AST SERPL-CCNC: 17 U/L (ref 1–32)
BILIRUB SERPL-MCNC: 0.3 MG/DL (ref 0.2–1.2)
BUN BLD-MCNC: 24 MG/DL (ref 8–23)
BUN/CREAT SERPL: 32.9 (ref 7–25)
CALCIUM SPEC-SCNC: 9.4 MG/DL (ref 8.6–10.5)
CHLORIDE SERPL-SCNC: 106 MMOL/L (ref 98–107)
CO2 SERPL-SCNC: 21 MMOL/L (ref 22–29)
CREAT BLD-MCNC: 0.73 MG/DL (ref 0.57–1)
DEPRECATED RDW RBC AUTO: 48.9 FL (ref 37–54)
ERYTHROCYTE [DISTWIDTH] IN BLOOD BY AUTOMATED COUNT: 13.2 % (ref 12.3–15.4)
GFR SERPL CREATININE-BSD FRML MDRD: 78 ML/MIN/1.73
GLOBULIN UR ELPH-MCNC: 3.6 GM/DL
GLUCOSE BLD-MCNC: 138 MG/DL (ref 65–99)
HCT VFR BLD AUTO: 38.1 % (ref 34–46.6)
HGB BLD-MCNC: 13.2 G/DL (ref 12–15.9)
MCH RBC QN AUTO: 34.7 PG (ref 26.6–33)
MCHC RBC AUTO-ENTMCNC: 34.6 G/DL (ref 31.5–35.7)
MCV RBC AUTO: 100.3 FL (ref 79–97)
PLATELET # BLD AUTO: 312 10*3/MM3 (ref 140–450)
PMV BLD AUTO: 9.9 FL (ref 6–12)
POTASSIUM BLD-SCNC: 4.2 MMOL/L (ref 3.5–5.2)
PROT SERPL-MCNC: 7.3 G/DL (ref 6–8.5)
RBC # BLD AUTO: 3.8 10*6/MM3 (ref 3.77–5.28)
SODIUM BLD-SCNC: 137 MMOL/L (ref 136–145)
WBC NRBC COR # BLD: 10.82 10*3/MM3 (ref 3.4–10.8)

## 2020-03-10 PROCEDURE — 80053 COMPREHEN METABOLIC PANEL: CPT | Performed by: INTERNAL MEDICINE

## 2020-03-10 PROCEDURE — 97535 SELF CARE MNGMENT TRAINING: CPT

## 2020-03-10 PROCEDURE — 94799 UNLISTED PULMONARY SVC/PX: CPT

## 2020-03-10 PROCEDURE — 97116 GAIT TRAINING THERAPY: CPT

## 2020-03-10 PROCEDURE — 97110 THERAPEUTIC EXERCISES: CPT

## 2020-03-10 PROCEDURE — 63710000001 PREDNISONE PER 1 MG: Performed by: INTERNAL MEDICINE

## 2020-03-10 PROCEDURE — 85027 COMPLETE CBC AUTOMATED: CPT | Performed by: INTERNAL MEDICINE

## 2020-03-10 RX ORDER — LEVOFLOXACIN 500 MG/1
500 TABLET, FILM COATED ORAL EVERY 24 HOURS
Qty: 7 TABLET | Refills: 0 | Status: SHIPPED | OUTPATIENT
Start: 2020-03-10 | End: 2020-03-17

## 2020-03-10 RX ORDER — DOXYCYCLINE 100 MG/1
100 TABLET ORAL EVERY 12 HOURS SCHEDULED
Qty: 14 TABLET | Refills: 0 | Status: SHIPPED | OUTPATIENT
Start: 2020-03-10 | End: 2020-03-17

## 2020-03-10 RX ORDER — PREDNISONE 20 MG/1
40 TABLET ORAL
Qty: 10 TABLET | Refills: 0 | Status: SHIPPED | OUTPATIENT
Start: 2020-03-11 | End: 2020-03-16

## 2020-03-10 RX ADMIN — METOPROLOL SUCCINATE 50 MG: 50 TABLET, EXTENDED RELEASE ORAL at 08:19

## 2020-03-10 RX ADMIN — LORAZEPAM 1 MG: 1 TABLET ORAL at 08:18

## 2020-03-10 RX ADMIN — ATORVASTATIN CALCIUM 10 MG: 10 TABLET, FILM COATED ORAL at 08:19

## 2020-03-10 RX ADMIN — LEVOFLOXACIN 500 MG: 500 TABLET, FILM COATED ORAL at 12:10

## 2020-03-10 RX ADMIN — IPRATROPIUM BROMIDE AND ALBUTEROL SULFATE 3 ML: 2.5; .5 SOLUTION RESPIRATORY (INHALATION) at 00:31

## 2020-03-10 RX ADMIN — GUAIFENESIN 1200 MG: 600 TABLET, EXTENDED RELEASE ORAL at 08:19

## 2020-03-10 RX ADMIN — BUDESONIDE AND FORMOTEROL FUMARATE DIHYDRATE 2 PUFF: 160; 4.5 AEROSOL RESPIRATORY (INHALATION) at 06:58

## 2020-03-10 RX ADMIN — POTASSIUM CHLORIDE 10 MEQ: 750 CAPSULE, EXTENDED RELEASE ORAL at 08:19

## 2020-03-10 RX ADMIN — LISINOPRIL 20 MG: 20 TABLET ORAL at 08:19

## 2020-03-10 RX ADMIN — ACETAMINOPHEN 650 MG: 325 TABLET, FILM COATED ORAL at 00:24

## 2020-03-10 RX ADMIN — ASPIRIN 81 MG: 81 TABLET ORAL at 08:18

## 2020-03-10 RX ADMIN — PREDNISONE 40 MG: 20 TABLET ORAL at 08:18

## 2020-03-10 RX ADMIN — LEVETIRACETAM 500 MG: 500 TABLET, FILM COATED ORAL at 08:19

## 2020-03-10 RX ADMIN — DOXYCYCLINE 100 MG: 100 TABLET, FILM COATED ORAL at 08:19

## 2020-03-10 RX ADMIN — AMLODIPINE BESYLATE 5 MG: 5 TABLET ORAL at 08:18

## 2020-03-10 RX ADMIN — APIXABAN 5 MG: 5 TABLET, FILM COATED ORAL at 08:18

## 2020-03-10 RX ADMIN — PANTOPRAZOLE SODIUM 40 MG: 40 TABLET, DELAYED RELEASE ORAL at 06:19

## 2020-03-10 RX ADMIN — MUPIROCIN: 20 OINTMENT TOPICAL at 08:19

## 2020-03-10 NOTE — THERAPY TREATMENT NOTE
Acute Care - Occupational Therapy Treatment Note  [facility]     Patient Name: Josi Mohamud  : 1945  MRN: [id number]  Today's Date: 3/10/2020  Onset of Illness/Injury or Date of Surgery: 20  Date of Referral to OT: 20  Referring Physician: Dr Keller(REBEL Mckay)    Admit Date: 3/7/2020       ICD-10-CM ICD-9-CM   1. Cellulitis of right lower extremity L03.115 682.6   2. Decreased activities of daily living (ADL) Z78.9 V49.89   3. Impaired functional mobility, balance, gait, and endurance Z74.09 V49.89   4. COPD with acute exacerbation (CMS/HCC) J44.1 491.21     Patient Active Problem List   Diagnosis   • Syncope and collapse   • Coronary artery disease involving native coronary artery of native heart with angina pectoris (CMS/HCC)   • Stented coronary artery   • Essential hypertension   • Abnormal positron emission tomography (PET) scan   • Gastroesophageal reflux disease   • Thyroid nodule   • Parotid neoplasm   • Anticoagulated   • Laryngopharyngeal reflux (LPR)   • Traumatic rhabdomyolysis (CMS/HCC)   • Atrial flutter with rapid ventricular response (CMS/HCC) on LINQ ? BRIEF   • Malignant neoplasm of upper lobe of left lung (CMS/HCC)   • COPD, moderate (CMS/HCC)   • Persistent asthma without complication   • Overweight   • Cigarette nicotine dependence without complication   • Multiple thyroid nodules   • Paroxysmal atrial fibrillation with rapid ventricular response (CMS/HCC)   • Hx of Hodgkin's lymphoma   • Dysfunction of both eustachian tubes   • Left chronic serous otitis media   • Morbidly obese (CMS/HCC)   • Tonsillar cyst   • Status post stereotactic radiosurgery   • Current every day smoker   • Renal cyst   • Cellulitis of right lower extremity   • COPD with acute exacerbation (CMS/HCC)   • Macrocytosis without anemia   • Obesity (BMI 30-39.9)   • Tobacco abuse   • Lower extremity edema, right side   • Leukocytosis     Past Medical History:   Diagnosis Date   • Bronchitis    •  COPD (chronic obstructive pulmonary disease) (CMS/HCC)    • Coronary artery disease    • GERD (gastroesophageal reflux disease)    • History of transfusion    • Hyperlipidemia    • Hypertension    • PAF (paroxysmal atrial fibrillation) (CMS/HCC)    • Persistent asthma without complication 7/8/2019   • Seizures (CMS/HCC)      Past Surgical History:   Procedure Laterality Date   • APPENDECTOMY     • APPENDECTOMY     • CARDIAC CATHETERIZATION     • CARDIAC ELECTROPHYSIOLOGY PROCEDURE N/A 5/23/2017    Procedure: Loop insertion; LINQ;  Surgeon: Jose Monteiro MD;  Location: [facility] CATH INVASIVE LOCATION;  Service:    • CHOLECYSTECTOMY     • CORONARY ANGIOPLASTY WITH STENT PLACEMENT      X 1    • LIVER BIOPSY     • MYRINGOTOMY W/ TUBES Left 11/22/2019    Procedure: LEFT MYRINGOTOMY WITH INSERTION OF EAR TUBE WITH RIGHT EAR EXAM UNDER ANESTHESIA;  Surgeon: Se Mcnulty MD;  Location: [facility] OR;  Service: ENT   • SPLENECTOMY     • US GUIDED FINE NEEDLE ASPIRATION  8/7/2018       Therapy Treatment    Rehabilitation Treatment Summary     Row Name 03/10/20 1037 03/10/20 0947 03/10/20 0826       Treatment Time/Intention    Discipline  physical therapy assistant  -CARMEN  physical therapy assistant  -  occupational therapist  -MW    Document Type  therapy note (daily note)  -JP2  --  therapy note (daily note)  -MW    Subjective Information  --  --  complains of;fatigue  -MW2    Mode of Treatment  --  --  occupational therapy  -MW2    Comment  --  pt working with OT at this time.   -MF  --    Reason Treatment Not Performed  --  other (see comments)  -  --    Existing Precautions/Restrictions  fall  -JP2  --  fall  -MW2    Recorded by [CARMEN] Bernarda Marin, PTA 03/10/20 1039  [JP2] Bernarda Marin, PTA 03/10/20 1040 [MF] Adeola Nixon PTA 03/10/20 0947 [MW] Cydney Dixon, OTR/L 03/10/20 0826  [MW2] Cydney Dixon, OTR/L 03/10/20 1100    Row Name 03/10/20 0826             Cognitive Assessment/Intervention-  PT/OT    Personal Safety Interventions  fall prevention program maintained;gait belt;muscle strengthening facilitated;nonskid shoes/slippers when out of bed;supervised activity  -MW      Recorded by [MW] Cydney Dixon OTR/L 03/10/20 1100      Row Name 03/10/20 0826             Bed Mobility Assessment/Treatment    Bed Mobility Assessment/Treatment  supine-sit  -MW      Scooting/Bridging [place] (Bed Mobility)  independent  -MW      Assistive Device (Bed Mobility)  head of bed elevated  -MW      Recorded by [MW] Cydney Dixon OTR/L 03/10/20 1100      Row Name 03/10/20 0826             Functional Mobility    Functional Mobility- Ind. Level  supervision required;conditional independence  -MW      Functional Mobility- Device  rolling walker  -MW      Functional Mobility- Comment  amb within room   -MW      Recorded by [MW] Cydney Dixon OTR/L 03/10/20 1100      Row Name 03/10/20 0826             Transfer Assessment/Treatment    Transfer Assessment/Treatment  bathtub transfer;sit-stand transfer  -MW      Recorded by [MW] Cydney Dixon OTR/L 03/10/20 1100      Row Name 03/10/20 0826             Sit-Stand Transfer    Sit-Stand [place] (Transfers)  supervision  -MW      Assistive Device (Sit-Stand Transfers)  walker, front-wheeled  -MW      Recorded by [MW] Cydney Dixon OTR/L 03/10/20 1100      Row Name 03/10/20 0826             Bathtub Transfer    Type (Bathtub Transfer)  lateral;sit-stand  -MW      [place] Level (Bathtub Transfer)  contact guard;supervision  -MW      Assistive Device (Bathtub Transfer)  shower chair;grab bars/tub rail  -MW      Recorded by [MW] Cydney Dixon OTR/L 03/10/20 1100      Row Name 03/10/20 0826             Positioning and Restraints    Pre-Treatment Position  in bed  -MW      Post Treatment Position  chair  -MW      In Chair  reclined;call light within reach;encouraged to call for assist;legs elevated  -MW      Recorded by [MW] Cydney Dixon, OTR/L  03/10/20 1100      Row Name 03/10/20 0826             Pain Assessment    Additional Documentation  Pain Scale: Numbers Pre/Post-Treatment (Group)  -MW      Recorded by [MW] Cydney Dixon OTR/L 03/10/20 1100      Row Name 03/10/20 0826             Pain Scale: Numbers Pre/Post-Treatment    Pain Scale: Numbers, Pretreatment  0/10 - no pain  -MW      Pain Scale: Numbers, Post-Treatment  0/10 - no pain  -MW      Recorded by [MW] Cydney Dixon, OTR/L 03/10/20 1100      Row Name                Wound 03/09/20 1100 Right lower leg    Wound - Properties Group Date first assessed: 03/09/20 [SUBHASH] Time first assessed: 1100 [SUBHASH] Present on Hospital Admission: Y [SUBHASH] Side: Right [SUBHASH] Orientation: lower [SUBHASH] Location: leg [SUBHASH] Recorded by:  [SUBHASH] Alexandre Moe, PT DPT 03/09/20 1134    Row Name 03/10/20 0826             Plan of Care Review    Plan of Care Reviewed With  patient  -MW      Progress  improving  -MW      Outcome Summary  OT txt completed. Pt amb within room, gathered ADL supplies and donned UE garment with distant supervision. Pt simulated tub transfer x2 with vc for increased safety and independence with home functional transfer. Pt demos increased SOA upon return to chair. Education provided for increased safety and activity tolerance/energy conservation at home. Pt to d/c home this date with assist from daughter.   -MW      Recorded by [MW] Cydney Dixon OTR/L 03/10/20 1100      Row Name 03/10/20 0826             Outcome Summary/Treatment Plan (OT)    Daily Summary of Progress (OT)  progress toward functional goals is good  -MW      Anticipated Discharge Disposition (OT)  home with assist;home with home health  -MW      Recorded by [MW] Cydney Dixon, OTR/L 03/10/20 1100        User Key  (r) = Recorded By, (t) = Taken By, (c) = Cosigned By    Initials Name Effective Dates Discipline    Alexandre Francis, PT DPT 08/02/16 -  PT    Bernarda Thompson, PTA 08/02/16 -  PT    Adeola Hernandez,  PTA 08/02/16 -  PT    MW Zack Cydney SOLORZANO, OTR/L 08/28/18 -  OT        Wound 03/09/20 1100 Right lower leg (Active)   Dressing Appearance dry;intact 3/9/2020  7:39 PM   Closure DAPHNE 3/10/2020  8:00 AM   Base dressing in place, unable to visualize 3/10/2020  8:00 AM   Care, Wound tutu boot 3/10/2020  8:00 AM           OT Recommendation and Plan  Outcome Summary/Treatment Plan (OT)  Daily Summary of Progress (OT): progress toward functional goals is good  Anticipated Discharge Disposition (OT): home with assist, home with home health  Planned Therapy Interventions (OT Eval): activity tolerance training, BADL retraining, functional balance retraining, occupation/activity based interventions, patient/caregiver education/training, transfer/mobility retraining  Therapy Frequency (OT Eval): 3 times/wk  Daily Summary of Progress (OT): progress toward functional goals is good  Plan of Care Review  Plan of Care Reviewed With: patient  Plan of Care Reviewed With: patient  Outcome Summary: OT txt completed. Pt amb within room, gathered ADL supplies and donned UE garment with distant supervision. Pt simulated tub transfer x2 with vc for increased safety and independence with home functional transfer. Pt demos increased SOA upon return to chair. Education provided for increased safety and activity tolerance/energy conservation at home. Pt to d/c home this date with assist from daughter.   Outcome Measures     Row Name 03/10/20 1100 03/09/20 1000          How much help from another is currently needed...    Putting on and taking off regular lower body clothing?  3  -MW  3  -MW     Bathing (including washing, rinsing, and drying)  3  -MW  3  -MW     Toileting (which includes using toilet bed pan or urinal)  3  -MW  3  -MW     Putting on and taking off regular upper body clothing  3  -MW  3  -MW     Taking care of personal grooming (such as brushing teeth)  4  -MW  4  -MW     Eating meals  4  -MW  4  -MW     AM-PAC 6 Clicks Score (OT)   20  -MW  20  -MW        Functional Assessment    Outcome Measure Options  AM-PAC 6 Clicks Daily Activity (OT)  -MW  AM-PAC 6 Clicks Daily Activity (OT)  -MW       User Key  (r) = Recorded By, (t) = Taken By, (c) = Cosigned By    Initials Name Provider Type    TAYO Cydney Dixon, OTR/L Occupational Therapist           Time Calculation:   Time Calculation- OT     Row Name 03/10/20 1100             Time Calculation- OT    OT Start Time  0942  -MW      OT Stop Time  1007  -MW      OT Time Calculation (min)  25 min  -MW      Total Timed Code Minutes- OT  25 minute(s)  -MW      OT Received On  03/10/20  -MW        User Key  (r) = Recorded By, (t) = Taken By, (c) = Cosigned By    Initials Name Provider Type     Cydney Dixon, OTR/L Occupational Therapist        Therapy Charges for Today     Code Description Service Date Service Provider Modifiers Qty    22580465329 HC OT EVAL MOD COMPLEXITY 4 3/9/2020 Cydney Dixon OTR/L GO 1    76483844741 HC OT SELF CARE/MGMT/TRAIN EA 15 MIN 3/10/2020 Cydney Dixon OTR/L GO 2               Cydney Dixon OTR/L  3/10/2020

## 2020-03-10 NOTE — THERAPY DISCHARGE NOTE
Acute Care - Physical Therapy Discharge Summary  [facility]       Patient Name: Josi Mohamud  : 1945  MRN: [id number]    Today's Date: 3/10/2020  Onset of Illness/Injury or Date of Surgery: 20    Date of Referral to PT: 20  Referring Physician: Dr Keller(REBEL Mckay)      Admit Date: 3/7/2020      PT Recommendation and Plan    Visit Dx:    ICD-10-CM ICD-9-CM   1. Cellulitis of right lower extremity L03.115 682.6   2. Decreased activities of daily living (ADL) Z78.9 V49.89   3. Impaired functional mobility, balance, gait, and endurance Z74.09 V49.89   4. COPD with acute exacerbation (CMS/[facility]) J44.1 491.21       Outcome Measures     Row Name 03/10/20 1100 20 1000          How much help from another is currently needed...    Putting on and taking off regular lower body clothing?  3  -MW  3  -MW     Bathing (including washing, rinsing, and drying)  3  -MW  3  -MW     Toileting (which includes using toilet bed pan or urinal)  3  -MW  3  -MW     Putting on and taking off regular upper body clothing  3  -MW  3  -MW     Taking care of personal grooming (such as brushing teeth)  4  -MW  4  -MW     Eating meals  4  -MW  4  -MW     AM-PAC 6 Clicks Score (OT)  20  -MW  20  -MW        Functional Assessment    Outcome Measure Options  AM-PAC 6 Clicks Daily Activity (OT)  -MW  AM-PAC 6 Clicks Daily Activity (OT)  -MW       User Key  (r) = Recorded By, (t) = Taken By, (c) = Cosigned By    Initials Name Provider Type    Cydney Boucher, OTR/L Occupational Therapist          PT Charges     Row Name 03/10/20 1110             Time Calculation    Start Time  1037  -CARMEN      Stop Time  1103  -CARMEN      Time Calculation (min)  26 min  -CARMEN      PT Received On  03/10/20  -CARMEN         Timed Charges    76064 - PT Therapeutic Exercise Minutes  13  -CARMEN      32014 - Gait Training Minutes   13  -CARMEN        User Key  (r) = Recorded By, (t) = Taken By, (c) = Cosigned By    Initials Name Provider Type    CARMEN Marin  Bernarda DAMON PTA Physical Therapy Assistant          Rehab Goal Summary     Row Name 03/10/20 8181             Physical Therapy Goals    Wound Care Goal Selection (PT)  wound care, PT goal 1  -         Transfer Goal 1 (PT)    Activity/Assistive Device (Transfer Goal 1, PT)  sit-to-stand/stand-to-sit;bed-to-chair/chair-to-bed;toilet;shower chair  -      [place] Level/Cues Needed (Transfer Goal 1, PT)  standby assist  -      Time Frame (Transfer Goal 1, PT)  long term goal (LTG);10 days  -AH      Progress/Outcome (Transfer Goal 1, PT)  goal met  -         Gait Training Goal 1 (PT)    Activity/Assistive Device (Gait Training Goal 1, PT)  gait (walking locomotion);assistive device use;decrease asymmetrical patterns;increase endurance/gait distance;improve balance and speed;decrease fall risk;walker, rolling  -      [place] Level (Gait Training Goal 1, PT)  standby assist  -      Distance (Gait Goal 1, PT)  250 ft w/o rest breaks  -      Time Frame (Gait Training Goal 1, PT)  long term goal (LTG);10 days  -      Progress/Outcome (Gait Training Goal 1, PT)  goal not met  -         Wound Care Goal 1 (PT)    Wound Care Goal 1 (PT)  R LE unna boot will stay intact for 5-7 days/PRN with no adverse reactions.   -      Time Frame (Wound Care Goal 1, PT)  long term goal (LTG);10 days  -      Progress/Outcome (Wound Care Goal 1, PT)  goal met  -         Patient Education Goal (PT)    Activity (Patient Education Goal, PT)  Pt will improve on ability to recognize need for assistance for safe home management.   -      [place]/Cues/Accuracy (Memory Goal 2, PT)  independent  -      Time Frame (Patient Education Goal, PT)  long term goal (LTG);10 days  -      Progress/Outcome (Patient Education Goal, PT)  goal not met  -        User Key  (r) = Recorded By, (t) = Taken By, (c) = Cosigned By    Initials Name Provider Type Discipline    Milady Harris PTA Physical Therapy Assistant PT               PT Discharge Summary  Reason for Discharge: Discharge from facility  Outcomes Achieved: Refer to plan of care for updates on goals achieved  Discharge Destination: Home with home health      Milady Tena, PTA   3/10/2020

## 2020-03-10 NOTE — PROGRESS NOTES
Continued Stay Note  [facility]     Patient Name: Josi Mohamud  MRN: [id number]  Today's Date: 3/10/2020    Admit Date: 3/7/2020    Discharge Plan     Row Name 03/10/20 1151       Plan    Plan Comments  Agrees to [facility]; face sheet sent; consent signed        Discharge Codes    No documentation.       Expected Discharge Date and Time     Expected Discharge Date Expected Discharge Time    Mar 10, 2020             Marlena Isaacs RN

## 2020-03-10 NOTE — PLAN OF CARE
"  Problem: Patient Care Overview  Goal: Plan of Care Review  Outcome: Ongoing (interventions implemented as appropriate)  Flowsheets (Taken 3/10/2020 0405)  Progress: improving  Plan of Care Reviewed With: patient  Outcome Summary: Ivette boot in place to RLE.  Pt states that her leg and foot are \"feeling better\".  Pt has been ambulating in room with walker.  PRN Tylenol given for c/o of back pain with good results.  VSS.  Safety maintained.  Patient is hopeful for discharge today.     "

## 2020-03-10 NOTE — PLAN OF CARE
Problem: Patient Care Overview  Goal: Plan of Care Review  Outcome: Ongoing (interventions implemented as appropriate)  Flowsheets (Taken 3/10/2020 1111)  Outcome Summary: Pt. stands and ambulates with RWX and supervision. Ambulated 180'. Worked thru multiple LE exercises actively. Will benefit from safety education, strengthening and increased activity.

## 2020-03-10 NOTE — THERAPY TREATMENT NOTE
Acute Care - Physical Therapy Treatment Note  [facility]     Patient Name: Josi Mohamud  : 1945  MRN: [id number]  Today's Date: 3/10/2020  Onset of Illness/Injury or Date of Surgery: 20  Date of Referral to PT: 20  Referring Physician: Dr Keller(REBEL Mckay)    Admit Date: 3/7/2020    Visit Dx:    ICD-10-CM ICD-9-CM   1. Cellulitis of right lower extremity L03.115 682.6   2. Decreased activities of daily living (ADL) Z78.9 V49.89   3. Impaired functional mobility, balance, gait, and endurance Z74.09 V49.89   4. COPD with acute exacerbation (CMS/HCC) J44.1 491.21     Patient Active Problem List   Diagnosis   • Syncope and collapse   • Coronary artery disease involving native coronary artery of native heart with angina pectoris (CMS/HCC)   • Stented coronary artery   • Essential hypertension   • Abnormal positron emission tomography (PET) scan   • Gastroesophageal reflux disease   • Thyroid nodule   • Parotid neoplasm   • Anticoagulated   • Laryngopharyngeal reflux (LPR)   • Traumatic rhabdomyolysis (CMS/HCC)   • Atrial flutter with rapid ventricular response (CMS/HCC) on LINQ ? BRIEF   • Malignant neoplasm of upper lobe of left lung (CMS/HCC)   • COPD, moderate (CMS/HCC)   • Persistent asthma without complication   • Overweight   • Cigarette nicotine dependence without complication   • Multiple thyroid nodules   • Paroxysmal atrial fibrillation with rapid ventricular response (CMS/HCC)   • Hx of Hodgkin's lymphoma   • Dysfunction of both eustachian tubes   • Left chronic serous otitis media   • Morbidly obese (CMS/HCC)   • Tonsillar cyst   • Status post stereotactic radiosurgery   • Current every day smoker   • Renal cyst   • Cellulitis of right lower extremity   • COPD with acute exacerbation (CMS/HCC)   • Macrocytosis without anemia   • Obesity (BMI 30-39.9)   • Tobacco abuse   • Lower extremity edema, right side   • Leukocytosis       Therapy Treatment    Rehabilitation Treatment Summary      Row Name 03/10/20 1037 03/10/20 0947 03/10/20 0826       Treatment Time/Intention    Discipline  physical therapy assistant  -CARMEN  physical therapy assistant  -MF  occupational therapist  -MW    Document Type  therapy note (daily note)  -JP2  --  therapy note (daily note)  -MW    Subjective Information  no complaints  -JP3  --  complains of;fatigue  -MW2    Mode of Treatment  --  --  occupational therapy  -MW2    Comment  --  pt working with OT at this time.   -MF  --    Reason Treatment Not Performed  --  other (see comments)  -  --    Existing Precautions/Restrictions  fall  -JP2  --  fall  -MW2    Recorded by [CARMEN] Bernarda Marin, PTA 03/10/20 1039  [JP2] Bernarda Marin, PTA 03/10/20 1040  [JP3] Bernarda Marin, PTA 03/10/20 1107 [MF] Adeola Nixon, PTA 03/10/20 0947 [MW] Cydney Dixon, OTR/L 03/10/20 0826  [MW2] Cydney Dixon, OTR/L 03/10/20 1100    Row Name 03/10/20 0826             Cognitive Assessment/Intervention- PT/OT    Personal Safety Interventions  fall prevention program maintained;gait belt;muscle strengthening facilitated;nonskid shoes/slippers when out of bed;supervised activity  -MW      Recorded by [MW] Cydney Dixon, OTR/L 03/10/20 1100      Row Name 03/10/20 0826             Bed Mobility Assessment/Treatment    Bed Mobility Assessment/Treatment  supine-sit  -MW      Scooting/Bridging Ware (Bed Mobility)  independent  -MW      Assistive Device (Bed Mobility)  head of bed elevated  -MW      Recorded by [MW] Cydney Dixon OTR/L 03/10/20 1100      Row Name 03/10/20 0826             Functional Mobility    Functional Mobility- Ind. Level  supervision required;conditional independence  -MW      Functional Mobility- Device  rolling walker  -MW      Functional Mobility- Comment  amb within room   -MW      Recorded by [MW] Cydney Dixon, OTR/L 03/10/20 1100      Row Name 03/10/20 0826             Transfer Assessment/Treatment    Transfer Assessment/Treatment   bathtub transfer;sit-stand transfer  -MW      Recorded by [MW] Cydney Dixon, OTR/L 03/10/20 1100      Row Name 03/10/20 1037 03/10/20 0826          Sit-Stand Transfer    Sit-Stand Bailey (Transfers)  supervision  -CARMEN  supervision  -MW     Assistive Device (Sit-Stand Transfers)  --  walker, front-wheeled  -MW     Recorded by [CARMEN] Bernarda Marin, PTA 03/10/20 1107 [MW] Cydney Dixon, OTR/L 03/10/20 1100     Row Name 03/10/20 1037             Stand-Sit Transfer    Stand-Sit Bailey (Transfers)  supervision  -CARMEN      Recorded by [CARMEN] Bernarda Marin, PTA 03/10/20 1110      Row Name 03/10/20 1037             Toilet Transfer    Type (Toilet Transfer)  sit-stand;stand-sit  -CARMEN      Bailey Level (Toilet Transfer)  independent  -CARMEN      Recorded by [CARMEN] Bernarda Marin, PTA 03/10/20 1110      Row Name 03/10/20 0826             Bathtub Transfer    Type (Bathtub Transfer)  lateral;sit-stand  -MW      Bailey Level (Bathtub Transfer)  contact guard;supervision  -MW      Assistive Device (Bathtub Transfer)  shower chair;grab bars/tub rail  -MW      Recorded by [MW] Cydney Dixon, OTR/L 03/10/20 1100      Row Name 03/10/20 1037             Gait/Stairs Assessment/Training    Bailey Level (Gait)  supervision  -CARMEN      Assistive Device (Gait)  walker, front-wheeled  -CARMEN      Distance in Feet (Gait)  180  -CARMEN      Deviations/Abnormal Patterns (Gait)  base of support, wide  -CARMEN      Comment (Gait/Stairs)  Instruct on staying with wx.  -CARMEN      Recorded by [CARMEN] Bernarda Marin, PTA 03/10/20 1110      Row Name 03/10/20 1037             Therapeutic Exercise    Lower Extremity (Therapeutic Exercise)  heel slides, bilateral;LAQ (long arc quad), bilateral  -CARMEN      Lower Extremity Range of Motion (Therapeutic Exercise)  hip flexion/extension, bilateral;hip abduction/adduction, bilateral;hip internal/external rotation, bilateral;ankle dorsiflexion/plantar flexion, bilateral  -CARMEN      Exercise  Type (Therapeutic Exercise)  AROM (active range of motion) hip abd/add w MR x10  -CARMEN      Position (Therapeutic Exercise)  seated;supine  -CRAMEN      Sets/Reps (Therapeutic Exercise)  20  -CARMEN      Recorded by [CARMEN] Bernarda Marin, PTA 03/10/20 1110      Row Name 03/10/20 1037 03/10/20 0826          Positioning and Restraints    Pre-Treatment Position  sitting in chair/recliner  -CARMEN  in bed  -MW     Post Treatment Position  chair  -CARMEN  chair  -MW     In Chair  reclined;call light within reach;encouraged to call for assist  -CARMEN  reclined;call light within reach;encouraged to call for assist;legs elevated  -MW     Recorded by [CARMEN] Bernarda Marin, PTA 03/10/20 1110 [MW] Cydney Dixon OTR/L 03/10/20 1100     Row Name 03/10/20 0826             Pain Assessment    Additional Documentation  Pain Scale: Numbers Pre/Post-Treatment (Group)  -MW      Recorded by [MW] Cydney Dixon OTR/L 03/10/20 1100      Row Name 03/10/20 1037 03/10/20 0826          Pain Scale: Numbers Pre/Post-Treatment    Pain Scale: Numbers, Pretreatment  0/10 - no pain  -CARMEN  0/10 - no pain  -MW     Pain Scale: Numbers, Post-Treatment  0/10 - no pain  -CARMEN  0/10 - no pain  -MW     Recorded by [CARMEN] Bernarda Marin, PTA 03/10/20 1110 [MW] Cydney Dixon OTR/L 03/10/20 1100     Row Name                Wound 03/09/20 1100 Right lower leg    Wound - Properties Group Date first assessed: 03/09/20 [SUBHASH] Time first assessed: 1100 [SUBHASH] Present on Hospital Admission: Y [SUBHASH] Side: Right [SUBHASH] Orientation: lower [SUBHASH] Location: leg [SUBHASH] Recorded by:  [SUBHASH] Alexandre Moe, PT DPT 03/09/20 1134    Row Name 03/10/20 0826             Plan of Care Review    Plan of Care Reviewed With  patient  -MW      Progress  improving  -MW      Outcome Summary  OT txt completed. Pt amb within room, gathered ADL supplies and donned UE garment with distant supervision. Pt simulated tub transfer x2 with vc for increased safety and independence with home functional transfer. Pt  demos increased SOA upon return to chair. Education provided for increased safety and activity tolerance/energy conservation at home. Pt to d/c home this date with assist from daughter.   -MW      Recorded by [MW] Cydney Dixon, OTR/L 03/10/20 1100      Row Name 03/10/20 0826             Outcome Summary/Treatment Plan (OT)    Daily Summary of Progress (OT)  progress toward functional goals is good  -MW      Anticipated Discharge Disposition (OT)  home with assist;home with home health  -MW      Recorded by [MW] Cydney Dixon, OTR/L 03/10/20 1100        User Key  (r) = Recorded By, (t) = Taken By, (c) = Cosigned By    Initials Name Effective Dates Discipline    Alexandre Francis, PT DPT 08/02/16 -  PT    Bernarda Thompson, PTA 08/02/16 -  PT    Adeola Hernandez, PTA 08/02/16 -  PT    Cydney Boucher, OTR/L 08/28/18 -  OT          Wound 03/09/20 1100 Right lower leg (Active)   Dressing Appearance dry;intact 3/9/2020  7:39 PM   Closure DAPHNE 3/10/2020  8:00 AM   Base dressing in place, unable to visualize 3/10/2020  8:00 AM   Care, Wound tutu boot 3/10/2020  8:00 AM               PT Recommendation and Plan     Outcome Summary: Pt. stands and ambulates with RWX and supervision. Ambulated 180'. Worked thru multiple LE exercises actively. Will benefit from safety education, strengthening and increased activity.  Outcome Measures     Row Name 03/10/20 1100 03/09/20 1000          How much help from another is currently needed...    Putting on and taking off regular lower body clothing?  3  -MW  3  -MW     Bathing (including washing, rinsing, and drying)  3  -MW  3  -MW     Toileting (which includes using toilet bed pan or urinal)  3  -MW  3  -MW     Putting on and taking off regular upper body clothing  3  -MW  3  -MW     Taking care of personal grooming (such as brushing teeth)  4  -MW  4  -MW     Eating meals  4  -MW  4  -MW     AM-PAC 6 Clicks Score (OT)  20  -MW  20  -MW        Functional Assessment     Outcome Measure Options  AM-PAC 6 Clicks Daily Activity (OT)  -  AM-PAC 6 Clicks Daily Activity (OT)  -       User Key  (r) = Recorded By, (t) = Taken By, (c) = Cosigned By    Initials Name Provider Type    Cydney Boucher, OTR/L Occupational Therapist         Time Calculation:   PT Charges     Row Name 03/10/20 1110             Time Calculation    Start Time  1037  -CARMEN      Stop Time  1103  -CARMEN      Time Calculation (min)  26 min  -CARMEN      PT Received On  03/10/20  -         Timed Charges    60588 - PT Therapeutic Exercise Minutes  13  -CARMEN      17581 - Gait Training Minutes   13  -CARMEN        User Key  (r) = Recorded By, (t) = Taken By, (c) = Cosigned By    Initials Name Provider Type    Bernarda Thompson PTA Physical Therapy Assistant        Therapy Charges for Today     Code Description Service Date Service Provider Modifiers Qty    28738440406 HC GAIT TRAINING EA 15 MIN 3/10/2020 Bernarda Marin PTA GP 1    04005391052 HC PT THER PROC EA 15 MIN 3/10/2020 Bernarda Marin PTA GP 1          PT G-Codes  Outcome Measure Options: AM-PAC 6 Clicks Daily Activity (OT)  AM-PAC 6 Clicks Score (PT): 18  AM-PAC 6 Clicks Score (OT): 20    Bernarda Marin PTA  3/10/2020

## 2020-03-10 NOTE — PLAN OF CARE
Problem: Patient Care Overview  Goal: Plan of Care Review  Outcome: Ongoing (interventions implemented as appropriate)  Flowsheets (Taken 3/10/2020 7434)  Progress: improving  Plan of Care Reviewed With: patient  Outcome Summary: OT txt completed. Pt amb within room, gathered ADL supplies and donned UE garment with distant supervision. Pt simulated tub transfer x2 with vc for increased safety and independence with home functional transfer. Pt demos increased SOA upon return to chair. Education provided for increased safety and activity tolerance/energy conservation at home. Pt to d/c home this date with assist from daughter.
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
## 2020-03-11 ENCOUNTER — READMISSION MANAGEMENT (OUTPATIENT)
Dept: CALL CENTER | Facility: HOSPITAL | Age: 75
End: 2020-03-11

## 2020-03-11 NOTE — OUTREACH NOTE
Prep Survey      Responses   [facility] patient discharged from?  [facility]   Is LACE score < 7 ?  No   Eligibility  Readm Mgmt   Discharge diagnosis  cellulitis,  COPD   Does the patient have one of the following disease processes/diagnoses(primary or secondary)?  COPD/Pneumonia   Does the patient have Home health ordered?  No [PDHD]   Is there a DME ordered?  No   Comments regarding appointments  see AVS multiple apmts   Medication alerts for this patient  multiple changes   Prep survey completed?  Yes          Madeline Galvez RN

## 2020-03-11 NOTE — THERAPY DISCHARGE NOTE
Acute Care - Occupational Therapy Discharge Summary  [facility]     Patient Name: Josi Mohamud  : 1945  MRN: [id number]    Today's Date: 3/11/2020  Onset of Illness/Injury or Date of Surgery: 20    Date of Referral to OT: 20  Referring Physician: Dr Keller(REBEL Mckay)      Admit Date: 3/7/2020        OT Recommendation and Plan    Visit Dx:    ICD-10-CM ICD-9-CM   1. Cellulitis of right lower extremity L03.115 682.6   2. Decreased activities of daily living (ADL) Z78.9 V49.89   3. Impaired functional mobility, balance, gait, and endurance Z74.09 V49.89   4. COPD with acute exacerbation (CMS/[facility]) J44.1 491.21               Rehab Goal Summary     Row Name 20 1353             Transfer Goal 1 (OT)    Activity/Assistive Device (Transfer Goal 1, OT)  toilet;tub;shower chair  -MT      [place] Level/Cues Needed (Transfer Goal 1, OT)  supervision required  -MT      Time Frame (Transfer Goal 1, OT)  long term goal (LTG);by discharge  -MT      Progress/Outcome (Transfer Goal 1, OT)  goal not met  -MT         Bathing Goal 1 (OT)    Activity/Assistive Device (Bathing Goal 1, OT)  bathing skills, all;shower chair  -MT      [place] Level/Cues Needed (Bathing Goal 1, OT)  supervision required  -MT      Time Frame (Bathing Goal 1, OT)  long term goal (LTG);by discharge  -MT      Progress/Outcomes (Bathing Goal 1, OT)  goal not met  -MT         Dressing Goal 1 (OT)    Activity/Assistive Device (Dressing Goal 1, OT)  lower body dressing;sock-aid  -MT      [place]/Cues Needed (Dressing Goal 1, OT)  supervision required  -MT      Time Frame (Dressing Goal 1, OT)  long term goal (LTG);by discharge  -MT      Progress/Outcome (Dressing Goal 1, OT)  goal not met  -MT        User Key  (r) = Recorded By, (t) = Taken By, (c) = Cosigned By    Initials Name Provider Type Discipline    MT Mar Rangel, TREVIZO/L Occupational Therapy Assistant OT          Outcome Measures     Row Name 03/10/20 1100  03/09/20 1000          How much help from another is currently needed...    Putting on and taking off regular lower body clothing?  3  -MW  3  -MW     Bathing (including washing, rinsing, and drying)  3  -MW  3  -MW     Toileting (which includes using toilet bed pan or urinal)  3  -MW  3  -MW     Putting on and taking off regular upper body clothing  3  -MW  3  -MW     Taking care of personal grooming (such as brushing teeth)  4  -MW  4  -MW     Eating meals  4  -MW  4  -MW     AM-PAC 6 Clicks Score (OT)  20  -MW  20  -MW        Functional Assessment    Outcome Measure Options  AM-PAC 6 Clicks Daily Activity (OT)  -MW  AM-PAC 6 Clicks Daily Activity (OT)  -MW       User Key  (r) = Recorded By, (t) = Taken By, (c) = Cosigned By    Initials Name Provider Type    Cydney Boucher, OTR/L Occupational Therapist          Therapy Suggested Charges     Code   Minutes Charges    None                 OT Discharge Summary  Reason for Discharge: Discharge from facility  Outcomes Achieved: Refer to plan of care for updates on goals achieved  Discharge Destination: Home with home health      BETHANY Keen  3/11/2020

## 2020-03-12 LAB
BACTERIA SPEC AEROBE CULT: NORMAL
BACTERIA SPEC AEROBE CULT: NORMAL

## 2020-03-15 ENCOUNTER — READMISSION MANAGEMENT (OUTPATIENT)
Dept: CALL CENTER | Facility: HOSPITAL | Age: 75
End: 2020-03-15

## 2020-03-15 NOTE — OUTREACH NOTE
COPD/PN Week 1 Survey      Responses   [facility] patient discharged from?  [place]   Does the patient have one of the following disease processes/diagnoses(primary or secondary)?  COPD/Pneumonia   Is there a successful TCM telephone encounter documented?  No   Was the primary reason for admission:  COPD exacerbation   Week 1 attempt successful?  No   Unsuccessful attempts  Attempt 1          Vanna Mason RN

## 2020-03-17 ENCOUNTER — READMISSION MANAGEMENT (OUTPATIENT)
Dept: CALL CENTER | Facility: HOSPITAL | Age: 75
End: 2020-03-17

## 2020-03-17 NOTE — OUTREACH NOTE
COPD/PN Week 1 Survey      Responses   [facility] patient discharged from?  [place]   Does the patient have one of the following disease processes/diagnoses(primary or secondary)?  COPD/Pneumonia   Is there a successful TCM telephone encounter documented?  No   Was the primary reason for admission:  COPD exacerbation   Week 1 attempt successful?  Yes   Call start time  1538   Call end time  1551   Discharge diagnosis  cellulitis,  COPD   Is patient permission given to speak with other caregiver?  Yes   Person spoke with today (if not patient) and relationship  daughter Lagay   Meds reviewed with patient/caregiver?  Yes   Is the patient having any side effects they believe may be caused by any medication additions or changes?  No   Does the patient have all medications ordered at discharge?  Yes   Is the patient taking all medications as directed (includes completed medication regime)?  Yes   Does the patient have a primary care provider?   Yes   Does the patient have an appointment with their PCP or pulmonologist within 7 days of discharge?  Yes   Has the patient kept scheduled appointments due by today?  Yes   What is the Home health agency?   [facility]   Has home health visited the patient within 72 hours of discharge?  Yes   Psychosocial issues?  No   Did the patient receive a copy of their discharge instructions?  Yes   Nursing interventions  Reviewed instructions with patient   What is the patient's perception of their health status since discharge?  Improving   Nursing Interventions  Nurse provided patient education   Are the patient's immunizations up to date?   Yes   Is the patient/caregiver able to teach back the hierarchy of who to call/visit for symptoms/problems? PCP, Specialist, Home health nurse, Urgent Care, ED, 911  Yes   Is the patient able to teach back COPD zones?  Yes   Nursing interventions  Education provided on various zones   Patient reports what zone on this call?  Yellow Zone   Yellow Zone   Increased shortness of air, Unable to complete daily activities   Yellow interventions  Continue to use daily medications, Get plenty of rest, Call provider immediatly if symptoms do not improve, Use quick relief inhaler as ordered   Week 1 call completed?  Yes          Se Cohen RN

## 2020-03-24 ENCOUNTER — CLINICAL SUPPORT (OUTPATIENT)
Dept: CARDIOLOGY | Facility: CLINIC | Age: 75
End: 2020-03-24

## 2020-03-24 ENCOUNTER — APPOINTMENT (OUTPATIENT)
Dept: PULMONOLOGY | Facility: HOSPITAL | Age: 75
End: 2020-03-24

## 2020-03-24 DIAGNOSIS — R55 SYNCOPE AND COLLAPSE: ICD-10-CM

## 2020-03-24 PROCEDURE — G2066 INTER DEVC REMOTE 30D: HCPCS | Performed by: PHYSICIAN ASSISTANT

## 2020-03-24 PROCEDURE — 93298 REM INTERROG DEV EVAL SCRMS: CPT | Performed by: PHYSICIAN ASSISTANT

## 2020-03-25 ENCOUNTER — READMISSION MANAGEMENT (OUTPATIENT)
Dept: CALL CENTER | Facility: HOSPITAL | Age: 75
End: 2020-03-25

## 2020-03-25 NOTE — OUTREACH NOTE
COPD/PN Week 2 Survey      Responses   [facility] patient discharged from?  [place]   Does the patient have one of the following disease processes/diagnoses(primary or secondary)?  COPD/Pneumonia   Was the primary reason for admission:  COPD exacerbation   Week 2 attempt successful?  No   Unsuccessful attempts  Attempt 1 [NO ANSWER, NO VM LEFT]          Barb Brown LPN

## 2020-03-28 NOTE — PROGRESS NOTES
[facility] Report- [facility]    March 28, 2020    Primary Cardiologist:  Thania  Reason for implant:  syncope  Battery:  ok  Events:  No new events noted.  Changes:  N/a  on anticoagulation       Follow up:  1 month

## 2020-03-30 ENCOUNTER — READMISSION MANAGEMENT (OUTPATIENT)
Dept: CALL CENTER | Facility: HOSPITAL | Age: 75
End: 2020-03-30

## 2020-03-30 NOTE — OUTREACH NOTE
"COPD/PN Week 2 Survey      Responses   [facility] patient discharged from?  [place]   Does the patient have one of the following disease processes/diagnoses(primary or secondary)?  COPD/Pneumonia   Was the primary reason for admission:  COPD exacerbation   Week 2 attempt successful?  Yes   Call start time  1128   Call end time  1132   Discharge diagnosis  cellulitis,  COPD   Is patient permission given to speak with other caregiver?  Yes   Meds reviewed with patient/caregiver?  Yes   Is the patient having any side effects they believe may be caused by any medication additions or changes?  No   Does the patient have all medications ordered at discharge?  Yes   Is the patient taking all medications as directed (includes completed medication regime)?  Yes   Medication comments  Has finished with antibiotics and steroids   Does the patient have a primary care provider?   Yes   Has the patient kept scheduled appointments due by today?  N/A   Comments  Appts have been cancelled due to the Covid19.  Is aware she can contact providers with any issues   What is the Home health agency?   [facility]   Has home health visited the patient within 72 hours of discharge?  Yes   Psychosocial issues?  No   Did the patient receive a copy of their discharge instructions?  Yes   Nursing interventions  Reviewed instructions with patient   What is the patient's perception of their health status since discharge?  Improving   Nursing Interventions  Nurse provided patient education   Is the patient/caregiver able to teach back the hierarchy of who to call/visit for symptoms/problems? PCP, Specialist, Home health nurse, Urgent Care, ED, 911  Yes   Additional teach back comments  States she is doing \"ok\".  Breathing has been good and taking medications as prescribed   Is the patient able to teach back COPD zones?  Yes   Patient reports what zone on this call?  Yellow Zone   Yellow Zone  Increased shortness of air, Unable to complete daily " activities   Yellow interventions  Continue to use daily medications, Use quick relief inhaler as ordered, Call provider immediatly if symptoms do not improve, Get plenty of rest   Week 2 call completed?  Yes   Wrap up additional comments  No questions or needs at this time          Erin Davies, ANNAN

## 2020-04-06 ENCOUNTER — OFFICE VISIT (OUTPATIENT)
Dept: NEUROLOGY | Facility: CLINIC | Age: 75
End: 2020-04-06

## 2020-04-06 DIAGNOSIS — R40.4 EPISODE OF ALTERED CONSCIOUSNESS: ICD-10-CM

## 2020-04-06 PROCEDURE — 99213 OFFICE O/P EST LOW 20 MIN: CPT | Performed by: PSYCHIATRY & NEUROLOGY

## 2020-04-06 RX ORDER — LEVETIRACETAM 500 MG/1
500 TABLET ORAL 2 TIMES DAILY
Qty: 60 TABLET | Refills: 5 | Status: CANCELLED | OUTPATIENT
Start: 2020-04-06

## 2020-04-06 NOTE — PATIENT INSTRUCTIONS
Patient to continue seizure precautions and safety precautions as discussed.  Patient not to be driving.  Patient to be taking showers not baths and not to be using sharp cutting tools and not to get in hot tub or swim by self and no climbing.  Patient to get to emergency room immediately if seizure occurs.  Patient to get with PCP about weight and diet control and get Keppra level done

## 2020-04-06 NOTE — PROGRESS NOTES
Patient had 22-minute telephone conversation about her episodes of altered consciousness.  She says she has had no further episodes of altered consciousness.  She has minimal headache.  She has had no falls or head trauma.  Patient not driving at this time.  Patient is continuing the Keppra 500 mg 1 p.o. twice daily after EMU findings of left temporal slowing.  Patient did not get her Keppra level done.  Patient is being followed by cardiology with loop recorder but does not have a pacemaker.  She denies any acute cardiac problems.  Patient recently in the hospital for cellulitis and had blood work which showed mild elevation of BUN but otherwise stable.  Safety precautions were reviewed in detail and seizure precautions.  We have checked with the patient's pharmacy and patient has a reorder in March for the Keppra and has 3 refills with that.  Patient saying that she is having some memory difficulties which she may have to have reviewed when she comes in in follow-up appointment.  Final diagnosis is episode of altered consciousness rule out seizure /continue Keppra as above.

## 2020-04-07 ENCOUNTER — READMISSION MANAGEMENT (OUTPATIENT)
Dept: CALL CENTER | Facility: HOSPITAL | Age: 75
End: 2020-04-07

## 2020-04-07 NOTE — OUTREACH NOTE
COPD/PN Week 3 Survey      Responses   [facility] patient discharged from?  [place]   COVID-19 Test Status  Not tested   Does the patient have one of the following disease processes/diagnoses(primary or secondary)?  COPD/Pneumonia   Was the primary reason for admission:  COPD exacerbation   Week 3 attempt successful?  No   Unsuccessful attempts  Attempt 1          Erin Davies LPN

## 2020-04-08 ENCOUNTER — READMISSION MANAGEMENT (OUTPATIENT)
Dept: CALL CENTER | Facility: HOSPITAL | Age: 75
End: 2020-04-08

## 2020-04-08 NOTE — OUTREACH NOTE
COPD/PN Week 3 Survey      Responses   [facility] patient discharged from?  [place]   COVID-19 Test Status  Not tested   Does the patient have one of the following disease processes/diagnoses(primary or secondary)?  COPD/Pneumonia   Was the primary reason for admission:  COPD exacerbation   Week 3 attempt successful?  Yes   Call start time  1654   Call end time  1658   Discharge diagnosis  cellulitis,  COPD   Person spoke with today (if not patient) and relationship  Josi-daughter   Meds reviewed with patient/caregiver?  Yes   Is the patient taking all medications as directed (includes completed medication regime)?  Yes   Has the patient kept scheduled appointments due by today?  Yes   What is the Home health agency?   No more home health   What is the patient's perception of their health status since discharge?  Improving [Still has swelling in her foot r/t cellulitis]   If the patient is a current smoker, are they able to teach back resources for cessation?  -- [Smoker]   Is the patient able to teach back COPD zones?  Yes   Nursing interventions  Education provided on various zones   Patient reports what zone on this call?  Yellow Zone   Yellow Zone  Increased shortness of air, Unable to complete daily activities, Increased or thicker phlegm/mucus, Using quick relief inhaler/nebulizer more often, Increased swelling of ankles   Yellow interventions  Get plenty of rest, Do not smoke, Use other meds such as steroids or antibiotics as ordered, Continue to use daily medications, Use quick relief inhaler as ordered [Pt uses O2 at night ]   Week 3 call completed?  Yes          Ghada Robledo RN

## 2020-04-15 ENCOUNTER — READMISSION MANAGEMENT (OUTPATIENT)
Dept: CALL CENTER | Facility: HOSPITAL | Age: 75
End: 2020-04-15

## 2020-04-15 NOTE — OUTREACH NOTE
COPD/PN Week 4 Survey      Responses   [facility] patient discharged from?  [place]   COVID-19 Test Status  Not tested   Does the patient have one of the following disease processes/diagnoses(primary or secondary)?  COPD/Pneumonia   Was the primary reason for admission:  COPD exacerbation   Week 4 attempt successful?  Yes   Call start time  0824   Call end time  0831   Medication alerts for this patient  Pt has completed her course of antibiotics.   Meds reviewed with patient/caregiver?  Yes   Is the patient taking all medications as directed (includes completed medication regime)?  Yes   Has the patient kept scheduled appointments due by today?  Yes   Comments  Pt has had multiple phone visits.   Is the patient still receiving Home Health Services?  Yes   What is the patient's perception of their health status since discharge?  Same   Nursing Interventions  Nurse provided patient education   Is the patient able to teach back COPD zones?  Yes   Patient reports what zone on this call?  Yellow Zone   Yellow Zone  Increased shortness of air, Using quick relief inhaler/nebulizer more often   Yellow interventions  Continue to use daily medications, Use quick relief inhaler as ordered, Use oxygen as ordered, Call provider immediatly if symptoms do not improve   Week 4 call completed?  Yes   Would the patient like one additional call?  No   Graduated  Yes   Did the patient feel the follow up calls were helpful during their recovery period?  Yes   Was the number of calls appropriate?  Yes   Wrap up additional comments  Pt's daughter states that she is at her baseline which is not great. She is aware to contact her pcp if any symptoms worsen.          Marlena Whitman RN

## 2020-04-27 ENCOUNTER — CLINICAL SUPPORT (OUTPATIENT)
Dept: CARDIOLOGY | Facility: CLINIC | Age: 75
End: 2020-04-27

## 2020-04-27 DIAGNOSIS — R55 SYNCOPE AND COLLAPSE: ICD-10-CM

## 2020-04-27 PROCEDURE — G2066 INTER DEVC REMOTE 30D: HCPCS | Performed by: PHYSICIAN ASSISTANT

## 2020-04-27 PROCEDURE — 93298 REM INTERROG DEV EVAL SCRMS: CPT | Performed by: PHYSICIAN ASSISTANT

## 2020-05-06 ENCOUNTER — OFFICE VISIT (OUTPATIENT)
Dept: CARDIOLOGY | Facility: CLINIC | Age: 75
End: 2020-05-06

## 2020-05-06 VITALS — HEIGHT: 59 IN | BODY MASS INDEX: 38.3 KG/M2 | WEIGHT: 190 LBS

## 2020-05-06 DIAGNOSIS — Z79.01 ANTICOAGULATED: ICD-10-CM

## 2020-05-06 DIAGNOSIS — E04.1 THYROID NODULE: ICD-10-CM

## 2020-05-06 DIAGNOSIS — I48.0 PAROXYSMAL ATRIAL FIBRILLATION WITH RAPID VENTRICULAR RESPONSE (HCC): ICD-10-CM

## 2020-05-06 DIAGNOSIS — I10 ESSENTIAL HYPERTENSION: ICD-10-CM

## 2020-05-06 DIAGNOSIS — I25.119 CORONARY ARTERY DISEASE INVOLVING NATIVE CORONARY ARTERY OF NATIVE HEART WITH ANGINA PECTORIS (HCC): ICD-10-CM

## 2020-05-06 DIAGNOSIS — F17.210 CIGARETTE NICOTINE DEPENDENCE WITHOUT COMPLICATION: ICD-10-CM

## 2020-05-06 DIAGNOSIS — F17.200 CURRENT EVERY DAY SMOKER: ICD-10-CM

## 2020-05-06 DIAGNOSIS — Z95.5 STENTED CORONARY ARTERY: ICD-10-CM

## 2020-05-06 DIAGNOSIS — J44.9 COPD, MODERATE (HCC): ICD-10-CM

## 2020-05-06 DIAGNOSIS — D49.0 PAROTID NEOPLASM: ICD-10-CM

## 2020-05-06 DIAGNOSIS — I48.92 ATRIAL FLUTTER WITH RAPID VENTRICULAR RESPONSE (HCC): ICD-10-CM

## 2020-05-06 DIAGNOSIS — R55 SYNCOPE AND COLLAPSE: Primary | ICD-10-CM

## 2020-05-06 DIAGNOSIS — R94.8 ABNORMAL POSITRON EMISSION TOMOGRAPHY (PET) SCAN: ICD-10-CM

## 2020-05-06 DIAGNOSIS — K21.9 GASTROESOPHAGEAL REFLUX DISEASE, ESOPHAGITIS PRESENCE NOT SPECIFIED: ICD-10-CM

## 2020-05-06 PROCEDURE — 99441 PR PHYS/QHP TELEPHONE EVALUATION 5-10 MIN: CPT | Performed by: INTERNAL MEDICINE

## 2020-05-06 NOTE — PROGRESS NOTES
Josi Mohamud  [id number]  1945  74 y.o.  female    This is a telephonic visit  You have chosen to receive care through a telephone visit. Do you consent to use a telephone visit for your medical care today? Yes      Referring Provider: Cayetano Crews MD    Reason for  Visit:    Prior syncope  Lung nodule  planned for radiation  Still smoking   Was told cannot stand chemotherapy  Paroxysmal atrial fibrillation on LINQ   Had ear tubes inserted  coronary artery disease stented coronary artery > 5 years ago    Subjective    No new events or occurrences  Patient overall stable  Denies any chest pain  No palpitation  No presyncope  No syncope  No orthopnea  No paroxysmal nocturnal dyspnea  Has had mild bipedal edema that responds to diuretic therapy  Compliant with prescribed medication regimen  Compliant with diet  States blood pressures have been okay but does not know the exact blood pressure readings  Feels tired  Has weakness  Chronic arthritic pain  Recent blood tests from March reviewed  Continues to use tobacco    History of present illness:  Josi Mohamud is a 74 y.o. yo female with history of Essential Hypertension   Hyperlipidemia  who presents today for   No chief complaint on file.  .    History  Past Medical History:   Diagnosis Date   • Bronchitis    • COPD (chronic obstructive pulmonary disease) (CMS/HCC)    • Coronary artery disease    • GERD (gastroesophageal reflux disease)    • History of transfusion    • Hyperlipidemia    • Hypertension    • PAF (paroxysmal atrial fibrillation) (CMS/HCC)    • Persistent asthma without complication 7/8/2019   • Seizures (CMS/HCC)    ,   Past Surgical History:   Procedure Laterality Date   • APPENDECTOMY     • APPENDECTOMY     • CARDIAC CATHETERIZATION     • CARDIAC ELECTROPHYSIOLOGY PROCEDURE N/A 5/23/2017    Procedure: Loop insertion; LINQ;  Surgeon: Jose Monteiro MD;  Location: [facility] CATH INVASIVE LOCATION;  Service:    • CHOLECYSTECTOMY     • CORONARY  ANGIOPLASTY WITH STENT PLACEMENT      X 1    • LIVER BIOPSY     • MYRINGOTOMY W/ TUBES Left 11/22/2019    Procedure: LEFT MYRINGOTOMY WITH INSERTION OF EAR TUBE WITH RIGHT EAR EXAM UNDER ANESTHESIA;  Surgeon: Se Mcnulty MD;  Location: [facility];  Service: ENT   • SPLENECTOMY     • US GUIDED FINE NEEDLE ASPIRATION  8/7/2018   ,   Family History   Problem Relation Age of Onset   • Esophageal cancer Brother    • Lung cancer Brother    • Breast cancer Neg Hx    ,   Social History     Tobacco Use   • Smoking status: Current Every Day Smoker     Packs/day: 0.50     Years: 58.00     Pack years: 29.00     Start date: 1961   • Smokeless tobacco: Never Used   Substance Use Topics   • Alcohol use: No   • Drug use: No   ,     Medications  Current Outpatient Medications   Medication Sig Dispense Refill   • acetaminophen (TYLENOL) 500 MG tablet Take 500 mg by mouth Every 6 (Six) Hours As Needed for Mild Pain .     • albuterol sulfate  (90 Base) MCG/ACT inhaler Inhale 1 puff Daily. 1 inhaler 6   • amLODIPine (NORVASC) 5 MG tablet Take 5 mg by mouth Daily.     • apixaban (ELIQUIS) 5 MG tablet tablet Take 1 tablet by mouth Every 12 (Twelve) Hours. 60 tablet 11   • aspirin 81 MG EC tablet Take 81 mg by mouth Daily.     • calcium citrate-vitamin d (CALCITRATE) 315-250 MG-UNIT tablet tablet Take 1 tablet by mouth Daily.     • furosemide (LASIX) 40 MG tablet Take 40 mg by mouth 2 (Two) Times a Day.     • guaiFENesin (MUCINEX) 600 MG 12 hr tablet Take 2 tablets by mouth 2 (Two) Times a Day As Needed for Cough. 15 tablet 0   • levETIRAcetam (KEPPRA) 500 MG tablet Take 500 mg by mouth 2 (Two) Times a Day.     • lisinopril (PRINIVIL,ZESTRIL) 20 MG tablet Take 20 mg by mouth Daily.     • LORazepam (ATIVAN) 2 MG tablet Take 1-2 mg by mouth 3 (Three) Times a Day.     • metoprolol succinate XL (TOPROL-XL) 50 MG 24 hr tablet Take 50 mg by mouth Daily.     • mupirocin (BACTROBAN) 2 % ointment Apply  topically to the appropriate  "area as directed Every 12 (Twelve) Hours. 1 g 0   • nitroglycerin (NITROSTAT) 0.4 MG SL tablet Place 0.4 mg under the tongue Every 5 (Five) Minutes As Needed for Chest Pain. Take no more than 3 doses in 15 minutes.     • omeprazole (priLOSEC) 40 MG capsule Take 40 mg by mouth Daily.  5   • potassium chloride (K-DUR) 10 MEQ CR tablet Take 10 mEq by mouth 2 (Two) Times a Day.     • simvastatin (ZOCOR) 20 MG tablet Take 20 mg by mouth Daily.     • tiotropium bromide monohydrate (SPIRIVA RESPIMAT) 2.5 MCG/ACT aerosol solution inhaler Inhale 2 puffs Daily. 1 inhaler 6     No current facility-administered medications for this visit.        Allergies:  Penicillins    Review of Systems  Review of Systems   Constitution: Positive for malaise/fatigue.   HENT: Negative.    Eyes: Negative.    Cardiovascular: Positive for dyspnea on exertion and leg swelling. Negative for chest pain, claudication, cyanosis, irregular heartbeat, near-syncope, orthopnea, palpitations, paroxysmal nocturnal dyspnea and syncope.   Respiratory: Negative.    Endocrine: Negative.    Hematologic/Lymphatic: Negative.    Skin: Negative.    Musculoskeletal: Positive for arthritis, joint pain and stiffness.   Gastrointestinal: Negative for anorexia.   Genitourinary: Negative.    Neurological: Positive for weakness.   Psychiatric/Behavioral: Negative.        Objective     Physical Exam:  Ht 149.9 cm (59\")   Wt 86.2 kg (190 lb)   LMP  (LMP Unknown)   BMI 38.38 kg/m²     Self reported vitals above as available      • General demeanor : Patient is calm and composed.  There is no distress.  Patient can converse normally    • Description of patient's judgment and insight: Normal  • Brief assessment of mental status, which may include:  - Orientation to time, place, and person  - Recent and remote memory intact  - Mood and affect is normal  • Per patient no problems with vision or eyes that is new  • Per self examination of patient no issues with her ears or " nose or gums or teeth  • Assessment of hearing: Normal  • self examination of patient no reported mass in her neck    • Patient can carry out her normal speech and does not have any respiratory difficulties  • Per self examination of patient no significant lower extremity edema  • Per self examination of patient no reported skin issues that is new  • Per self-examination gait is normal and unchanged from baseline   • Per self examination of patient digits and nails are normal without any cyanosis or swelling  • Per self examination of patient no issues with joints or bones that is new  • Assessment of range of motion with notation of any pain, crepitation or contracture         Results Review:      History: Right lower extremity pain, swelling     IMPRESSION:  Impression: There is no evidence of deep venous thrombosis or  superficial thrombophlebitis of the right lower extremity.     Comments: Right lower extremity venous duplex exam was performed using  color Doppler flow, Doppler wave form analysis, and grayscale imaging,  with and without compression. There is no evidence of deep venous  thrombosis of the common femoral, superficial femoral, popliteal,  posterior tibial, and peroneal veins. There is no thrombus identified in  the saphenofemoral junction or the greater saphenous vein.     This report was finalized on 03/09/2020 08:01 by Dr. Matthias Murphy MD.    IMPRESSION:  1. Hypoechoic cyst lower pole right kidney is noted.  2. The left renal contour is unremarkable.        This report was finalized on 03/02/2020 11:21 by Dr. Vini Dyer MD.      Results for orders placed during the hospital encounter of 12/17/19   Adult Transthoracic Echo Complete W/ Cont if Necessary Per Protocol    Narrative · Estimated EF = 60%.  · Left ventricular systolic function is normal.  · Left ventricular wall thickness is consistent with mild concentric   hypertrophy.  · Left ventricular diastolic dysfunction.  · No evidence of  pulmonary hypertension is present     ___________________________________________________________________________  _________________________________________  Prior echo report as below for comparison        Josi Mohamud   Echocardiogram   Order# [id number]   Reading physician: Jose Monteiro MD Ordering physician: Lui Rick MD   Study date: 17   Patient Information     Patient Name  Josi Mohamud MRN  [id number] Sex  Female  (Age)  1945 (74 y.o.)   Sedation Narrator Report     Sedation Narrator Report   Interpretation Summary     · Left ventricular wall thickness is consistent with mild concentric   hypertrophy.  · Left ventricular function is normal. Estimated EF = 65%.  · Estimated right ventricular systolic pressure from tricuspid   regurgitation is normal (<35 mmHg).                   Procedures    Assessment/Plan   Diagnoses and all orders for this visit:    Syncope and collapse    Coronary artery disease involving native coronary artery of native heart with angina pectoris (CMS/HCC)    Stented coronary artery    Essential hypertension    Abnormal positron emission tomography (PET) scan    Gastroesophageal reflux disease, esophagitis presence not specified    Thyroid nodule    Parotid neoplasm    Anticoagulated    Atrial flutter with rapid ventricular response (CMS/HCC) on LINQ ? BRIEF    Cigarette nicotine dependence without complication    Paroxysmal atrial fibrillation with rapid ventricular response (CMS/HCC)    Current every day smoker    COPD, moderate (CMS/HCC)      ____________________________________________________________________________________________________________________________________________  Health maintenance and recommendations      Similar recommendations as last visit       Offered to give patient  a copy of my notes       Questions were encouraged, asked and answered to the patient's  understanding and satisfaction. Questions if any regarding current medications and  side effects, need for refills and importance of compliance to medications stressed.    Reviewed available prior notes, consults, prior visits, laboratory findings, radiology and cardiology relevant reports. Updated chart as applicable. I have reviewed the patient's medical history in detail and updated the computerized patient record as relevant.      Updated patient regarding any new or relevant abnormalities on review of records or any new findings on physical exam. Mentioned to patient about purpose of visit and desirable health short and long term goals and objectives.    Primary to monitor CBC CMP Lipid panel and TSH as applicable    ___________________________________________________________________________________________________________________________________________       Plan      Monitor cardiac rhythm device LINQ function regularly per established schedule or PRN     Weigh yourself frequently, at least weekly, preferably daily, call me if more than 2 pounds a day or 5 pounds a week weight gain.  Flexible diuretic dosing     Defer additional cardiac testing    Return in about 6 months (around 11/6/2020).

## 2020-05-27 ENCOUNTER — LAB (OUTPATIENT)
Dept: LAB | Facility: HOSPITAL | Age: 75
End: 2020-05-27

## 2020-05-27 ENCOUNTER — TRANSCRIBE ORDERS (OUTPATIENT)
Dept: ADMINISTRATIVE | Facility: HOSPITAL | Age: 75
End: 2020-05-27

## 2020-05-27 DIAGNOSIS — Z12.31 ENCOUNTER FOR SCREENING MAMMOGRAM FOR MALIGNANT NEOPLASM OF BREAST: Primary | ICD-10-CM

## 2020-05-27 DIAGNOSIS — E78.00 PURE HYPERCHOLESTEROLEMIA: Primary | ICD-10-CM

## 2020-05-27 DIAGNOSIS — I51.9 MYXEDEMA HEART DISEASE: ICD-10-CM

## 2020-05-27 DIAGNOSIS — E03.9 MYXEDEMA HEART DISEASE: ICD-10-CM

## 2020-05-27 DIAGNOSIS — Z51.81 ENCOUNTER FOR THERAPEUTIC DRUG MONITORING: ICD-10-CM

## 2020-05-27 DIAGNOSIS — E78.00 PURE HYPERCHOLESTEROLEMIA: ICD-10-CM

## 2020-05-27 LAB
ALBUMIN SERPL-MCNC: 4 G/DL (ref 3.5–5.2)
ALBUMIN/GLOB SERPL: 1.3 G/DL
ALP SERPL-CCNC: 94 U/L (ref 39–117)
ALT SERPL W P-5'-P-CCNC: 16 U/L (ref 1–33)
ANION GAP SERPL CALCULATED.3IONS-SCNC: 11.1 MMOL/L (ref 5–15)
AST SERPL-CCNC: 24 U/L (ref 1–32)
BILIRUB SERPL-MCNC: 0.2 MG/DL (ref 0.2–1.2)
BILIRUB UR QL STRIP: NEGATIVE
BUN BLD-MCNC: 19 MG/DL (ref 8–23)
BUN/CREAT SERPL: 28.8 (ref 7–25)
CALCIUM SPEC-SCNC: 9.1 MG/DL (ref 8.6–10.5)
CHLORIDE SERPL-SCNC: 101 MMOL/L (ref 98–107)
CHOLEST SERPL-MCNC: 136 MG/DL (ref 0–200)
CLARITY UR: CLEAR
CO2 SERPL-SCNC: 26.9 MMOL/L (ref 22–29)
COLOR UR: YELLOW
CREAT BLD-MCNC: 0.66 MG/DL (ref 0.57–1)
DEPRECATED RDW RBC AUTO: 46 FL (ref 37–54)
ERYTHROCYTE [DISTWIDTH] IN BLOOD BY AUTOMATED COUNT: 12.5 % (ref 12.3–15.4)
GFR SERPL CREATININE-BSD FRML MDRD: 87 ML/MIN/1.73
GLOBULIN UR ELPH-MCNC: 3.2 GM/DL
GLUCOSE BLD-MCNC: 95 MG/DL (ref 65–99)
GLUCOSE UR STRIP-MCNC: NEGATIVE MG/DL
HCT VFR BLD AUTO: 43 % (ref 34–46.6)
HDLC SERPL-MCNC: 38 MG/DL (ref 40–60)
HGB BLD-MCNC: 14.8 G/DL (ref 12–15.9)
HGB UR QL STRIP.AUTO: NEGATIVE
KETONES UR QL STRIP: NEGATIVE
LDLC SERPL CALC-MCNC: 76 MG/DL (ref 0–100)
LDLC/HDLC SERPL: 1.99 {RATIO}
LEUKOCYTE ESTERASE UR QL STRIP.AUTO: NEGATIVE
MCH RBC QN AUTO: 34.3 PG (ref 26.6–33)
MCHC RBC AUTO-ENTMCNC: 34.4 G/DL (ref 31.5–35.7)
MCV RBC AUTO: 99.8 FL (ref 79–97)
NITRITE UR QL STRIP: NEGATIVE
PH UR STRIP.AUTO: 5.5 [PH] (ref 5–8)
PLATELET # BLD AUTO: 274 10*3/MM3 (ref 140–450)
PMV BLD AUTO: 11.2 FL (ref 6–12)
POTASSIUM BLD-SCNC: 4.1 MMOL/L (ref 3.5–5.2)
PROT SERPL-MCNC: 7.2 G/DL (ref 6–8.5)
PROT UR QL STRIP: NEGATIVE
RBC # BLD AUTO: 4.31 10*6/MM3 (ref 3.77–5.28)
SODIUM BLD-SCNC: 139 MMOL/L (ref 136–145)
SP GR UR STRIP: 1.02 (ref 1–1.03)
T4 FREE SERPL-MCNC: 1.04 NG/DL (ref 0.93–1.7)
TRIGL SERPL-MCNC: 112 MG/DL (ref 0–150)
TSH SERPL DL<=0.05 MIU/L-ACNC: 1.67 UIU/ML (ref 0.27–4.2)
UROBILINOGEN UR QL STRIP: NORMAL
VLDLC SERPL-MCNC: 22.4 MG/DL (ref 5–40)
WBC NRBC COR # BLD: 4.88 10*3/MM3 (ref 3.4–10.8)

## 2020-05-27 PROCEDURE — 84439 ASSAY OF FREE THYROXINE: CPT | Performed by: PHYSICIAN ASSISTANT

## 2020-05-27 PROCEDURE — 80061 LIPID PANEL: CPT | Performed by: PHYSICIAN ASSISTANT

## 2020-05-27 PROCEDURE — 84443 ASSAY THYROID STIM HORMONE: CPT | Performed by: PHYSICIAN ASSISTANT

## 2020-05-27 PROCEDURE — 85027 COMPLETE CBC AUTOMATED: CPT | Performed by: PHYSICIAN ASSISTANT

## 2020-05-27 PROCEDURE — 80053 COMPREHEN METABOLIC PANEL: CPT | Performed by: PHYSICIAN ASSISTANT

## 2020-05-27 PROCEDURE — 36415 COLL VENOUS BLD VENIPUNCTURE: CPT

## 2020-05-27 PROCEDURE — 80177 DRUG SCRN QUAN LEVETIRACETAM: CPT | Performed by: PHYSICIAN ASSISTANT

## 2020-05-27 PROCEDURE — 81003 URINALYSIS AUTO W/O SCOPE: CPT | Performed by: PHYSICIAN ASSISTANT

## 2020-05-28 ENCOUNTER — CLINICAL SUPPORT (OUTPATIENT)
Dept: CARDIOLOGY | Facility: CLINIC | Age: 75
End: 2020-05-28

## 2020-05-28 DIAGNOSIS — R55 SYNCOPE AND COLLAPSE: ICD-10-CM

## 2020-05-28 LAB — LEVETIRACETAM SERPL-MCNC: 14.7 UG/ML (ref 10–40)

## 2020-05-28 PROCEDURE — 93298 REM INTERROG DEV EVAL SCRMS: CPT | Performed by: PHYSICIAN ASSISTANT

## 2020-05-28 PROCEDURE — G2066 INTER DEVC REMOTE 30D: HCPCS | Performed by: PHYSICIAN ASSISTANT

## 2020-05-28 NOTE — PROGRESS NOTES
Linq Report- [facility]    May 28, 2020    Primary Cardiologist:  Thania  Reason for implant:  syncope  Battery:  ok  Events:  No new episodes noted.  Changes:  n/a    Follow up:  1 month

## 2020-05-29 ENCOUNTER — HOSPITAL ENCOUNTER (OUTPATIENT)
Dept: CT IMAGING | Facility: HOSPITAL | Age: 75
Discharge: HOME | End: 2020-05-29
Admitting: RADIOLOGY

## 2020-05-29 DIAGNOSIS — R40.4 EPISODE OF ALTERED CONSCIOUSNESS: ICD-10-CM

## 2020-05-29 DIAGNOSIS — C34.12 MALIGNANT NEOPLASM OF UPPER LOBE OF LEFT LUNG (HCC): ICD-10-CM

## 2020-05-29 DIAGNOSIS — F17.200 CURRENT EVERY DAY SMOKER: ICD-10-CM

## 2020-05-29 DIAGNOSIS — J44.9 COPD, MODERATE (HCC): ICD-10-CM

## 2020-05-29 DIAGNOSIS — Z85.71 HX OF HODGKIN'S LYMPHOMA: ICD-10-CM

## 2020-05-29 DIAGNOSIS — Z92.3 STATUS POST STEREOTACTIC RADIOSURGERY: ICD-10-CM

## 2020-05-29 DIAGNOSIS — Z79.01 ANTICOAGULATED: ICD-10-CM

## 2020-05-29 PROCEDURE — 71250 CT THORAX DX C-: CPT

## 2020-06-03 ENCOUNTER — OFFICE VISIT (OUTPATIENT)
Dept: RADIATION ONCOLOGY | Facility: HOSPITAL | Age: 75
End: 2020-06-03

## 2020-06-03 ENCOUNTER — HOSPITAL ENCOUNTER (OUTPATIENT)
Dept: RADIATION ONCOLOGY | Facility: HOSPITAL | Age: 75
Setting detail: RADIATION/ONCOLOGY SERIES
End: 2020-06-03

## 2020-06-03 VITALS
WEIGHT: 191 LBS | BODY MASS INDEX: 38.51 KG/M2 | HEIGHT: 59 IN | DIASTOLIC BLOOD PRESSURE: 64 MMHG | SYSTOLIC BLOOD PRESSURE: 143 MMHG

## 2020-06-03 DIAGNOSIS — F17.200 CURRENT EVERY DAY SMOKER: ICD-10-CM

## 2020-06-03 DIAGNOSIS — Z92.3 STATUS POST STEREOTACTIC RADIOSURGERY: ICD-10-CM

## 2020-06-03 DIAGNOSIS — C34.12 MALIGNANT NEOPLASM OF UPPER LOBE OF LEFT LUNG (HCC): Primary | ICD-10-CM

## 2020-06-03 DIAGNOSIS — J44.9 COPD, MODERATE (HCC): ICD-10-CM

## 2020-06-03 DIAGNOSIS — Z85.71 HX OF HODGKIN'S LYMPHOMA: ICD-10-CM

## 2020-06-03 PROCEDURE — G0463 HOSPITAL OUTPT CLINIC VISIT: HCPCS | Performed by: RADIOLOGY

## 2020-06-15 ENCOUNTER — OFFICE VISIT (OUTPATIENT)
Dept: NEUROSURGERY | Age: 75
End: 2020-06-15
Payer: MEDICARE

## 2020-06-15 VITALS
SYSTOLIC BLOOD PRESSURE: 135 MMHG | DIASTOLIC BLOOD PRESSURE: 70 MMHG | HEART RATE: 81 BPM | OXYGEN SATURATION: 95 % | WEIGHT: 180 LBS | HEIGHT: 59 IN | BODY MASS INDEX: 36.29 KG/M2

## 2020-06-15 PROCEDURE — 1090F PRES/ABSN URINE INCON ASSESS: CPT | Performed by: NURSE PRACTITIONER

## 2020-06-15 PROCEDURE — 99213 OFFICE O/P EST LOW 20 MIN: CPT | Performed by: NURSE PRACTITIONER

## 2020-06-15 PROCEDURE — 3017F COLORECTAL CA SCREEN DOC REV: CPT | Performed by: NURSE PRACTITIONER

## 2020-06-15 PROCEDURE — G8427 DOCREV CUR MEDS BY ELIG CLIN: HCPCS | Performed by: NURSE PRACTITIONER

## 2020-06-15 PROCEDURE — 4040F PNEUMOC VAC/ADMIN/RCVD: CPT | Performed by: NURSE PRACTITIONER

## 2020-06-15 PROCEDURE — 1123F ACP DISCUSS/DSCN MKR DOCD: CPT | Performed by: NURSE PRACTITIONER

## 2020-06-15 PROCEDURE — G8417 CALC BMI ABV UP PARAM F/U: HCPCS | Performed by: NURSE PRACTITIONER

## 2020-06-15 PROCEDURE — G8400 PT W/DXA NO RESULTS DOC: HCPCS | Performed by: NURSE PRACTITIONER

## 2020-06-15 PROCEDURE — 4004F PT TOBACCO SCREEN RCVD TLK: CPT | Performed by: NURSE PRACTITIONER

## 2020-06-15 RX ORDER — LEVETIRACETAM 500 MG/1
500 TABLET ORAL 2 TIMES DAILY
Qty: 60 TABLET | Refills: 5 | Status: SHIPPED | OUTPATIENT
Start: 2020-06-15

## 2020-06-15 NOTE — PROGRESS NOTES
[facility] Neurology Office Note      Patient:   Sincere Pruitt  MR#:    [id number]  Account Number:                         YOB: 1945  Date of Evaluation:  6/15/2020  Time of Note:                          9:53 PM  Primary/Referring Physician:  Neela Allen MD   Consulting Physician:  SYBIL Blanco    FOLLOW UP VISIT    Chief Complaint   Patient presents with    Follow-up    Seizures       HISTORY OF PRESENT ILLNESS    Sincere Pruitt is a 75y.o. year old female here for follow up. Denies further seizure like events. On Keppra 500mg BID, no side effects. Prior event described as sudden LOC, fell to the floor. Both times she had been sitting in a chair. She did note bowel and bladder incontinence, confusion noted. No tongue biting noted. She has had 2 admissions for rhabdomyolosis following these events. She wears a life alert and has not been alert enough to push it during these events. She denies auras. Events have not been witnessed by family. Has loop recorder as well, nothing seen on this yet. Back pain is worsening since last visit. Noting more difficulty with movement especially in the morning. Taking Tylenol on a daily basis, this is helpful. She does have a history of lung cancer, recently treated with radiation. Dr. Greenberg Nones and pulmonology following this. Still noting chronic cough. Prior history of Bell's palsy. No TBI, encephalitis or meningitis history. No family history of seizures. No other complaints.      Past Medical History:   Diagnosis Date    Anxiety     CAD (coronary artery disease)     Colon polyp     GERD (gastroesophageal reflux disease)     [facility]'s [facility] ([facility] [address]     Hypertension     Hypothyroidism     Osteoporosis     Type II or unspecified type diabetes mellitus without mention of complication, not stated as uncontrolled        Past Surgical History:   Procedure Laterality Date    APPENDECTOMY      CHOLECYSTECTOMY      COLONOSCOPY  2009    Son Mckeon on file     Forced sexual activity: Not on file   Other Topics Concern    Not on file   Social History Narrative    Not on file       Current Outpatient Medications   Medication Sig Dispense Refill    levETIRAcetam (KEPPRA) 500 MG tablet Take 1 tablet by mouth 2 times daily 60 tablet 5    OXYGEN Inhale 2 mLs into the lungs nightly      PROAIR  (90 Base) MCG/ACT inhaler Inhale 2 puffs into the lungs 4 times daily 1 Inhaler 3    calcium carbonate (TUMS) 500 MG chewable tablet Take 1 tablet by mouth daily      tiotropium (SPIRIVA RESPIMAT) 2.5 MCG/ACT AERS inhaler Inhale 2 puffs into the lungs daily      potassium chloride (KLOR-CON) 10 MEQ extended release tablet Take 10 mEq by mouth 2 times daily Only take on days patient takes lasix      acetaminophen (TYLENOL) 500 MG tablet Take 500 mg by mouth every 6 hours as needed for Pain      amLODIPine (NORVASC) 5 MG tablet Take 5 mg by mouth daily       ADVAIR DISKUS 250-50 MCG/DOSE AEPB Inhale 1 puff into the lungs 2 times daily       furosemide (LASIX) 20 MG tablet Take 20 mg by mouth 3 times daily       lisinopril (PRINIVIL;ZESTRIL) 10 MG tablet Take 20 mg by mouth daily       LORazepam (ATIVAN) 1 MG tablet Take 1 mg by mouth every 8 hours as needed       metoprolol succinate (TOPROL XL) 50 MG extended release tablet Take 50 mg by mouth daily       simvastatin (ZOCOR) 20 MG tablet Take 20 mg by mouth nightly       ASPIRIN PO Take 81 mg by mouth daily       diphenhydrAMINE (BENADRYL) 25 MG tablet Take 25 mg by mouth every 6 hours as needed for Itching      clopidogrel (PLAVIX) 75 MG tablet Take 75 mg by mouth daily        No current facility-administered medications for this visit. Allergies   Allergen Reactions    Pcn [Penicillins]        REVIEW OF SYSTEMS  Constitutional: []? Fever []? Sweat []? Chills []? Recent Injury [x]? Denies all unless marked  HEENT:[]? Headache  []? Head Injury/Hearing Loss  []? Sore Throat  []?  Ear Ache/Dizziness [x]? Denies all unless marked  Spine:  []? Neck pain  [x]? Back pain  [x]? Juana Angry  []? Denies all unless marked  Cardiovascular:[]? Heart Disease []? Chest Pain []? Palpitations  []? Denies all unless marked  Pulmonary: []? Shortness of Breath []? Cough   [x]? Denies all unless marke  Gastrointestinal: []? Nausea  []? Vomiting  [x]? Abdominal Pain  []? Constipation  []? Diarrhea  []? Dark Bloody Stools  []? Denies all unless marked  Psychiatric/Behavioral:[]? Depression []? Anxiety [x]? Denies all unless marked  Genitourinary:   []? Frequency  []? Urgency  []? Incontinence [x]? Pain with Urination  []? Denies all unless marked  Extremities: []? Pain  []? Swelling  [x]? Denies all unless marked  Musculoskeletal: []? Muscle Pain  []? Joint Pain  []? Arthritis []? Muscle Cramps []? Muscle Twitches  [x]? Denies all unless marked  Sleep: []? Insomnia []? Snoring []? Restless Legs []? Sleep Apnea  []? Daytime Sleepiness  [x]? Denies all unless marked  Skin:[]? Rash []? Skin Discoloration [x]? Denies all unless marked   Neurological: []? Visual Disturbance/Memory Loss []? Loss of Balance []? Slurred Speech/Weakness []? Seizures  []? Vertigo/Dizziness [x]? Denies all unless marked  The MA has completed the ROS with the patient. I have reviewed it in its' entirety with the patient and agree with the documentation. PHYSICAL EXAM  /70   Pulse 81   Ht 4' 11\" (1.499 m)   Wt 180 lb (81.6 kg)   SpO2 95%   BMI 36.36 kg/m²       Constitutional - No acute distress    HEENT- Conjunctiva normal.  No scars, masses, or lesions over external nose or ears, no neck masses noted, no jugular vein distension, no bruit  Cardiac- Regular rate and rhythm  Pulmonary- Good expansion, normal effort without use of accessory muscles  Musculoskeletal - No significant wasting of muscles noted, no bony deformities  Extremities - No clubbing, cyanosis or edema  Skin - Warm, dry, and intact.   No rash, erythema, or pallor  Psychiatric - Mood, affect, HDL, LDLCALC  No results found for: TSH, T4FREE  No results found for: CRP, SEDRATE     Reviewed Dr. Chance Ast records     MRI brain (2019)- moderate  and mild generalized cerebral volume loss     MRA head (2019)- less than 50% stenosis bilaterally     EEG (2019)- frontal temporal slowing on the left     Video EEG (2019)- intermittent left temporal slowing, no overt epileptiform abnormalities. No events captured    Lumbar x-ray (2020)-   Impression:    1. Underlying osteopenia. 2. No visualized acute fracture. Vertebral body heights are   maintained. 3. Prominent degenerative anterolisthesis at L4-5, measuring up to 12   mm. Additional lower lumbar facet arthropathy. Signed by Dr Hortencia Carvalho on 2020 11:44 AM     Lumbar flex/ex (2020)-   Impression   1. Grade 1 spondylolisthesis at L4/5 with no significant movement seen   with flexion or extension. Signed by Dr Corinne Babin on 2020 4:11 PM       ASSESSMENT:    Khang Galindo is a 75y.o. year old female here for follow up of seizures, back pain. She was started on Keppra given abnormal EEG and description of events (incontinence, admission for rhabdomyolysis following events and negative cardiac work up). No further events noted. Prior MRI brain with . Vascular work up negative. Loop recorder in place and nothing captured on this yet. Continuing to note back pain, x-rays with anterolisthesis but stable on flex/ex x-rays. Will plan for pain management referral to see alternative options. Following with radiation oncology and pulmonology for lung cancer. ICD-10-CM    1. Seizure disorder ([facility] [address] G40.909    2. Chronic bilateral low back pain without sciatica M54.5 SYBIL Byrd, Pain Medicine, [place]    W82.43    3. Anterolisthesis M43.10 Jeffery Castillo APRN, Pain Medicine, [place]       PLAN:  1. Continue Keppra 500mg BID. Discussed side effects with patient. 2. Referral to pain management   3.  Continue

## 2020-06-22 ENCOUNTER — TELEPHONE (OUTPATIENT)
Dept: VASCULAR SURGERY | Facility: CLINIC | Age: 75
End: 2020-06-22

## 2020-06-22 NOTE — TELEPHONE ENCOUNTER
Daughter confirmed appointment for testing and follow up with Dr. Leahy. I explained that we still can't have a lot of patients waiting in the lobby to come about 9:45. She expressed understanding.

## 2020-06-23 ENCOUNTER — OFFICE VISIT (OUTPATIENT)
Dept: VASCULAR SURGERY | Facility: CLINIC | Age: 75
End: 2020-06-23

## 2020-06-23 ENCOUNTER — HOSPITAL ENCOUNTER (OUTPATIENT)
Dept: ULTRASOUND IMAGING | Facility: HOSPITAL | Age: 75
Discharge: HOME | End: 2020-06-23
Admitting: SURGERY

## 2020-06-23 VITALS
HEIGHT: 59 IN | WEIGHT: 191 LBS | SYSTOLIC BLOOD PRESSURE: 130 MMHG | DIASTOLIC BLOOD PRESSURE: 92 MMHG | HEART RATE: 72 BPM | BODY MASS INDEX: 38.51 KG/M2 | OXYGEN SATURATION: 96 %

## 2020-06-23 DIAGNOSIS — Z72.0 TOBACCO ABUSE: ICD-10-CM

## 2020-06-23 DIAGNOSIS — I65.23 BILATERAL CAROTID ARTERY STENOSIS: Primary | ICD-10-CM

## 2020-06-23 DIAGNOSIS — I10 ESSENTIAL HYPERTENSION: ICD-10-CM

## 2020-06-23 DIAGNOSIS — I65.23 BILATERAL CAROTID ARTERY STENOSIS: ICD-10-CM

## 2020-06-23 PROCEDURE — 99214 OFFICE O/P EST MOD 30 MIN: CPT | Performed by: SURGERY

## 2020-06-23 PROCEDURE — 93880 EXTRACRANIAL BILAT STUDY: CPT

## 2020-06-23 PROCEDURE — 93880 EXTRACRANIAL BILAT STUDY: CPT | Performed by: SURGERY

## 2020-06-23 NOTE — PROGRESS NOTES
"6/23/2020     Cayetano Crews MD   [address] [facility] [address] [facility] [address]  [facility] [address]    Josi Mohamud  1945    Chief Complaint   Patient presents with   • Follow-up     1 yr f/u with carotid testing.  Pt denies any stroke like symptoms.  Last seen in the office 7/16/19.       Dear Cayetano Crews MD    HPI  I had the pleasure of seeing your patient Josi Mohamud in the office today.    As you recall, Josi Mohamud is a 74 y.o.  female who you are currently following for episodes of syncope.  She had her most recent one in January 2019.   She is also being followed by Dr. Monteiro as well.  She states she just does not feel well.  She denies any classic strokelike symptoms.  She had noninvasive testing performed which I personally reviewed.      Review of Systems   Constitutional: Positive for fatigue.   Eyes: Negative.    Respiratory: Negative.    Cardiovascular: Negative.    Gastrointestinal: Positive for constipation and diarrhea.   Endocrine: Negative.    Genitourinary: Negative.    Musculoskeletal: Positive for gait problem.   Skin: Negative.    Allergic/Immunologic: Negative.    Neurological: Positive for weakness and headaches. Negative for syncope.   Hematological: Negative.    Psychiatric/Behavioral: Negative.    All other systems reviewed and are negative.    /92   Pulse 72   Ht 149.9 cm (59\")   Wt 86.6 kg (191 lb)   LMP  (LMP Unknown)   SpO2 96%   BMI 38.58 kg/m²     Physical Exam   Constitutional: She is oriented to person, place, and time. She appears well-developed and well-nourished.   HENT:   Head: Normocephalic and atraumatic.   Eyes: Pupils are equal, round, and reactive to light. No scleral icterus.   Neck: Neck supple. No JVD present. Carotid bruit is not present. No thyromegaly present.   Cardiovascular: Normal rate, regular rhythm and normal heart sounds.   Pulses:       Carotid pulses are 2+ on the right side, and 2+ on the left side.       Femoral pulses are 2+ on the right " side, and 2+ on the left side.       Popliteal pulses are 2+ on the right side, and 2+ on the left side.        Dorsalis pedis pulses are 2+ on the right side, and 2+ on the left side.        Posterior tibial pulses are 2+ on the right side, and 2+ on the left side.   Pulmonary/Chest: Effort normal and breath sounds normal.   Abdominal: Soft. Bowel sounds are normal. She exhibits no distension, no abdominal bruit and no mass. There is no hepatosplenomegaly. There is no tenderness.   Musculoskeletal: Normal range of motion. She exhibits no edema.   Lymphadenopathy:     She has no cervical adenopathy.   Neurological: She is alert and oriented to person, place, and time. She has normal strength. No cranial nerve deficit or sensory deficit.   Skin: Skin is warm, dry and intact.   Psychiatric: She has a normal mood and affect.   Nursing note and vitals reviewed.    Diagnostic data:  Noninvasive testing including a carotid duplex shows less than 50% carotid stenosis bilaterally with bilateral antegrade vertebral flow.       Patient Active Problem List   Diagnosis   • Syncope and collapse   • Coronary artery disease involving native coronary artery of native heart with angina pectoris (CMS/HCC)   • Stented coronary artery   • Essential hypertension   • Abnormal positron emission tomography (PET) scan   • Gastroesophageal reflux disease   • Thyroid nodule   • Parotid neoplasm   • Anticoagulated   • Laryngopharyngeal reflux (LPR)   • Traumatic rhabdomyolysis (CMS/HCC)   • Atrial flutter with rapid ventricular response (CMS/HCC) on LINQ ? BRIEF   • Malignant neoplasm of upper lobe of left lung (CMS/HCC)   • COPD, moderate (CMS/HCC)   • Persistent asthma without complication   • Overweight   • Cigarette nicotine dependence without complication   • Multiple thyroid nodules   • Paroxysmal atrial fibrillation with rapid ventricular response (CMS/HCC)   • Hx of Hodgkin's lymphoma   • Dysfunction of both eustachian tubes   • Left  chronic serous otitis media   • Morbidly obese (CMS/HCC)   • Tonsillar cyst   • Status post stereotactic radiosurgery   • Current every day smoker   • Renal cyst   • Cellulitis of right lower extremity   • COPD with acute exacerbation (CMS/HCC)   • Macrocytosis without anemia   • Obesity (BMI 30-39.9)   • Tobacco abuse   • Lower extremity edema, right side   • Leukocytosis        Diagnosis Plan   1. Bilateral carotid artery stenosis  US Carotid Bilateral   2. Tobacco abuse     3. Essential hypertension         Plan: After thoroughly evaluating Josi Mohamud, I believe the best course of action is to remain conservative from a vascular standpoint. The patient is not currently having any strokelike symptoms and she has very minimal carotid disease bilaterally.  I did discuss vascular risk factors as they pertain to the progression of vascular disease including controlling hypertension.  This risk factors currently stable.  The patient is to continue taking their medications as previously discussed.  I will see her back in 1 years time with repeat noninvasive testing for continued surveillance.  I asked her to call Dr. Eleonora ANDERSON about her many complaints of tiredness and fatigue and not feeling well.  This was all discussed in full with complete understanding.  Thank you for allowing me to participate in the care of your patient.  Please do not hesitate to call with any questions or concerns.  We will keep you aware of any further encounters with Josi Mohamud.        Sincerely yours,         Nicholas Leahy, Cayetano Jackson MD

## 2020-07-24 ENCOUNTER — OFFICE VISIT (OUTPATIENT)
Dept: OTOLARYNGOLOGY | Facility: CLINIC | Age: 75
End: 2020-07-24

## 2020-07-24 VITALS
HEART RATE: 83 BPM | BODY MASS INDEX: 38.71 KG/M2 | TEMPERATURE: 97.8 F | SYSTOLIC BLOOD PRESSURE: 142 MMHG | WEIGHT: 192 LBS | DIASTOLIC BLOOD PRESSURE: 78 MMHG | HEIGHT: 59 IN

## 2020-07-24 DIAGNOSIS — Z72.0 TOBACCO ABUSE: ICD-10-CM

## 2020-07-24 DIAGNOSIS — H65.22 LEFT CHRONIC SEROUS OTITIS MEDIA: ICD-10-CM

## 2020-07-24 DIAGNOSIS — J35.8 TONSILLAR CYST: ICD-10-CM

## 2020-07-24 DIAGNOSIS — H69.83 DYSFUNCTION OF BOTH EUSTACHIAN TUBES: Primary | ICD-10-CM

## 2020-07-24 PROCEDURE — 99213 OFFICE O/P EST LOW 20 MIN: CPT | Performed by: PHYSICIAN ASSISTANT

## 2020-07-24 RX ORDER — POTASSIUM CHLORIDE 750 MG/1
TABLET, EXTENDED RELEASE ORAL
COMMUNITY
Start: 2020-07-13 | End: 2020-08-12 | Stop reason: SDUPTHER

## 2020-07-27 PROCEDURE — U0003 INFECTIOUS AGENT DETECTION BY NUCLEIC ACID (DNA OR RNA); SEVERE ACUTE RESPIRATORY SYNDROME CORONAVIRUS 2 (SARS-COV-2) (CORONAVIRUS DISEASE [COVID-19]), AMPLIFIED PROBE TECHNIQUE, MAKING USE OF HIGH THROUGHPUT TECHNOLOGIES AS DESCRIBED BY CMS-2020-01-R: HCPCS | Performed by: NURSE PRACTITIONER

## 2020-07-28 ENCOUNTER — TRANSCRIBE ORDERS (OUTPATIENT)
Dept: ADMINISTRATIVE | Facility: HOSPITAL | Age: 75
End: 2020-07-28

## 2020-07-28 ENCOUNTER — LAB (OUTPATIENT)
Dept: LAB | Facility: HOSPITAL | Age: 75
End: 2020-07-28

## 2020-07-28 ENCOUNTER — HOSPITAL ENCOUNTER (OUTPATIENT)
Dept: GENERAL RADIOLOGY | Facility: HOSPITAL | Age: 75
Discharge: HOME | End: 2020-07-28
Admitting: NURSE PRACTITIONER

## 2020-07-28 DIAGNOSIS — J40 BRONCHITIS, NOT SPECIFIED AS ACUTE OR CHRONIC: Primary | ICD-10-CM

## 2020-07-28 LAB
D DIMER PPP FEU-MCNC: 0.45 MG/L (FEU) (ref 0–0.5)
SARS-COV-2 IGG SERPLBLD QL IA.RAPID: NOT DETECTED
SARS-COV-2 IGM SERPLBLD QL IA.RAPID: NOT DETECTED

## 2020-07-28 PROCEDURE — 71046 X-RAY EXAM CHEST 2 VIEWS: CPT

## 2020-07-28 PROCEDURE — 80053 COMPREHEN METABOLIC PANEL: CPT | Performed by: NURSE PRACTITIONER

## 2020-07-28 PROCEDURE — 85379 FIBRIN DEGRADATION QUANT: CPT | Performed by: NURSE PRACTITIONER

## 2020-07-28 PROCEDURE — 85025 COMPLETE CBC W/AUTO DIFF WBC: CPT | Performed by: NURSE PRACTITIONER

## 2020-07-28 PROCEDURE — C9803 HOPD COVID-19 SPEC COLLECT: HCPCS

## 2020-07-28 PROCEDURE — 85652 RBC SED RATE AUTOMATED: CPT | Performed by: NURSE PRACTITIONER

## 2020-07-28 PROCEDURE — 36415 COLL VENOUS BLD VENIPUNCTURE: CPT | Performed by: NURSE PRACTITIONER

## 2020-07-28 PROCEDURE — 86328 IA NFCT AB SARSCOV2 COVID19: CPT | Performed by: NURSE PRACTITIONER

## 2020-07-29 LAB
ALBUMIN SERPL-MCNC: 4 G/DL (ref 3.5–5.2)
ALBUMIN/GLOB SERPL: 1.1 G/DL
ALP SERPL-CCNC: 93 U/L (ref 39–117)
ALT SERPL W P-5'-P-CCNC: 14 U/L (ref 1–33)
ANION GAP SERPL CALCULATED.3IONS-SCNC: 9.7 MMOL/L (ref 5–15)
AST SERPL-CCNC: 21 U/L (ref 1–32)
BASOPHILS # BLD AUTO: 0.04 10*3/MM3 (ref 0–0.2)
BASOPHILS NFR BLD AUTO: 0.7 % (ref 0–1.5)
BILIRUB SERPL-MCNC: 0.3 MG/DL (ref 0–1.2)
BUN SERPL-MCNC: 16 MG/DL (ref 8–23)
BUN/CREAT SERPL: 18 (ref 7–25)
CALCIUM SPEC-SCNC: 10 MG/DL (ref 8.6–10.5)
CHLORIDE SERPL-SCNC: 101 MMOL/L (ref 98–107)
CO2 SERPL-SCNC: 28.3 MMOL/L (ref 22–29)
CREAT SERPL-MCNC: 0.89 MG/DL (ref 0.57–1)
DEPRECATED RDW RBC AUTO: 49.3 FL (ref 37–54)
EOSINOPHIL # BLD AUTO: 0.1 10*3/MM3 (ref 0–0.4)
EOSINOPHIL NFR BLD AUTO: 1.8 % (ref 0.3–6.2)
ERYTHROCYTE [DISTWIDTH] IN BLOOD BY AUTOMATED COUNT: 12.9 % (ref 12.3–15.4)
ERYTHROCYTE [SEDIMENTATION RATE] IN BLOOD: 22 MM/HR (ref 0–30)
GFR SERPL CREATININE-BSD FRML MDRD: 62 ML/MIN/1.73
GLOBULIN UR ELPH-MCNC: 3.5 GM/DL
GLUCOSE SERPL-MCNC: 87 MG/DL (ref 65–99)
HCT VFR BLD AUTO: 45 % (ref 34–46.6)
HGB BLD-MCNC: 15.2 G/DL (ref 12–15.9)
IMM GRANULOCYTES # BLD AUTO: 0.01 10*3/MM3 (ref 0–0.05)
IMM GRANULOCYTES NFR BLD AUTO: 0.2 % (ref 0–0.5)
LYMPHOCYTES # BLD AUTO: 2.46 10*3/MM3 (ref 0.7–3.1)
LYMPHOCYTES NFR BLD AUTO: 45.4 % (ref 19.6–45.3)
MCH RBC QN AUTO: 34.5 PG (ref 26.6–33)
MCHC RBC AUTO-ENTMCNC: 33.8 G/DL (ref 31.5–35.7)
MCV RBC AUTO: 102 FL (ref 79–97)
MONOCYTES # BLD AUTO: 0.61 10*3/MM3 (ref 0.1–0.9)
MONOCYTES NFR BLD AUTO: 11.3 % (ref 5–12)
NEUTROPHILS NFR BLD AUTO: 2.2 10*3/MM3 (ref 1.7–7)
NEUTROPHILS NFR BLD AUTO: 40.6 % (ref 42.7–76)
NRBC BLD AUTO-RTO: 0 /100 WBC (ref 0–0.2)
PLATELET # BLD AUTO: 276 10*3/MM3 (ref 140–450)
PMV BLD AUTO: 11.2 FL (ref 6–12)
POTASSIUM SERPL-SCNC: 3.9 MMOL/L (ref 3.5–5.2)
PROT SERPL-MCNC: 7.5 G/DL (ref 6–8.5)
RBC # BLD AUTO: 4.41 10*6/MM3 (ref 3.77–5.28)
SODIUM SERPL-SCNC: 139 MMOL/L (ref 136–145)
WBC # BLD AUTO: 5.42 10*3/MM3 (ref 3.4–10.8)

## 2020-08-05 NOTE — PATIENT INSTRUCTIONS
Dry ear precautions.  Call for problems, especially ear pain or pressure, ear drainage, fever, or decreased hearing.     Recheck in six months. If symptoms develop call for sooner follow-up.    For more information:  [employer] [place]  1-800-QUIT-NOW  [email or web address]

## 2020-08-10 ENCOUNTER — HOSPITAL ENCOUNTER (OUTPATIENT)
Dept: PAIN MANAGEMENT | Age: 75
Discharge: HOME | End: 2020-08-10
Payer: MEDICARE

## 2020-08-10 VITALS
HEART RATE: 88 BPM | TEMPERATURE: 98.5 F | OXYGEN SATURATION: 95 % | HEIGHT: 59 IN | WEIGHT: 184 LBS | SYSTOLIC BLOOD PRESSURE: 121 MMHG | BODY MASS INDEX: 37.09 KG/M2 | DIASTOLIC BLOOD PRESSURE: 72 MMHG

## 2020-08-10 PROBLEM — R25.2 MUSCLE CRAMPS AT NIGHT: Status: ACTIVE | Noted: 2020-08-10

## 2020-08-10 PROBLEM — M54.6 ACUTE BILATERAL THORACIC BACK PAIN: Status: ACTIVE | Noted: 2020-08-10

## 2020-08-10 PROCEDURE — 99214 OFFICE O/P EST MOD 30 MIN: CPT | Performed by: NURSE PRACTITIONER

## 2020-08-10 PROCEDURE — 99215 OFFICE O/P EST HI 40 MIN: CPT

## 2020-08-10 RX ORDER — TRAMADOL HYDROCHLORIDE 50 MG/1
50 TABLET ORAL EVERY 8 HOURS PRN
Qty: 90 TABLET | Refills: 2 | Status: SHIPPED | OUTPATIENT
Start: 2020-08-10 | End: 2020-11-08

## 2020-08-10 ASSESSMENT — ENCOUNTER SYMPTOMS
CONSTIPATION: 0
BACK PAIN: 1
BOWEL INCONTINENCE: 0

## 2020-08-10 ASSESSMENT — PAIN DESCRIPTION - LOCATION: LOCATION: BACK;HIP

## 2020-08-10 ASSESSMENT — PAIN DESCRIPTION - ORIENTATION: ORIENTATION: LOWER;LEFT;RIGHT

## 2020-08-10 ASSESSMENT — PAIN SCALES - GENERAL: PAINLEVEL_OUTOF10: 8

## 2020-08-10 ASSESSMENT — PAIN DESCRIPTION - PAIN TYPE: TYPE: CHRONIC PAIN

## 2020-08-10 NOTE — PROGRESS NOTES
ORLIN Wilkinson  [facility]   Respiratory Disease Clinic  [address] [place] [address]  Phone: [phone]  Fax: [phone]     Josi Mohamud is a 74 y.o. female.   CC:   Chief Complaint   Patient presents with   • ABNORMAL PET OF LEFT LUNG     NO HOSPITALIZATIONS; NO EXPOSURE AND NO TESTING-----      NEEDS A NEW ALBUTEROL INHALER        HPI: She has moderate COPD, emphysema, nocturnal hypoxia, suspected neoplasm of left upper lobe (status post radiation therapy per Dr. Rosen) and she is a daily smoker.  She had follow-up CT in May per Dr. Rosen that showed stability without enlargement to lung nodules.  Her next follow-up with Dr. Rosen is in December.  She is on oxygen at night and uses Spiriva but relies on samples as she cannot afford it.  She has been having issues with cough and pleuritic pain over the past month.  She was treated by her PCP and then saw pain management earlier this week and was started on tramadol which has helped the pain.  She continues to have cough but unfortunately also continues to smoke.  She is utilizing an Aerobika flutter device at home and finds that helpful.  She is also taking Mucinex.  A prescription for rescue inhaler was sent to the patient's pharmacy and she was provided with a few samples of Spiriva.  She is followed by Dr. Crews's office for routine medical care and she stays up-to-date on flu and pneumonia vaccines.    The following portions of the patient's history were reviewed and updated as appropriate: allergies, current medications, past family history, past medical history, past social history, past surgical history and problem list.    Past Medical History:   Diagnosis Date   • Bronchitis    • Cancer (CMS/HCC)     lung   • COPD (chronic obstructive pulmonary disease) (CMS/HCC)    • Coronary artery disease    • GERD (gastroesophageal reflux disease)    • History of transfusion    • Hyperlipidemia    • Hypertension    •  "PAF (paroxysmal atrial fibrillation) (CMS/HCC)    • Persistent asthma without complication 7/8/2019   • Seizures (CMS/HCC)        Family History   Problem Relation Age of Onset   • Esophageal cancer Brother    • Lung cancer Brother    • Breast cancer Neg Hx        Social History     Socioeconomic History   • Marital status:      Spouse name: Not on file   • Number of children: 1   • Years of education: Not on file   • Highest education level: Not on file   Tobacco Use   • Smoking status: Current Every Day Smoker     Packs/day: 0.50     Years: 58.00     Pack years: 29.00     Start date: 1961   • Smokeless tobacco: Never Used   • Tobacco comment: TRYING TO QUIT    Substance and Sexual Activity   • Alcohol use: No   • Drug use: No   • Sexual activity: Defer       Review of Systems   Constitutional: Negative for appetite change, chills, fatigue and fever.   HENT: Negative for swollen glands, trouble swallowing and voice change.    Eyes: Negative for visual disturbance.   Respiratory: Positive for cough, chest tightness and shortness of breath. Negative for wheezing.    Cardiovascular: Negative for chest pain and leg swelling.   Gastrointestinal: Negative for abdominal distention, abdominal pain, nausea and vomiting.   Genitourinary: Negative.    Musculoskeletal: Positive for back pain. Negative for gait problem and myalgias.   Skin: Negative.    Neurological: Negative for weakness.   Hematological: Negative.    Psychiatric/Behavioral: The patient is not nervous/anxious.        /80   Pulse 87   Temp 98.1 °F (36.7 °C)   Ht 149.9 cm (59\")   Wt 81.6 kg (180 lb)   LMP  (LMP Unknown)   SpO2 95% Comment: RA  Breastfeeding No   BMI 36.36 kg/m²     Physical Exam   Constitutional: She is oriented to person, place, and time. She appears well-developed and well-nourished. No distress.   HENT:   Head: Normocephalic and atraumatic.   Wearing mask   Eyes: Pupils are equal, round, and reactive to light. No scleral " icterus.   Neck: Normal range of motion. Neck supple.   Cardiovascular: Normal rate, regular rhythm, S1 normal and S2 normal.   Pulmonary/Chest: Effort normal. No tachypnea. She has decreased breath sounds. She has no wheezes. She has rhonchi. She has no rales.   Cough with deep breath   Abdominal: Soft. Bowel sounds are normal. She exhibits no distension.   Musculoskeletal: Normal range of motion. She exhibits deformity (Arthritic changes to fingers). She exhibits no edema.   Lymphadenopathy:     She has no cervical adenopathy.   Neurological: She is alert and oriented to person, place, and time.   Skin: Skin is warm and dry. No rash noted. No cyanosis. Nails show no clubbing.   Psychiatric: She has a normal mood and affect. Her behavior is normal.   Vitals reviewed.        Josi was seen today for abnormal pet of left lung.    Diagnoses and all orders for this visit:    COPD, moderate (CMS/HCC)  -     albuterol sulfate  (90 Base) MCG/ACT inhaler; Inhale 1 puff Daily.  -     tiotropium bromide monohydrate (SPIRIVA RESPIMAT) 2.5 MCG/ACT aerosol solution inhaler; Inhale 2 puffs Daily.    Nocturnal hypoxia    Malignant neoplasm of upper lobe of left lung (CMS/HCC)  Comments:  Status post radiation    Cigarette nicotine dependence without complication    Pulmonary emphysema, unspecified emphysema type (CMS/HCC)      Patient's Body mass index is 36.36 kg/m². BMI is above normal parameters. Recommendations include: referral to primary care.      Follow-up/Plan: Continue Spiriva, rescue inhaler, oxygen at night and Aerobika flutter device.  Smoking cessation is strongly recommended.  Return to clinic in approximately 6 months with flow volume loop to see Dr. Frazier.    Jeremías Hernandez, APRN  8/12/2020  13:37

## 2020-08-10 NOTE — H&P
[facility] Physical & Pain Medicine    History and Physical    Patient Name: Amy Blake    MR #: [id number]    Account [de-identified]    : 1945    Age: 75 y.o. Sex: female    Date: 8/10/2020    PCP: Dayton Raygoza MD     Referral: SYBIL Bowers    Chief Complaint:   Chief Complaint   Patient presents with    Lower Back Pain       History of Present Illness: The patient is a 75 y.o. female who presents with a referral for low back pain. However, since patient was referred she is having acute thoracic back pain that she would like addressed first. Patient states that she has severe osteoporosis and patient is being treated for lung cancer. She states that about a month she started having worsening back pain between her shoulder blades. Patient states that when she coughs, she has to lean forward to flatten her back to help decrease the pain. Back Pain   This is a chronic problem. The current episode started more than 1 year ago. The problem occurs constantly. The problem has been rapidly worsening (progressed since referral primarly thoracic area) since onset. The pain is present in the thoracic spine (mostly in thoracic spine). The quality of the pain is described as aching and cramping. The symptoms are aggravated by bending, sitting, standing, twisting, lying down and coughing. Pertinent negatives include no bladder incontinence, bowel incontinence, leg pain, numbness, tingling or weakness.        Procedures since last appointment:   No    Screening Tools:    PEG Score: 9.3     Last PEG Score: N/A     Annual ORT Score: 1     Annual PHQ Score: 18    Current Pain Assessment  Pain Assessment  Pain Assessment: 0-10  Pain Level: 8  Pain Type: Chronic pain  Pain Location: Back, Hip  Pain Orientation: Lower, Left, Right    Past Medical History  Past Medical History:   Diagnosis Date    Anxiety     CAD (coronary artery disease)     Colon polyp     GERD (gastroesophageal reflux disease) Medications  Current Outpatient Medications   Medication Sig Dispense Refill    traMADol (ULTRAM) 50 MG tablet Take 1 tablet by mouth every 8 hours as needed for Pain for up to 90 days. 90 tablet 2    levETIRAcetam (KEPPRA) 500 MG tablet Take 1 tablet by mouth 2 times daily 60 tablet 5    OXYGEN Inhale 2 mLs into the lungs nightly      PROAIR  (90 Base) MCG/ACT inhaler Inhale 2 puffs into the lungs 4 times daily 1 Inhaler 3    calcium carbonate (TUMS) 500 MG chewable tablet Take 1 tablet by mouth daily      diphenhydrAMINE (BENADRYL) 25 MG tablet Take 25 mg by mouth every 6 hours as needed for Itching      tiotropium (SPIRIVA RESPIMAT) 2.5 MCG/ACT AERS inhaler Inhale 2 puffs into the lungs daily      potassium chloride (KLOR-CON) 10 MEQ extended release tablet Take 10 mEq by mouth 2 times daily Only take on days patient takes lasix      acetaminophen (TYLENOL) 500 MG tablet Take 500 mg by mouth every 6 hours as needed for Pain      amLODIPine (NORVASC) 5 MG tablet Take 5 mg by mouth daily       clopidogrel (PLAVIX) 75 MG tablet Take 75 mg by mouth daily       ADVAIR DISKUS 250-50 MCG/DOSE AEPB Inhale 1 puff into the lungs 2 times daily       furosemide (LASIX) 20 MG tablet Take 20 mg by mouth 3 times daily       lisinopril (PRINIVIL;ZESTRIL) 10 MG tablet Take 20 mg by mouth daily       LORazepam (ATIVAN) 1 MG tablet Take 1 mg by mouth every 8 hours as needed       metoprolol succinate (TOPROL XL) 50 MG extended release tablet Take 50 mg by mouth daily       simvastatin (ZOCOR) 20 MG tablet Take 20 mg by mouth nightly       ASPIRIN PO Take 81 mg by mouth daily        No current facility-administered medications for this encounter. Social History    Social History     Socioeconomic History    Marital status:       Spouse name: None    Number of children: None    Years of education: None    Highest education level: None   Occupational History    None   Social Needs    Financial resource strain: None    Food insecurity     Worry: None     Inability: None    Transportation needs     Medical: None     Non-medical: None   Tobacco Use    Smoking status: Current Every Day Smoker     Years: 50.00     Types: Cigarettes    Smokeless tobacco: Never Used   Substance and Sexual Activity    Alcohol use: Yes     Comment: Social    Drug use: No    Sexual activity: None   Lifestyle    Physical activity     Days per week: None     Minutes per session: None    Stress: None   Relationships    Social connections     Talks on phone: None     Gets together: None     Attends [employer] witness service: None     Active member of club or organization: None     Attends meetings of clubs or organizations: None     Relationship status: None    Intimate partner violence     Fear of current or ex partner: None     Emotionally abused: None     Physically abused: None     Forced sexual activity: None   Other Topics Concern    None   Social History Narrative    None         Family History  family history includes Cancer in her brother, brother, maternal grandfather, and sister; Liver Disease in her brother. Review of Systems:  Review of Systems   Constitutional: Negative for activity change. Gastrointestinal: Negative for bowel incontinence and constipation. Genitourinary: Negative for bladder incontinence. Musculoskeletal: Positive for arthralgias, back pain, gait problem (uses w/c) and myalgias. Negative for neck pain. Neurological: Negative for tingling, weakness and numbness. Psychiatric/Behavioral: Negative for agitation, self-injury and suicidal ideas. The patient is not nervous/anxious. 14 point ROS negative besides that noted in HPI    Physical exam:     Patient Vitals for the past 24 hrs:   BP Temp Pulse SpO2 Height Weight   08/10/20 1130 121/72 98.5 °F (36.9 °C) 88 95 % 4' 11\" (1.499 m) 184 lb (83.5 kg)       Body mass index is 37.16 kg/m².     Physical Exam  Vitals signs and nursing note reviewed. Constitutional:       General: She is not in acute distress. Appearance: She is well-developed. HENT:      Head: Normocephalic. Right Ear: External ear normal.      Left Ear: External ear normal.      Nose: Nose normal.   Eyes:      Conjunctiva/sclera: Conjunctivae normal.      Pupils: Pupils are equal, round, and reactive to light. Neck:      Vascular: No JVD. Trachea: No tracheal deviation. Cardiovascular:      Rate and Rhythm: Normal rate. Pulmonary:      Effort: Pulmonary effort is normal.   Abdominal:      General: There is no distension. Tenderness: There is no abdominal tenderness. Musculoskeletal:      Thoracic back: She exhibits tenderness and pain. Skin:     General: Skin is warm and dry. Neurological:      Mental Status: She is alert and oriented to person, place, and time. Cranial Nerves: No cranial nerve deficit. Psychiatric:         Behavior: Behavior normal.         Thought Content: Thought content normal.         Judgment: Judgment normal.         Diagnostics:    MRI exams received in the past 2 years:  No       When na                                              Where na       Imaging on chart: No         Imaging records requested: No     CT exam received during the last 12 months: No       When na                                              Where na       Imaging on chart: No         Imaging records requested: No     X-ray exam received during the last 12 months: Yes XR Lumbar SPine       When 2/2020                                              Where [facility]       Imaging on chart: Yes         Imaging records requested: No     Nerve Conduction Study/EMG:  No       When na                                              Where na       Imaging on chart: No         Imaging records requested:  No     Physical therapy: No       When: na                                               Where  na     Behavior Health No       When: na Where na     Labs  Yes       When: 7/2020                                             Where  Care Everywhere    Most recent imaging, testing, and response to counseling and/or rehabilitation were reviewed with patient. [x] Yes  [] No    LABS:     Lab Results   Component Value Date     05/04/2017    K 3.9 05/04/2017     05/04/2017    CO2 21 05/04/2017    BUN 19 05/04/2017    CREATININE 0.7 05/04/2017    GLUCOSE 136 05/04/2017    CALCIUM 8.7 05/04/2017        Lab Results   Component Value Date    WBC 5.9 05/04/2017    HGB 13.5 05/04/2017    HCT 38.8 05/04/2017    .1 (H) 05/04/2017     05/04/2017     Assessment:    Principal Problem:    Acute bilateral thoracic back pain  Active Problems:    Muscle cramps at night  Resolved Problems:    * No resolved hospital problems. *      PLAN:  Obtain xray of thoracic spine suspect possible compression fracture since pain started after coughing    traMADol (ULTRAM) 50 MG tablet 90 tablet    Sig - Route: Take 1 tablet by mouth every 8 hours as needed for Pain for up to 90 days. E-Prescribing Status: Receipt confirmed by pharmacy (8/10/2020 12:06 PM CDT)     Discussed Butrans patch which would be a good option however patient has not been on pain medication therefore will start with ultram    Patient to follow up in 6-8 weeks to review response to medication and review imaging. Discussion: Discussed exam findings and plan of care with patient. Patient agreed with POC and questions were asked and answered. Activity: discussed exercise as beneficial to pain reduction, encouraged stretching exercise with a focus on torso strengthening.     Goals:  Pain Management Goals of Therapy:   [] Resolution in pain  [x] Decrease in pain level  [x] Improvement in ADL's  [x] Increase in activities with less pain  [] Decrease in medication      Controlled substance monitoring:    [x] Discussed medication side effects, risk of tolerance and/or dependence, and/or alternative treatment  [] Discussed the detrimental effects of long term narcotic use in younger patients especially women of childbearing years. [x] No signs and symptoms of potential drug abuse or diversion were identified  [] Signs of potential drug abuse or diversion were identified   [] ORT Score   [] UDS non-compliant   [] See Note  [] Random urine drug screen sent today  [x] Random urine drug screen not completed today   [x] Deferred New Patient  [] Compliant   [] Not Compliant see note  [x] Medication agreement with provider signed today 8/10/2020  [] Medication agreement with provider on file under media   [] Medication regimen effective with c/o of side effects noted and continued   [x] New patient continuing current medication while developing POC   [] On going assessment and evaluation of medication regimen  [] Medication regimen not effective see plan for changes  [x] Gerson Nicole reviewed & on file under media     CC:  MD Marco Echavarria, APRN - CNP, 8/10/2020 at 9:25 PM    EMR dragon/transcription disclaimer: Much of this encounter note is electronic transcription/translation of spoken language to printed tach. Electronic translation of spoken language may be erroneous, or at times, nonsensical words or phrases may be inadvertently transcribed.  Although, I have reviewed the note for such errors, some may still exist.

## 2020-08-12 ENCOUNTER — OFFICE VISIT (OUTPATIENT)
Dept: PULMONOLOGY | Facility: CLINIC | Age: 75
End: 2020-08-12

## 2020-08-12 VITALS
BODY MASS INDEX: 36.29 KG/M2 | OXYGEN SATURATION: 95 % | SYSTOLIC BLOOD PRESSURE: 140 MMHG | TEMPERATURE: 98.1 F | DIASTOLIC BLOOD PRESSURE: 80 MMHG | HEIGHT: 59 IN | WEIGHT: 180 LBS | HEART RATE: 87 BPM

## 2020-08-12 DIAGNOSIS — G47.34 NOCTURNAL HYPOXIA: ICD-10-CM

## 2020-08-12 DIAGNOSIS — F17.210 CIGARETTE NICOTINE DEPENDENCE WITHOUT COMPLICATION: ICD-10-CM

## 2020-08-12 DIAGNOSIS — J43.9 PULMONARY EMPHYSEMA, UNSPECIFIED EMPHYSEMA TYPE (HCC): ICD-10-CM

## 2020-08-12 DIAGNOSIS — C34.12 MALIGNANT NEOPLASM OF UPPER LOBE OF LEFT LUNG (HCC): ICD-10-CM

## 2020-08-12 DIAGNOSIS — J44.9 COPD, MODERATE (HCC): Primary | ICD-10-CM

## 2020-08-12 PROCEDURE — 99214 OFFICE O/P EST MOD 30 MIN: CPT | Performed by: NURSE PRACTITIONER

## 2020-08-12 RX ORDER — TRAMADOL HYDROCHLORIDE 50 MG/1
50 TABLET ORAL EVERY 8 HOURS PRN
COMMUNITY
Start: 2020-08-10 | End: 2020-08-20 | Stop reason: HOSPADM

## 2020-08-12 RX ORDER — ALBUTEROL SULFATE 90 UG/1
1 AEROSOL, METERED RESPIRATORY (INHALATION) DAILY
Qty: 1 INHALER | Refills: 6 | Status: SHIPPED | OUTPATIENT
Start: 2020-08-12

## 2020-08-12 NOTE — PATIENT INSTRUCTIONS
Smoking Tobacco Information, Adult  Smoking tobacco can be harmful to your health. Tobacco contains a poisonous (toxic), colorless chemical called nicotine. Nicotine is addictive. It changes the brain and can make it hard to stop smoking. Tobacco also has other toxic chemicals that can hurt your body and raise your risk of many cancers.  How can smoking tobacco affect me?  Smoking tobacco puts you at risk for:  · Cancer. Smoking is most commonly associated with lung cancer, but can also lead to cancer in other parts of the body.  · Chronic obstructive pulmonary disease (COPD). This is a long-term lung condition that makes it hard to breathe. It also gets worse over time.  · High blood pressure (hypertension), heart disease, stroke, or heart attack.  · Lung infections, such as pneumonia.  · Cataracts. This is when the lenses in the eyes become clouded.  · Digestive problems. This may include peptic ulcers, heartburn, and gastroesophageal reflux disease (GERD).  · Oral health problems, such as gum disease and tooth loss.  · Loss of taste and smell.  Smoking can affect your appearance by causing:  · Wrinkles.  · Yellow or stained teeth, fingers, and fingernails.  Smoking tobacco can also affect your social life, because:  · It may be challenging to find places to smoke when away from home. Many workplaces, restaurants, hotels, and public places are tobacco-free.  · Smoking is expensive. This is due to the cost of tobacco and the long-term costs of treating health problems from smoking.  · Secondhand smoke may affect those around you. Secondhand smoke can cause lung cancer, breathing problems, and heart disease. Children of smokers have a higher risk for:  ? Sudden infant death syndrome (SIDS).  ? Ear infections.  ? Lung infections.  If you currently smoke tobacco, quitting now can help you:  · Lead a longer and healthier life.  · Look, smell, breathe, and feel better over time.  · Save money.  · Protect others from the  harms of secondhand smoke.  What actions can I take to prevent health problems?  Quit smoking    · Do not start smoking. Quit if you already do.  · Make a plan to quit smoking and commit to it. Look for programs to help you and ask your health care provider for recommendations and ideas.  · Set a date and write down all the reasons you want to quit.  · Let your friends and family know you are quitting so they can help and support you. Consider finding friends who also want to quit. It can be easier to quit with someone else, so that you can support each other.  · Talk with your health care provider about using nicotine replacement medicines to help you quit, such as gum, lozenges, patches, sprays, or pills.  · Do not replace cigarette smoking with electronic cigarettes, which are commonly called e-cigarettes. The safety of e-cigarettes is not known, and some may contain harmful chemicals.  · If you try to quit but return to smoking, stay positive. It is common to slip up when you first quit, so take it one day at a time.  · Be prepared for cravings. When you feel the urge to smoke, chew gum or suck on hard candy.  Lifestyle  · Stay busy and take care of your body.  · Drink enough fluid to keep your urine pale yellow.  · Get plenty of exercise and eat a healthy diet. This can help prevent weight gain after quitting.  · Monitor your eating habits. Quitting smoking can cause you to have a larger appetite than when you smoke.  · Find ways to relax. Go out with friends or family to a movie or a restaurant where people do not smoke.  · Ask your health care provider about having regular tests (screenings) to check for cancer. This may include blood tests, imaging tests, and other tests.  · Find ways to manage your stress, such as meditation, yoga, or exercise.  Where to find support  To get support to quit smoking, consider:  · Asking your health care provider for more information and resources.  · Taking classes to learn  more about quitting smoking.  · Looking for local organizations that offer resources about quitting smoking.  · Joining a support group for people who want to quit smoking in your local community.  · Calling the smokefree.gov counselor helpline: 2-800-Quit-Now (1-869.534.9819)  Where to find more information  You may find more information about quitting smoking from:  · HelpGuide.org: www.helpguide.org  · Smokefree.gov: smokefree.gov  · American Lung Association: www.lung.org  Contact a health care provider if you:  · Have problems breathing.  · Notice that your lips, nose, or fingers turn blue.  · Have chest pain.  · Are coughing up blood.  · Feel faint or you pass out.  · Have other health changes that cause you to worry.  Summary  · Smoking tobacco can negatively affect your health, the health of those around you, your finances, and your social life.  · Do not start smoking. Quit if you already do. If you need help quitting, ask your health care provider.  · Think about joining a support group for people who want to quit smoking in your local community. There are many effective programs that will help you to quit this behavior.  This information is not intended to replace advice given to you by your health care provider. Make sure you discuss any questions you have with your health care provider.  Document Released: 01/02/2018 Document Revised: 02/06/2019 Document Reviewed: 01/02/2018  Elsevier Patient Education © 2020 [employer]

## 2020-08-15 ENCOUNTER — HOSPITAL ENCOUNTER (INPATIENT)
Facility: HOSPITAL | Age: 75
LOS: 5 days | Discharge: NURSING, REHAB OR LONG-TERM CARE | End: 2020-08-20
Attending: EMERGENCY MEDICINE | Admitting: ORTHOPAEDIC SURGERY

## 2020-08-15 ENCOUNTER — APPOINTMENT (OUTPATIENT)
Dept: GENERAL RADIOLOGY | Facility: HOSPITAL | Age: 75
End: 2020-08-15

## 2020-08-15 ENCOUNTER — ANESTHESIA (OUTPATIENT)
Dept: PERIOP | Facility: HOSPITAL | Age: 75
End: 2020-08-15

## 2020-08-15 ENCOUNTER — ANESTHESIA EVENT (OUTPATIENT)
Dept: PERIOP | Facility: HOSPITAL | Age: 75
End: 2020-08-15

## 2020-08-15 DIAGNOSIS — S82.892C TYPE III OPEN FRACTURE OF LEFT ANKLE, INITIAL ENCOUNTER: Primary | ICD-10-CM

## 2020-08-15 DIAGNOSIS — Z74.09 IMPAIRED MOBILITY: ICD-10-CM

## 2020-08-15 DIAGNOSIS — Z78.9 DECREASED ACTIVITIES OF DAILY LIVING (ADL): ICD-10-CM

## 2020-08-15 DIAGNOSIS — J44.9 COPD (CHRONIC OBSTRUCTIVE PULMONARY DISEASE) CASE MANAGEMENT PATIENT (HCC): ICD-10-CM

## 2020-08-15 PROBLEM — M25.572 LEFT ANKLE PAIN: Status: ACTIVE | Noted: 2020-08-15

## 2020-08-15 PROBLEM — I48.0 PAF (PAROXYSMAL ATRIAL FIBRILLATION) (HCC): Status: ACTIVE | Noted: 2019-06-03

## 2020-08-15 PROBLEM — I25.10 CORONARY ARTERY DISEASE INVOLVING NATIVE CORONARY ARTERY OF NATIVE HEART WITHOUT ANGINA PECTORIS: Status: ACTIVE | Noted: 2017-05-19
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
## 2020-08-15 LAB
ABO GROUP BLD: NORMAL
ALBUMIN SERPL-MCNC: 3.8 G/DL (ref 3.5–5.2)
ALBUMIN/GLOB SERPL: 1.2 G/DL
ALP SERPL-CCNC: 100 U/L (ref 39–117)
ALT SERPL W P-5'-P-CCNC: 13 U/L (ref 1–33)
ANION GAP SERPL CALCULATED.3IONS-SCNC: 10 MMOL/L (ref 5–15)
APTT PPP: 32.6 SECONDS (ref 24.1–35)
ARTERIAL PATENCY WRIST A: ABNORMAL
AST SERPL-CCNC: 23 U/L (ref 1–32)
ATMOSPHERIC PRESS: 749 MMHG
BASE EXCESS BLDA CALC-SCNC: 0.7 MMOL/L (ref 0–2)
BASOPHILS # BLD AUTO: 0.03 10*3/MM3 (ref 0–0.2)
BASOPHILS NFR BLD AUTO: 0.4 % (ref 0–1.5)
BDY SITE: ABNORMAL
BILIRUB SERPL-MCNC: 0.3 MG/DL (ref 0–1.2)
BLD GP AB SCN SERPL QL: NEGATIVE
BODY TEMPERATURE: 37 C
BUN SERPL-MCNC: 21 MG/DL (ref 8–23)
BUN/CREAT SERPL: 18.1 (ref 7–25)
CALCIUM SPEC-SCNC: 9.1 MG/DL (ref 8.6–10.5)
CHLORIDE SERPL-SCNC: 97 MMOL/L (ref 98–107)
CO2 SERPL-SCNC: 26 MMOL/L (ref 22–29)
CREAT SERPL-MCNC: 1.16 MG/DL (ref 0.57–1)
DEPRECATED RDW RBC AUTO: 49 FL (ref 37–54)
EOSINOPHIL # BLD AUTO: 0 10*3/MM3 (ref 0–0.4)
EOSINOPHIL NFR BLD AUTO: 0 % (ref 0.3–6.2)
ERYTHROCYTE [DISTWIDTH] IN BLOOD BY AUTOMATED COUNT: 13.8 % (ref 12.3–15.4)
GAS FLOW AIRWAY: 2 LPM
GFR SERPL CREATININE-BSD FRML MDRD: 46 ML/MIN/1.73
GLOBULIN UR ELPH-MCNC: 3.2 GM/DL
GLUCOSE SERPL-MCNC: 131 MG/DL (ref 65–99)
HCO3 BLDA-SCNC: 26.1 MMOL/L (ref 20–26)
HCT VFR BLD AUTO: 43.3 % (ref 34–46.6)
HGB BLD-MCNC: 15 G/DL (ref 12–15.9)
HOLD SPECIMEN: NORMAL
HOLD SPECIMEN: NORMAL
IMM GRANULOCYTES # BLD AUTO: 0.01 10*3/MM3 (ref 0–0.05)
IMM GRANULOCYTES NFR BLD AUTO: 0.1 % (ref 0–0.5)
INR PPP: 1.32 (ref 0.91–1.09)
LYMPHOCYTES # BLD AUTO: 0.86 10*3/MM3 (ref 0.7–3.1)
LYMPHOCYTES NFR BLD AUTO: 12.4 % (ref 19.6–45.3)
Lab: ABNORMAL
MCH RBC QN AUTO: 33.9 PG (ref 26.6–33)
MCHC RBC AUTO-ENTMCNC: 34.6 G/DL (ref 31.5–35.7)
MCV RBC AUTO: 97.7 FL (ref 79–97)
MODALITY: ABNORMAL
MONOCYTES # BLD AUTO: 0.39 10*3/MM3 (ref 0.1–0.9)
MONOCYTES NFR BLD AUTO: 5.6 % (ref 5–12)
NEUTROPHILS NFR BLD AUTO: 5.67 10*3/MM3 (ref 1.7–7)
NEUTROPHILS NFR BLD AUTO: 81.5 % (ref 42.7–76)
NRBC BLD AUTO-RTO: 0 /100 WBC (ref 0–0.2)
PCO2 BLDA: 43.7 MM HG (ref 35–45)
PCO2 TEMP ADJ BLD: 43.7 MM HG (ref 35–45)
PH BLDA: 7.38 PH UNITS (ref 7.35–7.45)
PH, TEMP CORRECTED: 7.38 PH UNITS (ref 7.35–7.45)
PLATELET # BLD AUTO: 274 10*3/MM3 (ref 140–450)
PMV BLD AUTO: 10.2 FL (ref 6–12)
PO2 BLDA: 64.7 MM HG (ref 83–108)
PO2 TEMP ADJ BLD: 64.7 MM HG (ref 83–108)
POTASSIUM SERPL-SCNC: 4.3 MMOL/L (ref 3.5–5.2)
PROT SERPL-MCNC: 7 G/DL (ref 6–8.5)
PROTHROMBIN TIME: 16 SECONDS (ref 11.9–14.6)
RBC # BLD AUTO: 4.43 10*6/MM3 (ref 3.77–5.28)
RH BLD: POSITIVE
SAO2 % BLDCOA: 93 % (ref 94–99)
SARS-COV-2 RDRP RESP QL NAA+PROBE: NOT DETECTED
SODIUM SERPL-SCNC: 133 MMOL/L (ref 136–145)
T&S EXPIRATION DATE: NORMAL
TROPONIN T SERPL-MCNC: <0.01 NG/ML (ref 0–0.03)
VENTILATOR MODE: ABNORMAL
WBC # BLD AUTO: 6.96 10*3/MM3 (ref 3.4–10.8)
WHOLE BLOOD HOLD SPECIMEN: NORMAL
WHOLE BLOOD HOLD SPECIMEN: NORMAL

## 2020-08-15 PROCEDURE — 86901 BLOOD TYPING SEROLOGIC RH(D): CPT | Performed by: EMERGENCY MEDICINE

## 2020-08-15 PROCEDURE — 94799 UNLISTED PULMONARY SVC/PX: CPT

## 2020-08-15 PROCEDURE — C1713 ANCHOR/SCREW BN/BN,TIS/BN: HCPCS | Performed by: ORTHOPAEDIC SURGERY

## 2020-08-15 PROCEDURE — 25010000002 DEXAMETHASONE PER 1 MG: Performed by: NURSE ANESTHETIST, CERTIFIED REGISTERED

## 2020-08-15 PROCEDURE — 87635 SARS-COV-2 COVID-19 AMP PRB: CPT | Performed by: EMERGENCY MEDICINE

## 2020-08-15 PROCEDURE — 93005 ELECTROCARDIOGRAM TRACING: CPT | Performed by: EMERGENCY MEDICINE

## 2020-08-15 PROCEDURE — 90471 IMMUNIZATION ADMIN: CPT | Performed by: EMERGENCY MEDICINE

## 2020-08-15 PROCEDURE — 85025 COMPLETE CBC W/AUTO DIFF WBC: CPT | Performed by: EMERGENCY MEDICINE

## 2020-08-15 PROCEDURE — 85730 THROMBOPLASTIN TIME PARTIAL: CPT | Performed by: EMERGENCY MEDICINE

## 2020-08-15 PROCEDURE — 36600 WITHDRAWAL OF ARTERIAL BLOOD: CPT

## 2020-08-15 PROCEDURE — 80053 COMPREHEN METABOLIC PANEL: CPT | Performed by: EMERGENCY MEDICINE

## 2020-08-15 PROCEDURE — 94640 AIRWAY INHALATION TREATMENT: CPT

## 2020-08-15 PROCEDURE — 86850 RBC ANTIBODY SCREEN: CPT | Performed by: EMERGENCY MEDICINE

## 2020-08-15 PROCEDURE — 73590 X-RAY EXAM OF LOWER LEG: CPT

## 2020-08-15 PROCEDURE — 93010 ELECTROCARDIOGRAM REPORT: CPT | Performed by: INTERNAL MEDICINE

## 2020-08-15 PROCEDURE — 71045 X-RAY EXAM CHEST 1 VIEW: CPT

## 2020-08-15 PROCEDURE — 85610 PROTHROMBIN TIME: CPT | Performed by: EMERGENCY MEDICINE

## 2020-08-15 PROCEDURE — 0QSK04Z REPOSITION LEFT FIBULA WITH INTERNAL FIXATION DEVICE, OPEN APPROACH: ICD-10-PCS | Performed by: ORTHOPAEDIC SURGERY

## 2020-08-15 PROCEDURE — 86900 BLOOD TYPING SEROLOGIC ABO: CPT | Performed by: EMERGENCY MEDICINE

## 2020-08-15 PROCEDURE — 73600 X-RAY EXAM OF ANKLE: CPT

## 2020-08-15 PROCEDURE — 84484 ASSAY OF TROPONIN QUANT: CPT | Performed by: EMERGENCY MEDICINE

## 2020-08-15 PROCEDURE — 25010000002 TDAP 5-2.5-18.5 LF-MCG/0.5 SUSPENSION: Performed by: EMERGENCY MEDICINE

## 2020-08-15 PROCEDURE — 82803 BLOOD GASES ANY COMBINATION: CPT

## 2020-08-15 PROCEDURE — 25010000002 CEFAZOLIN PER 500 MG: Performed by: ORTHOPAEDIC SURGERY

## 2020-08-15 PROCEDURE — 25010000002 FENTANYL CITRATE (PF) 100 MCG/2ML SOLUTION: Performed by: NURSE ANESTHETIST, CERTIFIED REGISTERED

## 2020-08-15 PROCEDURE — 25010000003 CEFAZOLIN PER 500 MG: Performed by: NURSE ANESTHETIST, CERTIFIED REGISTERED

## 2020-08-15 PROCEDURE — 76000 FLUOROSCOPY <1 HR PHYS/QHP: CPT

## 2020-08-15 PROCEDURE — 25010000002 PROPOFOL 10 MG/ML EMULSION: Performed by: NURSE ANESTHETIST, CERTIFIED REGISTERED

## 2020-08-15 PROCEDURE — 90715 TDAP VACCINE 7 YRS/> IM: CPT | Performed by: EMERGENCY MEDICINE

## 2020-08-15 PROCEDURE — 99285 EMERGENCY DEPT VISIT HI MDM: CPT

## 2020-08-15 DEVICE — SCRW LK S/TAP STRDRV 3.5X14MM: Type: IMPLANTABLE DEVICE | Status: FUNCTIONAL

## 2020-08-15 DEVICE — SCRW LK ST STRDRV 2.7X16MM: Type: IMPLANTABLE DEVICE | Status: FUNCTIONAL

## 2020-08-15 DEVICE — SCRW CORT S/TAP 3.5X14MM: Type: IMPLANTABLE DEVICE | Status: FUNCTIONAL

## 2020-08-15 DEVICE — SCRW LK ST STRDRV 2.7X14MM: Type: IMPLANTABLE DEVICE | Status: FUNCTIONAL

## 2020-08-15 DEVICE — SCRW CORT S/TAP 3.5X12MM: Type: IMPLANTABLE DEVICE | Status: FUNCTIONAL

## 2020-08-15 DEVICE — SCRW LK ST STRDRV 2.7X12MM: Type: IMPLANTABLE DEVICE | Status: FUNCTIONAL

## 2020-08-15 DEVICE — PLT FIB LCP L/D 5H 2.7/3.5X99LT: Type: IMPLANTABLE DEVICE | Status: FUNCTIONAL

## 2020-08-15 RX ORDER — SODIUM CHLORIDE, SODIUM LACTATE, POTASSIUM CHLORIDE, CALCIUM CHLORIDE 600; 310; 30; 20 MG/100ML; MG/100ML; MG/100ML; MG/100ML
100 INJECTION, SOLUTION INTRAVENOUS CONTINUOUS
Status: CANCELLED | OUTPATIENT
Start: 2020-08-15

## 2020-08-15 RX ORDER — MORPHINE SULFATE 2 MG/ML
2 INJECTION, SOLUTION INTRAMUSCULAR; INTRAVENOUS
Status: DISCONTINUED | OUTPATIENT
Start: 2020-08-15 | End: 2020-08-16

## 2020-08-15 RX ORDER — FENTANYL CITRATE 50 UG/ML
25 INJECTION, SOLUTION INTRAMUSCULAR; INTRAVENOUS
Status: DISCONTINUED | OUTPATIENT
Start: 2020-08-15 | End: 2020-08-15 | Stop reason: HOSPADM

## 2020-08-15 RX ORDER — BUPIVACAINE HCL/0.9 % NACL/PF 0.1 %
2 PLASTIC BAG, INJECTION (ML) EPIDURAL EVERY 8 HOURS
Status: DISCONTINUED | OUTPATIENT
Start: 2020-08-15 | End: 2020-08-17

## 2020-08-15 RX ORDER — ACETAMINOPHEN 325 MG/1
650 TABLET ORAL EVERY 4 HOURS PRN
Status: DISCONTINUED | OUTPATIENT
Start: 2020-08-15 | End: 2020-08-20 | Stop reason: HOSPADM

## 2020-08-15 RX ORDER — LORAZEPAM 1 MG/1
1 TABLET ORAL 3 TIMES DAILY
Status: DISCONTINUED | OUTPATIENT
Start: 2020-08-15 | End: 2020-08-20 | Stop reason: HOSPADM

## 2020-08-15 RX ORDER — SODIUM CHLORIDE 9 MG/ML
75 INJECTION, SOLUTION INTRAVENOUS CONTINUOUS
Status: DISCONTINUED | OUTPATIENT
Start: 2020-08-15 | End: 2020-08-16

## 2020-08-15 RX ORDER — OXYCODONE AND ACETAMINOPHEN 10; 325 MG/1; MG/1
1 TABLET ORAL ONCE AS NEEDED
Status: DISCONTINUED | OUTPATIENT
Start: 2020-08-15 | End: 2020-08-15 | Stop reason: HOSPADM

## 2020-08-15 RX ORDER — SODIUM CHLORIDE 0.9 % (FLUSH) 0.9 %
3 SYRINGE (ML) INJECTION EVERY 12 HOURS SCHEDULED
Status: CANCELLED | OUTPATIENT
Start: 2020-08-15

## 2020-08-15 RX ORDER — ONDANSETRON 2 MG/ML
4 INJECTION INTRAMUSCULAR; INTRAVENOUS AS NEEDED
Status: DISCONTINUED | OUTPATIENT
Start: 2020-08-15 | End: 2020-08-15 | Stop reason: HOSPADM

## 2020-08-15 RX ORDER — LABETALOL HYDROCHLORIDE 5 MG/ML
5 INJECTION, SOLUTION INTRAVENOUS
Status: DISCONTINUED | OUTPATIENT
Start: 2020-08-15 | End: 2020-08-15 | Stop reason: HOSPADM

## 2020-08-15 RX ORDER — ONDANSETRON 2 MG/ML
4 INJECTION INTRAMUSCULAR; INTRAVENOUS EVERY 6 HOURS PRN
Status: DISCONTINUED | OUTPATIENT
Start: 2020-08-15 | End: 2020-08-16

## 2020-08-15 RX ORDER — ACETAMINOPHEN 650 MG/1
650 SUPPOSITORY RECTAL EVERY 4 HOURS PRN
Status: DISCONTINUED | OUTPATIENT
Start: 2020-08-15 | End: 2020-08-16

## 2020-08-15 RX ORDER — ACETAMINOPHEN 500 MG
1000 TABLET ORAL ONCE
Status: CANCELLED | OUTPATIENT
Start: 2020-08-15 | End: 2020-08-15

## 2020-08-15 RX ORDER — MAGNESIUM HYDROXIDE 1200 MG/15ML
LIQUID ORAL AS NEEDED
Status: DISCONTINUED | OUTPATIENT
Start: 2020-08-15 | End: 2020-08-15 | Stop reason: HOSPADM

## 2020-08-15 RX ORDER — CEFAZOLIN SODIUM 1 G/3ML
INJECTION, POWDER, FOR SOLUTION INTRAMUSCULAR; INTRAVENOUS AS NEEDED
Status: DISCONTINUED | OUTPATIENT
Start: 2020-08-15 | End: 2020-08-15 | Stop reason: SURG

## 2020-08-15 RX ORDER — SODIUM CHLORIDE 0.9 % (FLUSH) 0.9 %
3 SYRINGE (ML) INJECTION EVERY 12 HOURS SCHEDULED
Status: DISCONTINUED | OUTPATIENT
Start: 2020-08-15 | End: 2020-08-20 | Stop reason: HOSPADM

## 2020-08-15 RX ORDER — NALOXONE HCL 0.4 MG/ML
0.04 VIAL (ML) INJECTION AS NEEDED
Status: DISCONTINUED | OUTPATIENT
Start: 2020-08-15 | End: 2020-08-15 | Stop reason: HOSPADM

## 2020-08-15 RX ORDER — GUAIFENESIN 600 MG/1
1200 TABLET, EXTENDED RELEASE ORAL 2 TIMES DAILY PRN
Status: DISCONTINUED | OUTPATIENT
Start: 2020-08-15 | End: 2020-08-16

## 2020-08-15 RX ORDER — NALOXONE HCL 0.4 MG/ML
0.4 VIAL (ML) INJECTION
Status: DISCONTINUED | OUTPATIENT
Start: 2020-08-15 | End: 2020-08-16

## 2020-08-15 RX ORDER — PANTOPRAZOLE SODIUM 40 MG/1
40 TABLET, DELAYED RELEASE ORAL EVERY MORNING
Status: DISCONTINUED | OUTPATIENT
Start: 2020-08-15 | End: 2020-08-20 | Stop reason: HOSPADM

## 2020-08-15 RX ORDER — OXYCODONE AND ACETAMINOPHEN 7.5; 325 MG/1; MG/1
2 TABLET ORAL ONCE AS NEEDED
Status: DISCONTINUED | OUTPATIENT
Start: 2020-08-15 | End: 2020-08-15 | Stop reason: HOSPADM

## 2020-08-15 RX ORDER — ASPIRIN 81 MG/1
81 TABLET ORAL DAILY
Status: DISCONTINUED | OUTPATIENT
Start: 2020-08-15 | End: 2020-08-20 | Stop reason: HOSPADM

## 2020-08-15 RX ORDER — SODIUM CHLORIDE 0.9 % (FLUSH) 0.9 %
1-10 SYRINGE (ML) INJECTION AS NEEDED
Status: DISCONTINUED | OUTPATIENT
Start: 2020-08-15 | End: 2020-08-20 | Stop reason: HOSPADM

## 2020-08-15 RX ORDER — SODIUM CHLORIDE, SODIUM LACTATE, POTASSIUM CHLORIDE, CALCIUM CHLORIDE 600; 310; 30; 20 MG/100ML; MG/100ML; MG/100ML; MG/100ML
100 INJECTION, SOLUTION INTRAVENOUS CONTINUOUS
Status: DISCONTINUED | OUTPATIENT
Start: 2020-08-15 | End: 2020-08-16

## 2020-08-15 RX ORDER — PROPOFOL 10 MG/ML
VIAL (ML) INTRAVENOUS AS NEEDED
Status: DISCONTINUED | OUTPATIENT
Start: 2020-08-15 | End: 2020-08-15 | Stop reason: SURG

## 2020-08-15 RX ORDER — METOPROLOL SUCCINATE 50 MG/1
50 TABLET, EXTENDED RELEASE ORAL DAILY
Status: DISCONTINUED | OUTPATIENT
Start: 2020-08-15 | End: 2020-08-16

## 2020-08-15 RX ORDER — DEXAMETHASONE SODIUM PHOSPHATE 4 MG/ML
INJECTION, SOLUTION INTRA-ARTICULAR; INTRALESIONAL; INTRAMUSCULAR; INTRAVENOUS; SOFT TISSUE AS NEEDED
Status: DISCONTINUED | OUTPATIENT
Start: 2020-08-15 | End: 2020-08-15 | Stop reason: SURG

## 2020-08-15 RX ORDER — FLUMAZENIL 0.1 MG/ML
0.2 INJECTION INTRAVENOUS AS NEEDED
Status: DISCONTINUED | OUTPATIENT
Start: 2020-08-15 | End: 2020-08-15 | Stop reason: HOSPADM

## 2020-08-15 RX ORDER — OXYCODONE HYDROCHLORIDE AND ACETAMINOPHEN 5; 325 MG/1; MG/1
1 TABLET ORAL EVERY 4 HOURS PRN
Status: DISCONTINUED | OUTPATIENT
Start: 2020-08-15 | End: 2020-08-16

## 2020-08-15 RX ORDER — HYDROCODONE BITARTRATE AND ACETAMINOPHEN 7.5; 325 MG/1; MG/1
2 TABLET ORAL EVERY 4 HOURS PRN
Status: DISCONTINUED | OUTPATIENT
Start: 2020-08-15 | End: 2020-08-20 | Stop reason: HOSPADM

## 2020-08-15 RX ORDER — ONDANSETRON 4 MG/1
4 TABLET, FILM COATED ORAL EVERY 6 HOURS PRN
Status: DISCONTINUED | OUTPATIENT
Start: 2020-08-15 | End: 2020-08-20 | Stop reason: HOSPADM

## 2020-08-15 RX ORDER — SODIUM CHLORIDE, SODIUM LACTATE, POTASSIUM CHLORIDE, CALCIUM CHLORIDE 600; 310; 30; 20 MG/100ML; MG/100ML; MG/100ML; MG/100ML
INJECTION, SOLUTION INTRAVENOUS CONTINUOUS PRN
Status: DISCONTINUED | OUTPATIENT
Start: 2020-08-15 | End: 2020-08-15 | Stop reason: SURG

## 2020-08-15 RX ORDER — HYDROCODONE BITARTRATE AND ACETAMINOPHEN 7.5; 325 MG/1; MG/1
1 TABLET ORAL EVERY 4 HOURS PRN
Status: DISCONTINUED | OUTPATIENT
Start: 2020-08-15 | End: 2020-08-20 | Stop reason: HOSPADM

## 2020-08-15 RX ORDER — LEVETIRACETAM 500 MG/1
500 TABLET ORAL 2 TIMES DAILY
Status: DISCONTINUED | OUTPATIENT
Start: 2020-08-15 | End: 2020-08-20 | Stop reason: HOSPADM

## 2020-08-15 RX ORDER — AMLODIPINE BESYLATE 5 MG/1
5 TABLET ORAL DAILY
Status: DISCONTINUED | OUTPATIENT
Start: 2020-08-15 | End: 2020-08-20 | Stop reason: HOSPADM

## 2020-08-15 RX ORDER — PROMETHAZINE HYDROCHLORIDE 25 MG/ML
12.5 INJECTION, SOLUTION INTRAMUSCULAR; INTRAVENOUS EVERY 6 HOURS PRN
Status: DISCONTINUED | OUTPATIENT
Start: 2020-08-15 | End: 2020-08-20 | Stop reason: HOSPADM

## 2020-08-15 RX ORDER — ACETAMINOPHEN 160 MG/5ML
650 SOLUTION ORAL EVERY 4 HOURS PRN
Status: DISCONTINUED | OUTPATIENT
Start: 2020-08-15 | End: 2020-08-16

## 2020-08-15 RX ORDER — ATORVASTATIN CALCIUM 10 MG/1
10 TABLET, FILM COATED ORAL NIGHTLY
Status: DISCONTINUED | OUTPATIENT
Start: 2020-08-15 | End: 2020-08-20 | Stop reason: HOSPADM

## 2020-08-15 RX ORDER — OXYCODONE AND ACETAMINOPHEN 10; 325 MG/1; MG/1
1 TABLET ORAL EVERY 4 HOURS PRN
Status: DISCONTINUED | OUTPATIENT
Start: 2020-08-15 | End: 2020-08-16

## 2020-08-15 RX ORDER — DOCUSATE SODIUM 100 MG/1
100 CAPSULE, LIQUID FILLED ORAL 2 TIMES DAILY PRN
Status: DISCONTINUED | OUTPATIENT
Start: 2020-08-15 | End: 2020-08-20 | Stop reason: HOSPADM

## 2020-08-15 RX ORDER — ONDANSETRON 2 MG/ML
4 INJECTION INTRAMUSCULAR; INTRAVENOUS EVERY 6 HOURS PRN
Status: DISCONTINUED | OUTPATIENT
Start: 2020-08-15 | End: 2020-08-20 | Stop reason: HOSPADM

## 2020-08-15 RX ORDER — NICOTINE 21 MG/24HR
1 PATCH, TRANSDERMAL 24 HOURS TRANSDERMAL EVERY 24 HOURS
Status: DISCONTINUED | OUTPATIENT
Start: 2020-08-15 | End: 2020-08-20 | Stop reason: HOSPADM

## 2020-08-15 RX ORDER — ALBUTEROL SULFATE 2.5 MG/3ML
2.5 SOLUTION RESPIRATORY (INHALATION) EVERY 6 HOURS PRN
Status: DISCONTINUED | OUTPATIENT
Start: 2020-08-15 | End: 2020-08-20 | Stop reason: HOSPADM

## 2020-08-15 RX ORDER — MORPHINE SULFATE 2 MG/ML
2 INJECTION, SOLUTION INTRAMUSCULAR; INTRAVENOUS
Status: DISCONTINUED | OUTPATIENT
Start: 2020-08-15 | End: 2020-08-15 | Stop reason: HOSPADM

## 2020-08-15 RX ORDER — LIDOCAINE HYDROCHLORIDE 10 MG/ML
0.5 INJECTION, SOLUTION EPIDURAL; INFILTRATION; INTRACAUDAL; PERINEURAL ONCE AS NEEDED
Status: CANCELLED | OUTPATIENT
Start: 2020-08-15

## 2020-08-15 RX ORDER — SODIUM CHLORIDE 0.9 % (FLUSH) 0.9 %
10 SYRINGE (ML) INJECTION AS NEEDED
Status: DISCONTINUED | OUTPATIENT
Start: 2020-08-15 | End: 2020-08-16

## 2020-08-15 RX ORDER — SODIUM CHLORIDE 0.9 % (FLUSH) 0.9 %
10 SYRINGE (ML) INJECTION EVERY 12 HOURS SCHEDULED
Status: DISCONTINUED | OUTPATIENT
Start: 2020-08-15 | End: 2020-08-16

## 2020-08-15 RX ORDER — NITROGLYCERIN 0.4 MG/1
0.4 TABLET SUBLINGUAL
Status: DISCONTINUED | OUTPATIENT
Start: 2020-08-15 | End: 2020-08-20 | Stop reason: HOSPADM

## 2020-08-15 RX ORDER — FENTANYL CITRATE 50 UG/ML
INJECTION, SOLUTION INTRAMUSCULAR; INTRAVENOUS AS NEEDED
Status: DISCONTINUED | OUTPATIENT
Start: 2020-08-15 | End: 2020-08-15 | Stop reason: SURG

## 2020-08-15 RX ORDER — BUPIVACAINE HCL/0.9 % NACL/PF 0.1 %
2 PLASTIC BAG, INJECTION (ML) EPIDURAL ONCE
Status: COMPLETED | OUTPATIENT
Start: 2020-08-15 | End: 2020-08-15

## 2020-08-15 RX ORDER — ACETAMINOPHEN 325 MG/1
650 TABLET ORAL EVERY 4 HOURS PRN
Status: DISCONTINUED | OUTPATIENT
Start: 2020-08-15 | End: 2020-08-16

## 2020-08-15 RX ORDER — SODIUM CHLORIDE 0.9 % (FLUSH) 0.9 %
3-10 SYRINGE (ML) INJECTION AS NEEDED
Status: CANCELLED | OUTPATIENT
Start: 2020-08-15

## 2020-08-15 RX ADMIN — LEVETIRACETAM 500 MG: 500 TABLET, FILM COATED ORAL at 17:07

## 2020-08-15 RX ADMIN — METOPROLOL SUCCINATE 50 MG: 50 TABLET, EXTENDED RELEASE ORAL at 12:06

## 2020-08-15 RX ADMIN — DEXAMETHASONE SODIUM PHOSPHATE 4 MG: 4 INJECTION, SOLUTION INTRAMUSCULAR; INTRAVENOUS at 13:59

## 2020-08-15 RX ADMIN — NICOTINE 1 PATCH: 21 PATCH, EXTENDED RELEASE TRANSDERMAL at 17:08

## 2020-08-15 RX ADMIN — LIDOCAINE HYDROCHLORIDE 100 MG: 20 INJECTION, SOLUTION INTRAVENOUS at 13:52

## 2020-08-15 RX ADMIN — IPRATROPIUM BROMIDE 0.5 MG: 0.5 SOLUTION RESPIRATORY (INHALATION) at 12:31

## 2020-08-15 RX ADMIN — SODIUM CHLORIDE 75 ML/HR: 9 INJECTION, SOLUTION INTRAVENOUS at 12:06

## 2020-08-15 RX ADMIN — CEFAZOLIN SODIUM 2 G: 10 INJECTION, POWDER, FOR SOLUTION INTRAVENOUS at 17:29

## 2020-08-15 RX ADMIN — IPRATROPIUM BROMIDE 0.5 MG: 0.5 SOLUTION RESPIRATORY (INHALATION) at 19:47

## 2020-08-15 RX ADMIN — SODIUM CHLORIDE, POTASSIUM CHLORIDE, SODIUM LACTATE AND CALCIUM CHLORIDE: 600; 310; 30; 20 INJECTION, SOLUTION INTRAVENOUS at 13:35

## 2020-08-15 RX ADMIN — FENTANYL CITRATE 25 MCG: 50 INJECTION, SOLUTION INTRAMUSCULAR; INTRAVENOUS at 14:57

## 2020-08-15 RX ADMIN — FENTANYL CITRATE 25 MCG: 50 INJECTION, SOLUTION INTRAMUSCULAR; INTRAVENOUS at 14:41

## 2020-08-15 RX ADMIN — SODIUM CHLORIDE, PRESERVATIVE FREE 10 ML: 5 INJECTION INTRAVENOUS at 12:07

## 2020-08-15 RX ADMIN — Medication 2 G: at 08:56

## 2020-08-15 RX ADMIN — ATORVASTATIN CALCIUM 10 MG: 10 TABLET, FILM COATED ORAL at 21:29

## 2020-08-15 RX ADMIN — SODIUM CHLORIDE, POTASSIUM CHLORIDE, SODIUM LACTATE AND CALCIUM CHLORIDE 100 ML/HR: 600; 310; 30; 20 INJECTION, SOLUTION INTRAVENOUS at 17:08

## 2020-08-15 RX ADMIN — FENTANYL CITRATE 100 MCG: 50 INJECTION, SOLUTION INTRAMUSCULAR; INTRAVENOUS at 13:52

## 2020-08-15 RX ADMIN — FENTANYL CITRATE 50 MCG: 50 INJECTION, SOLUTION INTRAMUSCULAR; INTRAVENOUS at 14:59

## 2020-08-15 RX ADMIN — LORAZEPAM 1 MG: 1 TABLET ORAL at 21:39

## 2020-08-15 RX ADMIN — TETANUS TOXOID, REDUCED DIPHTHERIA TOXOID AND ACELLULAR PERTUSSIS VACCINE, ADSORBED 0.5 ML: 5; 2.5; 8; 8; 2.5 SUSPENSION INTRAMUSCULAR at 08:51

## 2020-08-15 RX ADMIN — ACETAMINOPHEN 650 MG: 325 TABLET, FILM COATED ORAL at 17:07

## 2020-08-15 RX ADMIN — AMLODIPINE BESYLATE 5 MG: 5 TABLET ORAL at 17:08

## 2020-08-15 RX ADMIN — PROPOFOL 90 MG: 10 INJECTION, EMULSION INTRAVENOUS at 13:52

## 2020-08-15 RX ADMIN — CEFAZOLIN 2 G: 330 INJECTION, POWDER, FOR SOLUTION INTRAMUSCULAR; INTRAVENOUS at 14:13

## 2020-08-15 NOTE — BRIEF OP NOTE
ANKLE OPEN REDUCTION INTERNAL FIXATION, INCISION AND DRAINAGE WOUND  Progress Note    Josi ORR Cade  8/15/2020    Pre-op Diagnosis:   left open aliza       Post-Op Diagnosis Codes:     * Fracture of ankle, bimalleolar, open, left, type I or II, initial encounter [S82.842B]    Procedure/CPT® Codes:  MT OPEN TREATMENT BIMALLEOLAR ANKLE FRACTURE [00278]  MT [facility]BRIDE [facility] OPEN FX/DISLO SKIN/MUS/BONE [91478]  CHG MANUAL APPL STRESS PFRMD PHYS/QHP JOINT FILMS [89728]      Procedure(s):  ANKLE OPEN REDUCTION INTERNAL FIXATION  INCISION AND DRAINAGE WOUND    Surgeon(s):  Keyshawn Medrano MD    Anesthesia: General with Block    Staff:   Circulator: Blaine Degroot RN  Scrub Person: Darren Reyes  Vendor Representative: Moody Smith  Assistant: Eli Escobedo  Assistant: Eli Escobedo      Estimated Blood Loss: minimal    Urine Voided: * No values recorded between 8/15/2020  1:37 PM and 8/15/2020  2:59 PM *    Specimens:                None          Drains: * No LDAs found *    Findings: see op note     Complications: none      Keyshawn Medrano MD     Date: 8/15/2020  Time: 14:59

## 2020-08-15 NOTE — ANESTHESIA PREPROCEDURE EVALUATION
Anesthesia Evaluation     NPO Solid Status: > 8 hours  NPO Liquid Status: > 8 hours           Airway   TM distance: >3 FB  Neck ROM: full  No difficulty expected  Dental      Pulmonary    (+) lung cancer, COPD, asthma,home oxygen,   Cardiovascular - normal exam    ECG reviewed    (+) hypertension, CAD, dysrhythmias, angina,       Neuro/Psych  GI/Hepatic/Renal/Endo    (+)  GERD,      Musculoskeletal     Abdominal  - normal exam   Substance History      OB/GYN          Other                          Anesthesia Plan    ASA 3 - emergent     MAC     intravenous induction     Anesthetic plan, all risks, benefits, and alternatives have been provided, discussed and informed consent has been obtained with: patient.

## 2020-08-15 NOTE — ED NOTES
Reduced by Dr Oviedo; Dr Medrano at bedside now and placed splint     Gabriella Menendez RN  08/15/20 [id number]

## 2020-08-15 NOTE — H&P
"    [facility]  HISTORY AND PHYSICAL    Date of Admission: 8/15/2020  Primary Care Physician: Cayetano Crews MD    Subjective     Chief Complaint: Fall, ankle pain.     History of Present Illness  This is a 74-year-old  female patient who resides in [place], [place].  She sees Dr. Cayetano Crews for primary care.  She follows with Dr. Monteiro with cardiology for history of coronary artery disease and atrial fibrillation.  She follows with Dr. Frazier for a history of COPD with ongoing tobacco abuse and was also treated for a lung cancer with radiation within the last year.    She is not really able to provide much history at present and she is somewhat sedate from recent pain medication.  Her daughter is at the bedside and provides good history.    The patient's daughter lives with her, but she did not stay at their house last night.  The patient had a contact EMS this morning after falling in her bedroom.  Apparently, she had rolled her ankle underneath the bed and they were able to stand her up and get her to a chair.  At that point in time, she refused to go to the hospital.  Her daughter states that she then tried to stand up and slid and basically rolled her foot \"backwards.\"  There was bleeding noted with an open fracture.  She was ultimately transported here.  She had her fracture dislocation reduced in the ER by Dr. Oviedo and Dr. Medrano.    She tells me that the patient has been very weak recently and has had several falls.  She fell in the yard the other day, but did not injure herself.    She was treated for a lung cancer by radiation with Dr. Rosen within the last year.  She recently saw the pulmonology office.    The patient when awoken does not complain of any chest pain or shortness of breath.      She has been in her usual state of health recently according to her daughter other than the problems with weakness and falls.    She is being " admitted by internal medicine secondary to her multiple medical comorbidities.  The daughter tells me that Dr. Medrano does not plan surgical intervention for several days secondary to swelling.  I will confirm with him.    Review of Systems  Otherwise complete ROS reviewed and negative except as mentioned in the HPI.    Past Medical History:   Past Medical History:   Diagnosis Date   • Bronchitis    • Cancer (CMS/HCC)     lung   • COPD (chronic obstructive pulmonary disease) (CMS/HCC)    • Coronary artery disease    • GERD (gastroesophageal reflux disease)    • History of transfusion    • Hyperlipidemia    • Hypertension    • PAF (paroxysmal atrial fibrillation) (CMS/HCC)    • Persistent asthma without complication 7/8/2019   • Seizures (CMS/HCC)      Past Surgical History:  Past Surgical History:   Procedure Laterality Date   • APPENDECTOMY     • APPENDECTOMY     • CARDIAC CATHETERIZATION     • CARDIAC ELECTROPHYSIOLOGY PROCEDURE N/A 5/23/2017    Procedure: Loop insertion; LINQ;  Surgeon: Jose Monteiro MD;  Location: [facility] CATH INVASIVE LOCATION;  Service:    • CHOLECYSTECTOMY     • CORONARY ANGIOPLASTY WITH STENT PLACEMENT      X 1    • LIVER BIOPSY     • MYRINGOTOMY W/ TUBES Left 11/22/2019    Procedure: LEFT MYRINGOTOMY WITH INSERTION OF EAR TUBE WITH RIGHT EAR EXAM UNDER ANESTHESIA;  Surgeon: Se Mcnulty MD;  Location: [facility] OR;  Service: ENT   • SPLENECTOMY     • US GUIDED FINE NEEDLE ASPIRATION  8/7/2018     Social History:  reports that she has been smoking. She started smoking about 59 years ago. She has a 29.00 pack-year smoking history. She has never used smokeless tobacco. She reports that she does not drink alcohol or use drugs.    Family History: family history includes Esophageal cancer in her brother; Lung cancer in her brother.       Allergies:  Allergies   Allergen Reactions   • Penicillins Rash     Medications:  Prior to Admission medications    Medication Sig Start Date End Date Taking?  Authorizing Provider   acetaminophen (TYLENOL) 500 MG tablet Take 500 mg by mouth Every 6 (Six) Hours As Needed for Mild Pain .    Triny Zapata MD   albuterol sulfate  (90 Base) MCG/ACT inhaler Inhale 1 puff Daily. 8/12/20   Jeremías Hernandez APRN   amLODIPine (NORVASC) 5 MG tablet Take 5 mg by mouth Daily.    Triny Zapata MD   apixaban (ELIQUIS) 5 MG tablet tablet Take 1 tablet by mouth Every 12 (Twelve) Hours. 12/6/19   Jose Monteiro MD   aspirin 81 MG EC tablet Take 81 mg by mouth Daily.    Triny Zapata MD   calcium citrate-vitamin d (CALCITRATE) 315-250 MG-UNIT tablet tablet Take 1 tablet by mouth Daily.    Triny Zapata MD   furosemide (LASIX) 40 MG tablet Take 40 mg by mouth 2 (Two) Times a Day.    Triny Zapata MD   guaiFENesin (MUCINEX) 600 MG 12 hr tablet Take 2 tablets by mouth 2 (Two) Times a Day As Needed for Cough. 4/10/17   Angy Peña MD   levETIRAcetam (KEPPRA) 500 MG tablet Take 500 mg by mouth 2 (Two) Times a Day.    Triny Zpaata MD   lisinopril (PRINIVIL,ZESTRIL) 20 MG tablet Take 20 mg by mouth Daily.    Triny Zapata MD   LORazepam (ATIVAN) 2 MG tablet Take 1-2 mg by mouth 3 (Three) Times a Day.    Triny Zapata MD   metoprolol succinate XL (TOPROL-XL) 50 MG 24 hr tablet Take 50 mg by mouth Daily.    Triny Zapata MD   nitroglycerin (NITROSTAT) 0.4 MG SL tablet Place 0.4 mg under the tongue Every 5 (Five) Minutes As Needed for Chest Pain. Take no more than 3 doses in 15 minutes.    Triny Zapata MD   omeprazole (priLOSEC) 40 MG capsule Take 40 mg by mouth Daily. 9/10/19   Triny Zapata MD   potassium chloride (K-DUR) 10 MEQ CR tablet Take 10 mEq by mouth 2 (Two) Times a Day.    Triny Zapata MD   simvastatin (ZOCOR) 20 MG tablet Take 20 mg by mouth Daily.    Triny Zapata MD   tiotropium bromide monohydrate (SPIRIVA RESPIMAT) 2.5 MCG/ACT aerosol solution inhaler  "Inhale 2 puffs Daily. 8/12/20   DavidJeremías APRN   traMADol (ULTRAM) 50 MG tablet Take 50 mg by mouth. 8/10/20 11/8/20  Provider, MD Triny     Objective     Vital Signs: /60   Pulse 90   Temp 97.9 °F (36.6 °C) (Oral)   Resp 19   Ht 162.6 cm (64\")   Wt 81.6 kg (180 lb)   LMP  (LMP Unknown)   SpO2 92%   BMI 30.90 kg/m²   Physical Exam   Constitutional: She is oriented to person, place, and time. She appears well-developed and well-nourished.   Up in bed.  Still somewhat sedate from pain medication.  Her daughter is at the bedside with her.   HENT:   Head: Normocephalic and atraumatic.   Eyes: Pupils are equal, round, and reactive to light. Conjunctivae and EOM are normal.   Neck: Neck supple. No JVD present.   Cardiovascular: Normal rate, regular rhythm, normal heart sounds and intact distal pulses. Exam reveals no gallop and no friction rub.   No murmur heard.  Pulmonary/Chest: Effort normal and breath sounds normal. No respiratory distress. She has no wheezes. She has no rales. She exhibits no tenderness.   On 2 L nasal cannula.  Apparently, she does not wear home oxygen.   Abdominal: Soft. Bowel sounds are normal. She exhibits no distension. There is no tenderness. There is no rebound and no guarding.   Musculoskeletal: Normal range of motion. She exhibits edema and tenderness. She exhibits no deformity.   Heavy bandaging and splinting to the left lower extremity.  The distal toes are perfused.   Neurological: She is alert and oriented to person, place, and time. She displays normal reflexes. No cranial nerve deficit. She exhibits normal muscle tone.   Skin: Skin is warm and dry. No rash noted.   Psychiatric:   Flat, drowsy from pain medication.  Will wake up easily.      Results Reviewed:  Lab Results (last 24 hours)     Procedure Component Value Units Date/Time    [place] Draw [[id number]] Collected:  08/15/20 0835    Specimen:  Blood Updated:  08/15/20 0945    Narrative:       " The following orders were created for panel order [place] Draw.  Procedure                               Abnormality         Status                     ---------                               -----------         ------                     Light Blue Top[[id number]]                                   Final result               Green Top (Gel)[[id number]]                                  Final result               Lavender Top[[id number]]                                     Final result               Red Top[[id number]]                                          Final result                 Please view results for these tests on the individual orders.    Light Blue Top [[id number]] Collected:  08/15/20 0835    Specimen:  Blood Updated:  08/15/20 0945     Extra Tube hold for add-on     Comment: Auto resulted       Green Top (Gel) [[id number]] Collected:  08/15/20 0835    Specimen:  Blood Updated:  08/15/20 0945     Extra Tube Hold for add-ons.     Comment: Auto resulted.       Lavender Top [[id number]] Collected:  08/15/20 0835    Specimen:  Blood Updated:  08/15/20 0945     Extra Tube hold for add-on     Comment: Auto resulted       Red Top [[id number]] Collected:  08/15/20 0835    Specimen:  Blood Updated:  08/15/20 0945     Extra Tube Hold for add-ons.     Comment: Auto resulted.       Comprehensive Metabolic Panel [[id number]]  (Abnormal) Collected:  08/15/20 0915    Specimen:  Blood from Arm, Left Updated:  08/15/20 0943     Glucose 131 mg/dL      BUN 21 mg/dL      Creatinine 1.16 mg/dL      Sodium 133 mmol/L      Potassium 4.3 mmol/L      Comment: Specimen hemolyzed.  Results may be affected.        Chloride 97 mmol/L      CO2 26.0 mmol/L      Calcium 9.1 mg/dL      Total Protein 7.0 g/dL      Albumin 3.80 g/dL      ALT (SGPT) 13 U/L      AST (SGOT) 23 U/L      Comment: Specimen hemolyzed.  Results may be affected.        Alkaline Phosphatase 100 U/L      Total Bilirubin 0.3 mg/dL      eGFR Non African Amer 46 mL/min/1.73         Globulin 3.2 gm/dL      A/G Ratio 1.2 g/dL      BUN/Creatinine Ratio 18.1     Anion Gap 10.0 mmol/L     Narrative:       GFR Normal >60  Chronic Kidney Disease <60  Kidney Failure <15      Troponin [[id number]]  (Normal) Collected:  08/15/20 0915    Specimen:  Blood from Arm, Left Updated:  08/15/20 0940     Troponin T <0.010 ng/mL     Narrative:       Troponin T Reference Range:  <= 0.03 ng/mL-   Negative for AMI  >0.03 ng/mL-     Abnormal for myocardial necrosis.  Clinicians would have to utilize clinical acumen, EKG, Troponin and serial changes to determine if it is an Acute Myocardial Infarction or myocardial injury due to an underlying chronic condition.       Results may be falsely decreased if patient taking Biotin.      Protime-INR [[id number]]  (Abnormal) Collected:  08/15/20 0915    Specimen:  Blood from Arm, Left Updated:  08/15/20 0934     Protime 16.0 Seconds      INR 1.32    aPTT [[id number]]  (Normal) Collected:  08/15/20 0915    Specimen:  Blood from Arm, Left Updated:  08/15/20 0934     PTT 32.6 seconds     COVID-19, [facility]BBOTT IN-HOUSE,NP Swab (NO TRANSPORT MEDIA) 2 HR TAT - Swab, Nasopharynx [[id number]]  (Normal) Collected:  08/15/20 0848    Specimen:  Swab from Nasopharynx Updated:  08/15/20 0922     COVID19 Not Detected    Narrative:       Fact sheet for providers: https://www.fda.gov/media/348938/download     Fact sheet for patients: https://www.fda.gov/media/065329/download    Blood Gas, Arterial [[id number]]  (Abnormal) Collected:  08/15/20 0918    Specimen:  Arterial Blood Updated:  08/15/20 0922     Site Right Brachial     Rom's Test N/A     pH, Arterial 7.385 pH units      pCO2, Arterial 43.7 mm Hg      pO2, Arterial 64.7 mm Hg      Comment: 84 Value below reference range        HCO3, Arterial 26.1 mmol/L      Comment: 83 Value above reference range        Base Excess, Arterial 0.7 mmol/L      O2 Saturation, Arterial 93.0 %      Comment: 84 Value below reference range        Temperature 37.0  C      Barometric Pressure for Blood Gas 749 mmHg      Modality Nasal Cannula     Flow Rate 2.0 lpm      Ventilator Mode NA     Collected by [id number]     Comment: Meter: [id number]     :  [id number]        pCO2, Temperature Corrected 43.7 mm Hg      pH, Temp Corrected 7.385 pH Units      pO2, Temperature Corrected 64.7 mm Hg     CBC & Differential [[id number]] Collected:  08/15/20 0835    Specimen:  Blood Updated:  08/15/20 0858    Narrative:       The following orders were created for panel order CBC & Differential.  Procedure                               Abnormality         Status                     ---------                               -----------         ------                     CBC Auto Differential[[id number]]        Abnormal            Final result                 Please view results for these tests on the individual orders.    CBC Auto Differential [[id number]]  (Abnormal) Collected:  08/15/20 0835    Specimen:  Blood Updated:  08/15/20 0858     WBC 6.96 10*3/mm3      RBC 4.43 10*6/mm3      Hemoglobin 15.0 g/dL      Hematocrit 43.3 %      MCV 97.7 fL      MCH 33.9 pg      MCHC 34.6 g/dL      RDW 13.8 %      RDW-SD 49.0 fl      MPV 10.2 fL      Platelets 274 10*3/mm3      Neutrophil % 81.5 %      Lymphocyte % 12.4 %      Monocyte % 5.6 %      Eosinophil % 0.0 %      Basophil % 0.4 %      Immature Grans % 0.1 %      Neutrophils, Absolute 5.67 10*3/mm3      Lymphocytes, Absolute 0.86 10*3/mm3      Monocytes, Absolute 0.39 10*3/mm3      Eosinophils, Absolute 0.00 10*3/mm3      Basophils, Absolute 0.03 10*3/mm3      Immature Grans, Absolute 0.01 10*3/mm3      nRBC 0.0 /100 WBC         Imaging Results (Last 24 Hours)     Procedure Component Value Units Date/Time    XR Ankle 2 View Left [[id number]] Collected:  08/15/20 0908     Updated:  08/15/20 0913    Narrative:       Exam: XR ANKLE 2 VW LEFT-     Indication: trauma; post splint     Comparison: Same day radiograph     Findings:     Tibiotalar  dislocation has been reduced and ankle joint now appears in  near-anatomic alignment. Talar dome appears intact. No definite tibial  fracture. Distal fibular fracture appears improved in alignment.  Circumferential soft tissue swelling.       Impression:       Impression:     1.  Interval reduction of tibiotalar joint, now in near-anatomic  alignment.  2.  Distal fibular fracture, improved in alignment.  3.  No definite distal tibial fracture.  This report was finalized on 08/15/2020 09:10 by Dr. Iftikhar Prieto MD.    XR Tibia Fibula 2 View Left [[id number]] Collected:  08/15/20 0906     Updated:  08/15/20 0911    Narrative:       Exam: XR TIBIA FIBULA 2 VW LEFT-     Indication: open bimalleolar fracture rule out proximal injury     Comparison: None       Impression:       Finding/impression:     No evidence of acute fracture involving the proximal and mid  tibia/fibula. Redemonstrated distal fibular fracture. Ankle joint  appears to have been reduced and now in anatomic alignment. Diffuse soft  tissue edema about the ankle.  This report was finalized on 08/15/2020 09:08 by Dr. Iftikhar Prieto MD.    XR Chest 1 View [[id number]] Collected:  08/15/20 0901     Updated:  08/15/20 0907    Narrative:       Exam: XR CHEST 1 VW-     Indication: trauma; pre-surgery     Comparison: 7/20/2020     Findings:     Cardiac silhouette is borderline enlarged. No pleural effusion,  pneumothorax, or focal consolidation. RIGHT anterior neck surgical clip.  Implantable loop recorder device. No acute osseous finding.       Impression:       Impression:     No acute findings.  This report was finalized on 08/15/2020 09:02 by Dr. Iftikhar Prieto MD.    XR Ankle 2 View Left [[id number]] Collected:  08/15/20 0846     Updated:  08/15/20 0851    Narrative:       Exam: XR ANKLE 2 VW LEFT-     Indication: trauma     Comparison: None       Impression:       Finding/impression:     Ankle fracture dislocation with posterior displacement of the talus  in  relation to the tibia/fibula. Fractures of the anterior and posterior  tibia suspected. Displaced comminuted distal fibular fracture. No  definite talar dome fracture. Calcaneus appears intact. Soft tissue  edema. No radiopaque foreign body.  This report was finalized on 08/15/2020 08:48 by Dr. Iftikhar Prieto MD.        I have personally reviewed and interpreted the radiology studies and ECG obtained at time of admission.     Assessment / Plan     Assessment:   Active Hospital Problems    Diagnosis   • **Type III open fracture of left ankle   • Fall   • Left ankle pain   • COPD, moderate (CMS/HCC)   • PAF (paroxysmal atrial fibrillation) (CMS/HCC)   • Stented coronary artery   • Coronary artery disease involving native coronary artery of native heart without angina pectoris   • Current every day smoker     Plan:   She will be admitted to my service here at [facility].  She presents to the emergency department after falling at home and twisting her left ankle.  She unfortunately sustained an open fracture and dislocation.  Initial plain films showed a fracture dislocation with posterior displacement of the talus in relation to the tibia/fibula.  Fractures of the anterior and posterior tibia is suspected.  Displaced comminuted distal fibular fracture.  No definite talar dome fracture.  Calcaneus appears intact.  Soft tissue edema.  She was splinted by EMS, but Dr. Oviedo/Dr. Medrano re-did this in the emergency department.  Repeat films show reduction of the tibiotalar joint with near anatomic alignment.  Distal fibular fracture with improved alignment.  No definite distal tibial fracture.  Internal medicine is asked to admit given her other medical comorbidities.    Nothing by mouth until I can confirm with orthopedics that they want to hold off on surgical intervention for now secondary to swelling.  Pain control.  Hold Eliquis.  We will continue aspirin.  Will discuss other management with  orthopedics.    Hold lisinopril and Lasix for now.  Continue beta-blocker and amlodipine.  Reconcile and resume other home medications as appropriate.    No need for cardiology or pulmonary consultations at present.  Will do so if the need arises.    Offered a nicotine patch and counseled for tobacco cessation.    SCD to the right lower extremity for now.    Code Status: Full.      I discussed the patient's findings and my recommendations with the patient.     Estimated length of stay is indeterminate at present.     Patient seen and examined by me on 08/15/20 at 10:09.     Electronically signed by Jose Skelton DO, 08/15/20, 10:08.

## 2020-08-15 NOTE — ED PROVIDER NOTES
"Subjective   This 74-year-old female patient was brought by ambulance after she stood up and started to turn and she ended up falling and twisting and having suffering a open left bimalleolar ankle fracture.  EMS states that had difficulty getting her out from where she was in the house.  They reported the foot was turned \"posteriorly and that they could not find a pulse.  Patient arrived here after having 100 mcg of fentanyl per EMS and her pain is much improved.  She did have a temporary splint applied by EMS.  The patient does have history of COPD as well as coronary artery disease.  She does use home nebulizer treatments and is on oxygen at night at home.  At times has had proximal paroxysmal atrial fibrillation.          Review of Systems   All other systems reviewed and are negative.      Past Medical History:   Diagnosis Date   • Bronchitis    • Cancer (CMS/HCC)     lung   • COPD (chronic obstructive pulmonary disease) (CMS/HCC)    • Coronary artery disease    • GERD (gastroesophageal reflux disease)    • History of transfusion    • Hyperlipidemia    • Hypertension    • PAF (paroxysmal atrial fibrillation) (CMS/[facility])    • Persistent asthma without complication 7/8/2019   • Seizures (CMS/[facility])        Allergies   Allergen Reactions   • Penicillins Rash       Past Surgical History:   Procedure Laterality Date   • APPENDECTOMY     • APPENDECTOMY     • CARDIAC CATHETERIZATION     • CARDIAC ELECTROPHYSIOLOGY PROCEDURE N/A 5/23/2017    Procedure: Loop insertion; LINQ;  Surgeon: Jose Monteiro MD;  Location: [facility] CATH INVASIVE LOCATION;  Service:    • CHOLECYSTECTOMY     • CORONARY ANGIOPLASTY WITH STENT PLACEMENT      X 1    • LIVER BIOPSY     • MYRINGOTOMY W/ TUBES Left 11/22/2019    Procedure: LEFT MYRINGOTOMY WITH INSERTION OF EAR TUBE WITH RIGHT EAR EXAM UNDER ANESTHESIA;  Surgeon: Se Mcnulty MD;  Location: [facility] OR;  Service: ENT   • SPLENECTOMY     • US GUIDED FINE NEEDLE ASPIRATION  8/7/2018 "       Family History   Problem Relation Age of Onset   • Esophageal cancer Brother    • Lung cancer Brother    • Breast cancer Neg Hx        Social History     Socioeconomic History   • Marital status:      Spouse name: Not on file   • Number of children: 1   • Years of education: Not on file   • Highest education level: Not on file   Tobacco Use   • Smoking status: Current Every Day Smoker     Packs/day: 0.50     Years: 58.00     Pack years: 29.00     Start date: 1961   • Smokeless tobacco: Never Used   • Tobacco comment: TRYING TO QUIT    Substance and Sexual Activity   • Alcohol use: No   • Drug use: No   • Sexual activity: Defer           Objective   Physical Exam   Constitutional: She is oriented to person, place, and time. She appears well-developed and well-nourished.   HENT:   Head: Normocephalic and atraumatic.   Eyes: Pupils are equal, round, and reactive to light. EOM are normal.   Neck: Normal range of motion. Neck supple.   Cardiovascular: Normal rate, regular rhythm and normal heart sounds.   Pulmonary/Chest: No respiratory distress. She has wheezes.   Abdominal: Soft. Bowel sounds are normal. There is no tenderness.   Musculoskeletal:   Patient's left ankle had a cardboard splint applied per EMS.  It was the knee was bent up in the foot seem to be rotated posteriorly.  We see open wound there.  In order to effectively get an x-ray I had to straighten out the leg which was done without any sedation because the patient is already very sleepy from the fentanyl.  Tolerated this with minimal discomfort.  Has a large bout 5 cm wound to the medial aspect of the ankle.  Anklebone can be seen in the wound.  As a straighten out the leg I was able to palpate a good dorsalis pedis pulse.   Neurological: She is alert and oriented to person, place, and time.   Nursing note and vitals reviewed.      Procedures           ED Course                                           MDM  Number of Diagnoses or Management  Options  Diagnosis management comments: Portable x-ray shows a talotibial dislocation with a distal fragment posterior.  Also a fracture of the posterior malleolus as well as the distal fibula.  The wound was irrigated with sterile water copiously and then a nonadherent dressing and gauze was used to wrap the wound.  Before this happened Dr. Medrano the orthopedist on call of cane and assisted me with bandaging the wound and then applying a short leg stirrup splint.  Dr. Medrano requested we admit her to the hospitalist service because of her comorbidities however he will take care of this open fracture.  Case was discussed with Dr. Skelton who agrees to admit the patient.      Final diagnoses:   Type III open fracture of left ankle, initial encounter   COPD (chronic obstructive pulmonary disease) case management patient (CMS/[facility])            Quique Oviedo DO  08/15/20 1002

## 2020-08-15 NOTE — OP NOTE
Patient Name: Walter  MRN: [id number]  : 1945    DATE of SURGERY: 8/15/2020    SURGEON: Keyshawn Medrano MD    ASSISTANT: NONE    PREOPERATIVE DIAGNOSIS:  Acute traumatic displaced bimalleolar fracture (lateral/posterior malleolus) of the Left lower leg, initial encounter for type II open fracture    POSTOPERATIVE DIAGNOSIS: Acute traumatic displaced bimalleolar fracture (lateral/posterior malleolus) of the Left lower leg, initial encounter for type II open fracture    PROCEDURE PERFORMED:   1) Open reduction internal fixation of closed displaced bimalleolar fracture of the Left lower leg  2) Irrigation and excisional debridement of skin, subcutaneous tissue, and bone left open ankle fracture   3) Intraoperative stress radiography, Left ankle    IMPLANTS: Synthes distal fibular locking plate    ANESTHESIA USED: General endotrachial anesthesia    OPERATIVE INDICATIONS: 74 y.o. female who fell at home earlier this morning with a severely displaced and open ankle fracture.  I presented to the ED to assist in reduction and splinting of the fracture.  Due to open fracture, I recommended acute surgical treatment.  We discussed an ORIF versus an external fixation of the ankle depending on soft tissue swelling.  Surgical indications include fracture displacement and stabilization of fracture to prevent future deformity, pain, and loss of function.  Risks include, but are not limited to, anesthesia, bleeding, infection, pain, damage to local structures, need for further surgery, malunion, nonunion, fracture displacement, failure of hardware, prominent hardware, intraoperative death.  Risks, benefits, and alternatives were discussed and the patient wishes to proceed with surgery.  She is a smoker and has COPD - she understands the detrimental effect of tissue and bone healing caused by nicotine.    ESTIMATED BLOOD LOSS: <10 mL    DRAINS: none     COMPLICATIONS: none    SPECIMENS: none    FINDINGS: see op  note    PROCEDURE in DETAIL:  The patient was seen in the preoperative holding room, the informed consent was reviewed and signed, and the correct operative extremity marked with the patient’s agreement.  The patient was transported to the operating room, where a timeout was performed identifying the correct patient and operative site.  Perioperative antibiotics were administered prior to incision.  A tourniquet was placed on the operative thigh, inflated to 250 mm Hg and total tourniquet time was <1 hour.    Attention was first turned to the medial laceration which measure 5 cm in length. He skins and tissue was extremely poor quality and was very thin.  A systematic exicisional debridement of skin, subcutaneous tissue, and bone was performed with a scalpel and rongeur.  6 liters of saline was used to irrigate the wound.    Attention was turned to the lateral aspect of the ankle where a longitudinal incision was made along the posterolateral border of the fibula.  Soft tissue was dissected down to the level of the fracture where soft tissue interposition and hematoma was removed.  A provisional reduction was performed and stabilized with clamps.  Again noted was very poor bone quality.  A Synthes plate was inserted along the lateral aspect of the bone, a series of locking screws were placed distally and cortical screws proximally maintaining length, alignment, and rotation.    C-arm images verified an adequate reduction.  A dorsiflexion-external rotation stress test was performed verfiying the syndesmosis to be stable.    With fixation of the lateral malleolus, the posterior malleolar fragment reduced near anatomically.  A posterior drawer test was used to verify the posterior malleolar fragment was stable without subluxation of the joint.  The fracture was <25% of the joint surface.    Incisions were irrigated, closed in layers. The skin was closed with nylon sutures.  The medial wound was very difficult to close  given the quality of her tissue - sutures tore the skin.  A combination of sutures and steri-strips were used.  A sterile dressing was placed followed by a well-padded short leg splint.  The patient was awakened by anesthesia, transported to the recovery room in stable condition.    POSTOPERATIVE PLAN:  1) Admit inpatient, IV antibiotics x 48 hrs  2) NWB operative leg  3) based on the stability of fixation and the patient's bone quality, non-weight bearing status will be for approximately 5-6 weeks  4) her prognosis is guarded given her poor tissue and bone quality; she is at substantial risk for a medial wound problem and subsequent infection    Electronically signed by Keyshawn Medrano MD on 8/15/2020 at 15:03

## 2020-08-15 NOTE — ANESTHESIA POSTPROCEDURE EVALUATION
"Patient: Josi Mohamud    Procedure Summary     Date:  08/15/20 Room / Location:  [facility] OR [address] [place] [address] [facility] OR    Anesthesia Start:  1337 Anesthesia Stop:  1515    Procedures:       ANKLE OPEN REDUCTION INTERNAL FIXATION (Left Ankle)      INCISION AND DRAINAGE WOUND (Left ) Diagnosis:       Fracture of ankle, bimalleolar, open, left, type I or II, initial encounter      (left open aliza)    Surgeon:  Keyshawn Medrano MD Provider:  Willem Oh CRNA    Anesthesia Type:  MAC ASA Status:  3 - Emergent          Anesthesia Type: MAC    Vitals  Vitals Value Taken Time   /69 8/15/2020  3:11 PM   Temp 97 °F (36.1 °C) 8/15/2020  3:01 PM   Pulse 84 8/15/2020  3:15 PM   Resp 12 8/15/2020  3:11 PM   SpO2 91 % 8/15/2020  3:15 PM   Vitals shown include unvalidated device data.        Post Anesthesia Care and Evaluation    Patient location during evaluation: PACU  Patient participation: complete - patient participated  Level of consciousness: awake and alert  Pain management: adequate  Airway patency: patent  Anesthetic complications: No anesthetic complications    Cardiovascular status: acceptable  Respiratory status: acceptable  Hydration status: acceptable    Comments: Blood pressure 129/64, pulse 85, temperature 97 °F (36.1 °C), temperature source Temporal, resp. rate 12, height 162.6 cm (64\"), weight 83.1 kg (183 lb 4.8 oz), SpO2 92 %, not currently breastfeeding.    Pt discharged from PACU based on bessie score >8      "

## 2020-08-15 NOTE — CONSULTS
Inpatient Orthopedic Surgery Consult  Consult performed by: Keyshawn Medrano MD  Consult ordered by: Jose Skelton DO  Assessment/Recommendations: Orthopaedic Inpatient Consultation    NAME:  Josi Mohamud   : 1945  MRN: [id number]    8/15/2020  8:22 AM    Requesting Physician: Jose Skelton DO    CHIEF COMPLAINT:  left ankle pain      HISTORY OF PRESENT ILLNESS:   The patient is a 74 y.o. Female status post fall this morning getting out of her bed sustaining a left ankle injury.  She was seen in the ED with a laceration of the medial ankle and dislocation of the tibiotalar joint.  I assisted in reducing and splinting the ankle in the ED after being notified.  Pain is located in the left ankle, rated a 5/5, dull and constant, worse with movement, better with rest and medication.  There are no associated symptoms.   She is a smoker and has COPD.     Past Medical History:    Past Medical History:  No date: Bronchitis  No date: Cancer (CMS/[facility])      Comment:  lung  No date: COPD (chronic obstructive pulmonary disease) (CMS/[facility])  No date: Coronary artery disease  No date: GERD (gastroesophageal reflux disease)  No date: History of transfusion  No date: Hyperlipidemia  No date: Hypertension  No date: PAF (paroxysmal atrial fibrillation) (CMS/[facility])  2019: Persistent asthma without complication  No date: Seizures (CMS/[facility])    Past Surgical History:    Past Surgical History:  No date: APPENDECTOMY  No date: APPENDECTOMY  No date: CARDIAC CATHETERIZATION  2017: CARDIAC ELECTROPHYSIOLOGY PROCEDURE; N/A      Comment:  Procedure: Loop insertion; LINQ;  Surgeon: Jose Monteiro MD;  Location: [facility] CATH INVASIVE LOCATION;  Service:   No date: CHOLECYSTECTOMY  No date: CORONARY ANGIOPLASTY WITH STENT PLACEMENT      Comment:  X 1   No date: LIVER BIOPSY  2019: MYRINGOTOMY W/ TUBES; Left      Comment:  Procedure: LEFT MYRINGOTOMY WITH INSERTION OF EAR TUBE                WITH RIGHT  EAR EXAM UNDER ANESTHESIA;  Surgeon: Se Mcnulty MD;  Location: [facility] OR;  Service: ENT  No date: SPLENECTOMY  8/7/2018: US GUIDED FINE NEEDLE ASPIRATION    Current Medications:   Prior to Admission medications :  Medication acetaminophen (TYLENOL) 500 MG tablet, Sig Take 500 mg by mouth Every 6 (Six) Hours As Needed for Mild Pain ., Start Date , End Date , Taking? , Authorizing Provider Triny Zapata MD    Medication albuterol sulfate  (90 Base) MCG/ACT inhaler, Sig Inhale 1 puff Daily., Start Date 8/12/20, End Date , Taking? , Authorizing Provider Jeremías Hernandez APRN    Medication amLODIPine (NORVASC) 5 MG tablet, Sig Take 5 mg by mouth Daily., Start Date , End Date , Taking? , Authorizing Provider Triny Zapata MD    Medication apixaban (ELIQUIS) 5 MG tablet tablet, Sig Take 1 tablet by mouth Every 12 (Twelve) Hours., Start Date 12/6/19, End Date , Taking? , Authorizing Provider Jose Monteiro MD    Medication aspirin 81 MG EC tablet, Sig Take 81 mg by mouth Daily., Start Date , End Date , Taking? , Authorizing Provider Triny Zapata MD    Medication calcium citrate-vitamin d (CALCITRATE) 315-250 MG-UNIT tablet tablet, Sig Take 1 tablet by mouth Daily., Start Date , End Date , Taking? , Authorizing Provider Triny Zapata MD    Medication furosemide (LASIX) 40 MG tablet, Sig Take 40 mg by mouth 2 (Two) Times a Day., Start Date , End Date , Taking? , Authorizing Provider Triny Zapata MD    Medication guaiFENesin (MUCINEX) 600 MG 12 hr tablet, Sig Take 2 tablets by mouth 2 (Two) Times a Day As Needed for Cough., Start Date 4/10/17, End Date , Taking? , Authorizing Provider Angy Peña MD    Medication levETIRAcetam (KEPPRA) 500 MG tablet, Sig Take 500 mg by mouth 2 (Two) Times a Day., Start Date , End Date , Taking? , Authorizing Provider Triny Zapata MD    Medication lisinopril (PRINIVIL,ZESTRIL) 20 MG tablet, Sig  Take 20 mg by mouth Daily., Start Date , End Date , Taking? , Authorizing Provider Triny Zapata MD    Medication LORazepam (ATIVAN) 2 MG tablet, Sig Take 1-2 mg by mouth 3 (Three) Times a Day., Start Date , End Date , Taking? , Authorizing Provider Triny Zapata MD    Medication metoprolol succinate XL (TOPROL-XL) 50 MG 24 hr tablet, Sig Take 50 mg by mouth Daily., Start Date , End Date , Taking? , Authorizing Provider Triny Zapata MD    Medication nitroglycerin (NITROSTAT) 0.4 MG SL tablet, Sig Place 0.4 mg under the tongue Every 5 (Five) Minutes As Needed for Chest Pain. Take no more than 3 doses in 15 minutes., Start Date , End Date , Taking? , Authorizing Provider Triny Zapata MD    Medication omeprazole (priLOSEC) 40 MG capsule, Sig Take 40 mg by mouth Daily., Start Date 9/10/19, End Date , Taking? , Authorizing Provider Triny Zapata MD    Medication potassium chloride (K-DUR) 10 MEQ CR tablet, Sig Take 10 mEq by mouth 2 (Two) Times a Day., Start Date , End Date , Taking? , Authorizing Provider Triny Zapata MD    Medication simvastatin (ZOCOR) 20 MG tablet, Sig Take 20 mg by mouth Daily., Start Date , End Date , Taking? , Authorizing Provider Triny Zapata MD    Medication tiotropium bromide monohydrate (SPIRIVA RESPIMAT) 2.5 MCG/ACT aerosol solution inhaler, Sig Inhale 2 puffs Daily., Start Date 8/12/20, End Date , Taking? , Authorizing Provider Jeremías Hernandez APRN    Medication traMADol (ULTRAM) 50 MG tablet, Sig Take 50 mg by mouth., Start Date 8/10/20, End Date 11/8/20, Taking? , Authorizing Provider Triny Zapata MD        Allergies:  Penicillins    Social History:   Social History    Socioeconomic History      Marital status:       Number of children: 1          Tobacco Use      Smoking status: Current Every Day Smoker        Packs/day: 0.50        Years: 58.00        Pack years: 29        Start date: 1961       Smokeless tobacco: Never Used      Tobacco comment: TRYING TO QUIT     Substance and Sexual Activity      Alcohol use: No      Drug use: No      Sexual activity: Defer      Family History:   Review of patient's family history indicates:  Problem: Esophageal cancer      Relation: Brother          Age of Onset: (Not Specified)  Problem: Lung cancer      Relation: Brother          Age of Onset: (Not Specified)  Problem: Breast cancer      Relation: Neg Hx          Age of Onset: (Not Specified)      REVIEW OF SYSTEMS:  14 point review of systems has been reviewed from the patient's emergency room visit, reviewed with the patient on today's date with no new changes.    PHYSICAL EXAM:      Physical Examination:  Vitals: ------------------------------------------------------------------             08/15/20  08/15/20  08/15/20        08/15/20                      0900      1008      1015            1120            ------------------------------------------------------------------   BP:       126/60    116/61    133/68          137/67            BP Location:                                     Left arm           Patient Position:                                       Lying            Pulse:      90        83        85              88              Resp:                                           18              Temp:                                     98 °F (36.7 °C)       TempSrc:                                       Oral             SpO2:      92%       92%       95%              96%             Weight:                               83.1 kg (183 lb 4.8 oz)   Height:                                                        ------------------------------------------------------------------  General:  Appears stated age, no distress.  Orientation:  Alert and oriented to time, place, and person.  Mood and Affect:  Cooperative and pleasant.  Gait:   Resting comfortably in bed.  Cardiovascular:  Symmetric 1-2 plus pulses in upper and lower extremities.  Lymph:  No cervical or inguinal lymphadenopathy noted.  Sensation:  Grossly intact to light touch.  DTR:  Normal, no pathologic reflexes.  Coordination/balance:  Normal    Musculoskeletal:  Right upper extremity exam:  There is no tenderness to palpation about the shoulder, elbow, wrist or hand.  Unrestricted full function motion is present.  Stability is normal with provocative tests, 5/5 strength, and skin is normal.      Left upper extremity exam:  There is no tenderness to palpation about the shoulder, elbow, wrist or hand.  Unrestricted full function motion is present.  Stability is normal with provocative tests, 5/5 strength, and skin is normal.     Right lower extremity exam:  There is no tenderness to palpation about the hip, knee, ankle or foot.  Unrestricted full function motion is present.  Stability is normal with provocative tests, 5/5 strength, and skin is normal.     Left lower extremity exam:  5 cm medial laceration with exposed medial malleolus, after reduction 2+ pulse, refuses motion/stability/strength    DATA:    CBC with Differential:  WBC       Date                     Value               Ref Range           Status                08/15/2020               6.96                3.40 - 10.80 1*     Final            ----------  RBC       Date                     Value               Ref Range           Status                08/15/2020               4.43                3.77 - 5.28 10*     Final            ----------  Hemoglobin       Date                     Value               Ref Range           Status                08/15/2020               15.0                12.0 - 15.9 g/*     Final            ----------  Hematocrit       Date                     Value               Ref Range           Status                08/15/2020               43.3                34.0 - 46.6 %       Final             ----------  Platelets       Date                     Value               Ref Range           Status                08/15/2020               274                 140 - 450 10*3*     Final            ----------  MCV       Date                     Value               Ref Range           Status                08/15/2020               97.7 (H)            79.0 - 97.0 fL      Final            ----------  MCH       Date                     Value               Ref Range           Status                08/15/2020               33.9 (H)            26.6 - 33.0 pg      Final            ----------  MCHC       Date                     Value               Ref Range           Status                08/15/2020               34.6                31.5 - 35.7 g/*     Final            ----------  RDW       Date                     Value               Ref Range           Status                08/15/2020               13.8                12.3 - 15.4 %       Final            ----------  nRBC       Date                     Value               Ref Range           Status                08/15/2020               0.0                 0.0 - 0.2 /100*     Final            ----------  Monocytes, Absolute       Date                     Value               Ref Range           Status                08/15/2020               0.39                0.10 - 0.90 10*     Final            ----------  Lymphocytes, Absolute       Date                     Value               Ref Range           Status                08/15/2020               0.86                0.70 - 3.10 10*     Final            ----------  Eosinophils, Absolute       Date                     Value               Ref Range           Status                08/15/2020               0.00                0.00 - 0.40 10*     Final            ----------  Basophils, Absolute       Date                     Value               Ref Range           Status                08/15/2020               0.03                 0.00 - 0.20 10*     Final            ----------  CMP:  Sodium       Date                     Value               Ref Range           Status                08/15/2020               133 (L)             136 - 145 mmol*     Final            ----------  Potassium       Date                     Value               Ref Range           Status                08/15/2020               4.3                 3.5 - 5.2 mmol*     Final              Comment:    Specimen hemolyzed.  Results may be affected.  ----------  Chloride       Date                     Value               Ref Range           Status                08/15/2020               97 (L)              98 - 107 mmol/L     Final            ----------  CO2       Date                     Value               Ref Range           Status                08/15/2020               26.0                22.0 - 29.0 mm*     Final            ----------  BUN       Date                     Value               Ref Range           Status                08/15/2020               21                  8 - 23 mg/dL        Final            ----------  Creatinine       Date                     Value               Ref Range           Status                08/15/2020               1.16 (H)            0.57 - 1.00 mg*     Final            ----------  A/G Ratio       Date                     Value               Ref Range           Status                08/15/2020               1.2                 g/dL                Final            ----------  Glucose       Date                     Value               Ref Range           Status                08/15/2020               131 (H)             65 - 99 mg/dL       Final            ----------  Calcium       Date                     Value               Ref Range           Status                08/15/2020               9.1                 8.6 - 10.5 mg/*     Final            ----------  Total Bilirubin       Date                     Value               Ref Range            Status                08/15/2020               0.3                 0.0 - 1.2 mg/dL     Final            ----------  Alkaline Phosphatase       Date                     Value               Ref Range           Status                08/15/2020               100                 39 - 117 U/L        Final            ----------  AST (SGOT)       Date                     Value               Ref Range           Status                08/15/2020               23                  1 - 32 U/L          Final              Comment:    Specimen hemolyzed.  Results may be affected.  ----------  ALT (SGPT)       Date                     Value               Ref Range           Status                08/15/2020               13                  1 - 33 U/L          Final            ----------  BMP:  Sodium       Date                     Value               Ref Range           Status                08/15/2020               133 (L)             136 - 145 mmol*     Final            ----------  Potassium       Date                     Value               Ref Range           Status                08/15/2020               4.3                 3.5 - 5.2 mmol*     Final              Comment:    Specimen hemolyzed.  Results may be affected.  ----------  Chloride       Date                     Value               Ref Range           Status                08/15/2020               97 (L)              98 - 107 mmol/L     Final            ----------  CO2       Date                     Value               Ref Range           Status                08/15/2020               26.0                22.0 - 29.0 mm*     Final            ----------  BUN       Date                     Value               Ref Range           Status                08/15/2020               21                  8 - 23 mg/dL        Final            ----------  Creatinine       Date                     Value               Ref Range           Status                08/15/2020                1.16 (H)            0.57 - 1.00 mg*     Final            ----------  Calcium       Date                     Value               Ref Range           Status                08/15/2020               9.1                 8.6 - 10.5 mg/*     Final            ----------  Glucose       Date                     Value               Ref Range           Status                08/15/2020               131 (H)             65 - 99 mg/dL       Final            ----------    ----------------------------------------------------------------------------------------------------------------------  I have reviewed the radiology images above and agree with the findings dictated below    Radiology: Imaging Results (Last 24 Hours)     Procedure Component Value Units Date/Time    XR Ankle 2 View Left ([id number]) Collected:  08/15/20 0908     Updated:  08/15/20 0913    Narrative:       Exam: XR ANKLE 2 VW LEFT-     Indication: trauma; post splint     Comparison: Same day radiograph     Findings:     Tibiotalar dislocation has been reduced and ankle joint now appears in  near-anatomic alignment. Talar dome appears intact. No definite tibial  fracture. Distal fibular fracture appears improved in alignment.  Circumferential soft tissue swelling.       Impression:       Impression:     1.  Interval reduction of tibiotalar joint, now in near-anatomic  alignment.  2.  Distal fibular fracture, improved in alignment.  3.  No definite distal tibial fracture.  This report was finalized on 08/15/2020 09:10 by Dr. Iftikhar Prieto MD.    XR Tibia Fibula 2 View Left ([id number]) Collected:  08/15/20 0906     Updated:  08/15/20 0911    Narrative:       Exam: XR TIBIA FIBULA 2 VW LEFT-     Indication: open bimalleolar fracture rule out proximal injury     Comparison: None       Impression:       Finding/impression:     No evidence of acute fracture involving the proximal and mid  tibia/fibula. Redemonstrated distal fibular fracture. Ankle joint  appears to  have been reduced and now in anatomic alignment. Diffuse soft  tissue edema about the ankle.  This report was finalized on 08/15/2020 09:08 by Dr. Iftikhar Prieto MD.    XR Chest 1 View ([id number]) Collected:  08/15/20 0901     Updated:  08/15/20 0907    Narrative:       Exam: XR CHEST 1 VW-     Indication: trauma; pre-surgery     Comparison: 7/20/2020     Findings:     Cardiac silhouette is borderline enlarged. No pleural effusion,  pneumothorax, or focal consolidation. RIGHT anterior neck surgical clip.  Implantable loop recorder device. No acute osseous finding.       Impression:       Impression:     No acute findings.  This report was finalized on 08/15/2020 09:02 by Dr. Iftikhar Prieto MD.    XR Ankle 2 View Left ([id number]) Collected:  08/15/20 0846     Updated:  08/15/20 0851    Narrative:       Exam: XR ANKLE 2 VW LEFT-     Indication: trauma     Comparison: None       Impression:       Finding/impression:     Ankle fracture dislocation with posterior displacement of the talus in  relation to the tibia/fibula. Fractures of the anterior and posterior  tibia suspected. Displaced comminuted distal fibular fracture. No  definite talar dome fracture. Calcaneus appears intact. Soft tissue  edema. No radiopaque foreign body.  This report was finalized on 08/15/2020 08:48 by Dr. Iftikhar Prieto MD.        ----------------------------------------------------------------------------------------------------------------------  Assessment:  Acute traumatic displaced bimalleolar fracture (lateral and posterior malleolus) of the left ankle, initial encounter for type II open fracture    Plan:  1) to OR for I&D left open ankle fracture/ORIF vs. External fixation left bimalleolar ankle fracture - we discussed the risks, benefits, and alternatives and the patient wishes to proceed  2) Admit postop for pain control, PT/OT, 48 hrs of IV antibiotics  3) NWBLLE    Electronically signed by Keyshawn Medrano MD on 8/15/2020 at  12:07

## 2020-08-15 NOTE — ANESTHESIA PROCEDURE NOTES
Airway  Urgency: elective    Date/Time: 8/15/2020 2:06 PM  Airway not difficult    General Information and Staff    Patient location during procedure: OR  CRNA: Willem Oh CRNA    Indications and Patient Condition  Indications for airway management: airway protection    Preoxygenated: yes  Mask difficulty assessment: 1 - vent by mask    Final Airway Details  Final airway type: supraglottic airway      Successful airway: unique  Size 4    Number of attempts at approach: 1  Assessment: lips, teeth, and gum same as pre-op    Additional Comments  Atraumatic intubation

## 2020-08-15 NOTE — PLAN OF CARE
Problem: Patient Care Overview  Goal: Plan of Care Review  Outcome: Ongoing (interventions implemented as appropriate)  Flowsheets  Taken 8/15/2020 1805  Progress: no change  Taken 8/15/2020 1645  Plan of Care Reviewed With: patient;daughter  Note:   Patient came from ED today with a left ankle fracture. Patient went down for surgery today, ORIF to left ankle, dressing CDI. LLE kept elevated, toes are warm and mobile. No c/o N/T. BLE edema, intermittent baseline per patient. Left arm nicotine patch. C/O mild pain, PRN tylenol given with good relief. OX4. Drowsy but will arouse to voice. DTV. , O2 at 3L. Safety maintained. Bed check. Will continue to monitor.

## 2020-08-16 ENCOUNTER — APPOINTMENT (OUTPATIENT)
Dept: GENERAL RADIOLOGY | Facility: HOSPITAL | Age: 75
End: 2020-08-16

## 2020-08-16 LAB
ANION GAP SERPL CALCULATED.3IONS-SCNC: 12 MMOL/L (ref 5–15)
ARTERIAL PATENCY WRIST A: POSITIVE
ATMOSPHERIC PRESS: 749 MMHG
BASE EXCESS BLDA CALC-SCNC: 1.5 MMOL/L (ref 0–2)
BDY SITE: ABNORMAL
BODY TEMPERATURE: 37 C
BUN SERPL-MCNC: 20 MG/DL (ref 8–23)
BUN/CREAT SERPL: 22.7 (ref 7–25)
CALCIUM SPEC-SCNC: 8.6 MG/DL (ref 8.6–10.5)
CHLORIDE SERPL-SCNC: 101 MMOL/L (ref 98–107)
CO2 SERPL-SCNC: 24 MMOL/L (ref 22–29)
CREAT SERPL-MCNC: 0.88 MG/DL (ref 0.57–1)
GAS FLOW AIRWAY: 3 LPM
GFR SERPL CREATININE-BSD FRML MDRD: 63 ML/MIN/1.73
GLUCOSE SERPL-MCNC: 117 MG/DL (ref 65–99)
HCO3 BLDA-SCNC: 25 MMOL/L (ref 20–26)
HCT VFR BLD AUTO: 33.6 % (ref 34–46.6)
HGB BLD-MCNC: 11.7 G/DL (ref 12–15.9)
Lab: ABNORMAL
MODALITY: ABNORMAL
PCO2 BLDA: 34.9 MM HG (ref 35–45)
PCO2 TEMP ADJ BLD: 34.9 MM HG (ref 35–45)
PH BLDA: 7.46 PH UNITS (ref 7.35–7.45)
PH, TEMP CORRECTED: 7.46 PH UNITS (ref 7.35–7.45)
PO2 BLDA: 58.5 MM HG (ref 83–108)
PO2 TEMP ADJ BLD: 58.5 MM HG (ref 83–108)
POTASSIUM SERPL-SCNC: 4.1 MMOL/L (ref 3.5–5.2)
SAO2 % BLDCOA: 92.2 % (ref 94–99)
SODIUM SERPL-SCNC: 137 MMOL/L (ref 136–145)
VENTILATOR MODE: ABNORMAL

## 2020-08-16 PROCEDURE — 85014 HEMATOCRIT: CPT | Performed by: ORTHOPAEDIC SURGERY

## 2020-08-16 PROCEDURE — 93010 ELECTROCARDIOGRAM REPORT: CPT | Performed by: INTERNAL MEDICINE

## 2020-08-16 PROCEDURE — 25010000002 CEFAZOLIN PER 500 MG: Performed by: ORTHOPAEDIC SURGERY

## 2020-08-16 PROCEDURE — 85018 HEMOGLOBIN: CPT | Performed by: ORTHOPAEDIC SURGERY

## 2020-08-16 PROCEDURE — 94799 UNLISTED PULMONARY SVC/PX: CPT

## 2020-08-16 PROCEDURE — 71045 X-RAY EXAM CHEST 1 VIEW: CPT

## 2020-08-16 PROCEDURE — 36600 WITHDRAWAL OF ARTERIAL BLOOD: CPT

## 2020-08-16 PROCEDURE — 97110 THERAPEUTIC EXERCISES: CPT

## 2020-08-16 PROCEDURE — 82803 BLOOD GASES ANY COMBINATION: CPT

## 2020-08-16 PROCEDURE — 80048 BASIC METABOLIC PNL TOTAL CA: CPT | Performed by: ORTHOPAEDIC SURGERY

## 2020-08-16 PROCEDURE — 25010000002 METHYLPREDNISOLONE PER 125 MG: Performed by: INTERNAL MEDICINE

## 2020-08-16 PROCEDURE — 97162 PT EVAL MOD COMPLEX 30 MIN: CPT

## 2020-08-16 PROCEDURE — 25010000002 ENOXAPARIN PER 10 MG: Performed by: INTERNAL MEDICINE

## 2020-08-16 PROCEDURE — 97166 OT EVAL MOD COMPLEX 45 MIN: CPT | Performed by: OCCUPATIONAL THERAPIST

## 2020-08-16 PROCEDURE — 93005 ELECTROCARDIOGRAM TRACING: CPT | Performed by: INTERNAL MEDICINE

## 2020-08-16 RX ORDER — LISINOPRIL 20 MG/1
20 TABLET ORAL DAILY
Status: DISCONTINUED | OUTPATIENT
Start: 2020-08-17 | End: 2020-08-20 | Stop reason: HOSPADM

## 2020-08-16 RX ORDER — METHYLPREDNISOLONE SODIUM SUCCINATE 125 MG/2ML
60 INJECTION, POWDER, LYOPHILIZED, FOR SOLUTION INTRAMUSCULAR; INTRAVENOUS ONCE
Status: COMPLETED | OUTPATIENT
Start: 2020-08-16 | End: 2020-08-16

## 2020-08-16 RX ORDER — METOPROLOL SUCCINATE 50 MG/1
50 TABLET, EXTENDED RELEASE ORAL
Status: DISCONTINUED | OUTPATIENT
Start: 2020-08-16 | End: 2020-08-20 | Stop reason: HOSPADM

## 2020-08-16 RX ORDER — GUAIFENESIN 600 MG/1
1200 TABLET, EXTENDED RELEASE ORAL EVERY 12 HOURS SCHEDULED
Status: DISCONTINUED | OUTPATIENT
Start: 2020-08-16 | End: 2020-08-20 | Stop reason: HOSPADM

## 2020-08-16 RX ORDER — FUROSEMIDE 40 MG/1
40 TABLET ORAL 2 TIMES DAILY
Status: DISCONTINUED | OUTPATIENT
Start: 2020-08-16 | End: 2020-08-20 | Stop reason: HOSPADM

## 2020-08-16 RX ADMIN — AMLODIPINE BESYLATE 5 MG: 5 TABLET ORAL at 09:03

## 2020-08-16 RX ADMIN — LORAZEPAM 1 MG: 1 TABLET ORAL at 20:44

## 2020-08-16 RX ADMIN — FUROSEMIDE 40 MG: 40 TABLET ORAL at 20:45

## 2020-08-16 RX ADMIN — HYDROCODONE BITARTRATE AND ACETAMINOPHEN 1 TABLET: 7.5; 325 TABLET ORAL at 10:10

## 2020-08-16 RX ADMIN — SODIUM CHLORIDE, PRESERVATIVE FREE 3 ML: 5 INJECTION INTRAVENOUS at 09:03

## 2020-08-16 RX ADMIN — ALBUTEROL SULFATE 2.5 MG: 2.5 SOLUTION RESPIRATORY (INHALATION) at 03:45

## 2020-08-16 RX ADMIN — FUROSEMIDE 40 MG: 40 TABLET ORAL at 15:05

## 2020-08-16 RX ADMIN — LORAZEPAM 1 MG: 1 TABLET ORAL at 15:05

## 2020-08-16 RX ADMIN — GUAIFENESIN 1200 MG: 600 TABLET, EXTENDED RELEASE ORAL at 20:45

## 2020-08-16 RX ADMIN — ATORVASTATIN CALCIUM 10 MG: 10 TABLET, FILM COATED ORAL at 20:45

## 2020-08-16 RX ADMIN — METHYLPREDNISOLONE SODIUM SUCCINATE 60 MG: 125 INJECTION, POWDER, FOR SOLUTION INTRAMUSCULAR; INTRAVENOUS at 05:12

## 2020-08-16 RX ADMIN — LEVETIRACETAM 500 MG: 500 TABLET, FILM COATED ORAL at 20:45

## 2020-08-16 RX ADMIN — CEFAZOLIN SODIUM 2 G: 10 INJECTION, POWDER, FOR SOLUTION INTRAVENOUS at 00:28

## 2020-08-16 RX ADMIN — NICOTINE 1 PATCH: 21 PATCH, EXTENDED RELEASE TRANSDERMAL at 09:19

## 2020-08-16 RX ADMIN — HYDROCODONE BITARTRATE AND ACETAMINOPHEN 1 TABLET: 7.5; 325 TABLET ORAL at 03:52

## 2020-08-16 RX ADMIN — LEVETIRACETAM 500 MG: 500 TABLET, FILM COATED ORAL at 09:02

## 2020-08-16 RX ADMIN — IPRATROPIUM BROMIDE 0.5 MG: 0.5 SOLUTION RESPIRATORY (INHALATION) at 20:21

## 2020-08-16 RX ADMIN — PANTOPRAZOLE SODIUM 40 MG: 40 TABLET, DELAYED RELEASE ORAL at 06:16

## 2020-08-16 RX ADMIN — GUAIFENESIN 1200 MG: 600 TABLET, EXTENDED RELEASE ORAL at 15:06

## 2020-08-16 RX ADMIN — CEFAZOLIN SODIUM 2 G: 10 INJECTION, POWDER, FOR SOLUTION INTRAVENOUS at 15:49

## 2020-08-16 RX ADMIN — METOPROLOL SUCCINATE 50 MG: 50 TABLET, EXTENDED RELEASE ORAL at 05:12

## 2020-08-16 RX ADMIN — LORAZEPAM 1 MG: 1 TABLET ORAL at 09:03

## 2020-08-16 RX ADMIN — ENOXAPARIN SODIUM 40 MG: 40 INJECTION SUBCUTANEOUS at 05:09

## 2020-08-16 RX ADMIN — SODIUM CHLORIDE, PRESERVATIVE FREE 10 ML: 5 INJECTION INTRAVENOUS at 09:03

## 2020-08-16 RX ADMIN — SODIUM CHLORIDE, POTASSIUM CHLORIDE, SODIUM LACTATE AND CALCIUM CHLORIDE 100 ML/HR: 600; 310; 30; 20 INJECTION, SOLUTION INTRAVENOUS at 05:12

## 2020-08-16 RX ADMIN — HYDROCODONE BITARTRATE AND ACETAMINOPHEN 1 TABLET: 7.5; 325 TABLET ORAL at 20:44

## 2020-08-16 RX ADMIN — SODIUM CHLORIDE, PRESERVATIVE FREE 3 ML: 5 INJECTION INTRAVENOUS at 20:50

## 2020-08-16 RX ADMIN — ASPIRIN 81 MG: 81 TABLET ORAL at 09:03

## 2020-08-16 RX ADMIN — IPRATROPIUM BROMIDE 0.5 MG: 0.5 SOLUTION RESPIRATORY (INHALATION) at 07:45

## 2020-08-16 RX ADMIN — CEFAZOLIN SODIUM 2 G: 10 INJECTION, POWDER, FOR SOLUTION INTRAVENOUS at 09:02

## 2020-08-16 NOTE — THERAPY TREATMENT NOTE
Acute Care - Physical Therapy Treatment Note  [facility]     Patient Name: Josi Mohamud  : 1945  MRN: [id number]  Today's Date: 2020  Onset of Illness/Injury or Date of Surgery: 08/15/20     Referring Physician: Dr. Medrano    Admit Date: 8/15/2020    Visit Dx:    ICD-10-CM ICD-9-CM   1. Type III open fracture of left ankle, initial encounter S82.892C 824.9   2. COPD (chronic obstructive pulmonary disease) case management patient (CMS/HCC) J44.9 496   3. Impaired mobility Z74.09 799.89   4. Decreased activities of daily living (ADL) Z78.9 V49.89     Patient Active Problem List   Diagnosis   • Syncope and collapse   • Coronary artery disease involving native coronary artery of native heart without angina pectoris   • Stented coronary artery   • Essential hypertension   • Abnormal positron emission tomography (PET) scan   • Gastroesophageal reflux disease   • Thyroid nodule   • Parotid neoplasm   • Anticoagulated   • Laryngopharyngeal reflux (LPR)   • Traumatic rhabdomyolysis (CMS/HCC)   • PAF (paroxysmal atrial fibrillation) (CMS/HCC)   • Malignant neoplasm of upper lobe of left lung (CMS/HCC)   • COPD, moderate (CMS/[facility])   • Persistent asthma without complication   • Overweight   • Cigarette nicotine dependence without complication   • Multiple thyroid nodules   • Hx of Hodgkin's lymphoma   • Dysfunction of both eustachian tubes   • Left chronic serous otitis media   • Morbidly obese (CMS/[facility])   • Tonsillar cyst   • Status post stereotactic radiosurgery   • Current every day smoker   • Renal cyst   • Cellulitis of right lower extremity   • COPD with acute exacerbation (CMS/[facility])   • Macrocytosis without anemia   • Obesity (BMI 30-39.9)   • Tobacco abuse   • Lower extremity edema, right side   • Leukocytosis   • Type III open fracture of left ankle   • Fall   • Left ankle pain       Therapy Treatment    Rehabilitation Treatment Summary     Row Name 20 1415             Treatment Time/Intention     Discipline  physical therapy assistant  -WK      Document Type  therapy note (daily note)  -WK      Subjective Information  complains of;pain LLE only with movement   -WK      Patient Effort  excellent  -WK      Existing Precautions/Restrictions  (S) fall;non-weight bearing LLE  -WK      Recorded by [WK] Cortney Medniola PTA 08/16/20 1445      Row Name 08/16/20 1415             Motor Skills Assessment/Interventions    Additional Documentation  Therapeutic Exercise (Group)  -WK      Recorded by [WK] Cortney Mendiola PTA 08/16/20 1445      Row Name 08/16/20 1415             Therapeutic Exercise    Lower Extremity (Therapeutic Exercise)  SLR (straight leg raise), bilateral;heel slides, bilateral  -WK      Lower Extremity Range of Motion (Therapeutic Exercise)  hip abduction/adduction, bilateral;ankle dorsiflexion/plantar flexion, right  -WK      Exercise Type (Therapeutic Exercise)  AROM (active range of motion);AAROM (active assistive range of motion) AROM RLE, AAROM LLE due to pain   -WK      Position (Therapeutic Exercise)  supine  -WK      Sets/Reps (Therapeutic Exercise)  10 x2   -WK      Recorded by [WK] Cortney Mendiola PTA 08/16/20 1445      Row Name 08/16/20 1415             Positioning and Restraints    Pre-Treatment Position  in bed  -WK      Post Treatment Position  bed  -WK      In Bed  fowlers;call light within reach;encouraged to call for assist  -WK      Recorded by [WK] Cortney Mendiola PTA 08/16/20 1445      Row Name                Wound 08/15/20 1416 Left ankle Incision    Wound - Properties Group Date first assessed: 08/15/20 [HW] Time first assessed: 1416 [HW] Present on Hospital Admission: N [HW] Side: Left [HW] Location: ankle [HW] Primary Wound Type: Incision [HW] Recorded by:  [HW] Blaine Degroot RN 08/15/20 1416    Row Name                Wound 08/16/20 0800 coccyx Pressure Injury    Wound - Properties Group Date first assessed: 08/16/20 [DB] Time first assessed: 0800 [DB] Location:  coccyx [DB] Primary Wound Type: Pressure inj [DB] Stage, Pressure Injury: Stage 2 [DB] Recorded by:  [DB] Destiny Arana RN 08/16/20 1134    Row Name                Wound 03/09/20 1100 Right lower leg    Wound - Properties Group Date first assessed: 03/09/20 [SUBHASH] Time first assessed: 1100 [SUBHASH] Present on Hospital Admission: Y [SUBHASH] Side: Right [SUBHASH] Orientation: lower [SUBHASH] Location: leg [SUBHASH] Recorded by:  [SUBHASH] Alexandre Meo, PT DPT 03/09/20 1134      User Key  (r) = Recorded By, (t) = Taken By, (c) = Cosigned By    Initials Name Effective Dates Discipline    Alexandre Francis, PT DPT 08/02/16 -  PT    Destiny Escobedo RN 11/06/19 -  Nurse    Cortney Garza PTA 08/02/16 -  PT    HW Blaine Degroot RN 06/05/20 -  Nurse          Wound 08/15/20 1416 Left ankle Incision (Active)   Dressing Appearance dry;intact 8/16/2020  8:55 AM   Drainage Amount none 8/16/2020  8:55 AM   Dressing Care, Wound gauze;elastic bandage 8/16/2020  8:55 AM       Rehab Goal Summary     Row Name 08/16/20 0910 08/16/20 0715          Bed Mobility Goal 1 (PT)    Activity/Assistive Device (Bed Mobility Goal 1, PT)  --  sit to supine/supine to sit  -SUBHASH     [place] Level/Cues Needed (Bed Mobility Goal 1, PT)  --  minimum assist (75% or more patient effort);2 person assist  -SUBHASH     Time Frame (Bed Mobility Goal 1, PT)  --  long term goal (LTG);10 days  -SUBHASH     Progress/Outcomes (Bed Mobility Goal 1, PT)  --  goal ongoing  -SUBHASH        Transfer Goal 1 (PT)    Activity/Assistive Device (Transfer Goal 1, PT)  --  sit-to-stand/stand-to-sit;bed-to-chair/chair-to-bed;lateral transfer;walker, rolling  -SUBHASH     [place] Level/Cues Needed (Transfer Goal 1, PT)  --  moderate assist (50-74% patient effort);2 person assist  -SUBHASH     Time Frame (Transfer Goal 1, PT)  --  long term goal (LTG);10 days  -SUBHASH     Progress/Outcome (Transfer Goal 1, PT)  --  goal ongoing  -SUBHASH        Occupational Therapy Goals    Transfer Goal Selection (OT)   transfer, OT goal 1  -JJ  --     Dressing Goal Selection (OT)  dressing, OT goal 1  -JJ  --     Toileting Goal Selection (OT)  toileting, OT goal 1  -JJ  --        Transfer Goal 1 (OT)    Activity/Assistive Device (Transfer Goal 1, OT)  sit-to-stand/stand-to-sit;bed-to-chair/chair-to-bed;shower chair;commode;commode, bedside without drop arms;walker, rolling  -JJ  --     [place] Level/Cues Needed (Transfer Goal 1, OT)  contact guard assist  -JJ  --     Time Frame (Transfer Goal 1, OT)  long term goal (LTG);by discharge  -JJ  --     Progress/Outcome (Transfer Goal 1, OT)  goal ongoing  -JJ  --        Dressing Goal 1 (OT)    Activity/Assistive Device (Dressing Goal 1, OT)  lower body dressing  -JJ  --     [place]/Cues Needed (Dressing Goal 1, OT)  minimum assist (75% or more patient effort);other (see comments) AE PRN  -JJ  --     Time Frame (Dressing Goal 1, OT)  long term goal (LTG);by discharge  -JJ  --     Progress/Outcome (Dressing Goal 1, OT)  goal ongoing  -JJ  --        Toileting Goal 1 (OT)    Activity/Device (Toileting Goal 1, OT)  toileting skills, all;commode;commode, bedside without drop arms  -JJ  --     [place] Level/Cues Needed (Toileting Goal 1, OT)  supervision required  -JJ  --     Time Frame (Toileting Goal 1, OT)  long term goal (LTG);by discharge  -JJ  --     Progress/Outcome (Toileting Goal 1, OT)  goal ongoing  -JJ  --       User Key  (r) = Recorded By, (t) = Taken By, (c) = Cosigned By    Initials Name Provider Type Discipline    Alexandre Francis, PT DPT Physical Therapist PT    Kita Rodriguez, OTR/L Occupational Therapist OT          Physical Therapy Education                 Title: PT OT SLP Therapies (In Progress)     Topic: Physical Therapy (In Progress)     Point: Mobility training (Done)     Description:   Instruct learner(s) on safety and technique for assisting patient out of bed, chair or wheelchair.  Instruct in the proper use of assistive devices, such as  walker, crutches, cane or brace.              Patient Friendly Description:   It's important to get you on your feet again, but we need to do so in a way that is safe for you. Falling has serious consequences, and your personal safety is the most important thing of all.        When it's time to get out of bed, one of us or a family member will sit next to you on the bed to give you support.     If your doctor or nurse tells you to use a walker, crutches, a cane, or a brace, be sure you use it every time you get out of bed, even if you think you don't need it.    Learning Progress Summary           Patient Acceptance, E, VU,NR by SUBHASH at 8/16/2020 0715    Comment:  Educated pt on progression of PT POC, benefits of activity, NWB L LE                   Point: Home exercise program (Not Started)     Description:   Instruct learner(s) on appropriate technique for monitoring, assisting and/or progressing patient with therapeutic exercises and activities.              Learner Progress:   Not documented in this visit.          Point: Body mechanics (Not Started)     Description:   Instruct learner(s) on proper positioning and spine alignment for patient and/or caregiver during mobility tasks and/or exercises.              Learner Progress:   Not documented in this visit.          Point: Precautions (Done)     Description:   Instruct learner(s) on prescribed precautions during mobility and gait tasks              Learning Progress Summary           Patient Acceptance, E, VU,NR by SUBHASH at 8/16/2020 0715    Comment:  Educated pt on progression of PT POC, benefits of activity, NWB L LE                               User Key     Initials Effective Dates Name Provider Type Discipline    SUBHASH 08/02/16 -  Alexandre Moe PT DPT Physical Therapist PT                PT Recommendation and Plan        Outcome Measures     Row Name 08/16/20 1200             How much help from another is currently needed...    Putting on and taking off  regular lower body clothing?  1  -JJ      Bathing (including washing, rinsing, and drying)  2  -JJ      Toileting (which includes using toilet bed pan or urinal)  2  -JJ      Putting on and taking off regular upper body clothing  4  -JJ      Taking care of personal grooming (such as brushing teeth)  4  -JJ      Eating meals  4  -JJ      AM-PAC 6 Clicks Score (OT)  17  -JJ         Functional Assessment    Outcome Measure Options  AM-PAC 6 Clicks Daily Activity (OT)  -        User Key  (r) = Recorded By, (t) = Taken By, (c) = Cosigned By    Initials Name Provider Type    Kita Rodriguez, OTR/L Occupational Therapist         Time Calculation:   PT Charges     Row Name 08/16/20 1445 08/16/20 1109          Time Calculation    Start Time  1415  -WK  0910  -SUBHASH     Stop Time  1442  -WK  1000 + 10 minutes with chart review at 0715. PT with 60 total minutes.   -SUBHASH     Time Calculation (min)  27 min  -WK  50 min  -SUBHASH     PT Received On  08/16/20  -WK  08/16/20  -SUBHASH     PT Goal Re-Cert Due Date  --  08/26/20  -SUBHASH        Time Calculation- PT    Total Timed Code Minutes- PT  27 minute(s)  -WK  --       User Key  (r) = Recorded By, (t) = Taken By, (c) = Cosigned By    Initials Name Provider Type    Alexandre Francis, PT DPT Physical Therapist    WK Cortney Mendiola PTA Physical Therapy Assistant        Therapy Charges for Today     Code Description Service Date Service Provider Modifiers Qty    15139345413 HC PT THER PROC EA 15 MIN 8/16/2020 Cortney Mendiola PTA GP 2          PT G-Codes  Outcome Measure Options: AM-PAC 6 Clicks Daily Activity (OT)  AM-PAC 6 Clicks Score (PT): 9  AM-PAC 6 Clicks Score (OT): 17    Cortney Mendiola PTA  8/16/2020

## 2020-08-16 NOTE — PLAN OF CARE
Problem: Patient Care Overview  Goal: Plan of Care Review  Outcome: Ongoing (interventions implemented as appropriate)  Flowsheets  Taken 8/16/2020 1535 by Destiny Arana RN  Progress: no change  Taken 8/16/2020 1204 by Kita Xiong OTR/L  Plan of Care Reviewed With: patient  Note:   A&O X4. No c/o N/T. No neurovascular changes. LLE dressing CDI, kept elevated. BLE edema. PO lasix given per order. , patient was on 3L of O2, currently on 2L, will continue to try and ween. Patient states she wears O2 at 2L at night at home. PRN PO pain meds given with good relief. Frequent voiding, pure wick applied. Sinus tach on tele. Very small open area on coccyx, picture in chart.Safety maintained. Will continue to monitor. Possible dc to SNF.  following.

## 2020-08-16 NOTE — DISCHARGE INSTRUCTIONS
Lower Extremity Post-op Instructions  Dr. ALVARADO        POST-OP CARE: Please follow these instructions closely!    IMPORTANT PHONE NUMBERS:  • For emergencies, please call 911  • You may reach Dr. Alvarado or his medical assistants at [phone], M-F 8:0am-5:00pm  • After 5pm or on the weekends, please call the answering service which can be reached from the number above   • Call immediately if you have any of the following symptoms:  - Elevated temperature above 101.5 degrees for more than 48 hours after surgery  - Persistent drainage  from wound  - Severe pain around surgical site  - Calf pain    Weight Bearing:   _____ Weight Bearing as tolerated (with or without crutches)   _____ Touch-Toe Weight-bearing    _____ Partial Weight Bearing  ___ % for ___ weeks (must use crutches)   __X___ Non Weight Bearing for _5-6___ weeks (must use crutches, wheelchair or                          knee walker)    Bathing:  If stated below, it is ok to remove your postop dressing and shower.  DO NOT SOAK the incision in water.  Pat the incision site dry after surgery  ___ You may remove your dressing and shower on the 3rd day following surgery; if you shower before this, please cover your incision thoroughly  _X__Keep your splint/dressing clean, dry, intact.  Do not place foreign objects inside the splint/dressing.    Dressings: Do NOT remove dressing/splint unless unless told to do so. SOME DRAINAGE IS NORMAL!  • If you have a splint or cast, do NOT get wet!!    • DO NOT touch, remove, or apply ointment to the incision and/or steri strips  • Steri strips may fall off on their own  • Signs of infection that warrant a phone call to our clinical line:  o Excessive drainage or redness  o Red streaking coming away from the incision  o Increased pain  o Increased temperature above 101 degrees    Sutures:  If your physician uses sutures in your knee or ankle, they will dissolve on their own and will not need to be removed.  Black sutures  occasionally used will need to be removed 10-14 days after surgery.    Elevate: Place 2 pillows under your ankle to get the incision area above the level of the heart to help in swelling (a recliner is not elevated!!!)    Ice: Ice your surgery site 5-6 times per day for 20 minutes at a time with dressing in place. You should wait at least 30 minutes before icing again to avoid ice irritation. It may be difficult at first to ice the surgery area due to the amount of dressing, but continue to be diligent with icing.  Your dressings will be taken down at your first post-op appointment.     Range of motion:   - For the knee- It is important to gain gain full extension (knee straight) as soon as possible following surgery.         Ice to decrease swelling --> Knee fully straight --> Walk without a limp!!!  - NO PILLOWS UNDER THE KNEE, ONLY under the ankle  - For the foot/ankle- range of motion restrictions will be given to you at your first post-op appointment. Until that time, avoid any unnecessary range o f motion.  - Physical therapy- Your physical therapy status will be discussed with you at your first post-op appointment.   **Achieving range of motion goals and decreasing swelling/inflammation are the primary focus for the first two (2) weeks following surgery. **    Medications: You will be discharged with the appropriate medications following your surgery. Fill these at the pharmacy and take them as directed on the label.   Possible medications that will be prescribed are below.  You may or may not receive all of these. Occasionally, additional medications may be given with specific instructions.  Percocet/Lortab (oxycodone/hydrocodone with tylenol) - Pain Medication.  o Take one tablet every 4-6 hours. DO NOT EXCEED 4,000mg of Tylenol in 24 hours.        **Itching is not an allergy - take benadryl or an over the counter allergy medication (claritin, Zyrtec) if needed  **DO NOT MIX WITH ALCOHOL, DRIVE WHILE  TAKING, OR TAKE EXTRA TYLENOL*   Zofran - Anti-nausea medication to help prevent nausea and vomiting after                             surgery.     Aspirin 81 mg - all patients with lower extremity surgery should take one aspirin                            81 mg for 17 days after surgery    DO NOT TAKE NSAID'S (ex. IBUPROFEN, MOTRIN, ALEVE, ETC) AFTER A BROKEN BONE HAS BEEN REPAIRED OR AFTER ACL SURGERY - THESE MEDICATIONS WILL SLOW THE HEALING PROCESS!!!    **If you are running low on pain medications, please notify us if you need a refill 24-48 hours prior to when you run out, so we can make arrangements to refill the prescription for you if we determine it is necessary**

## 2020-08-16 NOTE — PLAN OF CARE
Problem: Patient Care Overview  Goal: Plan of Care Review  Flowsheets  Taken 8/16/2020 1204  Plan of Care Reviewed With: patient  Taken 8/16/2020 0910  Outcome Summary: OT eval completed. Pt is A&Ox4. Demo's decreased strength, balance, activity tolerance, and increased pain. BUE strength 4-/5. Mod/Max Ax2 for supine <> sit at EOB and scooting/bridging. Min Ax2 for sit <> stand t/f from EOB and vcs for NWB L LE. Pt would benefit from skilled OT services to address these deficits. Recommend d/c to SNF for continued skilled therapy services.

## 2020-08-16 NOTE — PLAN OF CARE
Problem: Patient Care Overview  Goal: Plan of Care Review  Flowsheets (Taken 8/16/2020 0715)  Outcome Summary: PT rosie completed. She presents alert and oriented x 4. She was educated on NWB L LE. She required mod assist x 2 for all supine <> sit bed mobility. She stood x 2 reps. She needed min assist x 2 to stand and cues for NWB L LE. She demos weakness with activity and is at risk for falls. PT will cont with functional mobility, strength and balance. I anticipate she will need SNF placement for therapy.

## 2020-08-16 NOTE — PROGRESS NOTES
Orthopedic Surgery Progress Note    Josi Mohamud  8/16/2020      Subjective:     Systemic or Specific Complaints: pain overnight but no acute events    Objective:     Patient Vitals for the past 24 hrs:   BP Temp Temp src Pulse Resp SpO2 Height Weight   08/16/20 0750 -- -- -- (!) 124 20 95 % -- --   08/16/20 0745 138/71 98.3 °F (36.8 °C) Oral (!) 123 18 94 % -- --   08/16/20 0648 -- -- -- (!) 123 -- -- -- --   08/16/20 0617 -- -- -- (!) 126 18 96 % -- --   08/16/20 0550 -- -- -- (!) 132 -- -- -- --   08/16/20 0512 -- -- -- (!) 138 -- -- -- --   08/16/20 0435 124/60 -- -- (!) 140 -- -- -- --   08/16/20 0354 -- -- -- 109 20 93 % -- --   08/16/20 0345 -- -- -- 108 20 90 % -- --   08/16/20 0331 133/60 98.8 °F (37.1 °C) Oral 112 18 91 % -- --   08/16/20 0212 -- -- -- 98 -- 91 % -- --   08/16/20 0030 -- -- -- 98 -- 92 % -- --   08/15/20 2329 -- -- -- 104 -- 91 % -- --   08/15/20 2325 140/51 98.4 °F (36.9 °C) Oral 103 18 91 % -- --   08/15/20 2138 129/68 98.4 °F (36.9 °C) Oral 84 16 96 % -- --   08/15/20 1954 -- -- -- 85 16 97 % -- --   08/15/20 1947 -- -- -- 85 16 97 % -- --   08/15/20 1805 155/61 98 °F (36.7 °C) Oral 89 16 97 % -- --   08/15/20 1743 151/63 98.1 °F (36.7 °C) Oral 90 16 97 % -- --   08/15/20 1713 150/66 98 °F (36.7 °C) Oral 88 16 97 % -- --   08/15/20 1704 -- -- -- 93 17 96 % -- --   08/15/20 1615 135/61 -- -- 83 16 97 % -- --   08/15/20 1605 138/65 97.3 °F (36.3 °C) Temporal 89 24 94 % -- --   08/15/20 1600 -- -- -- 88 24 93 % -- --   08/15/20 1545 108/55 -- -- 81 14 94 % -- --   08/15/20 1530 130/60 -- -- 84 18 91 % -- --   08/15/20 1520 -- -- -- 88 14 90 % -- --   08/15/20 1515 129/64 -- -- 84 14 91 % -- --   08/15/20 1511 129/64 -- -- 85 12 92 % -- --   08/15/20 1506 139/62 -- -- 87 13 92 % -- --   08/15/20 1501 146/80 97 °F (36.1 °C) Temporal 87 14 94 % -- --   08/15/20 1241 -- -- -- 85 -- -- -- --   08/15/20 1231 -- -- -- 84 16 97 % -- --   08/15/20 1120 137/67 98 °F (36.7 °C) Oral 88 18 96 % --  "83.1 kg (183 lb 4.8 oz)   08/15/20 1015 133/68 -- -- 85 -- 95 % -- --   08/15/20 1008 116/61 -- -- 83 -- 92 % -- --   08/15/20 0900 126/60 -- -- 90 -- 92 % -- --   08/15/20 0859 -- -- -- -- -- -- 162.6 cm (64\") 81.6 kg (180 lb)   08/15/20 0845 133/66 -- -- 94 -- 94 % -- --       left lower  General: alert, appears stated age and cooperative   Wound: clean, dry, intact             Dressing: clean, dry, intact   Extremity: Distal NVI           DVT Exam: No evidence of DVT seen on physical exam.                   Data Review:  Lab Results (last 24 hours)     Procedure Component Value Units Date/Time    Basic Metabolic Panel [[id number]]  (Abnormal) Collected:  08/16/20 0507    Specimen:  Blood Updated:  08/16/20 0614     Glucose 117 mg/dL      BUN 20 mg/dL      Creatinine 0.88 mg/dL      Sodium 137 mmol/L      Potassium 4.1 mmol/L      Chloride 101 mmol/L      CO2 24.0 mmol/L      Calcium 8.6 mg/dL      eGFR Non African Amer 63 mL/min/1.73      BUN/Creatinine Ratio 22.7     Anion Gap 12.0 mmol/L     Narrative:       GFR Normal >60  Chronic Kidney Disease <60  Kidney Failure <15      Hemoglobin & Hematocrit, Blood [[id number]]  (Abnormal) Collected:  08/16/20 0507    Specimen:  Blood Updated:  08/16/20 0556     Hemoglobin 11.7 g/dL      Hematocrit 33.6 %     Blood Gas, Arterial [[id number]]  (Abnormal) Collected:  08/16/20 0457    Specimen:  Arterial Blood Updated:  08/16/20 0504     Site Left Radial     Rom's Test Positive     pH, Arterial 7.463 pH units      Comment: 83 Value above reference range        pCO2, Arterial 34.9 mm Hg      Comment: 84 Value below reference range        pO2, Arterial 58.5 mm Hg      Comment: 84 Value below reference range        HCO3, Arterial 25.0 mmol/L      Base Excess, Arterial 1.5 mmol/L      O2 Saturation, Arterial 92.2 %      Comment: 84 Value below reference range        Temperature 37.0 C      Barometric Pressure for Blood Gas 749 mmHg      Modality Nasal Cannula     Flow Rate 3.0 " lpm      Ventilator Mode NA     Collected by [id number]     Comment: Meter: [id number]     :  [id number]        pCO2, Temperature Corrected 34.9 mm Hg      pH, Temp Corrected 7.463 pH Units      pO2, Temperature Corrected 58.5 mm Hg     [place] Draw [[id number]] Collected:  08/15/20 0835    Specimen:  Blood Updated:  08/15/20 0945    Narrative:       The following orders were created for panel order [place] Draw.  Procedure                               Abnormality         Status                     ---------                               -----------         ------                     Light Blue Top[[id number]]                                   Final result               Green Top (Gel)[[id number]]                                  Final result               Lavender Top[[id number]]                                     Final result               Red Top[[id number]]                                          Final result                 Please view results for these tests on the individual orders.    Light Blue Top [[id number]] Collected:  08/15/20 0835    Specimen:  Blood Updated:  08/15/20 0945     Extra Tube hold for add-on     Comment: Auto resulted       Green Top (Gel) [[id number]] Collected:  08/15/20 0835    Specimen:  Blood Updated:  08/15/20 0945     Extra Tube Hold for add-ons.     Comment: Auto resulted.       Lavender Top [[id number]] Collected:  08/15/20 0835    Specimen:  Blood Updated:  08/15/20 0945     Extra Tube hold for add-on     Comment: Auto resulted       Red Top [[id number]] Collected:  08/15/20 0835    Specimen:  Blood Updated:  08/15/20 0945     Extra Tube Hold for add-ons.     Comment: Auto resulted.       Comprehensive Metabolic Panel [[id number]]  (Abnormal) Collected:  08/15/20 0915    Specimen:  Blood from Arm, Left Updated:  08/15/20 0943     Glucose 131 mg/dL      BUN 21 mg/dL      Creatinine 1.16 mg/dL      Sodium 133 mmol/L      Potassium 4.3 mmol/L      Comment: Specimen hemolyzed.   Results may be affected.        Chloride 97 mmol/L      CO2 26.0 mmol/L      Calcium 9.1 mg/dL      Total Protein 7.0 g/dL      Albumin 3.80 g/dL      ALT (SGPT) 13 U/L      AST (SGOT) 23 U/L      Comment: Specimen hemolyzed.  Results may be affected.        Alkaline Phosphatase 100 U/L      Total Bilirubin 0.3 mg/dL      eGFR Non African Amer 46 mL/min/1.73      Globulin 3.2 gm/dL      A/G Ratio 1.2 g/dL      BUN/Creatinine Ratio 18.1     Anion Gap 10.0 mmol/L     Narrative:       GFR Normal >60  Chronic Kidney Disease <60  Kidney Failure <15      Troponin [[id number]]  (Normal) Collected:  08/15/20 0915    Specimen:  Blood from Arm, Left Updated:  08/15/20 0940     Troponin T <0.010 ng/mL     Narrative:       Troponin T Reference Range:  <= 0.03 ng/mL-   Negative for AMI  >0.03 ng/mL-     Abnormal for myocardial necrosis.  Clinicians would have to utilize clinical acumen, EKG, Troponin and serial changes to determine if it is an Acute Myocardial Infarction or myocardial injury due to an underlying chronic condition.       Results may be falsely decreased if patient taking Biotin.      Protime-INR [[id number]]  (Abnormal) Collected:  08/15/20 0915    Specimen:  Blood from Arm, Left Updated:  08/15/20 0934     Protime 16.0 Seconds      INR 1.32    aPTT [[id number]]  (Normal) Collected:  08/15/20 0915    Specimen:  Blood from Arm, Left Updated:  08/15/20 0934     PTT 32.6 seconds     COVID-19, [facility]-HOUSE,NP Swab (NO TRANSPORT MEDIA) 2 HR TAT - Swab, Nasopharynx [[id number]]  (Normal) Collected:  08/15/20 0848    Specimen:  Swab from Nasopharynx Updated:  08/15/20 0922     COVID19 Not Detected    Narrative:       Fact sheet for providers: https://www.fda.gov/media/049315/download     Fact sheet for patients: https://www.fda.gov/media/080032/download    Blood Gas, Arterial [[id number]]  (Abnormal) Collected:  08/15/20 0918    Specimen:  Arterial Blood Updated:  08/15/20 0922     Site Right Brachial     Rom's Test N/A      pH, Arterial 7.385 pH units      pCO2, Arterial 43.7 mm Hg      pO2, Arterial 64.7 mm Hg      Comment: 84 Value below reference range        HCO3, Arterial 26.1 mmol/L      Comment: 83 Value above reference range        Base Excess, Arterial 0.7 mmol/L      O2 Saturation, Arterial 93.0 %      Comment: 84 Value below reference range        Temperature 37.0 C      Barometric Pressure for Blood Gas 749 mmHg      Modality Nasal Cannula     Flow Rate 2.0 lpm      Ventilator Mode NA     Collected by [id number]     Comment: Meter: [id number]     :  [id number]        pCO2, Temperature Corrected 43.7 mm Hg      pH, Temp Corrected 7.385 pH Units      pO2, Temperature Corrected 64.7 mm Hg     CBC & Differential [[id number]] Collected:  08/15/20 0835    Specimen:  Blood Updated:  08/15/20 0858    Narrative:       The following orders were created for panel order CBC & Differential.  Procedure                               Abnormality         Status                     ---------                               -----------         ------                     CBC Auto Differential[[id number]]        Abnormal            Final result                 Please view results for these tests on the individual orders.    CBC Auto Differential [[id number]]  (Abnormal) Collected:  08/15/20 0835    Specimen:  Blood Updated:  08/15/20 0858     WBC 6.96 10*3/mm3      RBC 4.43 10*6/mm3      Hemoglobin 15.0 g/dL      Hematocrit 43.3 %      MCV 97.7 fL      MCH 33.9 pg      MCHC 34.6 g/dL      RDW 13.8 %      RDW-SD 49.0 fl      MPV 10.2 fL      Platelets 274 10*3/mm3      Neutrophil % 81.5 %      Lymphocyte % 12.4 %      Monocyte % 5.6 %      Eosinophil % 0.0 %      Basophil % 0.4 %      Immature Grans % 0.1 %      Neutrophils, Absolute 5.67 10*3/mm3      Lymphocytes, Absolute 0.86 10*3/mm3      Monocytes, Absolute 0.39 10*3/mm3      Eosinophils, Absolute 0.00 10*3/mm3      Basophils, Absolute 0.03 10*3/mm3      Immature Grans, Absolute 0.01  10*3/mm3      nRBC 0.0 /100 WBC         Imaging Results (Last 24 Hours)     Procedure Component Value Units Date/Time    XR Chest 1 View [[id number]] Collected:  08/16/20 0722     Updated:  08/16/20 0726    Narrative:       Exam:   XR CHEST 1 VW-       Date:  8/16/2020      History:  Female, age  74 years;soa, low sats; S82.892C-Other fracture  of left lower leg, initial encounter for open fracture type IIIA, IIIB,  or IIIC; J44.9-Chronic obstructive pulmonary disease, unspecified     COMPARISON:  Chest x-ray dated 8/15/2020     Findings :     The heart and mediastinum are normal in size. Lungs are without focal  infiltrate, mass or effusions.  The bones show no acute pathology. Loop  recording device in place. Surgical clips project over the right  thoracic inlet.       Impression:       Impression:     No acute cardiopulmonary disease.     This report was finalized on 08/16/2020 07:22 by Dr. Pinky Snow MD.    XR Ankle 2 View Left [[id number]] Collected:  08/15/20 1506     Updated:  08/15/20 1511    Narrative:       XR ANKLE 2 VW LEFT- 8/15/2020 3:05 PM CDT     HISTORY: fracture; S82.892C-Other fracture of left lower leg, initial  encounter for open fracture type IIIA, IIIB, or IIIC; J44.9-Chronic  obstructive pulmonary disease, unspecified      COMPARISON: None       Impression:       2 intraoperative fluoroscopic views of the ankle are submitted. These  images were obtained for procedural guidance during distal fibular  fixation. Please refer to the operative note for more details.  Fluoroscopy time was 17 seconds with a dose of 0.34 mGy.   This report was finalized on 08/15/2020 15:08 by Dr. Iftikhar Prieto MD.    FL C Arm During Surgery [[id number]] Resulted:  08/15/20 1504     Updated:  08/15/20 1506    XR Ankle 2 View Left [[id number]] Collected:  08/15/20 0908     Updated:  08/15/20 0913    Narrative:       Exam: XR ANKLE 2 VW LEFT-     Indication: trauma; post splint     Comparison: Same day radiograph      Findings:     Tibiotalar dislocation has been reduced and ankle joint now appears in  near-anatomic alignment. Talar dome appears intact. No definite tibial  fracture. Distal fibular fracture appears improved in alignment.  Circumferential soft tissue swelling.       Impression:       Impression:     1.  Interval reduction of tibiotalar joint, now in near-anatomic  alignment.  2.  Distal fibular fracture, improved in alignment.  3.  No definite distal tibial fracture.  This report was finalized on 08/15/2020 09:10 by Dr. Iftikhar Prieto MD.    XR Tibia Fibula 2 View Left [[id number]] Collected:  08/15/20 0906     Updated:  08/15/20 0911    Narrative:       Exam: XR TIBIA FIBULA 2 VW LEFT-     Indication: open bimalleolar fracture rule out proximal injury     Comparison: None       Impression:       Finding/impression:     No evidence of acute fracture involving the proximal and mid  tibia/fibula. Redemonstrated distal fibular fracture. Ankle joint  appears to have been reduced and now in anatomic alignment. Diffuse soft  tissue edema about the ankle.  This report was finalized on 08/15/2020 09:08 by Dr. Iftikhar Prieto MD.    XR Chest 1 View [[id number]] Collected:  08/15/20 0901     Updated:  08/15/20 0907    Narrative:       Exam: XR CHEST 1 VW-     Indication: trauma; pre-surgery     Comparison: 7/20/2020     Findings:     Cardiac silhouette is borderline enlarged. No pleural effusion,  pneumothorax, or focal consolidation. RIGHT anterior neck surgical clip.  Implantable loop recorder device. No acute osseous finding.       Impression:       Impression:     No acute findings.  This report was finalized on 08/15/2020 09:02 by Dr. Iftikhar Prieto MD.    XR Ankle 2 View Left [[id number]] Collected:  08/15/20 0846     Updated:  08/15/20 0851    Narrative:       Exam: XR ANKLE 2 VW LEFT-     Indication: trauma     Comparison: None       Impression:       Finding/impression:     Ankle fracture dislocation with posterior  displacement of the talus in  relation to the tibia/fibula. Fractures of the anterior and posterior  tibia suspected. Displaced comminuted distal fibular fracture. No  definite talar dome fracture. Calcaneus appears intact. Soft tissue  edema. No radiopaque foreign body.  This report was finalized on 08/15/2020 08:48 by Dr. Iftikhar Prieto MD.          Assessment:     POD# 1 I&D/ORIF L open aliza    Plan:      1:  DVT prophylaxis, ICE, elevate  2:  Pain control  3:  Physical therapy/Occupational therapy  4:  48 hrs Abx postop, then ok to d/c  5: JONI Medrano MD

## 2020-08-16 NOTE — THERAPY EVALUATION
Acute Care - Occupational Therapy Initial Evaluation  [facility]     Patient Name: Josi Mohamud  : 1945  MRN: [id number]  Today's Date: 2020  Onset of Illness/Injury or Date of Surgery: 08/15/20  Date of Referral to OT: 08/15/20  Referring Physician: Dr. Medrano    Admit Date: 8/15/2020       ICD-10-CM ICD-9-CM   1. Type III open fracture of left ankle, initial encounter S82.892C 824.9   2. COPD (chronic obstructive pulmonary disease) case management patient (CMS/HCC) J44.9 496   3. Impaired mobility Z74.09 799.89   4. Decreased activities of daily living (ADL) Z78.9 V49.89     Patient Active Problem List   Diagnosis   • Syncope and collapse   • Coronary artery disease involving native coronary artery of native heart without angina pectoris   • Stented coronary artery   • Essential hypertension   • Abnormal positron emission tomography (PET) scan   • Gastroesophageal reflux disease   • Thyroid nodule   • Parotid neoplasm   • Anticoagulated   • Laryngopharyngeal reflux (LPR)   • Traumatic rhabdomyolysis (CMS/HCC)   • PAF (paroxysmal atrial fibrillation) (CMS/HCC)   • Malignant neoplasm of upper lobe of left lung (CMS/HCC)   • COPD, moderate (CMS/HCC)   • Persistent asthma without complication   • Overweight   • Cigarette nicotine dependence without complication   • Multiple thyroid nodules   • Hx of Hodgkin's lymphoma   • Dysfunction of both eustachian tubes   • Left chronic serous otitis media   • Morbidly obese (CMS/HCC)   • Tonsillar cyst   • Status post stereotactic radiosurgery   • Current every day smoker   • Renal cyst   • Cellulitis of right lower extremity   • COPD with acute exacerbation (CMS/HCC)   • Macrocytosis without anemia   • Obesity (BMI 30-39.9)   • Tobacco abuse   • Lower extremity edema, right side   • Leukocytosis   • Type III open fracture of left ankle   • Fall   • Left ankle pain     Past Medical History:   Diagnosis Date   • Bronchitis    • Cancer (CMS/HCC)     lung   • COPD  (chronic obstructive pulmonary disease) (CMS/HCC)    • Coronary artery disease    • GERD (gastroesophageal reflux disease)    • History of transfusion    • Hyperlipidemia    • Hypertension    • PAF (paroxysmal atrial fibrillation) (CMS/HCC)    • Persistent asthma without complication 7/8/2019   • Seizures (CMS/HCC)      Past Surgical History:   Procedure Laterality Date   • ANKLE OPEN REDUCTION INTERNAL FIXATION Left 8/15/2020    Procedure: ANKLE OPEN REDUCTION INTERNAL FIXATION;  Surgeon: Keyshawn Medrano MD;  Location: [facility] OR;  Service: Orthopedics;  Laterality: Left;   • APPENDECTOMY     • APPENDECTOMY     • CARDIAC CATHETERIZATION     • CARDIAC ELECTROPHYSIOLOGY PROCEDURE N/A 5/23/2017    Procedure: Loop insertion; LINQ;  Surgeon: oJse Monteiro MD;  Location:  [facility] CATH IN[facility]ASIVE LOCATION;  Service:    • CHOLECYSTECTOMY     • CORONARY ANGIOPLASTY WITH STENT PLACEMENT      X 1    • INCISION AND DRAINAGE OF WOUND Left 8/15/2020    Procedure: INCISION AND DRAINAGE WOUND;  Surgeon: Keyshawn Medrano MD;  Location: [facility] OR;  Service: Orthopedics;  Laterality: Left;   • LIVER BIOPSY     • MYRINGOTOMY W/ TUBES Left 11/22/2019    Procedure: LEFT MYRINGOTOMY WITH INSERTION OF EAR TUBE WITH RIGHT EAR EXAM UNDER ANESTHESIA;  Surgeon: Se Mcnulty MD;  Location: [facility] OR;  Service: ENT   • SPLENECTOMY     • US GUIDED FINE NEEDLE ASPIRATION  8/7/2018          OT ASSESSMENT FLOWSHEET (last 12 hours)      Occupational Therapy Evaluation     Row Name 08/16/20 0910                   OT Evaluation Time/Intention    Subjective Information  no complaints  -JJ        Document Type  evaluation see MAR  -JJ        Mode of Treatment  occupational therapy  -JJ        Patient Effort  good  -JJ           General Information    Patient Profile Reviewed?  yes  -JJ        Onset of Illness/Injury or Date of Surgery  08/15/20  -JJ        Referring Physician  Dr. Medrano  -JJ        Patient Observations   alert;cooperative;agree to therapy  -JJ        Patient/Family Observations  no family present in room   -JJ        General Observations of Patient  fowlers in bed, IV infusing, alert   -JJ        Prior Level of Function  independent:;all household mobility;community mobility;transfer;bed mobility;ADL's  -JJ        Equipment Currently Used at Home  rollator;wheelchair;cane, straight;shower chair rwx for mobility occasionally   -JJ        Pertinent History of Current Functional Problem  Fall in bedroom, L ankle pain & bleeding. Dx: Acute traumatic displaced bimalleolar fracture (lateral/posterior malleolus) of the Left lower leg. s/p 8/15 ORIF of closed displaced bimalleolar fracture of the Left lower leg, Irrigation and excisionaldebridement of skin, subcutaneous tissue, and bone left open ankle fracture.   -JJ        Existing Precautions/Restrictions  (S) fall;left;non-weight bearing  -JJ        Risks Reviewed  patient:;LOB;nausea/vomiting;dizziness;increased discomfort;change in vital signs;increased drainage;lines disloged  -JJ        Benefits Reviewed  patient:;improve function;increase independence;increase strength;increase balance;decrease pain;decrease risk of DVT;improve skin integrity;increase knowledge  -JJ           Relationship/Environment    Lives With  child(rd), adult  -JJ        Family Caregiver if Needed  child(rd), adult  -JJ           Resource/Environmental Concerns    Current Living Arrangements  home/apartment/condo  -JJ        Resource/Environmental Concerns  none  -JJ           Home Main Entrance    Number of Stairs, Main Entrance  one  -JJ        Stair Railings, Main Entrance  none  -JJ           Cognitive Assessment/Interventions    Additional Documentation  Cognitive Assessment/Intervention (Group)  -JJ           Cognitive Assessment/Intervention- PT/OT    Affect/Mental Status (Cognitive)  WFL  -JJ        Orientation Status (Cognition)  oriented x 4  -JJ        Follows Commands  (Cognition)  [facility]  -        Cognitive Function (Cognitive)  [facility]  -        Personal Safety Interventions  fall prevention program maintained;gait belt;muscle strengthening facilitated;nonskid shoes/slippers when out of bed;supervised activity  -           Safety Issues, Functional Mobility    Safety Issues Affecting Function (Mobility)  --  -J        Impairments Affecting Function (Mobility)  balance;endurance/activity tolerance;strength;postural/trunk control;pain  -           Mobility Assessment/Treatment    Extremity Weight-bearing Status  (S) left lower extremity  -JJ        Left Lower Extremity (Weight-bearing Status)  (S) non weight-bearing (NWB)  -JJ           Bed Mobility Assessment/Treatment    Bed Mobility Assessment/Treatment  supine-sit  -JJ        Scooting/Bridging [place] (Bed Mobility)  maximum assist (25% patient effort);2 person assist  -JJ        Supine-Sit [place] (Bed Mobility)  moderate assist (50% patient effort);2 person assist  -JJ        Sit-Supine [place] (Bed Mobility)  moderate assist (50% patient effort);2 person assist  -J        Assistive Device (Bed Mobility)  head of bed elevated;draw sheet;bed rails  -           Functional Mobility    Functional Mobility- Ind. Level  unable to perform  -           Transfer Assessment/Treatment    Transfer Assessment/Treatment  sit-stand transfer;stand-sit transfer  -J        Comment (Transfers)  stood x2 reps. Attempted pivoting at EOB, pt was unable to weightshift on foot to complete pivoting   -JJ           Sit-Stand Transfer    Sit-Stand [place] (Transfers)  minimum assist (75% patient effort);2 person assist  -J        Assistive Device (Sit-Stand Transfers)  walker, front-wheeled  -JJ           Stand-Sit Transfer    Stand-Sit [place] (Transfers)  minimum assist (75% patient effort);2 person assist;verbal cues  -J        Assistive Device (Stand-Sit Transfers)  walker, front-wheeled  -JJ           ADL  Assessment/Intervention    BADL Assessment/Intervention  lower body dressing  -JJ           Lower Body Dressing Assessment/Training    Lower Body Dressing [place] Level  don;socks;maximum assist (25% patient effort)  -JJ        Lower Body Dressing Position  edge of bed sitting  -J           BADL Safety/Performance    Impairments, BADL Safety/Performance  balance;endurance/activity tolerance;pain;strength  -JJ        Skilled BADL Treatment/Intervention  BADL process/adaptation training  -JJ           General ROM    GENERAL ROM COMMENTS  BUE AROM WFL   -JJ           MMT (Manual Muscle Testing)    General MMT Comments  BUE strength 4-/5  -JJ           Motor Assessment/Interventions    Additional Documentation  Balance (Group)  -JJ           Balance    Balance  static sitting balance;static standing balance  -J           Static Sitting Balance    Level of [place] (Unsupported Sitting, Static Balance)  supervision  -        Sitting Position (Unsupported Sitting, Static Balance)  sitting on edge of bed  -JJ           Static Standing Balance    Level of [place] (Supported Standing, Static Balance)  minimal assist, 75% patient effort;2 person assist  -JJ        Assistive Device Utilized (Supported Standing, Static Balance)  walker, rolling  -           Sensory Assessment/Intervention    Sensory General Assessment  no sensation deficits identified per pt report  -JJ           Positioning and Restraints    Pre-Treatment Position  in bed  -JJ        Post Treatment Position  bed  -JJ        In Bed  notified nsg;fowlers;call light within reach;encouraged to call for assist;exit alarm on;SCD pump applied;side rails up x3;legs elevated  -JJ           Pain Assessment    Additional Documentation  Pain Scale: Numbers Pre/Post-Treatment (Group)  -JJ           Pain Scale: Numbers Pre/Post-Treatment    Pain Scale: Numbers, Post-Treatment  3/10  -JJ        Pain Location - Side  Left  -JJ        Pain Location  ankle   -JJ        Pain Intervention(s)  Repositioned;Ambulation/increased activity;Medication (See MAR)  -JJ           Wound 08/15/20 1416 Left ankle Incision    Wound - Properties Group Date first assessed: 08/15/20  -HW Time first assessed: 1416  -HW Present on Hospital Admission: N  -HW Side: Left  -HW Location: ankle  -HW Primary Wound Type: Incision  -HW       Wound 08/16/20 0800 coccyx Pressure Injury    Wound - Properties Group Date first assessed: 08/16/20  -DB Time first assessed: 0800  -DB Location: coccyx  -DB Primary Wound Type: Pressure inj  -DB Stage, Pressure Injury: Stage 2  -DB       Wound 03/09/20 1100 Right lower leg    Wound - Properties Group Date first assessed: 03/09/20  -SUBHASH Time first assessed: 1100  -SUBHASH Present on Hospital Admission: Y  -SUBHASH Side: Right  -SUBHASH Orientation: lower  -SUBHASH Location: leg  -SUBHASH       Plan of Care Review    Plan of Care Reviewed With  patient  -JJ        Outcome Summary  OT eval completed. Pt is A&Ox4. Demo's decreased strength, balance, activity tolerance, and increased pain. BUE strength 4-/5. Mod/Max Ax2 for supine <> sit at EOB and scooting/bridging. Min Ax2 for sit <> stand t/f from EOB and vcs for NWB L LE. Pt would benefit from skilled OT services to address these deficits. Recommend d/c to SNF for continued skilled therapy services.   -JJ           Clinical Impression (OT)    Date of Referral to OT  08/15/20  -JJ        OT Diagnosis  decreased adls  -JJ        Prognosis (OT Eval)  good  -JJ        Patient/Family Goals Statement (OT Eval)  return home  -JJ        Criteria for Skilled Therapeutic Interventions Met (OT Eval)  yes;treatment indicated  -JJ        Rehab Potential (OT Eval)  good, to achieve stated therapy goals  -JJ        Therapy Frequency (OT Eval)  5 times/wk  -JJ        Predicted Duration of Therapy Intervention (Therapy Eval)  10 days  -JJ        Care Plan Review (OT)  evaluation/treatment results reviewed;care plan/treatment goals  reviewed;risks/benefits reviewed;current/potential barriers reviewed;patient/other agree to care plan  -JJ        Anticipated Discharge Disposition (OT)  skilled nursing facility  -JJ           Planned OT Interventions    Planned Therapy Interventions (OT Eval)  activity tolerance training;BADL retraining;adaptive equipment training;functional balance retraining;occupation/activity based interventions;patient/caregiver education/training;strengthening exercise;transfer/mobility retraining  -JJ           OT Goals    Transfer Goal Selection (OT)  transfer, OT goal 1  -JJ        Dressing Goal Selection (OT)  dressing, OT goal 1  -JJ        Toileting Goal Selection (OT)  toileting, OT goal 1  -JJ           Transfer Goal 1 (OT)    Activity/Assistive Device (Transfer Goal 1, OT)  sit-to-stand/stand-to-sit;bed-to-chair/chair-to-bed;shower chair;commode;commode, bedside without drop arms;walker, rolling  -JJ        [place] Level/Cues Needed (Transfer Goal 1, OT)  contact guard assist  -JJ        Time Frame (Transfer Goal 1, OT)  long term goal (LTG);by discharge  -JJ        Progress/Outcome (Transfer Goal 1, OT)  goal ongoing  -JJ           Dressing Goal 1 (OT)    Activity/Assistive Device (Dressing Goal 1, OT)  lower body dressing  -JJ        [place]/Cues Needed (Dressing Goal 1, OT)  minimum assist (75% or more patient effort);other (see comments) AE PRN  -JJ        Time Frame (Dressing Goal 1, OT)  long term goal (LTG);by discharge  -JJ        Progress/Outcome (Dressing Goal 1, OT)  goal ongoing  -JJ           Toileting Goal 1 (OT)    Activity/Device (Toileting Goal 1, OT)  toileting skills, all;commode;commode, bedside without drop arms  -JJ        [place] Level/Cues Needed (Toileting Goal 1, OT)  supervision required  -JJ        Time Frame (Toileting Goal 1, OT)  long term goal (LTG);by discharge  -JJ        Progress/Outcome (Toileting Goal 1, OT)  goal ongoing  -JJ           Living Environment    Home  Accessibility  stairs to enter home;tub/shower is not walk in  -          User Key  (r) = Recorded By, (t) = Taken By, (c) = Cosigned By    Initials Name Effective Dates    Alexandre Francis, PT DPT 08/02/16 -     Destiny Escobedo, RN 11/06/19 -     Kita oRdriguez OTR/MARYJO 07/05/20 -     Blaine Stack RN 06/05/20 -          Occupational Therapy Education                 Title: PT OT SLP Therapies (In Progress)     Topic: Occupational Therapy (In Progress)     Point: ADL training (Done)     Description:   Instruct learner(s) on proper safety adaptation and remediation techniques during self care or transfers.   Instruct in proper use of assistive devices.              Learning Progress Summary           Patient Acceptance, E, VU by  at 8/16/2020 1204                   Point: Home exercise program (Not Started)     Description:   Instruct learner(s) on appropriate technique for monitoring, assisting and/or progressing therapeutic exercises/activities.              Learner Progress:   Not documented in this visit.          Point: Precautions (Done)     Description:   Instruct learner(s) on prescribed precautions during self-care and functional transfers.              Learning Progress Summary           Patient Acceptance, E, VU by JOE at 8/16/2020 1204                   Point: Body mechanics (Done)     Description:   Instruct learner(s) on proper positioning and spine alignment during self-care, functional mobility activities and/or exercises.              Learning Progress Summary           Patient Acceptance, E, VU by  at 8/16/2020 1204                               User Key     Initials Effective Dates Name Provider Type Discipline     07/05/20 -  Kita Xiong OTR/L Occupational Therapist OT                  OT Recommendation and Plan  Outcome Summary/Treatment Plan (OT)  Anticipated Discharge Disposition (OT): skilled nursing facility  Planned Therapy Interventions (OT Eval):  activity tolerance training, BADL retraining, adaptive equipment training, functional balance retraining, occupation/activity based interventions, patient/caregiver education/training, strengthening exercise, transfer/mobility retraining  Therapy Frequency (OT Eval): 5 times/wk  Plan of Care Review  Plan of Care Reviewed With: patient  Plan of Care Reviewed With: patient  Outcome Summary: OT eval completed. Pt is A&Ox4. Demo's decreased strength, balance, activity tolerance, and increased pain. BUE strength 4-/5. Mod/Max Ax2 for supine <> sit at EOB and scooting/bridging. Min Ax2 for sit <> stand t/f from EOB and vcs for NWB L LE. Pt would benefit from skilled OT services to address these deficits. Recommend d/c to SNF for continued skilled therapy services.     Outcome Measures     Row Name 08/16/20 1200             How much help from another is currently needed...    Putting on and taking off regular lower body clothing?  1  -JJ      Bathing (including washing, rinsing, and drying)  2  -JJ      Toileting (which includes using toilet bed pan or urinal)  2  -JJ      Putting on and taking off regular upper body clothing  4  -JJ      Taking care of personal grooming (such as brushing teeth)  4  -JJ      Eating meals  4  -JJ      AM-PAC 6 Clicks Score (OT)  17  -JJ         Functional Assessment    Outcome Measure Options  AM-PAC 6 Clicks Daily Activity (OT)  -        User Key  (r) = Recorded By, (t) = Taken By, (c) = Cosigned By    Initials Name Provider Type    Kita Rodriguez OTR/L Occupational Therapist          Time Calculation:   Time Calculation- OT     Row Name 08/16/20 1203             Time Calculation- OT    OT Start Time  0910  -      OT Stop Time  1004  -      OT Time Calculation (min)  54 min  -      OT Received On  08/16/20  -      OT Goal Re-Cert Due Date  08/26/20  -        User Key  (r) = Recorded By, (t) = Taken By, (c) = Cosigned By    Initials Name Provider Type    MARTI Xiong  KADY [place]/L Occupational Therapist        Therapy Charges for Today     Code Description Service Date Service Provider Modifiers Qty    [id number] HC OT EVAL MOD COMPLEXITY 4 8/16/2020 Kita Xiong OTR/L GO 1               KADY Newman/MARYJO  8/16/2020

## 2020-08-16 NOTE — PROGRESS NOTES
[facility] Medicine Services  INPATIENT PROGRESS NOTE    Patient Name: Josi Mohamud  Date of Admission: 8/15/2020  Today's Date: 08/16/20  Length of Stay: 1  Primary Care Physician: Cayetano Crews MD    Subjective   Chief Complaint: Left ankle pain.  HPI   Developed a bad coughing spell during the night.  Had some worsening hypoxia with this.  Dr. Schroeder obtain a chest x-ray that showed no acute abnormalities.  She was given 60 mg of IV Solu-Medrol and some breathing treatments.  She has been having sinus tachycardia today.    She states that her left ankle hurts, but improved with pain medication.    She states that she is not any more short of breath than usual at this point in time.  She confirms that she wears oxygen at night, but not during the day.  Nursing to continue to try and wean her oxygen.  Encourage incentive spirometry.     Review of Systems   All pertinent negatives and positives are as above. All other systems have been reviewed and are negative unless otherwise stated.     Objective    Temp:  [97 °F (36.1 °C)-98.8 °F (37.1 °C)] 98.1 °F (36.7 °C)  Heart Rate:  [] 123  Resp:  [12-24] 18  BP: (108-155)/(51-80) 137/77  Physical Exam  Constitutional: She is oriented to person, place, and time. She appears well-developed and well-nourished. Up in bed, eating lunch.  No family present.  Discussed with her nurse, .  Head: Normocephalic and atraumatic.   Eyes: Pupils are equal, round, and reactive to light. Conjunctivae and EOM are normal.   Neck: Neck supple. No JVD present.   Cardiovascular:  Has been tachycardic, but sinus per telemetry.  Normal heart sounds and intact distal pulses. Exam reveals no gallop and no friction rub. No murmur heard.  Pulmonary/Chest: Effort normal and breath sounds normal. No respiratory distress. She has no wheezes. She has no rales. She exhibits no tenderness. On 3 L nasal cannula (wears O2 nocturnally at home).      Abdominal: Soft. Bowel sounds are normal. She exhibits no distension. There is no tenderness. There is no rebound and no guarding.   Musculoskeletal: Normal range of motion. She exhibits edema and tenderness. She exhibits no deformity.   Heavy bandaging and splinting to the left lower extremity. The distal toes are perfused.   Neurological: She is alert and oriented to person, place, and time. She displays normal reflexes. No cranial nerve deficit. She exhibits normal muscle tone.   Skin: Skin is warm and dry. No rash noted.   Psychiatric: More awake and interactive today.      Results Review:  I have reviewed the labs, radiology results, and diagnostic studies.    Laboratory Data:   Results from last 7 days   Lab Units 08/16/20  0507 08/15/20  0835   WBC 10*3/mm3  --  6.96   HEMOGLOBIN g/dL 11.7* 15.0   HEMATOCRIT % 33.6* 43.3   PLATELETS 10*3/mm3  --  274     Results from last 7 days   Lab Units 08/16/20  0507 08/15/20  0915   SODIUM mmol/L 137 133*   POTASSIUM mmol/L 4.1 4.3   CHLORIDE mmol/L 101 97*   CO2 mmol/L 24.0 26.0   BUN mg/dL 20 21   CREATININE mg/dL 0.88 1.16*   CALCIUM mg/dL 8.6 9.1   BILIRUBIN mg/dL  --  0.3   ALK PHOS U/L  --  100   ALT (SGPT) U/L  --  13   AST (SGOT) U/L  --  23   GLUCOSE mg/dL 117* 131*     Radiology Data:   Imaging Results (Last 24 Hours)     Procedure Component Value Units Date/Time    XR Chest 1 View [[id number]] Collected:  08/16/20 0722     Updated:  08/16/20 0726    Narrative:       Exam:   XR CHEST 1 VW-       Date:  8/16/2020      History:  Female, age  74 years;soa, low sats; S82.892C-Other fracture  of left lower leg, initial encounter for open fracture type IIIA, IIIB,  or IIIC; J44.9-Chronic obstructive pulmonary disease, unspecified     COMPARISON:  Chest x-ray dated 8/15/2020     Findings :     The heart and mediastinum are normal in size. Lungs are without focal  infiltrate, mass or effusions.  The bones show no acute pathology. Loop  recording device in place.  Surgical clips project over the right  thoracic inlet.       Impression:       Impression:     No acute cardiopulmonary disease.     This report was finalized on 08/16/2020 07:22 by Dr. Pinky Snow MD.    XR Ankle 2 View Left [[id number]] Collected:  08/15/20 1506     Updated:  08/15/20 1511    Narrative:       XR ANKLE 2 VW LEFT- 8/15/2020 3:05 PM CDT     HISTORY: fracture; S82.892C-Other fracture of left lower leg, initial  encounter for open fracture type IIIA, IIIB, or IIIC; J44.9-Chronic  obstructive pulmonary disease, unspecified      COMPARISON: None       Impression:       2 intraoperative fluoroscopic views of the ankle are submitted. These  images were obtained for procedural guidance during distal fibular  fixation. Please refer to the operative note for more details.  Fluoroscopy time was 17 seconds with a dose of 0.34 mGy.   This report was finalized on 08/15/2020 15:08 by Dr. Iftikhar Prieto MD.    FL C Arm During Surgery [[id number]] Resulted:  08/15/20 1504     Updated:  08/15/20 1506        I have reviewed the patient's current medications.     Assessment/Plan     Active Hospital Problems    Diagnosis   • **Type III open fracture of left ankle   • Fall   • Left ankle pain   • COPD, moderate (CMS/HCC)   • PAF (paroxysmal atrial fibrillation) (CMS/HCC)   • Stented coronary artery   • Coronary artery disease involving native coronary artery of native heart without angina pectoris   • Current every day smoker     Plan:   She was admitted to my service here at [facility] on 8/15.  She presented to the emergency department after falling at home and twisting her left ankle.  She unfortunately sustained an open fracture and dislocation.  Initial plain films showed a fracture dislocation with posterior displacement of the talus in relation to the tibia/fibula.  Fractures of the anterior and posterior tibia is suspected.  Displaced comminuted distal fibular fracture.  No definite talar dome  fracture.  Calcaneus appears intact.  Soft tissue edema.  She was splinted by EMS, but Dr. Oviedo/Dr. Medrano re-did this in the emergency department. Repeat films show reduction of the tibiotalar joint with near anatomic alignment.  Distal fibular fracture with improved alignment.  No definite distal tibial fracture.  Internal medicine is asked to admit given her other medical comorbidities.     Dr. Medrano took her to the OR on 8/15 for ORIF, irrigation and excisional debridement of skin, subcutaneous tissue, and bone.  She will continue on prophylactic antibiotics for the time being.  She will be nonweightbearing for 5-6 weeks.    Continue efforts at pain control.    Discontinue albuterol and continue to give Atrovent.  Incentive spirometry.  Mucinex.     I initially held lisinopril and Lasix admission, but will resume tomorrow.  Continued beta-blocker and amlodipine.      Reconciled and resumed other home medications as appropriate.     No need for cardiology or pulmonary consultations at present. Will do so if the need arises.     Offered a nicotine patch and counseled for tobacco cessation.     PT/OT.    Resume Eliquis that she takes for PAF and will also satisfy DVT prophylaxis.    Discharge Planning: She is highly likely to need skilled nursing placement.    Electronically signed by Jose Skelton DO, 08/16/20, 12:04.

## 2020-08-16 NOTE — PROGRESS NOTES
Discharge Planning Assessment  [facility]     Patient Name: Josi Mohamud  MRN: [id number]  Today's Date: 8/16/2020    Admit Date: 8/15/2020    Discharge Needs Assessment     Row Name 08/16/20 0837       Living Environment    Lives With  child(rd), adult;grandchild(rd)    Name(s) of Who Lives With Patient  Daughter- Josi    Current Living Arrangements  home/apartment/condo    Primary Care Provided by  self    Provides Primary Care For  no one    Family Caregiver if Needed  child(rd), adult    Quality of Family Relationships  helpful;involved;supportive    Able to Return to Prior Arrangements  yes       Resource/Environmental Concerns    Resource/Environmental Concerns  none       Transition Planning    Patient/Family Anticipates Transition to  home with family    Patient/Family Anticipated Services at Transition  none    Transportation Anticipated  family or friend will provide       Discharge Needs Assessment    Readmission Within the Last 30 Days  no previous admission in last 30 days    Concerns to be Addressed  no discharge needs identified    Equipment Currently Used at Home  walker, rolling;oxygen;wheelchair;cane, straight;grab bar;shower chair    Anticipated Changes Related to Illness  none    Equipment Needed After Discharge  none    Discharge Coordination/Progress  Pt has PCP and RX coverage.  Pt can afford medications.  SW will follow for therapy evals.        Discharge Plan    No documentation.       Destination      Coordination has not been started for this encounter.      Durable Medical Equipment      Coordination has not been started for this encounter.      Dialysis/Infusion      Coordination has not been started for this encounter.      Home Medical Care      Coordination has not been started for this encounter.      Therapy      Coordination has not been started for this encounter.      Community Resources      Coordination has not been started for this encounter.          Demographic Summary     No documentation.       Functional Status    No documentation.       Psychosocial    No documentation.       Abuse/Neglect    No documentation.       Legal    No documentation.       Substance Abuse    No documentation.       Patient Forms    No documentation.           ERIK FlanaganW

## 2020-08-16 NOTE — THERAPY EVALUATION
Patient Name: Josi Mohamud  : 1945    MRN: [id number]                              Today's Date: 2020       Admit Date: 8/15/2020    Visit Dx:     ICD-10-CM ICD-9-CM   1. Type III open fracture of left ankle, initial encounter S82.892C 824.9   2. COPD (chronic obstructive pulmonary disease) case management patient (CMS/[facility]) J44.9 496   3. Impaired mobility Z74.09 799.89     Patient Active Problem List   Diagnosis   • Syncope and collapse   • Coronary artery disease involving native coronary artery of native heart without angina pectoris   • Stented coronary artery   • Essential hypertension   • Abnormal positron emission tomography (PET) scan   • Gastroesophageal reflux disease   • Thyroid nodule   • Parotid neoplasm   • Anticoagulated   • Laryngopharyngeal reflux (LPR)   • Traumatic rhabdomyolysis (CMS/HCC)   • PAF (paroxysmal atrial fibrillation) (CMS/[facility])   • Malignant neoplasm of upper lobe of left lung (CMS/[facility])   • COPD, moderate (CMS/[facility])   • Persistent asthma without complication   • Overweight   • Cigarette nicotine dependence without complication   • Multiple thyroid nodules   • Hx of Hodgkin's lymphoma   • Dysfunction of both eustachian tubes   • Left chronic serous otitis media   • Morbidly obese (CMS/[facility])   • Tonsillar cyst   • Status post stereotactic radiosurgery   • Current every day smoker   • Renal cyst   • Cellulitis of right lower extremity   • COPD with acute exacerbation (CMS/[facility])   • Macrocytosis without anemia   • Obesity (BMI 30-39.9)   • Tobacco abuse   • Lower extremity edema, right side   • Leukocytosis   • Type III open fracture of left ankle   • Fall   • Left ankle pain     Past Medical History:   Diagnosis Date   • Bronchitis    • Cancer (CMS/HCC)     lung   • COPD (chronic obstructive pulmonary disease) (CMS/[facility])    • Coronary artery disease    • GERD (gastroesophageal reflux disease)    • History of transfusion    • Hyperlipidemia    • Hypertension    • PAF (paroxysmal  atrial fibrillation) (CMS/[facility])    • Persistent asthma without complication 7/8/2019   • Seizures (CMS/HCC)      Past Surgical History:   Procedure Laterality Date   • ANKLE OPEN REDUCTION INTERNAL FIXATION Left 8/15/2020    Procedure: ANKLE OPEN REDUCTION INTERNAL FIXATION;  Surgeon: Keyshawn Medrano MD;  Location: [facility] OR;  Service: Orthopedics;  Laterality: Left;   • APPENDECTOMY     • APPENDECTOMY     • CARDIAC CATHETERIZATION     • CARDIAC ELECTROPHYSIOLOGY PROCEDURE N/A 5/23/2017    Procedure: Loop insertion; LINQ;  Surgeon: Jose Monteiro MD;  Location:  [facility] IN[facility]ATION;  Service:    • CHOLECYSTECTOMY     • CORONARY ANGIOPLASTY WITH STENT PLACEMENT      X 1    • INCISION AND DRAINAGE OF WOUND Left 8/15/2020    Procedure: INCISION AND DRAINAGE WOUND;  Surgeon: Keyshawn Medrano MD;  Location: [facility] OR;  Service: Orthopedics;  Laterality: Left;   • LIVER BIOPSY     • MYRINGOTOMY W/ TUBES Left 11/22/2019    Procedure: LEFT MYRINGOTOMY WITH INSERTION OF EAR TUBE WITH RIGHT EAR EXAM UNDER ANESTHESIA;  Surgeon: Se Mcnulty MD;  Location: [facility] OR;  Service: ENT   • SPLENECTOMY     • US GUIDED FINE NEEDLE ASPIRATION  8/7/2018     General Information     Row Name 08/16/20 0715          PT Evaluation Time/Intention    Document Type  evaluation Fall in bedroom, L ankle pain & bleeding. Dx: Acute traumatic displaced bimalleolar fracture (lateral/posterior malleolus) of the Left lower leg. s/p 8/15 ORIF of closed displaced bimalleolar fracture of the Left lower leg, Irrigation and excisional...  -SUBHASH     Mode of Treatment  physical therapy ....debridement of skin, subcutaneous tissue, and bone left open ankle fracture  -SUBHASH     Row Name 08/16/20 0715          General Information    Patient Profile Reviewed?  yes  -SUBHASH     Prior Level of Function  independent:;all household mobility;ADL's  -SUBHASH     Existing Precautions/Restrictions  fall;left;non-weight bearing  -SUBHASH     Barriers to Rehab   medically complex;physical barrier  -SUBHASH     Row Name 08/16/20 0715          Relationship/Environment    Lives With  child(rd), adult  -SUBHASH     Row Name 08/16/20 0715          Resource/Environmental Concerns    Current Living Arrangements  home/apartment/condo  -SUBHASH     Row Name 08/16/20 0715          Home Main Entrance    Number of Stairs, Main Entrance  one  -SUBHASH     Stair Railings, Main Entrance  none  -SUBHASH     Row Name 08/16/20 0715          Cognitive Assessment/Intervention- PT/OT    Orientation Status (Cognition)  oriented x 4  -SUBHASH     Personal Safety Interventions  fall prevention program maintained;gait belt;muscle strengthening facilitated;nonskid shoes/slippers when out of bed  -SUBHASH     Row Name 08/16/20 0715          Safety Issues, Functional Mobility    Impairments Affecting Function (Mobility)  balance;endurance/activity tolerance;strength;postural/trunk control;pain  -SUBHASH       User Key  (r) = Recorded By, (t) = Taken By, (c) = Cosigned By    Initials Name Provider Type    Alexandre Francis, PT DPT Physical Therapist        Mobility     Row Name 08/16/20 0715          Bed Mobility Assessment/Treatment    Bed Mobility Assessment/Treatment  supine-sit;sit-supine;scooting/bridging  -SUBHASH     Scooting/Bridging [place] (Bed Mobility)  maximum assist (25% patient effort);2 person assist  -SUBHASH     Supine-Sit [place] (Bed Mobility)  moderate assist (50% patient effort);2 person assist  -SUBHASH     Sit-Supine [place] (Bed Mobility)  moderate assist (50% patient effort);2 person assist  -SUBHASH     Assistive Device (Bed Mobility)  head of bed elevated;draw sheet;bed rails  -SUBHASH     Row Name 08/16/20 0715          Transfer Assessment/Treatment    Comment (Transfers)  stood x 2 reps  -SUBHASH     Row Name 08/16/20 0715          Sit-Stand Transfer    Sit-Stand [place] (Transfers)  minimum assist (75% patient effort);2 person assist  -SUBHASH     Assistive Device (Sit-Stand Transfers)  walker, front-wheeled  -SUBHASH [place]  Name 08/16/20 0715          Mobility Assessment/Intervention    Extremity Weight-bearing Status  left lower extremity  -SUBHASH     Left Lower Extremity (Weight-bearing Status)  non weight-bearing (NWB)  -SUBHASH       User Key  (r) = Recorded By, (t) = Taken By, (c) = Cosigned By    Initials Name Provider Type    Alexandre Francis, PT DPT Physical Therapist        Obj/Interventions     Row Name 08/16/20 0715          General ROM    GENERAL ROM COMMENTS  R LE AROM WFL (edema), L LE in hard cast  -SUBHASH     Row Name 08/16/20 0715          MMT (Manual Muscle Testing)    General MMT Comments  R LE functionally 4-/5, deferred MMT to L LE  -SUBHASH     [facility] Name 08/16/20 0715          Static Sitting Balance    Level of [place] (Unsupported Sitting, Static Balance)  supervision  -SUBHASH     Sitting Position (Unsupported Sitting, Static Balance)  sitting on edge of bed  -SUBHASH     [facility] Name 08/16/20 0715          Dynamic Sitting Balance    Level of [place], Reaches Outside Midline (Sitting, Dynamic Balance)  supervision  -SUBHASH     Sitting Position, Reaches Outside Midline (Sitting, Dynamic Balance)  sitting on edge of bed  -[facility] Name 08/16/20 0715          Static Standing Balance    Level of [place] (Supported Standing, Static Balance)  minimal assist, 75% patient effort;2 person assist  -SUBHASH     Assistive Device Utilized (Supported Standing, Static Balance)  walker, rolling  -SUBHASH     [facility] Name 08/16/20 0715          Dynamic Standing Balance    Level of [place], Reaches Outside Midline (Standing, Dynamic Balance)  minimal assist, 75% patient effort;moderate assist, 50 to 74% patient effort;2 person assist  -SUBHASH     Assistive Device Utilized (Supported Standing, Dynamic Balance)  walker, rolling  -SUBHAHS       User Key  (r) = Recorded By, (t) = Taken By, (c) = Cosigned By    Initials Name Provider Type    Alexandre Francis, PT DPT Physical Therapist        Goals/Plan     Row Name 08/16/20 0715          Bed Mobility Goal 1 (PT)     Activity/Assistive Device (Bed Mobility Goal 1, PT)  sit to supine/supine to sit  -SUBHASH     [place] Level/Cues Needed (Bed Mobility Goal 1, PT)  minimum assist (75% or more patient effort);2 person assist  -SUBHASH     Time Frame (Bed Mobility Goal 1, PT)  long term goal (LTG);10 days  -SUBHASH     Progress/Outcomes (Bed Mobility Goal 1, PT)  goal ongoing  -SUBHASH     Row Name 08/16/20 0715          Transfer Goal 1 (PT)    Activity/Assistive Device (Transfer Goal 1, PT)  sit-to-stand/stand-to-sit;bed-to-chair/chair-to-bed;lateral transfer;walker, rolling  -SUBHASH     [place] Level/Cues Needed (Transfer Goal 1, PT)  moderate assist (50-74% patient effort);2 person assist  -SUBHASH     Time Frame (Transfer Goal 1, PT)  long term goal (LTG);10 days  -SUBHASH     Progress/Outcome (Transfer Goal 1, PT)  goal ongoing  -SUBHASH       User Key  (r) = Recorded By, (t) = Taken By, (c) = Cosigned By    Initials Name Provider Type    Alexandre Francis, PT DPT Physical Therapist        Clinical Impression     Row Name 08/16/20 0715          Pain Assessment    Additional Documentation  Pain Scale: Numbers Pre/Post-Treatment (Group)  -SUBHASH     Row Name 08/16/20 0715          Pain Scale: Numbers Pre/Post-Treatment    Pain Scale: Numbers, Pretreatment  3/10  -SUBHASH     Pain Scale: Numbers, Post-Treatment  3/10  -SUBHASH     Pain Location - Side  Left  -SUBHASH     Pain Location  ankle  -SUBHASH     Pain Intervention(s)  Repositioned;Ambulation/increased activity  -SUBHASH     Row Name 08/16/20 0715          Plan of Care Review    Plan of Care Reviewed With  patient  -SUBHASH     [facility] Name 08/16/20 0715          Physical Therapy Clinical Impression    Patient/Family Goals Statement (PT Clinical Impression)  increase mobility  -SUBHASH     Criteria for Skilled Interventions Met (PT Clinical Impression)  yes;treatment indicated  -SUBHASH     Rehab Potential (PT Clinical Summary)  good, to achieve stated therapy goals  -SUBHASH     Predicted Duration of Therapy (PT)  until d/c  -SUBHASH     Row Name  08/16/20 0715          Positioning and Restraints    Pre-Treatment Position  in bed  -SUBHASH     Post Treatment Position  bed  -SUBHASH     In Bed  notified nsg;fowlers;call light within reach;encouraged to call for assist;exit alarm on;SCD pump applied  -SUBHASH       User Key  (r) = Recorded By, (t) = Taken By, (c) = Cosigned By    Initials Name Provider Type    Alexandre Francis PT DPT Physical Therapist        Outcome Measures     Row Name 08/16/20 0715          How much help from another person do you currently need...    Turning from your back to your side while in flat bed without using bedrails?  2  -SUBHASH     Moving from lying on back to sitting on the side of a flat bed without bedrails?  2  -SUBHASH     Moving to and from a bed to a chair (including a wheelchair)?  1  -SUBHASH     Standing up from a chair using your arms (e.g., wheelchair, bedside chair)?  2  -SUBHASH     Climbing 3-5 steps with a railing?  1  -SUBHASH     To walk in hospital room?  1  -SUBHASH     AM-PAC 6 Clicks Score (PT)  9  -SUBHASH     Row Name 08/16/20 0715          Functional Assessment    Outcome Measure Options  AM-PAC 6 Clicks Basic Mobility (PT)  -SUBHASH       User Key  (r) = Recorded By, (t) = Taken By, (c) = Cosigned By    Initials Name Provider Type    Alexandre Francis, PT DPT Physical Therapist        Physical Therapy Education                 Title: PT OT SLP Therapies (In Progress)     Topic: Physical Therapy (In Progress)     Point: Mobility training (Done)     Description:   Instruct learner(s) on safety and technique for assisting patient out of bed, chair or wheelchair.  Instruct in the proper use of assistive devices, such as walker, crutches, cane or brace.              Patient Friendly Description:   It's important to get you on your feet again, but we need to do so in a way that is safe for you. Falling has serious consequences, and your personal safety is the most important thing of all.        When it's time to get out of bed, one of us or a family  member will sit next to you on the bed to give you support.     If your doctor or nurse tells you to use a walker, crutches, a cane, or a brace, be sure you use it every time you get out of bed, even if you think you don't need it.    Learning Progress Summary           Patient Acceptance, E, VU,NR by SUBHASH at 8/16/2020 0715    Comment:  Educated pt on progression of PT POC, benefits of activity, NWB L LE                   Point: Home exercise program (Not Started)     Description:   Instruct learner(s) on appropriate technique for monitoring, assisting and/or progressing patient with therapeutic exercises and activities.              Learner Progress:   Not documented in this visit.          Point: Body mechanics (Not Started)     Description:   Instruct learner(s) on proper positioning and spine alignment for patient and/or caregiver during mobility tasks and/or exercises.              Learner Progress:   Not documented in this visit.          Point: Precautions (Done)     Description:   Instruct learner(s) on prescribed precautions during mobility and gait tasks              Learning Progress Summary           Patient Acceptance, E, VU,NR by SUBHASH at 8/16/2020 0715    Comment:  Educated pt on progression of PT POC, benefits of activity, NWB L LE                               User Key     Initials Effective Dates Name Provider Type Discipline    SUBHASH 08/02/16 -  Alexandre Moe, PT DPT Physical Therapist PT              PT Recommendation and Plan  Planned Therapy Interventions (PT Eval): bed mobility training, transfer training, balance training, home exercise program, patient/family education, postural re-education, strengthening, wheelchair management/propulsion training  Outcome Summary/Treatment Plan (PT)  Anticipated Discharge Disposition (PT): skilled nursing facility  Plan of Care Reviewed With: patient  Outcome Summary: PT eval completed. She presents alert and oriented x 4. She was educated on NWB L LE. She  required mod assist x 2 for all supine <> sit bed mobility. She stood x 2 reps. She needed min assist x 2 to stand and cues for NWB L LE. She demos weakness with activity and is at risk for falls. PT will cont with functional mobility, strength and balance. I anticipate she will need SNF placement for therapy.     Time Calculation:   PT Charges     Row Name 08/16/20 1109             Time Calculation    Start Time  0910  -SUBHASH      Stop Time  1000 + 10 minutes with chart review at 0715. PT with 60 total minutes.   -SUBHASH      Time Calculation (min)  50 min  -SUBHASH      PT Received On  08/16/20  -SUBHASH      PT Goal Re-Cert Due Date  08/26/20  -SUBHASH        User Key  (r) = Recorded By, (t) = Taken By, (c) = Cosigned By    Initials Name Provider Type    Alexandre Francis, PT DPT Physical Therapist        Therapy Charges for Today     Code Description Service Date Service Provider Modifiers Qty    [id number] HC PT EVAL MOD COMPLEXITY 4 8/16/2020 Alexandre Moe, PT DPT GP 1          PT G-Codes  Outcome Measure Options: AM-PAC 6 Clicks Basic Mobility (PT)  AM-PAC 6 Clicks Score (PT): 9    Alexandre Moe, JOSE MANUEL DPT  8/16/2020

## 2020-08-16 NOTE — NURSING NOTE
Neris, PCA got patients VS @ 0330. Patient was encouraged to use incentive spirometer, cough and deep breath. Patient had increased coughing and HR was elevated on cont pulse ox. 140's. Patients HR quickly returned to 108-112 after patient quit coughing. Patient had inspiratory and expiratory wheezes and was SOA. Respiratory administered a prn breathing treatment at 0345. Patients HR increased after the breathing treatment to 140 on the cont pulse ox. Patient was put on tele at 0428. HR was sinus tach 142 per monitor rm. Dr. Quintero was notified and orders were obtained. Will cont to monitor.

## 2020-08-17 LAB
ANION GAP SERPL CALCULATED.3IONS-SCNC: 11 MMOL/L (ref 5–15)
BUN SERPL-MCNC: 22 MG/DL (ref 8–23)
BUN/CREAT SERPL: 23.7 (ref 7–25)
CALCIUM SPEC-SCNC: 8.7 MG/DL (ref 8.6–10.5)
CHLORIDE SERPL-SCNC: 103 MMOL/L (ref 98–107)
CO2 SERPL-SCNC: 26 MMOL/L (ref 22–29)
CREAT SERPL-MCNC: 0.93 MG/DL (ref 0.57–1)
DEPRECATED RDW RBC AUTO: 49.8 FL (ref 37–54)
ERYTHROCYTE [DISTWIDTH] IN BLOOD BY AUTOMATED COUNT: 13.7 % (ref 12.3–15.4)
GFR SERPL CREATININE-BSD FRML MDRD: 59 ML/MIN/1.73
GLUCOSE SERPL-MCNC: 135 MG/DL (ref 65–99)
HCT VFR BLD AUTO: 35.1 % (ref 34–46.6)
HGB BLD-MCNC: 11.9 G/DL (ref 12–15.9)
MCH RBC QN AUTO: 34 PG (ref 26.6–33)
MCHC RBC AUTO-ENTMCNC: 33.9 G/DL (ref 31.5–35.7)
MCV RBC AUTO: 100.3 FL (ref 79–97)
PLATELET # BLD AUTO: 227 10*3/MM3 (ref 140–450)
PMV BLD AUTO: 10.2 FL (ref 6–12)
POTASSIUM SERPL-SCNC: 4.3 MMOL/L (ref 3.5–5.2)
RBC # BLD AUTO: 3.5 10*6/MM3 (ref 3.77–5.28)
SODIUM SERPL-SCNC: 140 MMOL/L (ref 136–145)
WBC # BLD AUTO: 10.24 10*3/MM3 (ref 3.4–10.8)

## 2020-08-17 PROCEDURE — 94799 UNLISTED PULMONARY SVC/PX: CPT

## 2020-08-17 PROCEDURE — 80048 BASIC METABOLIC PNL TOTAL CA: CPT | Performed by: INTERNAL MEDICINE

## 2020-08-17 PROCEDURE — 97535 SELF CARE MNGMENT TRAINING: CPT

## 2020-08-17 PROCEDURE — 99406 BEHAV CHNG SMOKING 3-10 MIN: CPT

## 2020-08-17 PROCEDURE — 85027 COMPLETE CBC AUTOMATED: CPT | Performed by: INTERNAL MEDICINE

## 2020-08-17 PROCEDURE — 97530 THERAPEUTIC ACTIVITIES: CPT

## 2020-08-17 PROCEDURE — 97110 THERAPEUTIC EXERCISES: CPT

## 2020-08-17 PROCEDURE — 94664 DEMO&/EVAL PT USE INHALER: CPT

## 2020-08-17 PROCEDURE — 25010000002 CEFAZOLIN PER 500 MG: Performed by: ORTHOPAEDIC SURGERY

## 2020-08-17 RX ADMIN — SODIUM CHLORIDE, PRESERVATIVE FREE 3 ML: 5 INJECTION INTRAVENOUS at 09:31

## 2020-08-17 RX ADMIN — CEFAZOLIN SODIUM 2 G: 10 INJECTION, POWDER, FOR SOLUTION INTRAVENOUS at 00:29

## 2020-08-17 RX ADMIN — APIXABAN 5 MG: 5 TABLET, FILM COATED ORAL at 09:17

## 2020-08-17 RX ADMIN — GUAIFENESIN 1200 MG: 600 TABLET, EXTENDED RELEASE ORAL at 20:51

## 2020-08-17 RX ADMIN — CEFAZOLIN SODIUM 2 G: 10 INJECTION, POWDER, FOR SOLUTION INTRAVENOUS at 09:16

## 2020-08-17 RX ADMIN — GUAIFENESIN 1200 MG: 600 TABLET, EXTENDED RELEASE ORAL at 09:17

## 2020-08-17 RX ADMIN — ATORVASTATIN CALCIUM 10 MG: 10 TABLET, FILM COATED ORAL at 20:51

## 2020-08-17 RX ADMIN — IPRATROPIUM BROMIDE 0.5 MG: 0.5 SOLUTION RESPIRATORY (INHALATION) at 22:10

## 2020-08-17 RX ADMIN — IPRATROPIUM BROMIDE 0.5 MG: 0.5 SOLUTION RESPIRATORY (INHALATION) at 15:37

## 2020-08-17 RX ADMIN — IPRATROPIUM BROMIDE 0.5 MG: 0.5 SOLUTION RESPIRATORY (INHALATION) at 11:13

## 2020-08-17 RX ADMIN — FUROSEMIDE 40 MG: 40 TABLET ORAL at 09:17

## 2020-08-17 RX ADMIN — APIXABAN 5 MG: 5 TABLET, FILM COATED ORAL at 20:51

## 2020-08-17 RX ADMIN — SODIUM CHLORIDE, PRESERVATIVE FREE 3 ML: 5 INJECTION INTRAVENOUS at 20:55

## 2020-08-17 RX ADMIN — HYDROCODONE BITARTRATE AND ACETAMINOPHEN 1 TABLET: 7.5; 325 TABLET ORAL at 09:27

## 2020-08-17 RX ADMIN — METOPROLOL SUCCINATE 50 MG: 50 TABLET, EXTENDED RELEASE ORAL at 09:17

## 2020-08-17 RX ADMIN — ASPIRIN 81 MG: 81 TABLET ORAL at 09:17

## 2020-08-17 RX ADMIN — LEVETIRACETAM 500 MG: 500 TABLET, FILM COATED ORAL at 09:17

## 2020-08-17 RX ADMIN — NICOTINE 1 PATCH: 21 PATCH, EXTENDED RELEASE TRANSDERMAL at 09:19

## 2020-08-17 RX ADMIN — IPRATROPIUM BROMIDE 0.5 MG: 0.5 SOLUTION RESPIRATORY (INHALATION) at 07:28

## 2020-08-17 RX ADMIN — PANTOPRAZOLE SODIUM 40 MG: 40 TABLET, DELAYED RELEASE ORAL at 06:05

## 2020-08-17 RX ADMIN — LORAZEPAM 1 MG: 1 TABLET ORAL at 09:23

## 2020-08-17 RX ADMIN — LEVETIRACETAM 500 MG: 500 TABLET, FILM COATED ORAL at 20:51

## 2020-08-17 RX ADMIN — FUROSEMIDE 40 MG: 40 TABLET ORAL at 20:51

## 2020-08-17 RX ADMIN — LORAZEPAM 1 MG: 1 TABLET ORAL at 17:11

## 2020-08-17 RX ADMIN — AMLODIPINE BESYLATE 5 MG: 5 TABLET ORAL at 09:17

## 2020-08-17 RX ADMIN — HYDROCODONE BITARTRATE AND ACETAMINOPHEN 1 TABLET: 7.5; 325 TABLET ORAL at 20:51

## 2020-08-17 RX ADMIN — LISINOPRIL 20 MG: 20 TABLET ORAL at 09:16

## 2020-08-17 RX ADMIN — LORAZEPAM 1 MG: 1 TABLET ORAL at 20:51

## 2020-08-17 NOTE — NURSING NOTE
Patient is eating breakfast.  Patient and family requested I return for assessment at later time.  Daughter states she was unaware of a wound on her coccyx region.  She states her mother fell Thursday on concrete and worried that may have been the cause of it.     Will return at later time for assessment.

## 2020-08-17 NOTE — THERAPY TREATMENT NOTE
Acute Care - Physical Therapy Treatment Note  [facility]     Patient Name: Josi Mohamud  : 1945  MRN: [id number]  Today's Date: 2020  Onset of Illness/Injury or Date of Surgery: 08/15/20     Referring Physician: Dr. Medrano    Admit Date: 8/15/2020    Visit Dx:    ICD-10-CM ICD-9-CM   1. Type III open fracture of left ankle, initial encounter S82.892C 824.9   2. COPD (chronic obstructive pulmonary disease) case management patient (CMS/[facility]) J44.9 496   3. Impaired mobility Z74.09 799.89   4. Decreased activities of daily living (ADL) Z78.9 V49.89     Patient Active Problem List   Diagnosis   • Syncope and collapse   • Coronary artery disease involving native coronary artery of native heart without angina pectoris   • Stented coronary artery   • Essential hypertension   • Abnormal positron emission tomography (PET) scan   • Gastroesophageal reflux disease   • Thyroid nodule   • Parotid neoplasm   • Anticoagulated   • Laryngopharyngeal reflux (LPR)   • Traumatic rhabdomyolysis (CMS/[facility])   • PAF (paroxysmal atrial fibrillation) (CMS/[facility])   • Malignant neoplasm of upper lobe of left lung (CMS/[facility])   • COPD, moderate (CMS/[facility])   • Persistent asthma without complication   • Overweight   • Cigarette nicotine dependence without complication   • Multiple thyroid nodules   • Hx of Hodgkin's lymphoma   • Dysfunction of both eustachian tubes   • Left chronic serous otitis media   • Morbidly obese (CMS/[facility])   • Tonsillar cyst   • Status post stereotactic radiosurgery   • Current every day smoker   • Renal cyst   • Cellulitis of right lower extremity   • COPD with acute exacerbation (CMS/[facility])   • Macrocytosis without anemia   • Obesity (BMI 30-39.9)   • Tobacco abuse   • Lower extremity edema, right side   • Leukocytosis   • Type III open fracture of left ankle   • Fall   • Left ankle pain       Therapy Treatment    Rehabilitation Treatment Summary     Row Name 20 1309 20 1120 20 0900        Treatment Time/Intention    Discipline  physical therapy assistant  -AH  occupational therapy assistant  -TS  physical therapy assistant  -    Document Type  therapy note (daily note)  -2  therapy note (daily note)  -TS  therapy note (daily note)  -2    Subjective Information  complains of;weakness;fatigue;pain  -AH2  complains of;pain;weakness  -TS2  complains of;weakness;fatigue;pain  -2    Patient Effort  --  good  -TS2  --    Existing Precautions/Restrictions  (S) fall;left;non-weight bearing NWB LLE  -AH2  fall;left;non-weight bearing  -TS2  (S) fall;left;non-weight bearing NWB LLE  -2    Recorded by [] Milady Tena, PTA 08/17/20 1309  [AH2] Milady Tena, PTA 08/17/20 1350 [TS] Mally Scherer, TREVIZO/L 08/17/20 1122  [TS2] Mally Scherer, TREVIZO/L 08/17/20 1254 [AH] Milady Tena, PTA 08/17/20 0907  [AH2] Milady Tena, PTA 08/17/20 0937    Row Name 08/17/20 1120             Vital Signs    Post SpO2 (%)  94  -TS      O2 Delivery Post Treatment  room air  -TS      Recorded by [] Mally Scherer TREVIZO/L 08/17/20 1254      Row Name 08/17/20 1120             Cognitive Assessment/Intervention- PT/OT    Personal Safety Interventions  fall prevention program maintained;gait belt;nonskid shoes/slippers when out of bed  -TS      Recorded by [TS] Mally Scherer TREVIZO/L 08/17/20 1254      Row Name 08/17/20 1309 08/17/20 0900          Mobility Assessment/Intervention    Extremity Weight-bearing Status  left lower extremity  -AH  (S) left lower extremity  -AH     Left Lower Extremity (Weight-bearing Status)  non weight-bearing (NWB)  -AH  (S) non weight-bearing (NWB)  -AH     Recorded by [] Milady Tena, PTA 08/17/20 1350 [AH] Milady Tena, PTA 08/17/20 0937     Row Name 08/17/20 1309 08/17/20 1120 08/17/20 0900       Bed Mobility Assessment/Treatment    Scooting/Bridging [place] (Bed Mobility)  maximum assist (25% patient effort);2 person assist;verbal cues  -AH   minimum assist (75% patient effort);moderate assist (50% patient effort)  -TS  maximum assist (25% patient effort);2 person assist;verbal cues  -AH    Supine-Sit [place] (Bed Mobility)  -- chair  -AH  minimum assist (75% patient effort)  -TS  minimum assist (75% patient effort);verbal cues  -AH    Sit-Supine [place] (Bed Mobility)  minimum assist (75% patient effort);moderate assist (50% patient effort);verbal cues  -AH  --  minimum assist (75% patient effort);moderate assist (50% patient effort);verbal cues  -AH    Assistive Device (Bed Mobility)  --  bed rails;head of bed elevated;draw sheet  -TS  --    Recorded by [] Milady Tena, PTA 08/17/20 1350 [TS] Mally Scherer TREVIZO/L 08/17/20 1254 [] Milady Tena, PTA 08/17/20 0937    Row Name 08/17/20 1309 08/17/20 1120 08/17/20 0900       Transfer Assessment/Treatment    Transfer Assessment/Treatment  --  sit-stand transfer;stand-sit transfer  -TS  --    Maintains Weight-bearing Status (Transfers)  --  able to maintain  -TS  --    Comment (Transfers)  stood from chair, moved chair put BSC under pt for her to sit, stood from BSC, and moved bed up to pt  -  pt stood from EOB, EOB then scooted back and recliner placed behind pt   -TS  stood x 2, unable to move RLE and maintain LLE NWB  -AH    Recorded by [] Milady Tena, PTA 08/17/20 1350 [TS] Mally Scherer TREVIZO/L 08/17/20 1254 [] Milady Tena, PTA 08/17/20 0937    Row Name 08/17/20 1309 08/17/20 1120 08/17/20 0900       Sit-Stand Transfer    Sit-Stand [place] (Transfers)  minimum assist (75% patient effort);verbal cues  -AH  minimum assist (75% patient effort)  -TS  minimum assist (75% patient effort);verbal cues  -    Assistive Device (Sit-Stand Transfers)  --  walker, front-wheeled  -TS  --    Recorded by [] Milady Tena, PTA 08/17/20 1350 [TS] Mally Scherer, TREVIZO/MARYJO 08/17/20 1254 [] Milady Tena, PTA 08/17/20 0937    Row Name 08/17/20 1309 08/17/20  1120 08/17/20 0900       Stand-Sit Transfer    Stand-Sit [place] (Transfers)  minimum assist (75% patient effort);verbal cues  -AH  minimum assist (75% patient effort);verbal cues  -TS  minimum assist (75% patient effort);verbal cues  -    Assistive Device (Stand-Sit Transfers)  --  walker, front-wheeled  -TS  --    Recorded by [] Milady Tena, PTA 08/17/20 1350 [TS] Mally Scherer TREVIZO/L 08/17/20 1254 [] Milady Tena, PTA 08/17/20 0937    Row Name 08/17/20 1120             ADL Assessment/Intervention    BADL Assessment/Intervention  lower body dressing;toileting  -TS      Recorded by [] Mally Scherer TREVIZO/L 08/17/20 1254      Row Name 08/17/20 1120             Lower Body Dressing Assessment/Training    Lower Body Dressing [place] Level  don;socks;maximum assist (25% patient effort)  -TS      Lower Body Dressing Position  supine  -TS      Recorded by [] Mally Scherer COTA/L 08/17/20 1254      Row Name 08/17/20 1120             Toileting Assessment/Training    [place] Level (Toileting)  perform perineal hygiene;maximum assist (25% patient effort)  -TS      Toileting Position  supported standing  -TS      Recorded by [] Mally Scherer TREVIZO/L 08/17/20 1254      Row Name 08/17/20 0900             Therapeutic Exercise    Lower Extremity (Therapeutic Exercise)  LAQ (long arc quad), bilateral;marching while seated  -      Lower Extremity Range of Motion (Therapeutic Exercise)  ankle dorsiflexion/plantar flexion, right  -      Exercise Type (Therapeutic Exercise)  AROM (active range of motion)  -      Position (Therapeutic Exercise)  seated  -      Sets/Reps (Therapeutic Exercise)  10 X 2  -AH      Recorded by [] Milady Tena, PTA 08/17/20 0937      Row Name 08/17/20 1309 08/17/20 1120 08/17/20 0900       Positioning and Restraints    Pre-Treatment Position  sitting in chair/recliner  -  in bed  -TS  in bed  -    Post Treatment Position  bed   -AH  chair  -TS  bed  -AH    In Bed  fowlers;call light within reach;encouraged to call for assist;LLE elevated;SCD pump applied  -AH  --  fowlers;call light within reach;encouraged to call for assist;exit alarm on;with nsg;SCD pump applied  -AH    In Chair  --  reclined;call light within reach;encouraged to call for assist;notified nsg  -TS  --    Recorded by [AH] Milady Tena, PTA 08/17/20 1350 [TS] Mally Scherer, TREVIZO/L 08/17/20 1254 [AH] Milady Tena, PTA 08/17/20 0937    Row Name 08/17/20 1309 08/17/20 1120 08/17/20 0900       Pain Scale: Numbers Pre/Post-Treatment    Pain Scale: Numbers, Pretreatment  0/10 - no pain AT REST  -AH  1/10  -TS  0/10 - no pain AT REST  -AH    Pain Scale: Numbers, Post-Treatment  5/10  -AH  3/10  -TS  5/10  -AH    Pain Location - Side  Left  -AH  Left  -TS  Left  -AH    Pain Location  ankle  -AH  ankle  -TS  ankle  -AH    Pain Intervention(s)  Medication (See MAR);Repositioned  -AH  Repositioned  -TS  Medication (See MAR);Repositioned  -AH    Recorded by [] Milady Tena, PTA 08/17/20 1350 [TS] Mally Scherer, TREVIZO/L 08/17/20 1254 [AH] Milady Tena, PTA 08/17/20 0937    Row Name                Wound 08/15/20 1416 Left ankle Incision    Wound - Properties Group Date first assessed: 08/15/20 [HW] Time first assessed: 1416 [HW] Present on Hospital Admission: N [HW] Side: Left [HW] Location: ankle [HW] Primary Wound Type: Incision [HW] Recorded by:  [HW] Blaine Degroot RN 08/15/20 1416    Row Name                Wound 08/16/20 0800 coccyx Pressure Injury    Wound - Properties Group Date first assessed: 08/16/20 [DB] Time first assessed: 0800 [DB] Location: coccyx [DB] Primary Wound Type: Pressure inj [DB] Stage, Pressure Injury: Stage 2 [DB] Recorded by:  [DB] Destiny Arana RN 08/16/20 1134    Row Name                Wound 03/09/20 1100 Right lower leg    Wound - Properties Group Date first assessed: 03/09/20 [SUBHASH] Time first assessed: 1100 [SUBHASH] Present  on Hospital Admission: Y [SUBHASH] Side: Right [SUBHASH] Orientation: lower [SUBHASH] Location: leg [SUBHASH] Recorded by:  [SUBHASH] Alexandre Moe, PT DPT 03/09/20 1134    Row Name 08/17/20 1120             Outcome Summary/Treatment Plan (OT)    Daily Summary of Progress (OT)  progress toward functional goals is good  -TS      Recorded by [TS] Mally Scherer, TREVIZO/L 08/17/20 1254        User Key  (r) = Recorded By, (t) = Taken By, (c) = Cosigned By    Initials Name Effective Dates Discipline     Milady Tena, PTA 08/02/16 -  PT    Mally Agudelo, TREVIZO/L 08/02/16 -  OT    Alexandre Francis, PT DPT 08/02/16 -  PT    DB Destiny Arana RN 11/06/19 -  Nurse    HW Blaine Degroot RN 06/05/20 -  Nurse          Wound 08/15/20 1416 Left ankle Incision (Active)   Dressing Appearance dry;intact 8/17/2020  8:00 AM   Closure DAPHNE 8/17/2020  8:00 AM   Base dressing in place, unable to visualize 8/17/2020  8:00 AM   Drainage Amount none 8/17/2020  8:00 AM   Dressing Care, Wound gauze;elastic bandage 8/17/2020  8:00 AM       Wound 08/16/20 0800 coccyx Pressure Injury (Active)   Dressing Appearance open to air 8/17/2020  8:00 AM           Physical Therapy Education                 Title: PT OT SLP Therapies (In Progress)     Topic: Physical Therapy (In Progress)     Point: Mobility training (Done)     Description:   Instruct learner(s) on safety and technique for assisting patient out of bed, chair or wheelchair.  Instruct in the proper use of assistive devices, such as walker, crutches, cane or brace.              Patient Friendly Description:   It's important to get you on your feet again, but we need to do so in a way that is safe for you. Falling has serious consequences, and your personal safety is the most important thing of all.        When it's time to get out of bed, one of us or a family member will sit next to you on the bed to give you support.     If your doctor or nurse tells you to use a walker, crutches, a  cane, or a brace, be sure you use it every time you get out of bed, even if you think you don't need it.    Learning Progress Summary           Patient Acceptance, E,TB,D, VU,NR by  at 8/17/2020 0937    Comment:  pivot trans    Acceptance, E, VU,NR by SUBHASH at 8/16/2020 0715    Comment:  Educated pt on progression of PT POC, benefits of activity, NWB L LE                   Point: Home exercise program (Not Started)     Description:   Instruct learner(s) on appropriate technique for monitoring, assisting and/or progressing patient with therapeutic exercises and activities.              Learner Progress:   Not documented in this visit.          Point: Body mechanics (Not Started)     Description:   Instruct learner(s) on proper positioning and spine alignment for patient and/or caregiver during mobility tasks and/or exercises.              Learner Progress:   Not documented in this visit.          Point: Precautions (Done)     Description:   Instruct learner(s) on prescribed precautions during mobility and gait tasks              Learning Progress Summary           Patient Acceptance, E, VU,NR by SUBHASH at 8/16/2020 0715    Comment:  Educated pt on progression of PT POC, benefits of activity, NWB L LE                               User Key     Initials Effective Dates Name Provider Type Discipline     08/02/16 -  Milady Tena, PTA Physical Therapy Assistant PT    SUBHASH 08/02/16 -  Alexandre Moe, PT DPT Physical Therapist PT                PT Recommendation and Plan     Plan of Care Reviewed With: patient  Progress: improving  Outcome Summary: pt trans to EOB min assist, performed BLE exercises at EOB , sit-stand min assist, pt stood x 2, unable to move RLE and maintain NWB LLE, trans back to bed min-mod assist, pt would benefit from SNF  Outcome Measures     Row Name 08/17/20 1200 08/16/20 1200          How much help from another is currently needed...    Putting on and taking off regular lower body clothing?  2  -TS   1  -JJ     Bathing (including washing, rinsing, and drying)  2  -TS  2  -JJ     Toileting (which includes using toilet bed pan or urinal)  2  -TS  2  -JJ     Putting on and taking off regular upper body clothing  3  -TS  4  -JJ     Taking care of personal grooming (such as brushing teeth)  3  -TS  4  -JJ     Eating meals  4  -TS  4  -JJ     AM-PAC 6 Clicks Score (OT)  16  -TS  17  -JJ        Functional Assessment    Outcome Measure Options  --  AM-PAC 6 Clicks Daily Activity (OT)  -J       User Key  (r) = Recorded By, (t) = Taken By, (c) = Cosigned By    Initials Name Provider Type     Mally Scherer TREVIZO/L Occupational Therapy Assistant    Kita Rodriguez OTR/L Occupational Therapist         Time Calculation:   PT Charges     Row Name 08/17/20 1350 08/17/20 0939          Time Calculation    Start Time  1309  -  0900  -     Stop Time  1333  -AH  0930  -     Time Calculation (min)  24 min  -  30 min  -     PT Received On  --  08/17/20  -        Time Calculation- PT    Total Timed Code Minutes- PT  24 minute(s)  -  30 minute(s)  -        Timed Charges    74514 - PT Therapeutic Exercise Minutes  --  15  -AH     06847 - PT Therapeutic Activity Minutes  24  -AH  15  -AH       User Key  (r) = Recorded By, (t) = Taken By, (c) = Cosigned By    Initials Name Provider Type     Milady Tena PTA Physical Therapy Assistant        Therapy Charges for Today     Code Description Service Date Service Provider Modifiers Qty    62145837805 HC PT THER PROC EA 15 MIN 8/17/2020 Milady Tena, BATSHEVA GP 1    02532731537 HC PT THERAPEUTIC ACT EA 15 MIN 8/17/2020 Milady Tena PTA GP 1    67670782756 HC PT THERAPEUTIC ACT EA 15 MIN 8/17/2020 Milady Tena PTA GP 2          PT G-Codes  Outcome Measure Options: AM-PAC 6 Clicks Daily Activity (OT)  AM-PAC 6 Clicks Score (PT): 9  AM-PAC 6 Clicks Score (OT): 16    Milady Tena PTA  8/17/2020

## 2020-08-17 NOTE — PLAN OF CARE
Patient is up to chair today and happy to be out of the bed, still not able to ambulate and keep NWB status. Tolerating PO pain medications, will continue to monitor and notify MD of any changes. A+Ox4, working with staff- notified White of daughter's concern of past history of seizures- no new orders

## 2020-08-17 NOTE — PROGRESS NOTES
[facility] Medicine Services  INPATIENT PROGRESS NOTE    Length of Stay: 2  Date of Admission: 8/15/2020  Primary Care Physician: Cayetano Crews MD    Subjective     Chief Complaint:     Left ankle discomfort    HPI     Patient seems to be doing well today.  Pain is reasonably well controlled.  She currently has an ice pack over her left ankle.  She has had no further shortness of breath and no further cough.  Heart rate is 77 without tachycardia noted over the past 24 hours.  Referral has been made to [facility] for [facility] and we are waiting for a bed offer.  BMP and CBC are both unremarkable today.    Review of Systems     All pertinent negatives and positives are as above. All other systems have been reviewed and are negative unless otherwise stated.     Objective    Temp:  [97.6 °F (36.4 °C)-98.3 °F (36.8 °C)] 98.1 °F (36.7 °C)  Heart Rate:  [] 77  Resp:  [15-22] 15  BP: (110-133)/(57-71) 124/60    Lab Results (last 24 hours)     Procedure Component Value Units Date/Time    Basic Metabolic Panel [[id number]]  (Abnormal) Collected:  08/17/20 0439    Specimen:  Blood Updated:  08/17/20 0538     Glucose 135 mg/dL      BUN 22 mg/dL      Creatinine 0.93 mg/dL      Sodium 140 mmol/L      Potassium 4.3 mmol/L      Chloride 103 mmol/L      CO2 26.0 mmol/L      Calcium 8.7 mg/dL      eGFR Non African Amer 59 mL/min/1.73      BUN/Creatinine Ratio 23.7     Anion Gap 11.0 mmol/L     Narrative:       GFR Normal >60  Chronic Kidney Disease <60  Kidney Failure <15      CBC (No Diff) [[id number]]  (Abnormal) Collected:  08/17/20 0439    Specimen:  Blood Updated:  08/17/20 0521     WBC 10.24 10*3/mm3      RBC 3.50 10*6/mm3      Hemoglobin 11.9 g/dL      Hematocrit 35.1 %      .3 fL      MCH 34.0 pg      MCHC 33.9 g/dL      RDW 13.7 %      RDW-SD 49.8 fl      MPV 10.2 fL      Platelets 227 10*3/mm3           Imaging Results (Last 24 Hours)     ** No results found for the last  24 hours. **             Intake/Output Summary (Last 24 hours) at 8/17/2020 1539  Last data filed at 8/17/2020 0916  Gross per 24 hour   Intake 1140 ml   Output 1150 ml   Net -10 ml       Physical Exam  Constitutional: She is oriented to person, place, and time. She appears well-developed and well-nourished. Up in bed, eating lunch.  No family present.    Head: Normocephalic and atraumatic.   Eyes: Conjunctivae and EOM are normal.   Neck: Neck supple. No JVD present.   Cardiovascular: Normal heart sounds and intact distal pulses.  No murmur heard.  Pulmonary/Chest: Effort normal and breath sounds normal. No respiratory distress. She exhibits no tenderness.    Abdominal: Soft. Bowel sounds are normal. There is no rebound and no guarding.   Musculoskeletal: Normal range of motion. She exhibits edema and tenderness. She exhibits no deformity. Heavy bandaging and splinting of the left lower extremity.   Neurological: She is alert and oriented to person, place, and time. She exhibits normal muscle tone.   Skin: Skin is warm and dry. No rash noted.   Psychiatric: Alert and conversant.    Results Review:  I have reviewed the labs, radiology results, and diagnostic studies since my last progress note and made treatment changes reflective of the results.   I have reviewed the current medications.    Assessment/Plan     Active Hospital Problems    Diagnosis   • **Type III open fracture of left ankle   • Fall   • Left ankle pain   • Current every day smoker   • COPD, moderate (CMS/HCC)   • PAF (paroxysmal atrial fibrillation) (CMS/HCC)   • Stented coronary artery   • Coronary artery disease involving native coronary artery of native heart without angina pectoris       PLAN:  Referral to [facility] for SNF placement and rehab  Continue current therapy    Electronically signed by Milo White DO, 08/17/20, 15:39.

## 2020-08-17 NOTE — THERAPY TREATMENT NOTE
Acute Care - Physical Therapy Treatment Note  [facility]     Patient Name: Josi Mohamud  : 1945  MRN: [id number]  Today's Date: 2020  Onset of Illness/Injury or Date of Surgery: 08/15/20     Referring Physician: Dr. Medrano    Admit Date: 8/15/2020    Visit Dx:    ICD-10-CM ICD-9-CM   1. Type III open fracture of left ankle, initial encounter S82.892C 824.9   2. COPD (chronic obstructive pulmonary disease) case management patient (CMS/HCC) J44.9 496   3. Impaired mobility Z74.09 799.89   4. Decreased activities of daily living (ADL) Z78.9 V49.89     Patient Active Problem List   Diagnosis   • Syncope and collapse   • Coronary artery disease involving native coronary artery of native heart without angina pectoris   • Stented coronary artery   • Essential hypertension   • Abnormal positron emission tomography (PET) scan   • Gastroesophageal reflux disease   • Thyroid nodule   • Parotid neoplasm   • Anticoagulated   • Laryngopharyngeal reflux (LPR)   • Traumatic rhabdomyolysis (CMS/[facility])   • PAF (paroxysmal atrial fibrillation) (CMS/[facility])   • Malignant neoplasm of upper lobe of left lung (CMS/[facility])   • COPD, moderate (CMS/[facility])   • Persistent asthma without complication   • Overweight   • Cigarette nicotine dependence without complication   • Multiple thyroid nodules   • Hx of Hodgkin's lymphoma   • Dysfunction of both eustachian tubes   • Left chronic serous otitis media   • Morbidly obese (CMS/[facility])   • Tonsillar cyst   • Status post stereotactic radiosurgery   • Current every day smoker   • Renal cyst   • Cellulitis of right lower extremity   • COPD with acute exacerbation (CMS/[facility])   • Macrocytosis without anemia   • Obesity (BMI 30-39.9)   • Tobacco abuse   • Lower extremity edema, right side   • Leukocytosis   • Type III open fracture of left ankle   • Fall   • Left ankle pain       Therapy Treatment    Rehabilitation Treatment Summary     Row Name 20 0900             Treatment Time/Intention     Discipline  physical therapy assistant  -      Document Type  therapy note (daily note)  -2      Subjective Information  complains of;weakness;fatigue;pain  -2      Existing Precautions/Restrictions  (S) fall;left;non-weight bearing NWB LLE  -2      Recorded by [] Milady Tena, [facility] 08/17/20 0907  [2] Milady Tena, [facility] 08/17/20 0937      Row Name 08/17/20 0900             Mobility Assessment/Intervention    Extremity Weight-bearing Status  (S) left lower extremity  -      Left Lower Extremity (Weight-bearing Status)  (S) non weight-bearing (NWB)  -      Recorded by [] Milady Tena, [facility] 08/17/20 0937      Row Name 08/17/20 0900             Bed Mobility Assessment/Treatment    Scooting/Bridging [place] (Bed Mobility)  maximum assist (25% patient effort);2 person assist;verbal cues  -      Supine-Sit [place] (Bed Mobility)  minimum assist (75% patient effort);verbal cues  -      Sit-Supine [place] (Bed Mobility)  minimum assist (75% patient effort);moderate assist (50% patient effort);verbal cues  -      Recorded by [] Milady Tena, [facility] 08/17/20 0937      Row Name 08/17/20 0900             Transfer Assessment/Treatment    Comment (Transfers)  stood x 2, unable to move RLE and maintain LLE NWB  -      Recorded by [] Milady Tena, [facility] 08/17/20 0937      Row Name 08/17/20 0900             Sit-Stand Transfer    Sit-Stand [place] (Transfers)  minimum assist (75% patient effort);verbal cues  -      Recorded by [] Milady Tena, [facility] 08/17/20 0937      Row Name 08/17/20 0900             Stand-Sit Transfer    Stand-Sit [place] (Transfers)  minimum assist (75% patient effort);verbal cues  -      Recorded by [] Milady Tena, [facility] 08/17/20 0937      Row Name 08/17/20 0900             Therapeutic Exercise    Lower Extremity (Therapeutic Exercise)  LAQ (long arc quad), bilateral;marching while seated  -      Lower Extremity Range of Motion  (Therapeutic Exercise)  ankle dorsiflexion/plantar flexion, right  -      Exercise Type (Therapeutic Exercise)  AROM (active range of motion)  -      Position (Therapeutic Exercise)  seated  -      Sets/Reps (Therapeutic Exercise)  10 X 2  -AH      Recorded by [] Milady Tena, [facility] 08/17/20 0937      Row Name 08/17/20 0900             Positioning and Restraints    Pre-Treatment Position  in bed  -      Post Treatment Position  bed  -AH      In Bed  fowlers;call light within reach;encouraged to call for assist;exit alarm on;with nsg;SCD pump applied  -AH      Recorded by [] Milady Tena, PTA 08/17/20 0937      Row Name 08/17/20 0900             Pain Scale: Numbers Pre/Post-Treatment    Pain Scale: Numbers, Pretreatment  0/10 - no pain AT REST  -      Pain Scale: Numbers, Post-Treatment  5/10  -AH      Pain Location - Side  Left  -      Pain Location  ankle  -      Pain Intervention(s)  Medication (See MAR);Repositioned  -AH      Recorded by [] Milady Tena, [facility] 08/17/20 0937      Row Name                Wound 08/15/20 1416 Left ankle Incision    Wound - Properties Group Date first assessed: 08/15/20 [HW] Time first assessed: 1416 [HW] Present on Hospital Admission: N [HW] Side: Left [HW] Location: ankle [HW] Primary Wound Type: Incision [HW] Recorded by:  [HW] Blaine Degroot, RN 08/15/20 1416    Row Name                Wound 08/16/20 0800 coccyx Pressure Injury    Wound - Properties Group Date first assessed: 08/16/20 [DB] Time first assessed: 0800 [DB] Location: coccyx [DB] Primary Wound Type: Pressure inj [DB] Stage, Pressure Injury: Stage 2 [DB] Recorded by:  [DB] Destiny Arana RN 08/16/20 1134    Row Name                Wound 03/09/20 1100 Right lower leg    Wound - Properties Group Date first assessed: 03/09/20 [SUBHASH] Time first assessed: 1100 [SUBHASH] Present on Hospital Admission: Y [SUBHASH] Side: Right [SUBHASH] Orientation: lower [SUBHASH] Location: leg [SUBHASH] Recorded by:  [SUBHASH] Alexandre Moe  A, PT DPT 03/09/20 1134      User Key  (r) = Recorded By, (t) = Taken By, (c) = Cosigned By    Initials Name Effective Dates Discipline     Tena Milady M, PTA 08/02/16 -  PT    Alexandre Francis, PT DPT 08/02/16 -  PT    Destiny Escobedo RN 11/06/19 -  Nurse    HW Blaine Degroot RN 06/05/20 -  Nurse          Wound 08/15/20 1416 Left ankle Incision (Active)   Dressing Appearance dry;intact 8/17/2020 12:00 AM   Closure DAPHNE 8/17/2020 12:00 AM   Base dressing in place, unable to visualize 8/17/2020 12:00 AM   Drainage Amount none 8/17/2020 12:00 AM   Dressing Care, Wound gauze;elastic bandage 8/17/2020 12:00 AM           Physical Therapy Education                 Title: PT OT SLP Therapies (In Progress)     Topic: Physical Therapy (In Progress)     Point: Mobility training (Done)     Description:   Instruct learner(s) on safety and technique for assisting patient out of bed, chair or wheelchair.  Instruct in the proper use of assistive devices, such as walker, crutches, cane or brace.              Patient Friendly Description:   It's important to get you on your feet again, but we need to do so in a way that is safe for you. Falling has serious consequences, and your personal safety is the most important thing of all.        When it's time to get out of bed, one of us or a family member will sit next to you on the bed to give you support.     If your doctor or nurse tells you to use a walker, crutches, a cane, or a brace, be sure you use it every time you get out of bed, even if you think you don't need it.    Learning Progress Summary           Patient Acceptance, E,TB,D, VU,NR by MISSY at 8/17/2020 0937    Comment:  pivot trans    Acceptance, E, VU,NR by SUBHASH at 8/16/2020 0715    Comment:  Educated pt on progression of PT POC, benefits of activity, NWB L LE                   Point: Home exercise program (Not Started)     Description:   Instruct learner(s) on appropriate technique for monitoring, assisting  and/or progressing patient with therapeutic exercises and activities.              Learner Progress:   Not documented in this visit.          Point: Body mechanics (Not Started)     Description:   Instruct learner(s) on proper positioning and spine alignment for patient and/or caregiver during mobility tasks and/or exercises.              Learner Progress:   Not documented in this visit.          Point: Precautions (Done)     Description:   Instruct learner(s) on prescribed precautions during mobility and gait tasks              Learning Progress Summary           Patient Acceptance, E, VU,NR by SUBHASH at 8/16/2020 0715    Comment:  Educated pt on progression of PT POC, benefits of activity, NWB L LE                               User Key     Initials Effective Dates Name Provider Type Discipline     08/02/16 -  Milady Tena, PTA Physical Therapy Assistant PT    SUBHASH 08/02/16 -  Alexandre Moe, PT DPT Physical Therapist PT                PT Recommendation and Plan     Plan of Care Reviewed With: patient  Progress: improving  Outcome Summary: pt trans to EOB min assist, performed BLE exercises at EOB , sit-stand min assist, pt stood x 2, unable to move RLE and maintain NWB LLE, trans back to bed min-mod assist, pt would benefit from SNF  Outcome Measures     Row Name 08/16/20 1200             How much help from another is currently needed...    Putting on and taking off regular lower body clothing?  1  -JJ      Bathing (including washing, rinsing, and drying)  2  -JJ      Toileting (which includes using toilet bed pan or urinal)  2  -JJ      Putting on and taking off regular upper body clothing  4  -JJ      Taking care of personal grooming (such as brushing teeth)  4  -JJ      Eating meals  4  -JJ      AM-PAC 6 Clicks Score (OT)  17  -JJ         Functional Assessment    Outcome Measure Options  AM-PAC 6 Clicks Daily Activity (OT)  -JJ        User Key  (r) = Recorded By, (t) = Taken By, (c) = Cosigned By    Initials  Name Provider Type    Kita Rodriguez, OTR/L Occupational Therapist         Time Calculation:   PT Charges     Row Name 08/17/20 0939             Time Calculation    Start Time  0900  -      Stop Time  0930  -      Time Calculation (min)  30 min  -      PT Received On  08/17/20  -         Time Calculation- PT    Total Timed Code Minutes- PT  30 minute(s)  -         Timed Charges    05639 - PT Therapeutic Exercise Minutes  15  -AH      05282 - PT Therapeutic Activity Minutes  15  -AH        User Key  (r) = Recorded By, (t) = Taken By, (c) = Cosigned By    Initials Name Provider Type     Milady Tena PTA Physical Therapy Assistant        Therapy Charges for Today     Code Description Service Date Service Provider Modifiers Qty    97047411741 HC PT THER PROC EA 15 MIN 8/17/2020 Milady Tena PTA GP 1    29838337888 HC PT THERAPEUTIC ACT EA 15 MIN 8/17/2020 Milady Tena PTA GP 1          PT G-Codes  Outcome Measure Options: AM-PAC 6 Clicks Daily Activity (OT)  AM-PAC 6 Clicks Score (PT): 9  AM-PAC 6 Clicks Score (OT): 17    Milady Tena PTA  8/17/2020

## 2020-08-17 NOTE — THERAPY TREATMENT NOTE
Acute Care - Occupational Therapy Treatment Note  [facility]     Patient Name: Josi Mohamud  : 1945  MRN: [id number]  Today's Date: 2020  Onset of Illness/Injury or Date of Surgery: 08/15/20  Date of Referral to OT: 08/15/20  Referring Physician: Dr. Medrano    Admit Date: 8/15/2020       ICD-10-CM ICD-9-CM   1. Type III open fracture of left ankle, initial encounter S82.892C 824.9   2. COPD (chronic obstructive pulmonary disease) case management patient (CMS/HCC) J44.9 496   3. Impaired mobility Z74.09 799.89   4. Decreased activities of daily living (ADL) Z78.9 V49.89     Patient Active Problem List   Diagnosis   • Syncope and collapse   • Coronary artery disease involving native coronary artery of native heart without angina pectoris   • Stented coronary artery   • Essential hypertension   • Abnormal positron emission tomography (PET) scan   • Gastroesophageal reflux disease   • Thyroid nodule   • Parotid neoplasm   • Anticoagulated   • Laryngopharyngeal reflux (LPR)   • Traumatic rhabdomyolysis (CMS/HCC)   • PAF (paroxysmal atrial fibrillation) (CMS/HCC)   • Malignant neoplasm of upper lobe of left lung (CMS/HCC)   • COPD, moderate (CMS/HCC)   • Persistent asthma without complication   • Overweight   • Cigarette nicotine dependence without complication   • Multiple thyroid nodules   • Hx of Hodgkin's lymphoma   • Dysfunction of both eustachian tubes   • Left chronic serous otitis media   • Morbidly obese (CMS/HCC)   • Tonsillar cyst   • Status post stereotactic radiosurgery   • Current every day smoker   • Renal cyst   • Cellulitis of right lower extremity   • COPD with acute exacerbation (CMS/HCC)   • Macrocytosis without anemia   • Obesity (BMI 30-39.9)   • Tobacco abuse   • Lower extremity edema, right side   • Leukocytosis   • Type III open fracture of left ankle   • Fall   • Left ankle pain     Past Medical History:   Diagnosis Date   • Bronchitis    • Cancer (CMS/HCC)     lung   • COPD  (chronic obstructive pulmonary disease) (CMS/HCC)    • Coronary artery disease    • GERD (gastroesophageal reflux disease)    • History of transfusion    • Hyperlipidemia    • Hypertension    • PAF (paroxysmal atrial fibrillation) (CMS/HCC)    • Persistent asthma without complication 7/8/2019   • Seizures (CMS/HCC)      Past Surgical History:   Procedure Laterality Date   • ANKLE OPEN REDUCTION INTERNAL FIXATION Left 8/15/2020    Procedure: ANKLE OPEN REDUCTION INTERNAL FIXATION;  Surgeon: Keyshawn Medrano MD;  Location: [facility] OR;  Service: Orthopedics;  Laterality: Left;   • APPENDECTOMY     • APPENDECTOMY     • CARDIAC CATHETERIZATION     • CARDIAC ELECTROPHYSIOLOGY PROCEDURE N/A 5/23/2017    Procedure: Loop insertion; LINQ;  Surgeon: Jose Monteiro MD;  Location:  [facility] CATH INVASIVE LOCATION;  Service:    • CHOLECYSTECTOMY     • CORONARY ANGIOPLASTY WITH STENT PLACEMENT      X 1    • INCISION AND DRAINAGE OF WOUND Left 8/15/2020    Procedure: INCISION AND DRAINAGE WOUND;  Surgeon: Keyshawn Medrano MD;  Location: [facility] OR;  Service: Orthopedics;  Laterality: Left;   • LIVER BIOPSY     • MYRINGOTOMY W/ TUBES Left 11/22/2019    Procedure: LEFT MYRINGOTOMY WITH INSERTION OF EAR TUBE WITH RIGHT EAR EXAM UNDER ANESTHESIA;  Surgeon: Se Mcnulty MD;  Location: [facility] OR;  Service: ENT   • SPLENECTOMY     • US GUIDED FINE NEEDLE ASPIRATION  8/7/2018       Therapy Treatment    Rehabilitation Treatment Summary     Row Name 08/17/20 1120 08/17/20 0900          Treatment Time/Intention    Discipline  occupational therapy assistant  -TS  physical therapy assistant  -     Document Type  therapy note (daily note)  -TS  therapy note (daily note)  -AH2     Subjective Information  complains of;pain;weakness  -TS2  complains of;weakness;fatigue;pain  -AH2     Patient Effort  good  -TS2  --     Existing Precautions/Restrictions  fall;left;non-weight bearing  -TS2  (S) fall;left;non-weight bearing NWB LLE  -2      Recorded by [TS] Mally Scherer TREVIZO/L 08/17/20 1122  [TS2] Mally Scherer TREVIZO/L 08/17/20 1254 [] Milady Tena, PTA 08/17/20 0907  [AH2] Milady Tena, PTA 08/17/20 0937     Row Name 08/17/20 1120             Vital Signs    Post SpO2 (%)  94  -TS      O2 Delivery Post Treatment  room air  -TS      Recorded by [TS] Mally Scherer TREVIZO/L 08/17/20 1254      Row Name 08/17/20 1120             Cognitive Assessment/Intervention- PT/OT    Personal Safety Interventions  fall prevention program maintained;gait belt;nonskid shoes/slippers when out of bed  -TS      Recorded by [TS] Mally Scherer TREVIZO/L 08/17/20 1254      Row Name 08/17/20 0900             Mobility Assessment/Intervention    Extremity Weight-bearing Status  (S) left lower extremity  -AH      Left Lower Extremity (Weight-bearing Status)  (S) non weight-bearing (NWB)  -AH      Recorded by [] Milady Tena, PTA 08/17/20 0937      Row Name 08/17/20 1120 08/17/20 0900          Bed Mobility Assessment/Treatment    Scooting/Bridging Gates (Bed Mobility)  minimum assist (75% patient effort);moderate assist (50% patient effort)  -TS  maximum assist (25% patient effort);2 person assist;verbal cues  -     Supine-Sit [place] (Bed Mobility)  minimum assist (75% patient effort)  -TS  minimum assist (75% patient effort);verbal cues  -     Sit-Supine [place] (Bed Mobility)  --  minimum assist (75% patient effort);moderate assist (50% patient effort);verbal cues  -     Assistive Device (Bed Mobility)  bed rails;head of bed elevated;draw sheet  -TS  --     Recorded by [TS] Mally Scherer TREVIZO/L 08/17/20 1254 [] Milady Tena, PTA 08/17/20 0937     Row Name 08/17/20 1120 08/17/20 0900          Transfer Assessment/Treatment    Transfer Assessment/Treatment  sit-stand transfer;stand-sit transfer  -TS  --     Maintains Weight-bearing Status (Transfers)  able to maintain  -TS  --     Comment (Transfers)   pt stood from EOB, EOB then scooted back and recliner placed behind pt   -TS  stood x 2, unable to move RLE and maintain LLE NWB  -AH     Recorded by [TS] Mally Scherer COTA/L 08/17/20 1254 [] Milady Tena, PTA 08/17/20 0937     Row Name 08/17/20 1120 08/17/20 0900          Sit-Stand Transfer    Sit-Stand [place] (Transfers)  minimum assist (75% patient effort)  -TS  minimum assist (75% patient effort);verbal cues  -     Assistive Device (Sit-Stand Transfers)  walker, front-wheeled  -TS  --     Recorded by [TS] Mally Scherer COTA/MARYJO 08/17/20 1254 [] Milady Tena, PTA 08/17/20 0937     Row Name 08/17/20 1120 08/17/20 0900          Stand-Sit Transfer    Stand-Sit [place] (Transfers)  minimum assist (75% patient effort);verbal cues  -TS  minimum assist (75% patient effort);verbal cues  -     Assistive Device (Stand-Sit Transfers)  walker, front-wheeled  -TS  --     Recorded by [TS] Mally Scherer COTA/MARYJO 08/17/20 1254 [] Milady Tena, [facility] 08/17/20 0937     Row Name 08/17/20 1120             ADL Assessment/Intervention    BADL Assessment/Intervention  lower body dressing;toileting  -TS      Recorded by [TS] Mally Scherer COTA/L 08/17/20 1254      Row Name 08/17/20 1120             Lower Body Dressing Assessment/Training    Lower Body Dressing [place] Level  don;socks;maximum assist (25% patient effort)  -TS      Lower Body Dressing Position  supine  -TS      Recorded by [TS] Mally Scherer COTA/L 08/17/20 1254      Row Name 08/17/20 1120             Toileting Assessment/Training    [place] Level (Toileting)  perform perineal hygiene;maximum assist (25% patient effort)  -TS      Toileting Position  supported standing  -TS      Recorded by [TS] Mally Scherer TREVIZO/L 08/17/20 1254      Row Name 08/17/20 0900             Therapeutic Exercise    Lower Extremity (Therapeutic Exercise)  LAQ (long arc quad), bilateral;marching while seated  -       Lower Extremity Range of Motion (Therapeutic Exercise)  ankle dorsiflexion/plantar flexion, right  -      Exercise Type (Therapeutic Exercise)  AROM (active range of motion)  -AH      Position (Therapeutic Exercise)  seated  -AH      Sets/Reps (Therapeutic Exercise)  10 X 2  -AH      Recorded by [AH] Milady Tena, PTA 08/17/20 0937      Row Name 08/17/20 1120 08/17/20 0900          Positioning and Restraints    Pre-Treatment Position  in bed  -TS  in bed  -AH     Post Treatment Position  chair  -TS  bed  -AH     In Bed  --  fowlers;call light within reach;encouraged to call for assist;exit alarm on;with nsg;SCD pump applied  -AH     In Chair  reclined;call light within reach;encouraged to call for assist;notified nsg  -TS  --     Recorded by [TS] Mally Scherer TREVIZO/MARYJO 08/17/20 1254 [AH] Milady Tena, PTA 08/17/20 0937     Row Name 08/17/20 1120 08/17/20 0900          Pain Scale: Numbers Pre/Post-Treatment    Pain Scale: Numbers, Pretreatment  1/10  -TS  0/10 - no pain AT REST  -     Pain Scale: Numbers, Post-Treatment  3/10  -TS  5/10  -AH     Pain Location - Side  Left  -TS  Left  -AH     Pain Location  ankle  -TS  ankle  -AH     Pain Intervention(s)  Repositioned  -TS  Medication (See MAR);Repositioned  -AH     Recorded by [TS] Mally Scherer COTA/MARYJO 08/17/20 1254 [] Milady Tena, [facility] 08/17/20 0937     Row Name                Wound 08/15/20 1416 Left ankle Incision    Wound - Properties Group Date first assessed: 08/15/20 [HW] Time first assessed: 1416 [HW] Present on Hospital Admission: N [HW] Side: Left [HW] Location: ankle [HW] Primary Wound Type: Incision [HW] Recorded by:  [HW] Blaine Degroot RN 08/15/20 1416    Row Name                Wound 08/16/20 0800 coccyx Pressure Injury    Wound - Properties Group Date first assessed: 08/16/20 [DB] Time first assessed: 0800 [DB] Location: coccyx [DB] Primary Wound Type: Pressure inj [DB] Stage, Pressure Injury: Stage 2 [DB] Recorded  by:  [DB] Destiny Arana RN 08/16/20 1134    Row Name                Wound 03/09/20 1100 Right lower leg    Wound - Properties Group Date first assessed: 03/09/20 [SUBHASH] Time first assessed: 1100 [SUBHASH] Present on Hospital Admission: Y [SUBHASH] Side: Right [SUBHASH] Orientation: lower [SUBHASH] Location: leg [SUBHASH] Recorded by:  [SUBHASH] Alexandre Moe, PT DPT 03/09/20 1134    Row Name 08/17/20 1120             Outcome Summary/Treatment Plan (OT)    Daily Summary of Progress (OT)  progress toward functional goals is good  -TS      Recorded by [TS] Mally Scherer, TREVIOZ/L 08/17/20 1254        User Key  (r) = Recorded By, (t) = Taken By, (c) = Cosigned By    Initials Name Effective Dates Discipline     Milady Tena, PTA 08/02/16 -  PT    Mally Agudelo TREVIZO/L 08/02/16 -  OT    Alexandre Francis, PT DPT 08/02/16 -  PT    Destiny Escobedo RN 11/06/19 -  Nurse    HW Blaine Degroot RN 06/05/20 -  Nurse        Wound 08/15/20 1416 Left ankle Incision (Active)   Dressing Appearance dry;intact 8/17/2020  8:00 AM   Closure DAPHNE 8/17/2020  8:00 AM   Base dressing in place, unable to visualize 8/17/2020  8:00 AM   Drainage Amount none 8/17/2020  8:00 AM   Dressing Care, Wound gauze;elastic bandage 8/17/2020  8:00 AM       Wound 08/16/20 0800 coccyx Pressure Injury (Active)   Dressing Appearance open to air 8/17/2020  8:00 AM       Occupational Therapy Education                 Title: PT OT SLP Therapies (In Progress)     Topic: Occupational Therapy (In Progress)     Point: ADL training (Done)     Description:   Instruct learner(s) on proper safety adaptation and remediation techniques during self care or transfers.   Instruct in proper use of assistive devices.              Learning Progress Summary           Patient Acceptance, E, VU by JJOE at 8/16/2020 1204                   Point: Home exercise program (Not Started)     Description:   Instruct learner(s) on appropriate technique for monitoring, assisting and/or  progressing therapeutic exercises/activities.              Learner Progress:   Not documented in this visit.          Point: Precautions (Done)     Description:   Instruct learner(s) on prescribed precautions during self-care and functional transfers.              Learning Progress Summary           Patient Acceptance, E, VU by  at 8/16/2020 1204                   Point: Body mechanics (Done)     Description:   Instruct learner(s) on proper positioning and spine alignment during self-care, functional mobility activities and/or exercises.              Learning Progress Summary           Patient Acceptance, E, VU by  at 8/16/2020 1204                               User Key     Initials Effective Dates Name Provider Type Discipline     07/05/20 -  Kita Xiong OTR/L Occupational Therapist OT                OT Recommendation and Plan  Outcome Summary/Treatment Plan (OT)  Daily Summary of Progress (OT): progress toward functional goals is good  Daily Summary of Progress (OT): progress toward functional goals is good  Outcome Measures     Row Name 08/17/20 1200 08/16/20 1200          How much help from another is currently needed...    Putting on and taking off regular lower body clothing?  2  -TS  1  -JJ     Bathing (including washing, rinsing, and drying)  2  -TS  2  -JJ     Toileting (which includes using toilet bed pan or urinal)  2  -TS  2  -JJ     Putting on and taking off regular upper body clothing  3  -TS  4  -JJ     Taking care of personal grooming (such as brushing teeth)  3  -TS  4  -JJ     Eating meals  4  -TS  4  -JJ     AM-PAC 6 Clicks Score (OT)  16  -TS  17  -JJ        Functional Assessment    Outcome Measure Options  --  AM-PAC 6 Clicks Daily Activity (OT)  -       User Key  (r) = Recorded By, (t) = Taken By, (c) = Cosigned By    Initials Name Provider Type    TS Mally Scherer COTA/L Occupational Therapy Assistant     Kita Xiong OTR/L Occupational Therapist            Time Calculation:   Time Calculation- OT     Row Name 08/17/20 1255             Time Calculation- OT    OT Start Time  1120  -TS      OT Stop Time  1200  -TS      OT Time Calculation (min)  40 min  -TS      Total Timed Code Minutes- OT  40 minute(s)  -TS      OT Received On  08/17/20  -TS         Timed Charges    24369 - OT Self Care/Mgmt Minutes  40  -TS        User Key  (r) = Recorded By, (t) = Taken By, (c) = Cosigned By    Initials Name Provider Type     Mally Scherer COTA/L Occupational Therapy Assistant        Therapy Charges for Today     Code Description Service Date Service Provider Modifiers Qty    85051625941 HC OT SELF CARE/MGMT/TRAIN EA 15 MIN 8/17/2020 Mally Scherer COTA/L GO 3               Mally ALVARADO. BETHANY Scherer  8/17/2020

## 2020-08-17 NOTE — PLAN OF CARE
Problem: Patient Care Overview  Goal: Plan of Care Review  Flowsheets (Taken 8/17/2020 4775)  Progress: no change  Plan of Care Reviewed With: patient  Note:   No numbness or tingling reported per patient;  3 + edema to right foot;  pure wick in place;  O2 at 2 L per NC;  also uses O2 at home;  telemetry is sinus 70 bpm with a BBB;  regular diet with cardiac and meds whole with water;  will continue to monitor

## 2020-08-17 NOTE — PROGRESS NOTES
Continued Stay Note   [facility]     Patient Name: Josi Mohamud  MRN: [id number]  Today's Date: 8/17/2020    Admit Date: 8/15/2020    Discharge Plan     Row Name 08/17/20 1433       Plan    Plan Comments  SW consulted for possible placement at [facility].  SW contacted PT's daughter who advised that this is the PT and family's wishes. PT has been listed with [facility], will await bed offer or denial.         Discharge Codes    No documentation.             VIDA Yusuf

## 2020-08-17 NOTE — PLAN OF CARE
Problem: Patient Care Overview  Goal: Plan of Care Review  Outcome: Ongoing (interventions implemented as appropriate)  Flowsheets (Taken 8/17/2020 1721)  Plan of Care Reviewed With: patient  Outcome Summary: Ntn assessment per wound protocol. Pt iwth stage II wound to coccyx area.Cardiac diet oral intake avg 62.5% per two meals. Encouraged oral intake. Boost Plus daily,ice cream daily. Cont to follow for plan of care.

## 2020-08-17 NOTE — PROGRESS NOTES
Orthopedic Surgery Progress Note    Josi Mohamud  8/17/2020      Subjective:     Systemic or Specific Complaints:no acute events    Objective:     Patient Vitals for the past 24 hrs:   BP Temp Temp src Pulse Resp SpO2   08/17/20 0458 110/58 97.8 °F (36.6 °C) Oral 72 20 93 %   08/16/20 2050 128/57 97.6 °F (36.4 °C) Oral 83 22 96 %   08/16/20 2028 -- -- -- 98 22 95 %   08/16/20 2021 -- -- -- 97 22 95 %   08/16/20 1655 133/71 98.3 °F (36.8 °C) Oral (!) 121 18 95 %   08/16/20 1112 137/77 98.1 °F (36.7 °C) Oral (!) 123 18 95 %   08/16/20 0750 -- -- -- (!) 124 20 95 %   08/16/20 0745 138/71 98.3 °F (36.8 °C) Oral (!) 123 18 94 %       left lower  General: alert, appears stated age and cooperative   Wound: clean, dry, intact             Dressing: clean, dry, intact   Extremity: Distal NVI           DVT Exam: No evidence of DVT seen on physical exam.                   Data Review:  Lab Results (last 24 hours)     Procedure Component Value Units Date/Time    Basic Metabolic Panel [[id number]]  (Abnormal) Collected:  08/17/20 0439    Specimen:  Blood Updated:  08/17/20 0538     Glucose 135 mg/dL      BUN 22 mg/dL      Creatinine 0.93 mg/dL      Sodium 140 mmol/L      Potassium 4.3 mmol/L      Chloride 103 mmol/L      CO2 26.0 mmol/L      Calcium 8.7 mg/dL      eGFR Non African Amer 59 mL/min/1.73      BUN/Creatinine Ratio 23.7     Anion Gap 11.0 mmol/L     Narrative:       GFR Normal >60  Chronic Kidney Disease <60  Kidney Failure <15      CBC (No Diff) [[id number]]  (Abnormal) Collected:  08/17/20 0439    Specimen:  Blood Updated:  08/17/20 0521     WBC 10.24 10*3/mm3      RBC 3.50 10*6/mm3      Hemoglobin 11.9 g/dL      Hematocrit 35.1 %      .3 fL      MCH 34.0 pg      MCHC 33.9 g/dL      RDW 13.7 %      RDW-SD 49.8 fl      MPV 10.2 fL      Platelets 227 10*3/mm3         Imaging Results (Last 24 Hours)     Procedure Component Value Units Date/Time    XR Ankle 2 View Left [[id number]] Collected:  08/15/20 1507      Updated:  08/16/20 1502    Narrative:       XR ANKLE 2 VW LEFT- 8/15/2020 3:05 PM CDT     HISTORY: fracture; S82.892C-Other fracture of left lower leg, initial  encounter for open fracture type IIIA, IIIB, or IIIC; J44.9-Chronic  obstructive pulmonary disease, unspecified      COMPARISON: None       Impression:       2 intraoperative fluoroscopic views of the ankle are submitted. These  images were obtained for procedural guidance during distal fibular  fixation. Please refer to the operative note for more details.  Fluoroscopy time was 17 seconds with a dose of 0.34 mGy.   This report was finalized on 08/15/2020 15:08 by Dr. Iftikhar Prieto MD.    FL C Arm During Surgery [[id number]] Collected:  08/15/20 1506     Updated:  08/16/20 1502    Narrative:       XR ANKLE 2 VW LEFT- 8/15/2020 3:05 PM CDT     HISTORY: fracture; S82.892C-Other fracture of left lower leg, initial  encounter for open fracture type IIIA, IIIB, or IIIC; J44.9-Chronic  obstructive pulmonary disease, unspecified      COMPARISON: None       Impression:       2 intraoperative fluoroscopic views of the ankle are submitted. These  images were obtained for procedural guidance during distal fibular  fixation. Please refer to the operative note for more details.  Fluoroscopy time was 17 seconds with a dose of 0.34 mGy.   This report was finalized on 08/15/2020 15:08 by Dr. Iftikhar Prieto MD.    XR Chest 1 View [[id number]] Collected:  08/16/20 0722     Updated:  08/16/20 0726    Narrative:       Exam:   XR CHEST 1 VW-       Date:  8/16/2020      History:  Female, age  74 years;soa, low sats; S82.892C-Other fracture  of left lower leg, initial encounter for open fracture type IIIA, IIIB,  or IIIC; J44.9-Chronic obstructive pulmonary disease, unspecified     COMPARISON:  Chest x-ray dated 8/15/2020     Findings :     The heart and mediastinum are normal in size. Lungs are without focal  infiltrate, mass or effusions.  The bones show no acute pathology.  Loop  recording device in place. Surgical clips project over the right  thoracic inlet.       Impression:       Impression:     No acute cardiopulmonary disease.     This report was finalized on 08/16/2020 07:22 by Dr. Pinky Snow MD.          Assessment:     POD# 2 I&D/ORIF L open aliza    Plan:      1:  DVT prophylaxis, ICE, elevate  2:  Pain control  3:  Physical therapy/Occupational therapy  4:  48 hrs Abx postop, then ok to d/c  5:  NWBLLE  6:   Ok to D/C when medically stable, ortho F/U in 2 weeks.   7:  She is requesting [facility] at D/C if possible.   Albin Rodriguez PA-C

## 2020-08-17 NOTE — PROGRESS NOTES
Continued Stay Note   [facility]     Patient Name: Josi Mohamud  MRN: [id number]  Today's Date: 8/17/2020    Admit Date: 8/15/2020    Discharge Plan     Row Name 08/17/20 1327       Plan    Plan Comments  [facility] has made a bed offer for 8/20/20, as this is the first available date for bed. PT may dc to SNF on 8/20/20 if medically ready.     Row Name 08/17/20 4853       Plan    Plan Comments  MIKEY consulted for possible placement at [facility].  SW contacted PT's daughter who advised that this is the PT and family's wishes. PT has been listed with [facility], will await bed offer or denial.         Discharge Codes    No documentation.             VIDA Yusuf

## 2020-08-18 PROCEDURE — 97535 SELF CARE MNGMENT TRAINING: CPT

## 2020-08-18 PROCEDURE — 94799 UNLISTED PULMONARY SVC/PX: CPT

## 2020-08-18 PROCEDURE — 97110 THERAPEUTIC EXERCISES: CPT

## 2020-08-18 PROCEDURE — 99406 BEHAV CHNG SMOKING 3-10 MIN: CPT

## 2020-08-18 PROCEDURE — 97530 THERAPEUTIC ACTIVITIES: CPT

## 2020-08-18 RX ORDER — BENZONATATE 200 MG/1
200 CAPSULE ORAL 3 TIMES DAILY PRN
COMMUNITY
End: 2020-08-20 | Stop reason: HOSPADM

## 2020-08-18 RX ADMIN — LEVETIRACETAM 500 MG: 500 TABLET, FILM COATED ORAL at 20:57

## 2020-08-18 RX ADMIN — IPRATROPIUM BROMIDE 0.5 MG: 0.5 SOLUTION RESPIRATORY (INHALATION) at 19:19

## 2020-08-18 RX ADMIN — GUAIFENESIN 1200 MG: 600 TABLET, EXTENDED RELEASE ORAL at 20:57

## 2020-08-18 RX ADMIN — SODIUM CHLORIDE, PRESERVATIVE FREE 3 ML: 5 INJECTION INTRAVENOUS at 09:55

## 2020-08-18 RX ADMIN — LORAZEPAM 1 MG: 1 TABLET ORAL at 20:57

## 2020-08-18 RX ADMIN — IPRATROPIUM BROMIDE 0.5 MG: 0.5 SOLUTION RESPIRATORY (INHALATION) at 06:23

## 2020-08-18 RX ADMIN — NICOTINE 1 PATCH: 21 PATCH, EXTENDED RELEASE TRANSDERMAL at 09:54

## 2020-08-18 RX ADMIN — LISINOPRIL 20 MG: 20 TABLET ORAL at 09:55

## 2020-08-18 RX ADMIN — PANTOPRAZOLE SODIUM 40 MG: 40 TABLET, DELAYED RELEASE ORAL at 06:22

## 2020-08-18 RX ADMIN — GUAIFENESIN 1200 MG: 600 TABLET, EXTENDED RELEASE ORAL at 09:55

## 2020-08-18 RX ADMIN — APIXABAN 5 MG: 5 TABLET, FILM COATED ORAL at 20:57

## 2020-08-18 RX ADMIN — LEVETIRACETAM 500 MG: 500 TABLET, FILM COATED ORAL at 09:55

## 2020-08-18 RX ADMIN — DOCUSATE SODIUM 100 MG: 100 CAPSULE ORAL at 21:35

## 2020-08-18 RX ADMIN — SODIUM CHLORIDE, PRESERVATIVE FREE 3 ML: 5 INJECTION INTRAVENOUS at 20:58

## 2020-08-18 RX ADMIN — FUROSEMIDE 40 MG: 40 TABLET ORAL at 09:55

## 2020-08-18 RX ADMIN — AMLODIPINE BESYLATE 5 MG: 5 TABLET ORAL at 09:55

## 2020-08-18 RX ADMIN — APIXABAN 5 MG: 5 TABLET, FILM COATED ORAL at 09:55

## 2020-08-18 RX ADMIN — HYDROCODONE BITARTRATE AND ACETAMINOPHEN 1 TABLET: 7.5; 325 TABLET ORAL at 09:55

## 2020-08-18 RX ADMIN — METOPROLOL SUCCINATE 50 MG: 50 TABLET, EXTENDED RELEASE ORAL at 09:55

## 2020-08-18 RX ADMIN — LORAZEPAM 1 MG: 1 TABLET ORAL at 15:51

## 2020-08-18 RX ADMIN — FUROSEMIDE 40 MG: 40 TABLET ORAL at 20:57

## 2020-08-18 RX ADMIN — ATORVASTATIN CALCIUM 10 MG: 10 TABLET, FILM COATED ORAL at 20:57

## 2020-08-18 RX ADMIN — LORAZEPAM 1 MG: 1 TABLET ORAL at 09:54

## 2020-08-18 RX ADMIN — ASPIRIN 81 MG: 81 TABLET ORAL at 09:55

## 2020-08-18 NOTE — PLAN OF CARE
Problem: Patient Care Overview  Goal: Plan of Care Review  Outcome: Ongoing (interventions implemented as appropriate)  Flowsheets (Taken 8/18/2020 8118)  Progress: improving  Plan of Care Reviewed With: patient  Note:   VSS. No change in neuro/NV assess during the shift. Able to move toes/warm. Splint c/d/I. Pt c/o intermit pain, prn given. Assist w/repositioning. Purewick in place. Safety maintained.

## 2020-08-18 NOTE — PROGRESS NOTES
[facility]  INPATIENT PROGRESS NOTE    Length of Stay: 3  Date of Admission: 8/15/2020  Primary Care Physician: Cayetano Crews MD    Subjective     Chief Complaint:     Left ankle discomfort    HPI     Patient is doing well today with less discomfort.  She has had no further shortness of breath.  Heart rate is 76 with no tachycardia over the past 48 hours.  Referral has been made to [facility] for placement and we are still awaiting a bed offer.  PT/OT have evaluated the patient and made recommendations for SNF placement.  Orthopedics has evaluated the patient and feels the patient is appropriate for discharge to SNF when stable.    Review of Systems     All pertinent negatives and positives are as above. All other systems have been reviewed and are negative unless otherwise stated.     Objective    Temp:  [97.5 °F (36.4 °C)-98.2 °F (36.8 °C)] 98.2 °F (36.8 °C)  Heart Rate:  [73-88] 76  Resp:  [15-18] 18  BP: (111-129)/(49-66) 129/66    Lab Results (last 24 hours)     ** No results found for the last 24 hours. **          Imaging Results (Last 24 Hours)     ** No results found for the last 24 hours. **             Intake/Output Summary (Last 24 hours) at 8/18/2020 1514  Last data filed at 8/18/2020 1013  Gross per 24 hour   Intake --   Output 2500 ml   Net -2500 ml       Physical Exam  Constitutional: She is oriented to person, place, and time. She appears well-developed and well-nourished.  Lying in bed.  No family present.    Head: Normocephalic and atraumatic.   Eyes: Conjunctivae and EOM are normal.   Neck: Neck supple. No JVD present.   Cardiovascular: Normal heart sounds and intact distal pulses.  No murmur heard.  Pulmonary/Chest: Effort normal and breath sounds normal. No respiratory distress. She exhibits no tenderness.    Abdominal: Soft. Bowel sounds are normal. There is no rebound and no guarding.   Musculoskeletal: Normal range of motion. She  exhibits edema and tenderness. She exhibits no deformity. Heavy bandaging and splinting of the left lower extremity.   Neurological: She is alert and oriented to person, place, and time. She exhibits normal muscle tone.   Skin: Skin is warm and dry. No rash noted.   Psychiatric: Alert and conversant.    Results Review:  I have reviewed the labs, radiology results, and diagnostic studies since my last progress note and made treatment changes reflective of the results.   I have reviewed the current medications.    Assessment/Plan     Active Hospital Problems    Diagnosis   • **Type III open fracture of left ankle   • Fall   • Left ankle pain   • Current every day smoker   • COPD, moderate (CMS/HCC)   • PAF (paroxysmal atrial fibrillation) (CMS/HCC)   • Stented coronary artery   • Coronary artery disease involving native coronary artery of native heart without angina pectoris       PLAN:  Referral to [facility] for SNF placement and rehab  Continue current therapy    Electronically signed by Milo White DO, 08/18/20, 15:14.

## 2020-08-18 NOTE — THERAPY TREATMENT NOTE
Acute Care - Physical Therapy Treatment Note  [facility]     Patient Name: Josi Mohamud  : 1945  MRN: [id number]  Today's Date: 2020  Onset of Illness/Injury or Date of Surgery: 08/15/20     Referring Physician: Dr. Medrano    Admit Date: 8/15/2020    Visit Dx:    ICD-10-CM ICD-9-CM   1. Type III open fracture of left ankle, initial encounter S82.892C 824.9   2. COPD (chronic obstructive pulmonary disease) case management patient (CMS/Formerly [facility]) J44.9 496   3. Impaired mobility Z74.09 799.89   4. Decreased activities of daily living (ADL) Z78.9 V49.89     Patient Active Problem List   Diagnosis   • Syncope and collapse   • Coronary artery disease involving native coronary artery of native heart without angina pectoris   • Stented coronary artery   • Essential hypertension   • Abnormal positron emission tomography (PET) scan   • Gastroesophageal reflux disease   • Thyroid nodule   • Parotid neoplasm   • Anticoagulated   • Laryngopharyngeal reflux (LPR)   • Traumatic rhabdomyolysis (CMS/Formerly [facility])   • PAF (paroxysmal atrial fibrillation) (CMS/Formerly [facility])   • Malignant neoplasm of upper lobe of left lung (CMS/Formerly [facility])   • COPD, moderate (CMS/Formerly [facility])   • Persistent asthma without complication   • Overweight   • Cigarette nicotine dependence without complication   • Multiple thyroid nodules   • Hx of Hodgkin's lymphoma   • Dysfunction of both eustachian tubes   • Left chronic serous otitis media   • Morbidly obese (CMS/Formerly [facility])   • Tonsillar cyst   • Status post stereotactic radiosurgery   • Current every day smoker   • Renal cyst   • Cellulitis of right lower extremity   • COPD with acute exacerbation (CMS/Formerly [facility])   • Macrocytosis without anemia   • Obesity (BMI 30-39.9)   • Tobacco abuse   • Lower extremity edema, right side   • Leukocytosis   • Type III open fracture of left ankle   • Fall   • Left ankle pain       Therapy Treatment    Rehabilitation Treatment Summary     Row Name 20 1304 20 1116 20 1014        Treatment Time/Intention    Discipline  physical therapy assistant  -  physical therapy assistant  -  physical therapy assistant  -    Document Type  therapy note (daily note)  -[facility]  therapy note (daily note)  -[facility]  --    Subjective Information  complains of;weakness;fatigue;pain  -[facility]  complains of;weakness;fatigue;pain  -[facility]  --    Comment  --  --  pt requested to check to back later  -[facility]    Existing Precautions/Restrictions  fall;left;non-weight bearing NWB LLE  -2  fall;left;non-weight bearing NWB LLE  -2  --    Recorded by [] Milady Tena, PTA 08/18/20 1304  [2] Milady Tena, PTA 08/18/20 1330 [] Milady Tena, PTA 08/18/20 1116  [2] Milady Tena, PTA 08/18/20 1148 [] Milady Tena, PTA 08/18/20 1014  [2] Milady Tena, PTA 08/18/20 1018    Row Name 08/18/20 0848 08/18/20 0817          Treatment Time/Intention    Discipline  physical therapy assistant  -  occupational therapy assistant  -CH,NA,CH2     Document Type  --  therapy note (daily note)  -CH,NA,CH2     Subjective Information  --  complains of;pain  -CH,NA,CH2     Mode of Treatment  --  occupational therapy  -CH,NA,CH2     Patient Effort  --  good  -CH,NA,CH2     Comment  with OT  -  --     Existing Precautions/Restrictions  --  fall;left;non-weight bearing  -CH,NA,CH2     Recorded by [] Milady Tena, PTA 08/18/20 0849 [CH,NA,CH2] Kayla Gaona OTR/MARYJO (r) Jia Prasad (t) Kayla Gaona OTR/L (c) 08/18/20 1310     Row Name 08/18/20 0817             Cognitive Assessment/Intervention- PT/OT    Personal Safety Interventions  fall prevention program maintained;gait belt;supervised activity;nonskid shoes/slippers when out of bed;safety round/check completed;elopement precautions initiated  -CH,NA,CH2      Recorded by [CH,NA,CH2] Kayla Gaona OTR/MARYJO (r) Jia Prasad (t) Kayla Gaona, OTR/L (c) 08/18/20 1310      Row Name 08/18/20 0817             Safety Issues, Functional Mobility    Safety Issues  Affecting Function (Mobility)  insight into deficits/self awareness;judgment  -      Impairments Affecting Function (Mobility)  balance;endurance/activity tolerance;strength;postural/trunk control;pain  -CH      Recorded by [] Kayla Gaona OTR/L 08/18/20 1241      Row Name 08/18/20 1304 08/18/20 1116 08/18/20 0817       Mobility Assessment/Intervention    Extremity Weight-bearing Status  left lower extremity  -  left lower extremity  -  --    Left Lower Extremity (Weight-bearing Status)  non weight-bearing (NWB)  -  non weight-bearing (NWB)  -  non weight-bearing (NWB)  -CH,NA,CH2    Recorded by [] Milady Tena, PTA 08/18/20 1330 [] Milady Tena, PTA 08/18/20 1148 [CH,NA,CH2] Kayla Gaona, OTR/L (r) Jia Prasad (t) Kayla Gaona, OTR/L (c) 08/18/20 1310    Row Name 08/18/20 1304 08/18/20 1116 08/18/20 0817       Bed Mobility Assessment/Treatment    Bed Mobility Assessment/Treatment  --  --  scooting/bridging;supine-sit;sit-supine  -CH,NA,CH2    Scooting/Bridging [place] (Bed Mobility)  --  --  contact guard;verbal cues Scoot to EOB  -CH,NA,CH2    Supine-Sit [place] (Bed Mobility)  -- chair  -  minimum assist (75% patient effort);moderate assist (50% patient effort);verbal cues  -  verbal cues;contact guard  -CH,NA,CH2    Sit-Supine [place] (Bed Mobility)  minimum assist (75% patient effort);2 person assist;verbal cues  -  -- chair  -AH  verbal cues;maximum assist (25% patient effort);2 person assist  -CH,NA,CH2    Assistive Device (Bed Mobility)  --  --  bed rails;draw sheet;head of bed elevated  -CH,NA,CH2    Recorded by [] Milady Tena, PTA 08/18/20 1330 [] Milady Tena, PTA 08/18/20 1148 [CH,NA,CH2] Kayla Gaona, OTR/L (r) Jia Prasad (t) Kayla Gaona OTR/L (c) 08/18/20 1310    Row Name 08/18/20 0817             Functional Mobility    Functional Mobility- Ind. Level  unable to perform  -CH,NA,CH2      Recorded by [CH,NA,CH2] Kayla Gaona, JAYESHR/L  (r) Jia Prasad (t) Kayla Gaona, OTR/L (c) 08/18/20 1310      Row Name 08/18/20 1304 08/18/20 1116          Transfer Assessment/Treatment    Transfer Assessment/Treatment  stand pivot/stand step transfer  -AH  stand pivot/stand step transfer  -AH     Maintains Weight-bearing Status (Transfers)  --  unable to maintain weight-bearing status  -AH     Recorded by [] Milady Tena, PTA 08/18/20 1330 [] Milady Tena, PTA 08/18/20 1148     Row Name 08/18/20 1304 08/18/20 1116 08/18/20 0817       Sit-Stand Transfer    Sit-Stand [place] (Transfers)  minimum assist (75% patient effort);verbal cues  -AH  minimum assist (75% patient effort);verbal cues  -AH  contact guard;minimum assist (75% patient effort);verbal cues;2 person assist  (Pended)   -CH    Assistive Device (Sit-Stand Transfers)  --  --  walker, front-wheeled  -CH2,NA,CH3    Recorded by [] Milady Tena, PTA 08/18/20 1330 [] Milady Tena, PTA 08/18/20 1148 [CH] Kayla Gaona, OTR/L 08/18/20 1309  [CH2,NA,CH3] Kayla Gaona, OTR/L (r) Jia Prasad (t) Kayla Gaona, OTR/L (c) 08/18/20 1310    Row Name 08/18/20 1304 08/18/20 1116 08/18/20 0817       Stand-Sit Transfer    Stand-Sit [place] (Transfers)  minimum assist (75% patient effort);verbal cues  -AH  minimum assist (75% patient effort);verbal cues  -AH  minimum assist (75% patient effort);verbal cues  -CH,NA,CH2    Assistive Device (Stand-Sit Transfers)  --  --  walker, front-wheeled  (Pended)   -CH3    Recorded by [] Milady Tena, PTA 08/18/20 1330 [] Milady Tena, PTA 08/18/20 1148 [CH,NA,CH2] Kayla Gaona, OTR/L (r) Jia Prasad (t) Kayla Gaona, OTR/L (c) 08/18/20 1310  [CH3] Kayla Gaona, OTR/L 08/18/20 1309    Row Name 08/18/20 1304 08/18/20 1116          Stand Pivot/Stand Step Transfer    Stand Pivot/Stand Step [place]  moderate assist (50% patient effort);verbal cues;minimum assist (75% patient effort);2 person assist  -  moderate assist (50%  patient effort);maximum assist (25% patient effort);verbal cues  -AH     Recorded by [AH] Darinel Milady M, PTA 08/18/20 1330 [AH] Milady Tena, PTA 08/18/20 1148     Row Name 08/18/20 0817             ADL Assessment/Intervention    BADL Assessment/Intervention  upper body dressing;bathing;grooming  -CH,NA,CH2      Recorded by [CH,NA,CH2] Kayla Gaona, OTR/L (r) Jia Prasad (t) Kayla Gaona, OTR/L (c) 08/18/20 1310      Row Name 08/18/20 0817             Bathing Assessment/Intervention    Bathing [place] Level  moderate assist (50% patient effort);upper body;lower body  (Pended)   -CH      Bathing Position  edge of bed sitting  -CH2,NA,CH3      Comment (Bathing)  Pt. required assist with back side & distal LEs.   (Pended)   -CH      Recorded by [CH] Kayla Gaona, OTR/L 08/18/20 1309  [CH2,NA,CH3] Kayla Gaona OTR/L (r) Jia Prasad (t) Kayla Gaona, OTR/L (c) 08/18/20 1310      Row Name 08/18/20 0817             Upper Body Dressing Assessment/Training    Upper Body Dressing [place] Level  doff;supervision;don;set up  -CH,NA,CH2      Upper Body Dressing Position  edge of bed sitting  -CH,NA,CH2      Comment (Upper Body Dressing)  hospital gown  (Pended)   -CH3      Recorded by [CH,NA,CH2] Kayla Gaona OTR/L (r) Jia Prasad (t) Kayla Gaona, OTR/L (c) 08/18/20 1310  [CH3] Kayla Gaona, OTR/L 08/18/20 1309      Row Name 08/18/20 0817             Lower Body Dressing Assessment/Training    Lower Body Dressing Mobile Level  don;socks;maximum assist (25% patient effort)  -CH,NA,CH2      Lower Body Dressing Position  edge of bed sitting  -CH,NA,CH2      Recorded by [CH,NA,CH2] Kayla Gaona, OTR/L (r) Jia Prasad (t) Kayla Gaona, OTR/L (c) 08/18/20 1310      Row Name 08/18/20 0817             Grooming Assessment/Training    Mobile Level (Grooming)  wash face, hands;oral care regimen;set up;supervision  -CH,NA,CH2      Grooming Position  edge of bed sitting;sitting up in  bed  -CH,NA,CH2      Recorded by [CH,NA,CH2] Kayla Gaona, OTR/L (r) Jia Prasad (t) Kayla Gaona, OTR/L (c) 08/18/20 1310      Row Name 08/18/20 0817             BADL Safety/Performance    Impairments, BADL Safety/Performance  pain;strength;endurance/activity tolerance  (Pended)  LLE   -CH      Skilled BADL Treatment/Intervention  BADL process/adaptation training  -CH2,NA,CH3      Progress in BADL Status  effort and initiation  -CH2,NA,CH3      Recorded by [CH] Kayla Gaona, OTR/L 08/18/20 1309  [CH2,NA,CH3] Kayla Gaona, OTR/L (r) Jia Prasad (t) Kayla Gaona, OTR/L (c) 08/18/20 1310      Row Name 08/18/20 1304 08/18/20 1116          Therapeutic Exercise    Lower Extremity (Therapeutic Exercise)  gluteal sets;heel slides, bilateral;quad sets, bilateral;SAQ (short arc quad), bilateral  -AH  gluteal sets;heel slides, bilateral;LAQ (long arc quad), bilateral;quad sets, bilateral  -AH     Lower Extremity Range of Motion (Therapeutic Exercise)  hip abduction/adduction, bilateral;ankle dorsiflexion/plantar flexion, right  -AH  hip abduction/adduction, bilateral;ankle dorsiflexion/plantar flexion, right  -AH     Exercise Type (Therapeutic Exercise)  AROM (active range of motion);AAROM (active assistive range of motion)  -AH  AROM (active range of motion);AAROM (active assistive range of motion)  -AH     Sets/Reps (Therapeutic Exercise)  10 x 2  -AH  10 x 2  -AH     Recorded by [AH] Milady Tena, PTA 08/18/20 1330 [AH] Milady Tena, PTA 08/18/20 1148     Row Name 08/18/20 0817             Balance    Balance  static sitting balance  (Pended)   -CH      Recorded by [CH] Kayla Gaona OTR/L 08/18/20 1309      Row Name 08/18/20 0817             Static Sitting Balance    Level of [place] (Unsupported Sitting, Static Balance)  supervision  -CH,NA,CH2      Sitting Position (Unsupported Sitting, Static Balance)  sitting on edge of bed  -CH,NA,CH2      Time Able to Maintain Position (Unsupported Sitting,  Static Balance)  more than 5 minutes  (Pended)   -CH3      Recorded by [CH,NA,CH2] Kayla Gaona, OTR/L (r) Jia Prasad (t) Kayla Gaona, OTR/L (c) 08/18/20 1310  [CH3] Kayla Gaona, OTR/L 08/18/20 1310      Row Name 08/18/20 1304 08/18/20 1116 08/18/20 0817       Positioning and Restraints    Pre-Treatment Position  sitting in chair/recliner  -AH  in bed  -AH  in bed  -CH,NA,CH2    Post Treatment Position  bed  -AH  chair  -AH  bed  -CH,NA,CH2    In Bed  fowlers;call light within reach;encouraged to call for assist;SCD pump applied  -  --  fowlers;call light within reach;encouraged to call for assist;exit alarm on;LLE elevated;side rails up x2;SCD pump applied  (Pended)   -CH3    In Chair  --  reclined;call light within reach;encouraged to call for assist;notified nsg;legs elevated  -  --    Recorded by [AH] Milady Tena, PTA 08/18/20 1330 [AH] Milady Tena, PTA 08/18/20 1148 [CH,NA,CH2] aKyla Gaona, OTR/L (r) Jia Prasad (t) Kayla Gaona, OTR/L (c) 08/18/20 1310  [CH3] Kayla Gaona, OTR/L 08/18/20 1310    Row Name 08/18/20 1304 08/18/20 1116 08/18/20 0817       Pain Scale: Numbers Pre/Post-Treatment    Pain Scale: Numbers, Pretreatment  0/10 - no pain AT REST  -AH  0/10 - no pain AT REST  -AH  5/10  -CH,NA,CH2    Pain Scale: Numbers, Post-Treatment  5/10  -AH  5/10  -AH  5/10  -CH,NA,CH2    Pain Location - Side  Left  -AH  Left  -AH  Left  -CH,NA,CH2    Pain Location  ankle  -AH  ankle  -AH  ankle  -CH,NA,CH2    Pain Intervention(s)  Repositioned  -AH  Medication (See MAR);Repositioned  -AH  Repositioned;Elevated  -CH,NA,CH2    Recorded by [AH] Milady Tena, PTA 08/18/20 1330 [AH] Milady Tena, PTA 08/18/20 1148 [CH,NA,CH2] Kayla Gaona, OTR/L (r) Jia Prasad (t) Kayla Gaona, OTR/L (c) 08/18/20 1310    Row Name                Wound 08/15/20 1416 Left ankle Incision    Wound - Properties Group Date first assessed: 08/15/20 [HW] Time first assessed: 1416 [HW] Present on  Hospital Admission: N [HW] Side: Left [HW] Location: ankle [HW] Primary Wound Type: Incision [HW] Recorded by:  [HW] Blaine Degroot, RN 08/15/20 1416    Row Name                [REMOVED] Wound 08/16/20 0800 coccyx Pressure Injury    Wound - Properties Group Date first assessed: 08/16/20 [DB] Time first assessed: 0800 [DB] Location: coccyx [DB] Primary Wound Type: Pressure inj [DB] Stage, Pressure Injury: Stage 2 [DB] Resolution Date: 08/18/20 [HS] Resolution Time: 1102 [HS] Wound Outcome: Other [HS], Charting clarification  Recorded by:  [DB] Destiny Arana RN 08/16/20 1134 [HS] Gabriella Sunshine RN 08/18/20 1102    Row Name                [REMOVED] Wound 03/09/20 1100 Right lower leg    Wound - Properties Group Date first assessed: 03/09/20 [SUBHASH] Time first assessed: 1100 [SUBHASH] Present on Hospital Admission: Y [SUBHASH] Side: Right [SUBHASH] Orientation: lower [SUBHASH] Location: leg [SUBHASH] Resolution Date: 08/18/20 [CARLOS EDUARDO] Resolution Time: 1031 [CARLOS EDUARDO] Wound Outcome: Unknown [CARLOS EDUARDO] Recorded by:  [SUBHASH] Alexandre Moe, PT DPT 03/09/20 1134 [CARLOS EDUARDO] Quin Massey RN 08/18/20 1031    Row Name                Wound 08/16/20 0800 coccyx Fissure    Wound - Properties Group Date first assessed: 08/16/20 [HS] Time first assessed: 0800 [HS] Present on Hospital Admission: Y [HS] Location: coccyx [HS] Primary Wound Type: Fissure [HS] Recorded by:  [HS] Gabriella Sunshine RN 08/18/20 1102    Row Name 08/18/20 0817             Outcome Summary/Treatment Plan (OT)    Daily Summary of Progress (OT)  progress toward functional goals is good  -CH,NA,CH2      Plan for Continued Treatment (OT)  Continue OT POC  -CH,NA,CH2      Anticipated Discharge Disposition (OT)  skilled nursing facility pt. plans to attend rehab  -CH,NA,CH2      Recorded by [CH,NA,CH2] Kayla Gaona, JAYESHR/MARYJO (r) Jia Prasadt) Kayla Gaona, OTR/L (c) 08/18/20 1310        User Key  (r) = Recorded By, (t) = Taken By, (c) = Cosigned By    Initials Name Effective Dates Discipline     Milady Harris, PTA 08/02/16 -  PT    Kayla Sullivan, OTR/L 07/05/20 -  OT    Alexandre Francis, PT DPT 08/02/16 -  PT    Destiny Escobedo, RN 11/06/19 -  Nurse    HS Gabriella Sunshine, RN 12/27/16 -  Nurse    Quin Tay, RN 06/18/19 -  Nurse    Blaine Stack RN 06/05/20 -  Nurse    Jia Cain 08/13/20 -  OT          Wound 08/15/20 1416 Left ankle Incision (Active)   Dressing Appearance intact;dried drainage 8/18/2020 10:13 AM   Closure DAPHNE 8/18/2020 10:13 AM   Base dressing in place, unable to visualize 8/18/2020 10:13 AM   Drainage Amount scant 8/18/2020 10:13 AM   Dressing Care, Wound foam 8/18/2020 10:13 AM       Wound 08/16/20 0800 coccyx Fissure (Active)   Wound Image     8/18/2020  9:48 AM   Dressing Appearance open to air 8/18/2020  9:00 AM   Base pink;blanchable 8/18/2020  9:00 AM   Periwound blanchable;warm 8/18/2020  9:00 AM   Periwound Temperature warm 8/18/2020  9:00 AM   Periwound Skin Turgor soft 8/18/2020  9:00 AM   Edges irregular 8/18/2020  9:00 AM   Wound Length (cm) 1.9 cm 8/18/2020  9:00 AM   Wound Width (cm) 0.2 cm 8/18/2020  9:00 AM   Wound Depth (cm) 0.1 cm 8/18/2020  9:00 AM   Drainage Amount none 8/18/2020  9:00 AM   Care, Wound barrier applied 8/18/2020  9:00 AM   Dressing Care, Wound open to air 8/18/2020  9:00 AM   Periwound Care, Wound barrier ointment applied 8/18/2020  9:00 AM           Physical Therapy Education                 Title: PT OT SLP Therapies (In Progress)     Topic: Physical Therapy (In Progress)     Point: Mobility training (Done)     Description:   Instruct learner(s) on safety and technique for assisting patient out of bed, chair or wheelchair.  Instruct in the proper use of assistive devices, such as walker, crutches, cane or brace.              Patient Friendly Description:   It's important to get you on your feet again, but we need to do so in a way that is safe for you. Falling has serious consequences, and your personal  safety is the most important thing of all.        When it's time to get out of bed, one of us or a family member will sit next to you on the bed to give you support.     If your doctor or nurse tells you to use a walker, crutches, a cane, or a brace, be sure you use it every time you get out of bed, even if you think you don't need it.    Learning Progress Summary           Patient Acceptance, E,TB,D, VU,NR by  at 8/18/2020 1149    Comment:  pivot trans, NWB LLE    Acceptance, E,TB,D, VU,NR by  at 8/17/2020 0937    Comment:  pivot trans    Acceptance, E, VU,NR by SUBHASH at 8/16/2020 0715    Comment:  Educated pt on progression of PT POC, benefits of activity, NWB L LE                   Point: Home exercise program (Not Started)     Description:   Instruct learner(s) on appropriate technique for monitoring, assisting and/or progressing patient with therapeutic exercises and activities.              Learner Progress:   Not documented in this visit.          Point: Body mechanics (Not Started)     Description:   Instruct learner(s) on proper positioning and spine alignment for patient and/or caregiver during mobility tasks and/or exercises.              Learner Progress:   Not documented in this visit.          Point: Precautions (Done)     Description:   Instruct learner(s) on prescribed precautions during mobility and gait tasks              Learning Progress Summary           Patient Acceptance, E, VU,NR by SUBHASH at 8/16/2020 0715    Comment:  Educated pt on progression of PT POC, benefits of activity, NWB L LE                               User Key     Initials Effective Dates Name Provider Type Discipline     08/02/16 -  Milady Tena, PTA Physical Therapy Assistant PT    SUBHASH 08/02/16 -  Alexandre Moe, JOSE MANUEL DPT Physical Therapist PT                PT Recommendation and Plan     Plan of Care Reviewed With: patient  Progress: improving  Outcome Summary: Pt performed BLE exercises in the bed, trans to EOB min-mod  assist, sit-stand min assist, pivot trans with rwx mod-max assist, pt unable to maintain NWB LLE. pt would benefit from SNF  Outcome Measures     Row Name 08/18/20 1000 08/17/20 1200 08/16/20 1200       How much help from another is currently needed...    Putting on and taking off regular lower body clothing?  2  -CH (r) NA (t) CH (c)  2  -TS  1  -JJ    Bathing (including washing, rinsing, and drying)  3  -CH (r) NA (t) CH (c)  2  -TS  2  -JJ    Toileting (which includes using toilet bed pan or urinal)  2  -CH (r) NA (t) CH (c)  2  -TS  2  -JJ    Putting on and taking off regular upper body clothing  3  -CH (r) NA (t) CH (c)  3  -TS  4  -JJ    Taking care of personal grooming (such as brushing teeth)  4  -CH (r) NA (t) CH (c)  3  -TS  4  -JJ    Eating meals  4  -CH (r) NA (t) CH (c)  4  -TS  4  -JJ    AM-PAC 6 Clicks Score (OT)  18  -CH (r) NA (t)  16  -TS  17  -JJ       Functional Assessment    Outcome Measure Options  --  --  AM-PAC 6 Clicks Daily Activity (OT)  -JJ      User Key  (r) = Recorded By, (t) = Taken By, (c) = Cosigned By    Initials Name Provider Type    Kayla Sullivan, OTR/L Occupational Therapist    Mally Agudelo, TREVIZO/L Occupational Therapy Assistant    Kita Rodriguez, OTR/L Occupational Therapist    Jia Cain OT Student         Time Calculation:   PT Charges     Row Name 08/18/20 1330 08/18/20 1151          Time Calculation    Start Time  1304  -AH  1116  -AH     Stop Time  1328  -AH  1146  -AH     Time Calculation (min)  24 min  -  30 min  -     PT Received On  --  08/18/20  -        Time Calculation- PT    Total Timed Code Minutes- PT  24 minute(s)  -AH  30 minute(s)  -AH        Timed Charges    95743 - PT Therapeutic Exercise Minutes  14  -AH  15  -AH     25862 - PT Therapeutic Activity Minutes  10  -AH  15  -AH       User Key  (r) = Recorded By, (t) = Taken By, (c) = Cosigned By    Initials Name Provider Type    Milady Harris, PTA Physical Therapy Assistant         Therapy Charges for Today     Code Description Service Date Service Provider Modifiers Qty    65516132971 HC PT THER PROC EA 15 MIN 8/17/2020 Milady Tena, PTA GP 1    37074156876 HC PT THERAPEUTIC ACT EA 15 MIN 8/17/2020 Milady Tena, PTA GP 1    40017000362 HC PT THERAPEUTIC ACT EA 15 MIN 8/17/2020 Milady Tena, PTA GP 2    58770503320 HC PT THER PROC EA 15 MIN 8/18/2020 Milady Tena, PTA GP 1    06692008384 HC PT THERAPEUTIC ACT EA 15 MIN 8/18/2020 Milady Tena, PTA GP 1    83424569520 HC PT THER PROC EA 15 MIN 8/18/2020 Milady Tena, PTA GP 1    96400417172 HC PT THERAPEUTIC ACT EA 15 MIN 8/18/2020 Milady Tena, PTA GP 1          PT G-Codes  Outcome Measure Options: AM-PAC 6 Clicks Daily Activity (OT)  AM-PAC 6 Clicks Score (PT): 9  AM-PAC 6 Clicks Score (OT): 18    Milady Tena PTA  8/18/2020

## 2020-08-18 NOTE — PLAN OF CARE
Problem: Patient Care Overview  Goal: Plan of Care Review  Flowsheets  Taken 8/18/2020 1147 by Jia Prasad  Progress: no change (Pended)  Taken 8/18/2020 1013 by Quin Massey RN  Plan of Care Reviewed With: patient  Note:   OT tx completed. Pt. awake and oriented x4. Pt. expressed her pretreatment pain as 5/10. Pt. got from supine to EOB for bathing task using the bed rails with VCs and CGA. Pt. required setup, supervision and min A for reaching during TB sponge bathing. Pt. transferred sit to stand with min-mod A x2 and required tactile cues to maintain NWB status at LLE. To return to supine Ms. Mohamud required Max A x2  likely 2' to fatigue after bathing. Pt. completed dental hygiene sitting up in bed with set up and supervision. Pt. agrees she would benefit from SNF after d/c. Follow OT POC.

## 2020-08-18 NOTE — PLAN OF CARE
Problem: Patient Care Overview  Goal: Plan of Care Review  Outcome: Ongoing (interventions implemented as appropriate)  Flowsheets  Taken 8/18/2020 1506 by Quin Massey, RN  Progress: no change  Outcome Summary: Pt is A&Ox4. VSS. Medicated with prn norco with relief noted. PPP. Moderate edema to RLE. [place] hose on. BLE elevated on pillows. Generalized bruising present. NWB LLE. [place]wick in place. No neuro changes. Safety maintained. Will cont to monitor.

## 2020-08-18 NOTE — NURSING NOTE
[facility] Note      Patient: Josi Mohamud  MRN: [id number] : 1945         Problem List:   Patient Active Problem List    Diagnosis   • *Type III open fracture of left ankle [S82.892C]   • Fall [W19.XXXA]   • Left ankle pain [M25.572]   • Macrocytosis without anemia [D75.89]   • Obesity (BMI 30-39.9) [E66.9]   • Tobacco abuse [Z72.0]   • Lower extremity edema, right side [R60.0]   • Leukocytosis [D72.829]   • COPD with acute exacerbation (CMS/HCC) [J44.1]   • Cellulitis of right lower extremity [L03.115]   • Status post stereotactic radiosurgery [Z92.3]   • Current every day smoker [F17.200]   • Renal cyst [N28.1]   • Tonsillar cyst [J35.8]   • Morbidly obese (CMS/HCC) [E66.01]   • Dysfunction of both eustachian tubes [H69.83]   • Left chronic serous otitis media [H65.22]   • Hx of Hodgkin's lymphoma [Z85.71]   • Multiple thyroid nodules [E04.2]   • Malignant neoplasm of upper lobe of left lung (CMS/HCC) [C34.12]   • COPD, moderate (CMS/HCC) [J44.9]   • Persistent asthma without complication [J45.909]   • Overweight [E66.3]   • Cigarette nicotine dependence without complication [F17.210]   • PAF (paroxysmal atrial fibrillation) (CMS/HCC) [I48.0]   • Traumatic rhabdomyolysis (CMS/HCC) [T79.6XXA]   • Abnormal positron emission tomography (PET) scan [R94.8]   • Gastroesophageal reflux disease [K21.9]   • Thyroid nodule [E04.1]   • Parotid neoplasm [D49.0]   • Anticoagulated [Z79.01]   • Laryngopharyngeal reflux (LPR) [K21.9]   • Syncope and collapse [R55]   • Coronary artery disease involving native coronary artery of native heart without angina pectoris [I25.10]   • Stented coronary artery [Z95.5]   • Essential hypertension [I10]         Reason for Visit: Patient is an 74 y.o. female, being seen by [facility] for wound on coccyx.     Patient presents with a fissure at the superior aspect of the gluteal cleft.  The patient is incontinent and currently has a Primafit external urinary catheter in place.  This wound does  not appear to be pressure related.  I spoke with her daughters yesterday and they were unaware of a wound and questioned if it it was related to a fall that occurred last Thursday.  This wound does not appear to be related to a fall.  It is likely related to moisture.  Applied Zguard barrier cream to area.            Recommendations:    - Elevate Heels Off of Bed  - Turn Patient Q2H  - Use Seat Cushion When Up In Chair  - Apply Moisture Barrier After Any Incontinence   -Zguard        )Gabriella Sunshine RN 8/18/2020

## 2020-08-18 NOTE — PLAN OF CARE
Problem: Patient Care Overview  Goal: Plan of Care Review  Outcome: Ongoing (interventions implemented as appropriate)  Flowsheets (Taken 8/18/2020 1148)  Progress: improving  Plan of Care Reviewed With: patient  Outcome Summary: Pt performed BLE exercises in the bed, trans to EOB min-mod assist, sit-stand min assist, pivot trans with rwx mod-max assist, pt unable to maintain NWB LLE. pt would benefit from SNF

## 2020-08-18 NOTE — PROGRESS NOTES
Orthopedic Surgery Progress Note    Josi Mohamud  8/18/2020      Subjective:     Systemic or Specific Complaints:no acute events    Objective:     Patient Vitals for the past 24 hrs:   BP Temp Temp src Pulse Resp SpO2   08/18/20 0631 -- -- -- 88 18 --   08/18/20 0623 -- -- -- 80 18 96 %   08/18/20 0326 111/53 97.9 °F (36.6 °C) Oral 76 18 91 %   08/17/20 2246 125/49 97.7 °F (36.5 °C) Oral 85 18 90 %   08/17/20 2210 -- -- -- 83 17 91 %   08/17/20 2017 -- -- -- -- -- (!) 87 %   08/17/20 2011 115/53 97.9 °F (36.6 °C) Oral 86 16 92 %   08/17/20 1550 118/56 97.5 °F (36.4 °C) Oral 74 16 91 %   08/17/20 1544 -- -- -- 73 15 92 %   08/17/20 1537 -- -- -- 74 16 93 %   08/17/20 1120 -- -- -- 77 15 98 %   08/17/20 1113 -- -- -- 78 17 97 %       left lower  General: alert, appears stated age and cooperative   Wound: clean, dry, intact             Dressing: clean, dry, intact   Extremity: Distal NVI           DVT Exam: No evidence of DVT seen on physical exam.                   Data Review:  Lab Results (last 24 hours)     ** No results found for the last 24 hours. **        Imaging Results (Last 24 Hours)     ** No results found for the last 24 hours. **          Assessment:     POD# 3 I&D/ORIF L open aliza    Plan:      1:  DVT prophylaxis, ICE, elevate  2:  Pain control  3:  Physical therapy/Occupational therapy  4:  48 hrs Abx postop, then ok to d/c  5:  NWBLLE  6:   Ok to D/C when medically stable, ortho F/U in 2 weeks.   7:  She is requesting [facility] at D/C if possible.   Albin Rodriguez PA-C

## 2020-08-18 NOTE — SIGNIFICANT NOTE
Left information and went over smoking cessation.  Pt accepted.  Notified pt that patches are available if she needs one during her stay.  Milena Hinson, RRT

## 2020-08-18 NOTE — THERAPY TREATMENT NOTE
Acute Care - Occupational Therapy Treatment Note  [facility]     Patient Name: Josi Mohamud  : 1945  MRN: [id number]  Today's Date: 2020  Onset of Illness/Injury or Date of Surgery: 08/15/20  Date of Referral to OT: 08/15/20  Referring Physician: Dr. Medrano    Admit Date: 8/15/2020       ICD-10-CM ICD-9-CM   1. Type III open fracture of left ankle, initial encounter S82.892C 824.9   2. COPD (chronic obstructive pulmonary disease) case management patient (CMS/HCC) J44.9 496   3. Impaired mobility Z74.09 799.89   4. Decreased activities of daily living (ADL) Z78.9 V49.89     Patient Active Problem List   Diagnosis   • Syncope and collapse   • Coronary artery disease involving native coronary artery of native heart without angina pectoris   • Stented coronary artery   • Essential hypertension   • Abnormal positron emission tomography (PET) scan   • Gastroesophageal reflux disease   • Thyroid nodule   • Parotid neoplasm   • Anticoagulated   • Laryngopharyngeal reflux (LPR)   • Traumatic rhabdomyolysis (CMS/HCC)   • PAF (paroxysmal atrial fibrillation) (CMS/HCC)   • Malignant neoplasm of upper lobe of left lung (CMS/HCC)   • COPD, moderate (CMS/HCC)   • Persistent asthma without complication   • Overweight   • Cigarette nicotine dependence without complication   • Multiple thyroid nodules   • Hx of Hodgkin's lymphoma   • Dysfunction of both eustachian tubes   • Left chronic serous otitis media   • Morbidly obese (CMS/HCC)   • Tonsillar cyst   • Status post stereotactic radiosurgery   • Current every day smoker   • Renal cyst   • Cellulitis of right lower extremity   • COPD with acute exacerbation (CMS/HCC)   • Macrocytosis without anemia   • Obesity (BMI 30-39.9)   • Tobacco abuse   • Lower extremity edema, right side   • Leukocytosis   • Type III open fracture of left ankle   • Fall   • Left ankle pain     Past Medical History:   Diagnosis Date   • Bronchitis    • Cancer (CMS/HCC)     lung   • COPD  (chronic obstructive pulmonary disease) (CMS/HCC)    • Coronary artery disease    • GERD (gastroesophageal reflux disease)    • History of transfusion    • Hyperlipidemia    • Hypertension    • PAF (paroxysmal atrial fibrillation) (CMS/HCC)    • Persistent asthma without complication 7/8/2019   • Seizures (CMS/HCC)      Past Surgical History:   Procedure Laterality Date   • ANKLE OPEN REDUCTION INTERNAL FIXATION Left 8/15/2020    Procedure: ANKLE OPEN REDUCTION INTERNAL FIXATION;  Surgeon: Keyshawn Medrano MD;  Location: [facility] OR;  Service: Orthopedics;  Laterality: Left;   • APPENDECTOMY     • APPENDECTOMY     • CARDIAC CATHETERIZATION     • CARDIAC ELECTROPHYSIOLOGY PROCEDURE N/A 5/23/2017    Procedure: Loop insertion; LINQ;  Surgeon: Jose Monteiro MD;  Location:  [facility]ATH INVASIVE LOCATION;  Service:    • CHOLECYSTECTOMY     • CORONARY ANGIOPLASTY WITH STENT PLACEMENT      X 1    • INCISION AND DRAINAGE OF WOUND Left 8/15/2020    Procedure: INCISION AND DRAINAGE WOUND;  Surgeon: Keyshawn Medrano MD;  Location: [facility] OR;  Service: Orthopedics;  Laterality: Left;   • LIVER BIOPSY     • MYRINGOTOMY W/ TUBES Left 11/22/2019    Procedure: LEFT MYRINGOTOMY WITH INSERTION OF EAR TUBE WITH RIGHT EAR EXAM UNDER ANESTHESIA;  Surgeon: Se Mcnulty MD;  Location: [facility] OR;  Service: ENT   • SPLENECTOMY     • US GUIDED FINE NEEDLE ASPIRATION  8/7/2018       Therapy Treatment    Rehabilitation Treatment Summary     Row Name 08/18/20 1304 08/18/20 1116 08/18/20 1014       Treatment Time/Intention    Discipline  physical therapy assistant  -  physical therapy assistant  -  physical therapy assistant  -    Document Type  --  therapy note (daily note)  -[facility]  --    Subjective Information  --  complains of;weakness;fatigue;pain  -[facility]  --    Comment  --  --  pt requested to check to back later  -[facility]    Existing Precautions/Restrictions  --  fall;left;non-weight bearing NWB LLE  -[facility]  --    Recorded by  [] Milady Tena, PTA 08/18/20 1304 [] Milady Tena, PTA 08/18/20 1116  [2] Milady Tena, PTA 08/18/20 1148 [] Milady Tena, PTA 08/18/20 1014  [AH2] Milady Tena, PTA 08/18/20 1018    Row Name 08/18/20 0848 08/18/20 0817          Treatment Time/Intention    Discipline  physical therapy assistant  -  occupational therapy assistant  -CH,NA,CH2     Document Type  --  therapy note (daily note)  -CH,NA,CH2     Subjective Information  --  complains of;pain  -CH,NA,CH2     Mode of Treatment  --  occupational therapy  -CH,NA,CH2     Patient Effort  --  good  -CH,NA,CH2     Comment  with OT  -  --     Existing Precautions/Restrictions  --  fall;left;non-weight bearing  -CH,NA,CH2     Recorded by [] Milady Tena, PTA 08/18/20 0849 [CH,NA,CH2] Kayla Gaona OTR/L (r) Jia Prasad (t) Kayla Gaona, OTR/L (c) 08/18/20 1310     Row Name 08/18/20 0817             Cognitive Assessment/Intervention- PT/OT    Personal Safety Interventions  fall prevention program maintained;gait belt;supervised activity;nonskid shoes/slippers when out of bed;safety round/check completed;elopement precautions initiated  -CH,NA,CH2      Recorded by [CH,NA,CH2] Kayla Gaona OTR/L (r) Jia Prasad (t) Kayla Gaona, OTR/L (c) 08/18/20 1310      Row Name 08/18/20 0817             Safety Issues, Functional Mobility    Safety Issues Affecting Function (Mobility)  insight into deficits/self awareness;judgment  -CH      Impairments Affecting Function (Mobility)  balance;endurance/activity tolerance;strength;postural/trunk control;pain  -CH      Recorded by [CH] Kayla Gaona OTR/L 08/18/20 1241      Row Name 08/18/20 1116 08/18/20 0817          Mobility Assessment/Intervention    Extremity Weight-bearing Status  left lower extremity  -  --     Left Lower Extremity (Weight-bearing Status)  non weight-bearing (NWB)  -  non weight-bearing (NWB)  -CH,NA,CH2     Recorded by [] Milady Tena, PTA 08/18/20 114  [CH,NA,CH2] Kayla Gaona, OTR/L (r) Jia Prasad (t) Kayla Gaona, OTR/L (c) 08/18/20 1310     Row Name 08/18/20 1116 08/18/20 0817          Bed Mobility Assessment/Treatment    Bed Mobility Assessment/Treatment  --  scooting/bridging;supine-sit;sit-supine  -CH,NA,CH2     Scooting/Bridging Becker (Bed Mobility)  --  contact guard;verbal cues Scoot to EOB  -CH,NA,CH2     Supine-Sit Becker (Bed Mobility)  minimum assist (75% patient effort);moderate assist (50% patient effort);verbal cues  -  verbal cues;contact guard  -CH,NA,CH2     Sit-Supine Becker (Bed Mobility)  -- chair  -  verbal cues;maximum assist (25% patient effort);2 person assist  -CH,NA,CH2     Assistive Device (Bed Mobility)  --  bed rails;draw sheet;head of bed elevated  -CH,NA,CH2     Recorded by [] Milady Tena, PTA 08/18/20 1148 [CH,NA,CH2] Kayla Gaona, OTR/L (r) Jia Prasad (t) Kayla Gaona, OTR/L (c) 08/18/20 1310     Row Name 08/18/20 0817             Functional Mobility    Functional Mobility- Ind. Level  unable to perform  -CH,NA,CH2      Recorded by [CH,NA,CH2] Kayla Gaona, OTR/L (r) Jia Prasad (t) Kayla Gaona, OTR/L (c) 08/18/20 1310      Row Name 08/18/20 1116             Transfer Assessment/Treatment    Transfer Assessment/Treatment  stand pivot/stand step transfer  -      Maintains Weight-bearing Status (Transfers)  unable to maintain weight-bearing status  -      Recorded by [] Milady Tena, PTA 08/18/20 1148      Row Name 08/18/20 1116 08/18/20 0817          Sit-Stand Transfer    Sit-Stand [place] (Transfers)  minimum assist (75% patient effort);verbal cues  -  contact guard;minimum assist (75% patient effort);verbal cues;2 person assist  (Pended)   -     Assistive Device (Sit-Stand Transfers)  --  walker, front-wheeled  -CH2,NA,CH3     Recorded by [AH] Milady Tena, PTA 08/18/20 1148 [CH] Kayla Gaona, KADY/L 08/18/20 1309  [CH2,NA,CH3] Kayla Gaona, OTR/L (r)  Jia Prasad (t) Kayla Gaona, OTR/L (c) 08/18/20 1310     Row Name 08/18/20 1116 08/18/20 0817          Stand-Sit Transfer    Stand-Sit [place] (Transfers)  minimum assist (75% patient effort);verbal cues  -AH  minimum assist (75% patient effort);verbal cues  -CH,NA,CH2     Assistive Device (Stand-Sit Transfers)  --  walker, front-wheeled  (Pended)   -CH3     Recorded by [AH] Milady Tena, PTA 08/18/20 1148 [CH,NA,CH2] Kayla Gaona, OTR/L (r) Jia Prasad (t) Kayla Gaona, OTR/L (c) 08/18/20 1310  [CH3] Kayla Gaona, OTR/L 08/18/20 1309     Row Name 08/18/20 1116             Stand Pivot/Stand Step Transfer    Stand Pivot/Stand Step [place]  moderate assist (50% patient effort);maximum assist (25% patient effort);verbal cues  -AH      Recorded by [AH] Milady Tena, PTA 08/18/20 1148      Row Name 08/18/20 0817             ADL Assessment/Intervention    BADL Assessment/Intervention  upper body dressing;bathing;grooming  -CH,NA,CH2      Recorded by [CH,NA,CH2] Kayla Gaona, OTR/L (r) Jia Prasad (t) Kayla Gaona, OTR/L (c) 08/18/20 1310      Row Name 08/18/20 0817             Bathing Assessment/Intervention    Bathing [place] Level  moderate assist (50% patient effort);upper body;lower body  (Pended)   -CH      Bathing Position  edge of bed sitting  -CH2,NA,CH3      Comment (Bathing)  Pt. required assist with back side & distal LEs.   (Pended)   -CH      Recorded by [CH] Kayla Gaona, OTR/L 08/18/20 1309  [CH2,NA,CH3] Kayla Gaona, OTR/L (r) Jia Prasad (t) Kayla Gaona, OTR/L (c) 08/18/20 1310      Row Name 08/18/20 0817             Upper Body Dressing Assessment/Training    Upper Body Dressing [place] Level  doff;supervision;don;set up  -MARIA TERESA,NA,CH2      Upper Body Dressing Position  edge of bed sitting  -MARIA TERESA,REGGIE,CH2      Comment (Upper Body Dressing)  hospital gown  (Pended)   -CH3      Recorded by [MARIA TERESA,NA,CH2] Kayla Gaona, OTR/MARYJO (r) Jia Prasad (t) Kayla Gaona,  OTR/L (c) 08/18/20 1310  [CH3] Kayla Gaona, OTR/L 08/18/20 1309      Row Name 08/18/20 0817             Lower Body Dressing Assessment/Training    Lower Body Dressing [place] Level  don;socks;maximum assist (25% patient effort)  -CH,NA,CH2      Lower Body Dressing Position  edge of bed sitting  -CH,NA,CH2      Recorded by [CH,NA,CH2] Kayla Gaona OTR/L (r) Jia Prasad (t) Kayla Gaona, OTR/L (c) 08/18/20 1310      Row Name 08/18/20 0817             Grooming Assessment/Training    [place] Level (Grooming)  wash face, hands;oral care regimen;set up;supervision  -CH,NA,CH2      Grooming Position  edge of bed sitting;sitting up in bed  -CH,NA,CH2      Recorded by [CH,NA,CH2] Kayla Gaona, OTR/L (r) Jia Prasad (t) Kayla Gaona, OTR/L (c) 08/18/20 1310      Row Name 08/18/20 0817             BADL Safety/Performance    Impairments, BADL Safety/Performance  pain;strength;endurance/activity tolerance  (Pended)  LLE   -CH      Skilled BADL Treatment/Intervention  BADL process/adaptation training  -CH2,NA,CH3      Progress in BADL Status  effort and initiation  -CH2,NA,CH3      Recorded by [CH] Kayla Gaona, OTR/L 08/18/20 1309  [CH2,NA,CH3] Kayla Gaona OTR/L (r) Jia Prasad (t) Kayla Gaona, OTR/L (c) 08/18/20 1310      Row Name 08/18/20 1116             Therapeutic Exercise    Lower Extremity (Therapeutic Exercise)  gluteal sets;heel slides, bilateral;LAQ (long arc quad), bilateral;quad sets, bilateral  -      Lower Extremity Range of Motion (Therapeutic Exercise)  hip abduction/adduction, bilateral;ankle dorsiflexion/plantar flexion, right  -      Exercise Type (Therapeutic Exercise)  AROM (active range of motion);AAROM (active assistive range of motion)  -      Sets/Reps (Therapeutic Exercise)  10 x 2  -AH      Recorded by [AH] Milady Tena PTA 08/18/20 1148      Row Name 08/18/20 0817             Balance    Balance  static sitting balance  (Pended)   -CH      Recorded by [CH]  Kayla Gaona, OTR/L 08/18/20 1309      Row Name 08/18/20 0817             Static Sitting Balance    Level of [place] (Unsupported Sitting, Static Balance)  supervision  -CH,NA,CH2      Sitting Position (Unsupported Sitting, Static Balance)  sitting on edge of bed  -CH,NA,CH2      Time Able to Maintain Position (Unsupported Sitting, Static Balance)  more than 5 minutes  (Pended)   -CH3      Recorded by [CH,NA,CH2] Kayla Gaona, OTR/L (r) Jia Prasad (t) Kayla Gaona, OTR/L (c) 08/18/20 1310  [CH3] Kayla Gaona, OTR/L 08/18/20 1310      Row Name 08/18/20 1116 08/18/20 0817          Positioning and Restraints    Pre-Treatment Position  in bed  -  in bed  -CH,NA,CH2     Post Treatment Position  chair  -  bed  -CH,NA,CH2     In Bed  --  fowlers;call light within reach;encouraged to call for assist;exit alarm on;LLE elevated;side rails up x2;SCD pump applied  (Pended)   -CH3     In Chair  reclined;call light within reach;encouraged to call for assist;notified nsg;legs elevated  -  --     Recorded by [] Milady Tena, PTA 08/18/20 1148 [CH,NA,CH2] Kayla Gaona, OTR/L (r) Jia Prasad (t) Kayla Gaona, OTR/L (c) 08/18/20 1310  [CH3] Kayla Gaona, OTR/L 08/18/20 1310     Row Name 08/18/20 1116 08/18/20 0817          Pain Scale: Numbers Pre/Post-Treatment    Pain Scale: Numbers, Pretreatment  0/10 - no pain AT REST  -  5/10  -CH,NA,CH2     Pain Scale: Numbers, Post-Treatment  5/10  -  5/10  -CH,NA,CH2     Pain Location - Side  Left  -AH  Left  -CH,NA,CH2     Pain Location  ankle  -  ankle  -CH,NA,CH2     Pain Intervention(s)  Medication (See MAR);Repositioned  -AH  Repositioned;Elevated  -CH,NA,CH2     Recorded by [] Milady Tena, PTA 08/18/20 1148 [CH,NA,CH2] Kayla Gaona OTR/MARYJO (r) Jia Prasad (t) Kayla Gaona OTR/L (c) 08/18/20 1310     Row Name                Wound 08/15/20 1416 Left ankle Incision    Wound - Properties Group Date first assessed: 08/15/20 [HW] Time first  assessed: 1416 [HW] Present on Hospital Admission: N [HW] Side: Left [HW] Location: ankle [HW] Primary Wound Type: Incision [HW] Recorded by:  [HW] Blaine Degroot, RN 08/15/20 1416    Row Name                [REMOVED] Wound 08/16/20 0800 coccyx Pressure Injury    Wound - Properties Group Date first assessed: 08/16/20 [DB] Time first assessed: 0800 [DB] Location: coccyx [DB] Primary Wound Type: Pressure inj [DB] Stage, Pressure Injury: Stage 2 [DB] Resolution Date: 08/18/20 [HS] Resolution Time: 1102 [HS] Wound Outcome: Other [HS], Charting clarification  Recorded by:  [DB] Destiny Arana RN 08/16/20 1134 [HS] Gabriella Sunshine RN 08/18/20 1102    Row Name                [REMOVED] Wound 03/09/20 1100 Right lower leg    Wound - Properties Group Date first assessed: 03/09/20 [SUBHASH] Time first assessed: 1100 [SUBHASH] Present on Hospital Admission: Y [SUBHASH] Side: Right [SUBHASH] Orientation: lower [SUBHASH] Location: leg [SUBHASH] Resolution Date: 08/18/20 [CARLOS EDUARDO] Resolution Time: 1031 [CARLOS EDUARDO] Wound Outcome: Unknown [CARLOS EDUARDO] Recorded by:  [SUBHASH] Alexandre Moe, PT DPT 03/09/20 1134 [CARLOS EDUARDO] Quin Massey RN 08/18/20 1031    Row Name                Wound 08/16/20 0800 coccyx Fissure    Wound - Properties Group Date first assessed: 08/16/20 [HS] Time first assessed: 0800 [HS] Present on Hospital Admission: Y [HS] Location: coccyx [HS] Primary Wound Type: Fissure [HS] Recorded by:  [HS] Gabriella Sunshine RN 08/18/20 1102    Row Name 08/18/20 0817             Outcome Summary/Treatment Plan (OT)    Daily Summary of Progress (OT)  progress toward functional goals is good  -CH,NA,CH2      Plan for Continued Treatment (OT)  Continue OT POC  -CH,NA,CH2      Anticipated Discharge Disposition (OT)  skilled nursing facility pt. plans to attend rehab  -CH,NA,CH2      Recorded by [CH,NA,CH2] Kayla Gaona, OTR/L (r) Jia Prasad (t) Kayla Gaona OTR/L (c) 08/18/20 1310        User Key  (r) = Recorded By, (t) = Taken By, (c) = Cosigned By    Initials  Name Effective Dates Discipline     Alexis Tenacarly DAMON, PTA 08/02/16 -  PT    Kayla Sullivan, OTR/L 07/05/20 -  OT    Alexandre Francis, PT DPT 08/02/16 -  PT    Destiny Escobedo, RN 11/06/19 -  Nurse    Gabriella Hough, RN 12/27/16 -  Nurse    Quin Tay, RN 06/18/19 -  Nurse    Blaine Stack RN 06/05/20 -  Nurse    Jia Cain 08/13/20 -  OT        Wound 08/15/20 1416 Left ankle Incision (Active)   Dressing Appearance intact;dried drainage 8/18/2020 10:13 AM   Closure DAPHNE 8/18/2020 10:13 AM   Base dressing in place, unable to visualize 8/18/2020 10:13 AM   Drainage Amount scant 8/18/2020 10:13 AM   Dressing Care, Wound foam 8/18/2020 10:13 AM       Wound 08/16/20 0800 coccyx Fissure (Active)   Wound Image     8/18/2020  9:48 AM   Dressing Appearance open to air 8/18/2020  9:00 AM   Base pink;blanchable 8/18/2020  9:00 AM   Periwound blanchable;warm 8/18/2020  9:00 AM   Periwound Temperature warm 8/18/2020  9:00 AM   Periwound Skin Turgor soft 8/18/2020  9:00 AM   Edges irregular 8/18/2020  9:00 AM   Wound Length (cm) 1.9 cm 8/18/2020  9:00 AM   Wound Width (cm) 0.2 cm 8/18/2020  9:00 AM   Wound Depth (cm) 0.1 cm 8/18/2020  9:00 AM   Drainage Amount none 8/18/2020  9:00 AM   Care, Wound barrier applied 8/18/2020  9:00 AM   Dressing Care, Wound open to air 8/18/2020  9:00 AM   Periwound Care, Wound barrier ointment applied 8/18/2020  9:00 AM       Occupational Therapy Education                 Title: PT OT SLP Therapies (In Progress)     Topic: Occupational Therapy (In Progress)     Point: ADL training (Done)     Description:   Instruct learner(s) on proper safety adaptation and remediation techniques during self care or transfers.   Instruct in proper use of assistive devices.              Learning Progress Summary           Patient Acceptance, [place], VU by MARTI at 8/16/2020 1204                   Point: Home exercise program (Not Started)     Description:   Instruct learner(s) on  appropriate technique for monitoring, assisting and/or progressing therapeutic exercises/activities.              Learner Progress:   Not documented in this visit.          Point: Precautions (Done)     Description:   Instruct learner(s) on prescribed precautions during self-care and functional transfers.              Learning Progress Summary           Patient Acceptance, E, VU by  at 8/16/2020 1204                   Point: Body mechanics (Done)     Description:   Instruct learner(s) on proper positioning and spine alignment during self-care, functional mobility activities and/or exercises.              Learning Progress Summary           Patient Acceptance, E, VU by  at 8/16/2020 1204                               User Key     Initials Effective Dates Name Provider Type Discipline     07/05/20 -  Kita Xiong, OTR/L Occupational Therapist OT                OT Recommendation and Plan  Outcome Summary/Treatment Plan (OT)  Daily Summary of Progress (OT): progress toward functional goals is good  Plan for Continued Treatment (OT): Continue OT POC  Anticipated Discharge Disposition (OT): skilled nursing facility(pt. plans to attend rehab)  Daily Summary of Progress (OT): progress toward functional goals is good  Outcome Measures     Row Name 08/18/20 1000 08/17/20 1200 08/16/20 1200       How much help from another is currently needed...    Putting on and taking off regular lower body clothing?  2  -CH (r) NA (t) CH (c)  2  -TS  1  -JJ    Bathing (including washing, rinsing, and drying)  3  -CH (r) NA (t) CH (c)  2  -TS  2  -JJ    Toileting (which includes using toilet bed pan or urinal)  2  -CH (r) NA (t) CH (c)  2  -TS  2  -JJ    Putting on and taking off regular upper body clothing  3  -CH (r) NA (t) CH (c)  3  -TS  4  -JJ    Taking care of personal grooming (such as brushing teeth)  4  -CH (r) NA (t) CH (c)  3  -TS  4  -JJ    Eating meals  4  -CH (r) NA (t) CH (c)  4  -TS  4  -JJ    AM-PAC 6 Clicks  Score (OT)  18  -CH (r) NA (t)  16  -TS  17  -JJ       Functional Assessment    Outcome Measure Options  --  --  AM-PAC 6 Clicks Daily Activity (OT)  -      User Key  (r) = Recorded By, (t) = Taken By, (c) = Cosigned By    Initials Name Provider Type    Kayla Sullivan, OTR/L Occupational Therapist    Mally Agudelo, TREVIZO/L Occupational Therapy Assistant    Kita Rodriguez, OTR/L Occupational Therapist    Jia Cain OT Student           Time Calculation:   Time Calculation- OT     Row Name 08/18/20 1044             Time Calculation- OT    OT Start Time  0817  -CH (r) NA (t) CH (c)      OT Stop Time  0942  -CH (r) NA (t) CH (c)      OT Time Calculation (min)  85 min  -CH (r) NA (t)      Total Timed Code Minutes- OT  85 minute(s)  -CH (r) NA (t) CH (c)      OT Received On  08/18/20  -CH (r) NA (t) CH (c)         Timed Charges    27914 - OT Self Care/Mgmt Minutes  85  -CH (r) NA (t) CH (c)        User Key  (r) = Recorded By, (t) = Taken By, (c) = Cosigned By    Initials Name Provider Type    Kayla Sullivan, OTR/L Occupational Therapist    Jia Cain OT Student        Therapy Charges for Today     Code Description Service Date Service Provider Modifiers Qty    [id number] HC OT SELF CARE/MGMT/TRAIN EA 15 MIN 8/18/2020 Jia Prasad GO 6               PATI Azar Student  8/18/2020

## 2020-08-18 NOTE — THERAPY TREATMENT NOTE
Acute Care - Physical Therapy Treatment Note  [facility]     Patient Name: Josi Mohamud  : 1945  MRN: [id number]  Today's Date: 2020  Onset of Illness/Injury or Date of Surgery: 08/15/20     Referring Physician: Dr. Medrano    Admit Date: 8/15/2020    Visit Dx:    ICD-10-CM ICD-9-CM   1. Type III open fracture of left ankle, initial encounter S82.892C 824.9   2. COPD (chronic obstructive pulmonary disease) case management patient (CMS/[facility]) J44.9 496   3. Impaired mobility Z74.09 799.89   4. Decreased activities of daily living (ADL) Z78.9 V49.89     Patient Active Problem List   Diagnosis   • Syncope and collapse   • Coronary artery disease involving native coronary artery of native heart without angina pectoris   • Stented coronary artery   • Essential hypertension   • Abnormal positron emission tomography (PET) scan   • Gastroesophageal reflux disease   • Thyroid nodule   • Parotid neoplasm   • Anticoagulated   • Laryngopharyngeal reflux (LPR)   • Traumatic rhabdomyolysis (CMS/[facility])   • PAF (paroxysmal atrial fibrillation) (CMS/[facility])   • Malignant neoplasm of upper lobe of left lung (CMS/[facility])   • COPD, moderate (CMS/[facility])   • Persistent asthma without complication   • Overweight   • Cigarette nicotine dependence without complication   • Multiple thyroid nodules   • Hx of Hodgkin's lymphoma   • Dysfunction of both eustachian tubes   • Left chronic serous otitis media   • Morbidly obese (CMS/[facility])   • Tonsillar cyst   • Status post stereotactic radiosurgery   • Current every day smoker   • Renal cyst   • Cellulitis of right lower extremity   • COPD with acute exacerbation (CMS/[facility])   • Macrocytosis without anemia   • Obesity (BMI 30-39.9)   • Tobacco abuse   • Lower extremity edema, right side   • Leukocytosis   • Type III open fracture of left ankle   • Fall   • Left ankle pain       Therapy Treatment    Rehabilitation Treatment Summary     Row Name 20 1116 20 1014 20 0848        Treatment Time/Intention    Discipline  physical therapy assistant  -  physical therapy assistant  -  physical therapy assistant  -    Document Type  therapy note (daily note)  -2  --  --    Subjective Information  complains of;weakness;fatigue;pain  -2  --  --    Comment  --  pt requested to check to back later  -2  with OT  -    Existing Precautions/Restrictions  fall;left;non-weight bearing NWB LLE  -2  --  --    Recorded by [] Milady Tena, [facility] 08/18/20 1116  [2] Milady Tena, [facility] 08/18/20 1148 [] Milady Tena, [facility] 08/18/20 1014  [2] Milady Tena, [facility] 08/18/20 1018 [] Milady Tena, [facility] 08/18/20 0849    Row Name 08/18/20 0817             Treatment Time/Intention    Discipline  occupational therapy assistant  (Pended)   -      Document Type  therapy note (daily note)  (Pended)   -NA      Subjective Information  complains of;pain  (Pended)   -NA      Mode of Treatment  occupational therapy  (Pended)   -NA      Patient Effort  good  (Pended)   -NA2      Existing Precautions/Restrictions  fall;left;non-weight bearing  (Pended)   -NA2      Recorded by [NA] Jia Prasad 08/18/20 0820  [NA2] Jia Prasad 08/18/20 1035      Row Name 08/18/20 0817             Cognitive Assessment/Intervention- PT/OT    Personal Safety Interventions  fall prevention program maintained;gait belt;supervised activity;nonskid shoes/slippers when out of bed;safety round/check completed;elopement precautions initiated  (Pended)   -NA      Recorded by [NA] Jia Prasad 08/18/20 1132      Row Name 08/18/20 1116 08/18/20 0817          Mobility Assessment/Intervention    Extremity Weight-bearing Status  left lower extremity  -  --     Left Lower Extremity (Weight-bearing Status)  non weight-bearing (NWB)  -  non weight-bearing (NWB)  (Pended)   -NA     Recorded by [] Milady Tena, [facility] 08/18/20 1148 [NA] Jia Prasad 08/18/20 0820     Row Name 08/18/20 1116 08/18/20 0817          Bed Mobility  Assessment/Treatment    Bed Mobility Assessment/Treatment  --  scooting/bridging;supine-sit;sit-supine  (Pended)   -NA     Scooting/Bridging [place] (Bed Mobility)  --  contact guard;verbal cues  (Pended)   -NA     Supine-Sit [place] (Bed Mobility)  minimum assist (75% patient effort);moderate assist (50% patient effort);verbal cues  -  verbal cues;contact guard  (Pended)   -NA     Sit-Supine [place] (Bed Mobility)  -- chair  -  verbal cues;minimum assist (75% patient effort)  (Pended)   -NA     Assistive Device (Bed Mobility)  --  bed rails;draw sheet  (Pended)   -NA     Recorded by [] Milady Tena, PTA 08/18/20 1148 [NA] Jia Prasad 08/18/20 1035     Row Name 08/18/20 0817             Functional Mobility    Functional Mobility- Ind. Level  unable to perform  (Pended)   -NA      Recorded by [NA] Jia Prasad 08/18/20 1035      Row Name 08/18/20 1116             Transfer Assessment/Treatment    Transfer Assessment/Treatment  stand pivot/stand step transfer  -      Maintains Weight-bearing Status (Transfers)  unable to maintain weight-bearing status  -AH      Recorded by [] Milady Tena, PTA 08/18/20 1148      Row Name 08/18/20 1116             Sit-Stand Transfer    Sit-Stand [place] (Transfers)  minimum assist (75% patient effort);verbal cues  -      Recorded by [] Milady Tena, PTA 08/18/20 1148      Row Name 08/18/20 1116             Stand-Sit Transfer    Stand-Sit [place] (Transfers)  minimum assist (75% patient effort);verbal cues  -      Recorded by [] Milady Tena, PTA 08/18/20 1148      Row Name 08/18/20 1116             Stand Pivot/Stand Step Transfer    Stand Pivot/Stand Step [place]  moderate assist (50% patient effort);maximum assist (25% patient effort);verbal cues  -      Recorded by [] Milady Tena, PTA 08/18/20 1148      Row Name 08/18/20 0817             ADL Assessment/Intervention    BADL Assessment/Intervention  upper body  dressing;bathing;grooming  (Pended)   -NA      Recorded by [NA] Jia Prasad 08/18/20 1035      Row Name 08/18/20 0817             Bathing Assessment/Intervention    Bathing [place] Level  minimum assist (75% patient effort);supervision;set up  (Pended)   -NA      Bathing Position  edge of bed sitting  (Pended)   -NA      Recorded by [NA] Jia Prasad 08/18/20 1042      Row Name 08/18/20 0817             Upper Body Dressing Assessment/Training    Upper Body Dressing [place] Level  doff;supervision;don;set up  (Pended)   -NA      Upper Body Dressing Position  edge of bed sitting  (Pended)   -NA      Recorded by [NA] Jia Prasad 08/18/20 1042      Row Name 08/18/20 0817             Lower Body Dressing Assessment/Training    Lower Body Dressing [place] Level  don;socks;minimum assist (75% patient effort)  (Pended)   -NA      Lower Body Dressing Position  edge of bed sitting  (Pended)   -NA      Recorded by [NA] Jia Prasad 08/18/20 1042      Row Name 08/18/20 0817             Grooming Assessment/Training    [place] Level (Grooming)  wash face, hands;oral care regimen;set up;supervision  (Pended)   -NA      Grooming Position  edge of bed sitting;sitting up in bed  (Pended)   -NA      Recorded by [NA] Jia Prasad 08/18/20 1042      Row Name 08/18/20 1116             Therapeutic Exercise    Lower Extremity (Therapeutic Exercise)  gluteal sets;heel slides, bilateral;LAQ (long arc quad), bilateral;quad sets, bilateral  -      Lower Extremity Range of Motion (Therapeutic Exercise)  hip abduction/adduction, bilateral;ankle dorsiflexion/plantar flexion, right  -      Exercise Type (Therapeutic Exercise)  AROM (active range of motion);AAROM (active assistive range of motion)  -      Sets/Reps (Therapeutic Exercise)  10 x 2  -AH      Recorded by [AH] Milady Tena PTA 08/18/20 1148      Row Name 08/18/20 1116 08/18/20 0817          Positioning and Restraints    Pre-Treatment Position  in bed   -AH  in bed  (Pended)   -NA     Post Treatment Position  chair  -AH  bed  (Pended)   -NA     In Bed  --  (S) fowlers;call light within reach;encouraged to call for assist;exit alarm on;LLE elevated  (Pended)   -NA     In Chair  reclined;call light within reach;encouraged to call for assist;notified nsg;legs elevated  -AH  --     Recorded by [AH] Milady Tena, PTA 08/18/20 1148 [NA] Jia Prasad 08/18/20 1042     Row Name 08/18/20 1116 08/18/20 0817          Pain Scale: Numbers Pre/Post-Treatment    Pain Scale: Numbers, Pretreatment  0/10 - no pain AT REST  -AH  5/10  (Pended)   -NA     Pain Scale: Numbers, Post-Treatment  5/10  -AH  5/10  (Pended)   -NA     Pain Location - Side  Left  -  Left  (Pended)   -NA     Pain Location  ankle  -AH  ankle  (Pended)   -NA     Pain Intervention(s)  Medication (See MAR);Repositioned  -AH  Repositioned;Elevated  (Pended)   -NA     Recorded by [AH] Milady Tena, PTA 08/18/20 1148 [NA] Jia Prasad 08/18/20 1042     Row Name                Wound 08/15/20 1416 Left ankle Incision    Wound - Properties Group Date first assessed: 08/15/20 [HW] Time first assessed: 1416 [HW] Present on Hospital Admission: N [HW] Side: Left [HW] Location: ankle [HW] Primary Wound Type: Incision [HW] Recorded by:  [HW] Blaine Degroot RN 08/15/20 1416    Row Name                [REMOVED] Wound 08/16/20 0800 coccyx Pressure Injury    Wound - Properties Group Date first assessed: 08/16/20 [DB] Time first assessed: 0800 [DB] Location: coccyx [DB] Primary Wound Type: Pressure inj [DB] Stage, Pressure Injury: Stage 2 [DB] Resolution Date: 08/18/20 [HS] Resolution Time: 1102 [HS] Wound Outcome: Other [HS], Charting clarification  Recorded by:  [DB] Destiny Arana RN 08/16/20 1134 [HS] Gabriella Sunshine RN 08/18/20 1102    Row Name                [REMOVED] Wound 03/09/20 1100 Right lower leg    Wound - Properties Group Date first assessed: 03/09/20 [SUBHASH] Time first assessed: 1100 [SUBHASH] Present on  Hospital Admission: Y [SUBHASH] Side: Right [SUBHASH] Orientation: lower [SUBHASH] Location: leg [SUBHASH] Resolution Date: 08/18/20 [CARLOS EDUARDO] Resolution Time: 1031 [CARLOS EDUARDO] Wound Outcome: Unknown [CARLOS EDUARDO] Recorded by:  [SUBHASH] Alexandre Moe, PT DPT 03/09/20 1134 [CARLOS EDUARDO] Quin Massey RN 08/18/20 1031    Row Name                Wound 08/16/20 0800 coccyx Fissure    Wound - Properties Group Date first assessed: 08/16/20 [HS] Time first assessed: 0800 [HS] Present on Hospital Admission: Y [HS] Location: coccyx [HS] Primary Wound Type: Fissure [HS] Recorded by:  [HS] Gabriella Sunshine, RN 08/18/20 1102    Row Name 08/18/20 0817             Outcome Summary/Treatment Plan (OT)    Daily Summary of Progress (OT)  progress toward functional goals is good  (Pended)   -NA      Anticipated Discharge Disposition (OT)  skilled nursing facility  (Pended)  pt. plans to attend rehab  -NA2      Recorded by [NA] Jia Prasad 08/18/20 1042  [NA2] Jia Prasad 08/18/20 0820        User Key  (r) = Recorded By, (t) = Taken By, (c) = Cosigned By    Initials Name Effective Dates Discipline    Milady Harris, PTA 08/02/16 -  PT    Alexandre Francis, PT DPT 08/02/16 -  PT    Destiny Escobedo RN 11/06/19 -  Nurse    Gabriella Hough, RN 12/27/16 -  Nurse    Quin Tay RN 06/18/19 -  Nurse    Blaine Stack RN 06/05/20 -  Nurse    Jia Cain 08/13/20 -  OT          Wound 08/15/20 1416 Left ankle Incision (Active)   Dressing Appearance intact;dried drainage 8/18/2020 10:13 AM   Closure DAPHNE 8/18/2020 10:13 AM   Base dressing in place, unable to visualize 8/18/2020 10:13 AM   Drainage Amount scant 8/18/2020 10:13 AM   Dressing Care, Wound foam 8/18/2020 10:13 AM       Wound 08/16/20 0800 coccyx Fissure (Active)   Wound Image     8/18/2020  9:48 AM   Dressing Appearance open to air 8/18/2020  9:00 AM   Base pink;blanchable 8/18/2020  9:00 AM   Periwound blanchable;warm 8/18/2020  9:00 AM   Periwound Temperature warm 8/18/2020  9:00 AM      Periwound Skin Turgor soft 8/18/2020  9:00 AM   Edges irregular 8/18/2020  9:00 AM   Wound Length (cm) 1.9 cm 8/18/2020  9:00 AM   Wound Width (cm) 0.2 cm 8/18/2020  9:00 AM   Wound Depth (cm) 0.1 cm 8/18/2020  9:00 AM   Drainage Amount none 8/18/2020  9:00 AM   Care, Wound barrier applied 8/18/2020  9:00 AM   Dressing Care, Wound open to air 8/18/2020  9:00 AM   Periwound Care, Wound barrier ointment applied 8/18/2020  9:00 AM           Physical Therapy Education                 Title: PT OT SLP Therapies (In Progress)     Topic: Physical Therapy (In Progress)     Point: Mobility training (Done)     Description:   Instruct learner(s) on safety and technique for assisting patient out of bed, chair or wheelchair.  Instruct in the proper use of assistive devices, such as walker, crutches, cane or brace.              Patient Friendly Description:   It's important to get you on your feet again, but we need to do so in a way that is safe for you. Falling has serious consequences, and your personal safety is the most important thing of all.        When it's time to get out of bed, one of us or a family member will sit next to you on the bed to give you support.     If your doctor or nurse tells you to use a walker, crutches, a cane, or a brace, be sure you use it every time you get out of bed, even if you think you don't need it.    Learning Progress Summary           Patient Acceptance, E,TB,D, VU,NR by  at 8/18/2020 1149    Comment:  pivot trans, NWB LLE    Acceptance, E,TB,D, VU,NR by  at 8/17/2020 0937    Comment:  pivot trans    Acceptance, E, VU,NR by SUBHASH at 8/16/2020 0715    Comment:  Educated pt on progression of PT POC, benefits of activity, NWB L LE                   Point: Home exercise program (Not Started)     Description:   Instruct learner(s) on appropriate technique for monitoring, assisting and/or progressing patient with therapeutic exercises and activities.              Learner Progress:   Not  documented in this visit.          Point: Body mechanics (Not Started)     Description:   Instruct learner(s) on proper positioning and spine alignment for patient and/or caregiver during mobility tasks and/or exercises.              Learner Progress:   Not documented in this visit.          Point: Precautions (Done)     Description:   Instruct learner(s) on prescribed precautions during mobility and gait tasks              Learning Progress Summary           Patient Acceptance, E, VU,NR by SUBHASH at 8/16/2020 0715    Comment:  Educated pt on progression of PT POC, benefits of activity, NWB L LE                               User Key     Initials Effective Dates Name Provider Type Discipline     08/02/16 -  Milady Tena, PTA Physical Therapy Assistant PT    SUBHASH 08/02/16 -  Alexandre Moe, PT DPT Physical Therapist PT                PT Recommendation and Plan     Plan of Care Reviewed With: patient  Progress: improving  Outcome Summary: Pt performed BLE exercises in the bed, trans to EOB min-mod assist, sit-stand min assist, pivot trans with rwx mod-max assist, pt unable to maintain NWB LLE. pt would benefit from SNF  Outcome Measures     Row Name 08/18/20 1000 08/17/20 1200 08/16/20 1200       How much help from another is currently needed...    Putting on and taking off regular lower body clothing?  2  (Pended)   -NA  2  -TS  1  -JJ    Bathing (including washing, rinsing, and drying)  3  (Pended)   -NA  2  -TS  2  -JJ    Toileting (which includes using toilet bed pan or urinal)  2  (Pended)   -NA  2  -TS  2  -JJ    Putting on and taking off regular upper body clothing  3  (Pended)   -NA  3  -TS  4  -JJ    Taking care of personal grooming (such as brushing teeth)  4  (Pended)   -NA  3  -TS  4  -JJ    Eating meals  4  (Pended)   -NA  4  -TS  4  -JJ    AM-PAC 6 Clicks Score (OT)  18  (Pended)   -NA  16  -TS  17  -JJ       Functional Assessment    Outcome Measure Options  --  --  AM-PAC 6 Clicks Daily Activity (OT)   -JJ      User Key  (r) = Recorded By, (t) = Taken By, (c) = Cosigned By    Initials Name Provider Type    TS Mally Scherer, TREVIZO/L Occupational Therapy Assistant    Kita Rodriguez OTR/L Occupational Therapist    Jia Cain OT Student         Time Calculation:   PT Charges     Row Name 08/18/20 1151             Time Calculation    Start Time  1116  -      Stop Time  1146  -      Time Calculation (min)  30 min  -      PT Received On  08/18/20  -         Time Calculation- PT    Total Timed Code Minutes- PT  30 minute(s)  -AH         Timed Charges    38239 - PT Therapeutic Exercise Minutes  15  -AH      68209 - PT Therapeutic Activity Minutes  15  -AH        User Key  (r) = Recorded By, (t) = Taken By, (c) = Cosigned By    Initials Name Provider Type    Milady Harris, PTA Physical Therapy Assistant        Therapy Charges for Today     Code Description Service Date Service Provider Modifiers Qty    26418354736 HC PT THER PROC EA 15 MIN 8/17/2020 Milady Tena, PTA GP 1    11460724772 HC PT THERAPEUTIC ACT EA 15 MIN 8/17/2020 Milady Tena, PTA GP 1    02128500088 HC PT THERAPEUTIC ACT EA 15 MIN 8/17/2020 Milady Tena, PTA GP 2    12613829011 HC PT THER PROC EA 15 MIN 8/18/2020 Milady Tena, PTA GP 1    24914788564 HC PT THERAPEUTIC ACT EA 15 MIN 8/18/2020 Milady Tena, PTA GP 1          PT G-Codes  Outcome Measure Options: AM-PAC 6 Clicks Daily Activity (OT)  AM-PAC 6 Clicks Score (PT): 9  AM-PAC 6 Clicks Score (OT): (P) 18    Milady Tena PTA  8/18/2020

## 2020-08-19 LAB — SARS-COV-2 RNA RESP QL NAA+PROBE: NOT DETECTED

## 2020-08-19 PROCEDURE — 94799 UNLISTED PULMONARY SVC/PX: CPT

## 2020-08-19 PROCEDURE — 97530 THERAPEUTIC ACTIVITIES: CPT

## 2020-08-19 PROCEDURE — 97110 THERAPEUTIC EXERCISES: CPT

## 2020-08-19 PROCEDURE — 97535 SELF CARE MNGMENT TRAINING: CPT

## 2020-08-19 PROCEDURE — 87635 SARS-COV-2 COVID-19 AMP PRB: CPT | Performed by: FAMILY MEDICINE

## 2020-08-19 RX ADMIN — SODIUM CHLORIDE, PRESERVATIVE FREE 3 ML: 5 INJECTION INTRAVENOUS at 20:49

## 2020-08-19 RX ADMIN — GUAIFENESIN 1200 MG: 600 TABLET, EXTENDED RELEASE ORAL at 09:53

## 2020-08-19 RX ADMIN — LORAZEPAM 1 MG: 1 TABLET ORAL at 20:49

## 2020-08-19 RX ADMIN — METOPROLOL SUCCINATE 50 MG: 50 TABLET, EXTENDED RELEASE ORAL at 09:53

## 2020-08-19 RX ADMIN — APIXABAN 5 MG: 5 TABLET, FILM COATED ORAL at 20:49

## 2020-08-19 RX ADMIN — LEVETIRACETAM 500 MG: 500 TABLET, FILM COATED ORAL at 09:53

## 2020-08-19 RX ADMIN — SODIUM CHLORIDE, PRESERVATIVE FREE 3 ML: 5 INJECTION INTRAVENOUS at 09:54

## 2020-08-19 RX ADMIN — ATORVASTATIN CALCIUM 10 MG: 10 TABLET, FILM COATED ORAL at 20:49

## 2020-08-19 RX ADMIN — APIXABAN 5 MG: 5 TABLET, FILM COATED ORAL at 09:53

## 2020-08-19 RX ADMIN — HYDROCODONE BITARTRATE AND ACETAMINOPHEN 1 TABLET: 7.5; 325 TABLET ORAL at 14:51

## 2020-08-19 RX ADMIN — IPRATROPIUM BROMIDE 0.5 MG: 0.5 SOLUTION RESPIRATORY (INHALATION) at 07:08

## 2020-08-19 RX ADMIN — GUAIFENESIN 1200 MG: 600 TABLET, EXTENDED RELEASE ORAL at 20:49

## 2020-08-19 RX ADMIN — IPRATROPIUM BROMIDE 0.5 MG: 0.5 SOLUTION RESPIRATORY (INHALATION) at 14:25

## 2020-08-19 RX ADMIN — LORAZEPAM 1 MG: 1 TABLET ORAL at 09:58

## 2020-08-19 RX ADMIN — LEVETIRACETAM 500 MG: 500 TABLET, FILM COATED ORAL at 20:49

## 2020-08-19 RX ADMIN — LORAZEPAM 1 MG: 1 TABLET ORAL at 17:03

## 2020-08-19 RX ADMIN — HYDROCODONE BITARTRATE AND ACETAMINOPHEN 1 TABLET: 7.5; 325 TABLET ORAL at 01:56

## 2020-08-19 RX ADMIN — PANTOPRAZOLE SODIUM 40 MG: 40 TABLET, DELAYED RELEASE ORAL at 06:14

## 2020-08-19 RX ADMIN — IPRATROPIUM BROMIDE 0.5 MG: 0.5 SOLUTION RESPIRATORY (INHALATION) at 19:35

## 2020-08-19 RX ADMIN — LISINOPRIL 20 MG: 20 TABLET ORAL at 09:53

## 2020-08-19 RX ADMIN — IPRATROPIUM BROMIDE 0.5 MG: 0.5 SOLUTION RESPIRATORY (INHALATION) at 11:13

## 2020-08-19 RX ADMIN — FUROSEMIDE 40 MG: 40 TABLET ORAL at 09:53

## 2020-08-19 RX ADMIN — FUROSEMIDE 40 MG: 40 TABLET ORAL at 20:49

## 2020-08-19 RX ADMIN — AMLODIPINE BESYLATE 5 MG: 5 TABLET ORAL at 09:53

## 2020-08-19 RX ADMIN — ASPIRIN 81 MG: 81 TABLET ORAL at 09:53

## 2020-08-19 RX ADMIN — NICOTINE 1 PATCH: 21 PATCH, EXTENDED RELEASE TRANSDERMAL at 09:59

## 2020-08-19 NOTE — PLAN OF CARE
Problem: Patient Care Overview  Goal: Plan of Care Review  Outcome: Ongoing (interventions implemented as appropriate)  Flowsheets  Taken 8/19/2020 1610 by Kathryn Ventura RN  Progress: improving  Taken 8/19/2020 1155 by Cortney Mendiola PTA  Plan of Care Reviewed With: patient   A&Ox4. VSS. RLE edema; elevated on pillow. Strict bedrest. Randell hose in place. Eliquis and SCD for VTE. O2 at night via NC. Purwick in place. Plan to d/c to [facility] on 20th.

## 2020-08-19 NOTE — PLAN OF CARE
Problem: Patient Care Overview  Goal: Plan of Care Review  Outcome: Ongoing (interventions implemented as appropriate)  Flowsheets (Taken 8/19/2020 0608)  Progress: no change  Plan of Care Reviewed With: patient  Note:   VSS. NV check wnl, no change in neuro status. Drsg c/d/I, toes warm, able to move. C/o intermit pain, prn given. Assist w/bed mobility. SCD to rle. Safety maintained.

## 2020-08-19 NOTE — THERAPY TREATMENT NOTE
Acute Care - Physical Therapy Treatment Note  [facility]     Patient Name: Josi Mohamud  : 1945  MRN: [id number]  Today's Date: 2020  Onset of Illness/Injury or Date of Surgery: 08/15/20     Referring Physician: Dr. Medrano    Admit Date: 8/15/2020    Visit Dx:    ICD-10-CM ICD-9-CM   1. Type III open fracture of left ankle, initial encounter S82.892C 824.9   2. COPD (chronic obstructive pulmonary disease) case management patient (CMS/HCC) J44.9 496   3. Impaired mobility Z74.09 799.89   4. Decreased activities of daily living (ADL) Z78.9 V49.89     Patient Active Problem List   Diagnosis   • Syncope and collapse   • Coronary artery disease involving native coronary artery of native heart without angina pectoris   • Stented coronary artery   • Essential hypertension   • Abnormal positron emission tomography (PET) scan   • Gastroesophageal reflux disease   • Thyroid nodule   • Parotid neoplasm   • Anticoagulated   • Laryngopharyngeal reflux (LPR)   • Traumatic rhabdomyolysis (CMS/[facility])   • PAF (paroxysmal atrial fibrillation) (CMS/[facility])   • Malignant neoplasm of upper lobe of left lung (CMS/[facility])   • COPD, moderate (CMS/[facility])   • Persistent asthma without complication   • Overweight   • Cigarette nicotine dependence without complication   • Multiple thyroid nodules   • Hx of Hodgkin's lymphoma   • Dysfunction of both eustachian tubes   • Left chronic serous otitis media   • Morbidly obese (CMS/[facility])   • Tonsillar cyst   • Status post stereotactic radiosurgery   • Current every day smoker   • Renal cyst   • Cellulitis of right lower extremity   • COPD with acute exacerbation (CMS/[facility])   • Macrocytosis without anemia   • Obesity (BMI 30-39.9)   • Tobacco abuse   • Lower extremity edema, right side   • Leukocytosis   • Type III open fracture of left ankle   • Fall   • Left ankle pain       Therapy Treatment    Rehabilitation Treatment Summary     Row Name 20 1126             Treatment Time/Intention     Discipline  physical therapy assistant  -WK      Document Type  therapy note (daily note)  -WK      Subjective Information  complains of;pain  -WK      Patient Effort  excellent  -WK2      Existing Precautions/Restrictions  fall;non-weight bearing LLE  -WK2      Recorded by [WK] Cortney Mendiola, [facility] 08/19/20 1127  [WK2] Cortney Mendiola PTA 08/19/20 1155      Row Name 08/19/20 1126             Bed Mobility Assessment/Treatment    Scooting/Bridging [place] (Bed Mobility)  contact guard;verbal cues scoot to EOB   -WK      Supine-Sit [place] (Bed Mobility)  verbal cues;contact guard;minimum assist (75% patient effort) assist with LLE   -WK      Recorded by [WK] Cortney Mendiola PTA 08/19/20 1155      Row Name 08/19/20 1126             Sit-Stand Transfer    Sit-Stand [place] (Transfers)  minimum assist (75% patient effort) stood 3  -WK      Assistive Device (Sit-Stand Transfers)  walker, front-wheeled  -WK      Recorded by [WK] Cortney Mendiola PTA 08/19/20 1155      Row Name 08/19/20 1126             Stand-Sit Transfer    Stand-Sit [place] (Transfers)  minimum assist (75% patient effort)  -WK      Assistive Device (Stand-Sit Transfers)  walker, front-wheeled  -WK      Recorded by [WK] Cortney Mendiola PTA 08/19/20 1155      Row Name 08/19/20 1126             Stand Pivot/Stand Step Transfer    Stand Pivot/Stand Step [place]  minimum assist (75% patient effort);moderate assist (50% patient effort);2 person assist followed WB during transfer  -WK      Assistive Device (Stand Pivot Stand Step Transfer)  -- HHA   -WK      Recorded by [WK] Cortney Mendiola PTA 08/19/20 1155      Row Name 08/19/20 1126             Therapeutic Exercise    Lower Extremity (Therapeutic Exercise)  gluteal sets;LAQ (long arc quad), bilateral;marching while seated  -WK      Lower Extremity Range of Motion (Therapeutic Exercise)  hip abduction/adduction, bilateral;ankle dorsiflexion/plantar flexion, right  -WK       Exercise Type (Therapeutic Exercise)  AROM (active range of motion)  -WK      Position (Therapeutic Exercise)  seated  -WK      Sets/Reps (Therapeutic Exercise)  20  -WK      Recorded by [WK] Cortney Mendiola, BATSHEVA 08/19/20 1155      Row Name 08/19/20 1126             Static Sitting Balance    Level of [place] (Unsupported Sitting, Static Balance)  independent  -WK      Sitting Position (Unsupported Sitting, Static Balance)  sitting on edge of bed  -WK      Recorded by [WK] Cortney Mendiola PTA 08/19/20 1155      Row Name 08/19/20 1126             Static Standing Balance    Level of [place] (Supported Standing, Static Balance)  contact guard assist  -WK      Assistive Device Utilized (Supported Standing, Static Balance)  walker, rolling  -WK      Recorded by [WK] Cortney Mendiola PTA 08/19/20 1155      Row Name 08/19/20 1126             Positioning and Restraints    Pre-Treatment Position  in bed  -WK      Post Treatment Position  chair  -WK      In Chair  sitting;call light within reach;encouraged to call for assist  -WK      Recorded by [WK] Cortney Mendiola PTA 08/19/20 1155      Row Name 08/19/20 1126             Pain Scale: Numbers Pre/Post-Treatment    Pain Scale: Numbers, Pretreatment  0/10 - no pain  -WK      Pain Scale: Numbers, Post-Treatment  5/10  -WK      Pain Location - Side  Left  -WK      Pain Location  ankle  -WK      Pain Intervention(s)  Repositioned  -WK      Recorded by [WK] Cortney Mendiola PTA 08/19/20 1155      Row Name                Wound 08/15/20 1416 Left ankle Incision    Wound - Properties Group Date first assessed: 08/15/20 [HW] Time first assessed: 1416 [HW] Present on Hospital Admission: N [HW] Side: Left [HW] Location: ankle [HW] Primary Wound Type: Incision [HW] Recorded by:  [HW] Blaine Degroot RN 08/15/20 1416    Row Name                Wound 08/16/20 0800 coccyx Fissure    Wound - Properties Group Date first assessed: 08/16/20 [HS] Time first assessed: 0800 [HS]  Present on Hospital Admission: Y [HS] Location: coccyx [HS] Primary Wound Type: Fissure [HS] Recorded by:  [HS] Gabriella Sunshine RN 08/18/20 1102      User Key  (r) = Recorded By, (t) = Taken By, (c) = Cosigned By    Initials Name Effective Dates Discipline    WK Cortney Mendiola, PTA 08/02/16 -  PT    HS Gabriella Sunshine RN 12/27/16 -  Nurse    HW Blaine Degroot RN 06/05/20 -  Nurse          Wound 08/15/20 1416 Left ankle Incision (Active)   Dressing Appearance dry;intact 8/18/2020  8:50 PM   Closure DAPHNE 8/18/2020  8:50 PM   Base dressing in place, unable to visualize 8/18/2020  8:50 PM   Drainage Amount none 8/18/2020  8:50 PM       Wound 08/16/20 0800 coccyx Fissure (Active)   Dressing Appearance open to air 8/18/2020  8:50 PM   Drainage Amount none 8/18/2020  8:50 PM   Care, Wound barrier applied 8/18/2020  8:50 PM   Dressing Care, Wound open to air 8/18/2020  8:50 PM   Periwound Care, Wound barrier ointment applied 8/18/2020  8:50 PM           Physical Therapy Education                 Title: PT OT SLP Therapies (In Progress)     Topic: Physical Therapy (In Progress)     Point: Mobility training (Done)     Description:   Instruct learner(s) on safety and technique for assisting patient out of bed, chair or wheelchair.  Instruct in the proper use of assistive devices, such as walker, crutches, cane or brace.              Patient Friendly Description:   It's important to get you on your feet again, but we need to do so in a way that is safe for you. Falling has serious consequences, and your personal safety is the most important thing of all.        When it's time to get out of bed, one of us or a family member will sit next to you on the bed to give you support.     If your doctor or nurse tells you to use a walker, crutches, a cane, or a brace, be sure you use it every time you get out of bed, even if you think you don't need it.    Learning Progress Summary           Patient Acceptance, E,TB,D, VU,NR by   at 8/18/2020 1149    Comment:  pivot trans, NWB LLE    Acceptance, E,TB,D, VU,NR by  at 8/17/2020 0937    Comment:  pivot trans    Acceptance, E, VU,NR by SUBHASH at 8/16/2020 0715    Comment:  Educated pt on progression of PT POC, benefits of activity, NWB L LE                   Point: Home exercise program (Not Started)     Description:   Instruct learner(s) on appropriate technique for monitoring, assisting and/or progressing patient with therapeutic exercises and activities.              Learner Progress:   Not documented in this visit.          Point: Body mechanics (Not Started)     Description:   Instruct learner(s) on proper positioning and spine alignment for patient and/or caregiver during mobility tasks and/or exercises.              Learner Progress:   Not documented in this visit.          Point: Precautions (Done)     Description:   Instruct learner(s) on prescribed precautions during mobility and gait tasks              Learning Progress Summary           Patient Acceptance, E, VU by  at 8/19/2020 1158    Comment:  WB status    Acceptance, E, VU,NR by SUBHASH at 8/16/2020 0715    Comment:  Educated pt on progression of PT POC, benefits of activity, NWB L LE                               User Key     Initials Effective Dates Name Provider Type Discipline     08/02/16 -  Milady Tena PTA Physical Therapy Assistant PT    SUBHASH 08/02/16 -  Alexandre Moe, JOSE MANUEL DPT Physical Therapist PT     08/02/16 -  Cortney Mendiola PTA Physical Therapy Assistant PT                PT Recommendation and Plan     Plan of Care Reviewed With: patient  Progress: improving  Outcome Summary: Pt stood min A with RW performed x3. Pt performed transfer from bed to chair with Mod A 1-2 HHA  and able to follow WB status. Pt performed seated  BLE AROM 20 reps.  Outcome Measures     Row Name 08/18/20 1000 08/17/20 1200 08/16/20 1200       How much help from another is currently needed...    Putting on and taking off regular lower  body clothing?  2  -CH (r) NA (t) CH (c)  2  -TS  1  -JJ    Bathing (including washing, rinsing, and drying)  3  -CH (r) NA (t) CH (c)  2  -TS  2  -JJ    Toileting (which includes using toilet bed pan or urinal)  2  -CH (r) NA (t) CH (c)  2  -TS  2  -JJ    Putting on and taking off regular upper body clothing  3  -CH (r) NA (t) CH (c)  3  -TS  4  -JJ    Taking care of personal grooming (such as brushing teeth)  4  -CH (r) NA (t) CH (c)  3  -TS  4  -JJ    Eating meals  4  -CH (r) NA (t) CH (c)  4  -TS  4  -JJ    AM-PAC 6 Clicks Score (OT)  18  -CH (r) NA (t)  16  -TS  17  -JJ       Functional Assessment    Outcome Measure Options  --  --  AM-PAC 6 Clicks Daily Activity (OT)  -JJ      User Key  (r) = Recorded By, (t) = Taken By, (c) = Cosigned By    Initials Name Provider Type    Kayla Sullivan, OTR/L Occupational Therapist    Mally Agudelo, RTEVIZO/L Occupational Therapy Assistant    Kita Rodriguez, OTR/L Occupational Therapist    Jia Cain OT Student         Time Calculation:   PT Charges     Row Name 08/19/20 1158             Time Calculation    Start Time  1126  -WK      Stop Time  1158  -WK      Time Calculation (min)  32 min  -WK      PT Received On  08/19/20  -WK         Time Calculation- PT    Total Timed Code Minutes- PT  32 minute(s)  -WK        User Key  (r) = Recorded By, (t) = Taken By, (c) = Cosigned By    Initials Name Provider Type    WK Cortney Mendiola PTA Physical Therapy Assistant        Therapy Charges for Today     Code Description Service Date Service Provider Modifiers Qty    72161987945 HC PT THERAPEUTIC ACT EA 15 MIN 8/19/2020 Cortney Mendiola PTA GP 1    44090530122 HC PT THER PROC EA 15 MIN 8/19/2020 Cortney Mendiola PTA GP 1          PT G-Codes  Outcome Measure Options: AM-PAC 6 Clicks Daily Activity (OT)  AM-PAC 6 Clicks Score (PT): 9  AM-PAC 6 Clicks Score (OT): 18    Cortney Mendiola, PTA  8/19/2020

## 2020-08-19 NOTE — PROGRESS NOTES
[facility] Medicine Services  INPATIENT PROGRESS NOTE    Length of Stay: 4  Date of Admission: 8/15/2020  Primary Care Physician: Cayetano Crews MD    Subjective     Chief Complaint:      Left ankle discomfort    HPI     The patient is doing well postoperatively.  She is sitting up in a chair at bedside watching TV.  She has had no further shortness of breath or tachycardia.  Bed will be available tomorrow at [facility] and the patient can transition there tomorrow morning.  Repeat COVID testing will be ordered today.      Review of Systems     All pertinent negatives and positives are as above. All other systems have been reviewed and are negative unless otherwise stated.     Objective    Temp:  [97.4 °F (36.3 °C)-98.8 °F (37.1 °C)] 97.4 °F (36.3 °C)  Heart Rate:  [80-91] 84  Resp:  [15-18] 16  BP: (115-138)/(45-78) 138/78    Lab Results (last 24 hours)     ** No results found for the last 24 hours. **          Imaging Results (Last 24 Hours)     ** No results found for the last 24 hours. **             Intake/Output Summary (Last 24 hours) at 8/19/2020 1205  Last data filed at 8/19/2020 0608  Gross per 24 hour   Intake 240 ml   Output 2350 ml   Net -2110 ml       Physical Exam  Constitutional: She is oriented to person, place, and time. She appears well-developed and well-nourished.    Sitting in a chair at bedside.  No family present.    Head: Normocephalic and atraumatic.   Eyes: Conjunctivae and EOM are normal.   Neck: Neck supple. No JVD present.   Cardiovascular: Normal heart sounds and intact distal pulses.  No murmur heard.  Pulmonary/Chest: Effort normal and breath sounds normal. No respiratory distress. She exhibits no tenderness.    Abdominal: Soft. Bowel sounds are normal. There is no rebound and no guarding.   Musculoskeletal: Normal range of motion. She exhibits edema BLE. She exhibits no deformity. Bandaging and splinting of the left lower extremity.      Neurological: She is alert and oriented to person, place, and time. She exhibits normal muscle tone.   Skin: Skin is warm and dry. No rash noted.   Psychiatric: Alert and conversant.    Results Review:  I have reviewed the labs, radiology results, and diagnostic studies since my last progress note and made treatment changes reflective of the results.   I have reviewed the current medications.    Assessment/Plan     Active Hospital Problems    Diagnosis   • **Type III open fracture of left ankle   • Fall   • Left ankle pain   • Current every day smoker   • COPD, moderate (CMS/HCC)   • PAF (paroxysmal atrial fibrillation) (CMS/HCC)   • Stented coronary artery   • Coronary artery disease involving native coronary artery of native heart without angina pectoris       PLAN:  Discharge to [facility] tomorrow  Continue current therapy    Electronically signed by Milo White DO, 08/19/20, 12:05.

## 2020-08-19 NOTE — PROGRESS NOTES
Continued Stay Note   [place]     Patient Name: Josi Mohamud  MRN: [id number]  Today's Date: 8/19/2020    Admit Date: 8/15/2020    Discharge Plan     Row Name 08/19/20 1125       Plan    Plan Comments  Left message with admissions at [facility] regarding bed for tomorrow. Await return call.     Final Discharge Disposition Code  03 - skilled nursing facility (SNF)        Discharge Codes    No documentation.             FAMILIA Alvarenga

## 2020-08-19 NOTE — THERAPY TREATMENT NOTE
Acute Care - Physical Therapy Treatment Note  [facility]     Patient Name: Josi Mohamud  : 1945  MRN: [id number]  Today's Date: 2020  Onset of Illness/Injury or Date of Surgery: 08/15/20     Referring Physician: Dr. Medrano    Admit Date: 8/15/2020    Visit Dx:    ICD-10-CM ICD-9-CM   1. Type III open fracture of left ankle, initial encounter S82.892C 824.9   2. COPD (chronic obstructive pulmonary disease) case management patient (CMS/HCC) J44.9 496   3. Impaired mobility Z74.09 799.89   4. Decreased activities of daily living (ADL) Z78.9 V49.89     Patient Active Problem List   Diagnosis   • Syncope and collapse   • Coronary artery disease involving native coronary artery of native heart without angina pectoris   • Stented coronary artery   • Essential hypertension   • Abnormal positron emission tomography (PET) scan   • Gastroesophageal reflux disease   • Thyroid nodule   • Parotid neoplasm   • Anticoagulated   • Laryngopharyngeal reflux (LPR)   • Traumatic rhabdomyolysis (CMS/[facility])   • PAF (paroxysmal atrial fibrillation) (CMS/[facility])   • Malignant neoplasm of upper lobe of left lung (CMS/[facility])   • COPD, moderate (CMS/[facility])   • Persistent asthma without complication   • Overweight   • Cigarette nicotine dependence without complication   • Multiple thyroid nodules   • Hx of Hodgkin's lymphoma   • Dysfunction of both eustachian tubes   • Left chronic serous otitis media   • Morbidly obese (CMS/[facility])   • Tonsillar cyst   • Status post stereotactic radiosurgery   • Current every day smoker   • Renal cyst   • Cellulitis of right lower extremity   • COPD with acute exacerbation (CMS/[facility])   • Macrocytosis without anemia   • Obesity (BMI 30-39.9)   • Tobacco abuse   • Lower extremity edema, right side   • Leukocytosis   • Type III open fracture of left ankle   • Fall   • Left ankle pain       Therapy Treatment    Rehabilitation Treatment Summary     Row Name 20 1337 20 1126          Treatment  Time/Intention    Discipline  physical therapy assistant  -  physical therapy assistant  -WK     Document Type  therapy note (daily note)  -AH2  therapy note (daily note)  -WK     Subjective Information  complains of;pain;weakness  -2  complains of;pain  -WK     Patient Effort  --  excellent  -WK2     Existing Precautions/Restrictions  fall;left;non-weight bearing NWB LLE  -AH2  fall;non-weight bearing LLE  -WK2     Recorded by [] Milady Tena, [facility] 08/19/20 1338  [AH2] Milady Tena, [facility] 08/19/20 1402 [WK] Cortney Mendiola, [facility] 08/19/20 1127  [WK2] Cortney Mendiola, [facility] 08/19/20 1155     Row Name 08/19/20 1337             Mobility Assessment/Intervention    Extremity Weight-bearing Status  left lower extremity  -      Left Lower Extremity (Weight-bearing Status)  non weight-bearing (NWB)  -      Recorded by [] Milady Tena, [facility] 08/19/20 1402      Row Name 08/19/20 1337 08/19/20 1126          Bed Mobility Assessment/Treatment    Scooting/Bridging [place] (Bed Mobility)  --  contact guard;verbal cues scoot to EOB   -WK     Supine-Sit [place] (Bed Mobility)  -- chair  -  verbal cues;contact guard;minimum assist (75% patient effort) assist with LLE   -WK     Sit-Supine [place] (Bed Mobility)  minimum assist (75% patient effort);2 person assist;verbal cues  -  --     Recorded by [] Milady Tena, [facility] 08/19/20 1402 [WK] Cortney Mendiola, [facility] 08/19/20 1155     Row Name 08/19/20 1337             Transfer Assessment/Treatment    Transfer Assessment/Treatment  stand pivot/stand step transfer  -      Recorded by [] Milady Tena, PTA 08/19/20 1402      Row Name 08/19/20 1337 08/19/20 1126          Sit-Stand Transfer    Sit-Stand [place] (Transfers)  minimum assist (75% patient effort);verbal cues;moderate assist (50% patient effort);2 person assist  -  minimum assist (75% patient effort) stood 3  -WK     Assistive Device (Sit-Stand Transfers)  --  walker, front-wheeled   -WK     Recorded by [] Milady Tena, [facility] 08/19/20 1402 [WK] Cortney Mendiola, [facility] 08/19/20 1155     Row Name 08/19/20 1337 08/19/20 1126          Stand-Sit Transfer    Stand-Sit [place] (Transfers)  minimum assist (75% patient effort);verbal cues  [facility]  minimum assist (75% patient effort)  -WK     Assistive Device (Stand-Sit Transfers)  --  walker, front-wheeled  -WK     Recorded by [] Milady Tena, [facility] 08/19/20 1402 [WK] Cortney Mendiola, PTA 08/19/20 1155     Row Name 08/19/20 1337 08/19/20 1126          Stand Pivot/Stand Step Transfer    Stand Pivot/Stand Step [place]  moderate assist (50% patient effort);verbal cues;2 person assist;maximum assist (25% patient effort)  [facility]  minimum assist (75% patient effort);moderate assist (50% patient effort);2 person assist followed WB during transfer  -WK     Assistive Device (Stand Pivot Stand Step Transfer)  --  -- HHA   -WK     Recorded by [] Milady Tena, [facility] 08/19/20 1402 [WK] Cortney Mendiola, [facility] 08/19/20 1155     Row Name 08/19/20 1337 08/19/20 1126          Therapeutic Exercise    Lower Extremity (Therapeutic Exercise)  LAQ (long arc quad), bilateral;marching while seated  -  gluteal sets;LAQ (long arc quad), bilateral;marching while seated  -WK     Lower Extremity Range of Motion (Therapeutic Exercise)  hip abduction/adduction, bilateral;ankle dorsiflexion/plantar flexion, right  -  hip abduction/adduction, bilateral;ankle dorsiflexion/plantar flexion, right  -WK     Exercise Type (Therapeutic Exercise)  --  AROM (active range of motion)  -WK     Position (Therapeutic Exercise)  --  seated  -WK     Sets/Reps (Therapeutic Exercise)  20  -  20  -WK     Recorded by [] Milady Tena, PTA 08/19/20 1402 [WK] Cortney Mendiola, PTA 08/19/20 1155     Row Name 08/19/20 1126             Static Sitting Balance    Level of [place] (Unsupported Sitting, Static Balance)  independent  -WK      Sitting Position (Unsupported Sitting, Static  Balance)  sitting on edge of bed  -WK      Recorded by [WK] Cortney Mendiola, PTA 08/19/20 1155      Row Name 08/19/20 1126             Static Standing Balance    Level of [place] (Supported Standing, Static Balance)  contact guard assist  -WK      Assistive Device Utilized (Supported Standing, Static Balance)  walker, rolling  -WK      Recorded by [WK] Cortney Mendiola, PTA 08/19/20 1155      Row Name 08/19/20 1337 08/19/20 1126          Positioning and Restraints    Pre-Treatment Position  sitting in chair/recliner  -AH  in bed  -WK     Post Treatment Position  bed  -AH  chair  -WK     In Bed  fowlers;call light within reach;encouraged to call for assist;SCD pump applied;LLE elevated  -AH  --     In Chair  --  sitting;call light within reach;encouraged to call for assist  -WK     Recorded by [AH] Milady Tena, PTA 08/19/20 1402 [WK] Cortney Mendiola, PTA 08/19/20 1155     Row Name 08/19/20 1337 08/19/20 1126          Pain Scale: Numbers Pre/Post-Treatment    Pain Scale: Numbers, Pretreatment  6/10  -AH  0/10 - no pain  -WK     Pain Scale: Numbers, Post-Treatment  6/10  -AH  5/10  -WK     Pain Location - Side  Left  -AH  Left  -WK     Pain Location  ankle  -AH  ankle  -WK     Pain Intervention(s)  Repositioned  -AH  Repositioned  -WK     Recorded by [AH] Milady Tena, PTA 08/19/20 1402 [WK] Cortney Mendiola, PTA 08/19/20 1155     Row Name                Wound 08/15/20 1416 Left ankle Incision    Wound - Properties Group Date first assessed: 08/15/20 [HW] Time first assessed: 1416 [HW] Present on Hospital Admission: N [HW] Side: Left [HW] Location: ankle [HW] Primary Wound Type: Incision [HW] Recorded by:  [HW] Blaine Degroot RN 08/15/20 1416    Row Name                Wound 08/16/20 0800 coccyx Fissure    Wound - Properties Group Date first assessed: 08/16/20 [HS] Time first assessed: 0800 [HS] Present on Hospital Admission: Y [HS] Location: coccyx [HS] Primary Wound Type: Fissure [HS] Recorded by:   [HS] Gabriella Sunshine, RN 08/18/20 1102      User Key  (r) = Recorded By, (t) = Taken By, (c) = Cosigned By    Initials Name Effective Dates Discipline     TenaMilady, PTA 08/02/16 -  PT    WK Cortney Mendiola DOMINGA, PTA 08/02/16 -  PT    HS Gabriella Sunshine RN 12/27/16 -  Nurse    HW Blaine Degroot RN 06/05/20 -  Nurse          Wound 08/15/20 1416 Left ankle Incision (Active)   Dressing Appearance dry;intact 8/19/2020 10:08 AM   Closure DAPHNE 8/19/2020 10:08 AM   Base dressing in place, unable to visualize 8/19/2020 10:08 AM   Drainage Amount none 8/19/2020 10:08 AM       Wound 08/16/20 0800 coccyx Fissure (Active)   Dressing Appearance open to air 8/19/2020 10:08 AM   Drainage Amount none 8/18/2020  8:50 PM   Care, Wound barrier applied 8/18/2020  8:50 PM   Dressing Care, Wound open to air 8/18/2020  8:50 PM   Periwound Care, Wound barrier ointment applied 8/18/2020  8:50 PM           Physical Therapy Education                 Title: PT OT SLP Therapies (In Progress)     Topic: Physical Therapy (In Progress)     Point: Mobility training (Done)     Description:   Instruct learner(s) on safety and technique for assisting patient out of bed, chair or wheelchair.  Instruct in the proper use of assistive devices, such as walker, crutches, cane or brace.              Patient Friendly Description:   It's important to get you on your feet again, but we need to do so in a way that is safe for you. Falling has serious consequences, and your personal safety is the most important thing of all.        When it's time to get out of bed, one of us or a family member will sit next to you on the bed to give you support.     If your doctor or nurse tells you to use a walker, crutches, a cane, or a brace, be sure you use it every time you get out of bed, even if you think you don't need it.    Learning Progress Summary           Patient Acceptance, E,TB,D, VU,NR by  at 8/18/2020 3099    Comment:  pivot trans, [place]     Acceptance, E,TB,D, VU,NR by  at 8/17/2020 0937    Comment:  pivot trans    Acceptance, E, VU,NR by SUBHASH at 8/16/2020 0715    Comment:  Educated pt on progression of PT POC, benefits of activity, NWB L LE                   Point: Home exercise program (Not Started)     Description:   Instruct learner(s) on appropriate technique for monitoring, assisting and/or progressing patient with therapeutic exercises and activities.              Learner Progress:   Not documented in this visit.          Point: Body mechanics (Not Started)     Description:   Instruct learner(s) on proper positioning and spine alignment for patient and/or caregiver during mobility tasks and/or exercises.              Learner Progress:   Not documented in this visit.          Point: Precautions (Done)     Description:   Instruct learner(s) on prescribed precautions during mobility and gait tasks              Learning Progress Summary           Patient Acceptance, E, VU by  at 8/19/2020 1158    Comment:  WB status    Acceptance, E, VU,NR by SUBHASH at 8/16/2020 0715    Comment:  Educated pt on progression of PT POC, benefits of activity, NWB L LE                               User Key     Initials Effective Dates Name Provider Type Discipline     08/02/16 -  Milady Tena, PTA Physical Therapy Assistant PT    SUBHASH 08/02/16 -  Alexandre Moe, PT DPT Physical Therapist PT    WK 08/02/16 -  Cortney Mendiola PTA Physical Therapy Assistant PT                PT Recommendation and Plan     Plan of Care Reviewed With: patient  Progress: improving  Outcome Summary: Pt performed BLE exercises in the bed, trans to EOB min-mod assist, sit-stand min assist, pivot trans with rwx mod-max assist, pt unable to maintain NWB LLE. pt would benefit from SNF  Outcome Measures     Row Name 08/18/20 1000 08/17/20 1200          How much help from another is currently needed...    Putting on and taking off regular lower body clothing?  2  -CH (r) NA (t) CH (c)  2  -TS      Bathing (including washing, rinsing, and drying)  3  -CH (r) NA (t) CH (c)  2  -TS     Toileting (which includes using toilet bed pan or urinal)  2  -CH (r) NA (t) CH (c)  2  -TS     Putting on and taking off regular upper body clothing  3  -CH (r) NA (t) CH (c)  3  -TS     Taking care of personal grooming (such as brushing teeth)  4  -CH (r) NA (t) CH (c)  3  -TS     Eating meals  4  -CH (r) NA (t) CH (c)  4  -TS     AM-PAC 6 Clicks Score (OT)  18  -CH (r) NA (t)  16  -TS       User Key  (r) = Recorded By, (t) = Taken By, (c) = Cosigned By    Initials Name Provider Type     Kayla Gaona, OTR/L Occupational Therapist    Mally Agudelo, TREVIZO/L Occupational Therapy Assistant    Jia Cain OT Student         Time Calculation:   PT Charges     Row Name 08/19/20 1402 08/19/20 1158          Time Calculation    Start Time  1337  -  1126  -WK     Stop Time  1400  -AH  1158  -WK     Time Calculation (min)  23 min  -  32 min  -WK     PT Received On  --  08/19/20  -WK        Time Calculation- PT    Total Timed Code Minutes- PT  23 minute(s)  -AH  32 minute(s)  -WK        Timed Charges    60600 - PT Therapeutic Activity Minutes  23  -AH  --       User Key  (r) = Recorded By, (t) = Taken By, (c) = Cosigned By    Initials Name Provider Type     Milady Tena PTA Physical Therapy Assistant    WK Cortney Mendiola, BATSHEVA Physical Therapy Assistant        Therapy Charges for Today     Code Description Service Date Service Provider Modifiers Qty    63195767779 HC PT THER PROC EA 15 MIN 8/18/2020 Milady Tena, PTA GP 1    23541564780 HC PT THERAPEUTIC ACT EA 15 MIN 8/18/2020 Milady Tena, PTA GP 1    61343945215 HC PT THER PROC EA 15 MIN 8/18/2020 Milady Tena, PTA GP 1    40681846844 HC PT THERAPEUTIC ACT EA 15 MIN 8/18/2020 Milady Tena, PTA GP 1    41486318709 HC PT THERAPEUTIC ACT EA 15 MIN 8/19/2020 Milady Tena, PTA GP 2          PT G-Codes  Outcome Measure Options: AM-PAC 6 Clicks  Daily Activity (OT)  AM-PAC 6 Clicks Score (PT): 9  AM-PAC 6 Clicks Score (OT): 18    Milady Tena, PTA  8/19/2020

## 2020-08-19 NOTE — PROGRESS NOTES
Orthopedic Surgery Progress Note    Josi Mohamud  8/19/2020      Subjective:     Systemic or Specific Complaints: pain controlled, anticipation of d/c soon to SNF    Objective:     Patient Vitals for the past 24 hrs:   BP Temp Temp src Pulse Resp SpO2   08/19/20 0715 -- -- -- 83 18 96 %   08/19/20 0708 -- -- -- 84 18 94 %   08/19/20 0404 131/54 97.8 °F (36.6 °C) Oral 80 18 92 %   08/18/20 2235 115/45 98.8 °F (37.1 °C) Oral 82 16 91 %   08/18/20 1920 125/52 98.4 °F (36.9 °C) Oral 91 15 90 %   08/18/20 1919 -- -- -- 89 15 (!) 88 %   08/18/20 1524 120/52 98.5 °F (36.9 °C) Oral 81 18 90 %   08/18/20 0915 129/66 98.2 °F (36.8 °C) Oral 76 18 95 %       left lower  General: alert, appears stated age and cooperative   Wound: clean, dry, intact             Dressing: clean, dry, intact   Extremity: Distal NVI           DVT Exam: No evidence of DVT seen on physical exam.                   Data Review:  Lab Results (last 24 hours)     ** No results found for the last 24 hours. **        Imaging Results (Last 24 Hours)     ** No results found for the last 24 hours. **          Assessment:     POD# 4 I&D/ORIF L open aliza    Plan:      1:  DVT prophylaxis, ICE, elevate  2:  Pain control  3:  Physical therapy/Occupational therapy  4:  NWBLLE  5:  Ok to D/C when medically stable, ortho F/U in 2 weeks.       Keyshawn Medrano MD

## 2020-08-19 NOTE — PLAN OF CARE
Problem: Patient Care Overview  Goal: Plan of Care Review  Flowsheets  Taken 8/19/2020 1542 by Jia Prasad  Progress: improving (Pended)  Outcome Summary: OT tx completed. Pt. performs bed mobility by scooting and mobilizing from supine to sitting at EOB with CGA and vcs. Pt. participates in sponge bath at EOB with set up and min A. Pt. completes washing face/hands with setup and supervision. Pt. demonstrated decreased activity tolerance compared to last tx session d/t the difference in times (AM vs. PM). Continue OT POC. (Pended)  Taken 8/19/2020 1155 by Cortney Mendiola PTA  Plan of Care Reviewed With: patient

## 2020-08-19 NOTE — PLAN OF CARE
Problem: Patient Care Overview  Goal: Plan of Care Review  Outcome: Ongoing (interventions implemented as appropriate)  Flowsheets (Taken 8/19/2020 115)  Progress: improving  Plan of Care Reviewed With: patient  Outcome Summary: Pt stood min A with RW performed x3. Pt performed transfer from bed to chair with Mod A 1-2 HHA  and able to follow WB status. Pt performed seated  BLE AROM 20 reps.

## 2020-08-19 NOTE — PROGRESS NOTES
Continued Stay Note  [facility]     Patient Name: Josi Mohamud  MRN: [id number]  Today's Date: 8/19/2020    Admit Date: 8/15/2020    Discharge Plan     Row Name 08/19/20 1319       Plan    Plan Comments  Per Ghada at [facility], bed will be available tomorrow (8-20)    Final Discharge Disposition Code  03 - skilled nursing facility (SNF)    Row Name 08/19/20 1934       Plan    Plan Comments  Left message with admissions at [facility] regarding bed for tomorrow. Await return call.     Final Discharge Disposition Code  03 - skilled nursing facility (SNF)        Discharge Codes    No documentation.             FAMILIA Alvarenga

## 2020-08-19 NOTE — THERAPY TREATMENT NOTE
Acute Care - Occupational Therapy Treatment Note  [facility]     Patient Name: Josi Mohamud  : 1945  MRN: [id number]  Today's Date: 2020  Onset of Illness/Injury or Date of Surgery: 08/15/20  Date of Referral to OT: 08/15/20  Referring Physician: Dr. Medrano    Admit Date: 8/15/2020       ICD-10-CM ICD-9-CM   1. Type III open fracture of left ankle, initial encounter S82.892C 824.9   2. COPD (chronic obstructive pulmonary disease) case management patient (CMS/HCC) J44.9 496   3. Impaired mobility Z74.09 799.89   4. Decreased activities of daily living (ADL) Z78.9 V49.89     Patient Active Problem List   Diagnosis   • Syncope and collapse   • Coronary artery disease involving native coronary artery of native heart without angina pectoris   • Stented coronary artery   • Essential hypertension   • Abnormal positron emission tomography (PET) scan   • Gastroesophageal reflux disease   • Thyroid nodule   • Parotid neoplasm   • Anticoagulated   • Laryngopharyngeal reflux (LPR)   • Traumatic rhabdomyolysis (CMS/HCC)   • PAF (paroxysmal atrial fibrillation) (CMS/HCC)   • Malignant neoplasm of upper lobe of left lung (CMS/HCC)   • COPD, moderate (CMS/HCC)   • Persistent asthma without complication   • Overweight   • Cigarette nicotine dependence without complication   • Multiple thyroid nodules   • Hx of Hodgkin's lymphoma   • Dysfunction of both eustachian tubes   • Left chronic serous otitis media   • Morbidly obese (CMS/HCC)   • Tonsillar cyst   • Status post stereotactic radiosurgery   • Current every day smoker   • Renal cyst   • Cellulitis of right lower extremity   • COPD with acute exacerbation (CMS/HCC)   • Macrocytosis without anemia   • Obesity (BMI 30-39.9)   • Tobacco abuse   • Lower extremity edema, right side   • Leukocytosis   • Type III open fracture of left ankle   • Fall   • Left ankle pain     Past Medical History:   Diagnosis Date   • Bronchitis    • Cancer (CMS/HCC)     lung   • COPD  (chronic obstructive pulmonary disease) (CMS/HCC)    • Coronary artery disease    • GERD (gastroesophageal reflux disease)    • History of transfusion    • Hyperlipidemia    • Hypertension    • PAF (paroxysmal atrial fibrillation) (CMS/HCC)    • Persistent asthma without complication 7/8/2019   • Seizures (CMS/HCC)      Past Surgical History:   Procedure Laterality Date   • ANKLE OPEN REDUCTION INTERNAL FIXATION Left 8/15/2020    Procedure: ANKLE OPEN REDUCTION INTERNAL FIXATION;  Surgeon: Keyshawn Medrano MD;  Location: [facility] OR;  Service: Orthopedics;  Laterality: Left;   • APPENDECTOMY     • APPENDECTOMY     • CARDIAC CATHETERIZATION     • CARDIAC ELECTROPHYSIOLOGY PROCEDURE N/A 5/23/2017    Procedure: Loop insertion; LINQ;  Surgeon: Jose Monteiro MD;  Location:  [facility] LOCATION;  Service:    • CHOLECYSTECTOMY     • CORONARY ANGIOPLASTY WITH STENT PLACEMENT      X 1    • INCISION AND DRAINAGE OF WOUND Left 8/15/2020    Procedure: INCISION AND DRAINAGE WOUND;  Surgeon: Keyshawn Medrano MD;  Location: [facility] OR;  Service: Orthopedics;  Laterality: Left;   • LIVER BIOPSY     • MYRINGOTOMY W/ TUBES Left 11/22/2019    Procedure: LEFT MYRINGOTOMY WITH INSERTION OF EAR TUBE WITH RIGHT EAR EXAM UNDER ANESTHESIA;  Surgeon: Se Mcnulty MD;  Location: [facility] OR;  Service: ENT   • SPLENECTOMY     • US GUIDED FINE NEEDLE ASPIRATION  8/7/2018       Therapy Treatment    Rehabilitation Treatment Summary     Row Name 08/19/20 1447 08/19/20 1337 08/19/20 1126       Treatment Time/Intention    Discipline  occupational therapy assistant  -MARIA TERESA,NA,CH2  physical therapy assistant  -  physical therapy assistant  -WK    Document Type  therapy note (daily note)  -CH,NA,CH2  therapy note (daily note)  -[facility]  therapy note (daily note)  -WK    Subjective Information  complains of;pain  -CH,NA,CH2  complains of;pain;weakness  -2  complains of;pain  -WK    Mode of Treatment  occupational therapy  -MARIA TERESA,NA,CH2   --  --    Patient Effort  --  --  excellent  -WK2    Existing Precautions/Restrictions  fall;left;non-weight bearing  -CH,NA,CH2  fall;left;non-weight bearing NWB LLE  -AH2  fall;non-weight bearing LLE  -WK2    Recorded by [CH,NA,CH2] Kalya Gaona, OTR/L (r) Jia Prasad (t) Kayla Gaona, OTR/L (c) 08/19/20 1541 [AH] Milady Tena, PTA 08/19/20 1338  [AH2] Milady Tena, PTA 08/19/20 1402 [WK] Cortney Mendiola, PTA 08/19/20 1127  [WK2] Cortney Mendiola, PTA 08/19/20 1155    Row Name 08/19/20 1447             Cognitive Assessment/Intervention- PT/OT    Affect/Mental Status (Cognitive)  WFL  -CH,NA,CH2      Orientation Status (Cognition)  oriented x 4  -CH,NA,CH2      Follows Commands (Cognition)  WFL  -CH,NA,CH2      Cognitive Function (Cognitive)  WFL  -CH,NA,CH2      Personal Safety Interventions  safety round/check completed;elopement precautions initiated;fall prevention program maintained;gait belt;nonskid shoes/slippers when out of bed;supervised activity  -CH,NA,CH2      Recorded by [CH,NA,CH2] Kayla Gaona, OTR/L (r) Jia Prasad (t) Kayla Gaona, OTR/L (c) 08/19/20 1541      Row Name 08/19/20 1447             Safety Issues, Functional Mobility    Impairments Affecting Function (Mobility)  balance;endurance/activity tolerance;strength;postural/trunk control;pain  -CH,NA,CH2      Recorded by [CH,NA,CH2] Kayla Gaona, OTR/L (r) Jia Prasad (t) Kayla Gaona, OTR/L (c) 08/19/20 1541      Row Name 08/19/20 1337             Mobility Assessment/Intervention    Extremity Weight-bearing Status  left lower extremity  -AH      Left Lower Extremity (Weight-bearing Status)  non weight-bearing (NWB)  -AH      Recorded by [AH] Milady Tena, PTA 08/19/20 1402      Row Name 08/19/20 1447 08/19/20 1337 08/19/20 1126       Bed Mobility Assessment/Treatment    Bed Mobility Assessment/Treatment  scooting/bridging;supine-sit;sit-supine  -CH,NA,CH2  --  --    Scooting/Bridging [place] (Bed Mobility)   contact guard;verbal cues  -CH,NA,CH2  --  contact guard;verbal cues scoot to EOB   -WK    Supine-Sit [place] (Bed Mobility)  verbal cues;contact guard  -CH,NA,CH2  -- chair  -  verbal cues;contact guard;minimum assist (75% patient effort) assist with LLE   -WK    Sit-Supine [place] (Bed Mobility)  minimum assist (75% patient effort);2 person assist;verbal cues  -CH,NA,CH2  minimum assist (75% patient effort);2 person assist;verbal cues  -  --    Assistive Device (Bed Mobility)  bed rails;draw sheet  -CH,NA,CH2  --  --    Recorded by [CH,NA,CH2] Kayla Gaona, OTR/L (r) Jia Prasad (t) Kayla Gaona, OTR/L (c) 08/19/20 1541 [AH] Milady Tena, PTA 08/19/20 1402 [WK] Cortney Mendiola, PTA 08/19/20 1155    Row Name 08/19/20 1447             Functional Mobility    Functional Mobility- Ind. Level  unable to perform  -CH,NA,CH2      Recorded by [CH,NA,CH2] Kayla Gaona, OTR/L (r) Jia Prasad (t) Kayla Gaona, OTR/L (c) 08/19/20 1541      Row Name 08/19/20 1337             Transfer Assessment/Treatment    Transfer Assessment/Treatment  stand pivot/stand step transfer  -      Recorded by [AH] Milady Tena, PTA 08/19/20 1402      Row Name 08/19/20 1337 08/19/20 1126          Sit-Stand Transfer    Sit-Stand [place] (Transfers)  minimum assist (75% patient effort);verbal cues;moderate assist (50% patient effort);2 person assist  -  minimum assist (75% patient effort) stood 3  -WK     Assistive Device (Sit-Stand Transfers)  --  walker, front-wheeled  -WK     Recorded by [AH] Milady Tena, PTA 08/19/20 1402 [WK] Cortney Mendiola, PTA 08/19/20 1155     Row Name 08/19/20 1337 08/19/20 1126          Stand-Sit Transfer    Stand-Sit [place] (Transfers)  minimum assist (75% patient effort);verbal cues  -  minimum assist (75% patient effort)  -WK     Assistive Device (Stand-Sit Transfers)  --  walker, front-wheeled  -WK     Recorded by [] Milady Tena, PTA 08/19/20 3465 [WK] Marlena  Cortney ORR, PTA 08/19/20 1155     Row Name 08/19/20 1337 08/19/20 1126          Stand Pivot/Stand Step Transfer    Stand Pivot/Stand Step [place]  moderate assist (50% patient effort);verbal cues;2 person assist;maximum assist (25% patient effort)  -  minimum assist (75% patient effort);moderate assist (50% patient effort);2 person assist followed WB during transfer  -WK     Assistive Device (Stand Pivot Stand Step Transfer)  --  -- HHA   -WK     Recorded by [AH] Milady Tena, PTA 08/19/20 1402 [WK] Cortney Mendiola, PTA 08/19/20 1155     Row Name 08/19/20 1447             ADL Assessment/Intervention    BADL Assessment/Intervention  bathing;grooming  -CH,NA,CH2      Recorded by [CH,NA,CH2] Kayla Gaona, OTR/L (r) Jia Prasad (t) Kayla Gaona, OTR/L (c) 08/19/20 1541      Row Name 08/19/20 1447             Bathing Assessment/Intervention    Bathing [place] Level  minimum assist (75% patient effort);set up;lower body;upper body  -CH,NA,CH2      Bathing Position  edge of bed sitting  -CH,NA,CH2      Recorded by [CH,NA,CH2] Kayla Gaona, OTR/L (r) Jia Prasad (t) Kayla Gaona, OTR/L (c) 08/19/20 1541      Row Name 08/19/20 1447             Upper Body Dressing Assessment/Training    Upper Body Dressing [place] Level  doff;supervision;don;set up  -CH,NA,CH2      Upper Body Dressing Position  edge of bed sitting  -CH,NA,CH2      Recorded by [CH,NA,CH2] Kayla Gaona, OTR/L (r) Jia Prasad (t) Kayla Gaona, OTR/L (c) 08/19/20 1541      Row Name 08/19/20 1447             Grooming Assessment/Training    [place] Level (Grooming)  wash face, hands;set up;supervision  -CH,NA,CH2      Grooming Position  edge of bed sitting  -CH,NA,CH2      Recorded by [CH,NA,CH2] Kalya Goana, OTR/L (r) Jia Prasad (t) Kayla Gaona, OTR/L (c) 08/19/20 1541      Row Name 08/19/20 1447             BADL Safety/Performance    Impairments, BADL Safety/Performance  balance;endurance/activity  tolerance;strength;pain  -CH,NA,CH2      Skilled BADL Treatment/Intervention  BADL process/adaptation training  -CH,NA,CH2      Progress in BADL Status  effort and initiation  -CH,NA,CH2      Recorded by [CH,NA,CH2] Kayla Gaona OTR/L (r) Jia Prasad (t) Kayla Gaona, OTR/L (c) 08/19/20 1541      Row Name 08/19/20 1337 08/19/20 1126          Therapeutic Exercise    Lower Extremity (Therapeutic Exercise)  LAQ (long arc quad), bilateral;marching while seated  -AH  gluteal sets;LAQ (long arc quad), bilateral;marching while seated  -WK     Lower Extremity Range of Motion (Therapeutic Exercise)  hip abduction/adduction, bilateral;ankle dorsiflexion/plantar flexion, right  -AH  hip abduction/adduction, bilateral;ankle dorsiflexion/plantar flexion, right  -WK     Exercise Type (Therapeutic Exercise)  --  AROM (active range of motion)  -WK     Position (Therapeutic Exercise)  --  seated  -WK     Sets/Reps (Therapeutic Exercise)  20  -AH  20  -WK     Recorded by [AH] Milady Tena, PTA 08/19/20 1402 [WK] Cortney Mendiola, PTA 08/19/20 1155     Row Name 08/19/20 1447 08/19/20 1126          Static Sitting Balance    Level of [place] (Unsupported Sitting, Static Balance)  independent  -CH,NA,CH2  independent  -WK     Sitting Position (Unsupported Sitting, Static Balance)  sitting on edge of bed  -CH,NA,CH2  sitting on edge of bed  -WK     Recorded by [CH,NA,CH2] Kayla Gaona OTR/MARYJO (r) Jia Prasad (t) Kayla Gaona, OTR/L (c) 08/19/20 1541 [WK] Cortney Mendiola, PTA 08/19/20 1155     Row Name 08/19/20 1126             Static Standing Balance    Level of [place] (Supported Standing, Static Balance)  contact guard assist  -WK      Assistive Device Utilized (Supported Standing, Static Balance)  walker, rolling  -WK      Recorded by [WK] Cortney Mendiola, PTA 08/19/20 1155      Row Name 08/19/20 1447 08/19/20 1337 08/19/20 1126       Positioning and Restraints    Pre-Treatment Position  in bed  -CH,NA,CH2   sitting in chair/recliner  -AH  in bed  -WK    Post Treatment Position  bed  -CH,NA,CH2  bed  -AH  chair  -WK    In Bed  side lying right;call light within reach;encouraged to call for assist;exit alarm on  -CH,NA,CH2  fowlers;call light within reach;encouraged to call for assist;SCD pump applied;LLE elevated  -AH  --    In Chair  --  --  sitting;call light within reach;encouraged to call for assist  -WK    Recorded by [CH,NA,CH2] Kayla Gaona, OTR/L (r) Jia Prasad (t) Kayla Gaona, OTR/L (c) 08/19/20 1541 [AH] Milady Tena, PTA 08/19/20 1402 [WK] Cortney Mendiola, PTA 08/19/20 1155    Row Name 08/19/20 1447 08/19/20 1337 08/19/20 1126       Pain Scale: Numbers Pre/Post-Treatment    Pain Scale: Numbers, Pretreatment  6/10  -CH,NA,CH2  6/10  -AH  0/10 - no pain  -WK    Pain Scale: Numbers, Post-Treatment  6/10  -CH,NA,CH2  6/10  -AH  5/10  -WK    Pain Location - Side  Left  -CH,NA,CH2  Left  -AH  Left  -WK    Pain Location  ankle  -CH,NA,CH2  ankle  -AH  ankle  -WK    Pain Intervention(s)  Repositioned  -CH,NA,CH2  Repositioned  -AH  Repositioned  -WK    Recorded by [CH,NA,CH2] Kayla Gaona, OTR/L (r) Jia Prasad (t) Kayla Gaona, OTR/L (c) 08/19/20 1541 [AH] Milady Tean, PTA 08/19/20 1402 [WK] Cortney Mendiola, PTA 08/19/20 1155    Row Name                Wound 08/15/20 1416 Left ankle Incision    Wound - Properties Group Date first assessed: 08/15/20 [HW] Time first assessed: 1416 [HW] Present on Hospital Admission: N [HW] Side: Left [HW] Location: ankle [HW] Primary Wound Type: Incision [HW] Recorded by:  [HW] Blaine Degroot RN 08/15/20 1416    Row Name                Wound 08/16/20 0800 coccyx Fissure    Wound - Properties Group Date first assessed: 08/16/20 [HS] Time first assessed: 0800 [HS] Present on Hospital Admission: Y [HS] Location: coccyx [HS] Primary Wound Type: Fissure [HS] Recorded by:  [HS] Gabriella Sunshine RN 08/18/20 1102    Row Name 08/19/20 1447             Outcome  Summary/Treatment Plan (OT)    Daily Summary of Progress (OT)  progress toward functional goals is good  -CH,NA,CH2      Plan for Continued Treatment (OT)  Continue OT POC  -CH,NA,CH2      Recorded by [CH,NA,CH2] Kayla Gaona, OTR/L (r) Jia Prasad (t) Kayla Gaona, OTR/L (c) 08/19/20 1541        User Key  (r) = Recorded By, (t) = Taken By, (c) = Cosigned By    Initials Name Effective Dates Discipline     iMlady Tena, PTA 08/02/16 -  PT    Kayla Sullivan, OTR/L 07/05/20 -  OT    WK Cortney Mendiola, PTA 08/02/16 -  PT    HS Gabriella Sunshine, RN 12/27/16 -  Nurse    Blaine Stack RN 06/05/20 -  Nurse    Jia Cain 08/13/20 -  OT        Wound 08/15/20 1416 Left ankle Incision (Active)   Dressing Appearance dry;intact 8/19/2020 10:08 AM   Closure DAPHNE 8/19/2020 10:08 AM   Base dressing in place, unable to visualize 8/19/2020 10:08 AM   Drainage Amount none 8/19/2020 10:08 AM       Wound 08/16/20 0800 coccyx Fissure (Active)   Dressing Appearance open to air 8/19/2020 10:08 AM   Drainage Amount none 8/18/2020  8:50 PM   Care, Wound barrier applied 8/18/2020  8:50 PM   Dressing Care, Wound open to air 8/18/2020  8:50 PM   Periwound Care, Wound barrier ointment applied 8/18/2020  8:50 PM       Occupational Therapy Education                 Title: PT OT SLP Therapies (In Progress)     Topic: Occupational Therapy (In Progress)     Point: ADL training (Done)     Description:   Instruct learner(s) on proper safety adaptation and remediation techniques during self care or transfers.   Instruct in proper use of assistive devices.              Learning Progress Summary           Patient Acceptance, E, VU by MARTI at 8/16/2020 1204                   Point: Home exercise program (Not Started)     Description:   Instruct learner(s) on appropriate technique for monitoring, assisting and/or progressing therapeutic exercises/activities.              Learner Progress:   Not documented in this visit.           Point: Precautions (Done)     Description:   Instruct learner(s) on prescribed precautions during self-care and functional transfers.              Learning Progress Summary           Patient Acceptance, E, VU by MARTI at 8/16/2020 1204                   Point: Body mechanics (Done)     Description:   Instruct learner(s) on proper positioning and spine alignment during self-care, functional mobility activities and/or exercises.              Learning Progress Summary           Patient Acceptance, E, VU by MARTI at 8/16/2020 1204                               User Key     Initials Effective Dates Name Provider Type Discipline    MARTI 07/05/20 -  Kita Xiong OTR/L Occupational Therapist OT                OT Recommendation and Plan  Outcome Summary/Treatment Plan (OT)  Daily Summary of Progress (OT): progress toward functional goals is good  Plan for Continued Treatment (OT): Continue OT POC  Anticipated Discharge Disposition (OT): skilled nursing facility(pt. plans to attend rehab)  Daily Summary of Progress (OT): progress toward functional goals is good  Outcome Summary: (P) OT tx completed. Pt. performs bed mobility by scooting and mobilizing from supine to sitting at EOB with CGA and vcs. Pt. participates in sponge bath at EOB with set up and min A. Pt. completes washing face/hands with setup and supervision. Pt. demonstrated decreased activity tolerance compared to last tx session d/t the difference in times (AM vs. PM). Continue OT POC.  Outcome Measures     Row Name 08/18/20 1000 08/17/20 1200          How much help from another is currently needed...    Putting on and taking off regular lower body clothing?  2  -CH (r) NA (t) CH (c)  2  -TS     Bathing (including washing, rinsing, and drying)  3  -CH (r) NA (t) CH (c)  2  -TS     Toileting (which includes using toilet bed pan or urinal)  2  -CH (r) NA (t) CH (c)  2  -TS     Putting on and taking off regular upper body clothing  3  -CH (r) NA (t) CH (c)  3  -TS      Taking care of personal grooming (such as brushing teeth)  4  -CH (r) NA (t) CH (c)  3  -TS     Eating meals  4  -CH (r) NA (t) CH (c)  4  -TS     AM-PAC 6 Clicks Score (OT)  18  -CH (r) NA (t)  16  -TS       User Key  (r) = Recorded By, (t) = Taken By, (c) = Cosigned By    Initials Name Provider Type    Kayla Sullivan, OTR/L Occupational Therapist    Mally Agudelo, TREVIZO/L Occupational Therapy Assistant    Jia Cain OT Student           Time Calculation:   Time Calculation- OT     Row Name 08/19/20 1532             Time Calculation- OT    OT Start Time  1447  -CH (r) NA (t) CH (c)      OT Stop Time  1513  -CH (r) NA (t) CH (c)      OT Time Calculation (min)  26 min  -CH (r) NA (t)      Total Timed Code Minutes- OT  26 minute(s)  -CH (r) NA (t) CH (c)      OT Received On  08/19/20  -CH (r) NA (t) CH (c)         Timed Charges    31488 - OT Self Care/Mgmt Minutes  26  -CH (r) NA (t) CH (c)        User Key  (r) = Recorded By, (t) = Taken By, (c) = Cosigned By    Initials Name Provider Type    Kayla Sullivan, OTR/L Occupational Therapist    Jia Cain OT Student        Therapy Charges for Today     Code Description Service Date Service Provider Modifiers Qty    02288295284 HC OT SELF CARE/MGMT/TRAIN EA 15 MIN 8/18/2020 Jia Prasad 6    13548287032 HC OT SELF CARE/MGMT/TRAIN EA 15 MIN 8/19/2020 Jia Prasad 2               PATI Azar Student  8/19/2020

## 2020-08-20 VITALS
BODY MASS INDEX: 31.29 KG/M2 | WEIGHT: 183.3 LBS | HEART RATE: 92 BPM | TEMPERATURE: 98.2 F | SYSTOLIC BLOOD PRESSURE: 158 MMHG | OXYGEN SATURATION: 94 % | HEIGHT: 64 IN | RESPIRATION RATE: 18 BRPM | DIASTOLIC BLOOD PRESSURE: 61 MMHG

## 2020-08-20 PROCEDURE — 94799 UNLISTED PULMONARY SVC/PX: CPT

## 2020-08-20 PROCEDURE — 97535 SELF CARE MNGMENT TRAINING: CPT

## 2020-08-20 PROCEDURE — 97110 THERAPEUTIC EXERCISES: CPT

## 2020-08-20 RX ORDER — ACETAMINOPHEN 325 MG/1
650 TABLET ORAL EVERY 4 HOURS PRN
Status: ON HOLD
Start: 2020-08-20 | End: 2021-01-01

## 2020-08-20 RX ORDER — PSEUDOEPHEDRINE HCL 30 MG
100 TABLET ORAL 2 TIMES DAILY PRN
Start: 2020-08-20

## 2020-08-20 RX ADMIN — FUROSEMIDE 40 MG: 40 TABLET ORAL at 08:17

## 2020-08-20 RX ADMIN — METOPROLOL SUCCINATE 50 MG: 50 TABLET, EXTENDED RELEASE ORAL at 08:17

## 2020-08-20 RX ADMIN — IPRATROPIUM BROMIDE 0.5 MG: 0.5 SOLUTION RESPIRATORY (INHALATION) at 06:52

## 2020-08-20 RX ADMIN — AMLODIPINE BESYLATE 5 MG: 5 TABLET ORAL at 08:17

## 2020-08-20 RX ADMIN — APIXABAN 5 MG: 5 TABLET, FILM COATED ORAL at 08:17

## 2020-08-20 RX ADMIN — IPRATROPIUM BROMIDE 0.5 MG: 0.5 SOLUTION RESPIRATORY (INHALATION) at 10:50

## 2020-08-20 RX ADMIN — LEVETIRACETAM 500 MG: 500 TABLET, FILM COATED ORAL at 08:18

## 2020-08-20 RX ADMIN — LORAZEPAM 1 MG: 1 TABLET ORAL at 16:12

## 2020-08-20 RX ADMIN — ASPIRIN 81 MG: 81 TABLET ORAL at 08:18

## 2020-08-20 RX ADMIN — HYDROCODONE BITARTRATE AND ACETAMINOPHEN 1 TABLET: 7.5; 325 TABLET ORAL at 08:16

## 2020-08-20 RX ADMIN — IPRATROPIUM BROMIDE 0.5 MG: 0.5 SOLUTION RESPIRATORY (INHALATION) at 19:05

## 2020-08-20 RX ADMIN — GUAIFENESIN 1200 MG: 600 TABLET, EXTENDED RELEASE ORAL at 08:17

## 2020-08-20 RX ADMIN — HYDROCODONE BITARTRATE AND ACETAMINOPHEN 1 TABLET: 7.5; 325 TABLET ORAL at 18:33

## 2020-08-20 RX ADMIN — NICOTINE 1 PATCH: 21 PATCH, EXTENDED RELEASE TRANSDERMAL at 08:19

## 2020-08-20 RX ADMIN — IPRATROPIUM BROMIDE 0.5 MG: 0.5 SOLUTION RESPIRATORY (INHALATION) at 14:20

## 2020-08-20 RX ADMIN — SODIUM CHLORIDE, PRESERVATIVE FREE 3 ML: 5 INJECTION INTRAVENOUS at 08:19

## 2020-08-20 RX ADMIN — LORAZEPAM 1 MG: 1 TABLET ORAL at 08:17

## 2020-08-20 RX ADMIN — LISINOPRIL 20 MG: 20 TABLET ORAL at 08:17

## 2020-08-20 NOTE — THERAPY TREATMENT NOTE
Acute Care - Occupational Therapy Treatment Note  [facility]     Patient Name: Josi Mohamud  : 1945  MRN: [id number]  Today's Date: 2020  Onset of Illness/Injury or Date of Surgery: 08/15/20  Date of Referral to OT: 08/15/20  Referring Physician: Dr. Medrano    Admit Date: 8/15/2020       ICD-10-CM ICD-9-CM   1. Type III open fracture of left ankle, initial encounter S82.892C 824.9   2. COPD (chronic obstructive pulmonary disease) case management patient (CMS/HCC) J44.9 496   3. Impaired mobility Z74.09 799.89   4. Decreased activities of daily living (ADL) Z78.9 V49.89     Patient Active Problem List   Diagnosis   • Syncope and collapse   • Coronary artery disease involving native coronary artery of native heart without angina pectoris   • Stented coronary artery   • Essential hypertension   • Abnormal positron emission tomography (PET) scan   • Gastroesophageal reflux disease   • Thyroid nodule   • Parotid neoplasm   • Anticoagulated   • Laryngopharyngeal reflux (LPR)   • Traumatic rhabdomyolysis (CMS/HCC)   • PAF (paroxysmal atrial fibrillation) (CMS/HCC)   • Malignant neoplasm of upper lobe of left lung (CMS/HCC)   • COPD, moderate (CMS/HCC)   • Persistent asthma without complication   • Overweight   • Cigarette nicotine dependence without complication   • Multiple thyroid nodules   • Hx of Hodgkin's lymphoma   • Dysfunction of both eustachian tubes   • Left chronic serous otitis media   • Morbidly obese (CMS/HCC)   • Tonsillar cyst   • Status post stereotactic radiosurgery   • Current every day smoker   • Renal cyst   • Cellulitis of right lower extremity   • COPD with acute exacerbation (CMS/HCC)   • Macrocytosis without anemia   • Obesity (BMI 30-39.9)   • Tobacco abuse   • Lower extremity edema, right side   • Leukocytosis   • Type III open fracture of left ankle   • Fall   • Left ankle pain     Past Medical History:   Diagnosis Date   • Bronchitis    • Cancer (CMS/HCC)     lung   • COPD  (chronic obstructive pulmonary disease) (CMS/HCC)    • Coronary artery disease    • GERD (gastroesophageal reflux disease)    • History of transfusion    • Hyperlipidemia    • Hypertension    • PAF (paroxysmal atrial fibrillation) (CMS/HCC)    • Persistent asthma without complication 7/8/2019   • Seizures (CMS/HCC)      Past Surgical History:   Procedure Laterality Date   • ANKLE OPEN REDUCTION INTERNAL FIXATION Left 8/15/2020    Procedure: ANKLE OPEN REDUCTION INTERNAL FIXATION;  Surgeon: Keyshawn Medrano MD;  Location: [facility] OR;  Service: Orthopedics;  Laterality: Left;   • APPENDECTOMY     • APPENDECTOMY     • CARDIAC CATHETERIZATION     • CARDIAC ELECTROPHYSIOLOGY PROCEDURE N/A 5/23/2017    Procedure: Loop insertion; LINQ;  Surgeon: Jose Monteiro MD;  Location:  [facility]IVE LOCATION;  Service:    • CHOLECYSTECTOMY     • CORONARY ANGIOPLASTY WITH STENT PLACEMENT      X 1    • INCISION AND DRAINAGE OF WOUND Left 8/15/2020    Procedure: INCISION AND DRAINAGE WOUND;  Surgeon: Keyshawn Medrano MD;  Location: [facility] OR;  Service: Orthopedics;  Laterality: Left;   • LIVER BIOPSY     • MYRINGOTOMY W/ TUBES Left 11/22/2019    Procedure: LEFT MYRINGOTOMY WITH INSERTION OF EAR TUBE WITH RIGHT EAR EXAM UNDER ANESTHESIA;  Surgeon: Se Mcnulty MD;  Location: [facility] OR;  Service: ENT   • SPLENECTOMY     • US GUIDED FINE NEEDLE ASPIRATION  8/7/2018       Therapy Treatment    Rehabilitation Treatment Summary     Row Name 08/20/20 1023 08/20/20 0929          Treatment Time/Intention    Discipline  physical therapy assistant  -  occupational therapy assistant  -TS,NA,TS2     Document Type  --  therapy note (daily note)  -TS,NA,TS2     Subjective Information  --  complains of;pain  -TS,NA,TS2     Mode of Treatment  --  occupational therapy  -TS,NA,TS2     Patient Effort  --  good  -TS,NA,TS2     Comment  with OT  -2  --     Existing Precautions/Restrictions  --  fall;left;non-weight bearing   -TS,NA,TS2     Recorded by [AH] Milady Tena, PTA 08/20/20 1024  [AH2] Milady Tena, PTA 08/20/20 1026 [TS,NA,TS2] Mally Scherer COTA/MARYJO (r) Jia Prasad (t) Mally Scherer TREVIZO/L (c) 08/20/20 1338     Row Name 08/20/20 0929             Cognitive Assessment/Intervention- PT/OT    Affect/Mental Status (Cognitive)  WFL  -TS,NA,TS2      Orientation Status (Cognition)  oriented x 4  -TS,NA,TS2      Follows Commands (Cognition)  WFL  -TS,NA,TS2      Cognitive Function (Cognitive)  WFL  -TS,NA,TS2      Personal Safety Interventions  safety round/check completed;elopement precautions initiated;fall prevention program maintained;supervised activity  -TS,NA,TS2      Recorded by [TS,NA,TS2] Mally Scherer COTA/MARYJO (r) Jia Prasad (t) Mally Scherer, TREVIZO/L (c) 08/20/20 1338      Row Name 08/20/20 0929             Bed Mobility Assessment/Treatment    Bed Mobility Assessment/Treatment  scooting/bridging;supine-sit;sit-supine;rolling left;rolling right  -TS,NA,TS2      Rolling Left Sublimity (Bed Mobility)  verbal cues;conditional independence  (Pended)   -NA2      Rolling Right Sublimity (Bed Mobility)  verbal cues;conditional independence  (Pended)   -NA2      Scooting/Bridging Sublimity (Bed Mobility)  verbal cues;contact guard  -TS,NA,TS2      Supine-Sit Sublimity (Bed Mobility)  verbal cues;minimum assist (75% patient effort)  -TS,NA,TS2      Sit-Supine Sublimity (Bed Mobility)  verbal cues;contact guard  -TS,NA,TS2      Bed Mobility, Safety Issues  decreased use of legs for bridging/pushing  -TS,NA,TS2      Assistive Device (Bed Mobility)  bed rails  -TS,NA,TS2      Recorded by [NA2] Jia Prasad 08/20/20 1338  [TS,NA,TS2] Mally Scherer COTA/MARYJO (r) Jia Prasad (t) Mally Scherer COTA/L (c) 08/20/20 1338      Row Name 08/20/20 0982             Functional Mobility    Functional Mobility- Ind. Level  unable to perform  -TS,NA,TS2      Recorded by [TS,NA,TS2]  Mally Scherer, TRVEIZO/L (r) Jia Prasad (t) Mally Scherer, TREVIZO/L (c) 08/20/20 1338      Row Name 08/20/20 0929             ADL Assessment/Intervention    BADL Assessment/Intervention  bathing;grooming;upper body dressing;lower body dressing  -TS,NA,TS2      Recorded by [TS,NA,TS2] Mally Scherer, TREVIZO/L (r) Jia Prasad (t) Mally Scherer, TREVIZO/L (c) 08/20/20 1338      Row Name 08/20/20 0929             Bathing Assessment/Intervention    Bathing [place] Level  minimum assist (75% patient effort);set up;lower body;upper body  -TS,NA,TS2      Bathing Position  edge of bed sitting  -TS,NA,TS2      Recorded by [TS,NA,TS2] Mally Scherer, TREVIZO/L (r) Jia Prasad (t) Mally Scherer, TREVIZO/L (c) 08/20/20 1338      Row Name 08/20/20 0929             Upper Body Dressing Assessment/Training    Upper Body Dressing [place] Level  doff;supervision;don;set up  -TS,NA,TS2      Upper Body Dressing Position  edge of bed sitting  -TS,NA,TS2      Recorded by [TS,NA,TS2] Mally Scherer, TREVIZO/L (r) Jia Prasad (t) Mally Scherer, TREVIZO/L (c) 08/20/20 1338      Row Name 08/20/20 0929             Lower Body Dressing Assessment/Training    Lower Body Dressing [place] Level  don;undergarment;pants/bottoms;set up;maximum assist (25% patient effort)  -TS,NA,TS2      Lower Body Dressing Position  supine  -TS,NA,TS2      Comment (Lower Body Dressing)  don pants in supine d/t fatigue  -TS,NA,TS2      Recorded by [TS,NA,TS2] Mally Scherer, TREVIZO/L (r) Jia Prasad (t) Mally Scherer, TREVIZO/L (c) 08/20/20 1338      Row Name 08/20/20 0929             Grooming Assessment/Training    [place] Level (Grooming)  hair care, combing/brushing;set up  -TS,NA,TS2      Grooming Position  edge of bed sitting  -TS,NA,TS2      Recorded by [TS,NA,TS2] Mally Scherer COTA/MARYJO (r) Jia Prasad (t) Mally Scherer COTA/L (c) 08/20/20 1338      Row Name 08/20/20 0994              BADL Safety/Performance    Impairments, BADL Safety/Performance  balance;endurance/activity tolerance;strength;pain  -TS,NA,TS2      Skilled BADL Treatment/Intervention  BADL process/adaptation training  -TS,NA,TS2      Progress in BADL Status  effort and initiation  -TS,NA,TS2      Recorded by [TS,NA,TS2] Mally Scherer, TREVIZO/L (r) Jia Prasad (t) Mally Scherer, TREVIZO/L (c) 08/20/20 1338      Row Name 08/20/20 0929             Static Sitting Balance    Level of [place] (Unsupported Sitting, Static Balance)  supervision  -TS,NA,TS2      Sitting Position (Unsupported Sitting, Static Balance)  sitting on edge of bed  -TS,NA,TS2      Recorded by [TS,NA,TS2] Mally Scherer, TREVIZO/L (r) Jia Prasad (t) Mally Scherer, TREVIZO/L (c) 08/20/20 1338      Row Name 08/20/20 0929             Positioning and Restraints    Pre-Treatment Position  in bed  -TS,NA,TS2      Post Treatment Position  bed  -TS,NA,TS2      In Bed  fowlers;call light within reach;encouraged to call for assist;exit alarm on  -TS,NA,TS2      Recorded by [TS,NA,TS2] Mally Scherer, TREVIZO/L (r) Jia Prasad (t) Mally Scherer, TREVIZO/L (c) 08/20/20 1338      Row Name 08/20/20 0929             Pain Scale: Numbers Pre/Post-Treatment    Pain Scale: Numbers, Pretreatment  6/10  -TS,NA,TS2      Pain Scale: Numbers, Post-Treatment  6/10  -TS,NA,TS2      Pain Location - Side  Left  -TS,NA,TS2      Pain Location  ankle  -TS,NA,TS2      Pain Intervention(s)  Repositioned  -TS,NA,TS2      Recorded by [TS,NA,TS2] Mally Scherer, TREVIZO/L (r) Jia Prasad (t) Mally Scherer, TREVIZO/L (c) 08/20/20 1338      Row Name                Wound 08/15/20 1416 Left ankle Incision    Wound - Properties Group Date first assessed: 08/15/20 [HW] Time first assessed: 1416 [HW] Present on Hospital Admission: N [HW] Side: Left [HW] Location: ankle [HW] Primary Wound Type: Incision [HW] Recorded by:  [HW] Blaine Degroot, RN  08/15/20 1416    Row Name                Wound 08/16/20 0800 coccyx Fissure    Wound - Properties Group Date first assessed: 08/16/20 [HS] Time first assessed: 0800 [HS] Present on Hospital Admission: Y [HS] Location: coccyx [HS] Primary Wound Type: Fissure [HS] Recorded by:  [HS] Gabriella Sunshine RN 08/18/20 1102    Row Name 08/20/20 0929             Outcome Summary/Treatment Plan (OT)    Daily Summary of Progress (OT)  progress toward functional goals is good  -TS,NA,TS2      Plan for Continued Treatment (OT)  Continue OT POC  -TS,NA,TS2      Anticipated Discharge Disposition (OT)  skilled nursing facility  -TS,NA,TS2      Recorded by [TS,NA,TS2] Mally Scherer TREVIZO/L (r) Jia Prasad (t) Mally Scherer, TREVIZO/L (c) 08/20/20 1338        User Key  (r) = Recorded By, (t) = Taken By, (c) = Cosigned By    Initials Name Effective Dates Discipline     Milady Tena, PTA 08/02/16 -  PT    TS Mally Scherer TREVIZO/L 08/02/16 -  OT    HS Gabriella Sunshine RN 12/27/16 -  Nurse     Blaine Degroot RN 06/05/20 -  Nurse    Jia Cain 08/13/20 -  OT        Wound 08/15/20 1416 Left ankle Incision (Active)   Dressing Appearance dry;intact 8/20/2020  8:16 AM   Closure DAPHNE 8/20/2020  8:16 AM   Base dressing in place, unable to visualize 8/20/2020  8:16 AM   Drainage Amount none 8/20/2020  8:16 AM   Dressing Care, Wound gauze;elastic bandage 8/20/2020  8:16 AM       Wound 08/16/20 0800 coccyx Fissure (Active)   Dressing Appearance open to air 8/20/2020  8:16 AM   Base pink;blanchable 8/20/2020  8:16 AM   Periwound blanchable;warm 8/20/2020  8:16 AM   Periwound Temperature warm 8/20/2020  8:16 AM   Periwound Skin Turgor soft 8/20/2020  8:16 AM   Drainage Amount none 8/20/2020  8:16 AM   Care, Wound barrier applied 8/20/2020  8:16 AM   Dressing Care, Wound open to air 8/20/2020  8:16 AM       Occupational Therapy Education                 Title: PT OT SLP Therapies (In Progress)     Topic:  Occupational Therapy (In Progress)     Point: ADL training (Done)     Description:   Instruct learner(s) on proper safety adaptation and remediation techniques during self care or transfers.   Instruct in proper use of assistive devices.              Learning Progress Summary           Patient Acceptance, E, VU by  at 8/16/2020 1204                   Point: Home exercise program (Not Started)     Description:   Instruct learner(s) on appropriate technique for monitoring, assisting and/or progressing therapeutic exercises/activities.              Learner Progress:   Not documented in this visit.          Point: Precautions (Done)     Description:   Instruct learner(s) on prescribed precautions during self-care and functional transfers.              Learning Progress Summary           Patient Acceptance, E, VU by MARTI at 8/16/2020 1204                   Point: Body mechanics (Done)     Description:   Instruct learner(s) on proper positioning and spine alignment during self-care, functional mobility activities and/or exercises.              Learning Progress Summary           Patient Acceptance, E, VU by  at 8/16/2020 1204                               User Key     Initials Effective Dates Name Provider Type Discipline    JOE 07/05/20 -  Kita Xiong OTR/L Occupational Therapist OT                OT Recommendation and Plan  Outcome Summary/Treatment Plan (OT)  Daily Summary of Progress (OT): progress toward functional goals is good  Plan for Continued Treatment (OT): Continue OT POC  Anticipated Discharge Disposition (OT): skilled nursing facility  Daily Summary of Progress (OT): progress toward functional goals is good  Outcome Summary: OT tx completed. Pt. performs bed mobility by scooting and mobilizing from supine to sitting at EOB with CGA and vcs. Pt. participates in sponge bath at EOB with set up and min A. Pt. completes washing face/hands with setup and supervision. Pt. demonstrated decreased activity  tolerance compared to last tx session d/t the difference in times (AM vs. PM). Continue OT POC.  Outcome Measures     Row Name 08/20/20 1000 08/18/20 1000          How much help from another is currently needed...    Putting on and taking off regular lower body clothing?  1  -TS (r) NA (t) TS (c)  2  -CH (r) NA (t) CH (c)     Bathing (including washing, rinsing, and drying)  3  -TS (r) NA (t) TS (c)  3  -CH (r) NA (t) CH (c)     Toileting (which includes using toilet bed pan or urinal)  2  -TS (r) NA (t) TS (c)  2  -CH (r) NA (t) CH (c)     Putting on and taking off regular upper body clothing  3  -TS (r) NA (t) TS (c)  3  -CH (r) NA (t) CH (c)     Taking care of personal grooming (such as brushing teeth)  3  -TS (r) NA (t) TS (c)  4  -CH (r) NA (t) CH (c)     Eating meals  4  -TS (r) NA (t) TS (c)  4  -CH (r) NA (t) CH (c)     AM-PAC 6 Clicks Score (OT)  16  -TS (r) NA (t)  18  -CH (r) NA (t)       User Key  (r) = Recorded By, (t) = Taken By, (c) = Cosigned By    Initials Name Provider Type    CH Kayla Gaona, OTR/L Occupational Therapist    Mally Agudelo, TREVIZO/L Occupational Therapy Assistant    Jia Cain OT Student           Time Calculation:   Time Calculation- OT     Row Name 08/20/20 1058             Time Calculation- OT    OT Start Time  0929  -TS (r) NA (t) TS (c)      OT Stop Time  1040  -TS (r) NA (t) TS (c)      OT Time Calculation (min)  71 min  -TS (r) NA (t)      Total Timed Code Minutes- OT  71 minute(s)  -TS (r) NA (t) TS (c)         Timed Charges    00201 - OT Self Care/Mgmt Minutes  71  -TS (r) NA (t) TS (c)        User Key  (r) = Recorded By, (t) = Taken By, (c) = Cosigned By    Initials Name Provider Type    TS Mally Scherer, TREVIZO/L Occupational Therapy Assistant    Jia Cain OT Student        Therapy Charges for Today     Code Description Service Date Service Provider Modifiers Qty    [id number]  OT SELF CARE/MGMT/TRAIN EA 15 MIN 8/19/2020 Jia Prasad GO 2     [id number] HC OT SELF CARE/MGMT/TRAIN EA 15 MIN 8/20/2020 Jia Prasad 5               PATI Azar Student  8/20/2020

## 2020-08-20 NOTE — THERAPY TREATMENT NOTE
Acute Care - Physical Therapy Treatment Note  [facility]     Patient Name: Josi Mohamud  : 1945  MRN: [id number]  Today's Date: 2020  Onset of Illness/Injury or Date of Surgery: 08/15/20     Referring Physician: Dr. Medrano    Admit Date: 8/15/2020    Visit Dx:    ICD-10-CM ICD-9-CM   1. Type III open fracture of left ankle, initial encounter S82.892C 824.9   2. COPD (chronic obstructive pulmonary disease) case management patient (CMS/[facility]) J44.9 496   3. Impaired mobility Z74.09 799.89   4. Decreased activities of daily living (ADL) Z78.9 V49.89     Patient Active Problem List   Diagnosis   • Syncope and collapse   • Coronary artery disease involving native coronary artery of native heart without angina pectoris   • Stented coronary artery   • Essential hypertension   • Abnormal positron emission tomography (PET) scan   • Gastroesophageal reflux disease   • Thyroid nodule   • Parotid neoplasm   • Anticoagulated   • Laryngopharyngeal reflux (LPR)   • Traumatic rhabdomyolysis (CMS/[facility])   • PAF (paroxysmal atrial fibrillation) (CMS/[facility])   • Malignant neoplasm of upper lobe of left lung (CMS/[facility])   • COPD, moderate (CMS/[facility])   • Persistent asthma without complication   • Overweight   • Cigarette nicotine dependence without complication   • Multiple thyroid nodules   • Hx of Hodgkin's lymphoma   • Dysfunction of both eustachian tubes   • Left chronic serous otitis media   • Morbidly obese (CMS/[facility])   • Tonsillar cyst   • Status post stereotactic radiosurgery   • Current every day smoker   • Renal cyst   • Cellulitis of right lower extremity   • COPD with acute exacerbation (CMS/[facility])   • Macrocytosis without anemia   • Obesity (BMI 30-39.9)   • Tobacco abuse   • Lower extremity edema, right side   • Leukocytosis   • Type III open fracture of left ankle   • Fall   • Left ankle pain       Therapy Treatment    Rehabilitation Treatment Summary     Row Name 20 1344 20 1023 20 0929        Treatment Time/Intention    Discipline  physical therapy assistant  -  physical therapy assistant  -  occupational therapy assistant  -TS,NA,TS2    Document Type  therapy note (daily note)  -2  --  therapy note (daily note)  -TS,NA,TS2    Subjective Information  complains of;weakness;fatigue;pain  -2  --  complains of;pain  -TS,NA,TS2    Mode of Treatment  --  --  occupational therapy  -TS,NA,TS2    Patient Effort  --  --  good  -TS,NA,TS2    Comment  --  with OT  -[facility]  --    Existing Precautions/Restrictions  fall;left;non-weight bearing NWB LLE  -2  --  fall;left;non-weight bearing  -TS,NA,TS2    Recorded by [] Milady Tena, PTA 08/20/20 1354  [2] Milady Tena, PTA 08/20/20 1407 [AH] Milady Tena, PTA 08/20/20 1024  [2] Milady Tena, PTA 08/20/20 1026 [TS,NA,TS2] Mally Scherer TREVIZO/L (r) Jia Prasad (t) Mally Scherer, TREVIZO/L (c) 08/20/20 1338    Row Name 08/20/20 0929             Cognitive Assessment/Intervention- PT/OT    Affect/Mental Status (Cognitive)  WFL  -TS,NA,TS2      Orientation Status (Cognition)  oriented x 4  -TS,NA,TS2      Follows Commands (Cognition)  WFL  -TS,NA,TS2      Cognitive Function (Cognitive)  WFL  -TS,NA,TS2      Personal Safety Interventions  safety round/check completed;elopement precautions initiated;fall prevention program maintained;supervised activity  -TS,NA,TS2      Recorded by [TS,NA,TS2] Mally Scherer COTA/MARYJO (r) Jia Prasad (t) Mally Scherer TREVIZO/L (c) 08/20/20 1338      Row Name 08/20/20 1344             Mobility Assessment/Intervention    Extremity Weight-bearing Status  left lower extremity  -      Left Lower Extremity (Weight-bearing Status)  non weight-bearing (NWB)  -      Recorded by [] Milady Tena, PTA 08/20/20 1407      Row Name 08/20/20 1344 08/20/20 0929          Bed Mobility Assessment/Treatment    Bed Mobility Assessment/Treatment  --  scooting/bridging;supine-sit;sit-supine;rolling  left;rolling right  -TS,NA,TS2     Rolling Left [place] (Bed Mobility)  --  verbal cues;conditional independence  (Pended)   -NA2     Rolling Right [place] (Bed Mobility)  --  verbal cues;conditional independence  (Pended)   -NA2     Scooting/Bridging [place] (Bed Mobility)  --  verbal cues;contact guard  -TS,NA,TS2     Supine-Sit [place] (Bed Mobility)  --  verbal cues;minimum assist (75% patient effort)  -TS,NA,TS2     Sit-Supine [place] (Bed Mobility)  --  verbal cues;contact guard  -TS,NA,TS2     Bed Mobility, Safety Issues  --  decreased use of legs for bridging/pushing  -TS,NA,TS2     Assistive Device (Bed Mobility)  --  bed rails  -TS,NA,TS2     Comment (Bed Mobility)  declined mobility, waiting for ambulance for d/c  -  --     Recorded by [AH] Milady Tena, PTA 08/20/20 1407 [NA2] Jia Prasad 08/20/20 1338  [TS,NA,TS2] Mally Scherer TREVIZO/L (r) Jia Prasad (t) Mally Scherer, TREVIZO/L (c) 08/20/20 1338     Row Name 08/20/20 0929             Functional Mobility    Functional Mobility- Ind. Level  unable to perform  -TS,NA,TS2      Recorded by [TS,NA,TS2] Mally Scherer TREVIZO/L (r) Jia Prasad (t) Mally Scherer, TREVIZO/L (c) 08/20/20 1338      Row Name 08/20/20 1344             Transfer Assessment/Treatment    Transfer Assessment/Treatment  stand pivot/stand step transfer  -      Recorded by [AH] Milady Tena, PTA 08/20/20 1407      Row Name 08/20/20 0929             ADL Assessment/Intervention    BADL Assessment/Intervention  bathing;grooming;upper body dressing;lower body dressing  -TS,NA,TS2      Recorded by [TS,NA,TS2] Mally Scherer TREVIZO/L (r) Jia Prasad (t) Mally Scherer TREVIZO/L (c) 08/20/20 1338      Row Name 08/20/20 0929             Bathing Assessment/Intervention    Bathing [place] Level  minimum assist (75% patient effort);set up;lower body;upper body  -TS,NA,TS2      Bathing Position  edge of bed sitting   -TS,NA,TS2      Recorded by [TS,NA,TS2] Mally Scherer, TREVIZO/L (r) Jia Prasad (t) Mally Scherer, TREVIZO/L (c) 08/20/20 1338      Row Name 08/20/20 0929             Upper Body Dressing Assessment/Training    Upper Body Dressing [place] Level  doff;supervision;don;set up  -TS,NA,TS2      Upper Body Dressing Position  edge of bed sitting  -TS,NA,TS2      Recorded by [TS,NA,TS2] Mally Scherer, TREVIZO/L (r) Jia Prasad (t) Mally Scherer, TREVIZO/L (c) 08/20/20 1338      Row Name 08/20/20 0929             Lower Body Dressing Assessment/Training    Lower Body Dressing [place] Level  don;undergarment;pants/bottoms;set up;maximum assist (25% patient effort)  -TS,NA,TS2      Lower Body Dressing Position  supine  -TS,NA,TS2      Comment (Lower Body Dressing)  don pants in supine d/t fatigue  -TS,NA,TS2      Recorded by [TS,NA,TS2] Mally Scherer, TREVIZO/L (r) Jia Prasad (t) Mally Scherer, TREVIZO/L (c) 08/20/20 1338      Row Name 08/20/20 0929             Grooming Assessment/Training    [place] Level (Grooming)  hair care, combing/brushing;set up  -TS,NA,TS2      Grooming Position  edge of bed sitting  -TS,NA,TS2      Recorded by [TS,NA,TS2] aMlly Scherer, TREVIZO/L (r) Jia Prasad (t) Mally Scherer, TREVIZO/L (c) 08/20/20 1338      Row Name 08/20/20 0929             BADL Safety/Performance    Impairments, BADL Safety/Performance  balance;endurance/activity tolerance;strength;pain  -TS,NA,TS2      Skilled BADL Treatment/Intervention  BADL process/adaptation training  -TS,NA,TS2      Progress in BADL Status  effort and initiation  -TS,NA,TS2      Recorded by [TS,NA,TS2] Mally Scherer, TREVIZO/L (r) Jia Prasad (t) Mally Scherer COTA/L (c) 08/20/20 1338      Row Name 08/20/20 1344             Therapeutic Exercise    Lower Extremity (Therapeutic Exercise)  gluteal sets;heel slides, bilateral;quad sets, bilateral;SAQ (short arc quad), bilateral  -AH       Lower Extremity Range of Motion (Therapeutic Exercise)  hip abduction/adduction, bilateral;ankle dorsiflexion/plantar flexion, right  -      Exercise Type (Therapeutic Exercise)  AROM (active range of motion);AAROM (active assistive range of motion)  -      Sets/Reps (Therapeutic Exercise)  20  -AH      Recorded by [] Milady Tena, PTA 08/20/20 1407      Row Name 08/20/20 0929             Static Sitting Balance    Level of [place] (Unsupported Sitting, Static Balance)  supervision  -TS,NA,TS2      Sitting Position (Unsupported Sitting, Static Balance)  sitting on edge of bed  -TS,NA,TS2      Recorded by [TS,NA,TS2] Mally Scherer TREVIZO/L (r) Jia Prasad (t) Mally Scherer TREVIZO/L (c) 08/20/20 1338      Row Name 08/20/20 1344 08/20/20 0929          Positioning and Restraints    Pre-Treatment Position  in bed  -  in bed  -TS,NA,TS2     Post Treatment Position  bed  -  bed  -TS,NA,TS2     In Bed  fowlers;call light within reach;encouraged to call for assist;LLE elevated  -  fowlers;call light within reach;encouraged to call for assist;exit alarm on  -TS,NA,TS2     Recorded by [] Milady Tena, PTA 08/20/20 1407 [TS,NA,TS2] Mally Scherer TREVIZO/L (r) Jia Prasad (t) Mally Scherer TREVIZO/L (c) 08/20/20 1338     Row Name 08/20/20 1344 08/20/20 0929          Pain Scale: Numbers Pre/Post-Treatment    Pain Scale: Numbers, Pretreatment  6/10  -  6/10  -TS,NA,TS2     Pain Scale: Numbers, Post-Treatment  6/10  -  6/10  -TS,NA,TS2     Pain Location - Side  Left  -  Left  -TS,NA,TS2     Pain Location  ankle  -  ankle  -TS,NA,TS2     Pain Intervention(s)  Medication (See MAR);Repositioned  -  Repositioned  -TS,NA,TS2     Recorded by [] Milady Tena, PTA 08/20/20 1407 [TS,NA,TS2] Mally Scherer COTA/MARYJO (r) Jia Prasad (t) Mally Scherer COTA/L (c) 08/20/20 1338     Row Name                Wound 08/15/20 1416 Left ankle Incision    Wound - Properties  Group Date first assessed: 08/15/20 [HW] Time first assessed: 1416 [HW] Present on Hospital Admission: N [HW] Side: Left [HW] Location: ankle [HW] Primary Wound Type: Incision [HW] Recorded by:  [HW] Blaine Degroot RN 08/15/20 1416    Row Name                Wound 08/16/20 0800 coccyx Fissure    Wound - Properties Group Date first assessed: 08/16/20 [HS] Time first assessed: 0800 [HS] Present on Hospital Admission: Y [HS] Location: coccyx [HS] Primary Wound Type: Fissure [HS] Recorded by:  [HS] Gabriella Sunshine RN 08/18/20 1102    Row Name 08/20/20 0929             Outcome Summary/Treatment Plan (OT)    Daily Summary of Progress (OT)  progress toward functional goals is good  -TS,NA,TS2      Plan for Continued Treatment (OT)  Continue OT POC  -TS,NA,TS2      Anticipated Discharge Disposition (OT)  skilled nursing facility  -TS,NA,TS2      Recorded by [TS,NA,TS2] Mally Scherer, TREVIZO/L (r) Jia Prasad (t) Mally Scherer, TREVIZO/L (c) 08/20/20 1338        User Key  (r) = Recorded By, (t) = Taken By, (c) = Cosigned By    Initials Name Effective Dates Discipline     Milady Tena, PTA 08/02/16 -  PT    TS Mally Scherer, TREVIZO/L 08/02/16 -  OT    HS Gabriella Sunshine RN 12/27/16 -  Nurse    Blaine Stack RN 06/05/20 -  Nurse    Jia Cain 08/13/20 -  OT          Wound 08/15/20 1416 Left ankle Incision (Active)   Dressing Appearance dry;intact 8/20/2020  8:16 AM   Closure DAPHNE 8/20/2020  8:16 AM   Base dressing in place, unable to visualize 8/20/2020  8:16 AM   Drainage Amount none 8/20/2020  8:16 AM   Dressing Care, Wound gauze;elastic bandage 8/20/2020  8:16 AM       Wound 08/16/20 0800 coccyx Fissure (Active)   Dressing Appearance open to air 8/20/2020  8:16 AM   Base pink;blanchable 8/20/2020  8:16 AM   Periwound blanchable;warm 8/20/2020  8:16 AM   Periwound Temperature warm 8/20/2020  8:16 AM   Periwound Skin Turgor soft 8/20/2020  8:16 AM   Drainage Amount none 8/20/2020   8:16 AM   Care, Wound barrier applied 8/20/2020  8:16 AM   Dressing Care, Wound open to air 8/20/2020  8:16 AM           Physical Therapy Education                 Title: PT OT SLP Therapies (In Progress)     Topic: Physical Therapy (In Progress)     Point: Mobility training (Done)     Description:   Instruct learner(s) on safety and technique for assisting patient out of bed, chair or wheelchair.  Instruct in the proper use of assistive devices, such as walker, crutches, cane or brace.              Patient Friendly Description:   It's important to get you on your feet again, but we need to do so in a way that is safe for you. Falling has serious consequences, and your personal safety is the most important thing of all.        When it's time to get out of bed, one of us or a family member will sit next to you on the bed to give you support.     If your doctor or nurse tells you to use a walker, crutches, a cane, or a brace, be sure you use it every time you get out of bed, even if you think you don't need it.    Learning Progress Summary           Patient Acceptance, E,TB,D, VU,NR by  at 8/18/2020 1149    Comment:  pivot trans, NWB LLE    Acceptance, E,TB,D, VU,NR by  at 8/17/2020 0937    Comment:  pivot trans    Acceptance, E, VU,NR by  at 8/16/2020 0715    Comment:  Educated pt on progression of PT POC, benefits of activity, NWB L LE                   Point: Home exercise program (Not Started)     Description:   Instruct learner(s) on appropriate technique for monitoring, assisting and/or progressing patient with therapeutic exercises and activities.              Learner Progress:   Not documented in this visit.          Point: Body mechanics (Not Started)     Description:   Instruct learner(s) on proper positioning and spine alignment for patient and/or caregiver during mobility tasks and/or exercises.              Learner Progress:   Not documented in this visit.          Point: Precautions (Done)      Description:   Instruct learner(s) on prescribed precautions during mobility and gait tasks              Learning Progress Summary           Patient Acceptance, E, VU by WK at 8/19/2020 1158    Comment:  WB status    Acceptance, E, VU,NR by SUBHASH at 8/16/2020 0715    Comment:  Educated pt on progression of PT POC, benefits of activity, NWB L LE                               User Key     Initials Effective Dates Name Provider Type Discipline     08/02/16 -  Milady Tena, PTA Physical Therapy Assistant PT    SUBHASH 08/02/16 -  Alexandre Moe, PT DPT Physical Therapist PT    WK 08/02/16 -  Cortney Mendiola PTA Physical Therapy Assistant PT                PT Recommendation and Plan     Plan of Care Reviewed With: patient  Progress: improving  Outcome Summary: Pt performed BLE exercises in the bed, trans to EOB min-mod assist, sit-stand min assist, pivot trans with rwx mod-max assist, pt unable to maintain NWB LLE. pt would benefit from SNF  Outcome Measures     Row Name 08/20/20 1000 08/18/20 1000          How much help from another is currently needed...    Putting on and taking off regular lower body clothing?  1  -TS (r) NA (t) TS (c)  2  -CH (r) NA (t) CH (c)     Bathing (including washing, rinsing, and drying)  3  -TS (r) NA (t) TS (c)  3  -CH (r) NA (t) CH (c)     Toileting (which includes using toilet bed pan or urinal)  2  -TS (r) NA (t) TS (c)  2  -CH (r) NA (t) CH (c)     Putting on and taking off regular upper body clothing  3  -TS (r) NA (t) TS (c)  3  -CH (r) NA (t) CH (c)     Taking care of personal grooming (such as brushing teeth)  3  -TS (r) NA (t) TS (c)  4  -CH (r) NA (t) CH (c)     Eating meals  4  -TS (r) NA (t) TS (c)  4  -CH (r) NA (t) CH (c)     AM-PAC 6 Clicks Score (OT)  16  -TS (r) NA (t)  18  -CH (r) NA (t)       User Key  (r) = Recorded By, (t) = Taken By, (c) = Cosigned By    Initials Name Provider Type    CH Kayla Gaona, OTR/L Occupational Therapist    Mally Agudelo, SANTHOSH/L  Occupational Therapy Assistant    Jia Cain OT Student         Time Calculation:   PT Charges     Row Name 08/20/20 1407             Time Calculation    Start Time  1344  -      Stop Time  1407  -      Time Calculation (min)  23 min  -      PT Received On  08/20/20  -         Time Calculation- PT    Total Timed Code Minutes- PT  23 minute(s)  -         Timed Charges    41853 - PT Therapeutic Exercise Minutes  23  -AH        User Key  (r) = Recorded By, (t) = Taken By, (c) = Cosigned By    Initials Name Provider Type     Milady Tena PTA Physical Therapy Assistant        Therapy Charges for Today     Code Description Service Date Service Provider Modifiers Qty    52120817478 HC PT THERAPEUTIC ACT EA 15 MIN 8/19/2020 Milady Tena PTA GP 2    20477345422 HC PT THER PROC EA 15 MIN 8/20/2020 Milady Tena, BATSHEVA GP 2          PT G-Codes  Outcome Measure Options: AM-PAC 6 Clicks Daily Activity (OT)  AM-PAC 6 Clicks Score (PT): 9  AM-PAC 6 Clicks Score (OT): 16    Milady Tena PTA  8/20/2020

## 2020-08-20 NOTE — PROGRESS NOTES
Continued Stay Note   Pankaj     Patient Name: Josi Mohamud  MRN: [id number]  Today's Date: 8/20/2020    Admit Date: 8/15/2020    Discharge Plan     Row Name 08/20/20 1008       Plan    Final Note  Bed ready today at [facility]. Pt will be skilled level. Call report number is [phone]        Discharge Codes    No documentation.             FAMILIA Alvarenga

## 2020-08-20 NOTE — PLAN OF CARE
Pt a& o x4. Minimal c/o pain so far this shift. PRN pain meds effective. Splint intact to LLE, able to move toes without difficulty. Remains NWB to LLE. Continues to have edema to RLE. Lasix as ordered. VSS. To be discharged to [facility] today. Will cont to monitor.   Problem: Patient Care Overview  Goal: Plan of Care Review  Outcome: Ongoing (interventions implemented as appropriate)  Flowsheets (Taken 8/20/2020 1210)  Progress: improving  Plan of Care Reviewed With: patient

## 2020-08-20 NOTE — PLAN OF CARE
Problem: Patient Care Overview  Goal: Plan of Care Review  8/20/2020 0451 by Cortney Monaco, RN  Note:   Pt alert and oriented x4. No c/o pain so far this shift. Drsg/splint to LLE CDI. Purewick in place. Pt has rested well. VSS. Safety maintained. Will continue to monitor.

## 2020-08-20 NOTE — PROGRESS NOTES
Orthopedic Surgery Progress Note    Josi Encarnacionnt  8/20/2020      Subjective:     Systemic or Specific Complaints: pain controlled, anticipation of d/c soon to SNF today?     Objective:     Patient Vitals for the past 24 hrs:   BP Temp Temp src Pulse Resp SpO2   08/20/20 0752 158/61 98.2 °F (36.8 °C) Oral 100 18 93 %   08/20/20 0658 -- -- -- 100 18 90 %   08/20/20 0652 -- -- -- 86 18 92 %   08/19/20 2310 136/60 98.3 °F (36.8 °C) Oral 88 16 91 %   08/19/20 2001 136/56 98.1 °F (36.7 °C) Oral 85 18 92 %   08/19/20 1943 -- -- -- 87 -- --   08/19/20 1935 -- -- -- 84 20 92 %   08/19/20 1547 130/49 98.5 °F (36.9 °C) Oral 79 20 94 %   08/19/20 1432 -- -- -- 83 16 94 %   08/19/20 1425 -- -- -- 85 18 94 %   08/19/20 1122 -- -- -- 84 16 93 %   08/19/20 1113 -- -- -- 82 16 92 %       left lower  General: alert, appears stated age and cooperative   Wound: clean, dry, intact             Dressing: clean, dry, intact   Extremity: Distal NVI           DVT Exam: No evidence of DVT seen on physical exam.                   Data Review:  Lab Results (last 24 hours)     Procedure Component Value Units Date/Time    COVID-19,CEPHEID,COR/KATHLEEN/PAD IN-HOUSE(OR EMERGENT/ADD-ON),NP SWAB IN TRANSPORT MEDIA 3-4 HR TAT - Swab, Nasopharynx [[id number]]  (Normal) Collected:  08/19/20 1703    Specimen:  Swab from Nasopharynx Updated:  08/19/20 1811     COVID19 Not Detected    Narrative:       Fact sheet for providers: https://www.fda.gov/media/054404/download     Fact sheet for patients: https://www.fda.gov/media/270629/download        Imaging Results (Last 24 Hours)     ** No results found for the last 24 hours. **          Assessment:     POD# 5 I&D/ORIF L open aliza    Plan:      1:  DVT prophylaxis, ICE, elevate  2:  Pain control  3:  Physical therapy/Occupational therapy  4:  NWBLLE  5:  Ok to D/C when medically stable, ortho F/U in 2 weeks.       CRYSTAL MarreroC

## 2020-08-20 NOTE — DISCHARGE SUMMARY
[facility] Services  DISCHARGE SUMMARY       Date of Admission: 8/15/2020  Date of Discharge:  8/20/2020  Primary Care Physician: Cayetano Crews MD    Discharge Diagnoses:  Active Hospital Problems    Diagnosis   • **Type III open fracture of left ankle   • Fall   • Left ankle pain   • Current every day smoker   • COPD, moderate (CMS/HCC)   • PAF (paroxysmal atrial fibrillation) (CMS/HCC)   • Stented coronary artery   • Coronary artery disease involving native coronary artery of native heart without angina pectoris         Presenting Problem/History of Present Illness:  Type III open fracture of left ankle, initial encounter [S82.896Q]     Chief Complaint on Day of Discharge:   Left ankle discomfort    History of Present Illness on Day of Discharge:   The patient continues to recover well.  She has been accepted to SNF for continuation of rehabilitation and is stable for transfer to that facility.  Both services agreed that the patient is stable for discharge.    Hospital Course  The patient was admitted 8/15 presenting to the emergency department after falling at home and twisting her left ankle.  The patient sustained an open fracture dislocation with posterior displacement of the talus in relation to the tibia/fibula.  There is also a displaced comminuted distal fibular fracture.  She was splinted in the emergency department and the fracture was reduced with near anatomic alignment.  Dr. Giovany nolasco took her to the OR on 8/15 for ORIF, irrigation and excisional debridement of skin, subcutaneous tissue and bone.  She was placed on prophylactic antibiotics and will be nonweightbearing for 5 to 6 weeks.  Postoperative recovery was unremarkable.  Referral was sent to [facility] on 8/17 for SNF placement and rehab.  The patient did well with therapy throughout her stay and has been accepted in transfer to that facility today.    Procedures Performed:   1) Open reduction  internal fixation of closed displaced bimalleolar fracture of the Left lower leg  2) Irrigation and excisional debridement of skin, subcutaneous tissue, and bone left open ankle fracture   3) Intraoperative stress radiography, Left ankle    Consults:   Therapeutic surgery    Pertinent Test Results:   Lab Results (last 7 days)     Procedure Component Value Units Date/Time    COVID-19,CEPHEID,COR/KATHLEEN/PAD IN-HOUSE(OR EMERGENT/ADD-ON),NP SWAB IN TRANSPORT MEDIA 3-4 HR TAT - Swab, Nasopharynx [[id number]]  (Normal) Collected:  08/19/20 1703    Specimen:  Swab from Nasopharynx Updated:  08/19/20 1811     COVID19 Not Detected    Narrative:       Fact sheet for providers: https://www.fda.gov/media/364929/download     Fact sheet for patients: https://www.fda.gov/media/217366/download    Basic Metabolic Panel [[id number]]  (Abnormal) Collected:  08/17/20 0439    Specimen:  Blood Updated:  08/17/20 0538     Glucose 135 mg/dL      BUN 22 mg/dL      Creatinine 0.93 mg/dL      Sodium 140 mmol/L      Potassium 4.3 mmol/L      Chloride 103 mmol/L      CO2 26.0 mmol/L      Calcium 8.7 mg/dL      eGFR Non African Amer 59 mL/min/1.73      BUN/Creatinine Ratio 23.7     Anion Gap 11.0 mmol/L     Narrative:       GFR Normal >60  Chronic Kidney Disease <60  Kidney Failure <15      CBC (No Diff) [[id number]]  (Abnormal) Collected:  08/17/20 0439    Specimen:  Blood Updated:  08/17/20 0521     WBC 10.24 10*3/mm3      RBC 3.50 10*6/mm3      Hemoglobin 11.9 g/dL      Hematocrit 35.1 %      .3 fL      MCH 34.0 pg      MCHC 33.9 g/dL      RDW 13.7 %      RDW-SD 49.8 fl      MPV 10.2 fL      Platelets 227 10*3/mm3     Basic Metabolic Panel [[id number]]  (Abnormal) Collected:  08/16/20 0507    Specimen:  Blood Updated:  08/16/20 0614     Glucose 117 mg/dL      BUN 20 mg/dL      Creatinine 0.88 mg/dL      Sodium 137 mmol/L      Potassium 4.1 mmol/L      Chloride 101 mmol/L      CO2 24.0 mmol/L      Calcium 8.6 mg/dL      eGFR Non African Amer  63 mL/min/1.73      BUN/Creatinine Ratio 22.7     Anion Gap 12.0 mmol/L     Narrative:       GFR Normal >60  Chronic Kidney Disease <60  Kidney Failure <15      Hemoglobin & Hematocrit, Blood [[id number]]  (Abnormal) Collected:  08/16/20 0507    Specimen:  Blood Updated:  08/16/20 0556     Hemoglobin 11.7 g/dL      Hematocrit 33.6 %     Blood Gas, Arterial [[id number]]  (Abnormal) Collected:  08/16/20 0457    Specimen:  Arterial Blood Updated:  08/16/20 0504     Site Left Radial     Rom's Test Positive     pH, Arterial 7.463 pH units      Comment: 83 Value above reference range        pCO2, Arterial 34.9 mm Hg      Comment: 84 Value below reference range        pO2, Arterial 58.5 mm Hg      Comment: 84 Value below reference range        HCO3, Arterial 25.0 mmol/L      Base Excess, Arterial 1.5 mmol/L      O2 Saturation, Arterial 92.2 %      Comment: 84 Value below reference range        Temperature 37.0 C      Barometric Pressure for Blood Gas 749 mmHg      Modality Nasal Cannula     Flow Rate 3.0 lpm      Ventilator Mode NA     Collected by [id number]     Comment: Meter: [id number]     :  [id number]        pCO2, Temperature Corrected 34.9 mm Hg      pH, Temp Corrected 7.463 pH Units      pO2, Temperature Corrected 58.5 mm Hg     [place] Draw [[id number]] Collected:  08/15/20 0835    Specimen:  Blood Updated:  08/15/20 0945    Narrative:       The following orders were created for panel order [place] Draw.  Procedure                               Abnormality         Status                     ---------                               -----------         ------                     Light Blue Top[[id number]]                                   Final result               Green Top (Gel)[[id number]]                                  Final result               Lavender Top[[id number]]                                     Final result               Red Top[[id number]]                                          Final result                  Please view results for these tests on the individual orders.    Light Blue Top [[id number]] Collected:  08/15/20 0835    Specimen:  Blood Updated:  08/15/20 0945     Extra Tube hold for add-on     Comment: Auto resulted       Green Top (Gel) [[id number]] Collected:  08/15/20 0835    Specimen:  Blood Updated:  08/15/20 0945     Extra Tube Hold for add-ons.     Comment: Auto resulted.       Lavender Top [[id number]] Collected:  08/15/20 0835    Specimen:  Blood Updated:  08/15/20 0945     Extra Tube hold for add-on     Comment: Auto resulted       Red Top [[id number]] Collected:  08/15/20 0835    Specimen:  Blood Updated:  08/15/20 0945     Extra Tube Hold for add-ons.     Comment: Auto resulted.       Comprehensive Metabolic Panel [[id number]]  (Abnormal) Collected:  08/15/20 0915    Specimen:  Blood from Arm, Left Updated:  08/15/20 0943     Glucose 131 mg/dL      BUN 21 mg/dL      Creatinine 1.16 mg/dL      Sodium 133 mmol/L      Potassium 4.3 mmol/L      Comment: Specimen hemolyzed.  Results may be affected.        Chloride 97 mmol/L      CO2 26.0 mmol/L      Calcium 9.1 mg/dL      Total Protein 7.0 g/dL      Albumin 3.80 g/dL      ALT (SGPT) 13 U/L      AST (SGOT) 23 U/L      Comment: Specimen hemolyzed.  Results may be affected.        Alkaline Phosphatase 100 U/L      Total Bilirubin 0.3 mg/dL      eGFR Non African Amer 46 mL/min/1.73      Globulin 3.2 gm/dL      A/G Ratio 1.2 g/dL      BUN/Creatinine Ratio 18.1     Anion Gap 10.0 mmol/L     Narrative:       GFR Normal >60  Chronic Kidney Disease <60  Kidney Failure <15      Troponin [[id number]]  (Normal) Collected:  08/15/20 0915    Specimen:  Blood from Arm, Left Updated:  08/15/20 0940     Troponin T <0.010 ng/mL     Narrative:       Troponin T Reference Range:  <= 0.03 ng/mL-   Negative for AMI  >0.03 ng/mL-     Abnormal for myocardial necrosis.  Clinicians would have to utilize clinical acumen, EKG, Troponin and serial changes to determine if it is  an Acute Myocardial Infarction or myocardial injury due to an underlying chronic condition.       Results may be falsely decreased if patient taking Biotin.      Protime-INR [[id number]]  (Abnormal) Collected:  08/15/20 0915    Specimen:  Blood from Arm, Left Updated:  08/15/20 0934     Protime 16.0 Seconds      INR 1.32    aPTT [[id number]]  (Normal) Collected:  08/15/20 0915    Specimen:  Blood from Arm, Left Updated:  08/15/20 0934     PTT 32.6 seconds     COVID-19, ABBOTT IN-HOUSE,NP Swab (NO TRANSPORT MEDIA) 2 HR TAT - Swab, Nasopharynx [[id number]]  (Normal) Collected:  08/15/20 0848    Specimen:  Swab from Nasopharynx Updated:  08/15/20 0922     COVID19 Not Detected    Narrative:       Fact sheet for providers: https://www.fda.gov/media/379052/download     Fact sheet for patients: https://www.fda.gov/media/538116/download    Blood Gas, Arterial [[id number]]  (Abnormal) Collected:  08/15/20 0918    Specimen:  Arterial Blood Updated:  08/15/20 0922     Site Right Brachial     Rom's Test N/A     pH, Arterial 7.385 pH units      pCO2, Arterial 43.7 mm Hg      pO2, Arterial 64.7 mm Hg      Comment: 84 Value below reference range        HCO3, Arterial 26.1 mmol/L      Comment: 83 Value above reference range        Base Excess, Arterial 0.7 mmol/L      O2 Saturation, Arterial 93.0 %      Comment: 84 Value below reference range        Temperature 37.0 C      Barometric Pressure for Blood Gas 749 mmHg      Modality Nasal Cannula     Flow Rate 2.0 lpm      Ventilator Mode NA     Collected by [id number]     Comment: Meter: [id number]     :  [id number]        pCO2, Temperature Corrected 43.7 mm Hg      pH, Temp Corrected 7.385 pH Units      pO2, Temperature Corrected 64.7 mm Hg     CBC & Differential [[id number]] Collected:  08/15/20 0835    Specimen:  Blood Updated:  08/15/20 0858    Narrative:       The following orders were created for panel order CBC & Differential.  Procedure                                Abnormality         Status                     ---------                               -----------         ------                     CBC Auto Differential[[id number]]        Abnormal            Final result                 Please view results for these tests on the individual orders.    CBC Auto Differential [[id number]]  (Abnormal) Collected:  08/15/20 0835    Specimen:  Blood Updated:  08/15/20 0858     WBC 6.96 10*3/mm3      RBC 4.43 10*6/mm3      Hemoglobin 15.0 g/dL      Hematocrit 43.3 %      MCV 97.7 fL      MCH 33.9 pg      MCHC 34.6 g/dL      RDW 13.8 %      RDW-SD 49.0 fl      MPV 10.2 fL      Platelets 274 10*3/mm3      Neutrophil % 81.5 %      Lymphocyte % 12.4 %      Monocyte % 5.6 %      Eosinophil % 0.0 %      Basophil % 0.4 %      Immature Grans % 0.1 %      Neutrophils, Absolute 5.67 10*3/mm3      Lymphocytes, Absolute 0.86 10*3/mm3      Monocytes, Absolute 0.39 10*3/mm3      Eosinophils, Absolute 0.00 10*3/mm3      Basophils, Absolute 0.03 10*3/mm3      Immature Grans, Absolute 0.01 10*3/mm3      nRBC 0.0 /100 WBC         Imaging Results (Last 7 Days)     Procedure Component Value Units Date/Time    XR Ankle 2 View Left [[id number]] Collected:  08/15/20 1506     Updated:  08/16/20 1502    Narrative:       XR ANKLE 2 VW LEFT- 8/15/2020 3:05 PM CDT     HISTORY: fracture; S82.892C-Other fracture of left lower leg, initial  encounter for open fracture type IIIA, IIIB, or IIIC; J44.9-Chronic  obstructive pulmonary disease, unspecified      COMPARISON: None       Impression:       2 intraoperative fluoroscopic views of the ankle are submitted. These  images were obtained for procedural guidance during distal fibular  fixation. Please refer to the operative note for more details.  Fluoroscopy time was 17 seconds with a dose of 0.34 mGy.   This report was finalized on 08/15/2020 15:08 by Dr. Iftikhra Prieto MD.    FL C Arm During Surgery [[id number]] Collected:  08/15/20 1506     Updated:  08/16/20 1502     Narrative:       XR ANKLE 2 VW LEFT- 8/15/2020 3:05 PM CDT     HISTORY: fracture; S82.892C-Other fracture of left lower leg, initial  encounter for open fracture type IIIA, IIIB, or IIIC; J44.9-Chronic  obstructive pulmonary disease, unspecified      COMPARISON: None       Impression:       2 intraoperative fluoroscopic views of the ankle are submitted. These  images were obtained for procedural guidance during distal fibular  fixation. Please refer to the operative note for more details.  Fluoroscopy time was 17 seconds with a dose of 0.34 mGy.   This report was finalized on 08/15/2020 15:08 by Dr. Iftikhar Prieto MD.    XR Chest 1 View [[id number]] Collected:  08/16/20 0722     Updated:  08/16/20 0726    Narrative:       Exam:   XR CHEST 1 VW-       Date:  8/16/2020      History:  Female, age  74 years;soa, low sats; S82.892C-Other fracture  of left lower leg, initial encounter for open fracture type IIIA, IIIB,  or IIIC; J44.9-Chronic obstructive pulmonary disease, unspecified     COMPARISON:  Chest x-ray dated 8/15/2020     Findings :     The heart and mediastinum are normal in size. Lungs are without focal  infiltrate, mass or effusions.  The bones show no acute pathology. Loop  recording device in place. Surgical clips project over the right  thoracic inlet.       Impression:       Impression:     No acute cardiopulmonary disease.     This report was finalized on 08/16/2020 07:22 by Dr. Pinky Snow MD.    XR Ankle 2 View Left [[id number]] Collected:  08/15/20 0908     Updated:  08/15/20 0913    Narrative:       Exam: XR ANKLE 2 VW LEFT-     Indication: trauma; post splint     Comparison: Same day radiograph     Findings:     Tibiotalar dislocation has been reduced and ankle joint now appears in  near-anatomic alignment. Talar dome appears intact. No definite tibial  fracture. Distal fibular fracture appears improved in alignment.  Circumferential soft tissue swelling.       Impression:       Impression:        1.  Interval reduction of tibiotalar joint, now in near-anatomic  alignment.  2.  Distal fibular fracture, improved in alignment.  3.  No definite distal tibial fracture.  This report was finalized on 08/15/2020 09:10 by Dr. Iftikhar Prieto MD.    XR Tibia Fibula 2 View Left [[id number]] Collected:  08/15/20 0906     Updated:  08/15/20 0911    Narrative:       Exam: XR TIBIA FIBULA 2 VW LEFT-     Indication: open bimalleolar fracture rule out proximal injury     Comparison: None       Impression:       Finding/impression:     No evidence of acute fracture involving the proximal and mid  tibia/fibula. Redemonstrated distal fibular fracture. Ankle joint  appears to have been reduced and now in anatomic alignment. Diffuse soft  tissue edema about the ankle.  This report was finalized on 08/15/2020 09:08 by Dr. Iftikhar Prieto MD.    XR Chest 1 View [[id number]] Collected:  08/15/20 0901     Updated:  08/15/20 0907    Narrative:       Exam: XR CHEST 1 VW-     Indication: trauma; pre-surgery     Comparison: 7/20/2020     Findings:     Cardiac silhouette is borderline enlarged. No pleural effusion,  pneumothorax, or focal consolidation. RIGHT anterior neck surgical clip.  Implantable loop recorder device. No acute osseous finding.       Impression:       Impression:     No acute findings.  This report was finalized on 08/15/2020 09:02 by Dr. Iftikhar Prieto MD.    XR Ankle 2 View Left [[id number]] Collected:  08/15/20 0846     Updated:  08/15/20 0851    Narrative:       Exam: XR ANKLE 2 VW LEFT-     Indication: trauma     Comparison: None       Impression:       Finding/impression:     Ankle fracture dislocation with posterior displacement of the talus in  relation to the tibia/fibula. Fractures of the anterior and posterior  tibia suspected. Displaced comminuted distal fibular fracture. No  definite talar dome fracture. Calcaneus appears intact. Soft tissue  edema. No radiopaque foreign body.  This report was finalized on  "08/15/2020 08:48 by Dr. Iftikhar Prieto MD.          Condition on Discharge:    Stable    Physical Exam on Discharge:  /61 (BP Location: Left arm, Patient Position: Lying)   Pulse 85   Temp 98.2 °F (36.8 °C) (Oral)   Resp 18   Ht 162.6 cm (64\")   Wt 83.1 kg (183 lb 4.8 oz)   LMP  (LMP Unknown)   SpO2 92%   BMI 31.46 kg/m²   Physical Exam     1) Open reduction internal fixation of closed displaced bimalleolar fracture of the Left lower leg  2) Irrigation and excisional debridement of skin, subcutaneous tissue, and bone left open ankle fracture   3) Intraoperative stress radiography, Left ankle    Discharge Disposition:  Skilled Nursing Facility (DC - External)    Discharge Medications:     Discharge Medications      New Medications      Instructions Start Date   docusate sodium 100 MG capsule   100 mg, Oral, 2 Times Daily PRN         Changes to Medications      Instructions Start Date   acetaminophen 325 MG tablet  Commonly known as:  TYLENOL  What changed:    · medication strength  · how much to take  · when to take this   650 mg, Oral, Every 4 Hours PRN         Continue These Medications      Instructions Start Date   albuterol sulfate  (90 Base) MCG/ACT inhaler  Commonly known as:  PROVENTIL HFA;VENTOLIN HFA;PROAIR HFA   1 puff, Inhalation, Daily      amLODIPine 5 MG tablet  Commonly known as:  NORVASC   5 mg, Oral, Daily      apixaban 5 MG tablet tablet  Commonly known as:  ELIQUIS   5 mg, Oral, Every 12 Hours Scheduled      aspirin 81 MG EC tablet   81 mg, Oral, Daily      CALTRATE 600+D PO   1 tablet, Oral, 2 times daily      furosemide 40 MG tablet  Commonly known as:  LASIX   40 mg, Oral, 2 Times Daily      guaiFENesin 600 MG 12 hr tablet  Commonly known as:  MUCINEX   1,200 mg, Oral, 2 Times Daily PRN      levETIRAcetam 500 MG tablet  Commonly known as:  KEPPRA   500 mg, Oral, 2 Times Daily      lisinopril 20 MG tablet  Commonly known as:  PRINIVIL,ZESTRIL   20 mg, Oral, Daily      "   metoprolol succinate XL 50 MG 24 hr tablet  Commonly known as:  TOPROL-XL   50 mg, Oral, Daily      nitroglycerin 0.4 MG SL tablet  Commonly known as:  NITROSTAT   0.4 mg, Sublingual, Every 5 Minutes PRN, Take no more than 3 doses in 15 minutes.      omeprazole 40 MG capsule  Commonly known as:  priLOSEC   40 mg, Oral, Daily      simvastatin 20 MG tablet  Commonly known as:  ZOCOR   20 mg, Oral, Daily      tiotropium bromide monohydrate 2.5 MCG/ACT aerosol solution inhaler  Commonly known as:  SPIRIVA RESPIMAT   2 puffs, Inhalation, Daily - RT         Stop These Medications    benzonatate 200 MG capsule  Commonly known as:  TESSALON     LORazepam 2 MG tablet  Commonly known as:  ATIVAN     potassium chloride 10 MEQ CR tablet  Commonly known as:  K-DUR     traMADol 50 MG tablet  Commonly known as:  ULTRAM            Discharge Diet:   Diet Instructions     Diet: Regular; Thin      Discharge Diet:  Regular    Fluid Consistency:  Thin          Discharge Care Plan / Instructions:   Discharge to [facility]    Activity at Discharge:   Activity Instructions     Activity as Tolerated      NWB LLE    Other Activity Restrictions      Type of Restriction:  Other    Explain Other Restrictions:  NWB LLE          Follow-up Appointments:  Follow-up with Dr. Avery in 2 weeks       Electronically signed by Milo White DO, 08/20/20, 13:41.    Time: Discharge over 30 min    Part of this note may be an electronic transcription/translation of spoken language to printed text using the Dragon Dictation system.

## 2020-08-21 ENCOUNTER — LAB REQUISITION (OUTPATIENT)
Dept: LAB | Facility: HOSPITAL | Age: 75
End: 2020-08-21

## 2020-08-21 ENCOUNTER — EPISODE CHANGES (OUTPATIENT)
Dept: CASE MANAGEMENT | Facility: OTHER | Age: 75
End: 2020-08-21

## 2020-08-21 DIAGNOSIS — Z00.00 ENCOUNTER FOR GENERAL ADULT MEDICAL EXAMINATION WITHOUT ABNORMAL FINDINGS: ICD-10-CM

## 2020-08-21 LAB
ALBUMIN SERPL-MCNC: 2.9 G/DL (ref 3.5–5.2)
ALP SERPL-CCNC: 135 U/L (ref 39–117)
ALT SERPL W P-5'-P-CCNC: 18 U/L (ref 1–33)
ANION GAP SERPL CALCULATED.3IONS-SCNC: 10 MMOL/L (ref 5–15)
ANISOCYTOSIS BLD QL: NORMAL
AST SERPL-CCNC: 44 U/L (ref 1–32)
BILIRUB CONJ SERPL-MCNC: 0.2 MG/DL (ref 0–0.3)
BILIRUB INDIRECT SERPL-MCNC: 0.2 MG/DL
BILIRUB SERPL-MCNC: 0.4 MG/DL (ref 0–1.2)
BUN SERPL-MCNC: 22 MG/DL (ref 8–23)
BUN/CREAT SERPL: 29.3 (ref 7–25)
CALCIUM SPEC-SCNC: 8.3 MG/DL (ref 8.6–10.5)
CHLORIDE SERPL-SCNC: 100 MMOL/L (ref 98–107)
CHOLEST SERPL-MCNC: 123 MG/DL (ref 0–200)
CO2 SERPL-SCNC: 31 MMOL/L (ref 22–29)
CREAT SERPL-MCNC: 0.75 MG/DL (ref 0.57–1)
DEPRECATED RDW RBC AUTO: 49.7 FL (ref 37–54)
EOSINOPHIL # BLD MANUAL: 0.05 10*3/MM3 (ref 0–0.4)
EOSINOPHIL NFR BLD MANUAL: 1 % (ref 0.3–6.2)
ERYTHROCYTE [DISTWIDTH] IN BLOOD BY AUTOMATED COUNT: 13.5 % (ref 12.3–15.4)
GFR SERPL CREATININE-BSD FRML MDRD: 75 ML/MIN/1.73
GIANT PLATELETS: NORMAL
GLUCOSE SERPL-MCNC: 93 MG/DL (ref 65–99)
HBA1C MFR BLD: 6.3 % (ref 4.8–5.6)
HCT VFR BLD AUTO: 32.9 % (ref 34–46.6)
HDLC SERPL-MCNC: 34 MG/DL (ref 40–60)
HGB BLD-MCNC: 11.1 G/DL (ref 12–15.9)
LDLC SERPL CALC-MCNC: 70 MG/DL (ref 0–100)
LDLC/HDLC SERPL: 2.06 {RATIO}
LYMPHOCYTES # BLD MANUAL: 2.29 10*3/MM3 (ref 0.7–3.1)
LYMPHOCYTES NFR BLD MANUAL: 41.8 % (ref 19.6–45.3)
LYMPHOCYTES NFR BLD MANUAL: 5.1 % (ref 5–12)
MCH RBC QN AUTO: 33.6 PG (ref 26.6–33)
MCHC RBC AUTO-ENTMCNC: 33.7 G/DL (ref 31.5–35.7)
MCV RBC AUTO: 99.7 FL (ref 79–97)
MONOCYTES # BLD AUTO: 0.28 10*3/MM3 (ref 0.1–0.9)
NEUTROPHILS # BLD AUTO: 2.85 10*3/MM3 (ref 1.7–7)
NEUTROPHILS NFR BLD MANUAL: 50 % (ref 42.7–76)
NEUTS BAND NFR BLD MANUAL: 2 % (ref 0–5)
PLATELET # BLD AUTO: 296 10*3/MM3 (ref 140–450)
PMV BLD AUTO: 10.1 FL (ref 6–12)
POTASSIUM SERPL-SCNC: 3.7 MMOL/L (ref 3.5–5.2)
PREALB SERPL-MCNC: 20.3 MG/DL (ref 20–40)
PROT SERPL-MCNC: 5.2 G/DL (ref 6–8.5)
RBC # BLD AUTO: 3.3 10*6/MM3 (ref 3.77–5.28)
SODIUM SERPL-SCNC: 141 MMOL/L (ref 136–145)
TRIGL SERPL-MCNC: 94 MG/DL (ref 0–150)
TSH SERPL DL<=0.05 MIU/L-ACNC: 2.55 UIU/ML (ref 0.27–4.2)
VIT B12 BLD-MCNC: 468 PG/ML (ref 211–946)
VLDLC SERPL-MCNC: 18.8 MG/DL
WBC # BLD AUTO: 5.48 10*3/MM3 (ref 3.4–10.8)
WBC MORPH BLD: NORMAL

## 2020-08-21 PROCEDURE — 83036 HEMOGLOBIN GLYCOSYLATED A1C: CPT | Performed by: INTERNAL MEDICINE

## 2020-08-21 PROCEDURE — 85025 COMPLETE CBC W/AUTO DIFF WBC: CPT | Performed by: INTERNAL MEDICINE

## 2020-08-21 PROCEDURE — 80076 HEPATIC FUNCTION PANEL: CPT | Performed by: INTERNAL MEDICINE

## 2020-08-21 PROCEDURE — 80177 DRUG SCRN QUAN LEVETIRACETAM: CPT | Performed by: INTERNAL MEDICINE

## 2020-08-21 PROCEDURE — 80061 LIPID PANEL: CPT | Performed by: INTERNAL MEDICINE

## 2020-08-21 PROCEDURE — 80048 BASIC METABOLIC PNL TOTAL CA: CPT | Performed by: INTERNAL MEDICINE

## 2020-08-21 PROCEDURE — 84443 ASSAY THYROID STIM HORMONE: CPT | Performed by: INTERNAL MEDICINE

## 2020-08-21 PROCEDURE — 82607 VITAMIN B-12: CPT | Performed by: INTERNAL MEDICINE

## 2020-08-21 PROCEDURE — 36415 COLL VENOUS BLD VENIPUNCTURE: CPT | Performed by: INTERNAL MEDICINE

## 2020-08-21 PROCEDURE — 84134 ASSAY OF PREALBUMIN: CPT | Performed by: INTERNAL MEDICINE

## 2020-08-21 NOTE — THERAPY DISCHARGE NOTE
Acute Care - Occupational Therapy Discharge Summary  [facility]     Patient Name: Josi Mohamud  : 1945  MRN: [id number]    Today's Date: 2020  Onset of Illness/Injury or Date of Surgery: 08/15/20    Date of Referral to OT: 08/15/20  Referring Physician: Dr. Medrano      Admit Date: 8/15/2020        OT Recommendation and Plan    Visit Dx:    ICD-10-CM ICD-9-CM   1. Type III open fracture of left ankle, initial encounter S82.892C 824.9   2. COPD (chronic obstructive pulmonary disease) case management patient (CMS/[facility]) J44.9 496   3. Impaired mobility Z74.09 799.89   4. Decreased activities of daily living (ADL) Z78.9 V49.89               Rehab Goal Summary     Row Name 20 0730             Bed Mobility Goal 1 (PT)    Activity/Assistive Device (Bed Mobility Goal 1, PT)  sit to supine/supine to sit  -MF      [place] Level/Cues Needed (Bed Mobility Goal 1, PT)  minimum assist (75% or more patient effort);2 person assist  -MF      Time Frame (Bed Mobility Goal 1, PT)  long term goal (LTG);10 days  -MF      Progress/Outcomes (Bed Mobility Goal 1, PT)  goal not met  -MF         Transfer Goal 1 (PT)    Activity/Assistive Device (Transfer Goal 1, PT)  sit-to-stand/stand-to-sit;bed-to-chair/chair-to-bed;lateral transfer;walker, rolling  -MF      [place] Level/Cues Needed (Transfer Goal 1, PT)  moderate assist (50-74% patient effort);2 person assist  -MF      Time Frame (Transfer Goal 1, PT)  long term goal (LTG);10 days  -MF      Progress/Outcome (Transfer Goal 1, PT)  goal not met  -MF         Transfer Goal 1 (OT)    Activity/Assistive Device (Transfer Goal 1, OT)  sit-to-stand/stand-to-sit;bed-to-chair/chair-to-bed;shower chair;commode;commode, bedside without drop arms;walker, rolling  -TS      [place] Level/Cues Needed (Transfer Goal 1, OT)  contact guard assist  -TS      Time Frame (Transfer Goal 1, OT)  long term goal (LTG);by discharge  -TS      Progress/Outcome (Transfer Goal 1, OT)   goal not met  -TS         Dressing Goal 1 (OT)    Activity/Assistive Device (Dressing Goal 1, OT)  lower body dressing  -TS      [place]/Cues Needed (Dressing Goal 1, OT)  minimum assist (75% or more patient effort);other (see comments)  -TS      Time Frame (Dressing Goal 1, OT)  long term goal (LTG);by discharge  -TS      Progress/Outcome (Dressing Goal 1, OT)  goal not met  -TS         Toileting Goal 1 (OT)    Activity/Device (Toileting Goal 1, OT)  toileting skills, all;commode;commode, bedside without drop arms  -TS      [place] Level/Cues Needed (Toileting Goal 1, OT)  supervision required  -TS      Time Frame (Toileting Goal 1, OT)  long term goal (LTG);by discharge  -TS      Progress/Outcome (Toileting Goal 1, OT)  goal not met  -TS        User Key  (r) = Recorded By, (t) = Taken By, (c) = Cosigned By    Initials Name Provider Type Discipline    TS Mally Scherer, TREVIZO/L Occupational Therapy Assistant OT    Adeola Hernandez, PTA Physical Therapy Assistant PT          Outcome Measures     Row Name 08/20/20 1000 08/18/20 1000          How much help from another is currently needed...    Putting on and taking off regular lower body clothing?  1  -TS (r) NA (t) TS (c)  2  -CH (r) NA (t) CH (c)     Bathing (including washing, rinsing, and drying)  3  -TS (r) NA (t) TS (c)  3  -CH (r) NA (t) CH (c)     Toileting (which includes using toilet bed pan or urinal)  2  -TS (r) NA (t) TS (c)  2  -CH (r) NA (t) CH (c)     Putting on and taking off regular upper body clothing  3  -TS (r) NA (t) TS (c)  3  -CH (r) NA (t) CH (c)     Taking care of personal grooming (such as brushing teeth)  3  -TS (r) NA (t) TS (c)  4  -CH (r) NA (t) CH (c)     Eating meals  4  -TS (r) NA (t) TS (c)  4  -CH (r) NA (t) CH (c)     AM-PAC 6 Clicks Score (OT)  16  -TS (r) NA (t)  18  -CH (r) NA (t)       User Key  (r) = Recorded By, (t) = Taken By, (c) = Cosigned By    Initials Name Provider Type    Kayla Sullivan, OTR/L  Occupational Therapist    Mally Agudelo COTA/L Occupational Therapy Assistant    Jia Cain OT Student          Therapy Suggested Charges     Code   Minutes Charges    46750 (CPT®) Hc Ot Neuromusc Re Education Ea 15 Min      19487 (CPT®) Hc Ot Ther Proc Ea 15 Min      32284 (CPT®) Hc Ot Therapeutic Act Ea 15 Min      66259 (CPT®) Hc Ot Manual Therapy Ea 15 Min      55618 (CPT®) Hc Ot Iontophoresis Ea 15 Min      81119 (CPT®) Hc Ot Elec Stim Ea-Per 15 Min      90787 (CPT®) Hc Ot Ultrasound Ea 15 Min      59132 (CPT®) Hc Ot Self Care/Mgmt/Train Ea 15 Min 71 5    Total  71 5              OT Discharge Summary  Reason for Discharge: Discharge from facility  Outcomes Achieved: Refer to plan of care for updates on goals achieved  Discharge Destination: [facility]      BETHANY Hawkins  8/21/2020

## 2020-08-21 NOTE — THERAPY DISCHARGE NOTE
Acute Care - Physical Therapy Discharge Summary  [facility]       Patient Name: Josi Mohamud  : 1945  MRN: [id number]    Today's Date: 2020  Onset of Illness/Injury or Date of Surgery: 08/15/20       Referring Physician: Dr. Medrano      Admit Date: 8/15/2020      PT Recommendation and Plan    Visit Dx:    ICD-10-CM ICD-9-CM   1. Type III open fracture of left ankle, initial encounter S82.892C 824.9   2. COPD (chronic obstructive pulmonary disease) case management patient (CMS/[facility]) J44.9 496   3. Impaired mobility Z74.09 799.89   4. Decreased activities of daily living (ADL) Z78.9 V49.89       Outcome Measures     Row Name 20 1000 20 1000          How much help from another is currently needed...    Putting on and taking off regular lower body clothing?  1  -TS (r) NA (t) TS (c)  2  -CH (r) NA (t) CH (c)     Bathing (including washing, rinsing, and drying)  3  -TS (r) NA (t) TS (c)  3  -CH (r) NA (t) CH (c)     Toileting (which includes using toilet bed pan or urinal)  2  -TS (r) NA (t) TS (c)  2  -CH (r) NA (t) CH (c)     Putting on and taking off regular upper body clothing  3  -TS (r) NA (t) TS (c)  3  -CH (r) NA (t) CH (c)     Taking care of personal grooming (such as brushing teeth)  3  -TS (r) NA (t) TS (c)  4  -CH (r) NA (t) CH (c)     Eating meals  4  -TS (r) NA (t) TS (c)  4  -CH (r) NA (t) CH (c)     AM-PAC 6 Clicks Score (OT)  16  -TS (r) NA (t)  18  -CH (r) NA (t)       User Key  (r) = Recorded By, (t) = Taken By, (c) = Cosigned By    Initials Name Provider Type    CH Kayla Gaona, OTR/L Occupational Therapist    TS Mally Scherer, TREVIZO/L Occupational Therapy Assistant    Jia Cain OT Student              Rehab Goal Summary     Row Name 20 1496             Bed Mobility Goal 1 (PT)    Activity/Assistive Device (Bed Mobility Goal 1, PT)  sit to supine/supine to sit  -MF      [place] Level/Cues Needed (Bed Mobility Goal 1, PT)  minimum assist (75% or more  patient effort);2 person assist  -MF      Time Frame (Bed Mobility Goal 1, PT)  long term goal (LTG);10 days  -MF      Progress/Outcomes (Bed Mobility Goal 1, PT)  goal not met  -MF         Transfer Goal 1 (PT)    Activity/Assistive Device (Transfer Goal 1, PT)  sit-to-stand/stand-to-sit;bed-to-chair/chair-to-bed;lateral transfer;walker, rolling  -MF      [place] Level/Cues Needed (Transfer Goal 1, PT)  moderate assist (50-74% patient effort);2 person assist  -MF      Time Frame (Transfer Goal 1, PT)  long term goal (LTG);10 days  -MF      Progress/Outcome (Transfer Goal 1, PT)  goal not met  -MF        User Key  (r) = Recorded By, (t) = Taken By, (c) = Cosigned By    Initials Name Provider Type Discipline    Adeola Hernandez, PTA Physical Therapy Assistant PT              PT Discharge Summary  Anticipated Discharge Disposition (PT): skilled nursing facility  Reason for Discharge: Discharge from facility  Outcomes Achieved: Unable to make functional progress toward goals at this time  Discharge Destination: SNF      Adeola Nixon PTA   8/21/2020

## 2020-08-24 ENCOUNTER — LAB REQUISITION (OUTPATIENT)
Dept: LAB | Facility: HOSPITAL | Age: 75
End: 2020-08-24

## 2020-08-24 DIAGNOSIS — Z00.00 ENCOUNTER FOR GENERAL ADULT MEDICAL EXAMINATION WITHOUT ABNORMAL FINDINGS: ICD-10-CM

## 2020-08-24 LAB
BACTERIA UR QL AUTO: ABNORMAL /HPF
BILIRUB UR QL STRIP: NEGATIVE
CLARITY UR: ABNORMAL
COLOR UR: YELLOW
GLUCOSE UR STRIP-MCNC: NEGATIVE MG/DL
HGB UR QL STRIP.AUTO: NEGATIVE
HYALINE CASTS UR QL AUTO: ABNORMAL /LPF
KETONES UR QL STRIP: NEGATIVE
LEUKOCYTE ESTERASE UR QL STRIP.AUTO: ABNORMAL
LEVETIRACETAM SERPL-MCNC: 14.6 UG/ML (ref 10–40)
NITRITE UR QL STRIP: NEGATIVE
PH UR STRIP.AUTO: 7.5 [PH] (ref 5–8)
PROT UR QL STRIP: NEGATIVE
RBC # UR: ABNORMAL /HPF
REF LAB TEST METHOD: ABNORMAL
SP GR UR STRIP: 1.02 (ref 1–1.03)
SQUAMOUS #/AREA URNS HPF: ABNORMAL /HPF
UROBILINOGEN UR QL STRIP: ABNORMAL
WBC UR QL AUTO: ABNORMAL /HPF

## 2020-08-24 PROCEDURE — 87186 SC STD MICRODIL/AGAR DIL: CPT | Performed by: INTERNAL MEDICINE

## 2020-08-24 PROCEDURE — 87088 URINE BACTERIA CULTURE: CPT | Performed by: INTERNAL MEDICINE

## 2020-08-24 PROCEDURE — 87077 CULTURE AEROBIC IDENTIFY: CPT | Performed by: INTERNAL MEDICINE

## 2020-08-24 PROCEDURE — 87086 URINE CULTURE/COLONY COUNT: CPT | Performed by: INTERNAL MEDICINE

## 2020-08-24 PROCEDURE — 81001 URINALYSIS AUTO W/SCOPE: CPT | Performed by: INTERNAL MEDICINE

## 2020-08-25 ENCOUNTER — LAB REQUISITION (OUTPATIENT)
Dept: LAB | Facility: HOSPITAL | Age: 75
End: 2020-08-25

## 2020-08-25 ENCOUNTER — EPISODE CHANGES (OUTPATIENT)
Dept: CASE MANAGEMENT | Facility: OTHER | Age: 75
End: 2020-08-25

## 2020-08-25 DIAGNOSIS — Z00.00 ENCOUNTER FOR GENERAL ADULT MEDICAL EXAMINATION WITHOUT ABNORMAL FINDINGS: ICD-10-CM

## 2020-08-25 LAB
BACTERIA UR QL AUTO: ABNORMAL /HPF
BILIRUB UR QL STRIP: NEGATIVE
CLARITY UR: CLEAR
COLOR UR: YELLOW
GLUCOSE UR STRIP-MCNC: NEGATIVE MG/DL
HGB UR QL STRIP.AUTO: NEGATIVE
HYALINE CASTS UR QL AUTO: ABNORMAL /LPF
KETONES UR QL STRIP: NEGATIVE
LEUKOCYTE ESTERASE UR QL STRIP.AUTO: ABNORMAL
NITRITE UR QL STRIP: NEGATIVE
PH UR STRIP.AUTO: 6.5 [PH] (ref 5–8)
PROT UR QL STRIP: NEGATIVE
RBC # UR: ABNORMAL /HPF
REF LAB TEST METHOD: ABNORMAL
SP GR UR STRIP: 1.02 (ref 1–1.03)
SQUAMOUS #/AREA URNS HPF: ABNORMAL /HPF
UROBILINOGEN UR QL STRIP: ABNORMAL
WBC UR QL AUTO: ABNORMAL /HPF

## 2020-08-25 PROCEDURE — 87086 URINE CULTURE/COLONY COUNT: CPT | Performed by: INTERNAL MEDICINE

## 2020-08-25 PROCEDURE — 81001 URINALYSIS AUTO W/SCOPE: CPT | Performed by: INTERNAL MEDICINE

## 2020-08-26 LAB — BACTERIA SPEC AEROBE CULT: NORMAL

## 2020-08-27 ENCOUNTER — PATIENT OUTREACH (OUTPATIENT)
Dept: CASE MANAGEMENT | Facility: OTHER | Age: 75
End: 2020-08-27

## 2020-08-27 ENCOUNTER — TELEPHONE (OUTPATIENT)
Dept: NEUROLOGY | Facility: CLINIC | Age: 75
End: 2020-08-27

## 2020-08-27 LAB
BACTERIA SPEC AEROBE CULT: ABNORMAL
BACTERIA SPEC AEROBE CULT: ABNORMAL

## 2020-08-27 NOTE — OUTREACH NOTE
Care Coordination Note    [facility] provided [facility]  a SNF update on the ACO patient via e-mail.    Jada Gaona RN  Ambulatory     8/27/2020, 13:20

## 2020-08-31 ENCOUNTER — APPOINTMENT (OUTPATIENT)
Dept: MAMMOGRAPHY | Facility: HOSPITAL | Age: 75
End: 2020-08-31

## 2020-09-02 NOTE — TELEPHONE ENCOUNTER
Able to contact [facility] today.  Explained that Dr. Glasgow is out of the office this week.  Suggested she talk to the nurse or physician at [facility] about her concerns.  She thought Dr. Glasgow would have to order tests, explained that her physician at [facility] can order tests.

## 2020-10-08 ENCOUNTER — PATIENT OUTREACH (OUTPATIENT)
Dept: CASE MANAGEMENT | Facility: OTHER | Age: 75
End: 2020-10-08

## 2020-10-08 NOTE — OUTREACH NOTE
SNF Follow-up Note      Responses   Acute Facility Discharged From  [facility]   Acute Discharge Date  08/20/20   Name of the Skilled Nursing Facility?  [facility]   Tier Level of the Skilled Nursing Facility  3   Purpose of SNF Admission  -- [SW unsure]   Estimated length of stay for the patient?  SW unsure        Spoke with MIKEY Grey at [facility]. She was very hesitant to release any information about the patient.     Jada Gaona RN  Ambulatory     10/8/2020, 09:48 CDT

## 2020-10-12 ENCOUNTER — LAB REQUISITION (OUTPATIENT)
Dept: LAB | Facility: HOSPITAL | Age: 75
End: 2020-10-12

## 2020-10-12 DIAGNOSIS — Z00.00 ENCOUNTER FOR GENERAL ADULT MEDICAL EXAMINATION WITHOUT ABNORMAL FINDINGS: ICD-10-CM

## 2020-10-12 LAB
ANION GAP SERPL CALCULATED.3IONS-SCNC: 13 MMOL/L (ref 5–15)
BASOPHILS # BLD AUTO: 0.05 10*3/MM3 (ref 0–0.2)
BASOPHILS NFR BLD AUTO: 0.7 % (ref 0–1.5)
BUN SERPL-MCNC: 12 MG/DL (ref 8–23)
BUN/CREAT SERPL: 14.6 (ref 7–25)
CALCIUM SPEC-SCNC: 8.9 MG/DL (ref 8.6–10.5)
CHLORIDE SERPL-SCNC: 104 MMOL/L (ref 98–107)
CO2 SERPL-SCNC: 22 MMOL/L (ref 22–29)
CREAT SERPL-MCNC: 0.82 MG/DL (ref 0.57–1)
DEPRECATED RDW RBC AUTO: 48.4 FL (ref 37–54)
EOSINOPHIL # BLD AUTO: 0.12 10*3/MM3 (ref 0–0.4)
EOSINOPHIL NFR BLD AUTO: 1.7 % (ref 0.3–6.2)
ERYTHROCYTE [DISTWIDTH] IN BLOOD BY AUTOMATED COUNT: 13.1 % (ref 12.3–15.4)
GFR SERPL CREATININE-BSD FRML MDRD: 68 ML/MIN/1.73
GLUCOSE SERPL-MCNC: 90 MG/DL (ref 65–99)
HCT VFR BLD AUTO: 38 % (ref 34–46.6)
HGB BLD-MCNC: 12.7 G/DL (ref 12–15.9)
IMM GRANULOCYTES # BLD AUTO: 0.03 10*3/MM3 (ref 0–0.05)
IMM GRANULOCYTES NFR BLD AUTO: 0.4 % (ref 0–0.5)
LYMPHOCYTES # BLD AUTO: 2.35 10*3/MM3 (ref 0.7–3.1)
LYMPHOCYTES NFR BLD AUTO: 32.4 % (ref 19.6–45.3)
MCH RBC QN AUTO: 33.6 PG (ref 26.6–33)
MCHC RBC AUTO-ENTMCNC: 33.4 G/DL (ref 31.5–35.7)
MCV RBC AUTO: 100.5 FL (ref 79–97)
MONOCYTES # BLD AUTO: 0.56 10*3/MM3 (ref 0.1–0.9)
MONOCYTES NFR BLD AUTO: 7.7 % (ref 5–12)
NEUTROPHILS NFR BLD AUTO: 4.14 10*3/MM3 (ref 1.7–7)
NEUTROPHILS NFR BLD AUTO: 57.1 % (ref 42.7–76)
NRBC BLD AUTO-RTO: 0.6 /100 WBC (ref 0–0.2)
PLATELET # BLD AUTO: 294 10*3/MM3 (ref 140–450)
PMV BLD AUTO: 11.1 FL (ref 6–12)
POTASSIUM SERPL-SCNC: 4.3 MMOL/L (ref 3.5–5.2)
RBC # BLD AUTO: 3.78 10*6/MM3 (ref 3.77–5.28)
SODIUM SERPL-SCNC: 139 MMOL/L (ref 136–145)
WBC # BLD AUTO: 7.25 10*3/MM3 (ref 3.4–10.8)

## 2020-10-12 PROCEDURE — 85025 COMPLETE CBC W/AUTO DIFF WBC: CPT | Performed by: NURSE PRACTITIONER

## 2020-10-12 PROCEDURE — 36415 COLL VENOUS BLD VENIPUNCTURE: CPT | Performed by: NURSE PRACTITIONER

## 2020-10-12 PROCEDURE — 80048 BASIC METABOLIC PNL TOTAL CA: CPT | Performed by: NURSE PRACTITIONER

## 2020-10-22 ENCOUNTER — LAB REQUISITION (OUTPATIENT)
Dept: LAB | Facility: HOSPITAL | Age: 75
End: 2020-10-22

## 2020-10-22 DIAGNOSIS — Z00.00 ENCOUNTER FOR GENERAL ADULT MEDICAL EXAMINATION WITHOUT ABNORMAL FINDINGS: ICD-10-CM

## 2020-10-22 LAB
25(OH)D3 SERPL-MCNC: 33 NG/ML (ref 30–100)
ALBUMIN SERPL-MCNC: 3.3 G/DL (ref 3.5–5.2)
ALBUMIN/GLOB SERPL: 1.4 G/DL
ALP SERPL-CCNC: 86 U/L (ref 39–117)
ALT SERPL W P-5'-P-CCNC: 10 U/L (ref 1–33)
ANION GAP SERPL CALCULATED.3IONS-SCNC: 5 MMOL/L (ref 5–15)
AST SERPL-CCNC: 14 U/L (ref 1–32)
BASOPHILS # BLD MANUAL: 0.09 10*3/MM3 (ref 0–0.2)
BASOPHILS NFR BLD AUTO: 2.1 % (ref 0–1.5)
BILIRUB SERPL-MCNC: 0.2 MG/DL (ref 0–1.2)
BUN SERPL-MCNC: 14 MG/DL (ref 8–23)
BUN/CREAT SERPL: 15.2 (ref 7–25)
CALCIUM SPEC-SCNC: 8.8 MG/DL (ref 8.6–10.5)
CHLORIDE SERPL-SCNC: 107 MMOL/L (ref 98–107)
CO2 SERPL-SCNC: 27 MMOL/L (ref 22–29)
CREAT SERPL-MCNC: 0.92 MG/DL (ref 0.57–1)
DEPRECATED RDW RBC AUTO: 50.4 FL (ref 37–54)
EOSINOPHIL # BLD MANUAL: 0.09 10*3/MM3 (ref 0–0.4)
EOSINOPHIL NFR BLD MANUAL: 2.1 % (ref 0.3–6.2)
ERYTHROCYTE [DISTWIDTH] IN BLOOD BY AUTOMATED COUNT: 13.6 % (ref 12.3–15.4)
GFR SERPL CREATININE-BSD FRML MDRD: 60 ML/MIN/1.73
GIANT PLATELETS: ABNORMAL
GLOBULIN UR ELPH-MCNC: 2.4 GM/DL
GLUCOSE SERPL-MCNC: 109 MG/DL (ref 65–99)
HCT VFR BLD AUTO: 34.9 % (ref 34–46.6)
HGB BLD-MCNC: 11.5 G/DL (ref 12–15.9)
LYMPHOCYTES # BLD MANUAL: 2.33 10*3/MM3 (ref 0.7–3.1)
LYMPHOCYTES NFR BLD MANUAL: 11.5 % (ref 5–12)
LYMPHOCYTES NFR BLD MANUAL: 56.3 % (ref 19.6–45.3)
MCH RBC QN AUTO: 33.4 PG (ref 26.6–33)
MCHC RBC AUTO-ENTMCNC: 33 G/DL (ref 31.5–35.7)
MCV RBC AUTO: 101.5 FL (ref 79–97)
MONOCYTES # BLD AUTO: 0.47 10*3/MM3 (ref 0.1–0.9)
NEUTROPHILS # BLD AUTO: 1.16 10*3/MM3 (ref 1.7–7)
NEUTROPHILS NFR BLD MANUAL: 25 % (ref 42.7–76)
NEUTS BAND NFR BLD MANUAL: 3.1 % (ref 0–5)
PLATELET # BLD AUTO: 306 10*3/MM3 (ref 140–450)
PMV BLD AUTO: 10.3 FL (ref 6–12)
POTASSIUM SERPL-SCNC: 4.2 MMOL/L (ref 3.5–5.2)
PROT SERPL-MCNC: 5.7 G/DL (ref 6–8.5)
RBC # BLD AUTO: 3.44 10*6/MM3 (ref 3.77–5.28)
RBC MORPH BLD: NORMAL
SODIUM SERPL-SCNC: 139 MMOL/L (ref 136–145)
WBC # BLD AUTO: 4.13 10*3/MM3 (ref 3.4–10.8)
WBC MORPH BLD: NORMAL

## 2020-10-22 PROCEDURE — 80053 COMPREHEN METABOLIC PANEL: CPT | Performed by: NURSE PRACTITIONER

## 2020-10-22 PROCEDURE — 36415 COLL VENOUS BLD VENIPUNCTURE: CPT | Performed by: NURSE PRACTITIONER

## 2020-10-22 PROCEDURE — 85025 COMPLETE CBC W/AUTO DIFF WBC: CPT | Performed by: NURSE PRACTITIONER

## 2020-10-22 PROCEDURE — 82306 VITAMIN D 25 HYDROXY: CPT | Performed by: NURSE PRACTITIONER

## 2021-01-01 ENCOUNTER — APPOINTMENT (OUTPATIENT)
Dept: CT IMAGING | Facility: HOSPITAL | Age: 76
End: 2021-01-01

## 2021-01-01 ENCOUNTER — HOSPITAL ENCOUNTER (OUTPATIENT)
Dept: ULTRASOUND IMAGING | Facility: HOSPITAL | Age: 76
Discharge: HOME | End: 2021-09-14
Admitting: INTERNAL MEDICINE

## 2021-01-01 ENCOUNTER — APPOINTMENT (OUTPATIENT)
Dept: GENERAL RADIOLOGY | Facility: HOSPITAL | Age: 76
End: 2021-01-01

## 2021-01-01 ENCOUNTER — HOSPITAL ENCOUNTER (OUTPATIENT)
Dept: RADIATION ONCOLOGY | Facility: HOSPITAL | Age: 76
Setting detail: RADIATION/ONCOLOGY SERIES
Discharge: HOME | End: 2021-09-09

## 2021-01-01 ENCOUNTER — OFFICE VISIT (OUTPATIENT)
Dept: RADIATION ONCOLOGY | Facility: HOSPITAL | Age: 76
End: 2021-01-01

## 2021-01-01 ENCOUNTER — ANESTHESIA EVENT (OUTPATIENT)
Dept: PERIOP | Facility: HOSPITAL | Age: 76
End: 2021-01-01

## 2021-01-01 ENCOUNTER — HOSPITAL ENCOUNTER (OUTPATIENT)
Dept: RADIATION ONCOLOGY | Facility: HOSPITAL | Age: 76
Setting detail: RADIATION/ONCOLOGY SERIES
End: 2021-01-01

## 2021-01-01 ENCOUNTER — APPOINTMENT (OUTPATIENT)
Dept: ULTRASOUND IMAGING | Facility: HOSPITAL | Age: 76
End: 2021-01-01

## 2021-01-01 ENCOUNTER — TELEPHONE (OUTPATIENT)
Dept: ONCOLOGY | Facility: CLINIC | Age: 76
End: 2021-01-01

## 2021-01-01 ENCOUNTER — LAB (OUTPATIENT)
Dept: LAB | Facility: HOSPITAL | Age: 76
End: 2021-01-01

## 2021-01-01 ENCOUNTER — HOSPITAL ENCOUNTER (INPATIENT)
Facility: HOSPITAL | Age: 76
LOS: 1 days | Discharge: NURSING, REHAB OR LONG-TERM CARE | End: 2021-07-24
Attending: FAMILY MEDICINE | Admitting: FAMILY MEDICINE

## 2021-01-01 ENCOUNTER — APPOINTMENT (OUTPATIENT)
Dept: CARDIOLOGY | Facility: HOSPITAL | Age: 76
End: 2021-01-01

## 2021-01-01 ENCOUNTER — LAB REQUISITION (OUTPATIENT)
Dept: LAB | Facility: HOSPITAL | Age: 76
End: 2021-01-01

## 2021-01-01 ENCOUNTER — OFFICE VISIT (OUTPATIENT)
Dept: VASCULAR SURGERY | Facility: CLINIC | Age: 76
End: 2021-01-01

## 2021-01-01 ENCOUNTER — HOSPITAL ENCOUNTER (INPATIENT)
Facility: HOSPITAL | Age: 76
LOS: 3 days | Discharge: NURSING, REHAB OR LONG-TERM CARE | End: 2021-09-29
Attending: INTERNAL MEDICINE | Admitting: INTERNAL MEDICINE

## 2021-01-01 ENCOUNTER — OFFICE VISIT (OUTPATIENT)
Dept: NEUROSURGERY | Facility: CLINIC | Age: 76
End: 2021-01-01

## 2021-01-01 ENCOUNTER — HOSPITAL ENCOUNTER (OUTPATIENT)
Dept: ULTRASOUND IMAGING | Facility: HOSPITAL | Age: 76
Discharge: HOME | End: 2021-09-08

## 2021-01-01 ENCOUNTER — HOSPITAL ENCOUNTER (EMERGENCY)
Facility: HOSPITAL | Age: 76
Discharge: HOME | End: 2021-04-04
Attending: EMERGENCY MEDICINE | Admitting: EMERGENCY MEDICINE

## 2021-01-01 ENCOUNTER — HOSPITAL ENCOUNTER (INPATIENT)
Facility: HOSPITAL | Age: 76
LOS: 4 days | Discharge: NURSING, REHAB OR LONG-TERM CARE | End: 2021-11-13
Attending: INTERNAL MEDICINE | Admitting: FAMILY MEDICINE

## 2021-01-01 ENCOUNTER — ANESTHESIA (OUTPATIENT)
Dept: PERIOP | Facility: HOSPITAL | Age: 76
End: 2021-01-01

## 2021-01-01 ENCOUNTER — OFFICE VISIT (OUTPATIENT)
Dept: ONCOLOGY | Facility: CLINIC | Age: 76
End: 2021-01-01

## 2021-01-01 ENCOUNTER — TELEPHONE (OUTPATIENT)
Dept: PULMONOLOGY | Facility: CLINIC | Age: 76
End: 2021-01-01

## 2021-01-01 ENCOUNTER — HOSPITAL ENCOUNTER (OUTPATIENT)
Dept: CT IMAGING | Facility: HOSPITAL | Age: 76
Discharge: HOME | End: 2021-08-18
Admitting: INTERNAL MEDICINE

## 2021-01-01 ENCOUNTER — TELEPHONE (OUTPATIENT)
Dept: OTOLARYNGOLOGY | Facility: CLINIC | Age: 76
End: 2021-01-01

## 2021-01-01 ENCOUNTER — HOSPITAL ENCOUNTER (OUTPATIENT)
Dept: CT IMAGING | Facility: HOSPITAL | Age: 76
Discharge: HOME | End: 2021-10-26
Admitting: PHYSICIAN ASSISTANT

## 2021-01-01 ENCOUNTER — READMISSION MANAGEMENT (OUTPATIENT)
Dept: CALL CENTER | Facility: HOSPITAL | Age: 76
End: 2021-01-01

## 2021-01-01 ENCOUNTER — HOSPITAL ENCOUNTER (OUTPATIENT)
Dept: CT IMAGING | Facility: HOSPITAL | Age: 76
Discharge: HOME | End: 2021-08-16

## 2021-01-01 ENCOUNTER — HOSPITAL ENCOUNTER (OUTPATIENT)
Dept: CT IMAGING | Facility: HOSPITAL | Age: 76
Discharge: HOME | End: 2021-05-24
Admitting: PHYSICIAN ASSISTANT

## 2021-01-01 ENCOUNTER — PATIENT ROUNDING (BHMG ONLY) (OUTPATIENT)
Dept: NEUROSURGERY | Facility: CLINIC | Age: 76
End: 2021-01-01

## 2021-01-01 ENCOUNTER — HOSPITAL ENCOUNTER (OUTPATIENT)
Dept: RADIATION ONCOLOGY | Facility: HOSPITAL | Age: 76
Setting detail: RADIATION/ONCOLOGY SERIES
Discharge: HOME | End: 2021-09-07

## 2021-01-01 ENCOUNTER — OFFICE VISIT (OUTPATIENT)
Dept: PULMONOLOGY | Facility: CLINIC | Age: 76
End: 2021-01-01

## 2021-01-01 ENCOUNTER — OFFICE VISIT (OUTPATIENT)
Dept: OTOLARYNGOLOGY | Facility: CLINIC | Age: 76
End: 2021-01-01

## 2021-01-01 ENCOUNTER — HOSPITAL ENCOUNTER (EMERGENCY)
Facility: HOSPITAL | Age: 76
Discharge: NURSING, REHAB OR LONG-TERM CARE | End: 2021-08-03
Admitting: EMERGENCY MEDICINE

## 2021-01-01 ENCOUNTER — HOSPITAL ENCOUNTER (OUTPATIENT)
Dept: CT IMAGING | Facility: HOSPITAL | Age: 76
Discharge: HOME | End: 2021-02-23
Admitting: RADIOLOGY

## 2021-01-01 ENCOUNTER — HOSPITAL ENCOUNTER (OUTPATIENT)
Dept: ULTRASOUND IMAGING | Facility: HOSPITAL | Age: 76
Discharge: HOME | End: 2021-06-08
Admitting: SURGERY

## 2021-01-01 ENCOUNTER — APPOINTMENT (OUTPATIENT)
Dept: MRI IMAGING | Facility: HOSPITAL | Age: 76
End: 2021-01-01

## 2021-01-01 ENCOUNTER — HOSPITAL ENCOUNTER (EMERGENCY)
Facility: HOSPITAL | Age: 76
Discharge: NURSING, REHAB OR LONG-TERM CARE | End: 2021-10-22
Attending: EMERGENCY MEDICINE | Admitting: PODIATRIST

## 2021-01-01 ENCOUNTER — HOSPITAL ENCOUNTER (OUTPATIENT)
Dept: RADIATION ONCOLOGY | Facility: HOSPITAL | Age: 76
Setting detail: RADIATION/ONCOLOGY SERIES
Discharge: HOME | End: 2021-08-31

## 2021-01-01 ENCOUNTER — PROCEDURE VISIT (OUTPATIENT)
Dept: PULMONOLOGY | Facility: CLINIC | Age: 76
End: 2021-01-01

## 2021-01-01 ENCOUNTER — TELEPHONE (OUTPATIENT)
Dept: RADIATION ONCOLOGY | Facility: HOSPITAL | Age: 76
End: 2021-01-01

## 2021-01-01 ENCOUNTER — TRANSCRIBE ORDERS (OUTPATIENT)
Dept: LAB | Facility: HOSPITAL | Age: 76
End: 2021-01-01

## 2021-01-01 ENCOUNTER — HOSPITAL ENCOUNTER (OUTPATIENT)
Dept: CT IMAGING | Facility: HOSPITAL | Age: 76
Discharge: HOME | End: 2021-08-04

## 2021-01-01 ENCOUNTER — CONSULT (OUTPATIENT)
Dept: RADIATION ONCOLOGY | Facility: HOSPITAL | Age: 76
End: 2021-01-01

## 2021-01-01 VITALS
HEART RATE: 111 BPM | DIASTOLIC BLOOD PRESSURE: 78 MMHG | SYSTOLIC BLOOD PRESSURE: 122 MMHG | HEIGHT: 59 IN | WEIGHT: 191 LBS | BODY MASS INDEX: 38.51 KG/M2 | OXYGEN SATURATION: 93 %

## 2021-01-01 VITALS
BODY MASS INDEX: 38.91 KG/M2 | SYSTOLIC BLOOD PRESSURE: 116 MMHG | TEMPERATURE: 97.9 F | DIASTOLIC BLOOD PRESSURE: 58 MMHG | HEART RATE: 66 BPM | OXYGEN SATURATION: 93 % | HEIGHT: 59 IN | RESPIRATION RATE: 18 BRPM | WEIGHT: 193 LBS

## 2021-01-01 VITALS
OXYGEN SATURATION: 100 % | SYSTOLIC BLOOD PRESSURE: 121 MMHG | HEIGHT: 59 IN | WEIGHT: 191.14 LBS | DIASTOLIC BLOOD PRESSURE: 46 MMHG | TEMPERATURE: 98.5 F | BODY MASS INDEX: 38.53 KG/M2 | RESPIRATION RATE: 16 BRPM | HEART RATE: 97 BPM

## 2021-01-01 VITALS
BODY MASS INDEX: 32.78 KG/M2 | HEART RATE: 94 BPM | DIASTOLIC BLOOD PRESSURE: 50 MMHG | BODY MASS INDEX: 35.97 KG/M2 | DIASTOLIC BLOOD PRESSURE: 62 MMHG | HEIGHT: 63 IN | HEIGHT: 64 IN | WEIGHT: 203 LBS | OXYGEN SATURATION: 95 % | SYSTOLIC BLOOD PRESSURE: 138 MMHG | SYSTOLIC BLOOD PRESSURE: 106 MMHG | WEIGHT: 192 LBS

## 2021-01-01 VITALS
OXYGEN SATURATION: 98 % | HEART RATE: 68 BPM | WEIGHT: 203 LBS | RESPIRATION RATE: 16 BRPM | HEIGHT: 63 IN | DIASTOLIC BLOOD PRESSURE: 58 MMHG | BODY MASS INDEX: 35.97 KG/M2 | TEMPERATURE: 97.4 F | SYSTOLIC BLOOD PRESSURE: 91 MMHG

## 2021-01-01 VITALS
HEIGHT: 59 IN | OXYGEN SATURATION: 97 % | SYSTOLIC BLOOD PRESSURE: 130 MMHG | BODY MASS INDEX: 38.3 KG/M2 | RESPIRATION RATE: 18 BRPM | DIASTOLIC BLOOD PRESSURE: 72 MMHG | HEART RATE: 72 BPM | WEIGHT: 190 LBS | TEMPERATURE: 97.6 F

## 2021-01-01 VITALS
SYSTOLIC BLOOD PRESSURE: 108 MMHG | RESPIRATION RATE: 18 BRPM | OXYGEN SATURATION: 95 % | DIASTOLIC BLOOD PRESSURE: 62 MMHG | HEIGHT: 59 IN | HEART RATE: 50 BPM | BODY MASS INDEX: 38.3 KG/M2 | WEIGHT: 190 LBS | TEMPERATURE: 98.3 F

## 2021-01-01 VITALS
HEIGHT: 59 IN | BODY MASS INDEX: 38.3 KG/M2 | DIASTOLIC BLOOD PRESSURE: 74 MMHG | HEART RATE: 102 BPM | OXYGEN SATURATION: 92 % | SYSTOLIC BLOOD PRESSURE: 116 MMHG | WEIGHT: 190 LBS

## 2021-01-01 VITALS
BODY MASS INDEX: 38.3 KG/M2 | HEIGHT: 59 IN | OXYGEN SATURATION: 98 % | HEART RATE: 70 BPM | RESPIRATION RATE: 14 BRPM | SYSTOLIC BLOOD PRESSURE: 140 MMHG | WEIGHT: 190 LBS | DIASTOLIC BLOOD PRESSURE: 79 MMHG | TEMPERATURE: 98.7 F

## 2021-01-01 VITALS
RESPIRATION RATE: 20 BRPM | SYSTOLIC BLOOD PRESSURE: 135 MMHG | BODY MASS INDEX: 38.3 KG/M2 | HEIGHT: 59 IN | WEIGHT: 190 LBS | HEART RATE: 78 BPM | OXYGEN SATURATION: 97 % | TEMPERATURE: 99.3 F | DIASTOLIC BLOOD PRESSURE: 62 MMHG

## 2021-01-01 VITALS — HEIGHT: 59 IN | BODY MASS INDEX: 38.51 KG/M2 | WEIGHT: 191 LBS

## 2021-01-01 VITALS
HEIGHT: 59 IN | WEIGHT: 190 LBS | BODY MASS INDEX: 38.3 KG/M2 | HEART RATE: 80 BPM | RESPIRATION RATE: 20 BRPM | SYSTOLIC BLOOD PRESSURE: 107 MMHG | DIASTOLIC BLOOD PRESSURE: 52 MMHG | OXYGEN SATURATION: 94 %

## 2021-01-01 VITALS
OXYGEN SATURATION: 100 % | DIASTOLIC BLOOD PRESSURE: 53 MMHG | RESPIRATION RATE: 16 BRPM | WEIGHT: 186.5 LBS | TEMPERATURE: 98.8 F | BODY MASS INDEX: 33.04 KG/M2 | HEART RATE: 72 BPM | SYSTOLIC BLOOD PRESSURE: 143 MMHG | HEIGHT: 63 IN

## 2021-01-01 VITALS
WEIGHT: 190 LBS | OXYGEN SATURATION: 92 % | HEART RATE: 106 BPM | BODY MASS INDEX: 38.38 KG/M2 | DIASTOLIC BLOOD PRESSURE: 66 MMHG | SYSTOLIC BLOOD PRESSURE: 127 MMHG | TEMPERATURE: 98.9 F

## 2021-01-01 VITALS
HEIGHT: 64 IN | WEIGHT: 194.6 LBS | BODY MASS INDEX: 33.22 KG/M2 | HEART RATE: 100 BPM | OXYGEN SATURATION: 93 % | TEMPERATURE: 97.9 F | DIASTOLIC BLOOD PRESSURE: 62 MMHG | RESPIRATION RATE: 18 BRPM | SYSTOLIC BLOOD PRESSURE: 113 MMHG

## 2021-01-01 VITALS
WEIGHT: 193 LBS | SYSTOLIC BLOOD PRESSURE: 114 MMHG | HEIGHT: 59 IN | RESPIRATION RATE: 18 BRPM | BODY MASS INDEX: 38.91 KG/M2 | DIASTOLIC BLOOD PRESSURE: 51 MMHG | HEART RATE: 71 BPM

## 2021-01-01 DIAGNOSIS — Z85.71 HX OF HODGKIN'S LYMPHOMA: ICD-10-CM

## 2021-01-01 DIAGNOSIS — J44.9 COPD, MODERATE (HCC): ICD-10-CM

## 2021-01-01 DIAGNOSIS — Z92.3 STATUS POST STEREOTACTIC RADIOSURGERY: ICD-10-CM

## 2021-01-01 DIAGNOSIS — F17.200 CURRENT EVERY DAY SMOKER: ICD-10-CM

## 2021-01-01 DIAGNOSIS — C79.31 BRAIN METASTASIS: Primary | ICD-10-CM

## 2021-01-01 DIAGNOSIS — C34.12 MALIGNANT NEOPLASM OF UPPER LOBE OF LEFT LUNG (HCC): ICD-10-CM

## 2021-01-01 DIAGNOSIS — E87.1 HYPONATREMIA: ICD-10-CM
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
## 2021-01-01 DIAGNOSIS — C34.12 MALIGNANT NEOPLASM OF UPPER LOBE OF LEFT LUNG (HCC): Primary | ICD-10-CM

## 2021-01-01 DIAGNOSIS — K11.8 PAROTID MASS: ICD-10-CM

## 2021-01-01 DIAGNOSIS — I65.23 BILATERAL CAROTID ARTERY STENOSIS: Primary | ICD-10-CM

## 2021-01-01 DIAGNOSIS — N17.9 ACUTE RENAL FAILURE, UNSPECIFIED ACUTE RENAL FAILURE TYPE (HCC): ICD-10-CM

## 2021-01-01 DIAGNOSIS — G47.34 NOCTURNAL HYPOXIA: Chronic | ICD-10-CM

## 2021-01-01 DIAGNOSIS — S92.302A MULTIPLE CLOSED FRACTURES OF METATARSAL BONE OF LEFT FOOT, INITIAL ENCOUNTER: ICD-10-CM

## 2021-01-01 DIAGNOSIS — N17.9 AKI (ACUTE KIDNEY INJURY) (HCC): ICD-10-CM

## 2021-01-01 DIAGNOSIS — R00.1 SYMPTOMATIC BRADYCARDIA: Primary | ICD-10-CM

## 2021-01-01 DIAGNOSIS — Z78.9 IMPAIRED MOBILITY AND ADLS: ICD-10-CM

## 2021-01-01 DIAGNOSIS — Z00.00 ENCOUNTER FOR GENERAL ADULT MEDICAL EXAMINATION WITHOUT ABNORMAL FINDINGS: ICD-10-CM

## 2021-01-01 DIAGNOSIS — S92.312A CLOSED DISPLACED FRACTURE OF FIRST METATARSAL BONE OF LEFT FOOT, INITIAL ENCOUNTER: ICD-10-CM

## 2021-01-01 DIAGNOSIS — J18.9 PNEUMONIA OF LEFT UPPER LOBE DUE TO INFECTIOUS ORGANISM: Primary | ICD-10-CM

## 2021-01-01 DIAGNOSIS — Z87.891 FORMER SMOKER: ICD-10-CM

## 2021-01-01 DIAGNOSIS — Z78.9 NON-SMOKER: ICD-10-CM

## 2021-01-01 DIAGNOSIS — E87.1 HYPONATREMIA: Primary | ICD-10-CM

## 2021-01-01 DIAGNOSIS — S80.12XD TRAUMATIC HEMATOMA OF LOWER LEG, LEFT, SUBSEQUENT ENCOUNTER: ICD-10-CM

## 2021-01-01 DIAGNOSIS — G93.89 BRAIN MASS: ICD-10-CM

## 2021-01-01 DIAGNOSIS — Z74.09 IMPAIRED MOBILITY: ICD-10-CM

## 2021-01-01 DIAGNOSIS — I65.23 BILATERAL CAROTID ARTERY STENOSIS: ICD-10-CM

## 2021-01-01 DIAGNOSIS — Z78.9 DECREASED ACTIVITIES OF DAILY LIVING (ADL): ICD-10-CM

## 2021-01-01 DIAGNOSIS — S83.92XA SPRAIN OF LEFT KNEE, UNSPECIFIED LIGAMENT, INITIAL ENCOUNTER: ICD-10-CM

## 2021-01-01 DIAGNOSIS — R77.8 ELEVATED TROPONIN: ICD-10-CM

## 2021-01-01 DIAGNOSIS — S81.812A SKIN TEAR OF LEFT LOWER LEG WITHOUT COMPLICATION, INITIAL ENCOUNTER: Primary | ICD-10-CM

## 2021-01-01 DIAGNOSIS — D49.0 PAROTID NEOPLASM: ICD-10-CM

## 2021-01-01 DIAGNOSIS — E11.40 TYPE 2 DIABETES MELLITUS WITH DIABETIC NEUROPATHY, UNSPECIFIED WHETHER LONG TERM INSULIN USE (HCC): ICD-10-CM

## 2021-01-01 DIAGNOSIS — C79.31 BRAIN METASTASIS: ICD-10-CM

## 2021-01-01 DIAGNOSIS — J43.9 PULMONARY EMPHYSEMA, UNSPECIFIED EMPHYSEMA TYPE (HCC): ICD-10-CM

## 2021-01-01 DIAGNOSIS — R13.14 PHARYNGOESOPHAGEAL DYSPHAGIA: ICD-10-CM

## 2021-01-01 DIAGNOSIS — J44.9 COPD, MILD (HCC): Primary | Chronic | ICD-10-CM

## 2021-01-01 DIAGNOSIS — J44.9 COPD, MODERATE (HCC): Primary | ICD-10-CM

## 2021-01-01 DIAGNOSIS — S80.12XA HEMATOMA OF LEFT LOWER EXTREMITY, INITIAL ENCOUNTER: ICD-10-CM

## 2021-01-01 DIAGNOSIS — S93.602A SPRAIN OF LEFT FOOT, INITIAL ENCOUNTER: ICD-10-CM

## 2021-01-01 DIAGNOSIS — R13.10 DYSPHAGIA, UNSPECIFIED TYPE: ICD-10-CM

## 2021-01-01 DIAGNOSIS — K11.8 PAROTID MASS: Primary | ICD-10-CM

## 2021-01-01 DIAGNOSIS — E66.9 OBESITY (BMI 30-39.9): ICD-10-CM

## 2021-01-01 DIAGNOSIS — G93.89 BRAIN MASS: Primary | ICD-10-CM

## 2021-01-01 DIAGNOSIS — R13.12 OROPHARYNGEAL DYSPHAGIA: ICD-10-CM

## 2021-01-01 DIAGNOSIS — R49.0 HOARSENESS: Primary | ICD-10-CM

## 2021-01-01 DIAGNOSIS — C34.90 PRIMARY MALIGNANT NEOPLASM OF LUNG METASTATIC TO OTHER SITE, UNSPECIFIED LATERALITY (HCC): ICD-10-CM

## 2021-01-01 DIAGNOSIS — Z01.818 PREOPERATIVE TESTING: Primary | ICD-10-CM

## 2021-01-01 DIAGNOSIS — I10 ESSENTIAL HYPERTENSION: ICD-10-CM

## 2021-01-01 DIAGNOSIS — Z74.09 IMPAIRED MOBILITY AND ADLS: ICD-10-CM

## 2021-01-01 DIAGNOSIS — E78.2 MIXED HYPERLIPIDEMIA: ICD-10-CM

## 2021-01-01 DIAGNOSIS — S92.901A CLOSED FRACTURE OF RIGHT FOOT, INITIAL ENCOUNTER: Primary | ICD-10-CM

## 2021-01-01 DIAGNOSIS — J38.01 UNILATERAL COMPLETE PARALYSIS OF VOCAL CORD: Primary | ICD-10-CM

## 2021-01-01 LAB
ABO GROUP BLD: NORMAL
ALBUMIN SERPL-MCNC: 2.7 G/DL (ref 3.5–5.2)
ALBUMIN SERPL-MCNC: 2.7 G/DL (ref 3.5–5.2)
ALBUMIN SERPL-MCNC: 3.4 G/DL (ref 3.5–5.2)
ALBUMIN SERPL-MCNC: 3.6 G/DL (ref 3.5–5.2)
ALBUMIN SERPL-MCNC: 3.6 G/DL (ref 3.5–5.2)
ALBUMIN SERPL-MCNC: 3.8 G/DL (ref 3.5–5.2)
ALBUMIN SERPL-MCNC: 4 G/DL (ref 3.5–5.2)
ALBUMIN/GLOB SERPL: 1 G/DL
ALBUMIN/GLOB SERPL: 1.1 G/DL
ALBUMIN/GLOB SERPL: 1.1 G/DL
ALBUMIN/GLOB SERPL: 1.2 G/DL
ALBUMIN/GLOB SERPL: 1.3 G/DL
ALP SERPL-CCNC: 104 U/L (ref 39–117)
ALP SERPL-CCNC: 115 U/L (ref 39–117)
ALP SERPL-CCNC: 134 U/L (ref 39–117)
ALP SERPL-CCNC: 150 U/L (ref 39–117)
ALP SERPL-CCNC: 62 U/L (ref 39–117)
ALP SERPL-CCNC: 66 U/L (ref 39–117)
ALP SERPL-CCNC: 98 U/L (ref 39–117)
ALT SERPL W P-5'-P-CCNC: 17 U/L (ref 1–33)
ALT SERPL W P-5'-P-CCNC: 17 U/L (ref 1–33)
ALT SERPL W P-5'-P-CCNC: 20 U/L (ref 1–33)
ALT SERPL W P-5'-P-CCNC: 23 U/L (ref 1–33)
ALT SERPL W P-5'-P-CCNC: 31 U/L (ref 1–33)
ALT SERPL W P-5'-P-CCNC: 36 U/L (ref 1–33)
ALT SERPL W P-5'-P-CCNC: 71 U/L (ref 1–33)
AMORPH URATE CRY URNS QL MICRO: ABNORMAL /HPF
ANION GAP SERPL CALCULATED.3IONS-SCNC: 10 MMOL/L (ref 5–15)
ANION GAP SERPL CALCULATED.3IONS-SCNC: 11 MMOL/L (ref 5–15)
ANION GAP SERPL CALCULATED.3IONS-SCNC: 12 MMOL/L (ref 5–15)
ANION GAP SERPL CALCULATED.3IONS-SCNC: 12 MMOL/L (ref 5–15)
ANION GAP SERPL CALCULATED.3IONS-SCNC: 13 MMOL/L (ref 5–15)
ANION GAP SERPL CALCULATED.3IONS-SCNC: 5 MMOL/L (ref 5–15)
ANION GAP SERPL CALCULATED.3IONS-SCNC: 8 MMOL/L (ref 5–15)
ANION GAP SERPL CALCULATED.3IONS-SCNC: 8 MMOL/L (ref 5–15)
ANION GAP SERPL CALCULATED.3IONS-SCNC: 9 MMOL/L (ref 5–15)
ANISOCYTOSIS BLD QL: ABNORMAL
ANISOCYTOSIS BLD QL: ABNORMAL
APTT PPP: 27.8 SECONDS (ref 24.1–35)
APTT PPP: 35.3 SECONDS (ref 24.1–35)
APTT PPP: 36.5 SECONDS (ref 24.1–35)
APTT PPP: 39.1 SECONDS (ref 24.1–35)
ARTERIAL PATENCY WRIST A: ABNORMAL
ARTERIAL PATENCY WRIST A: ABNORMAL
AST SERPL-CCNC: 12 U/L (ref 1–32)
AST SERPL-CCNC: 15 U/L (ref 1–32)
AST SERPL-CCNC: 16 U/L (ref 1–32)
AST SERPL-CCNC: 19 U/L (ref 1–32)
AST SERPL-CCNC: 21 U/L (ref 1–32)
AST SERPL-CCNC: 23 U/L (ref 1–32)
AST SERPL-CCNC: 26 U/L (ref 1–32)
ATMOSPHERIC PRESS: 754 MMHG
ATMOSPHERIC PRESS: 754 MMHG
BACTERIA BLD CULT: ABNORMAL
BACTERIA SPEC AEROBE CULT: ABNORMAL
BACTERIA SPEC AEROBE CULT: ABNORMAL
BACTERIA SPEC AEROBE CULT: NO GROWTH
BACTERIA SPEC AEROBE CULT: NORMAL
BACTERIA SPEC AEROBE CULT: NORMAL
BACTERIA SPEC RESP CULT: NORMAL
BACTERIA UR QL AUTO: ABNORMAL /HPF
BASE EXCESS BLDA CALC-SCNC: -3 MMOL/L (ref 0–2)
BASE EXCESS BLDA CALC-SCNC: -5 MMOL/L (ref 0–2)
BASOPHILS # BLD AUTO: 0.02 10*3/MM3 (ref 0–0.2)
BASOPHILS # BLD AUTO: 0.04 10*3/MM3 (ref 0–0.2)
BASOPHILS # BLD AUTO: 0.05 10*3/MM3 (ref 0–0.2)
BASOPHILS # BLD AUTO: 0.05 10*3/MM3 (ref 0–0.2)
BASOPHILS NFR BLD AUTO: 0.2 % (ref 0–1.5)
BASOPHILS NFR BLD AUTO: 0.4 % (ref 0–1.5)
BASOPHILS NFR BLD AUTO: 0.4 % (ref 0–1.5)
BASOPHILS NFR BLD AUTO: 0.5 % (ref 0–1.5)
BASOPHILS NFR BLD AUTO: 0.6 % (ref 0–1.5)
BASOPHILS NFR BLD AUTO: 0.7 % (ref 0–1.5)
BDY SITE: ABNORMAL
BDY SITE: ABNORMAL
BH BB BLOOD EXPIRATION DATE: NORMAL
BH BB BLOOD EXPIRATION DATE: NORMAL
BH BB BLOOD TYPE BARCODE: 6200
BH BB BLOOD TYPE BARCODE: 6200
BH BB DISPENSE STATUS: NORMAL
BH BB DISPENSE STATUS: NORMAL
BH BB PRODUCT CODE: NORMAL
BH BB PRODUCT CODE: NORMAL
BH BB UNIT NUMBER: NORMAL
BH BB UNIT NUMBER: NORMAL
BH CV ECHO MEAS - AO MAX PG (FULL): 0.97 MMHG
BH CV ECHO MEAS - AO MAX PG (FULL): 2.4 MMHG
BH CV ECHO MEAS - AO MAX PG: 15.1 MMHG
BH CV ECHO MEAS - AO MAX PG: 9.6 MMHG
BH CV ECHO MEAS - AO MEAN PG (FULL): 1 MMHG
BH CV ECHO MEAS - AO MEAN PG (FULL): 1 MMHG
BH CV ECHO MEAS - AO MEAN PG: 7 MMHG
BH CV ECHO MEAS - AO MEAN PG: 8 MMHG
BH CV ECHO MEAS - AO ROOT AREA (BSA CORRECTED): 1.4
BH CV ECHO MEAS - AO ROOT AREA (BSA CORRECTED): 1.4
BH CV ECHO MEAS - AO ROOT AREA: 4.9 CM^2
BH CV ECHO MEAS - AO ROOT AREA: 5.3 CM^2
BH CV ECHO MEAS - AO ROOT DIAM: 2.5 CM
BH CV ECHO MEAS - AO ROOT DIAM: 2.6 CM
BH CV ECHO MEAS - AO V2 MAX: 155 CM/SEC
BH CV ECHO MEAS - AO V2 MAX: 194 CM/SEC
BH CV ECHO MEAS - AO V2 MEAN: 123 CM/SEC
BH CV ECHO MEAS - AO V2 MEAN: 127 CM/SEC
BH CV ECHO MEAS - AO V2 VTI: 27.5 CM
BH CV ECHO MEAS - AO V2 VTI: 31.4 CM
BH CV ECHO MEAS - AVA(I,A): 2.9 CM^2
BH CV ECHO MEAS - AVA(I,A): 3.1 CM^2
BH CV ECHO MEAS - AVA(I,D): 2.9 CM^2
BH CV ECHO MEAS - AVA(I,D): 3.1 CM^2
BH CV ECHO MEAS - AVA(V,A): 2.6 CM^2
BH CV ECHO MEAS - AVA(V,A): 3 CM^2
BH CV ECHO MEAS - AVA(V,D): 2.6 CM^2
BH CV ECHO MEAS - AVA(V,D): 3 CM^2
BH CV ECHO MEAS - BSA(HAYCOCK): 1.9 M^2
BH CV ECHO MEAS - BSA(HAYCOCK): 2 M^2
BH CV ECHO MEAS - BSA: 1.8 M^2
BH CV ECHO MEAS - BSA: 1.9 M^2
BH CV ECHO MEAS - BZI_BMI: 32.3 KILOGRAMS/M^2
BH CV ECHO MEAS - BZI_BMI: 38.6 KILOGRAMS/M^2
BH CV ECHO MEAS - BZI_METRIC_HEIGHT: 149.9 CM
BH CV ECHO MEAS - BZI_METRIC_HEIGHT: 162.6 CM
BH CV ECHO MEAS - BZI_METRIC_WEIGHT: 85.3 KG
BH CV ECHO MEAS - BZI_METRIC_WEIGHT: 86.6 KG
BH CV ECHO MEAS - EDV(CUBED): 77.9 ML
BH CV ECHO MEAS - EDV(MOD-SP4): 117 ML
BH CV ECHO MEAS - EDV(MOD-SP4): 75.1 ML
BH CV ECHO MEAS - EDV(TEICH): 81.7 ML
BH CV ECHO MEAS - EF(CUBED): 78.7 %
BH CV ECHO MEAS - EF(MOD-SP4): 65.5 %
BH CV ECHO MEAS - EF(MOD-SP4): 68.7 %
BH CV ECHO MEAS - EF(TEICH): 71.3 %
BH CV ECHO MEAS - ESV(CUBED): 16.6 ML
BH CV ECHO MEAS - ESV(MOD-SP4): 25.9 ML
BH CV ECHO MEAS - ESV(MOD-SP4): 36.6 ML
BH CV ECHO MEAS - ESV(TEICH): 23.4 ML
BH CV ECHO MEAS - FS: 40.3 %
BH CV ECHO MEAS - IVS/LVPW: 0.76
BH CV ECHO MEAS - IVSD: 1 CM
BH CV ECHO MEAS - LA DIMENSION: 3.7 CM
BH CV ECHO MEAS - LA DIMENSION: 3.9 CM
BH CV ECHO MEAS - LA/AO: 1.4
BH CV ECHO MEAS - LA/AO: 1.6
BH CV ECHO MEAS - LAT PEAK E' VEL: 8.7 CM/SEC
BH CV ECHO MEAS - LV DIASTOLIC VOL/BSA (35-75): 39.4 ML/M^2
BH CV ECHO MEAS - LV DIASTOLIC VOL/BSA (35-75): 64.7 ML/M^2
BH CV ECHO MEAS - LV MASS(C)D: 181.6 GRAMS
BH CV ECHO MEAS - LV MASS(C)DI: 100.4 GRAMS/M^2
BH CV ECHO MEAS - LV MAX PG: 12.7 MMHG
BH CV ECHO MEAS - LV MAX PG: 8.6 MMHG
BH CV ECHO MEAS - LV MEAN PG: 6 MMHG
BH CV ECHO MEAS - LV MEAN PG: 7 MMHG
BH CV ECHO MEAS - LV SYSTOLIC VOL/BSA (12-30): 13.6 ML/M^2
BH CV ECHO MEAS - LV SYSTOLIC VOL/BSA (12-30): 20.2 ML/M^2
BH CV ECHO MEAS - LV V1 MAX: 147 CM/SEC
BH CV ECHO MEAS - LV V1 MAX: 178 CM/SEC
BH CV ECHO MEAS - LV V1 MEAN: 115 CM/SEC
BH CV ECHO MEAS - LV V1 MEAN: 121 CM/SEC
BH CV ECHO MEAS - LV V1 VTI: 27.8 CM
BH CV ECHO MEAS - LV V1 VTI: 31 CM
BH CV ECHO MEAS - LVIDD: 4.3 CM
BH CV ECHO MEAS - LVIDS: 2.6 CM
BH CV ECHO MEAS - LVLD AP4: 7.7 CM
BH CV ECHO MEAS - LVLD AP4: 7.9 CM
BH CV ECHO MEAS - LVLS AP4: 5.7 CM
BH CV ECHO MEAS - LVLS AP4: 6.2 CM
BH CV ECHO MEAS - LVOT AREA (M): 2.8 CM^2
BH CV ECHO MEAS - LVOT AREA (M): 3.1 CM^2
BH CV ECHO MEAS - LVOT AREA: 2.8 CM^2
BH CV ECHO MEAS - LVOT AREA: 3.1 CM^2
BH CV ECHO MEAS - LVOT DIAM: 1.9 CM
BH CV ECHO MEAS - LVOT DIAM: 2 CM
BH CV ECHO MEAS - LVPWD: 1.4 CM
BH CV ECHO MEAS - MED PEAK E' VEL: 6.3 CM/SEC
BH CV ECHO MEAS - MV A MAX VEL: 112 CM/SEC
BH CV ECHO MEAS - MV DEC SLOPE: 681 CM/SEC^2
BH CV ECHO MEAS - MV DEC TIME: 0.09 SEC
BH CV ECHO MEAS - MV DEC TIME: 0.21 SEC
BH CV ECHO MEAS - MV E MAX VEL: 62.3 CM/SEC
BH CV ECHO MEAS - MV E MAX VEL: 90.7 CM/SEC
BH CV ECHO MEAS - MV E/A: 0.56
BH CV ECHO MEAS - RAP SYSTOLE: 10 MMHG
BH CV ECHO MEAS - RAP SYSTOLE: 5 MMHG
BH CV ECHO MEAS - RVSP: 23 MMHG
BH CV ECHO MEAS - RVSP: 33.8 MMHG
BH CV ECHO MEAS - SI(AO): 76.6 ML/M^2
BH CV ECHO MEAS - SI(AO): 85.2 ML/M^2
BH CV ECHO MEAS - SI(CUBED): 33.9 ML/M^2
BH CV ECHO MEAS - SI(LVOT): 41.4 ML/M^2
BH CV ECHO MEAS - SI(LVOT): 53.9 ML/M^2
BH CV ECHO MEAS - SI(MOD-SP4): 25.8 ML/M^2
BH CV ECHO MEAS - SI(MOD-SP4): 44.5 ML/M^2
BH CV ECHO MEAS - SI(TEICH): 32.2 ML/M^2
BH CV ECHO MEAS - SV(AO): 146 ML
BH CV ECHO MEAS - SV(AO): 154.1 ML
BH CV ECHO MEAS - SV(CUBED): 61.3 ML
BH CV ECHO MEAS - SV(LVOT): 78.8 ML
BH CV ECHO MEAS - SV(LVOT): 97.4 ML
BH CV ECHO MEAS - SV(MOD-SP4): 49.2 ML
BH CV ECHO MEAS - SV(MOD-SP4): 80.4 ML
BH CV ECHO MEAS - SV(TEICH): 58.3 ML
BH CV ECHO MEAS - TR MAX VEL: 212 CM/SEC
BH CV ECHO MEAS - TR MAX VEL: 244 CM/SEC
BH CV ECHO MEASUREMENTS AVERAGE E/E' RATIO: 8.31
BILIRUB SERPL-MCNC: 0.2 MG/DL (ref 0–1.2)
BILIRUB SERPL-MCNC: 0.3 MG/DL (ref 0–1.2)
BILIRUB SERPL-MCNC: 0.3 MG/DL (ref 0–1.2)
BILIRUB UR QL STRIP: NEGATIVE
BLD GP AB SCN SERPL QL: NEGATIVE
BODY TEMPERATURE: 37 C
BODY TEMPERATURE: 37 C
BOTTLE TYPE: ABNORMAL
BUN SERPL-MCNC: 19 MG/DL (ref 8–23)
BUN SERPL-MCNC: 21 MG/DL (ref 8–23)
BUN SERPL-MCNC: 22 MG/DL (ref 8–23)
BUN SERPL-MCNC: 27 MG/DL (ref 8–23)
BUN SERPL-MCNC: 30 MG/DL (ref 8–23)
BUN SERPL-MCNC: 30 MG/DL (ref 8–23)
BUN SERPL-MCNC: 32 MG/DL (ref 8–23)
BUN SERPL-MCNC: 33 MG/DL (ref 8–23)
BUN SERPL-MCNC: 34 MG/DL (ref 8–23)
BUN SERPL-MCNC: 34 MG/DL (ref 8–23)
BUN SERPL-MCNC: 36 MG/DL (ref 8–23)
BUN SERPL-MCNC: 40 MG/DL (ref 8–23)
BUN SERPL-MCNC: 42 MG/DL (ref 8–23)
BUN SERPL-MCNC: 46 MG/DL (ref 8–23)
BUN SERPL-MCNC: 63 MG/DL (ref 8–23)
BUN/CREAT SERPL: 18.3 (ref 7–25)
BUN/CREAT SERPL: 18.7 (ref 7–25)
BUN/CREAT SERPL: 20 (ref 7–25)
BUN/CREAT SERPL: 22.5 (ref 7–25)
BUN/CREAT SERPL: 26.5 (ref 7–25)
BUN/CREAT SERPL: 27.6 (ref 7–25)
BUN/CREAT SERPL: 28 (ref 7–25)
BUN/CREAT SERPL: 28.1 (ref 7–25)
BUN/CREAT SERPL: 34.3 (ref 7–25)
BUN/CREAT SERPL: 40 (ref 7–25)
BUN/CREAT SERPL: 41.3 (ref 7–25)
BUN/CREAT SERPL: 44.4 (ref 7–25)
BUN/CREAT SERPL: 48.8 (ref 7–25)
BUN/CREAT SERPL: 49.4 (ref 7–25)
BUN/CREAT SERPL: 69.6 (ref 7–25)
CA-I BLD-MCNC: 4.43 MG/DL (ref 4.6–5.4)
CALCIUM SPEC-SCNC: 7.8 MG/DL (ref 8.6–10.5)
CALCIUM SPEC-SCNC: 8 MG/DL (ref 8.6–10.5)
CALCIUM SPEC-SCNC: 8 MG/DL (ref 8.6–10.5)
CALCIUM SPEC-SCNC: 8.7 MG/DL (ref 8.6–10.5)
CALCIUM SPEC-SCNC: 8.7 MG/DL (ref 8.6–10.5)
CALCIUM SPEC-SCNC: 8.8 MG/DL (ref 8.6–10.5)
CALCIUM SPEC-SCNC: 8.9 MG/DL (ref 8.6–10.5)
CALCIUM SPEC-SCNC: 9 MG/DL (ref 8.6–10.5)
CALCIUM SPEC-SCNC: 9.1 MG/DL (ref 8.6–10.5)
CALCIUM SPEC-SCNC: 9.3 MG/DL (ref 8.6–10.5)
CALCIUM SPEC-SCNC: 9.4 MG/DL (ref 8.6–10.5)
CALCIUM SPEC-SCNC: 9.4 MG/DL (ref 8.6–10.5)
CHLORIDE SERPL-SCNC: 100 MMOL/L (ref 98–107)
CHLORIDE SERPL-SCNC: 103 MMOL/L (ref 98–107)
CHLORIDE SERPL-SCNC: 104 MMOL/L (ref 98–107)
CHLORIDE SERPL-SCNC: 105 MMOL/L (ref 98–107)
CHLORIDE SERPL-SCNC: 106 MMOL/L (ref 98–107)
CHLORIDE SERPL-SCNC: 94 MMOL/L (ref 98–107)
CHLORIDE SERPL-SCNC: 96 MMOL/L (ref 98–107)
CHLORIDE SERPL-SCNC: 96 MMOL/L (ref 98–107)
CHLORIDE SERPL-SCNC: 97 MMOL/L (ref 98–107)
CHLORIDE SERPL-SCNC: 97 MMOL/L (ref 98–107)
CHLORIDE SERPL-SCNC: 99 MMOL/L (ref 98–107)
CHLORIDE SERPL-SCNC: 99 MMOL/L (ref 98–107)
CHOLEST SERPL-MCNC: 119 MG/DL (ref 0–200)
CK SERPL-CCNC: 22 U/L (ref 20–180)
CK SERPL-CCNC: 417 U/L (ref 20–180)
CK SERPL-CCNC: 64 U/L (ref 20–180)
CK SERPL-CCNC: 74 U/L (ref 20–180)
CLARITY UR: ABNORMAL
CLARITY UR: ABNORMAL
CLARITY UR: CLEAR
CLUMPED PLATELETS: PRESENT
CO2 SERPL-SCNC: 17 MMOL/L (ref 22–29)
CO2 SERPL-SCNC: 19 MMOL/L (ref 22–29)
CO2 SERPL-SCNC: 21 MMOL/L (ref 22–29)
CO2 SERPL-SCNC: 21 MMOL/L (ref 22–29)
CO2 SERPL-SCNC: 22 MMOL/L (ref 22–29)
CO2 SERPL-SCNC: 23 MMOL/L (ref 22–29)
CO2 SERPL-SCNC: 23 MMOL/L (ref 22–29)
CO2 SERPL-SCNC: 24 MMOL/L (ref 22–29)
CO2 SERPL-SCNC: 25 MMOL/L (ref 22–29)
CO2 SERPL-SCNC: 25 MMOL/L (ref 22–29)
CO2 SERPL-SCNC: 26 MMOL/L (ref 22–29)
CO2 SERPL-SCNC: 28 MMOL/L (ref 22–29)
CO2 SERPL-SCNC: 32 MMOL/L (ref 22–29)
COLOR UR: YELLOW
CREAT BLDA-MCNC: 0.8 MG/DL (ref 0.6–1.3)
CREAT BLDA-MCNC: 1.3 MG/DL (ref 0.6–1.3)
CREAT SERPL-MCNC: 0.46 MG/DL (ref 0.57–1)
CREAT SERPL-MCNC: 0.55 MG/DL (ref 0.57–1)
CREAT SERPL-MCNC: 0.8 MG/DL (ref 0.57–1)
CREAT SERPL-MCNC: 0.81 MG/DL (ref 0.57–1)
CREAT SERPL-MCNC: 0.82 MG/DL (ref 0.57–1)
CREAT SERPL-MCNC: 0.85 MG/DL (ref 0.57–1)
CREAT SERPL-MCNC: 0.96 MG/DL (ref 0.57–1)
CREAT SERPL-MCNC: 1.04 MG/DL (ref 0.57–1)
CREAT SERPL-MCNC: 1.05 MG/DL (ref 0.57–1)
CREAT SERPL-MCNC: 1.07 MG/DL (ref 0.57–1)
CREAT SERPL-MCNC: 1.13 MG/DL (ref 0.57–1)
CREAT SERPL-MCNC: 1.23 MG/DL (ref 0.57–1)
CREAT SERPL-MCNC: 1.34 MG/DL (ref 0.57–1)
CREAT SERPL-MCNC: 1.51 MG/DL (ref 0.57–1)
CREAT SERPL-MCNC: 3.37 MG/DL (ref 0.57–1)
CREAT UR-MCNC: 109.4 MG/DL
CROSSMATCH INTERPRETATION: NORMAL
CROSSMATCH INTERPRETATION: NORMAL
CRP SERPL-MCNC: 7.64 MG/DL (ref 0–0.5)
CYTO UR: NORMAL
D DIMER PPP FEU-MCNC: 0.25 MG/L (FEU) (ref 0–0.5)
D DIMER PPP FEU-MCNC: 0.25 MG/L (FEU) (ref 0–0.5)
D DIMER PPP FEU-MCNC: <0.22 MG/L (FEU) (ref 0–0.5)
D-LACTATE SERPL-SCNC: 1 MMOL/L (ref 0.5–2)
D-LACTATE SERPL-SCNC: 2.5 MMOL/L (ref 0.5–2)
D-LACTATE SERPL-SCNC: 2.7 MMOL/L (ref 0.5–2)
DEPRECATED RDW RBC AUTO: 44.2 FL (ref 37–54)
DEPRECATED RDW RBC AUTO: 44.3 FL (ref 37–54)
DEPRECATED RDW RBC AUTO: 44.6 FL (ref 37–54)
DEPRECATED RDW RBC AUTO: 45.1 FL (ref 37–54)
DEPRECATED RDW RBC AUTO: 45.2 FL (ref 37–54)
DEPRECATED RDW RBC AUTO: 46.5 FL (ref 37–54)
DEPRECATED RDW RBC AUTO: 46.8 FL (ref 37–54)
DEPRECATED RDW RBC AUTO: 48 FL (ref 37–54)
DEPRECATED RDW RBC AUTO: 49.6 FL (ref 37–54)
DEPRECATED RDW RBC AUTO: 55.7 FL (ref 37–54)
DEPRECATED RDW RBC AUTO: 57.9 FL (ref 37–54)
DEPRECATED RDW RBC AUTO: 59.2 FL (ref 37–54)
DEVELOPER EXPIRATION DATE: NORMAL
DEVELOPER LOT NUMBER: 203
EOSINOPHIL # BLD AUTO: 0.01 10*3/MM3 (ref 0–0.4)
EOSINOPHIL # BLD AUTO: 0.01 10*3/MM3 (ref 0–0.4)
EOSINOPHIL # BLD AUTO: 0.02 10*3/MM3 (ref 0–0.4)
EOSINOPHIL # BLD AUTO: 0.02 10*3/MM3 (ref 0–0.4)
EOSINOPHIL # BLD AUTO: 0.11 10*3/MM3 (ref 0–0.4)
EOSINOPHIL # BLD AUTO: 0.14 10*3/MM3 (ref 0–0.4)
EOSINOPHIL # BLD MANUAL: 0.06 10*3/MM3 (ref 0–0.4)
EOSINOPHIL NFR BLD AUTO: 0.1 % (ref 0.3–6.2)
EOSINOPHIL NFR BLD AUTO: 0.1 % (ref 0.3–6.2)
EOSINOPHIL NFR BLD AUTO: 0.2 % (ref 0.3–6.2)
EOSINOPHIL NFR BLD AUTO: 0.2 % (ref 0.3–6.2)
EOSINOPHIL NFR BLD AUTO: 1.6 % (ref 0.3–6.2)
EOSINOPHIL NFR BLD AUTO: 2 % (ref 0.3–6.2)
EOSINOPHIL NFR BLD MANUAL: 1 % (ref 0.3–6.2)
ERYTHROCYTE [DISTWIDTH] IN BLOOD BY AUTOMATED COUNT: 12 % (ref 12.3–15.4)
ERYTHROCYTE [DISTWIDTH] IN BLOOD BY AUTOMATED COUNT: 12.4 % (ref 12.3–15.4)
ERYTHROCYTE [DISTWIDTH] IN BLOOD BY AUTOMATED COUNT: 12.5 % (ref 12.3–15.4)
ERYTHROCYTE [DISTWIDTH] IN BLOOD BY AUTOMATED COUNT: 13 % (ref 12.3–15.4)
ERYTHROCYTE [DISTWIDTH] IN BLOOD BY AUTOMATED COUNT: 13.2 % (ref 12.3–15.4)
ERYTHROCYTE [DISTWIDTH] IN BLOOD BY AUTOMATED COUNT: 13.3 % (ref 12.3–15.4)
ERYTHROCYTE [DISTWIDTH] IN BLOOD BY AUTOMATED COUNT: 13.3 % (ref 12.3–15.4)
ERYTHROCYTE [DISTWIDTH] IN BLOOD BY AUTOMATED COUNT: 14.9 % (ref 12.3–15.4)
ERYTHROCYTE [DISTWIDTH] IN BLOOD BY AUTOMATED COUNT: 14.9 % (ref 12.3–15.4)
ERYTHROCYTE [DISTWIDTH] IN BLOOD BY AUTOMATED COUNT: 15.3 % (ref 12.3–15.4)
EXPIRATION DATE: NORMAL
FECAL OCCULT BLOOD SCREEN, POC: NEGATIVE
GAS FLOW AIRWAY: 2 LPM
GAS FLOW AIRWAY: 2 LPM
GFR SERPL CREATININE-BSD FRML MDRD: 107 ML/MIN/1.73
GFR SERPL CREATININE-BSD FRML MDRD: 13 ML/MIN/1.73
GFR SERPL CREATININE-BSD FRML MDRD: 132 ML/MIN/1.73
GFR SERPL CREATININE-BSD FRML MDRD: 34 ML/MIN/1.73
GFR SERPL CREATININE-BSD FRML MDRD: 38 ML/MIN/1.73
GFR SERPL CREATININE-BSD FRML MDRD: 42 ML/MIN/1.73
GFR SERPL CREATININE-BSD FRML MDRD: 47 ML/MIN/1.73
GFR SERPL CREATININE-BSD FRML MDRD: 50 ML/MIN/1.73
GFR SERPL CREATININE-BSD FRML MDRD: 51 ML/MIN/1.73
GFR SERPL CREATININE-BSD FRML MDRD: 52 ML/MIN/1.73
GFR SERPL CREATININE-BSD FRML MDRD: 57 ML/MIN/1.73
GFR SERPL CREATININE-BSD FRML MDRD: 65 ML/MIN/1.73
GFR SERPL CREATININE-BSD FRML MDRD: 68 ML/MIN/1.73
GFR SERPL CREATININE-BSD FRML MDRD: 69 ML/MIN/1.73
GFR SERPL CREATININE-BSD FRML MDRD: 70 ML/MIN/1.73
GFR SERPL CREATININE-BSD FRML MDRD: ABNORMAL ML/MIN/{1.73_M2}
GLOBULIN UR ELPH-MCNC: 2.1 GM/DL
GLOBULIN UR ELPH-MCNC: 2.8 GM/DL
GLOBULIN UR ELPH-MCNC: 3.1 GM/DL
GLOBULIN UR ELPH-MCNC: 3.2 GM/DL
GLOBULIN UR ELPH-MCNC: 3.5 GM/DL
GLOBULIN UR ELPH-MCNC: 3.5 GM/DL
GLOBULIN UR ELPH-MCNC: 3.7 GM/DL
GLUCOSE BLDC GLUCOMTR-MCNC: 132 MG/DL (ref 70–130)
GLUCOSE BLDC GLUCOMTR-MCNC: 141 MG/DL (ref 70–130)
GLUCOSE BLDC GLUCOMTR-MCNC: 142 MG/DL (ref 70–130)
GLUCOSE BLDC GLUCOMTR-MCNC: 143 MG/DL (ref 70–130)
GLUCOSE BLDC GLUCOMTR-MCNC: 147 MG/DL (ref 70–130)
GLUCOSE BLDC GLUCOMTR-MCNC: 160 MG/DL (ref 70–130)
GLUCOSE BLDC GLUCOMTR-MCNC: 162 MG/DL (ref 70–130)
GLUCOSE BLDC GLUCOMTR-MCNC: 185 MG/DL (ref 70–130)
GLUCOSE BLDC GLUCOMTR-MCNC: 198 MG/DL (ref 70–130)
GLUCOSE BLDC GLUCOMTR-MCNC: 201 MG/DL (ref 70–130)
GLUCOSE BLDC GLUCOMTR-MCNC: 211 MG/DL (ref 70–130)
GLUCOSE BLDC GLUCOMTR-MCNC: 219 MG/DL (ref 70–130)
GLUCOSE BLDC GLUCOMTR-MCNC: 252 MG/DL (ref 70–130)
GLUCOSE BLDC GLUCOMTR-MCNC: 255 MG/DL (ref 70–130)
GLUCOSE BLDC GLUCOMTR-MCNC: 275 MG/DL (ref 70–130)
GLUCOSE BLDC GLUCOMTR-MCNC: 280 MG/DL (ref 70–130)
GLUCOSE BLDC GLUCOMTR-MCNC: 283 MG/DL (ref 70–130)
GLUCOSE BLDC GLUCOMTR-MCNC: 305 MG/DL (ref 70–130)
GLUCOSE BLDC GLUCOMTR-MCNC: 320 MG/DL (ref 70–130)
GLUCOSE BLDC GLUCOMTR-MCNC: 321 MG/DL (ref 70–130)
GLUCOSE BLDC GLUCOMTR-MCNC: 324 MG/DL (ref 70–130)
GLUCOSE BLDC GLUCOMTR-MCNC: 330 MG/DL (ref 70–130)
GLUCOSE BLDC GLUCOMTR-MCNC: 382 MG/DL (ref 70–130)
GLUCOSE BLDC GLUCOMTR-MCNC: 65 MG/DL (ref 70–130)
GLUCOSE BLDC GLUCOMTR-MCNC: 90 MG/DL (ref 70–130)
GLUCOSE SERPL-MCNC: 101 MG/DL (ref 65–99)
GLUCOSE SERPL-MCNC: 102 MG/DL (ref 65–99)
GLUCOSE SERPL-MCNC: 107 MG/DL (ref 65–99)
GLUCOSE SERPL-MCNC: 115 MG/DL (ref 65–99)
GLUCOSE SERPL-MCNC: 116 MG/DL (ref 65–99)
GLUCOSE SERPL-MCNC: 127 MG/DL (ref 65–99)
GLUCOSE SERPL-MCNC: 136 MG/DL (ref 65–99)
GLUCOSE SERPL-MCNC: 165 MG/DL (ref 65–99)
GLUCOSE SERPL-MCNC: 170 MG/DL (ref 65–99)
GLUCOSE SERPL-MCNC: 184 MG/DL (ref 65–99)
GLUCOSE SERPL-MCNC: 243 MG/DL (ref 65–99)
GLUCOSE SERPL-MCNC: 394 MG/DL (ref 65–99)
GLUCOSE SERPL-MCNC: 47 MG/DL (ref 65–99)
GLUCOSE SERPL-MCNC: 89 MG/DL (ref 65–99)
GLUCOSE SERPL-MCNC: 95 MG/DL (ref 65–99)
GLUCOSE UR STRIP-MCNC: ABNORMAL MG/DL
GLUCOSE UR STRIP-MCNC: NEGATIVE MG/DL
GRAM STN SPEC: ABNORMAL
GRAM STN SPEC: NORMAL
GRAN CASTS URNS QL MICRO: ABNORMAL /LPF
HBA1C MFR BLD: 6.4 % (ref 4.8–5.6)
HBA1C MFR BLD: 6.5 % (ref 4.8–5.6)
HCO3 BLDA-SCNC: 20 MMOL/L (ref 20–26)
HCO3 BLDA-SCNC: 21.4 MMOL/L (ref 20–26)
HCT VFR BLD AUTO: 28.7 % (ref 34–46.6)
HCT VFR BLD AUTO: 29 % (ref 34–46.6)
HCT VFR BLD AUTO: 29.6 % (ref 34–46.6)
HCT VFR BLD AUTO: 32.8 % (ref 34–46.6)
HCT VFR BLD AUTO: 33 % (ref 34–46.6)
HCT VFR BLD AUTO: 33.3 % (ref 34–46.6)
HCT VFR BLD AUTO: 33.4 % (ref 34–46.6)
HCT VFR BLD AUTO: 33.7 % (ref 34–46.6)
HCT VFR BLD AUTO: 34.1 % (ref 34–46.6)
HCT VFR BLD AUTO: 34.8 % (ref 34–46.6)
HCT VFR BLD AUTO: 36.5 % (ref 34–46.6)
HCT VFR BLD AUTO: 37.2 % (ref 34–46.6)
HDLC SERPL-MCNC: 34 MG/DL (ref 40–60)
HGB BLD-MCNC: 10.2 G/DL (ref 12–15.9)
HGB BLD-MCNC: 10.7 G/DL (ref 12–15.9)
HGB BLD-MCNC: 11 G/DL (ref 12–15.9)
HGB BLD-MCNC: 11 G/DL (ref 12–15.9)
HGB BLD-MCNC: 11.5 G/DL (ref 12–15.9)
HGB BLD-MCNC: 11.6 G/DL (ref 12–15.9)
HGB BLD-MCNC: 12 G/DL (ref 12–15.9)
HGB BLD-MCNC: 12.2 G/DL (ref 12–15.9)
HGB BLD-MCNC: 12.5 G/DL (ref 12–15.9)
HGB BLD-MCNC: 12.7 G/DL (ref 12–15.9)
HGB BLD-MCNC: 9.7 G/DL (ref 12–15.9)
HGB BLD-MCNC: 9.9 G/DL (ref 12–15.9)
HGB UR QL STRIP.AUTO: ABNORMAL
HGB UR QL STRIP.AUTO: NEGATIVE
HOLD SPECIMEN: NORMAL
HOLD SPECIMEN: NORMAL
HYALINE CASTS UR QL AUTO: ABNORMAL /LPF
IMM GRANULOCYTES # BLD AUTO: 0.03 10*3/MM3 (ref 0–0.05)
IMM GRANULOCYTES # BLD AUTO: 0.04 10*3/MM3 (ref 0–0.05)
IMM GRANULOCYTES # BLD AUTO: 0.06 10*3/MM3 (ref 0–0.05)
IMM GRANULOCYTES # BLD AUTO: 0.06 10*3/MM3 (ref 0–0.05)
IMM GRANULOCYTES # BLD AUTO: 0.18 10*3/MM3 (ref 0–0.05)
IMM GRANULOCYTES NFR BLD AUTO: 0.4 % (ref 0–0.5)
IMM GRANULOCYTES NFR BLD AUTO: 0.6 % (ref 0–0.5)
IMM GRANULOCYTES NFR BLD AUTO: 0.6 % (ref 0–0.5)
IMM GRANULOCYTES NFR BLD AUTO: 0.7 % (ref 0–0.5)
IMM GRANULOCYTES NFR BLD AUTO: 1.6 % (ref 0–0.5)
INR PPP: 1.22 (ref 0.91–1.09)
INR PPP: 1.22 (ref 0.91–1.09)
INR PPP: 1.28 (ref 0.91–1.09)
INR PPP: 1.52 (ref 0.91–1.09)
ISOLATED FROM: ABNORMAL
KETONES UR QL STRIP: ABNORMAL
KETONES UR QL STRIP: NEGATIVE
L PNEUMO1 AG UR QL IA: NEGATIVE
LAB AP CASE REPORT: NORMAL
LAB AP CLINICAL INFORMATION: NORMAL
LAB AP DIAGNOSIS COMMENT: NORMAL
LDLC SERPL CALC-MCNC: 73 MG/DL (ref 0–100)
LDLC/HDLC SERPL: 2.17 {RATIO}
LEFT ATRIUM VOLUME INDEX: 20.6 ML/M2
LEFT ATRIUM VOLUME: 36.2 CM3
LEUKOCYTE ESTERASE UR QL STRIP.AUTO: ABNORMAL
LEUKOCYTE ESTERASE UR QL STRIP.AUTO: NEGATIVE
LEUKOCYTE ESTERASE UR QL STRIP.AUTO: NEGATIVE
LEVETIRACETAM SERPL-MCNC: 39.4 UG/ML (ref 10–40)
LYMPHOCYTES # BLD AUTO: 1.75 10*3/MM3 (ref 0.7–3.1)
LYMPHOCYTES # BLD AUTO: 1.77 10*3/MM3 (ref 0.7–3.1)
LYMPHOCYTES # BLD AUTO: 2 10*3/MM3 (ref 0.7–3.1)
LYMPHOCYTES # BLD AUTO: 2.09 10*3/MM3 (ref 0.7–3.1)
LYMPHOCYTES # BLD AUTO: 2.12 10*3/MM3 (ref 0.7–3.1)
LYMPHOCYTES # BLD AUTO: 2.19 10*3/MM3 (ref 0.7–3.1)
LYMPHOCYTES # BLD MANUAL: 1.5 10*3/MM3 (ref 0.7–3.1)
LYMPHOCYTES # BLD MANUAL: 3.92 10*3/MM3 (ref 0.7–3.1)
LYMPHOCYTES NFR BLD AUTO: 18 % (ref 19.6–45.3)
LYMPHOCYTES NFR BLD AUTO: 18.7 % (ref 19.6–45.3)
LYMPHOCYTES NFR BLD AUTO: 19.7 % (ref 19.6–45.3)
LYMPHOCYTES NFR BLD AUTO: 19.7 % (ref 19.6–45.3)
LYMPHOCYTES NFR BLD AUTO: 29.5 % (ref 19.6–45.3)
LYMPHOCYTES NFR BLD AUTO: 32.7 % (ref 19.6–45.3)
LYMPHOCYTES NFR BLD MANUAL: 14.4 % (ref 19.6–45.3)
LYMPHOCYTES NFR BLD MANUAL: 4.1 % (ref 5–12)
LYMPHOCYTES NFR BLD MANUAL: 4.2 % (ref 5–12)
LYMPHOCYTES NFR BLD MANUAL: 59.4 % (ref 19.6–45.3)
Lab: 203
Lab: ABNORMAL
Lab: NORMAL
MAGNESIUM SERPL-MCNC: 1.9 MG/DL (ref 1.6–2.4)
MAGNESIUM SERPL-MCNC: 2.4 MG/DL (ref 1.6–2.4)
MAGNESIUM SERPL-MCNC: 2.4 MG/DL (ref 1.6–2.4)
MAXIMAL PREDICTED HEART RATE: 144 BPM
MAXIMAL PREDICTED HEART RATE: 144 BPM
MCH RBC QN AUTO: 33 PG (ref 26.6–33)
MCH RBC QN AUTO: 33 PG (ref 26.6–33)
MCH RBC QN AUTO: 33.8 PG (ref 26.6–33)
MCH RBC QN AUTO: 34.2 PG (ref 26.6–33)
MCH RBC QN AUTO: 34.3 PG (ref 26.6–33)
MCH RBC QN AUTO: 34.6 PG (ref 26.6–33)
MCH RBC QN AUTO: 34.7 PG (ref 26.6–33)
MCH RBC QN AUTO: 34.8 PG (ref 26.6–33)
MCH RBC QN AUTO: 34.9 PG (ref 26.6–33)
MCH RBC QN AUTO: 35 PG (ref 26.6–33)
MCHC RBC AUTO-ENTMCNC: 32.4 G/DL (ref 31.5–35.7)
MCHC RBC AUTO-ENTMCNC: 32.9 G/DL (ref 31.5–35.7)
MCHC RBC AUTO-ENTMCNC: 33 G/DL (ref 31.5–35.7)
MCHC RBC AUTO-ENTMCNC: 33.4 G/DL (ref 31.5–35.7)
MCHC RBC AUTO-ENTMCNC: 33.4 G/DL (ref 31.5–35.7)
MCHC RBC AUTO-ENTMCNC: 34.1 G/DL (ref 31.5–35.7)
MCHC RBC AUTO-ENTMCNC: 34.1 G/DL (ref 31.5–35.7)
MCHC RBC AUTO-ENTMCNC: 34.5 G/DL (ref 31.5–35.7)
MCHC RBC AUTO-ENTMCNC: 34.5 G/DL (ref 31.5–35.7)
MCHC RBC AUTO-ENTMCNC: 35.2 G/DL (ref 31.5–35.7)
MCHC RBC AUTO-ENTMCNC: 35.4 G/DL (ref 31.5–35.7)
MCHC RBC AUTO-ENTMCNC: 35.9 G/DL (ref 31.5–35.7)
MCV RBC AUTO: 100.3 FL (ref 79–97)
MCV RBC AUTO: 100.6 FL (ref 79–97)
MCV RBC AUTO: 100.7 FL (ref 79–97)
MCV RBC AUTO: 101.9 FL (ref 79–97)
MCV RBC AUTO: 103.6 FL (ref 79–97)
MCV RBC AUTO: 106 FL (ref 79–97)
MCV RBC AUTO: 106.1 FL (ref 79–97)
MCV RBC AUTO: 96.9 FL (ref 79–97)
MCV RBC AUTO: 98 FL (ref 79–97)
MCV RBC AUTO: 98.6 FL (ref 79–97)
MCV RBC AUTO: 99.1 FL (ref 79–97)
MCV RBC AUTO: 99.4 FL (ref 79–97)
MODALITY: ABNORMAL
MODALITY: ABNORMAL
MONOCYTES # BLD AUTO: 0.25 10*3/MM3 (ref 0.1–0.9)
MONOCYTES # BLD AUTO: 0.43 10*3/MM3 (ref 0.1–0.9)
MONOCYTES # BLD AUTO: 0.46 10*3/MM3 (ref 0.1–0.9)
MONOCYTES # BLD AUTO: 0.69 10*3/MM3 (ref 0.1–0.9)
MONOCYTES # BLD AUTO: 0.78 10*3/MM3 (ref 0.1–0.9)
MONOCYTES # BLD AUTO: 0.95 10*3/MM3 (ref 0.1–0.9)
MONOCYTES # BLD AUTO: 0.99 10*3/MM3 (ref 0.1–0.9)
MONOCYTES # BLD AUTO: 1.13 10*3/MM3 (ref 0.1–0.9)
MONOCYTES NFR BLD AUTO: 11 % (ref 5–12)
MONOCYTES NFR BLD AUTO: 12.7 % (ref 5–12)
MONOCYTES NFR BLD AUTO: 14.8 % (ref 5–12)
MONOCYTES NFR BLD AUTO: 4.5 % (ref 5–12)
MONOCYTES NFR BLD AUTO: 7 % (ref 5–12)
MONOCYTES NFR BLD AUTO: 8.4 % (ref 5–12)
MRSA DNA SPEC QL NAA+PROBE: ABNORMAL
MYELOCYTES NFR BLD MANUAL: 1 % (ref 0–0)
NEGATIVE CONTROL: NEGATIVE
NEUTROPHILS # BLD AUTO: 1.83 10*3/MM3 (ref 1.7–7)
NEUTROPHILS # BLD AUTO: 8.4 10*3/MM3 (ref 1.7–7)
NEUTROPHILS NFR BLD AUTO: 3.33 10*3/MM3 (ref 1.7–7)
NEUTROPHILS NFR BLD AUTO: 4 10*3/MM3 (ref 1.7–7)
NEUTROPHILS NFR BLD AUTO: 49.8 % (ref 42.7–76)
NEUTROPHILS NFR BLD AUTO: 5.9 10*3/MM3 (ref 1.7–7)
NEUTROPHILS NFR BLD AUTO: 56.3 % (ref 42.7–76)
NEUTROPHILS NFR BLD AUTO: 66.4 % (ref 42.7–76)
NEUTROPHILS NFR BLD AUTO: 7.25 10*3/MM3 (ref 1.7–7)
NEUTROPHILS NFR BLD AUTO: 7.4 10*3/MM3 (ref 1.7–7)
NEUTROPHILS NFR BLD AUTO: 70.9 % (ref 42.7–76)
NEUTROPHILS NFR BLD AUTO: 72.7 % (ref 42.7–76)
NEUTROPHILS NFR BLD AUTO: 73.7 % (ref 42.7–76)
NEUTROPHILS NFR BLD AUTO: 8.02 10*3/MM3 (ref 1.7–7)
NEUTROPHILS NFR BLD MANUAL: 30.2 % (ref 42.7–76)
NEUTROPHILS NFR BLD MANUAL: 78.4 % (ref 42.7–76)
NEUTS BAND NFR BLD MANUAL: 2.1 % (ref 0–5)
NITRITE UR QL STRIP: NEGATIVE
NRBC BLD AUTO-RTO: 0 /100 WBC (ref 0–0.2)
NT-PROBNP SERPL-MCNC: 315.1 PG/ML (ref 0–1800)
NT-PROBNP SERPL-MCNC: 3823 PG/ML (ref 0–1800)
NT-PROBNP SERPL-MCNC: 3851 PG/ML (ref 0–1800)
PATH REPORT.FINAL DX SPEC: NORMAL
PATH REPORT.GROSS SPEC: NORMAL
PCO2 BLDA: 35.2 MM HG (ref 35–45)
PCO2 BLDA: 36 MM HG (ref 35–45)
PCO2 TEMP ADJ BLD: 35.2 MM HG (ref 35–45)
PCO2 TEMP ADJ BLD: 36 MM HG (ref 35–45)
PH BLDA: 7.35 PH UNITS (ref 7.35–7.45)
PH BLDA: 7.39 PH UNITS (ref 7.35–7.45)
PH UR STRIP.AUTO: 5.5 [PH] (ref 5–8)
PH UR STRIP.AUTO: 8.5 [PH] (ref 5–8)
PH UR STRIP.AUTO: <=5 [PH] (ref 5–8)
PH, TEMP CORRECTED: 7.35 PH UNITS (ref 7.35–7.45)
PH, TEMP CORRECTED: 7.39 PH UNITS (ref 7.35–7.45)
PHOSPHATE SERPL-MCNC: 3.3 MG/DL (ref 2.5–4.5)
PLAT MORPH BLD: NORMAL
PLATELET # BLD AUTO: 176 10*3/MM3 (ref 140–450)
PLATELET # BLD AUTO: 224 10*3/MM3 (ref 140–450)
PLATELET # BLD AUTO: 242 10*3/MM3 (ref 140–450)
PLATELET # BLD AUTO: 244 10*3/MM3 (ref 140–450)
PLATELET # BLD AUTO: 318 10*3/MM3 (ref 140–450)
PLATELET # BLD AUTO: 349 10*3/MM3 (ref 140–450)
PLATELET # BLD AUTO: 356 10*3/MM3 (ref 140–450)
PLATELET # BLD AUTO: 378 10*3/MM3 (ref 140–450)
PLATELET # BLD AUTO: 385 10*3/MM3 (ref 140–450)
PLATELET # BLD AUTO: 386 10*3/MM3 (ref 140–450)
PLATELET # BLD AUTO: 403 10*3/MM3 (ref 140–450)
PLATELET # BLD AUTO: 415 10*3/MM3 (ref 140–450)
PMV BLD AUTO: 10.2 FL (ref 6–12)
PMV BLD AUTO: 10.2 FL (ref 6–12)
PMV BLD AUTO: 10.4 FL (ref 6–12)
PMV BLD AUTO: 10.4 FL (ref 6–12)
PMV BLD AUTO: 9.3 FL (ref 6–12)
PMV BLD AUTO: 9.3 FL (ref 6–12)
PMV BLD AUTO: 9.4 FL (ref 6–12)
PMV BLD AUTO: 9.5 FL (ref 6–12)
PMV BLD AUTO: 9.6 FL (ref 6–12)
PMV BLD AUTO: 9.6 FL (ref 6–12)
PMV BLD AUTO: 9.8 FL (ref 6–12)
PMV BLD AUTO: 9.9 FL (ref 6–12)
PO2 BLDA: 64.4 MM HG (ref 83–108)
PO2 BLDA: 75.1 MM HG (ref 83–108)
PO2 TEMP ADJ BLD: 64.4 MM HG (ref 83–108)
PO2 TEMP ADJ BLD: 75.1 MM HG (ref 83–108)
POIKILOCYTOSIS BLD QL SMEAR: ABNORMAL
POLYCHROMASIA BLD QL SMEAR: ABNORMAL
POSITIVE CONTROL: POSITIVE
POTASSIUM SERPL-SCNC: 3.7 MMOL/L (ref 3.5–5.2)
POTASSIUM SERPL-SCNC: 3.8 MMOL/L (ref 3.5–5.2)
POTASSIUM SERPL-SCNC: 4 MMOL/L (ref 3.5–5.2)
POTASSIUM SERPL-SCNC: 4.1 MMOL/L (ref 3.5–5.2)
POTASSIUM SERPL-SCNC: 4.2 MMOL/L (ref 3.5–5.2)
POTASSIUM SERPL-SCNC: 4.4 MMOL/L (ref 3.5–5.2)
POTASSIUM SERPL-SCNC: 4.5 MMOL/L (ref 3.5–5.2)
POTASSIUM SERPL-SCNC: 4.6 MMOL/L (ref 3.5–5.2)
POTASSIUM SERPL-SCNC: 4.8 MMOL/L (ref 3.5–5.2)
POTASSIUM SERPL-SCNC: 5 MMOL/L (ref 3.5–5.2)
POTASSIUM SERPL-SCNC: 5.1 MMOL/L (ref 3.5–5.2)
POTASSIUM SERPL-SCNC: 5.2 MMOL/L (ref 3.5–5.2)
POTASSIUM SERPL-SCNC: 5.3 MMOL/L (ref 3.5–5.2)
PROCALCITONIN SERPL-MCNC: 0.14 NG/ML (ref 0–0.25)
PROCALCITONIN SERPL-MCNC: 0.16 NG/ML (ref 0–0.25)
PROCALCITONIN SERPL-MCNC: 0.3 NG/ML (ref 0–0.25)
PROT SERPL-MCNC: 4.8 G/DL (ref 6–8.5)
PROT SERPL-MCNC: 5.5 G/DL (ref 6–8.5)
PROT SERPL-MCNC: 6.8 G/DL (ref 6–8.5)
PROT SERPL-MCNC: 6.9 G/DL (ref 6–8.5)
PROT SERPL-MCNC: 6.9 G/DL (ref 6–8.5)
PROT SERPL-MCNC: 7.3 G/DL (ref 6–8.5)
PROT SERPL-MCNC: 7.5 G/DL (ref 6–8.5)
PROT UR QL STRIP: ABNORMAL
PROT UR QL STRIP: ABNORMAL
PROT UR QL STRIP: NEGATIVE
PROTHROMBIN TIME: 14.5 SECONDS (ref 11.5–13.4)
PROTHROMBIN TIME: 14.9 SECONDS (ref 11.9–14.6)
PROTHROMBIN TIME: 15 SECONDS (ref 11.5–13.4)
PROTHROMBIN TIME: 17.7 SECONDS (ref 11.9–14.6)
QT INTERVAL: 338 MS
QT INTERVAL: 340 MS
QT INTERVAL: 370 MS
QT INTERVAL: 374 MS
QT INTERVAL: 382 MS
QT INTERVAL: 400 MS
QT INTERVAL: 520 MS
QTC INTERVAL: 364 MS
QTC INTERVAL: 373 MS
QTC INTERVAL: 447 MS
QTC INTERVAL: 500 MS
QTC INTERVAL: 509 MS
QTC INTERVAL: 516 MS
QTC INTERVAL: 526 MS
RBC # BLD AUTO: 2.8 10*6/MM3 (ref 3.77–5.28)
RBC # BLD AUTO: 2.85 10*6/MM3 (ref 3.77–5.28)
RBC # BLD AUTO: 3.02 10*6/MM3 (ref 3.77–5.28)
RBC # BLD AUTO: 3.14 10*6/MM3 (ref 3.77–5.28)
RBC # BLD AUTO: 3.15 10*6/MM3 (ref 3.77–5.28)
RBC # BLD AUTO: 3.24 10*6/MM3 (ref 3.77–5.28)
RBC # BLD AUTO: 3.31 10*6/MM3 (ref 3.77–5.28)
RBC # BLD AUTO: 3.35 10*6/MM3 (ref 3.77–5.28)
RBC # BLD AUTO: 3.43 10*6/MM3 (ref 3.77–5.28)
RBC # BLD AUTO: 3.59 10*6/MM3 (ref 3.77–5.28)
RBC # BLD AUTO: 3.7 10*6/MM3 (ref 3.77–5.28)
RBC # BLD AUTO: 3.71 10*6/MM3 (ref 3.77–5.28)
RBC # UR: ABNORMAL /HPF
REF LAB TEST METHOD: ABNORMAL
RH BLD: POSITIVE
S PNEUM AG SPEC QL LA: NEGATIVE
S PYO AG THROAT QL: NEGATIVE
SAO2 % BLDCOA: 91.8 % (ref 94–99)
SAO2 % BLDCOA: 94.7 % (ref 94–99)
SARS-COV-2 RNA PNL SPEC NAA+PROBE: NOT DETECTED
SMUDGE CELLS BLD QL SMEAR: ABNORMAL
SODIUM SERPL-SCNC: 127 MMOL/L (ref 136–145)
SODIUM SERPL-SCNC: 128 MMOL/L (ref 136–145)
SODIUM SERPL-SCNC: 130 MMOL/L (ref 136–145)
SODIUM SERPL-SCNC: 132 MMOL/L (ref 136–145)
SODIUM SERPL-SCNC: 132 MMOL/L (ref 136–145)
SODIUM SERPL-SCNC: 133 MMOL/L (ref 136–145)
SODIUM SERPL-SCNC: 133 MMOL/L (ref 136–145)
SODIUM SERPL-SCNC: 134 MMOL/L (ref 136–145)
SODIUM SERPL-SCNC: 135 MMOL/L (ref 136–145)
SODIUM SERPL-SCNC: 137 MMOL/L (ref 136–145)
SODIUM SERPL-SCNC: 137 MMOL/L (ref 136–145)
SODIUM SERPL-SCNC: 140 MMOL/L (ref 136–145)
SODIUM SERPL-SCNC: 140 MMOL/L (ref 136–145)
SODIUM UR-SCNC: 21 MMOL/L
SP GR UR STRIP: 1.01 (ref 1–1.03)
SP GR UR STRIP: 1.01 (ref 1–1.03)
SP GR UR STRIP: 1.02 (ref 1–1.03)
SQUAMOUS #/AREA URNS HPF: ABNORMAL /HPF
STRESS TARGET HR: 122 BPM
STRESS TARGET HR: 122 BPM
T&S EXPIRATION DATE: NORMAL
T4 FREE SERPL-MCNC: 1.35 NG/DL (ref 0.93–1.7)
TARGETS BLD QL SMEAR: ABNORMAL
TRI-PHOS CRY URNS QL MICRO: ABNORMAL /HPF
TRIGL SERPL-MCNC: 56 MG/DL (ref 0–150)
TROPONIN T SERPL-MCNC: 0.04 NG/ML (ref 0–0.03)
TROPONIN T SERPL-MCNC: 0.22 NG/ML (ref 0–0.03)
TROPONIN T SERPL-MCNC: 0.3 NG/ML (ref 0–0.03)
TROPONIN T SERPL-MCNC: 0.33 NG/ML (ref 0–0.03)
TROPONIN T SERPL-MCNC: <0.01 NG/ML (ref 0–0.03)
TSH SERPL DL<=0.05 MIU/L-ACNC: 0.06 UIU/ML (ref 0.27–4.2)
TSH SERPL DL<=0.05 MIU/L-ACNC: 0.25 UIU/ML (ref 0.27–4.2)
TSH SERPL DL<=0.05 MIU/L-ACNC: 0.88 UIU/ML (ref 0.27–4.2)
UNIT  ABO: NORMAL
UNIT  ABO: NORMAL
UNIT  RH: NORMAL
UNIT  RH: NORMAL
UROBILINOGEN UR QL STRIP: ABNORMAL
UROBILINOGEN UR QL STRIP: NORMAL
UUN 24H UR-MCNC: 859 MG/DL
VANCOMYCIN SERPL-MCNC: 13 MCG/ML (ref 5–40)
VARIANT LYMPHS NFR BLD MANUAL: 5.2 % (ref 0–5)
VENTILATOR MODE: ABNORMAL
VENTILATOR MODE: ABNORMAL
VIT B1 BLD-SCNC: 98.5 NMOL/L (ref 66.5–200)
VIT B12 BLD-MCNC: 414 PG/ML (ref 211–946)
VIT B6 SERPL-MCNC: 4.6 UG/L (ref 2–32.8)
VLDLC SERPL-MCNC: 12 MG/DL (ref 5–40)
WBC # BLD AUTO: 10.17 10*3/MM3 (ref 3.4–10.8)
WBC # BLD AUTO: 10.44 10*3/MM3 (ref 3.4–10.8)
WBC # BLD AUTO: 11.31 10*3/MM3 (ref 3.4–10.8)
WBC # BLD AUTO: 5.89 10*3/MM3 (ref 3.4–10.8)
WBC # BLD AUTO: 6.07 10*3/MM3 (ref 3.4–10.8)
WBC # BLD AUTO: 6.7 10*3/MM3 (ref 3.4–10.8)
WBC # BLD AUTO: 6.78 10*3/MM3 (ref 3.4–10.8)
WBC # BLD AUTO: 6.98 10*3/MM3 (ref 3.4–10.8)
WBC # BLD AUTO: 7.09 10*3/MM3 (ref 3.4–10.8)
WBC # BLD AUTO: 7.71 10*3/MM3 (ref 3.4–10.8)
WBC # BLD AUTO: 8.89 10*3/MM3 (ref 3.4–10.8)
WBC # BLD AUTO: 9.84 10*3/MM3 (ref 3.4–10.8)
WBC MORPH BLD: NORMAL
WBC UR QL AUTO: ABNORMAL /HPF
WHOLE BLOOD HOLD SPECIMEN: NORMAL
WHOLE BLOOD HOLD SPECIMEN: NORMAL

## 2021-01-01 PROCEDURE — 84145 PROCALCITONIN (PCT): CPT | Performed by: INTERNAL MEDICINE

## 2021-01-01 PROCEDURE — 93010 ELECTROCARDIOGRAM REPORT: CPT | Performed by: INTERNAL MEDICINE

## 2021-01-01 PROCEDURE — 71250 CT THORAX DX C-: CPT

## 2021-01-01 PROCEDURE — 63710000001 PREDNISONE PER 5 MG: Performed by: CLINICAL NURSE SPECIALIST

## 2021-01-01 PROCEDURE — 94640 AIRWAY INHALATION TREATMENT: CPT

## 2021-01-01 PROCEDURE — 25010000002 PROPOFOL 10 MG/ML EMULSION: Performed by: NURSE ANESTHETIST, CERTIFIED REGISTERED

## 2021-01-01 PROCEDURE — 82570 ASSAY OF URINE CREATININE: CPT | Performed by: INTERNAL MEDICINE

## 2021-01-01 PROCEDURE — C1751 CATH, INF, PER/CENT/MIDLINE: HCPCS

## 2021-01-01 PROCEDURE — 82565 ASSAY OF CREATININE: CPT

## 2021-01-01 PROCEDURE — 70551 MRI BRAIN STEM W/O DYE: CPT

## 2021-01-01 PROCEDURE — 25010000002 CEFTRIAXONE PER 250 MG: Performed by: FAMILY MEDICINE

## 2021-01-01 PROCEDURE — 87635 SARS-COV-2 COVID-19 AMP PRB: CPT | Performed by: EMERGENCY MEDICINE

## 2021-01-01 PROCEDURE — 0 GADOBENATE DIMEGLUMINE 529 MG/ML SOLUTION: Performed by: PSYCHIATRY & NEUROLOGY

## 2021-01-01 PROCEDURE — 36415 COLL VENOUS BLD VENIPUNCTURE: CPT | Performed by: INTERNAL MEDICINE

## 2021-01-01 PROCEDURE — 85730 THROMBOPLASTIN TIME PARTIAL: CPT | Performed by: EMERGENCY MEDICINE

## 2021-01-01 PROCEDURE — C9803 HOPD COVID-19 SPEC COLLECT: HCPCS

## 2021-01-01 PROCEDURE — 94664 DEMO&/EVAL PT USE INHALER: CPT

## 2021-01-01 PROCEDURE — 80048 BASIC METABOLIC PNL TOTAL CA: CPT | Performed by: NURSE PRACTITIONER

## 2021-01-01 PROCEDURE — 82803 BLOOD GASES ANY COMBINATION: CPT

## 2021-01-01 PROCEDURE — 87899 AGENT NOS ASSAY W/OPTIC: CPT | Performed by: INTERNAL MEDICINE

## 2021-01-01 PROCEDURE — 88112 CYTOPATH CELL ENHANCE TECH: CPT | Performed by: INTERNAL MEDICINE

## 2021-01-01 PROCEDURE — 87086 URINE CULTURE/COLONY COUNT: CPT | Performed by: FAMILY MEDICINE

## 2021-01-01 PROCEDURE — 99222 1ST HOSP IP/OBS MODERATE 55: CPT | Performed by: INTERNAL MEDICINE

## 2021-01-01 PROCEDURE — 76536 US EXAM OF HEAD AND NECK: CPT

## 2021-01-01 PROCEDURE — 99282 EMERGENCY DEPT VISIT SF MDM: CPT

## 2021-01-01 PROCEDURE — 93306 TTE W/DOPPLER COMPLETE: CPT | Performed by: INTERNAL MEDICINE

## 2021-01-01 PROCEDURE — A9552 F18 FDG: HCPCS

## 2021-01-01 PROCEDURE — 63710000001 INSULIN LISPRO (HUMAN) PER 5 UNITS: Performed by: FAMILY MEDICINE

## 2021-01-01 PROCEDURE — 77300 RADIATION THERAPY DOSE PLAN: CPT | Performed by: RADIOLOGY

## 2021-01-01 PROCEDURE — 80048 BASIC METABOLIC PNL TOTAL CA: CPT | Performed by: FAMILY MEDICINE

## 2021-01-01 PROCEDURE — 83735 ASSAY OF MAGNESIUM: CPT | Performed by: CLINICAL NURSE SPECIALIST

## 2021-01-01 PROCEDURE — 94799 UNLISTED PULMONARY SVC/PX: CPT

## 2021-01-01 PROCEDURE — 97162 PT EVAL MOD COMPLEX 30 MIN: CPT

## 2021-01-01 PROCEDURE — 36415 COLL VENOUS BLD VENIPUNCTURE: CPT

## 2021-01-01 PROCEDURE — 83735 ASSAY OF MAGNESIUM: CPT | Performed by: EMERGENCY MEDICINE

## 2021-01-01 PROCEDURE — 93005 ELECTROCARDIOGRAM TRACING: CPT | Performed by: INTERNAL MEDICINE

## 2021-01-01 PROCEDURE — 36415 COLL VENOUS BLD VENIPUNCTURE: CPT | Performed by: NURSE PRACTITIONER

## 2021-01-01 PROCEDURE — 77336 RADIATION PHYSICS CONSULT: CPT | Performed by: RADIOLOGY

## 2021-01-01 PROCEDURE — 25010000002 PERFLUTREN 6.52 MG/ML SUSPENSION: Performed by: FAMILY MEDICINE

## 2021-01-01 PROCEDURE — 82962 GLUCOSE BLOOD TEST: CPT

## 2021-01-01 PROCEDURE — 96374 THER/PROPH/DIAG INJ IV PUSH: CPT

## 2021-01-01 PROCEDURE — 25010000002 HYDROCORTISONE SODIUM SUCCINATE 100 MG RECONSTITUTED SOLUTION: Performed by: INTERNAL MEDICINE

## 2021-01-01 PROCEDURE — 80053 COMPREHEN METABOLIC PANEL: CPT | Performed by: NURSE PRACTITIONER

## 2021-01-01 PROCEDURE — 84443 ASSAY THYROID STIM HORMONE: CPT | Performed by: INTERNAL MEDICINE

## 2021-01-01 PROCEDURE — 85379 FIBRIN DEGRADATION QUANT: CPT | Performed by: EMERGENCY MEDICINE

## 2021-01-01 PROCEDURE — 85025 COMPLETE CBC W/AUTO DIFF WBC: CPT | Performed by: NURSE PRACTITIONER

## 2021-01-01 PROCEDURE — 85610 PROTHROMBIN TIME: CPT | Performed by: EMERGENCY MEDICINE

## 2021-01-01 PROCEDURE — 99213 OFFICE O/P EST LOW 20 MIN: CPT | Performed by: SURGERY

## 2021-01-01 PROCEDURE — 71260 CT THORAX DX C+: CPT

## 2021-01-01 PROCEDURE — 87641 MR-STAPH DNA AMP PROBE: CPT | Performed by: INTERNAL MEDICINE

## 2021-01-01 PROCEDURE — 97530 THERAPEUTIC ACTIVITIES: CPT

## 2021-01-01 PROCEDURE — 97110 THERAPEUTIC EXERCISES: CPT

## 2021-01-01 PROCEDURE — 99214 OFFICE O/P EST MOD 30 MIN: CPT | Performed by: INTERNAL MEDICINE

## 2021-01-01 PROCEDURE — 99232 SBSQ HOSP IP/OBS MODERATE 35: CPT | Performed by: INTERNAL MEDICINE

## 2021-01-01 PROCEDURE — 81001 URINALYSIS AUTO W/SCOPE: CPT | Performed by: INTERNAL MEDICINE

## 2021-01-01 PROCEDURE — 97166 OT EVAL MOD COMPLEX 45 MIN: CPT

## 2021-01-01 PROCEDURE — 77373 STRTCTC BDY RAD THER TX DLVR: CPT | Performed by: RADIOLOGY

## 2021-01-01 PROCEDURE — 80048 BASIC METABOLIC PNL TOTAL CA: CPT | Performed by: INTERNAL MEDICINE

## 2021-01-01 PROCEDURE — 85610 PROTHROMBIN TIME: CPT | Performed by: FAMILY MEDICINE

## 2021-01-01 PROCEDURE — 87635 SARS-COV-2 COVID-19 AMP PRB: CPT | Performed by: INTERNAL MEDICINE

## 2021-01-01 PROCEDURE — 36600 WITHDRAWAL OF ARTERIAL BLOOD: CPT

## 2021-01-01 PROCEDURE — 93321 DOPPLER ECHO F-UP/LMTD STD: CPT | Performed by: INTERNAL MEDICINE

## 2021-01-01 PROCEDURE — 73630 X-RAY EXAM OF FOOT: CPT

## 2021-01-01 PROCEDURE — 80053 COMPREHEN METABOLIC PANEL: CPT | Performed by: EMERGENCY MEDICINE

## 2021-01-01 PROCEDURE — 82550 ASSAY OF CK (CPK): CPT | Performed by: INTERNAL MEDICINE

## 2021-01-01 PROCEDURE — 92610 EVALUATE SWALLOWING FUNCTION: CPT | Performed by: SPEECH-LANGUAGE PATHOLOGIST

## 2021-01-01 PROCEDURE — 86850 RBC ANTIBODY SCREEN: CPT | Performed by: EMERGENCY MEDICINE

## 2021-01-01 PROCEDURE — 99222 1ST HOSP IP/OBS MODERATE 55: CPT | Performed by: PHYSICIAN ASSISTANT

## 2021-01-01 PROCEDURE — 36410 VNPNXR 3YR/> PHY/QHP DX/THER: CPT

## 2021-01-01 PROCEDURE — 25010000002 HYDROCORTISONE SODIUM SUCCINATE 100 MG RECONSTITUTED SOLUTION: Performed by: EMERGENCY MEDICINE

## 2021-01-01 PROCEDURE — 83880 ASSAY OF NATRIURETIC PEPTIDE: CPT | Performed by: INTERNAL MEDICINE

## 2021-01-01 PROCEDURE — 86923 COMPATIBILITY TEST ELECTRIC: CPT

## 2021-01-01 PROCEDURE — 81003 URINALYSIS AUTO W/O SCOPE: CPT | Performed by: EMERGENCY MEDICINE

## 2021-01-01 PROCEDURE — 96375 TX/PRO/DX INJ NEW DRUG ADDON: CPT

## 2021-01-01 PROCEDURE — 84100 ASSAY OF PHOSPHORUS: CPT | Performed by: INTERNAL MEDICINE

## 2021-01-01 PROCEDURE — 73564 X-RAY EXAM KNEE 4 OR MORE: CPT

## 2021-01-01 PROCEDURE — 93880 EXTRACRANIAL BILAT STUDY: CPT | Performed by: SURGERY

## 2021-01-01 PROCEDURE — 83605 ASSAY OF LACTIC ACID: CPT | Performed by: INTERNAL MEDICINE

## 2021-01-01 PROCEDURE — 85025 COMPLETE CBC W/AUTO DIFF WBC: CPT

## 2021-01-01 PROCEDURE — 25010000002 DIPHENHYDRAMINE PER 50 MG: Performed by: CLINICAL NURSE SPECIALIST

## 2021-01-01 PROCEDURE — 70450 CT HEAD/BRAIN W/O DYE: CPT

## 2021-01-01 PROCEDURE — 63710000001 PREDNISONE PER 1 MG: Performed by: CLINICAL NURSE SPECIALIST

## 2021-01-01 PROCEDURE — 93308 TTE F-UP OR LMTD: CPT | Performed by: INTERNAL MEDICINE

## 2021-01-01 PROCEDURE — 77301 RADIOTHERAPY DOSE PLAN IMRT: CPT | Performed by: RADIOLOGY

## 2021-01-01 PROCEDURE — 82550 ASSAY OF CK (CPK): CPT | Performed by: EMERGENCY MEDICINE

## 2021-01-01 PROCEDURE — 94010 BREATHING CAPACITY TEST: CPT | Performed by: NURSE PRACTITIONER

## 2021-01-01 PROCEDURE — 96361 HYDRATE IV INFUSION ADD-ON: CPT

## 2021-01-01 PROCEDURE — 99283 EMERGENCY DEPT VISIT LOW MDM: CPT

## 2021-01-01 PROCEDURE — 99284 EMERGENCY DEPT VISIT MOD MDM: CPT

## 2021-01-01 PROCEDURE — 87081 CULTURE SCREEN ONLY: CPT | Performed by: EMERGENCY MEDICINE

## 2021-01-01 PROCEDURE — 85027 COMPLETE CBC AUTOMATED: CPT | Performed by: NURSE PRACTITIONER

## 2021-01-01 PROCEDURE — 88172 CYTP DX EVAL FNA 1ST EA SITE: CPT | Performed by: INTERNAL MEDICINE

## 2021-01-01 PROCEDURE — 93308 TTE F-UP OR LMTD: CPT

## 2021-01-01 PROCEDURE — 97166 OT EVAL MOD COMPLEX 45 MIN: CPT | Performed by: OCCUPATIONAL THERAPIST

## 2021-01-01 PROCEDURE — 73590 X-RAY EXAM OF LOWER LEG: CPT

## 2021-01-01 PROCEDURE — 99233 SBSQ HOSP IP/OBS HIGH 50: CPT | Performed by: NURSE PRACTITIONER

## 2021-01-01 PROCEDURE — 25010000002 VANCOMYCIN 1 G RECONSTITUTED SOLUTION: Performed by: INTERNAL MEDICINE

## 2021-01-01 PROCEDURE — 99285 EMERGENCY DEPT VISIT HI MDM: CPT

## 2021-01-01 PROCEDURE — 25010000002 MEROPENEM PER 100 MG: Performed by: EMERGENCY MEDICINE

## 2021-01-01 PROCEDURE — 73610 X-RAY EXAM OF ANKLE: CPT

## 2021-01-01 PROCEDURE — 92611 MOTION FLUOROSCOPY/SWALLOW: CPT

## 2021-01-01 PROCEDURE — 97535 SELF CARE MNGMENT TRAINING: CPT | Performed by: OCCUPATIONAL THERAPIST

## 2021-01-01 PROCEDURE — 84443 ASSAY THYROID STIM HORMONE: CPT | Performed by: FAMILY MEDICINE

## 2021-01-01 PROCEDURE — 83880 ASSAY OF NATRIURETIC PEPTIDE: CPT | Performed by: FAMILY MEDICINE

## 2021-01-01 PROCEDURE — 76942 ECHO GUIDE FOR BIOPSY: CPT

## 2021-01-01 PROCEDURE — 71045 X-RAY EXAM CHEST 1 VIEW: CPT

## 2021-01-01 PROCEDURE — 84439 ASSAY OF FREE THYROXINE: CPT | Performed by: NURSE PRACTITIONER

## 2021-01-01 PROCEDURE — 63710000001 DEXAMETHASONE PER 0.25 MG: Performed by: FAMILY MEDICINE

## 2021-01-01 PROCEDURE — 83036 HEMOGLOBIN GLYCOSYLATED A1C: CPT | Performed by: FAMILY MEDICINE

## 2021-01-01 PROCEDURE — 25010000002 HYDROCORTISONE SODIUM SUCCINATE 100 MG RECONSTITUTED SOLUTION: Performed by: NURSE PRACTITIONER

## 2021-01-01 PROCEDURE — 73523 X-RAY EXAM HIPS BI 5/> VIEWS: CPT

## 2021-01-01 PROCEDURE — 82270 OCCULT BLOOD FECES: CPT | Performed by: EMERGENCY MEDICINE

## 2021-01-01 PROCEDURE — 84540 ASSAY OF URINE/UREA-N: CPT | Performed by: INTERNAL MEDICINE

## 2021-01-01 PROCEDURE — 87077 CULTURE AEROBIC IDENTIFY: CPT | Performed by: FAMILY MEDICINE

## 2021-01-01 PROCEDURE — 99497 ADVNCD CARE PLAN 30 MIN: CPT | Performed by: CLINICAL NURSE SPECIALIST

## 2021-01-01 PROCEDURE — G0463 HOSPITAL OUTPT CLINIC VISIT: HCPCS | Performed by: RADIOLOGY

## 2021-01-01 PROCEDURE — 99221 1ST HOSP IP/OBS SF/LOW 40: CPT | Performed by: NURSE PRACTITIONER

## 2021-01-01 PROCEDURE — 80177 DRUG SCRN QUAN LEVETIRACETAM: CPT | Performed by: INTERNAL MEDICINE

## 2021-01-01 PROCEDURE — 82550 ASSAY OF CK (CPK): CPT | Performed by: FAMILY MEDICINE

## 2021-01-01 PROCEDURE — 92610 EVALUATE SWALLOWING FUNCTION: CPT

## 2021-01-01 PROCEDURE — 73700 CT LOWER EXTREMITY W/O DYE: CPT

## 2021-01-01 PROCEDURE — 93005 ELECTROCARDIOGRAM TRACING: CPT | Performed by: EMERGENCY MEDICINE

## 2021-01-01 PROCEDURE — 85007 BL SMEAR W/DIFF WBC COUNT: CPT | Performed by: INTERNAL MEDICINE

## 2021-01-01 PROCEDURE — 81001 URINALYSIS AUTO W/SCOPE: CPT | Performed by: FAMILY MEDICINE

## 2021-01-01 PROCEDURE — 87040 BLOOD CULTURE FOR BACTERIA: CPT | Performed by: EMERGENCY MEDICINE

## 2021-01-01 PROCEDURE — 82330 ASSAY OF CALCIUM: CPT

## 2021-01-01 PROCEDURE — 25010000002 DIGOXIN PER 500 MCG: Performed by: FAMILY MEDICINE

## 2021-01-01 PROCEDURE — 85027 COMPLETE CBC AUTOMATED: CPT | Performed by: FAMILY MEDICINE

## 2021-01-01 PROCEDURE — 93010 ELECTROCARDIOGRAM REPORT: CPT | Performed by: EMERGENCY MEDICINE

## 2021-01-01 PROCEDURE — 85027 COMPLETE CBC AUTOMATED: CPT | Performed by: INTERNAL MEDICINE

## 2021-01-01 PROCEDURE — 99213 OFFICE O/P EST LOW 20 MIN: CPT | Performed by: OTOLARYNGOLOGY

## 2021-01-01 PROCEDURE — 80061 LIPID PANEL: CPT | Performed by: FAMILY MEDICINE

## 2021-01-01 PROCEDURE — 80053 COMPREHEN METABOLIC PANEL: CPT

## 2021-01-01 PROCEDURE — A9577 INJ MULTIHANCE: HCPCS | Performed by: PSYCHIATRY & NEUROLOGY

## 2021-01-01 PROCEDURE — 0HBLXZZ EXCISION OF LEFT LOWER LEG SKIN, EXTERNAL APPROACH: ICD-10-PCS | Performed by: FAMILY MEDICINE

## 2021-01-01 PROCEDURE — 63710000001 DEXAMETHASONE PER 0.25 MG: Performed by: NURSE PRACTITIONER

## 2021-01-01 PROCEDURE — 99221 1ST HOSP IP/OBS SF/LOW 40: CPT | Performed by: CLINICAL NURSE SPECIALIST

## 2021-01-01 PROCEDURE — 85379 FIBRIN DEGRADATION QUANT: CPT | Performed by: INTERNAL MEDICINE

## 2021-01-01 PROCEDURE — 85379 FIBRIN DEGRADATION QUANT: CPT | Performed by: FAMILY MEDICINE

## 2021-01-01 PROCEDURE — 93306 TTE W/DOPPLER COMPLETE: CPT

## 2021-01-01 PROCEDURE — 25010000002 IOPAMIDOL 61 % SOLUTION: Performed by: PHYSICIAN ASSISTANT

## 2021-01-01 PROCEDURE — 83605 ASSAY OF LACTIC ACID: CPT | Performed by: EMERGENCY MEDICINE

## 2021-01-01 PROCEDURE — 99222 1ST HOSP IP/OBS MODERATE 55: CPT | Performed by: NURSE PRACTITIONER

## 2021-01-01 PROCEDURE — 87186 SC STD MICRODIL/AGAR DIL: CPT | Performed by: FAMILY MEDICINE

## 2021-01-01 PROCEDURE — 84145 PROCALCITONIN (PCT): CPT | Performed by: EMERGENCY MEDICINE

## 2021-01-01 PROCEDURE — 86140 C-REACTIVE PROTEIN: CPT | Performed by: EMERGENCY MEDICINE

## 2021-01-01 PROCEDURE — P9612 CATHETERIZE FOR URINE SPEC: HCPCS

## 2021-01-01 PROCEDURE — 31575 DIAGNOSTIC LARYNGOSCOPY: CPT | Performed by: OTOLARYNGOLOGY

## 2021-01-01 PROCEDURE — 77338 DESIGN MLC DEVICE FOR IMRT: CPT | Performed by: RADIOLOGY

## 2021-01-01 PROCEDURE — 74230 X-RAY XM SWLNG FUNCJ C+: CPT

## 2021-01-01 PROCEDURE — 83735 ASSAY OF MAGNESIUM: CPT | Performed by: INTERNAL MEDICINE

## 2021-01-01 PROCEDURE — 85730 THROMBOPLASTIN TIME PARTIAL: CPT | Performed by: FAMILY MEDICINE

## 2021-01-01 PROCEDURE — 93005 ELECTROCARDIOGRAM TRACING: CPT | Performed by: FAMILY MEDICINE

## 2021-01-01 PROCEDURE — 25010000002 ONDANSETRON PER 1 MG: Performed by: EMERGENCY MEDICINE

## 2021-01-01 PROCEDURE — 93325 DOPPLER ECHO COLOR FLOW MAPG: CPT | Performed by: INTERNAL MEDICINE

## 2021-01-01 PROCEDURE — 85025 COMPLETE CBC W/AUTO DIFF WBC: CPT | Performed by: INTERNAL MEDICINE

## 2021-01-01 PROCEDURE — 84484 ASSAY OF TROPONIN QUANT: CPT | Performed by: PHYSICIAN ASSISTANT

## 2021-01-01 PROCEDURE — 25010000002 CEFEPIME PER 500 MG: Performed by: INTERNAL MEDICINE

## 2021-01-01 PROCEDURE — 99214 OFFICE O/P EST MOD 30 MIN: CPT | Performed by: NURSE PRACTITIONER

## 2021-01-01 PROCEDURE — 78815 PET IMAGE W/CT SKULL-THIGH: CPT

## 2021-01-01 PROCEDURE — 93325 DOPPLER ECHO COLOR FLOW MAPG: CPT

## 2021-01-01 PROCEDURE — 84484 ASSAY OF TROPONIN QUANT: CPT | Performed by: FAMILY MEDICINE

## 2021-01-01 PROCEDURE — 80053 COMPREHEN METABOLIC PANEL: CPT | Performed by: INTERNAL MEDICINE

## 2021-01-01 PROCEDURE — 85025 COMPLETE CBC W/AUTO DIFF WBC: CPT | Performed by: EMERGENCY MEDICINE

## 2021-01-01 PROCEDURE — 74176 CT ABD & PELVIS W/O CONTRAST: CPT

## 2021-01-01 PROCEDURE — 25010000002 EPINEPHRINE PER 0.1 MG: Performed by: INTERNAL MEDICINE

## 2021-01-01 PROCEDURE — 25010000002 VANCOMYCIN 1 G RECONSTITUTED SOLUTION 1 EACH VIAL: Performed by: INTERNAL MEDICINE

## 2021-01-01 PROCEDURE — 83880 ASSAY OF NATRIURETIC PEPTIDE: CPT | Performed by: EMERGENCY MEDICINE

## 2021-01-01 PROCEDURE — 80053 COMPREHEN METABOLIC PANEL: CPT | Performed by: FAMILY MEDICINE

## 2021-01-01 PROCEDURE — 87150 DNA/RNA AMPLIFIED PROBE: CPT | Performed by: EMERGENCY MEDICINE

## 2021-01-01 PROCEDURE — 70552 MRI BRAIN STEM W/DYE: CPT

## 2021-01-01 PROCEDURE — 86901 BLOOD TYPING SEROLOGIC RH(D): CPT | Performed by: EMERGENCY MEDICINE

## 2021-01-01 PROCEDURE — 87070 CULTURE OTHR SPECIMN AEROBIC: CPT | Performed by: INTERNAL MEDICINE

## 2021-01-01 PROCEDURE — 93880 EXTRACRANIAL BILAT STUDY: CPT

## 2021-01-01 PROCEDURE — 25010000002 IOPAMIDOL 61 % SOLUTION: Performed by: RADIOLOGY

## 2021-01-01 PROCEDURE — 82607 VITAMIN B-12: CPT | Performed by: CLINICAL NURSE SPECIALIST

## 2021-01-01 PROCEDURE — 84484 ASSAY OF TROPONIN QUANT: CPT | Performed by: INTERNAL MEDICINE

## 2021-01-01 PROCEDURE — 93321 DOPPLER ECHO F-UP/LMTD STD: CPT

## 2021-01-01 PROCEDURE — 87205 SMEAR GRAM STAIN: CPT | Performed by: INTERNAL MEDICINE

## 2021-01-01 PROCEDURE — 94729 DIFFUSING CAPACITY: CPT | Performed by: NURSE PRACTITIONER

## 2021-01-01 PROCEDURE — 84484 ASSAY OF TROPONIN QUANT: CPT | Performed by: EMERGENCY MEDICINE

## 2021-01-01 PROCEDURE — 87880 STREP A ASSAY W/OPTIC: CPT | Performed by: EMERGENCY MEDICINE

## 2021-01-01 PROCEDURE — 25010000002 MORPHINE SULFATE (PF) 2 MG/ML SOLUTION: Performed by: EMERGENCY MEDICINE

## 2021-01-01 PROCEDURE — 0 FLUDEOXYGLUCOSE F18 SOLUTION

## 2021-01-01 PROCEDURE — 85025 COMPLETE CBC W/AUTO DIFF WBC: CPT | Performed by: FAMILY MEDICINE

## 2021-01-01 PROCEDURE — 25010000003 CEFAZOLIN PER 500 MG: Performed by: EMERGENCY MEDICINE

## 2021-01-01 PROCEDURE — 99222 1ST HOSP IP/OBS MODERATE 55: CPT | Performed by: CLINICAL NURSE SPECIALIST

## 2021-01-01 PROCEDURE — 77334 RADIATION TREATMENT AID(S): CPT | Performed by: RADIOLOGY

## 2021-01-01 PROCEDURE — 87635 SARS-COV-2 COVID-19 AMP PRB: CPT | Performed by: FAMILY MEDICINE

## 2021-01-01 PROCEDURE — 87147 CULTURE TYPE IMMUNOLOGIC: CPT | Performed by: EMERGENCY MEDICINE

## 2021-01-01 PROCEDURE — 84425 ASSAY OF VITAMIN B-1: CPT | Performed by: CLINICAL NURSE SPECIALIST

## 2021-01-01 PROCEDURE — 25010000002 DOPAMINE PER 40 MG: Performed by: INTERNAL MEDICINE

## 2021-01-01 PROCEDURE — 84300 ASSAY OF URINE SODIUM: CPT | Performed by: INTERNAL MEDICINE

## 2021-01-01 PROCEDURE — 72192 CT PELVIS W/O DYE: CPT

## 2021-01-01 PROCEDURE — 63710000001 DEXAMETHASONE PER 0.25 MG: Performed by: INTERNAL MEDICINE

## 2021-01-01 PROCEDURE — 25010000002 ONDANSETRON PER 1 MG: Performed by: FAMILY MEDICINE

## 2021-01-01 PROCEDURE — 84207 ASSAY OF VITAMIN B-6: CPT | Performed by: CLINICAL NURSE SPECIALIST

## 2021-01-01 PROCEDURE — 99232 SBSQ HOSP IP/OBS MODERATE 35: CPT | Performed by: CLINICAL NURSE SPECIALIST

## 2021-01-01 PROCEDURE — 73620 X-RAY EXAM OF FOOT: CPT

## 2021-01-01 PROCEDURE — 99203 OFFICE O/P NEW LOW 30 MIN: CPT | Performed by: NEUROLOGICAL SURGERY

## 2021-01-01 PROCEDURE — 83036 HEMOGLOBIN GLYCOSYLATED A1C: CPT | Performed by: INTERNAL MEDICINE

## 2021-01-01 PROCEDURE — 80202 ASSAY OF VANCOMYCIN: CPT | Performed by: INTERNAL MEDICINE

## 2021-01-01 PROCEDURE — 86900 BLOOD TYPING SEROLOGIC ABO: CPT | Performed by: EMERGENCY MEDICINE

## 2021-01-01 RX ORDER — FUROSEMIDE 40 MG/1
40 TABLET ORAL DAILY
Status: DISCONTINUED | OUTPATIENT
Start: 2021-01-01 | End: 2021-01-01 | Stop reason: HOSPADM

## 2021-01-01 RX ORDER — DIGOXIN 0.25 MG/ML
250 INJECTION INTRAMUSCULAR; INTRAVENOUS ONCE
Status: COMPLETED | OUTPATIENT
Start: 2021-01-01 | End: 2021-01-01

## 2021-01-01 RX ORDER — NALOXONE HCL 0.4 MG/ML
0.04 VIAL (ML) INJECTION AS NEEDED
Status: DISCONTINUED | OUTPATIENT
Start: 2021-01-01 | End: 2021-01-01 | Stop reason: HOSPADM

## 2021-01-01 RX ORDER — ONDANSETRON 4 MG/1
4 TABLET, FILM COATED ORAL EVERY 6 HOURS PRN
Status: DISCONTINUED | OUTPATIENT
Start: 2021-01-01 | End: 2021-01-01 | Stop reason: HOSPADM

## 2021-01-01 RX ORDER — ONDANSETRON 2 MG/ML
4 INJECTION INTRAMUSCULAR; INTRAVENOUS EVERY 6 HOURS PRN
Status: DISCONTINUED | OUTPATIENT
Start: 2021-01-01 | End: 2021-01-01 | Stop reason: SDUPTHER

## 2021-01-01 RX ORDER — SODIUM CHLORIDE 9 MG/ML
1-3 INJECTION, SOLUTION INTRAVENOUS CONTINUOUS
Status: CANCELLED | OUTPATIENT
Start: 2021-01-01

## 2021-01-01 RX ORDER — ALUMINA, MAGNESIA, AND SIMETHICONE 2400; 2400; 240 MG/30ML; MG/30ML; MG/30ML
7.5 SUSPENSION ORAL EVERY 4 HOURS PRN
Status: DISCONTINUED | OUTPATIENT
Start: 2021-01-01 | End: 2021-01-01 | Stop reason: HOSPADM

## 2021-01-01 RX ORDER — SODIUM CHLORIDE, SODIUM LACTATE, POTASSIUM CHLORIDE, CALCIUM CHLORIDE 600; 310; 30; 20 MG/100ML; MG/100ML; MG/100ML; MG/100ML
75 INJECTION, SOLUTION INTRAVENOUS CONTINUOUS
Status: DISCONTINUED | OUTPATIENT
Start: 2021-01-01 | End: 2021-01-01

## 2021-01-01 RX ORDER — DIPHENHYDRAMINE HYDROCHLORIDE 50 MG/ML
50 INJECTION INTRAMUSCULAR; INTRAVENOUS ONCE
Status: COMPLETED | OUTPATIENT
Start: 2021-01-01 | End: 2021-01-01

## 2021-01-01 RX ORDER — FUROSEMIDE 20 MG/1
20 TABLET ORAL
COMMUNITY
End: 2021-01-01 | Stop reason: HOSPADM

## 2021-01-01 RX ORDER — CEPHALEXIN 500 MG/1
500 CAPSULE ORAL 2 TIMES DAILY
Qty: 10 CAPSULE | Refills: 0 | Status: SHIPPED | OUTPATIENT
Start: 2021-01-01 | End: 2021-01-01

## 2021-01-01 RX ORDER — SODIUM CHLORIDE 0.9 % (FLUSH) 0.9 %
10 SYRINGE (ML) INJECTION AS NEEDED
Status: DISCONTINUED | OUTPATIENT
Start: 2021-01-01 | End: 2021-01-01 | Stop reason: HOSPADM

## 2021-01-01 RX ORDER — PREDNISONE 50 MG/1
TABLET ORAL
Qty: 3 TABLET | Refills: 0 | Status: ON HOLD | OUTPATIENT
Start: 2021-01-01 | End: 2021-01-01

## 2021-01-01 RX ORDER — SODIUM CHLORIDE 9 MG/ML
INJECTION, SOLUTION INTRAVENOUS AS NEEDED
Status: DISCONTINUED | OUTPATIENT
Start: 2021-01-01 | End: 2021-01-01 | Stop reason: HOSPADM

## 2021-01-01 RX ORDER — FENTANYL CITRATE 50 UG/ML
25 INJECTION, SOLUTION INTRAMUSCULAR; INTRAVENOUS
Status: DISCONTINUED | OUTPATIENT
Start: 2021-01-01 | End: 2021-01-01 | Stop reason: HOSPADM

## 2021-01-01 RX ORDER — FAMOTIDINE 20 MG/1
1 TABLET, FILM COATED ORAL NIGHTLY
COMMUNITY
Start: 2021-01-01

## 2021-01-01 RX ORDER — SPIRONOLACTONE 25 MG/1
25 TABLET ORAL DAILY
Status: DISCONTINUED | OUTPATIENT
Start: 2021-01-01 | End: 2021-01-01 | Stop reason: HOSPADM

## 2021-01-01 RX ORDER — DIPHENHYDRAMINE HYDROCHLORIDE AND LIDOCAINE HYDROCHLORIDE AND ALUMINUM HYDROXIDE AND MAGNESIUM HYDRO
10 KIT EVERY 8 HOURS PRN
Status: ON HOLD
Start: 2021-01-01 | End: 2021-01-01

## 2021-01-01 RX ORDER — SPIRONOLACTONE 25 MG/1
25 TABLET ORAL EVERY MORNING
COMMUNITY
End: 2021-01-01 | Stop reason: HOSPADM

## 2021-01-01 RX ORDER — METOPROLOL SUCCINATE 50 MG/1
50 TABLET, EXTENDED RELEASE ORAL DAILY
Status: DISCONTINUED | OUTPATIENT
Start: 2021-01-01 | End: 2021-01-01 | Stop reason: HOSPADM

## 2021-01-01 RX ORDER — CEFDINIR 300 MG/1
300 CAPSULE ORAL 2 TIMES DAILY
Qty: 8 CAPSULE | Refills: 0
Start: 2021-01-01 | End: 2021-01-01

## 2021-01-01 RX ORDER — GABAPENTIN 300 MG/1
1 CAPSULE ORAL 3 TIMES DAILY
Status: ON HOLD | COMMUNITY
Start: 2021-01-01 | End: 2021-01-01 | Stop reason: SDUPTHER

## 2021-01-01 RX ORDER — LORAZEPAM 0.5 MG/1
1 TABLET ORAL 2 TIMES DAILY PRN
Status: ON HOLD | COMMUNITY
Start: 2021-01-01 | End: 2021-01-01 | Stop reason: SDUPTHER

## 2021-01-01 RX ORDER — FAMOTIDINE 20 MG/1
20 TABLET, FILM COATED ORAL NIGHTLY
Status: DISCONTINUED | OUTPATIENT
Start: 2021-01-01 | End: 2021-01-01 | Stop reason: HOSPADM

## 2021-01-01 RX ORDER — SULFAMETHOXAZOLE AND TRIMETHOPRIM 800; 160 MG/1; MG/1
1 TABLET ORAL 2 TIMES DAILY
Qty: 10 TABLET | Refills: 0 | Status: SHIPPED | OUTPATIENT
Start: 2021-01-01 | End: 2021-01-01

## 2021-01-01 RX ORDER — SULFAMETHOXAZOLE AND TRIMETHOPRIM 800; 160 MG/1; MG/1
1 TABLET ORAL 2 TIMES DAILY
Qty: 20 TABLET | Refills: 0 | Status: SHIPPED | OUTPATIENT
Start: 2021-01-01 | End: 2021-01-01 | Stop reason: SDUPTHER

## 2021-01-01 RX ORDER — ACETAMINOPHEN 325 MG/1
650 TABLET ORAL EVERY 4 HOURS PRN
COMMUNITY

## 2021-01-01 RX ORDER — EPINEPHRINE 1 MG/ML
0.5 INJECTION, SOLUTION, CONCENTRATE INTRAVENOUS ONCE
Status: COMPLETED | OUTPATIENT
Start: 2021-01-01 | End: 2021-01-01

## 2021-01-01 RX ORDER — DIPHENHYDRAMINE HYDROCHLORIDE AND LIDOCAINE HYDROCHLORIDE AND ALUMINUM HYDROXIDE AND MAGNESIUM HYDRO
10 KIT EVERY 8 HOURS PRN
Status: DISCONTINUED | OUTPATIENT
Start: 2021-01-01 | End: 2021-01-01 | Stop reason: HOSPADM

## 2021-01-01 RX ORDER — BISACODYL 10 MG
10 SUPPOSITORY, RECTAL RECTAL DAILY PRN
Status: DISCONTINUED | OUTPATIENT
Start: 2021-01-01 | End: 2021-01-01 | Stop reason: HOSPADM

## 2021-01-01 RX ORDER — IPRATROPIUM BROMIDE AND ALBUTEROL SULFATE 2.5; .5 MG/3ML; MG/3ML
3 SOLUTION RESPIRATORY (INHALATION)
Status: DISCONTINUED | OUTPATIENT
Start: 2021-01-01 | End: 2021-01-01 | Stop reason: HOSPADM

## 2021-01-01 RX ORDER — LISINOPRIL 20 MG/1
20 TABLET ORAL DAILY
Status: DISCONTINUED | OUTPATIENT
Start: 2021-01-01 | End: 2021-01-01 | Stop reason: HOSPADM

## 2021-01-01 RX ORDER — SODIUM CHLORIDE 9 MG/ML
100 INJECTION, SOLUTION INTRAVENOUS CONTINUOUS
Status: DISCONTINUED | OUTPATIENT
Start: 2021-01-01 | End: 2021-01-01

## 2021-01-01 RX ORDER — CEFDINIR 300 MG/1
300 CAPSULE ORAL
Status: DISCONTINUED | OUTPATIENT
Start: 2021-01-01 | End: 2021-01-01 | Stop reason: HOSPADM

## 2021-01-01 RX ORDER — LORAZEPAM 0.5 MG/1
0.5 TABLET ORAL 2 TIMES DAILY PRN
Qty: 6 TABLET | Refills: 0 | Status: ON HOLD | OUTPATIENT
Start: 2021-01-01 | End: 2021-01-01

## 2021-01-01 RX ORDER — IPRATROPIUM BROMIDE AND ALBUTEROL SULFATE 2.5; .5 MG/3ML; MG/3ML
3 SOLUTION RESPIRATORY (INHALATION) 4 TIMES DAILY PRN
Qty: 360 ML | Status: ON HOLD
Start: 2021-01-01 | End: 2021-01-01

## 2021-01-01 RX ORDER — BUDESONIDE AND FORMOTEROL FUMARATE DIHYDRATE 160; 4.5 UG/1; UG/1
2 AEROSOL RESPIRATORY (INHALATION)
Status: DISCONTINUED | OUTPATIENT
Start: 2021-01-01 | End: 2021-01-01 | Stop reason: HOSPADM

## 2021-01-01 RX ORDER — ATORVASTATIN CALCIUM 40 MG/1
80 TABLET, FILM COATED ORAL NIGHTLY
Status: DISCONTINUED | OUTPATIENT
Start: 2021-01-01 | End: 2021-01-01 | Stop reason: HOSPADM

## 2021-01-01 RX ORDER — OXYBUTYNIN CHLORIDE 5 MG/1
5 TABLET ORAL 2 TIMES DAILY
Status: DISCONTINUED | OUTPATIENT
Start: 2021-01-01 | End: 2021-01-01 | Stop reason: HOSPADM

## 2021-01-01 RX ORDER — DIGOXIN 125 MCG
125 TABLET ORAL
Start: 2021-01-01

## 2021-01-01 RX ORDER — ASPIRIN 81 MG/1
81 TABLET ORAL DAILY
Status: DISCONTINUED | OUTPATIENT
Start: 2021-01-01 | End: 2021-01-01 | Stop reason: HOSPADM

## 2021-01-01 RX ORDER — NOREPINEPHRINE BIT/0.9 % NACL 8 MG/250ML
.02-.3 INFUSION BOTTLE (ML) INTRAVENOUS
Status: DISCONTINUED | OUTPATIENT
Start: 2021-01-01 | End: 2021-01-01

## 2021-01-01 RX ORDER — EPINEPHRINE 1 MG/ML
1 INJECTION, SOLUTION, CONCENTRATE INTRAVENOUS ONCE
Status: DISCONTINUED | OUTPATIENT
Start: 2021-01-01 | End: 2021-01-01

## 2021-01-01 RX ORDER — ONDANSETRON 2 MG/ML
4 INJECTION INTRAMUSCULAR; INTRAVENOUS EVERY 6 HOURS PRN
Status: DISCONTINUED | OUTPATIENT
Start: 2021-01-01 | End: 2021-01-01 | Stop reason: HOSPADM

## 2021-01-01 RX ORDER — BUDESONIDE AND FORMOTEROL FUMARATE DIHYDRATE 80; 4.5 UG/1; UG/1
2 AEROSOL RESPIRATORY (INHALATION)
Status: DISCONTINUED | OUTPATIENT
Start: 2021-01-01 | End: 2021-01-01 | Stop reason: HOSPADM

## 2021-01-01 RX ORDER — ATORVASTATIN CALCIUM 10 MG/1
10 TABLET, FILM COATED ORAL DAILY
Status: DISCONTINUED | OUTPATIENT
Start: 2021-01-01 | End: 2021-01-01

## 2021-01-01 RX ORDER — TRAMADOL HYDROCHLORIDE 50 MG/1
50 TABLET ORAL EVERY 6 HOURS PRN
Qty: 12 TABLET | Refills: 0 | Status: SHIPPED | OUTPATIENT
Start: 2021-01-01 | End: 2021-01-01 | Stop reason: HOSPADM

## 2021-01-01 RX ORDER — TRAMADOL HYDROCHLORIDE 50 MG/1
1 TABLET ORAL EVERY 6 HOURS PRN
Status: ON HOLD | COMMUNITY
Start: 2021-01-01 | End: 2021-01-01 | Stop reason: SDUPTHER

## 2021-01-01 RX ORDER — HYDROMORPHONE HYDROCHLORIDE 1 MG/ML
0.5 INJECTION, SOLUTION INTRAMUSCULAR; INTRAVENOUS; SUBCUTANEOUS
Status: DISCONTINUED | OUTPATIENT
Start: 2021-01-01 | End: 2021-01-01 | Stop reason: HOSPADM

## 2021-01-01 RX ORDER — PROPOFOL 10 MG/ML
VIAL (ML) INTRAVENOUS AS NEEDED
Status: DISCONTINUED | OUTPATIENT
Start: 2021-01-01 | End: 2021-01-01 | Stop reason: SURG

## 2021-01-01 RX ORDER — OXYBUTYNIN CHLORIDE 5 MG/1
1 TABLET ORAL 2 TIMES DAILY
COMMUNITY
Start: 2020-11-28 | End: 2021-01-01 | Stop reason: HOSPADM

## 2021-01-01 RX ORDER — BENZONATATE 100 MG/1
200 CAPSULE ORAL EVERY 8 HOURS PRN
Status: DISCONTINUED | OUTPATIENT
Start: 2021-01-01 | End: 2021-01-01 | Stop reason: HOSPADM

## 2021-01-01 RX ORDER — GABAPENTIN 100 MG/1
100 CAPSULE ORAL 3 TIMES DAILY
Status: DISCONTINUED | OUTPATIENT
Start: 2021-01-01 | End: 2021-01-01 | Stop reason: HOSPADM

## 2021-01-01 RX ORDER — POLYETHYLENE GLYCOL 3350 17 G/17G
17 POWDER, FOR SOLUTION ORAL DAILY PRN
Status: ON HOLD | COMMUNITY
End: 2021-01-01

## 2021-01-01 RX ORDER — LIDOCAINE HYDROCHLORIDE 10 MG/ML
0.5 INJECTION, SOLUTION EPIDURAL; INFILTRATION; INTRACAUDAL; PERINEURAL ONCE AS NEEDED
Status: DISCONTINUED | OUTPATIENT
Start: 2021-01-01 | End: 2021-01-01 | Stop reason: HOSPADM

## 2021-01-01 RX ORDER — DIGOXIN 125 MCG
125 TABLET ORAL
Status: DISCONTINUED | OUTPATIENT
Start: 2021-01-01 | End: 2021-01-01 | Stop reason: HOSPADM

## 2021-01-01 RX ORDER — SODIUM CHLORIDE 9 MG/ML
125 INJECTION, SOLUTION INTRAVENOUS CONTINUOUS
Status: DISCONTINUED | OUTPATIENT
Start: 2021-01-01 | End: 2021-01-01

## 2021-01-01 RX ORDER — NICOTINE POLACRILEX 4 MG
15 LOZENGE BUCCAL
Status: DISCONTINUED | OUTPATIENT
Start: 2021-01-01 | End: 2021-01-01 | Stop reason: HOSPADM

## 2021-01-01 RX ORDER — MAGNESIUM HYDROXIDE 1200 MG/15ML
LIQUID ORAL AS NEEDED
Status: DISCONTINUED | OUTPATIENT
Start: 2021-01-01 | End: 2021-01-01 | Stop reason: HOSPADM

## 2021-01-01 RX ORDER — LEVETIRACETAM 500 MG/1
500 TABLET ORAL 2 TIMES DAILY
Status: DISCONTINUED | OUTPATIENT
Start: 2021-01-01 | End: 2021-01-01 | Stop reason: HOSPADM

## 2021-01-01 RX ORDER — PREDNISONE 20 MG/1
20 TABLET ORAL
Status: DISCONTINUED | OUTPATIENT
Start: 2021-01-01 | End: 2021-01-01

## 2021-01-01 RX ORDER — GABAPENTIN 100 MG/1
100 CAPSULE ORAL 3 TIMES DAILY
Qty: 9 CAPSULE | Refills: 0 | Status: SHIPPED | OUTPATIENT
Start: 2021-01-01

## 2021-01-01 RX ORDER — SIMETHICONE 125 MG
125 TABLET,CHEWABLE ORAL EVERY 6 HOURS PRN
COMMUNITY
End: 2021-01-01 | Stop reason: HOSPADM

## 2021-01-01 RX ORDER — SODIUM CHLORIDE 0.9 % (FLUSH) 0.9 %
3 SYRINGE (ML) INJECTION AS NEEDED
Status: DISCONTINUED | OUTPATIENT
Start: 2021-01-01 | End: 2021-01-01 | Stop reason: HOSPADM

## 2021-01-01 RX ORDER — SODIUM CHLORIDE 0.9 % (FLUSH) 0.9 %
10 SYRINGE (ML) INJECTION EVERY 12 HOURS SCHEDULED
Status: DISCONTINUED | OUTPATIENT
Start: 2021-01-01 | End: 2021-01-01 | Stop reason: HOSPADM

## 2021-01-01 RX ORDER — PANTOPRAZOLE SODIUM 40 MG/1
40 TABLET, DELAYED RELEASE ORAL EVERY MORNING
Status: DISCONTINUED | OUTPATIENT
Start: 2021-01-01 | End: 2021-01-01 | Stop reason: HOSPADM

## 2021-01-01 RX ORDER — LIDOCAINE/RACEPINEP/TETRACAINE 4-0.05-0.5
3 SOLUTION WITH PREFILLED APPLICATOR (ML) TOPICAL ONCE
Status: COMPLETED | OUTPATIENT
Start: 2021-01-01 | End: 2021-01-01

## 2021-01-01 RX ORDER — PANTOPRAZOLE SODIUM 40 MG/1
40 TABLET, DELAYED RELEASE ORAL EVERY MORNING
Refills: 5 | Status: DISCONTINUED | OUTPATIENT
Start: 2021-01-01 | End: 2021-01-01 | Stop reason: HOSPADM

## 2021-01-01 RX ORDER — ALUMINA, MAGNESIA, AND SIMETHICONE 2400; 2400; 240 MG/30ML; MG/30ML; MG/30ML
15 SUSPENSION ORAL EVERY 6 HOURS PRN
Status: DISCONTINUED | OUTPATIENT
Start: 2021-01-01 | End: 2021-01-01 | Stop reason: SDUPTHER

## 2021-01-01 RX ORDER — ACETAMINOPHEN 325 MG/1
650 TABLET ORAL EVERY 4 HOURS PRN
Status: DISCONTINUED | OUTPATIENT
Start: 2021-01-01 | End: 2021-01-01 | Stop reason: HOSPADM

## 2021-01-01 RX ORDER — GABAPENTIN 100 MG/1
200 CAPSULE ORAL 3 TIMES DAILY
Status: ON HOLD | COMMUNITY
End: 2021-01-01 | Stop reason: SDUPTHER

## 2021-01-01 RX ORDER — LIDOCAINE 50 MG/G
1 PATCH TOPICAL DAILY PRN
Status: ON HOLD | COMMUNITY
End: 2021-01-01

## 2021-01-01 RX ORDER — DOPAMINE HYDROCHLORIDE 160 MG/100ML
2-20 INJECTION, SOLUTION INTRAVENOUS
Status: DISCONTINUED | OUTPATIENT
Start: 2021-01-01 | End: 2021-01-01

## 2021-01-01 RX ORDER — OXYCODONE AND ACETAMINOPHEN 10; 325 MG/1; MG/1
1 TABLET ORAL ONCE AS NEEDED
Status: DISCONTINUED | OUTPATIENT
Start: 2021-01-01 | End: 2021-01-01 | Stop reason: HOSPADM

## 2021-01-01 RX ORDER — SODIUM CHLORIDE, SODIUM LACTATE, POTASSIUM CHLORIDE, CALCIUM CHLORIDE 600; 310; 30; 20 MG/100ML; MG/100ML; MG/100ML; MG/100ML
1000 INJECTION, SOLUTION INTRAVENOUS CONTINUOUS
Status: DISCONTINUED | OUTPATIENT
Start: 2021-01-01 | End: 2021-01-01 | Stop reason: HOSPADM

## 2021-01-01 RX ORDER — IBUPROFEN 600 MG/1
600 TABLET ORAL ONCE AS NEEDED
Status: DISCONTINUED | OUTPATIENT
Start: 2021-01-01 | End: 2021-01-01 | Stop reason: HOSPADM

## 2021-01-01 RX ORDER — SODIUM CHLORIDE 9 MG/ML
100 INJECTION, SOLUTION INTRAVENOUS CONTINUOUS
Status: DISCONTINUED | OUTPATIENT
Start: 2021-01-01 | End: 2021-01-01 | Stop reason: HOSPADM

## 2021-01-01 RX ORDER — DEXAMETHASONE SODIUM PHOSPHATE 4 MG/ML
4 INJECTION, SOLUTION INTRA-ARTICULAR; INTRALESIONAL; INTRAMUSCULAR; INTRAVENOUS; SOFT TISSUE EVERY 8 HOURS SCHEDULED
Status: DISCONTINUED | OUTPATIENT
Start: 2021-01-01 | End: 2021-01-01

## 2021-01-01 RX ORDER — ONDANSETRON 2 MG/ML
4 INJECTION INTRAMUSCULAR; INTRAVENOUS ONCE
Status: COMPLETED | OUTPATIENT
Start: 2021-01-01 | End: 2021-01-01

## 2021-01-01 RX ORDER — LABETALOL HYDROCHLORIDE 5 MG/ML
5 INJECTION, SOLUTION INTRAVENOUS
Status: DISCONTINUED | OUTPATIENT
Start: 2021-01-01 | End: 2021-01-01 | Stop reason: HOSPADM

## 2021-01-01 RX ORDER — AMLODIPINE BESYLATE 5 MG/1
5 TABLET ORAL DAILY
Status: DISCONTINUED | OUTPATIENT
Start: 2021-01-01 | End: 2021-01-01 | Stop reason: HOSPADM

## 2021-01-01 RX ORDER — GABAPENTIN 100 MG/1
100 CAPSULE ORAL 3 TIMES DAILY
Qty: 9 CAPSULE | Refills: 0 | Status: ON HOLD | OUTPATIENT
Start: 2021-01-01 | End: 2021-01-01 | Stop reason: SDUPTHER

## 2021-01-01 RX ORDER — POLYETHYLENE GLYCOL 3350 17 G/17G
17 POWDER, FOR SOLUTION ORAL DAILY PRN
Status: DISCONTINUED | OUTPATIENT
Start: 2021-01-01 | End: 2021-01-01 | Stop reason: HOSPADM

## 2021-01-01 RX ORDER — IPRATROPIUM BROMIDE AND ALBUTEROL SULFATE 2.5; .5 MG/3ML; MG/3ML
3 SOLUTION RESPIRATORY (INHALATION)
Status: DISCONTINUED | OUTPATIENT
Start: 2021-01-01 | End: 2021-01-01

## 2021-01-01 RX ORDER — MORPHINE SULFATE 2 MG/ML
2 INJECTION, SOLUTION INTRAMUSCULAR; INTRAVENOUS ONCE
Status: COMPLETED | OUTPATIENT
Start: 2021-01-01 | End: 2021-01-01

## 2021-01-01 RX ORDER — DEXAMETHASONE 4 MG/1
4 TABLET ORAL
Status: DISCONTINUED | OUTPATIENT
Start: 2021-01-01 | End: 2021-01-01 | Stop reason: HOSPADM

## 2021-01-01 RX ORDER — IPRATROPIUM BROMIDE AND ALBUTEROL SULFATE 2.5; .5 MG/3ML; MG/3ML
3 SOLUTION RESPIRATORY (INHALATION) EVERY 6 HOURS PRN
COMMUNITY

## 2021-01-01 RX ORDER — FUROSEMIDE 20 MG/1
20 TABLET ORAL DAILY
Status: ON HOLD | COMMUNITY
End: 2021-01-01

## 2021-01-01 RX ORDER — GUAIFENESIN 600 MG/1
1200 TABLET, EXTENDED RELEASE ORAL EVERY 12 HOURS SCHEDULED
Status: DISCONTINUED | OUTPATIENT
Start: 2021-01-01 | End: 2021-01-01 | Stop reason: HOSPADM

## 2021-01-01 RX ORDER — LIDOCAINE HYDROCHLORIDE 20 MG/ML
INJECTION, SOLUTION EPIDURAL; INFILTRATION; INTRACAUDAL; PERINEURAL AS NEEDED
Status: DISCONTINUED | OUTPATIENT
Start: 2021-01-01 | End: 2021-01-01 | Stop reason: SURG

## 2021-01-01 RX ORDER — LORAZEPAM 0.5 MG/1
0.5 TABLET ORAL 3 TIMES DAILY
COMMUNITY
End: 2021-01-01 | Stop reason: HOSPADM

## 2021-01-01 RX ORDER — FLUMAZENIL 0.1 MG/ML
0.2 INJECTION INTRAVENOUS AS NEEDED
Status: DISCONTINUED | OUTPATIENT
Start: 2021-01-01 | End: 2021-01-01 | Stop reason: HOSPADM

## 2021-01-01 RX ORDER — ONDANSETRON 4 MG/1
1 TABLET, FILM COATED ORAL EVERY 6 HOURS PRN
COMMUNITY
Start: 2021-01-01

## 2021-01-01 RX ORDER — ALUMINA, MAGNESIA, AND SIMETHICONE 2400; 2400; 240 MG/30ML; MG/30ML; MG/30ML
15 SUSPENSION ORAL EVERY 6 HOURS PRN
Status: DISCONTINUED | OUTPATIENT
Start: 2021-01-01 | End: 2021-01-01 | Stop reason: HOSPADM

## 2021-01-01 RX ORDER — CEFDINIR 300 MG/1
300 CAPSULE ORAL
Qty: 4 CAPSULE | Refills: 0
Start: 2021-01-01 | End: 2021-01-01

## 2021-01-01 RX ORDER — CALCIUM CARBONATE 200(500)MG
2 TABLET,CHEWABLE ORAL EVERY 4 HOURS PRN
COMMUNITY

## 2021-01-01 RX ORDER — LORAZEPAM 0.5 MG/1
0.5 TABLET ORAL 2 TIMES DAILY PRN
Status: ON HOLD | COMMUNITY
End: 2021-01-01 | Stop reason: SDUPTHER

## 2021-01-01 RX ORDER — CLINDAMYCIN PHOSPHATE 900 MG/50ML
900 INJECTION INTRAVENOUS ONCE
Status: COMPLETED | OUTPATIENT
Start: 2021-01-01 | End: 2021-01-01

## 2021-01-01 RX ORDER — DEXTROSE MONOHYDRATE 25 G/50ML
25 INJECTION, SOLUTION INTRAVENOUS
Status: DISCONTINUED | OUTPATIENT
Start: 2021-01-01 | End: 2021-01-01 | Stop reason: HOSPADM

## 2021-01-01 RX ORDER — GABAPENTIN 100 MG/1
200 CAPSULE ORAL 3 TIMES DAILY
Qty: 9 CAPSULE | Refills: 0 | Status: ON HOLD | OUTPATIENT
Start: 2021-01-01 | End: 2021-01-01

## 2021-01-01 RX ORDER — DEXAMETHASONE 4 MG/1
4 TABLET ORAL
Start: 2021-01-01

## 2021-01-01 RX ORDER — SODIUM HYPOCHLORITE 1.25 MG/ML
SOLUTION TOPICAL EVERY 12 HOURS SCHEDULED
Status: DISCONTINUED | OUTPATIENT
Start: 2021-01-01 | End: 2021-01-01

## 2021-01-01 RX ORDER — LEVETIRACETAM 500 MG/1
500 TABLET ORAL 2 TIMES DAILY
Status: DISCONTINUED | OUTPATIENT
Start: 2021-01-01 | End: 2021-01-01

## 2021-01-01 RX ORDER — GABAPENTIN 100 MG/1
200 CAPSULE ORAL 3 TIMES DAILY
Status: DISCONTINUED | OUTPATIENT
Start: 2021-01-01 | End: 2021-01-01 | Stop reason: HOSPADM

## 2021-01-01 RX ORDER — ATORVASTATIN CALCIUM 10 MG/1
10 TABLET, FILM COATED ORAL NIGHTLY
Status: DISCONTINUED | OUTPATIENT
Start: 2021-01-01 | End: 2021-01-01 | Stop reason: HOSPADM

## 2021-01-01 RX ORDER — TRAMADOL HYDROCHLORIDE 50 MG/1
50 TABLET ORAL EVERY 6 HOURS PRN
Status: DISCONTINUED | OUTPATIENT
Start: 2021-01-01 | End: 2021-01-01 | Stop reason: HOSPADM

## 2021-01-01 RX ORDER — DIPHENHYDRAMINE HCL 50 MG
CAPSULE ORAL
Qty: 1 CAPSULE | Refills: 0 | Status: ON HOLD | OUTPATIENT
Start: 2021-01-01 | End: 2021-01-01

## 2021-01-01 RX ORDER — FLUTICASONE PROPIONATE 50 MCG
SPRAY, SUSPENSION (ML) NASAL DAILY PRN
Status: ON HOLD | COMMUNITY
Start: 2021-01-01 | End: 2021-01-01

## 2021-01-01 RX ORDER — CALCIUM CARBONATE 200(500)MG
2 TABLET,CHEWABLE ORAL EVERY 4 HOURS PRN
Status: DISCONTINUED | OUTPATIENT
Start: 2021-01-01 | End: 2021-01-01 | Stop reason: HOSPADM

## 2021-01-01 RX ORDER — TIOTROPIUM BROMIDE INHALATION SPRAY 1.56 UG/1
1 SPRAY, METERED RESPIRATORY (INHALATION) DAILY
Status: ON HOLD | COMMUNITY
Start: 2021-01-01 | End: 2021-01-01

## 2021-01-01 RX ORDER — LIDOCAINE HYDROCHLORIDE 10 MG/ML
1 INJECTION, SOLUTION EPIDURAL; INFILTRATION; INTRACAUDAL; PERINEURAL ONCE
Status: COMPLETED | OUTPATIENT
Start: 2021-01-01 | End: 2021-01-01

## 2021-01-01 RX ORDER — IPRATROPIUM BROMIDE AND ALBUTEROL SULFATE 2.5; .5 MG/3ML; MG/3ML
3 SOLUTION RESPIRATORY (INHALATION) 4 TIMES DAILY PRN
Status: DISCONTINUED | OUTPATIENT
Start: 2021-01-01 | End: 2021-01-01 | Stop reason: HOSPADM

## 2021-01-01 RX ORDER — DEXAMETHASONE 4 MG/1
4 TABLET ORAL EVERY 8 HOURS SCHEDULED
Status: DISCONTINUED | OUTPATIENT
Start: 2021-01-01 | End: 2021-01-01 | Stop reason: HOSPADM

## 2021-01-01 RX ORDER — SODIUM CHLORIDE 0.9 % (FLUSH) 0.9 %
10 SYRINGE (ML) INJECTION AS NEEDED
Status: CANCELLED | OUTPATIENT
Start: 2021-01-01

## 2021-01-01 RX ORDER — PANTOPRAZOLE SODIUM 40 MG/1
40 TABLET, DELAYED RELEASE ORAL
Refills: 5 | Status: DISCONTINUED | OUTPATIENT
Start: 2021-01-01 | End: 2021-01-01 | Stop reason: HOSPADM

## 2021-01-01 RX ORDER — NYSTATIN 100000 U/G
1 CREAM TOPICAL EVERY 12 HOURS SCHEDULED
Status: DISCONTINUED | OUTPATIENT
Start: 2021-01-01 | End: 2021-01-01 | Stop reason: HOSPADM

## 2021-01-01 RX ORDER — ONDANSETRON 2 MG/ML
4 INJECTION INTRAMUSCULAR; INTRAVENOUS AS NEEDED
Status: DISCONTINUED | OUTPATIENT
Start: 2021-01-01 | End: 2021-01-01 | Stop reason: HOSPADM

## 2021-01-01 RX ORDER — CEFDINIR 300 MG/1
300 CAPSULE ORAL
Status: DISCONTINUED | OUTPATIENT
Start: 2021-01-01 | End: 2021-01-01

## 2021-01-01 RX ORDER — SODIUM CHLORIDE 0.9 % (FLUSH) 0.9 %
3 SYRINGE (ML) INJECTION EVERY 12 HOURS SCHEDULED
Status: CANCELLED | OUTPATIENT
Start: 2021-01-01

## 2021-01-01 RX ADMIN — PERFLUTREN 8.48 MG: 6.52 INJECTION, SUSPENSION INTRAVENOUS at 09:48

## 2021-01-01 RX ADMIN — AMLODIPINE BESYLATE 5 MG: 5 TABLET ORAL at 10:37

## 2021-01-01 RX ADMIN — APIXABAN 5 MG: 5 TABLET, FILM COATED ORAL at 21:12

## 2021-01-01 RX ADMIN — LEVETIRACETAM 500 MG: 500 TABLET, FILM COATED ORAL at 09:20

## 2021-01-01 RX ADMIN — HYDROCORTISONE SODIUM SUCCINATE 50 MG: 100 INJECTION, POWDER, FOR SOLUTION INTRAMUSCULAR; INTRAVENOUS at 18:50

## 2021-01-01 RX ADMIN — Medication 1 APPLICATION: at 22:21

## 2021-01-01 RX ADMIN — SODIUM CHLORIDE, PRESERVATIVE FREE 10 ML: 5 INJECTION INTRAVENOUS at 08:01

## 2021-01-01 RX ADMIN — NYSTATIN 1 APPLICATION: 100000 CREAM TOPICAL at 08:34

## 2021-01-01 RX ADMIN — FAMOTIDINE 20 MG: 20 TABLET, FILM COATED ORAL at 23:25

## 2021-01-01 RX ADMIN — SODIUM CHLORIDE, PRESERVATIVE FREE 10 ML: 5 INJECTION INTRAVENOUS at 23:26

## 2021-01-01 RX ADMIN — OXYBUTYNIN CHLORIDE 5 MG: 5 TABLET ORAL at 08:57

## 2021-01-01 RX ADMIN — NYSTATIN 1 APPLICATION: 100000 CREAM TOPICAL at 08:19

## 2021-01-01 RX ADMIN — SODIUM CHLORIDE 100 ML/HR: 9 INJECTION, SOLUTION INTRAVENOUS at 15:25

## 2021-01-01 RX ADMIN — APIXABAN 5 MG: 5 TABLET, FILM COATED ORAL at 08:16

## 2021-01-01 RX ADMIN — DEXAMETHASONE 4 MG: 4 TABLET ORAL at 08:33

## 2021-01-01 RX ADMIN — OXYBUTYNIN CHLORIDE 5 MG: 5 TABLET ORAL at 23:25

## 2021-01-01 RX ADMIN — ASPIRIN 81 MG: 81 TABLET, COATED ORAL at 09:20

## 2021-01-01 RX ADMIN — GABAPENTIN 100 MG: 100 CAPSULE ORAL at 20:34

## 2021-01-01 RX ADMIN — IPRATROPIUM BROMIDE 0.5 MG: 0.5 SOLUTION RESPIRATORY (INHALATION) at 19:13

## 2021-01-01 RX ADMIN — SODIUM CHLORIDE, PRESERVATIVE FREE 10 ML: 5 INJECTION INTRAVENOUS at 20:06

## 2021-01-01 RX ADMIN — INSULIN LISPRO 8 UNITS: 100 INJECTION, SOLUTION INTRAVENOUS; SUBCUTANEOUS at 12:02

## 2021-01-01 RX ADMIN — HYDROCORTISONE SODIUM SUCCINATE 50 MG: 100 INJECTION, POWDER, FOR SOLUTION INTRAMUSCULAR; INTRAVENOUS at 00:03

## 2021-01-01 RX ADMIN — HYDROCORTISONE SODIUM SUCCINATE 50 MG: 100 INJECTION, POWDER, FOR SOLUTION INTRAMUSCULAR; INTRAVENOUS at 06:13

## 2021-01-01 RX ADMIN — SODIUM CHLORIDE 1 G: 9 INJECTION, SOLUTION INTRAVENOUS at 18:09

## 2021-01-01 RX ADMIN — IOPAMIDOL 100 ML: 612 INJECTION, SOLUTION INTRAVENOUS at 15:15

## 2021-01-01 RX ADMIN — PERFLUTREN 8.48 MG: 6.52 INJECTION, SUSPENSION INTRAVENOUS at 10:19

## 2021-01-01 RX ADMIN — GABAPENTIN 100 MG: 100 CAPSULE ORAL at 23:26

## 2021-01-01 RX ADMIN — NYSTATIN 1 APPLICATION: 100000 CREAM TOPICAL at 20:06

## 2021-01-01 RX ADMIN — SODIUM CHLORIDE, PRESERVATIVE FREE 10 ML: 5 INJECTION INTRAVENOUS at 09:21

## 2021-01-01 RX ADMIN — SODIUM BICARBONATE 150 MEQ: 84 INJECTION INTRAVENOUS at 20:00

## 2021-01-01 RX ADMIN — PANTOPRAZOLE SODIUM 40 MG: 40 TABLET, DELAYED RELEASE ORAL at 06:23

## 2021-01-01 RX ADMIN — DIPHENHYDRAMINE HYDROCHLORIDE 50 MG: 50 INJECTION, SOLUTION INTRAMUSCULAR; INTRAVENOUS at 07:53

## 2021-01-01 RX ADMIN — DIGOXIN 125 MCG: 125 TABLET ORAL at 12:16

## 2021-01-01 RX ADMIN — DEXTROSE MONOHYDRATE 25 G: 500 INJECTION PARENTERAL at 05:25

## 2021-01-01 RX ADMIN — SODIUM CHLORIDE 100 ML/HR: 9 INJECTION, SOLUTION INTRAVENOUS at 15:19

## 2021-01-01 RX ADMIN — ASPIRIN 81 MG: 81 TABLET, COATED ORAL at 08:33

## 2021-01-01 RX ADMIN — DEXAMETHASONE 4 MG: 4 TABLET ORAL at 08:57

## 2021-01-01 RX ADMIN — ATROPINE SULFATE 0.5 MG: 0.1 INJECTION INTRAVENOUS at 04:55

## 2021-01-01 RX ADMIN — GABAPENTIN 100 MG: 100 CAPSULE ORAL at 17:28

## 2021-01-01 RX ADMIN — IPRATROPIUM BROMIDE AND ALBUTEROL SULFATE 3 ML: 2.5; .5 SOLUTION RESPIRATORY (INHALATION) at 10:39

## 2021-01-01 RX ADMIN — GABAPENTIN 200 MG: 100 CAPSULE ORAL at 20:47

## 2021-01-01 RX ADMIN — IOPAMIDOL 100 ML: 612 INJECTION, SOLUTION INTRAVENOUS at 16:00

## 2021-01-01 RX ADMIN — LEVETIRACETAM 500 MG: 500 TABLET, FILM COATED ORAL at 08:00

## 2021-01-01 RX ADMIN — LISINOPRIL 20 MG: 20 TABLET ORAL at 10:37

## 2021-01-01 RX ADMIN — SODIUM CHLORIDE 1 G: 9 INJECTION, SOLUTION INTRAVENOUS at 17:06

## 2021-01-01 RX ADMIN — NYSTATIN 500000 UNITS: 100000 SUSPENSION ORAL at 12:17

## 2021-01-01 RX ADMIN — SODIUM CHLORIDE, POTASSIUM CHLORIDE, SODIUM LACTATE AND CALCIUM CHLORIDE 75 ML/HR: 600; 310; 30; 20 INJECTION, SOLUTION INTRAVENOUS at 03:41

## 2021-01-01 RX ADMIN — DOPAMINE HYDROCHLORIDE 2 MCG/KG/MIN: 160 INJECTION, SOLUTION INTRAVENOUS at 05:20

## 2021-01-01 RX ADMIN — NYSTATIN 1 APPLICATION: 100000 CREAM TOPICAL at 20:35

## 2021-01-01 RX ADMIN — INSULIN LISPRO 5 UNITS: 100 INJECTION, SOLUTION INTRAVENOUS; SUBCUTANEOUS at 11:36

## 2021-01-01 RX ADMIN — IPRATROPIUM BROMIDE 0.5 MG: 0.5 SOLUTION RESPIRATORY (INHALATION) at 11:53

## 2021-01-01 RX ADMIN — BUDESONIDE AND FORMOTEROL FUMARATE DIHYDRATE 2 PUFF: 80; 4.5 AEROSOL RESPIRATORY (INHALATION) at 20:23

## 2021-01-01 RX ADMIN — ONDANSETRON 4 MG: 2 INJECTION INTRAMUSCULAR; INTRAVENOUS at 18:34

## 2021-01-01 RX ADMIN — SODIUM CHLORIDE 1 G: 9 INJECTION, SOLUTION INTRAVENOUS at 10:20

## 2021-01-01 RX ADMIN — INSULIN LISPRO 5 UNITS: 100 INJECTION, SOLUTION INTRAVENOUS; SUBCUTANEOUS at 20:34

## 2021-01-01 RX ADMIN — SODIUM CHLORIDE, PRESERVATIVE FREE 10 ML: 5 INJECTION INTRAVENOUS at 08:47

## 2021-01-01 RX ADMIN — NYSTATIN 1 APPLICATION: 100000 CREAM TOPICAL at 08:47

## 2021-01-01 RX ADMIN — GUAIFENESIN 1200 MG: 600 TABLET, EXTENDED RELEASE ORAL at 23:24

## 2021-01-01 RX ADMIN — FUROSEMIDE 40 MG: 40 TABLET ORAL at 08:33

## 2021-01-01 RX ADMIN — INSULIN LISPRO 3 UNITS: 100 INJECTION, SOLUTION INTRAVENOUS; SUBCUTANEOUS at 17:02

## 2021-01-01 RX ADMIN — ASPIRIN 81 MG: 81 TABLET, COATED ORAL at 08:01

## 2021-01-01 RX ADMIN — SODIUM CHLORIDE, PRESERVATIVE FREE 10 ML: 5 INJECTION INTRAVENOUS at 09:01

## 2021-01-01 RX ADMIN — NYSTATIN 500000 UNITS: 100000 SUSPENSION ORAL at 11:21

## 2021-01-01 RX ADMIN — PANTOPRAZOLE SODIUM 40 MG: 40 TABLET, DELAYED RELEASE ORAL at 06:00

## 2021-01-01 RX ADMIN — INSULIN LISPRO 10 UNITS: 100 INJECTION, SOLUTION INTRAVENOUS; SUBCUTANEOUS at 18:08

## 2021-01-01 RX ADMIN — SODIUM CHLORIDE 100 ML/HR: 9 INJECTION, SOLUTION INTRAVENOUS at 10:52

## 2021-01-01 RX ADMIN — VANCOMYCIN HYDROCHLORIDE 1000 MG: 1 INJECTION, POWDER, LYOPHILIZED, FOR SOLUTION INTRAVENOUS at 14:20

## 2021-01-01 RX ADMIN — ASPIRIN 81 MG: 81 TABLET, COATED ORAL at 08:22

## 2021-01-01 RX ADMIN — METOPROLOL SUCCINATE 50 MG: 50 TABLET, FILM COATED, EXTENDED RELEASE ORAL at 08:00

## 2021-01-01 RX ADMIN — MORPHINE SULFATE 2 MG: 2 INJECTION, SOLUTION INTRAMUSCULAR; INTRAVENOUS at 18:34

## 2021-01-01 RX ADMIN — LEVETIRACETAM 500 MG: 500 TABLET, FILM COATED ORAL at 20:34

## 2021-01-01 RX ADMIN — Medication 1 APPLICATION: at 23:26

## 2021-01-01 RX ADMIN — INSULIN LISPRO 10 UNITS: 100 INJECTION, SOLUTION INTRAVENOUS; SUBCUTANEOUS at 21:22

## 2021-01-01 RX ADMIN — LEVETIRACETAM 500 MG: 500 TABLET, FILM COATED ORAL at 10:40

## 2021-01-01 RX ADMIN — OXYBUTYNIN CHLORIDE 5 MG: 5 TABLET ORAL at 22:21

## 2021-01-01 RX ADMIN — GABAPENTIN 100 MG: 100 CAPSULE ORAL at 22:21

## 2021-01-01 RX ADMIN — SODIUM CHLORIDE, PRESERVATIVE FREE 10 ML: 5 INJECTION INTRAVENOUS at 10:40

## 2021-01-01 RX ADMIN — NYSTATIN 500000 UNITS: 100000 SUSPENSION ORAL at 17:18

## 2021-01-01 RX ADMIN — Medication 1 APPLICATION: at 08:57

## 2021-01-01 RX ADMIN — HYDROCORTISONE SODIUM SUCCINATE 50 MG: 100 INJECTION, POWDER, FOR SOLUTION INTRAMUSCULAR; INTRAVENOUS at 17:59

## 2021-01-01 RX ADMIN — SODIUM CHLORIDE 1 G: 9 INJECTION, SOLUTION INTRAVENOUS at 17:08

## 2021-01-01 RX ADMIN — SODIUM CHLORIDE, PRESERVATIVE FREE 10 ML: 5 INJECTION INTRAVENOUS at 22:22

## 2021-01-01 RX ADMIN — CEFDINIR 300 MG: 300 CAPSULE ORAL at 08:22

## 2021-01-01 RX ADMIN — IPRATROPIUM BROMIDE AND ALBUTEROL SULFATE 3 ML: 2.5; .5 SOLUTION RESPIRATORY (INHALATION) at 18:41

## 2021-01-01 RX ADMIN — APIXABAN 5 MG: 5 TABLET, FILM COATED ORAL at 08:01

## 2021-01-01 RX ADMIN — BUDESONIDE AND FORMOTEROL FUMARATE DIHYDRATE 2 PUFF: 80; 4.5 AEROSOL RESPIRATORY (INHALATION) at 10:38

## 2021-01-01 RX ADMIN — SODIUM CHLORIDE, PRESERVATIVE FREE 10 ML: 5 INJECTION INTRAVENOUS at 08:23

## 2021-01-01 RX ADMIN — GABAPENTIN 100 MG: 100 CAPSULE ORAL at 09:21

## 2021-01-01 RX ADMIN — LIDOCAINE HYDROCHLORIDE 50 MG: 20 INJECTION, SOLUTION EPIDURAL; INFILTRATION; INTRACAUDAL; PERINEURAL at 11:44

## 2021-01-01 RX ADMIN — METOPROLOL SUCCINATE 50 MG: 50 TABLET, FILM COATED, EXTENDED RELEASE ORAL at 09:20

## 2021-01-01 RX ADMIN — GUAIFENESIN 1200 MG: 600 TABLET, EXTENDED RELEASE ORAL at 22:21

## 2021-01-01 RX ADMIN — OXYBUTYNIN CHLORIDE 5 MG: 5 TABLET ORAL at 20:10

## 2021-01-01 RX ADMIN — GABAPENTIN 100 MG: 100 CAPSULE ORAL at 08:33

## 2021-01-01 RX ADMIN — BUDESONIDE AND FORMOTEROL FUMARATE DIHYDRATE 2 PUFF: 80; 4.5 AEROSOL RESPIRATORY (INHALATION) at 19:30

## 2021-01-01 RX ADMIN — APIXABAN 5 MG: 5 TABLET, FILM COATED ORAL at 20:34

## 2021-01-01 RX ADMIN — HYDROCORTISONE SODIUM SUCCINATE 100 MG: 100 INJECTION, POWDER, FOR SOLUTION INTRAMUSCULAR; INTRAVENOUS at 12:32

## 2021-01-01 RX ADMIN — BARIUM SULFATE 20 ML: 400 SUSPENSION ORAL at 12:39

## 2021-01-01 RX ADMIN — LEVETIRACETAM 500 MG: 500 TABLET, FILM COATED ORAL at 20:50

## 2021-01-01 RX ADMIN — SODIUM CHLORIDE, PRESERVATIVE FREE 10 ML: 5 INJECTION INTRAVENOUS at 20:34

## 2021-01-01 RX ADMIN — BUDESONIDE AND FORMOTEROL FUMARATE DIHYDRATE 2 PUFF: 80; 4.5 AEROSOL RESPIRATORY (INHALATION) at 06:59

## 2021-01-01 RX ADMIN — IPRATROPIUM BROMIDE AND ALBUTEROL SULFATE 3 ML: 2.5; .5 SOLUTION RESPIRATORY (INHALATION) at 10:29

## 2021-01-01 RX ADMIN — OXYBUTYNIN CHLORIDE 5 MG: 5 TABLET ORAL at 09:20

## 2021-01-01 RX ADMIN — METOPROLOL SUCCINATE 50 MG: 50 TABLET, FILM COATED, EXTENDED RELEASE ORAL at 10:37

## 2021-01-01 RX ADMIN — LEVETIRACETAM 500 MG: 500 TABLET, FILM COATED ORAL at 21:03

## 2021-01-01 RX ADMIN — BUDESONIDE AND FORMOTEROL FUMARATE DIHYDRATE 2 PUFF: 160; 4.5 AEROSOL RESPIRATORY (INHALATION) at 18:41

## 2021-01-01 RX ADMIN — INSULIN LISPRO 8 UNITS: 100 INJECTION, SOLUTION INTRAVENOUS; SUBCUTANEOUS at 18:09

## 2021-01-01 RX ADMIN — FUROSEMIDE 40 MG: 40 TABLET ORAL at 10:39

## 2021-01-01 RX ADMIN — IPRATROPIUM BROMIDE AND ALBUTEROL SULFATE 3 ML: 2.5; .5 SOLUTION RESPIRATORY (INHALATION) at 06:48

## 2021-01-01 RX ADMIN — BARIUM SULFATE 20 ML: 400 PASTE ORAL at 12:39

## 2021-01-01 RX ADMIN — ONDANSETRON 4 MG: 2 INJECTION INTRAMUSCULAR; INTRAVENOUS at 06:13

## 2021-01-01 RX ADMIN — FAMOTIDINE 20 MG: 20 TABLET, FILM COATED ORAL at 20:10

## 2021-01-01 RX ADMIN — BUDESONIDE AND FORMOTEROL FUMARATE DIHYDRATE 2 PUFF: 160; 4.5 AEROSOL RESPIRATORY (INHALATION) at 07:26

## 2021-01-01 RX ADMIN — DEXAMETHASONE 4 MG: 4 TABLET ORAL at 11:41

## 2021-01-01 RX ADMIN — ATORVASTATIN CALCIUM 10 MG: 10 TABLET, FILM COATED ORAL at 20:34

## 2021-01-01 RX ADMIN — MEROPENEM 1 G: 1 INJECTION, POWDER, FOR SOLUTION INTRAVENOUS at 12:27

## 2021-01-01 RX ADMIN — GABAPENTIN 100 MG: 100 CAPSULE ORAL at 15:33

## 2021-01-01 RX ADMIN — LISINOPRIL 20 MG: 20 TABLET ORAL at 08:57

## 2021-01-01 RX ADMIN — BUDESONIDE AND FORMOTEROL FUMARATE DIHYDRATE 2 PUFF: 80; 4.5 AEROSOL RESPIRATORY (INHALATION) at 06:42

## 2021-01-01 RX ADMIN — CEFTRIAXONE SODIUM 1 G: 1 INJECTION, POWDER, FOR SOLUTION INTRAMUSCULAR; INTRAVENOUS at 09:04

## 2021-01-01 RX ADMIN — Medication 2 APPLICATION: at 08:29

## 2021-01-01 RX ADMIN — CEFEPIME HYDROCHLORIDE 2 G: 2 INJECTION, POWDER, FOR SOLUTION INTRAVENOUS at 23:25

## 2021-01-01 RX ADMIN — DEXAMETHASONE 4 MG: 4 TABLET ORAL at 08:46

## 2021-01-01 RX ADMIN — INSULIN LISPRO 8 UNITS: 100 INJECTION, SOLUTION INTRAVENOUS; SUBCUTANEOUS at 12:15

## 2021-01-01 RX ADMIN — BENZONATATE 200 MG: 100 CAPSULE ORAL at 21:12

## 2021-01-01 RX ADMIN — ASPIRIN 81 MG: 81 TABLET, COATED ORAL at 10:38

## 2021-01-01 RX ADMIN — SODIUM CHLORIDE, POTASSIUM CHLORIDE, SODIUM LACTATE AND CALCIUM CHLORIDE 75 ML/HR: 600; 310; 30; 20 INJECTION, SOLUTION INTRAVENOUS at 08:07

## 2021-01-01 RX ADMIN — LEVETIRACETAM 500 MG: 500 TABLET, FILM COATED ORAL at 23:25

## 2021-01-01 RX ADMIN — SODIUM HYPOCHLORITE: 1.25 SOLUTION TOPICAL at 02:47

## 2021-01-01 RX ADMIN — NYSTATIN 500000 UNITS: 100000 SUSPENSION ORAL at 22:20

## 2021-01-01 RX ADMIN — LIDOCAINE-EPINEPHRINE-TETRACAINE EXTERNAL SOLN 4-0.05-0.5% 3 ML: 4-0.05-0.5 SOLUTION at 15:21

## 2021-01-01 RX ADMIN — LEVETIRACETAM 500 MG: 500 TABLET, FILM COATED ORAL at 08:57

## 2021-01-01 RX ADMIN — GUAIFENESIN 1200 MG: 600 TABLET, EXTENDED RELEASE ORAL at 08:57

## 2021-01-01 RX ADMIN — SODIUM CHLORIDE, PRESERVATIVE FREE 10 ML: 5 INJECTION INTRAVENOUS at 18:34

## 2021-01-01 RX ADMIN — DEXAMETHASONE 4 MG: 4 TABLET ORAL at 17:05

## 2021-01-01 RX ADMIN — ASPIRIN 81 MG: 81 TABLET, COATED ORAL at 08:57

## 2021-01-01 RX ADMIN — ASPIRIN 81 MG: 81 TABLET, COATED ORAL at 17:37

## 2021-01-01 RX ADMIN — INSULIN LISPRO 3 UNITS: 100 INJECTION, SOLUTION INTRAVENOUS; SUBCUTANEOUS at 08:44

## 2021-01-01 RX ADMIN — NYSTATIN 1 APPLICATION: 100000 CREAM TOPICAL at 21:03

## 2021-01-01 RX ADMIN — SODIUM CHLORIDE, POTASSIUM CHLORIDE, SODIUM LACTATE AND CALCIUM CHLORIDE 2000 ML: 600; 310; 30; 20 INJECTION, SOLUTION INTRAVENOUS at 12:10

## 2021-01-01 RX ADMIN — APIXABAN 5 MG: 5 TABLET, FILM COATED ORAL at 21:03

## 2021-01-01 RX ADMIN — GUAIFENESIN 1200 MG: 600 TABLET, EXTENDED RELEASE ORAL at 08:29

## 2021-01-01 RX ADMIN — BARIUM SULFATE 20 ML: 400 SUSPENSION ORAL at 12:40

## 2021-01-01 RX ADMIN — BUDESONIDE AND FORMOTEROL FUMARATE DIHYDRATE 2 PUFF: 80; 4.5 AEROSOL RESPIRATORY (INHALATION) at 07:45

## 2021-01-01 RX ADMIN — DEXAMETHASONE 4 MG: 4 TABLET ORAL at 18:08

## 2021-01-01 RX ADMIN — BENZONATATE 200 MG: 100 CAPSULE ORAL at 06:23

## 2021-01-01 RX ADMIN — CEFEPIME HYDROCHLORIDE 2 G: 2 INJECTION, POWDER, FOR SOLUTION INTRAVENOUS at 17:27

## 2021-01-01 RX ADMIN — FLUDEOXYGLUCOSE F18 1 DOSE: 300 INJECTION INTRAVENOUS at 11:18

## 2021-01-01 RX ADMIN — APIXABAN 5 MG: 5 TABLET, FILM COATED ORAL at 08:57

## 2021-01-01 RX ADMIN — SODIUM CHLORIDE, PRESERVATIVE FREE 10 ML: 5 INJECTION INTRAVENOUS at 21:04

## 2021-01-01 RX ADMIN — SODIUM CHLORIDE, POTASSIUM CHLORIDE, SODIUM LACTATE AND CALCIUM CHLORIDE 1000 ML: 600; 310; 30; 20 INJECTION, SOLUTION INTRAVENOUS at 11:15

## 2021-01-01 RX ADMIN — INSULIN LISPRO 10 UNITS: 100 INJECTION, SOLUTION INTRAVENOUS; SUBCUTANEOUS at 12:14

## 2021-01-01 RX ADMIN — DEXAMETHASONE 4 MG: 4 TABLET ORAL at 17:07

## 2021-01-01 RX ADMIN — ASPIRIN 81 MG: 81 TABLET, COATED ORAL at 08:16

## 2021-01-01 RX ADMIN — IPRATROPIUM BROMIDE AND ALBUTEROL SULFATE 3 ML: 2.5; .5 SOLUTION RESPIRATORY (INHALATION) at 06:11

## 2021-01-01 RX ADMIN — ASPIRIN 81 MG: 81 TABLET, COATED ORAL at 08:45

## 2021-01-01 RX ADMIN — CEFEPIME HYDROCHLORIDE 2 G: 2 INJECTION, POWDER, FOR SOLUTION INTRAVENOUS at 22:20

## 2021-01-01 RX ADMIN — APIXABAN 5 MG: 5 TABLET, FILM COATED ORAL at 23:25

## 2021-01-01 RX ADMIN — BUDESONIDE AND FORMOTEROL FUMARATE DIHYDRATE 2 PUFF: 80; 4.5 AEROSOL RESPIRATORY (INHALATION) at 07:37

## 2021-01-01 RX ADMIN — EPINEPHRINE 0.5 MG: 1 INJECTION, SOLUTION, CONCENTRATE INTRAVENOUS at 05:04

## 2021-01-01 RX ADMIN — SODIUM CHLORIDE, PRESERVATIVE FREE 10 ML: 5 INJECTION INTRAVENOUS at 08:34

## 2021-01-01 RX ADMIN — FUROSEMIDE 40 MG: 40 TABLET ORAL at 08:22

## 2021-01-01 RX ADMIN — GABAPENTIN 100 MG: 100 CAPSULE ORAL at 17:58

## 2021-01-01 RX ADMIN — ATORVASTATIN CALCIUM 80 MG: 40 TABLET, FILM COATED ORAL at 20:50

## 2021-01-01 RX ADMIN — NOREPINEPHRINE BITARTRATE 0.02 MCG/KG/MIN: 1 INJECTION, SOLUTION, CONCENTRATE INTRAVENOUS at 05:49

## 2021-01-01 RX ADMIN — DEXAMETHASONE 4 MG: 4 TABLET ORAL at 11:21

## 2021-01-01 RX ADMIN — SODIUM CHLORIDE 500 ML: 9 INJECTION, SOLUTION INTRAVENOUS at 08:13

## 2021-01-01 RX ADMIN — SODIUM CHLORIDE 500 ML: 9 INJECTION, SOLUTION INTRAVENOUS at 10:03

## 2021-01-01 RX ADMIN — PANTOPRAZOLE SODIUM 40 MG: 40 TABLET, DELAYED RELEASE ORAL at 11:55

## 2021-01-01 RX ADMIN — LEVETIRACETAM 500 MG: 500 TABLET, FILM COATED ORAL at 08:22

## 2021-01-01 RX ADMIN — GADOBENATE DIMEGLUMINE 17 ML: 529 INJECTION, SOLUTION INTRAVENOUS at 09:05

## 2021-01-01 RX ADMIN — NYSTATIN 1 APPLICATION: 100000 CREAM TOPICAL at 14:19

## 2021-01-01 RX ADMIN — INSULIN LISPRO 12 UNITS: 100 INJECTION, SOLUTION INTRAVENOUS; SUBCUTANEOUS at 21:03

## 2021-01-01 RX ADMIN — DIGOXIN 250 MCG: 0.25 INJECTION INTRAMUSCULAR; INTRAVENOUS at 11:41

## 2021-01-01 RX ADMIN — INSULIN LISPRO 10 UNITS: 100 INJECTION, SOLUTION INTRAVENOUS; SUBCUTANEOUS at 17:25

## 2021-01-01 RX ADMIN — DEXAMETHASONE 4 MG: 4 TABLET ORAL at 11:40

## 2021-01-01 RX ADMIN — NYSTATIN 500000 UNITS: 100000 SUSPENSION ORAL at 08:56

## 2021-01-01 RX ADMIN — GABAPENTIN 100 MG: 100 CAPSULE ORAL at 08:21

## 2021-01-01 RX ADMIN — GABAPENTIN 100 MG: 100 CAPSULE ORAL at 08:00

## 2021-01-01 RX ADMIN — APIXABAN 5 MG: 5 TABLET, FILM COATED ORAL at 10:40

## 2021-01-01 RX ADMIN — INSULIN LISPRO 3 UNITS: 100 INJECTION, SOLUTION INTRAVENOUS; SUBCUTANEOUS at 08:21

## 2021-01-01 RX ADMIN — BUDESONIDE AND FORMOTEROL FUMARATE DIHYDRATE 2 PUFF: 80; 4.5 AEROSOL RESPIRATORY (INHALATION) at 20:04

## 2021-01-01 RX ADMIN — FAMOTIDINE 20 MG: 20 TABLET, FILM COATED ORAL at 22:21

## 2021-01-01 RX ADMIN — BUDESONIDE AND FORMOTEROL FUMARATE DIHYDRATE 2 PUFF: 160; 4.5 AEROSOL RESPIRATORY (INHALATION) at 06:48

## 2021-01-01 RX ADMIN — GUAIFENESIN 1200 MG: 600 TABLET, EXTENDED RELEASE ORAL at 09:20

## 2021-01-01 RX ADMIN — APIXABAN 5 MG: 5 TABLET, FILM COATED ORAL at 20:50

## 2021-01-01 RX ADMIN — IPRATROPIUM BROMIDE 0.5 MG: 0.5 SOLUTION RESPIRATORY (INHALATION) at 06:45

## 2021-01-01 RX ADMIN — PANTOPRAZOLE SODIUM 40 MG: 40 TABLET, DELAYED RELEASE ORAL at 06:15

## 2021-01-01 RX ADMIN — IPRATROPIUM BROMIDE 0.5 MG: 0.5 SOLUTION RESPIRATORY (INHALATION) at 15:19

## 2021-01-01 RX ADMIN — DEXAMETHASONE 4 MG: 4 TABLET ORAL at 13:50

## 2021-01-01 RX ADMIN — DEXAMETHASONE 4 MG: 4 TABLET ORAL at 08:22

## 2021-01-01 RX ADMIN — BUDESONIDE AND FORMOTEROL FUMARATE DIHYDRATE 2 PUFF: 160; 4.5 AEROSOL RESPIRATORY (INHALATION) at 20:10

## 2021-01-01 RX ADMIN — GABAPENTIN 100 MG: 100 CAPSULE ORAL at 16:46

## 2021-01-01 RX ADMIN — LEVETIRACETAM 500 MG: 500 TABLET, FILM COATED ORAL at 22:21

## 2021-01-01 RX ADMIN — NYSTATIN 1 APPLICATION: 100000 CREAM TOPICAL at 08:23

## 2021-01-01 RX ADMIN — LEVETIRACETAM 500 MG: 500 TABLET, FILM COATED ORAL at 21:12

## 2021-01-01 RX ADMIN — Medication: at 09:21

## 2021-01-01 RX ADMIN — GABAPENTIN 100 MG: 100 CAPSULE ORAL at 17:17

## 2021-01-01 RX ADMIN — LIDOCAINE HYDROCHLORIDE ANHYDROUS 1 ML: 10 INJECTION, SOLUTION INFILTRATION at 14:18

## 2021-01-01 RX ADMIN — APIXABAN 5 MG: 5 TABLET, FILM COATED ORAL at 08:33

## 2021-01-01 RX ADMIN — APIXABAN 5 MG: 5 TABLET, FILM COATED ORAL at 20:10

## 2021-01-01 RX ADMIN — DEXAMETHASONE 4 MG: 4 TABLET ORAL at 14:39

## 2021-01-01 RX ADMIN — DEXAMETHASONE 4 MG: 4 TABLET ORAL at 17:25

## 2021-01-01 RX ADMIN — CEFEPIME HYDROCHLORIDE 2 G: 2 INJECTION, POWDER, FOR SOLUTION INTRAVENOUS at 09:20

## 2021-01-01 RX ADMIN — GABAPENTIN 100 MG: 100 CAPSULE ORAL at 08:44

## 2021-01-01 RX ADMIN — GABAPENTIN 100 MG: 100 CAPSULE ORAL at 11:55

## 2021-01-01 RX ADMIN — APIXABAN 5 MG: 5 TABLET, FILM COATED ORAL at 22:21

## 2021-01-01 RX ADMIN — DEXAMETHASONE 4 MG: 4 TABLET ORAL at 19:04

## 2021-01-01 RX ADMIN — DEXAMETHASONE 4 MG: 4 TABLET ORAL at 17:17

## 2021-01-01 RX ADMIN — PANTOPRAZOLE SODIUM 40 MG: 40 TABLET, DELAYED RELEASE ORAL at 06:34

## 2021-01-01 RX ADMIN — CEFEPIME HYDROCHLORIDE 2 G: 2 INJECTION, POWDER, FOR SOLUTION INTRAVENOUS at 08:57

## 2021-01-01 RX ADMIN — INSULIN LISPRO 5 UNITS: 100 INJECTION, SOLUTION INTRAVENOUS; SUBCUTANEOUS at 08:29

## 2021-01-01 RX ADMIN — ATORVASTATIN CALCIUM 10 MG: 10 TABLET, FILM COATED ORAL at 21:12

## 2021-01-01 RX ADMIN — IPRATROPIUM BROMIDE AND ALBUTEROL SULFATE 3 ML: 2.5; .5 SOLUTION RESPIRATORY (INHALATION) at 23:05

## 2021-01-01 RX ADMIN — LISINOPRIL 20 MG: 20 TABLET ORAL at 11:21

## 2021-01-01 RX ADMIN — APIXABAN 5 MG: 5 TABLET, FILM COATED ORAL at 10:37

## 2021-01-01 RX ADMIN — IPRATROPIUM BROMIDE AND ALBUTEROL SULFATE 3 ML: 2.5; .5 SOLUTION RESPIRATORY (INHALATION) at 07:22

## 2021-01-01 RX ADMIN — DEXAMETHASONE 4 MG: 4 TABLET ORAL at 12:16

## 2021-01-01 RX ADMIN — IPRATROPIUM BROMIDE AND ALBUTEROL SULFATE 3 ML: 2.5; .5 SOLUTION RESPIRATORY (INHALATION) at 15:32

## 2021-01-01 RX ADMIN — PREDNISONE 50 MG: 20 TABLET ORAL at 06:00

## 2021-01-01 RX ADMIN — PANTOPRAZOLE SODIUM 40 MG: 40 TABLET, DELAYED RELEASE ORAL at 06:13

## 2021-01-01 RX ADMIN — GABAPENTIN 100 MG: 100 CAPSULE ORAL at 16:17

## 2021-01-01 RX ADMIN — LEVETIRACETAM 500 MG: 500 TABLET, FILM COATED ORAL at 10:36

## 2021-01-01 RX ADMIN — BUDESONIDE AND FORMOTEROL FUMARATE DIHYDRATE 2 PUFF: 80; 4.5 AEROSOL RESPIRATORY (INHALATION) at 19:57

## 2021-01-01 RX ADMIN — GABAPENTIN 100 MG: 100 CAPSULE ORAL at 08:56

## 2021-01-01 RX ADMIN — PANTOPRAZOLE SODIUM 40 MG: 40 TABLET, DELAYED RELEASE ORAL at 06:46

## 2021-01-01 RX ADMIN — PROPOFOL 150 MG: 10 INJECTION, EMULSION INTRAVENOUS at 11:44

## 2021-01-01 RX ADMIN — METOPROLOL SUCCINATE 50 MG: 50 TABLET, FILM COATED, EXTENDED RELEASE ORAL at 08:57

## 2021-01-01 RX ADMIN — LEVETIRACETAM 500 MG: 500 TABLET, FILM COATED ORAL at 20:10

## 2021-01-01 RX ADMIN — IPRATROPIUM BROMIDE AND ALBUTEROL SULFATE 3 ML: 2.5; .5 SOLUTION RESPIRATORY (INHALATION) at 20:09

## 2021-01-01 RX ADMIN — INSULIN LISPRO 8 UNITS: 100 INJECTION, SOLUTION INTRAVENOUS; SUBCUTANEOUS at 17:07

## 2021-01-01 RX ADMIN — BUDESONIDE AND FORMOTEROL FUMARATE DIHYDRATE 2 PUFF: 80; 4.5 AEROSOL RESPIRATORY (INHALATION) at 06:46

## 2021-01-01 RX ADMIN — GABAPENTIN 100 MG: 100 CAPSULE ORAL at 21:03

## 2021-01-01 RX ADMIN — SODIUM CHLORIDE 100 ML/HR: 9 INJECTION, SOLUTION INTRAVENOUS at 04:08

## 2021-01-01 RX ADMIN — BUDESONIDE AND FORMOTEROL FUMARATE DIHYDRATE 2 PUFF: 80; 4.5 AEROSOL RESPIRATORY (INHALATION) at 19:13

## 2021-01-01 RX ADMIN — IPRATROPIUM BROMIDE AND ALBUTEROL SULFATE 3 ML: 2.5; .5 SOLUTION RESPIRATORY (INHALATION) at 02:51

## 2021-01-01 RX ADMIN — APIXABAN 5 MG: 5 TABLET, FILM COATED ORAL at 08:22

## 2021-01-01 RX ADMIN — DEXAMETHASONE 4 MG: 4 TABLET ORAL at 08:17

## 2021-01-01 RX ADMIN — LEVETIRACETAM 500 MG: 500 TABLET, FILM COATED ORAL at 08:17

## 2021-01-01 RX ADMIN — GABAPENTIN 100 MG: 100 CAPSULE ORAL at 21:12

## 2021-01-01 RX ADMIN — IPRATROPIUM BROMIDE AND ALBUTEROL SULFATE 3 ML: 2.5; .5 SOLUTION RESPIRATORY (INHALATION) at 11:33

## 2021-01-01 RX ADMIN — FUROSEMIDE 40 MG: 40 TABLET ORAL at 18:26

## 2021-01-01 RX ADMIN — BUDESONIDE AND FORMOTEROL FUMARATE DIHYDRATE 2 PUFF: 80; 4.5 AEROSOL RESPIRATORY (INHALATION) at 19:42

## 2021-01-01 RX ADMIN — PREDNISONE 50 MG: 20 TABLET ORAL at 00:22

## 2021-01-01 RX ADMIN — FUROSEMIDE 40 MG: 40 TABLET ORAL at 08:46

## 2021-01-01 RX ADMIN — ASPIRIN 81 MG: 81 TABLET, COATED ORAL at 10:39

## 2021-01-01 RX ADMIN — IPRATROPIUM BROMIDE AND ALBUTEROL SULFATE 3 ML: 2.5; .5 SOLUTION RESPIRATORY (INHALATION) at 13:45

## 2021-01-01 RX ADMIN — LEVETIRACETAM 500 MG: 500 TABLET, FILM COATED ORAL at 08:46

## 2021-01-01 RX ADMIN — GABAPENTIN 100 MG: 100 CAPSULE ORAL at 16:30

## 2021-01-01 RX ADMIN — APIXABAN 5 MG: 5 TABLET, FILM COATED ORAL at 09:20

## 2021-01-01 RX ADMIN — INSULIN LISPRO 8 UNITS: 100 INJECTION, SOLUTION INTRAVENOUS; SUBCUTANEOUS at 20:37

## 2021-01-01 RX ADMIN — ATORVASTATIN CALCIUM 10 MG: 10 TABLET, FILM COATED ORAL at 21:03

## 2021-01-01 RX ADMIN — FUROSEMIDE 40 MG: 40 TABLET ORAL at 06:00

## 2021-01-01 RX ADMIN — SODIUM CHLORIDE, PRESERVATIVE FREE 10 ML: 5 INJECTION INTRAVENOUS at 08:20

## 2021-01-01 RX ADMIN — VANCOMYCIN HYDROCHLORIDE 500 MG: 1 INJECTION, POWDER, LYOPHILIZED, FOR SOLUTION INTRAVENOUS at 20:19

## 2021-01-01 RX ADMIN — CLINDAMYCIN PHOSPHATE 900 MG: 900 INJECTION, SOLUTION INTRAVENOUS at 12:36

## 2021-01-01 RX ADMIN — LEVETIRACETAM 500 MG: 500 TABLET, FILM COATED ORAL at 08:33

## 2021-01-01 RX ADMIN — PANTOPRAZOLE SODIUM 40 MG: 40 TABLET, DELAYED RELEASE ORAL at 09:21

## 2021-01-01 RX ADMIN — DEXAMETHASONE 4 MG: 4 TABLET ORAL at 12:17

## 2021-01-01 RX ADMIN — GABAPENTIN 100 MG: 100 CAPSULE ORAL at 15:28

## 2021-01-01 RX ADMIN — GABAPENTIN 200 MG: 100 CAPSULE ORAL at 10:38

## 2021-01-01 RX ADMIN — OXYBUTYNIN CHLORIDE 5 MG: 5 TABLET ORAL at 08:01

## 2021-01-01 RX ADMIN — SODIUM CHLORIDE 100 ML/HR: 9 INJECTION, SOLUTION INTRAVENOUS at 06:01

## 2021-01-01 RX ADMIN — Medication 0.02 MCG/KG/MIN: at 11:52

## 2021-01-01 RX ADMIN — SODIUM HYPOCHLORITE: 1.25 SOLUTION TOPICAL at 08:19

## 2021-01-01 RX ADMIN — SODIUM CHLORIDE, PRESERVATIVE FREE 10 ML: 5 INJECTION INTRAVENOUS at 20:35

## 2021-01-01 RX ADMIN — SPIRONOLACTONE 25 MG: 25 TABLET ORAL at 10:37

## 2021-01-01 RX ADMIN — GABAPENTIN 200 MG: 100 CAPSULE ORAL at 15:18

## 2021-01-01 RX ADMIN — BENZONATATE 200 MG: 100 CAPSULE ORAL at 16:17

## 2021-01-01 RX ADMIN — HYDROCORTISONE SODIUM SUCCINATE 50 MG: 100 INJECTION, POWDER, FOR SOLUTION INTRAMUSCULAR; INTRAVENOUS at 06:51

## 2021-01-01 RX ADMIN — CEFEPIME HYDROCHLORIDE 2 G: 2 INJECTION, POWDER, FOR SOLUTION INTRAVENOUS at 13:38

## 2021-01-01 RX ADMIN — SODIUM CHLORIDE 100 ML/HR: 9 INJECTION, SOLUTION INTRAVENOUS at 18:33

## 2021-01-01 RX ADMIN — GABAPENTIN 100 MG: 100 CAPSULE ORAL at 08:17

## 2021-01-01 RX ADMIN — BARIUM SULFATE 20 ML: 0.81 POWDER, FOR SUSPENSION ORAL at 12:40

## 2021-01-01 RX ADMIN — SODIUM HYPOCHLORITE: 1.25 SOLUTION TOPICAL at 16:30

## 2021-01-01 RX ADMIN — BUDESONIDE AND FORMOTEROL FUMARATE DIHYDRATE 2 PUFF: 160; 4.5 AEROSOL RESPIRATORY (INHALATION) at 06:11

## 2021-01-01 RX ADMIN — PREDNISONE 50 MG: 20 TABLET ORAL at 18:26

## 2021-02-18 ENCOUNTER — TELEPHONE (OUTPATIENT)
Dept: NEUROSURGERY | Age: 76
End: 2021-02-18

## 2021-02-23 NOTE — PROGRESS NOTES
[facility]ST[facility]en[facility] Felecia Ortiz BSN, PA-C  ____________________________________________________________  [facility]  Department of Radiation Oncology  [address] [place] [id number]  Office:  [phone]  Fax: [phone]    DATE:  2/24/2021  PATIENT:  Josi Mohamud   1945                                 MEDICAL RECORD #: [id number]    Reason for Follow up Visit:  Josi Mohamud s a very pleasant 75 y.o. patient that completed radiation to the lung and returns to the clinic today for routine follow up exam. Reports fatigue, cough, SOB, and chronic back pain. Denies appetite change, unexpected weight change, nausea/vomiting, diarrhea, light-headedness, weakness, and headaches.     History of Present Illness:  Diagnosed in September 2019 with malignant neoplasm of upper lobe of left lung, CELI nodule, 0.9 cm and larger, more inferior lateral CELI, 1.7 cm. Treated with SBRT, received 5000 cGy in 5 fractions to lung lesions with completion on 11/25/2019.     06/13/2019 -CT Chest without contrast:  • Left upper lobe spiculated pulmonary nodule measures 1.2 cm on axial series 4, image 69, previously 1.0 cm.   • The left upper lobe 0.9 cm pulmonary nodule on series 4 image 27, previously 0.4 cm on 07/06/2018.  • There is no mediastinal or hilar lymphadenopathy by CT size criteria.   • Small hiatal hernia.   • There appears to be a 2 cm low-density nodule in the right thyroid.   • Probable implanted cardiac loop recorder at the left midline chest.      07/08/2019 - Appointment with :  • She did have evidence of slight enlargement of her 2 lung nodules in the left upper lobe.    • One is located more peripherally in the lingula and the other is located more superiorly and medially in the upper division.    • I did advise her that these could represent slow-growing malignant lesions.    Recommendations:  • I do not think a needle biopsy would be  good of option because the risk of pneumothorax although the lingular lesion would probably be accessible.    • Navigational bronchoscopy would be another option but there would be a chance of a false negative result.    • The definitive procedure would be thoracic surgery referral for resection with most likely a left upper lobectomy and she is not interested in surgery.  Based on her age and other comorbidities I think that is reasonable to avoid surgery.    • I told her my recommendation would be a follow-up CT short-term in 3 months and if the lesions increase in size then consider empiric radiation therapy and she and her daughter are in total agreement with this plan.     09/17/2019 - CT Chest without contrast:  • 2 subcentimeter nodules again noted in the right lobe of thyroid gland..  • Centrilobular emphysematous changes noted in the right and left lung.   • 2 nodules are again noted in the left lung.   • On image 27 9 mm nodule is noted.   • On image 66 and 1.2 mm nodule is present. This slightly increased in size and neoplasm should be excluded. This is not amenable to percutaneous biopsy.  • No pleural effusion is present.     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  • Right-sided nodule displays SUV max of 5.4 with the left-sided nodule displaying SUV max of 6.5. 2 additional right parotid nodules more inferiorly, displacing a more low level radiotracer uptake.   • Asymmetric radiotracer uptake at the left tonsil, measuring 4.5 cm SUV max.   • The more cranial and medial left upper lobe pulmonary nodule measures up to 0.9 cm, displacing measured SUV max 2.3.   • The larger, more inferior lateral left upper lobe nodule displays SUV max 2.2. This measures approximately 1.7 cm.   • Review of the mediastinum reveals no hypermetabolic lymphadenopathy.      09/25/2019 - Documentation per :  • I received a PET scan report  indicating activity in the patient's lung nodules.    • My plan is been to get a follow-up chest CT and if they were increasing in size consider empiric radiation therapy.    • She is scheduled follow up in early October and again I think the options if she is going to consider treatment would be empiric radiation.    09/25/2019 - Documentation per :  • The patient's daughter Franc called back and I reviewed the results of the PET scan with her.    • In reviewing epic, it appears that Dr. Mcnulty is aware of the PET findings and the patient is being worked up in regards to the parotid nodules and the tonsillar uptake.    • I recommend a referral to Dr. Rosen regarding the pulmonary nodules and the patient and her daughter are agreeable.    • They will keep follow-up with me on 10-8-19.    10/08/2019 - Appointment with :  • There were multiple bilateral hypermetabolic parotid nodules and she is scheduled for a biopsy with ENT later this month.    • Education on injecting Lovenox was provided to the patient and her niece Kathryn.   • All of her pulmonary medications were refilled and I recommend she start back on Mucinex twice a day.    • A prescription for doxycycline was sent to the patient's pharmacy and she also received a Depo-Medrol injection in the office today.   •  She is scheduled to see Dr. Rosen this Thursday regarding possible radiation treatment based on the results of her recent PET scan from 9-23-19.    • Return to clinic to see Dr. Frazier in approximately 3 months.     10/14/2019 - US guided thyroid bilateral biopsy per .    11/12/2019 - 11/25/2019  Radiation Oncology Treatment Course  • Received 5000 cGy in 5 fractions to left upper lobe lung via SBRT.    01/30/2020 CT Chest:  • Slightly decreased size of larger left upper lobe pulmonary nodules. Recommend follow-up per oncology/radiation oncology.  • Right kidney with a 1.7 cm low-density lesion at the inferior aspect, which  is outside the field-of-view on comparisons. This is most commonly a cyst, but incompletely evaluated on this exam. Recommend correlation with outside imaging. If none available, consider renal ultrasound for further evaluation    02/24/2020 - Appointment with :  • Return to Dr. Rosen in 3 months with repeat CT chest  • Follow with Dr. Frazier as scheduled    05/29/2020 - CT Chest:  • Previously treated left upper lobe pulmonary nodules are stable in size. No new or enlarging nodules identified.  • Centrilobular emphysema.  • Advanced coronary atheromatous calcification.  • No suspicious adenopathy in the chest.  • 2.3 cm right renal cyst was shown to be a simple cyst on recent ultrasound.    06/03/2020 - Appointment with :  • CT chest schedule for late August 2020, they will call you  • Return to Dr. Rosen in 6 months    02/23/2021 - CT Chest with contrast:  • The central airways are midline and patent.   • Emphysema.  • There is predominantly linear opacity tracking along the posterior aspect left upper lobe along the fissure. This is new compared to 5/29/2020.   • The previously demonstrated pulmonary nodule is not discretely identified on today's exam.  • Lingula with a 0.9 cm spiculated pulmonary nodule on axial series 3, image 62, previously up to 1.5 cm on 5/29/2020.  • There is no mediastinal or hilar lymphadenopathy by CT size criteria.   • Esophagus has normal course, caliber and wall thickness.   • Small hiatal hernia.  • Diffusely heterogeneous thyroid.   • No supraclavicular or axillary adenopathy.  • Coarse calcifications in the bilateral breasts.  • There is wedge-shaped peripheral hyperdensity in the right liver lobe on axial image 108.   • Intrahepatic bile duct dilation appears mildly increased compared to 5/29/2020.   • Cholecystectomy clips on the prior exam.   • Changes of splenectomy with residual splenosis.   • Adrenal glands appear within normal limits.   • Partially  visualized pancreatic tail within normal limits.  • Severe height loss of T4, new compared to 5/29/2019.    Impression:  • Peripheral consolidative opacity at the left upper lobe, which could represent postradiation changes. Correlate with patient history. The previously described left apical pulmonary nodule is not discretely demonstrated on today's exam.  • Decreased size of spiculated lingular pulmonary nodule now measuring 0.9 cm, previously 1.5 cm on 5/29/2020.  • New severe height loss of T4 compared to 5/29/2019.  • Peripheral wedge-shaped hyperdensity in the right liver lobe, favored to represent transient hepatic attenuation difference (YON).  • Splenectomy with residual splenosis.    History obtained from  PATIENT and CHART    PAST MEDICAL HISTORY  Past Medical History:   Diagnosis Date   • Bronchitis    • Cancer (CMS/HCC)     lung   • COPD (chronic obstructive pulmonary disease) (CMS/HCC)    • Coronary artery disease    • GERD (gastroesophageal reflux disease)    • History of transfusion    • Hyperlipidemia    • Hypertension    • PAF (paroxysmal atrial fibrillation) (CMS/HCC)    • Persistent asthma without complication 7/8/2019   • Seizures (CMS/HCC)       PAST SURGICAL HISTORY  Past Surgical History:   Procedure Laterality Date   • ANKLE OPEN REDUCTION INTERNAL FIXATION Left 8/15/2020    Procedure: ANKLE OPEN REDUCTION INTERNAL FIXATION;  Surgeon: Keyshawn Medrano MD;  Location: [facility] OR;  Service: Orthopedics;  Laterality: Left;   • APPENDECTOMY     • APPENDECTOMY     • CARDIAC CATHETERIZATION     • CARDIAC ELECTROPHYSIOLOGY PROCEDURE N/A 5/23/2017    Procedure: Loop insertion; LINQ;  Surgeon: Jose Monteiro MD;  Location: [facility] CATH INVASIVE LOCATION;  Service:    • CHOLECYSTECTOMY     • CORONARY ANGIOPLASTY WITH STENT PLACEMENT      X 1    • INCISION AND DRAINAGE OF WOUND Left 8/15/2020    Procedure: INCISION AND DRAINAGE WOUND;  Surgeon: Keyshawn Medrano MD;  Location: [facility] OR;   Service: Orthopedics;  Laterality: Left;   • LIVER BIOPSY     • MYRINGOTOMY W/ TUBES Left 11/22/2019    Procedure: LEFT MYRINGOTOMY WITH INSERTION OF EAR TUBE WITH RIGHT EAR EXAM UNDER ANESTHESIA;  Surgeon: Se Mcnulty MD;  [facility];  Service: ENT   • SPLENECTOMY     • US GUIDED FINE NEEDLE ASPIRATION  8/7/2018      FAMILY HISTORY  family history includes Esophageal cancer in her brother; Lung cancer in her brother.     SOCIAL HISTORY  Social History     Tobacco Use   • Smoking status: Current Every Day Smoker     Packs/day: 0.50     Years: 58.00     Pack years: 29.00     Start date: 1961   • Smokeless tobacco: Never Used   • Tobacco comment: TRYING TO QUIT    Substance Use Topics   • Alcohol use: No   • Drug use: No      Penicillins     MEDICATIONS  Current Outpatient Medications   Medication Sig Dispense Refill   • acetaminophen (TYLENOL) 325 MG tablet Take 2 tablets by mouth Every 4 (Four) Hours As Needed for Mild Pain .     • albuterol sulfate  (90 Base) MCG/ACT inhaler Inhale 1 puff Daily. 1 inhaler 6   • amLODIPine (NORVASC) 5 MG tablet Take 5 mg by mouth Daily.     • apixaban (ELIQUIS) 5 MG tablet tablet Take 1 tablet by mouth Every 12 (Twelve) Hours. 60 tablet 11   • aspirin 81 MG EC tablet Take 81 mg by mouth Daily.     • Calcium Carbonate-Vitamin D (CALTRATE 600+D PO) Take 1 tablet by mouth 2 (two) times a day.     • docusate sodium 100 MG capsule Take 100 mg by mouth 2 (Two) Times a Day As Needed for Constipation.     • furosemide (LASIX) 40 MG tablet Take 40 mg by mouth 2 (Two) Times a Day.     • guaiFENesin (MUCINEX) 600 MG 12 hr tablet Take 2 tablets by mouth 2 (Two) Times a Day As Needed for Cough. 15 tablet 0   • levETIRAcetam (KEPPRA) 500 MG tablet Take 500 mg by mouth 2 (Two) Times a Day.     • lisinopril (PRINIVIL,ZESTRIL) 20 MG tablet Take 20 mg by mouth Daily.     • LORazepam (ATIVAN) 0.5 MG tablet Take 1 tablet by mouth 2 (Two) Times a Day As Needed.     • metoprolol  "succinate XL (TOPROL-XL) 50 MG 24 hr tablet Take 50 mg by mouth Daily.     • nitroglycerin (NITROSTAT) 0.4 MG SL tablet Place 0.4 mg under the tongue Every 5 (Five) Minutes As Needed for Chest Pain. Take no more than 3 doses in 15 minutes.     • omeprazole (priLOSEC) 40 MG capsule Take 40 mg by mouth Daily.  5   • ondansetron (ZOFRAN) 4 MG tablet Take 1 tablet by mouth Every 6 (Six) Hours As Needed.     • oxybutynin (DITROPAN) 5 MG tablet Take 1 tablet by mouth Daily.     • simvastatin (ZOCOR) 20 MG tablet Take 20 mg by mouth Daily.     • tiotropium bromide monohydrate (SPIRIVA RESPIMAT) 2.5 MCG/ACT aerosol solution inhaler Inhale 2 puffs Daily. 3 inhaler 0   • traMADol (ULTRAM) 50 MG tablet Take 1 tablet by mouth Every 6 (Six) Hours As Needed.       No current facility-administered medications for this visit.       Current outpatient and discharge medications have been reconciled for the patient.  Reviewed by: Lyle Garza MD    The following portions of the patient's history were reviewed and updated as appropriate: allergies, current medications, past family history, past medical history, past social history, past surgical history and problem list.    REVIEW OF SYSTEMS  Review of Systems   Constitutional: Positive for fatigue. Negative for appetite change and unexpected weight change.   HENT: Negative.    Respiratory: Positive for cough (productive cough white sputum, occ \"specks\" or blood) and shortness of breath (with activity, stable for patient).    Cardiovascular: Negative.    Gastrointestinal: Negative.    Endocrine: Negative.    Genitourinary: Negative.    Musculoskeletal: Positive for back pain (chronic back pain).   Skin: Negative.    Allergic/Immunologic: Negative.    Neurological:        Occasional episodes of \"passing out\", currently residing in [facility]   Hematological: Bruises/bleeds easily.   Psychiatric/Behavioral: Positive for confusion (some).      PHYSICAL EXAM  VITAL SIGNS:   Vitals: " "   02/24/21 1017   BP: 138/62   Weight: 87.1 kg (192 lb)   Height: 162.6 cm (64\")   PainSc: 0-No pain   PainLoc: Back  Comment: chronic     General:  Alert and oriented, no acute distress, well developed, Vitals reviewed.  Head:  Normocephalic, without obvious abnormality    Nose/Sinuses:  Nares normal externally, no sinus tenderness.  Mouth/Throat:  Mucosa moist, pharynx without erythema  Neck: No evidence of adenopathy in the cervical or supraclavicular areas.  Eyes: No gross abnormalities   Ears: Ears intact with no external abnormalities noted  Chest:  Respiratory efforts are normal and unlabored, chest is clear to auscultation.  Cardiovascular: Regular rate and rhythm  Abdomen:  Soft, non-tender, normal bowel sounds  Extremities:  LOPEZ well, warm to touch, no evidence of cyanosis or edema.  Skin: No suspicious lesions or rashes of concern     Psych: Mood and affect are appropriate    Performance Status: ECOG (1)    Clinical Quality Measures  -Pain Documented  Josi ORR Cade reports a pain score of 0. Given her pain assessment as noted, treatment options were discussed and the following options were decided upon as a follow-up plan to address the patient's pain: continuation of current treatment plan for pain and use of non-medical modalities (ice, heat, stretching and/or behavior modifications)     Pain Medications             acetaminophen (TYLENOL) 325 MG tablet Take 2 tablets by mouth Every 4 (Four) Hours As Needed for Mild Pain .    aspirin 81 MG EC tablet Take 81 mg by mouth Daily.    levETIRAcetam (KEPPRA) 500 MG tablet Take 500 mg by mouth 2 (Two) Times a Day.    traMADol (ULTRAM) 50 MG tablet Take 1 tablet by mouth Every 6 (Six) Hours As Needed.        -Advanced Care Planning Advance Care Planning   ACP discussion was held with the patient during this visit. Patient does not have an advance directive, information provided.    -Body Mass Index Screening and Follow-Up Plan  Patient's Body mass index is " 32.96 kg/m². BMI is above normal parameters. Recommendations include: educational material.  -Tobacco Use: Screening and Cessation Intervention Social History    Tobacco Use      Smoking status: Current Every Day Smoker        Packs/day: 0.50        Years: 58.00        Pack years: 29        Start date: 1961      Smokeless tobacco: Never Used      Tobacco comment: TRYING TO QUIT    Smoking cessation information given in after visit summary.    ASSESSMENT AND PLAN  1. Malignant neoplasm of upper lobe of left lung (CMS/HCC)    2. COPD, moderate (CMS/HCC)    3. Hx of Hodgkin's lymphoma    4. Status post stereotactic radiosurgery    5. Current every day smoker      Orders Placed This Encounter   Procedures   • CT Chest With Contrast Diagnostic     Standing Status:   Future     Standing Expiration Date:   2/24/2022     RECOMMENDATIONS: Josi Mohamud is status post completion of radiation therapy to the lung and presents to our clinic today for surveillance exam and to review imaging. Diagnosed in September 2019 with malignant neoplasm of upper lobe of left lung, CELI nodule, 0.9 cm and larger, more inferior lateral CELI, 1.7 cm. Treated with SBRT, received 5000 cGy in 5 fractions to lung lesions with completion on 11/25/2019.      CT-scan of the chest completed on 02/23/2021 revealed peripheral consolidative opacity at the left upper lobe, which could represent postradiation changes. Correlate with patient history. The previously described left apical pulmonary nodule is not discretely demonstrated on today's exam. Decreased size of spiculated lingular pulmonary nodule now measuring 0.9 cm, previously 1.5 cm on 5/29/2020. New severe height loss of T4 compared to 5/29/2019. Peripheral wedge-shaped hyperdensity in the right liver lobe, favored to represent transient hepatic attenuation difference (YON). Splenectomy with residual splenosis.    On exam, I do not see suspicious evidence for recurrent or metastatic disease at  this time. We will continue routine follow-up/surveillance as discussed in 4 months with follow up CT scan before visit and I have instructed her to continue to see the other health care providers as per their scheduling.    Mrs. Mohamud states that she quit smoking back in August.      Patient Instructions   Return in about 4 months (around 6/24/2021).  I have ordered a CT scan of the chest, they will call you to schedule this.   Continue to follow with your other physicians.      Todays appointment time was spent in counseling, coordination of care and surveillance related to patients diagnosis as well as radiation therapy possible and probable after effects.   Lyle Garza MD  2/24/2021

## 2021-02-24 PROBLEM — F17.200 CURRENT EVERY DAY SMOKER: Status: RESOLVED | Noted: 2020-02-24 | Resolved: 2021-01-01

## 2021-02-24 PROBLEM — Z72.0 TOBACCO ABUSE: Status: RESOLVED | Noted: 2020-03-09 | Resolved: 2021-01-01

## 2021-02-24 NOTE — PATIENT INSTRUCTIONS
Return in about 4 months (around 6/24/2021).  I have ordered a CT scan of the chest, they will call you to schedule this.   Continue to follow with your other physicians.

## 2021-03-02 NOTE — TELEPHONE ENCOUNTER
Spoke with patient's daughter.  Patient due for follow-up 6- with CT scan prior to this appointment.  Instructed daughter to call our office if CT had not been scheduled by mid-June.  She verbalized understanding of instructions.

## 2021-03-02 NOTE — TELEPHONE ENCOUNTER
----- Message from Sean Rosen III, MD sent at 3/2/2021  9:57 AM CST -----  Please notify her CT scan of thorax looks stable and I will schedule another 1 to be repeated in June and we will see her in the office in June.Please document in epic

## 2021-04-04 NOTE — ED PROVIDER NOTES
Subjective   75-year-old female presents to the emergency department with complaint of left leg soft tissue injury.  She states her brothers chandrakant was sitting on her lap, went to jump down and scratched her leg causing a skin tear to the anterior aspect of the left lower leg.  Bleeding is controlled.  Patient is not up-to-date on tetanus.  She states her pain is mild in severity with no aggravating alleviating factors.  Does report frequent cellulitis with lower extremity soft tissue injuries.      History provided by:  Patient      Review of Systems   All other systems reviewed and are negative.      Past Medical History:   Diagnosis Date   • Bronchitis    • Cancer (CMS/HCC)     lung   • COPD (chronic obstructive pulmonary disease) (CMS/HCC)    • Coronary artery disease    • GERD (gastroesophageal reflux disease)    • History of transfusion    • Hyperlipidemia    • Hypertension    • PAF (paroxysmal atrial fibrillation) (CMS/HCC)    • Persistent asthma without complication 7/8/2019   • Seizures (CMS/[facility])        Allergies   Allergen Reactions   • Penicillins Rash       Past Surgical History:   Procedure Laterality Date   • ANKLE OPEN REDUCTION INTERNAL FIXATION Left 8/15/2020    Procedure: ANKLE OPEN REDUCTION INTERNAL FIXATION;  Surgeon: Keyshawn Medrano MD;  Location: [facility] OR;  Service: Orthopedics;  Laterality: Left;   • APPENDECTOMY     • APPENDECTOMY     • CARDIAC CATHETERIZATION     • CARDIAC ELECTROPHYSIOLOGY PROCEDURE N/A 5/23/2017    Procedure: Loop insertion; LINQ;  Surgeon: Jose Monteiro MD;  Location: [facility] CATH INVASIVE LOCATION;  Service:    • CHOLECYSTECTOMY     • CORONARY ANGIOPLASTY WITH STENT PLACEMENT      X 1    • INCISION AND DRAINAGE OF WOUND Left 8/15/2020    Procedure: INCISION AND DRAINAGE WOUND;  Surgeon: Keyshawn Medrano MD;  Location: [facility] OR;  Service: Orthopedics;  Laterality: Left;   • LIVER BIOPSY     • MYRINGOTOMY W/ TUBES Left 11/22/2019    Procedure: LEFT  MYRINGOTOMY WITH INSERTION OF EAR TUBE WITH RIGHT EAR EXAM UNDER ANESTHESIA;  Surgeon: Se Mcnulty MD;  Location: [facility] OR;  Service: ENT   • SPLENECTOMY     • US GUIDED FINE NEEDLE ASPIRATION  8/7/2018       Family History   Problem Relation Age of Onset   • Esophageal cancer Brother    • Lung cancer Brother    • Breast cancer Neg Hx        Social History     Socioeconomic History   • Marital status:      Spouse name: Not on file   • Number of children: 1   • Years of education: Not on file   • Highest education level: Not on file   Tobacco Use   • Smoking status: Current Every Day Smoker     Packs/day: 0.50     Years: 58.00     Pack years: 29.00     Start date: 1961   • Smokeless tobacco: Never Used   • Tobacco comment: TRYING TO QUIT    Substance and Sexual Activity   • Alcohol use: No   • Drug use: No   • Sexual activity: Defer           Objective   Physical Exam  Vitals and nursing note reviewed.   Constitutional:       General: She is not in acute distress.     Appearance: Normal appearance. She is normal weight.   HENT:      Head: Normocephalic and atraumatic.      Nose: Nose normal.      Mouth/Throat:      Mouth: Mucous membranes are moist.      Pharynx: Oropharynx is clear. No oropharyngeal exudate or posterior oropharyngeal erythema.   Eyes:      Extraocular Movements: Extraocular movements intact.      Conjunctiva/sclera: Conjunctivae normal.      Pupils: Pupils are equal, round, and reactive to light.   Cardiovascular:      Rate and Rhythm: Normal rate and regular rhythm.      Pulses: Normal pulses.      Heart sounds: Normal heart sounds.   Pulmonary:      Effort: Pulmonary effort is normal.      Breath sounds: Normal breath sounds. No wheezing, rhonchi or rales.   Abdominal:      General: Abdomen is flat. Bowel sounds are normal. There is no distension.      Palpations: Abdomen is soft.      Tenderness: There is no abdominal tenderness.   Musculoskeletal:         General: No tenderness.  Normal range of motion.      Cervical back: Normal range of motion and neck supple. No rigidity. No muscular tenderness.      Right lower leg: No edema.      Left lower leg: No edema.   Skin:     General: Skin is warm and dry.      Capillary Refill: Capillary refill takes less than 2 seconds.      Findings: No rash.      Comments: L-shaped skin tear to anterior aspect left lower leg, no bleeding   Neurological:      General: No focal deficit present.      Mental Status: She is alert and oriented to person, place, and time. Mental status is at baseline.      Cranial Nerves: No cranial nerve deficit.      Sensory: No sensory deficit.      Motor: No weakness.   Psychiatric:         Mood and Affect: Mood normal.         Behavior: Behavior normal.         Thought Content: Thought content normal.         Laceration Repair    Date/Time: 4/4/2021 3:40 PM  Performed by: Brady Lilly PA-C  Authorized by: Irineo Rosales MD     Consent:     Consent obtained:  Verbal    Consent given by:  Patient    Risks discussed:  Infection, need for additional repair, poor wound healing, poor cosmetic result and pain    Alternatives discussed:  No treatment, delayed treatment, observation and referral  Anesthesia (see MAR for exact dosages):     Anesthesia method:  Topical application    Topical anesthetic:  LET  Laceration details:     Location:  Leg    Leg location:  L lower leg    Wound length (cm): triangle with 4 cm by 3.5 cm by 3.5 cm edges.  Repair type:     Repair type:  Intermediate  Pre-procedure details:     Preparation:  Patient was prepped and draped in usual sterile fashion and imaging obtained to evaluate for foreign bodies  Exploration:     Hemostasis achieved with:  Direct pressure  Treatment:     Area cleansed with:  Shur-Clens and saline    Amount of cleaning:  Extensive  Skin repair:     Repair method:  Sutures and Steri-Strips    Suture size:  4-0    Wound skin closure material used: ethilon.    Suture  technique:  Simple interrupted    Number of sutures: 2 loose sutures to pull together; middle closed with steristrips.    Number of Steri-Strips:  2  Approximation:     Approximation:  Loose  Post-procedure details:     Dressing:  Adhesive bandage and antibiotic ointment    Patient tolerance of procedure:  Tolerated well, no immediate complications               ED Course                                           MDM    Final diagnoses:   Skin tear of left lower leg without complication, initial encounter       ED Disposition  ED Disposition     ED Disposition Condition Comment    Discharge Stable           Cayetano Crews MD  [address] [id number] [phone]    Schedule an appointment as soon as possible for a visit in 2 days      [facility] Emergency Department  [address] [id number] [phone]    As needed         Medication List      New Prescriptions    sulfamethoxazole-trimethoprim 800-160 MG per tablet  Commonly known as: BACTRIM DS,SEPTRA DS  Take 1 tablet by mouth 2 (Two) Times a Day for 10 days.           Where to Get Your Medications      These medications were sent to [facility]/pharmacy #[id number] - [place], [place] - [address] AT [address]CROSS FROM [address] - [phone]  - [phone]   [address] [facility] [address]    Hours: 24-hours Phone: [phone]   · sulfamethoxazole-trimethoprim 800-160 MG per tablet          Brady Lilly PA-C  04/04/21 1608       Brady Lilly PA-C  04/04/21 2978

## 2021-04-04 NOTE — ED PROVIDER NOTES
Subjective   History of Present Illness    Review of Systems    Past Medical History:   Diagnosis Date   • Bronchitis    • Cancer (CMS/HCC)     lung   • COPD (chronic obstructive pulmonary disease) (CMS/HCC)    • Coronary artery disease    • GERD (gastroesophageal reflux disease)    • History of transfusion    • Hyperlipidemia    • Hypertension    • PAF (paroxysmal atrial fibrillation) (CMS/HCC)    • Persistent asthma without complication 7/8/2019   • Seizures (CMS/HCC)        Allergies   Allergen Reactions   • Penicillins Rash       Past Surgical History:   Procedure Laterality Date   • ANKLE OPEN REDUCTION INTERNAL FIXATION Left 8/15/2020    Procedure: ANKLE OPEN REDUCTION INTERNAL FIXATION;  Surgeon: Keyshawn Medrano MD;  Location: [facility] OR;  Service: Orthopedics;  Laterality: Left;   • APPENDECTOMY     • APPENDECTOMY     • CARDIAC CATHETERIZATION     • CARDIAC ELECTROPHYSIOLOGY PROCEDURE N/A 5/23/2017    Procedure: Loop insertion; LINQ;  Surgeon: Jose Monteiro MD;  Location: [facility] CATH INVASIVE LOCATION;  Service:    • CHOLECYSTECTOMY     • CORONARY ANGIOPLASTY WITH STENT PLACEMENT      X 1    • INCISION AND DRAINAGE OF WOUND Left 8/15/2020    Procedure: INCISION AND DRAINAGE WOUND;  Surgeon: Keyshawn Medrano MD;  Location: [facility] OR;  Service: Orthopedics;  Laterality: Left;   • LIVER BIOPSY     • MYRINGOTOMY W/ TUBES Left 11/22/2019    Procedure: LEFT MYRINGOTOMY WITH INSERTION OF EAR TUBE WITH RIGHT EAR EXAM UNDER ANESTHESIA;  Surgeon: Se Mcnulty MD;  Location: [facility] OR;  Service: ENT   • SPLENECTOMY     • US GUIDED FINE NEEDLE ASPIRATION  8/7/2018       Family History   Problem Relation Age of Onset   • Esophageal cancer Brother    • Lung cancer Brother    • Breast cancer Neg Hx        Social History     Socioeconomic History   • Marital status:      Spouse name: Not on file   • Number of children: 1   • Years of education: Not on file   • Highest education level: Not on file    Tobacco Use   • Smoking status: Current Every Day Smoker     Packs/day: 0.50     Years: 58.00     Pack years: 29.00     Start date: 1961   • Smokeless tobacco: Never Used   • Tobacco comment: TRYING TO QUIT    Substance and Sexual Activity   • Alcohol use: No   • Drug use: No   • Sexual activity: Defer           Objective   Physical Exam    Procedures           ED Course                                           MDM    Final diagnoses:   Skin tear of left lower leg without complication, initial encounter       ED Disposition  ED Disposition     ED Disposition Condition Comment    Discharge Stable           Cayetano Crews MD  [address] [facility] [address]  [facility] [id number] [phone]    Schedule an appointment as soon as possible for a visit in 2 days      [facility] Emergency Department  [address] [facility] [id number] [phone]    As needed         Medication List      New Prescriptions    sulfamethoxazole-trimethoprim 800-160 MG per tablet  Commonly known as: BACTRIM DS,SEPTRA DS  Take 1 tablet by mouth 2 (Two) Times a Day for 10 days.           Where to Get Your Medications      These medications were sent to [facility]/pharmacy #[id number] - [place] - [address] AT [address]OSS FROM [address] - [phone]  - [phone]   [address] [facility] [address]    Hours: 24-hours Phone: [phone]   · sulfamethoxazole-trimethoprim 800-160 MG per tablet

## 2021-04-04 NOTE — DISCHARGE INSTRUCTIONS
Please rest, ice, and elevate your left leg as much as possible over the next few days .  Please use a compressive wrap to help with swelling.   Complete all of your antibiotics.   Keep the wound clean and dry for 24 hours and then allow warm soapy water such as Dial to gently run over it.  Allow the steri strips to fall off on their own. The stiches will need to be removed in 7-10 days.   Use over the counter medications as needed for pain control.

## 2021-06-08 PROBLEM — I65.23 BILATERAL CAROTID ARTERY STENOSIS: Status: ACTIVE | Noted: 2021-01-01

## 2021-06-08 PROBLEM — E78.2 MIXED HYPERLIPIDEMIA: Status: ACTIVE | Noted: 2021-01-01

## 2021-06-08 NOTE — PROGRESS NOTES
"6/8/2021     Cayetano Crews MD   [address]  [place] [address]    Josi Mohamud  1945    Chief Complaint   Patient presents with   • Follow-up     1 Year Follow Up For Bilateral Carotid Artery Stenosis. Test [id number] US pad carotid bilateral. Patient denies any stroke like symptoms.    • Smoker     Patient is a Current Some Day Smoker... patient is nursing facility     • Med Management     Verbally verified medicaitons with patient        Dear Cayetano Crews MD    HPI  I had the pleasure of seeing your patient Josi Mohamud in the office today.    As you recall, Josi Mohamud is a 75 y.o.  female who we are following for asymptomatic carotid occlusive disease. She denies any classic strokelike symptoms. She is maintained on aspirin, Eliquis, and Zocor.  Currently, she appears to be doing quite well with no significant vascular complaints.  She did have noninvasive testing performed today, which I did review in office.     Review of Systems   Constitutional: Positive for fatigue.   HENT: Negative.    Eyes: Negative.    Respiratory: Negative.    Cardiovascular: Negative.    Gastrointestinal: Negative.  Negative for constipation and diarrhea.   Endocrine: Negative.    Genitourinary: Negative.    Musculoskeletal: Positive for gait problem and neck pain.   Skin: Negative.    Allergic/Immunologic: Negative.    Neurological: Positive for weakness. Negative for syncope and headaches.   Hematological: Negative.    Psychiatric/Behavioral: Negative.    All other systems reviewed and are negative.    /74 (BP Location: Right arm, Patient Position: Sitting, Cuff Size: Adult)   Pulse 102   Ht 149.9 cm (59\")   Wt 86.2 kg (190 lb)   LMP  (LMP Unknown)   SpO2 92%   BMI 38.38 kg/m²    Physical Exam  Vitals and nursing note reviewed.   Constitutional:       General: She is not in acute distress.     Appearance: Normal appearance. She is well-developed. She is obese. She is not diaphoretic. "   HENT:      Head: Normocephalic and atraumatic.   Eyes:      General: No scleral icterus.     Pupils: Pupils are equal, round, and reactive to light.   Neck:      Thyroid: No thyromegaly.      Vascular: No carotid bruit or JVD.   Cardiovascular:      Rate and Rhythm: Normal rate and regular rhythm.      Pulses: Normal pulses.      Heart sounds: Normal heart sounds and S2 normal. No murmur heard.   No friction rub. No gallop.    Pulmonary:      Effort: Pulmonary effort is normal.      Breath sounds: Normal breath sounds.   Abdominal:      General: Bowel sounds are normal.      Palpations: Abdomen is soft.   Musculoskeletal:         General: Normal range of motion.      Cervical back: Normal range of motion and neck supple.      Comments: wheelchair   Skin:     General: Skin is warm and dry.   Neurological:      Mental Status: She is alert and oriented to person, place, and time.      Cranial Nerves: No cranial nerve deficit.   Psychiatric:         Mood and Affect: Mood normal.         Behavior: Behavior normal.         Thought Content: Thought content normal.         Judgment: Judgment normal.       Diagnostic data:  Noninvasive testing including a carotid duplex shows less than 50% carotid stenosis bilaterally with bilateral antegrade vertebral flow.       Patient Active Problem List   Diagnosis   • Syncope and collapse   • Coronary artery disease involving native coronary artery of native heart without angina pectoris   • Stented coronary artery   • Essential hypertension   • Gastroesophageal reflux disease   • Thyroid nodule   • Parotid neoplasm   • Anticoagulated   • Laryngopharyngeal reflux (LPR)   • Traumatic rhabdomyolysis (CMS/HCC)   • PAF (paroxysmal atrial fibrillation) (CMS/HCC)   • Malignant neoplasm of upper lobe of left lung (CMS/HCC)   • COPD, moderate (CMS/HCC)   • Persistent asthma without complication   • Overweight   • Multiple thyroid nodules   • Hx of Hodgkin's lymphoma   • Dysfunction of both  eustachian tubes   • Left chronic serous otitis media   • Morbidly obese (CMS/HCC)   • Tonsillar cyst   • Status post stereotactic radiosurgery   • Renal cyst   • Cellulitis of right lower extremity   • Macrocytosis without anemia   • Obesity (BMI 30-39.9)   • Lower extremity edema, right side   • Leukocytosis   • Type III open fracture of left ankle   • Fall   • Left ankle pain   • Bilateral carotid artery stenosis   • Mixed hyperlipidemia        Diagnosis Plan   1. Bilateral carotid artery stenosis     2. Essential hypertension     3. Mixed hyperlipidemia         Plan: After thoroughly evaluating Josi Mohamud, I believe the best course of action is to remain conservative from a vascular standpoint. The patient is not currently having any strokelike symptoms and she has very minimal carotid disease bilaterally.  I did discuss vascular risk factors as they pertain to the progression of vascular disease including controlling hypertension and hyperlipidemia.  These risk factors currently stable.  The patient is to continue taking their medications as previously discussed.  I will see her back in 1 years time with repeat noninvasive testing for continued surveillance. This was all discussed in full with complete understanding.  Thank you for allowing me to participate in the care of your patient.  Please do not hesitate to call with any questions or concerns.  We will keep you aware of any further encounters with Josi Mohamud.        Sincerely yours,         DO Eleonora Cohen Danny Neale, MD

## 2021-07-23 PROBLEM — Z86.73 RECENT CEREBROVASCULAR ACCIDENT (CVA): Status: ACTIVE | Noted: 2021-01-01

## 2021-07-23 PROBLEM — S82.892C TYPE III OPEN FRACTURE OF LEFT ANKLE: Status: RESOLVED | Noted: 2020-08-15 | Resolved: 2021-01-01

## 2021-07-23 PROBLEM — T79.6XXA TRAUMATIC RHABDOMYOLYSIS (HCC): Status: RESOLVED | Noted: 2019-01-22 | Resolved: 2021-01-01

## 2021-07-23 PROBLEM — G93.89 BRAIN MASS: Status: ACTIVE | Noted: 2021-01-01

## 2021-07-23 NOTE — ED PROVIDER NOTES
Subjective   This patient is a 75-year-old female who was transferred to the ED via EMS because she was found on the floor next to her bed at the nursing home.  It is uncertain how long she been down.  The patient does state that she has had frequent falls over the last few months and had 3 falls in 1 day just 2 days ago.  She states that the problem is bilateral weakness in the lower extremities that just doesn't allow her to support her own weight.  She also states that a few days ago she had upper extremity weakness again bilateral.  She also reports some increased confusion and her relative in the room confirms this.  She denies head trauma specifically, has no chest pain, shortness of breath, abdominal pain or extremity injury per her report.  She has been eating and drinking normally and using the bathroom normally for her.          Review of Systems   Neurological: Positive for weakness.   All other systems reviewed and are negative.      Past Medical History:   Diagnosis Date   • Bilateral carotid artery stenosis 6/8/2021   • Bronchitis    • Cancer (CMS/[facility])     lung   • COPD (chronic obstructive pulmonary disease) (CMS/[facility])    • Coronary artery disease    • GERD (gastroesophageal reflux disease)    • History of transfusion    • Hyperlipidemia    • Hypertension    • PAF (paroxysmal atrial fibrillation) (CMS/[facility])    • Parotid neoplasm 7/21/2018   • Persistent asthma without complication 7/8/2019   • Renal cyst 2/24/2020   • Seizures (CMS/[facility])    • Status post stereotactic radiosurgery 2/24/2020   • Stented coronary artery 5/19/2017   • Thyroid nodule 7/21/2018       Allergies   Allergen Reactions   • Contrast Dye Itching     Contrast reaction occurred with pruritus and erythema of the upper  and lower extremities. No difficulties swallowing or breathing. Patient  received 50 mg Benadryl orally to improve symptomatology. Patient should  be pretreated with steroids and Benadryl prior to any future IV  contrast  Administration. 2021     • Penicillins Rash       Past Surgical History:   Procedure Laterality Date   • ANKLE OPEN REDUCTION INTERNAL FIXATION Left 8/15/2020    Procedure: ANKLE OPEN REDUCTION INTERNAL FIXATION;  Surgeon: Keyshawn Medrano MD;  Location: [facility] OR;  Service: Orthopedics;  Laterality: Left;   • APPENDECTOMY     • APPENDECTOMY     • CARDIAC CATHETERIZATION     • CARDIAC ELECTROPHYSIOLOGY PROCEDURE N/A 2017    Procedure: Loop insertion; LINQ;  Surgeon: Jose Monteiro MD;  Location: [facility] CATH INVASIVE LOCATION;  Service:    • CHOLECYSTECTOMY     • CORONARY ANGIOPLASTY WITH STENT PLACEMENT      X 1    • INCISION AND DRAINAGE OF WOUND Left 8/15/2020    Procedure: INCISION AND DRAINAGE WOUND;  Surgeon: Keyshawn Medrano MD;  Location: [facility] OR;  Service: Orthopedics;  Laterality: Left;   • LIVER BIOPSY     • MYRINGOTOMY W/ TUBES Left 2019    Procedure: LEFT MYRINGOTOMY WITH INSERTION OF EAR TUBE WITH RIGHT EAR EXAM UNDER ANESTHESIA;  Surgeon: Se Mcnulty MD;  Location: [facility] OR;  Service: ENT   • SPLENECTOMY     • US GUIDED FINE NEEDLE ASPIRATION  2018       Family History   Problem Relation Age of Onset   • Esophageal cancer Brother    • Lung cancer Brother    • Breast cancer Neg Hx        Social History     Socioeconomic History   • Marital status:      Spouse name: Not on file   • Number of children: 1   • Years of education: Not on file   • Highest education level: Not on file   Tobacco Use   • Smoking status: Former Smoker     Packs/day: 0.50     Years: 58.00     Pack years: 29.00     Start date:      Quit date: 2020     Years since quittin.9   • Smokeless tobacco: Never Used   Substance and Sexual Activity   • Alcohol use: No   • Drug use: No   • Sexual activity: Defer           Objective   Physical Exam  Vitals and nursing note reviewed.   Constitutional:       Appearance: She is well-developed.   HENT:      Head: Normocephalic  and atraumatic.      Right Ear: External ear normal.      Left Ear: External ear normal.      Nose: Nose normal.      Mouth/Throat:      Mouth: Mucous membranes are moist.      Pharynx: Oropharynx is clear.   Eyes:      Conjunctiva/sclera: Conjunctivae normal.   Cardiovascular:      Rate and Rhythm: Normal rate and regular rhythm.      Heart sounds: Normal heart sounds.   Pulmonary:      Effort: Pulmonary effort is normal.      Breath sounds: Normal breath sounds.   Abdominal:      General: Bowel sounds are normal.      Palpations: Abdomen is soft.   Musculoskeletal:         General: Normal range of motion.      Cervical back: Normal range of motion and neck supple.   Skin:     General: Skin is warm and dry.      Capillary Refill: Capillary refill takes less than 2 seconds.   Neurological:      General: No focal deficit present.      Mental Status: She is alert and oriented to person, place, and time.   Psychiatric:         Behavior: Behavior normal.         Thought Content: Thought content normal.         Judgment: Judgment normal.         Procedures           ED Course                                           MDM  Number of Diagnoses or Management Options     Amount and/or Complexity of Data Reviewed  Clinical lab tests: reviewed and ordered  Tests in the radiology section of CPT®: reviewed and ordered  Tests in the medicine section of CPT®: reviewed and ordered  Decide to obtain previous medical records or to obtain history from someone other than the patient: yes    Patient Progress  Patient progress: stable      Final diagnoses:   Hyponatremia   CECY (acute kidney injury) (CMS/[facility])       ED Disposition  ED Disposition     ED Disposition Condition Comment    Decision to Admit  Level of Care: Telemetry [5]   Diagnosis: Hyponatremia [853491]   Admitting Physician: MORRIS BLANCA [[id number]]   Attending Physician: MORRIS BLANCA [[id number]]   Certification: I Certify That Inpatient Hospital Services Are  Medically Necessary For Greater Than 2 Midnights            No follow-up provider specified.       Medication List      No changes were made to your prescriptions during this visit.       The patient's work-up revealed hyponatremia, CECY and an abnormal CT scan.  Overall I believe she deserves admission to the hospital for correction of her electrolyte abnormalities, hydration and consideration of MRI to further evaluate her brain.  I discussed this with Dr. Stinson who kindly agreed to the admission under the hospitalist service.  The patient is currently stable in the ED     Kevin Chiang MD  07/23/21 7770

## 2021-07-23 NOTE — H&P
[facility] Medicine Services  HISTORY AND PHYSICAL    Date of Admission: 7/23/2021  Primary Care Physician: Cayetano Crews MD    Subjective     Chief Complaint:   Gait problem, falls    History of Present Illness    This 75-year-old female nursing home resident was transported by EMS to [facility] emergency department after being found in the floor at the nursing facility.  The patient states that she was getting out of bed this morning and when she stood her legs gave out.  She denies pain as a result of the fall.  She was subsequently brought to the emergency department by EMS for work-up.  CT scan of the head was done showing a subacute to chronic hypodensity in the left occipital lobe with an old right basal ganglia stroke.  Further evaluation with MRI scan of the brain shows that the left occipital hypodensity is consistent with a neoplasm.  The patient has a history of Hodgkin's lymphoma and lung cancer.  She completed treatment for both cancers successfully.  She states that her lung cancer treatment was completed about 5 years ago based on her recollection.    Further work-up in the emergency department reveals negative Covid swab, troponin within normal limits.  Magnesium within normal limits.  Unremarkable blood gas.  CK elevated at 417 likely secondary to her fall.  Urinalysis shows TNTC WBCs but no bacteria consistent with sterile pyuria.  CMP shows creatinine slightly elevated at 1.51 above her baseline of 1.2.  Sodium is 128.  IV fluids of normal saline have been started.  BMP, TSH, D-dimer within normal limits.  White blood cell count also within normal limits with hemoglobin 12.5.    Review of Systems   Constitutional: Positive for activity change.   HENT: Negative.    Eyes: Negative.    Respiratory: Negative.    Cardiovascular: Negative.    Gastrointestinal: Negative.    Endocrine: Negative.    Genitourinary: Negative.    Musculoskeletal:  Positive for gait problem.   Skin: Negative.    Allergic/Immunologic: Negative.    Neurological: Negative for speech difficulty, weakness and light-headedness.   Hematological: Negative.    Psychiatric/Behavioral: Negative.         Otherwise complete ROS reviewed and negative except as mentioned in the HPI.      Past Medical History:     Past Medical History:   Diagnosis Date   • Anticoagulated 7/21/2018   • Bilateral carotid artery stenosis 6/8/2021   • Bronchitis    • Cancer (CMS/HCC)     lung   • COPD (chronic obstructive pulmonary disease) (CMS/HCC)    • COPD, moderate (CMS/HCC) 7/8/2019   • Coronary artery disease    • Coronary artery disease involving native coronary artery of native heart without angina pectoris 5/19/2017   • Essential hypertension 5/19/2017   • Gastroesophageal reflux disease 7/21/2018   • GERD (gastroesophageal reflux disease)    • History of transfusion    • Hx of Hodgkin's lymphoma 10/11/2019   • Hyperlipidemia    • Hypertension    • Malignant neoplasm of upper lobe of left lung (CMS/HCC) 7/8/2019   • Mixed hyperlipidemia 6/8/2021   • PAF (paroxysmal atrial fibrillation) (CMS/HCC)    • Parotid neoplasm 7/21/2018   • Persistent asthma without complication 7/8/2019   • Renal cyst 2/24/2020   • Seizures (CMS/HCC)    • Status post stereotactic radiosurgery 2/24/2020   • Stented coronary artery 5/19/2017   • Thyroid nodule 7/21/2018       Past Surgical History:  Past Surgical History:   Procedure Laterality Date   • ANKLE OPEN REDUCTION INTERNAL FIXATION Left 8/15/2020    Procedure: ANKLE OPEN REDUCTION INTERNAL FIXATION;  Surgeon: Keyshawn Medrano MD;  Location: [facility] OR;  Service: Orthopedics;  Laterality: Left;   • APPENDECTOMY     • APPENDECTOMY     • CARDIAC CATHETERIZATION     • CARDIAC ELECTROPHYSIOLOGY PROCEDURE N/A 5/23/2017    Procedure: Loop insertion; LINQ;  Surgeon: Jose Monteiro MD;  Location: [facility] CATH INVASIVE LOCATION;  Service:    • CHOLECYSTECTOMY     • CORONARY  ANGIOPLASTY WITH STENT PLACEMENT      X 1    • INCISION AND DRAINAGE OF WOUND Left 8/15/2020    Procedure: INCISION AND DRAINAGE WOUND;  Surgeon: Keyshawn Medrano MD;  Location:  [facility] OR;  Service: Orthopedics;  Laterality: Left;   • LIVER BIOPSY     • MYRINGOTOMY W/ TUBES Left 11/22/2019    Procedure: LEFT MYRINGOTOMY WITH INSERTION OF EAR TUBE WITH RIGHT EAR EXAM UNDER ANESTHESIA;  Surgeon: Se Mcnulty MD;  Location:  [facility] OR;  Service: ENT   • SPLENECTOMY     • US GUIDED FINE NEEDLE ASPIRATION  8/7/2018       Family History:   family history includes Esophageal cancer in her brother; Lung cancer in her brother.    Social History:    reports that she quit smoking about 11 months ago. She started smoking about 60 years ago. She has a 29.00 pack-year smoking history. She has never used smokeless tobacco. She reports that she does not drink alcohol and does not use drugs.    Medications:  Prior to Admission medications    Medication Sig Start Date End Date Taking? Authorizing Provider   acetaminophen (TYLENOL) 325 MG tablet Take 2 tablets by mouth Every 4 (Four) Hours As Needed for Mild Pain . 8/20/20  Yes Milo White DO   albuterol sulfate  (90 Base) MCG/ACT inhaler Inhale 1 puff Daily. 8/12/20  Yes Jeremías Hernandez APRN   amLODIPine (NORVASC) 5 MG tablet Take 5 mg by mouth Daily.   Yes ProviderTriny MD   apixaban (ELIQUIS) 5 MG tablet tablet Take 1 tablet by mouth Every 12 (Twelve) Hours. 12/6/19  Yes Jose Monteiro MD   aspirin 81 MG EC tablet Take 81 mg by mouth Daily.   Yes Triny Zapata MD   Calcium Carbonate-Vitamin D (CALTRATE 600+D PO) Take 1 tablet by mouth 2 (two) times a day.   Yes Triny Zapata MD   docusate sodium 100 MG capsule Take 100 mg by mouth 2 (Two) Times a Day As Needed for Constipation. 8/20/20  Yes Milo White DO   fluticasone-salmeterol (ADVAIR) 100-50 MCG/DOSE DISKUS Inhale 2 (Two) Times a Day.   Yes Jodi  MD Triny   furosemide (LASIX) 20 MG tablet Take 20 mg by mouth Daily. At bedtime   Yes Triny Zapata MD   gabapentin (NEURONTIN) 100 MG capsule Take 200 mg by mouth 3 (Three) Times a Day.   Yes Triny Zapata MD   guaiFENesin (MUCINEX) 600 MG 12 hr tablet Take 2 tablets by mouth 2 (Two) Times a Day As Needed for Cough.  Patient taking differently: Take 1,200 mg by mouth 2 (Two) Times a Day. 4/10/17  Yes Angy Peña MD   levETIRAcetam (KEPPRA) 500 MG tablet Take 500 mg by mouth 2 (Two) Times a Day.   Yes Triny Zapata MD   lidocaine (LIDODERM) 5 % Place 1 patch on the skin as directed by provider Daily As Needed for Mild Pain , Moderate Pain  or Severe Pain . Remove & Discard patch within 12 hours or as directed by MD   Yes Triny Zapata MD   lisinopril (PRINIVIL,ZESTRIL) 20 MG tablet Take 20 mg by mouth Daily.   Yes Triny Zapata MD   LORazepam (ATIVAN) 0.5 MG tablet Take 1 tablet by mouth 2 (Two) Times a Day As Needed. 1/14/21  Yes Triny Zapata MD   metoprolol succinate XL (TOPROL-XL) 50 MG 24 hr tablet Take 50 mg by mouth Daily.   Yes Triny Zapata MD   nitroglycerin (NITROSTAT) 0.4 MG SL tablet Place 0.4 mg under the tongue Every 5 (Five) Minutes As Needed for Chest Pain. Take no more than 3 doses in 15 minutes.   Yes Triny Zapata MD   omeprazole (priLOSEC) 40 MG capsule Take 40 mg by mouth Daily. 9/10/19  Yes Triny Zapata MD   ondansetron (ZOFRAN) 4 MG tablet Take 1 tablet by mouth Every 6 (Six) Hours As Needed. 1/15/21  Yes Triny Zapata MD   oxybutynin (DITROPAN) 5 MG tablet Take 1 tablet by mouth 2 (Two) Times a Day. 11/28/20  Yes Triny Zapata MD   polyethylene glycol (MIRALAX) 17 g packet Take 17 g by mouth Daily As Needed (constipation).   Yes Triny Zapata MD   simvastatin (ZOCOR) 20 MG tablet Take 20 mg by mouth Daily.   Yes Triny Zapata MD   spironolactone (ALDACTONE) 25 MG tablet  "Take 25 mg by mouth Daily.   Yes Provider, MD Triny   tiotropium bromide monohydrate (SPIRIVA RESPIMAT) 2.5 MCG/ACT aerosol solution inhaler Inhale 2 puffs Daily. 8/12/20  Yes Jeremías Hernandez APRN   traMADol (ULTRAM) 50 MG tablet Take 1 tablet by mouth Every 6 (Six) Hours As Needed. 1/7/21  Yes Triny Zapata MD   furosemide (LASIX) 40 MG tablet Take 40 mg by mouth Daily. Morning    Provider, MD Triny       Allergies:  Allergies   Allergen Reactions   • Contrast Dye Itching     Contrast reaction occurred with pruritus and erythema of the upper  and lower extremities. No difficulties swallowing or breathing. Patient  received 50 mg Benadryl orally to improve symptomatology. Patient should  be pretreated with steroids and Benadryl prior to any future IV contrast  Administration. 05-     • Penicillins Rash       Objective     Vital Signs:   /71 (BP Location: Right arm, Patient Position: Lying)   Pulse 112   Temp 98.2 °F (36.8 °C) (Oral)   Resp 16   Ht 149.9 cm (59.02\")   Wt 86.7 kg (191 lb 3.2 oz)   LMP  (LMP Unknown)   SpO2 95%   BMI 38.60 kg/m²     Physical Exam  Constitutional:       General: She is not in acute distress.     Appearance: Normal appearance. She is normal weight.   HENT:      Head: Normocephalic and atraumatic.      Right Ear: External ear normal.      Left Ear: External ear normal.      Nose: Nose normal.      Mouth/Throat:      Mouth: Mucous membranes are moist.      Pharynx: Oropharynx is clear.   Eyes:      General: No scleral icterus.     Extraocular Movements: Extraocular movements intact.      Conjunctiva/sclera: Conjunctivae normal.      Pupils: Pupils are equal, round, and reactive to light.   Cardiovascular:      Rate and Rhythm: Normal rate and regular rhythm.      Pulses: Normal pulses.      Heart sounds: Normal heart sounds.   Pulmonary:      Effort: Pulmonary effort is normal. No respiratory distress.      Breath sounds: Normal breath " sounds.   Abdominal:      General: Abdomen is flat. Bowel sounds are normal.      Palpations: Abdomen is soft. There is no mass.   Musculoskeletal:         General: Normal range of motion.      Right lower leg: No edema.      Left lower leg: Edema present.   Skin:     General: Skin is warm and dry.      Coloration: Skin is not pale.   Neurological:      General: No focal deficit present.      Mental Status: She is alert and oriented to person, place, and time. Mental status is at baseline.      Motor: Weakness (Generalized without focal deficit) present.   Psychiatric:         Mood and Affect: Mood normal.         Judgment: Judgment normal.           Results Reviewed:  Lab Results (last 24 hours)     Procedure Component Value Units Date/Time    Vitamin B6 [[id number]] Collected: 07/23/21 1657    Specimen: Blood Updated: 07/23/21 1702    Vitamin B12 [[id number]] Collected: 07/23/21 1657    Specimen: Blood Updated: 07/23/21 1702    Vitamin B1, Whole Blood [[id number]] Collected: 07/23/21 1657    Specimen: Blood Updated: 07/23/21 1702    Magnesium [[id number]]  (Normal) Collected: 07/23/21 1001    Specimen: Blood Updated: 07/23/21 1604     Magnesium 1.9 mg/dL     POC Glucose Once [[id number]]  (Normal) Collected: 07/23/21 1550    Specimen: Blood Updated: 07/23/21 1551     Glucose 90 mg/dL      Comment: : [id number] [facility] ID: [id number]       COVID PRE-OP / PRE-PROCEDURE SCREENING ORDER (NO ISOLATION) - Swab, Nasal Cavity [[id number]]  (Normal) Collected: 07/23/21 1215    Specimen: Swab from Nasal Cavity Updated: 07/23/21 1259    Narrative:      The following orders were created for panel order COVID PRE-OP / PRE-PROCEDURE SCREENING ORDER (NO ISOLATION) - Swab, Nasal Cavity.  Procedure                               Abnormality         Status                     ---------                               -----------         ------                     COVID-19,[facility]...[[id number]]  Normal               Final result                 Please view results for these tests on the individual orders.    COVID-19,[facility] IN-HOUSE,Nasal Swab No Transport Media 3-4 HR TAT - Swab, Nasal Cavity [[id number]]  (Normal) Collected: 07/23/21 1215    Specimen: Swab from Nasal Cavity Updated: 07/23/21 1259     COVID19 Not Detected    Narrative:      Fact sheet for providers: https://www.fda.gov/media/440257/download     Fact sheet for patients: https://www.fda.gov/media/837725/download    Test performed by PCR.    Consider negative results in combination with clinical observations, patient history, and epidemiological information.    CK [[id number]]  (Abnormal) Collected: 07/23/21 1001    Specimen: Blood Updated: 07/23/21 1201     Creatine Kinase 417 U/L     Troponin [[id number]]  (Normal) Collected: 07/23/21 1001    Specimen: Blood Updated: 07/23/21 1041     Troponin T <0.010 ng/mL     Narrative:      Troponin T Reference Range:  <= 0.03 ng/mL-   Negative for AMI  >0.03 ng/mL-     Abnormal for myocardial necrosis.  Clinicians would have to utilize clinical acumen, EKG, Troponin and serial changes to determine if it is an Acute Myocardial Infarction or myocardial injury due to an underlying chronic condition.       Results may be falsely decreased if patient taking Biotin.      Blood Gas, Arterial - [[id number]]  (Abnormal) Collected: 07/23/21 0918    Specimen: Arterial Blood Updated: 07/23/21 0924     Site Left Brachial     Rom's Test N/A     pH, Arterial 7.352 pH units      pCO2, Arterial 36.0 mm Hg      pO2, Arterial 75.1 mm Hg      Comment: 84 Value below reference range        HCO3, Arterial 20.0 mmol/L      Comment: 84 Value below reference range        Base Excess, Arterial -5.0 mmol/L      Comment: 84 Value below reference range        O2 Saturation, Arterial 94.7 %      Temperature 37.0 C      Barometric Pressure for Blood Gas 754 mmHg      Modality Nasal Cannula     Flow Rate 2.0 lpm      Ventilator Mode NA     Collected by  [id number]     Comment: Meter: [id number]     :  [id number]        pCO2, Temperature Corrected 36.0 mm Hg      pH, Temp Corrected 7.352 pH Units      pO2, Temperature Corrected 75.1 mm Hg     TSH [[id number]]  (Normal) Collected: 07/23/21 0811    Specimen: Blood Updated: 07/23/21 0905     TSH 0.875 uIU/mL     Comprehensive Metabolic Panel [[id number]]  (Abnormal) Collected: 07/23/21 0811    Specimen: Blood Updated: 07/23/21 0901     Glucose 115 mg/dL      BUN 34 mg/dL      Creatinine 1.51 mg/dL      Sodium 128 mmol/L      Potassium 5.0 mmol/L      Chloride 96 mmol/L      CO2 19.0 mmol/L      Calcium 9.4 mg/dL      Total Protein 7.3 g/dL      Albumin 3.60 g/dL      ALT (SGPT) 17 U/L      AST (SGOT) 21 U/L      Alkaline Phosphatase 104 U/L      Total Bilirubin 0.3 mg/dL      eGFR Non African Amer 34 mL/min/1.73      Globulin 3.7 gm/dL      A/G Ratio 1.0 g/dL      BUN/Creatinine Ratio 22.5     Anion Gap 13.0 mmol/L     Narrative:      GFR Normal >60  Chronic Kidney Disease <60  Kidney Failure <15      Troponin [[id number]]  (Normal) Collected: 07/23/21 0811    Specimen: Blood Updated: 07/23/21 0858     Troponin T <0.010 ng/mL     Narrative:      Troponin T Reference Range:  <= 0.03 ng/mL-   Negative for AMI  >0.03 ng/mL-     Abnormal for myocardial necrosis.  Clinicians would have to utilize clinical acumen, EKG, Troponin and serial changes to determine if it is an Acute Myocardial Infarction or myocardial injury due to an underlying chronic condition.       Results may be falsely decreased if patient taking Biotin.      BNP [[id number]]  (Normal) Collected: 07/23/21 0811    Specimen: Blood Updated: 07/23/21 0858     proBNP 315.1 pg/mL     Narrative:      Among patients with dyspnea, NT-proBNP is highly sensitive for the detection of acute congestive heart failure. In addition NT-proBNP of <300 pg/ml effectively rules out acute congestive heart failure with 99% negative predictive value.    Results may be falsely  decreased if patient taking Biotin.      D-dimer, Quantitative [[id number]]  (Normal) Collected: 07/23/21 0811    Specimen: Blood Updated: 07/23/21 0848     D-Dimer, Quantitative 0.25 mg/L (FEU)     Narrative:      Reference Range is 0-0.50 mg/L FEU. However, results <0.50 mg/L FEU tends to rule out DVT or PE. Results >0.50 mg/L FEU are not useful in predicting absence or presence of DVT or PE.      Protime-INR [[id number]]  (Abnormal) Collected: 07/23/21 0811    Specimen: Blood Updated: 07/23/21 0848     Protime 15.0 Seconds      INR 1.28    aPTT [[id number]]  (Abnormal) Collected: 07/23/21 0811    Specimen: Blood Updated: 07/23/21 0848     PTT 36.5 seconds     Urinalysis With Culture If Indicated - Urine, Catheter In/Out [[id number]]  (Abnormal) Collected: 07/23/21 0821    Specimen: Urine, Catheter In/Out Updated: 07/23/21 0840     Color, UA Yellow     Appearance, UA Cloudy     pH, UA <=5.0     Specific Gravity, UA 1.015     Glucose, UA Negative     Ketones, UA Negative     Bilirubin, UA Negative     Blood, UA Negative     Protein, UA Negative     Leuk Esterase, UA Large (3+)     Nitrite, UA Negative     Urobilinogen, UA 0.2 E.U./dL    Urinalysis, Microscopic Only - Urine, Catheter In/Out [[id number]]  (Abnormal) Collected: 07/23/21 0821    Specimen: Urine, Catheter In/Out Updated: 07/23/21 0840     RBC, UA 0-2 /HPF      WBC, UA Too Numerous to Count /HPF      Bacteria, UA None Seen /HPF      Squamous Epithelial Cells, UA 7-12 /HPF      Hyaline Casts, UA 7-12 /LPF      Methodology Automated Microscopy    Urine Culture - Urine, Urine, Catheter In/Out [[id number]] Collected: 07/23/21 0821    Specimen: Urine, Catheter In/Out Updated: 07/23/21 0840    CBC & Differential [[id number]]  (Abnormal) Collected: 07/23/21 0811    Specimen: Blood Updated: 07/23/21 0835    Narrative:      The following orders were created for panel order CBC & Differential.  Procedure                               Abnormality         Status                      ---------                               -----------         ------                     CBC Auto Differential[[id number]]        Abnormal            Final result                 Please view results for these tests on the individual orders.    CBC Auto Differential [[id number]]  (Abnormal) Collected: 07/23/21 0811    Specimen: Blood Updated: 07/23/21 0835     WBC 9.84 10*3/mm3      RBC 3.59 10*6/mm3      Hemoglobin 12.5 g/dL      Hematocrit 34.8 %      MCV 96.9 fL      MCH 34.8 pg      MCHC 35.9 g/dL      RDW 13.0 %      RDW-SD 44.3 fl      MPV 9.3 fL      Platelets 356 10*3/mm3      Neutrophil % 73.7 %      Lymphocyte % 18.0 %      Monocyte % 7.0 %      Eosinophil % 0.2 %      Basophil % 0.5 %      Immature Grans % 0.6 %      Neutrophils, Absolute 7.25 10*3/mm3      Lymphocytes, Absolute 1.77 10*3/mm3      Monocytes, Absolute 0.69 10*3/mm3      Eosinophils, Absolute 0.02 10*3/mm3      Basophils, Absolute 0.05 10*3/mm3      Immature Grans, Absolute 0.06 10*3/mm3      nRBC 0.0 /100 WBC           CT Head Without Contrast    Result Date: 7/23/2021  Narrative: EXAMINATION: CT HEAD WO CONTRAST-  7/23/2021 9:36 AM CDT  CT SCAN OF THE HEAD, WITHOUT CONTRAST:  HISTORY: Syncope, mental status changes  COMPARISONS: Head CT imaging dated  1/22/2019 and 4/6/2017. Brain MR imaging dated 7/3/2019.  TECHNIQUE:  Radiation dose equals  mGy-cm.  Automated exposure control dose reduction technique was implemented.   CT evaluation of the head without intravenous contrast. 5 mm transaxial images were obtained.   2-D sagittal and coronal reconstruction images were generated.  FINDINGS:  There is confluent low attenuation in the periventricular and subcortical white matter observed previously compatible with advanced chronic ischemic changes.  Old right basal ganglia lacunar infarct identified in the genu of the internal capsule.  There is interval development of low attenuation in the left occipital lobe compatible with an  area of infarction, age uncertain, subacute or chronic suspected. Correlate with clinical findings.  There is no intra or extra-axial hemorrhage.  There is no mass effect or midline shift.  There is no developing hydrocephalus  Paranasal sinuses imaged part are clear.  Hyperostosis frontalis interna identified. There is no skull fracture or acute calvarial abnormality.           Impression: 1. Interval development of low attenuation in the left occipital lobe, ischemic change/infarction, age-indeterminate, subacute or chronic considered. Correlate with patient presentation. 2. Advanced chronic ischemic changes.    This report was finalized on 07/23/2021 09:42 by Dr. Marcello Rutledge MD.    MRI Brain Without Contrast    Result Date: 7/23/2021  Narrative: EXAMINATION: MRI brain without contrast 7/23/2021  HISTORY: Evaluate for stroke.  FINDINGS: Today's exam is compared to a previous MRI of the brain dated 7/3/2019. Multiplanar fast spin echo sequences were obtained of the brain on a high-field magnet without gadolinium enhancement. There is a predominantly cystic lesion involving the left occipital lobe which is well-circumscribed measuring 2.1 x 1.6 cm in size with surrounding vasogenic edema. There is some restriction of diffusion within the wall of this lesion. Given the history of lung carcinoma I would favor that this represents a metastatic focus. A cerebral abscess would also be in the differential but considered less likely given the location, appearance and history. No additional foci of diffusion restriction are present.  There is atrophy of the brain with prominence of the subarachnoid spaces and ventricular enlargement. There is evidence of previous lacunar infarcts involving the right basal ganglia with focal encephalomalacia and gliosis. Moderate to severe small vessel ischemic changes are noted involving the periventricular and higher white matter tracts. No additional discrete intra-axial lesions are  appreciated. Postcontrast imaging would be recommended to assess for additional more subtle lesions. Gradient imaging demonstrates no evidence of blooming to suggest acute or chronic hemorrhage.      Impression: 1.. There is a cystic mass involving the left occipital lobe with adjacent vasogenic edema. Given the history I would favor a metastatic lesion. Abscess is also in the differential but considered less likely. There is some associated diffusion restriction within the wall of the lesion. No additional discrete lesions are appreciated but postcontrast imaging is recommended. 2. Atrophy with moderate to severe small vessel ischemic disease. Remote lacunar infarcts involving the right basal ganglia are present with focal encephalomalacia and gliosis. 3. No additional foci of diffusion restriction are demonstrated. There is no radiographic evidence of acute ischemic stroke on today's exam. This report was finalized on 07/23/2021 17:10 by Dr. Aric Rosen MD.    XR Chest 1 View    Result Date: 7/23/2021  Narrative: EXAM: XR CHEST 1 VW- - 7/23/2021 7:52 AM CDT  HISTORY: Syncope   COMPARISON: 8/16/2020.  TECHNIQUE:  1 images.  Frontal view of the chest.  FINDINGS:  No pneumothorax, pleural effusion or focal consolidation. Minimal linear atelectasis or scarring at the left lung base. Cardiac mediastinal silhouette within normal limits. Calcified aortic atherosclerosis. No acute bony finding.       Impression: 1. No acute cardiopulmonary findings. This report was finalized on 07/23/2021 08:11 by Dr Ally Tierney MD.    US Carotid Bilateral    Result Date: 7/23/2021  Narrative: History: Stroke      Impression: Impression: 1. There is less than 50% stenosis of the right internal carotid artery. 2. There is less than 50% stenosis of the left internal carotid artery. 3. Antegrade flow is demonstrated in bilateral vertebral arteries.  Comments: Bilateral carotid vertebral arterial duplex scan was performed.  Grayscale  imaging shows intimal thickening and calcified elements at the carotid bifurcation. The right internal carotid artery peak systolic velocity is 90.8 cm/sec. The end-diastolic velocity is 22.6 cm/sec. The right ICA/CCA ratio is approximately 1.2 . These findings correlate with less than 50% stenosis of the right internal carotid artery.  Grayscale imaging shows intimal thickening and calcified elements at the carotid bifurcation. The left internal carotid artery peak systolic velocity is 90.6 cm/sec. The end-diastolic velocity is 24.5 cm/sec. The left ICA/CCA ratio is approximately 1.0 . These findings correlate with less than 50% stenosis of the left internal carotid artery.  Antegrade flow is demonstrated in bilateral vertebral arteries.  This report was finalized on 07/23/2021 15:25 by Dr. Nicholas Leahy MD.     I have personally reviewed and interpreted the radiology studies and ECG obtained at time of admission.     Assessment / Plan      Assessment & Plan  Active Hospital Problems    Diagnosis    • **Brain mass    • Bilateral carotid artery stenosis    • Fall    • Obesity (BMI 30-39.9)    • PAF (paroxysmal atrial fibrillation) (CMS/HCC)    • Anticoagulated    • Syncope and collapse    • Essential hypertension        PLAN:   Admit to the medical surgical floor  Continue anticoagulation with Eliquis as there is no evidence of acute stroke  Resume the bulk of home medications  Neurology consultation  MRI scan of the brain with contrast.  PT/OT/SLP evaluate and treat  Regular diet  If MRI scan of the brain with contrast reveals a high degree of suspicion for neoplastic process, the patient would prefer to have further work-up accomplished on an outpatient basis rather than an inpatient work-up.    Code Status:   Full code  Surrogate decision makers the patient's granddaughter     I discussed the patient's findings and my recommendations with: The patient and her granddaughter    Estimated length of stay:  Unknown    Electronically signed by Milo White DO, 07/23/21, 18:07 CDT.

## 2021-07-23 NOTE — PLAN OF CARE
Problem: Adult Inpatient Plan of Care  Goal: Plan of Care Review  Outcome: Ongoing, Progressing  Flowsheets (Taken 7/23/2021 9401)  Plan of Care Reviewed With: (RN, Lisa) other (see comments)  Outcome Summary: SLP consult received for failed swallow screen. RN spoke with MD, who allowed for sips with meds. SLP then attempted to see to complete clinical bedside swallowing evaluation, yet RN reported pt was off unit for MRI. Will follow up in AM on 07/24/21 and complete evaluation at that time. Thanks!

## 2021-07-23 NOTE — CONSULTS
Neurology Consult Note    Referring Provider: Milo White MD  Reason for Consultation: subacute stroke      History of present illness:    This is a 75 y.o. female patient known to me as she is followed in neurology clinic for impaired memory and syncope. History is obtained by patient and medical record. Uncertain of reliability of patient as she does have history of memory impairment. She tells me she has been at SNF for about 6 months as her family could no longer care for her at home. She also states that she did contract Covid after moving to SNF but did not require hospitalization. States she got it from her room mate there who later  from the illness.  She did have work up that included stay at EMU at [facility] in 2019 that showed left temporal lobe slowing and was started on Keppra 500 mg BID. Patient has history of LINQ placed by Dr. Monteiro in 2017. PMH also includes PAF on Eliquis and ASA, bilateral carotid stenosis followed by [facility] vascular team, COPD, nocturnal hypoxia on 2L PNC, lymphoma, hypertension, HLD, malignant neoplasm of left upper lobe lung. Patient is former smoker stopping almost 1 year ago.     Patient tells me this morning she was getting out bed as she felt she had layed there long enough. Patient uses a walker and wheelchair for mobility. When she stood, both her legs gave out. She denies dizziness, light headedness. She denies tongue biting or bowel/bladder incontinence. She states she did not lose consciousness but report from SNF was that she was unresponsive.  Patient uncertain of the time but from what the patient tells me her daughter was contacted about 0600 this AM. Patient was transported to [facility] by EMS. Patient states she is back to her baseline other than feeling fatigued. She developed shingles about 1 week ago. Patient told ED MD that she had been falling and had 3 falls in 1 day just 2 days ago.  EMS blood glucose was 143. Initial pulse ox was 88% at SNF.     CT head was  done showing subacute to chronic hypodensity left occipital lobe, old right basal ganglia stroke. Patient has MRI brain 2019 that showed chronic microvascular disease and atrophy.     EKG showed sinus tachycardia with left BBB.    Labs showed hyponatremia 128. WBC 9.84. CK- 417. TSH- 0. 875, UA large leuk esterase.     Past Medical History:   Diagnosis Date   • Anticoagulated 7/21/2018   • Bilateral carotid artery stenosis 6/8/2021   • Bronchitis    • Cancer (CMS/HCC)     lung   • COPD (chronic obstructive pulmonary disease) (CMS/HCC)    • COPD, moderate (CMS/HCC) 7/8/2019   • Coronary artery disease    • Coronary artery disease involving native coronary artery of native heart without angina pectoris 5/19/2017   • Essential hypertension 5/19/2017   • Gastroesophageal reflux disease 7/21/2018   • GERD (gastroesophageal reflux disease)    • History of transfusion    • Hx of Hodgkin's lymphoma 10/11/2019   • Hyperlipidemia    • Hypertension    • Malignant neoplasm of upper lobe of left lung (CMS/HCC) 7/8/2019   • Mixed hyperlipidemia 6/8/2021   • PAF (paroxysmal atrial fibrillation) (CMS/HCC)    • Parotid neoplasm 7/21/2018   • Persistent asthma without complication 7/8/2019   • Renal cyst 2/24/2020   • Seizures (CMS/HCC)    • Status post stereotactic radiosurgery 2/24/2020   • Stented coronary artery 5/19/2017   • Thyroid nodule 7/21/2018       Allergies   Allergen Reactions   • Contrast Dye Itching     Contrast reaction occurred with pruritus and erythema of the upper  and lower extremities. No difficulties swallowing or breathing. Patient  received 50 mg Benadryl orally to improve symptomatology. Patient should  be pretreated with steroids and Benadryl prior to any future IV contrast  Administration. 05-     • Penicillins Rash     No current facility-administered medications on file prior to encounter.     Current Outpatient Medications on File Prior to Encounter   Medication Sig   • acetaminophen (TYLENOL)  325 MG tablet Take 2 tablets by mouth Every 4 (Four) Hours As Needed for Mild Pain .   • albuterol sulfate  (90 Base) MCG/ACT inhaler Inhale 1 puff Daily.   • amLODIPine (NORVASC) 5 MG tablet Take 5 mg by mouth Daily.   • apixaban (ELIQUIS) 5 MG tablet tablet Take 1 tablet by mouth Every 12 (Twelve) Hours.   • aspirin 81 MG EC tablet Take 81 mg by mouth Daily.   • Calcium Carbonate-Vitamin D (CALTRATE 600+D PO) Take 1 tablet by mouth 2 (two) times a day.   • docusate sodium 100 MG capsule Take 100 mg by mouth 2 (Two) Times a Day As Needed for Constipation.   • fluticasone-salmeterol (ADVAIR) 100-50 MCG/DOSE DISKUS Inhale 2 (Two) Times a Day.   • furosemide (LASIX) 20 MG tablet Take 20 mg by mouth Daily. At bedtime   • gabapentin (NEURONTIN) 100 MG capsule Take 200 mg by mouth 3 (Three) Times a Day.   • guaiFENesin (MUCINEX) 600 MG 12 hr tablet Take 2 tablets by mouth 2 (Two) Times a Day As Needed for Cough. (Patient taking differently: Take 1,200 mg by mouth 2 (Two) Times a Day.)   • levETIRAcetam (KEPPRA) 500 MG tablet Take 500 mg by mouth 2 (Two) Times a Day.   • lidocaine (LIDODERM) 5 % Place 1 patch on the skin as directed by provider Daily As Needed for Mild Pain , Moderate Pain  or Severe Pain . Remove & Discard patch within 12 hours or as directed by MD   • lisinopril (PRINIVIL,ZESTRIL) 20 MG tablet Take 20 mg by mouth Daily.   • LORazepam (ATIVAN) 0.5 MG tablet Take 1 tablet by mouth 2 (Two) Times a Day As Needed.   • metoprolol succinate XL (TOPROL-XL) 50 MG 24 hr tablet Take 50 mg by mouth Daily.   • nitroglycerin (NITROSTAT) 0.4 MG SL tablet Place 0.4 mg under the tongue Every 5 (Five) Minutes As Needed for Chest Pain. Take no more than 3 doses in 15 minutes.   • omeprazole (priLOSEC) 40 MG capsule Take 40 mg by mouth Daily.   • ondansetron (ZOFRAN) 4 MG tablet Take 1 tablet by mouth Every 6 (Six) Hours As Needed.   • oxybutynin (DITROPAN) 5 MG tablet Take 1 tablet by mouth 2 (Two) Times a Day.   •  polyethylene glycol (MIRALAX) 17 g packet Take 17 g by mouth Daily As Needed (constipation).   • simvastatin (ZOCOR) 20 MG tablet Take 20 mg by mouth Daily.   • spironolactone (ALDACTONE) 25 MG tablet Take 25 mg by mouth Daily.   • tiotropium bromide monohydrate (SPIRIVA RESPIMAT) 2.5 MCG/ACT aerosol solution inhaler Inhale 2 puffs Daily.   • traMADol (ULTRAM) 50 MG tablet Take 1 tablet by mouth Every 6 (Six) Hours As Needed.   • furosemide (LASIX) 40 MG tablet Take 40 mg by mouth Daily. Morning       Current Facility-Administered Medications:   •  aluminum-magnesium hydroxide-simethicone (MAALOX MAX) 400-400-40 MG/5ML suspension 7.5 mL, 7.5 mL, Oral, Q4H PRN, Milo White, DO  •  atorvastatin (LIPITOR) tablet 80 mg, 80 mg, Oral, Nightly, Milo White S, DO  •  bisacodyl (DULCOLAX) suppository 10 mg, 10 mg, Rectal, Daily PRN, Milo White, DO  •  ondansetron (ZOFRAN) injection 4 mg, 4 mg, Intravenous, Q6H PRN, Milo White, DO  •  sodium chloride 0.9 % flush 10 mL, 10 mL, Intravenous, Q12H, Milo White, DO  •  sodium chloride 0.9 % flush 10 mL, 10 mL, Intravenous, PRN, Milo White S, DO  •  sodium chloride 0.9 % infusion, 100 mL/hr, Intravenous, Continuous, Milo White, DO, Last Rate: 100 mL/hr at 21 1519, 100 mL/hr at 21 1519  Social History     Socioeconomic History   • Marital status:      Spouse name: Not on file   • Number of children: 1   • Years of education: Not on file   • Highest education level: Not on file   Tobacco Use   • Smoking status: Former Smoker     Packs/day: 0.50     Years: 58.00     Pack years: 29.00     Start date:      Quit date: 2020     Years since quittin.9   • Smokeless tobacco: Never Used   Substance and Sexual Activity   • Alcohol use: No   • Drug use: No   • Sexual activity: Defer     Family History   Problem Relation Age of Onset   • Esophageal cancer Brother    • Lung cancer Brother    • Breast cancer  Neg Hx        Review of Systems  A 14 point review of systems was reviewed and was negative except for general fatigue.     Vital Signs   Temp:  [98.2 °F (36.8 °C)-98.7 °F (37.1 °C)] 98.2 °F (36.8 °C)  Heart Rate:  [112-155] 112  Resp:  [13-16] 16  BP: ()/(41-72) 111/71    General Exam:  Head:  Normal cephalic, atraumatic  HEENT:  Neck supple  Fundoscopic Exam:  No signs of disc edema  CVS:  Regular rate and rhythm.  No murmurs  Carotid Examination:  No bruits  Lungs:  Clear to auscultation  Abdomen:  Non-tender, Non-distended  Extremities:  No signs of peripheral edema   Skin:  No rashes    Neurologic Exam:    Mental Status:    -Awake, Alert, Oriented X 3. Correctly named the president. Correctly stated wearing masks secondary to covid.   -No word finding difficulties  -No aphasia  -No dysarthria  -Follows simple and complex commands    CN II:  Visual fields full.  Pupils equally reactive to light  CN III, IV, VI:  Extraocular Muscles full with no signs of nystagmus  CN V:  Facial sensory is symmetric with no asymmetries.  CN VII:  Facial motor symmetric  CN VIII:  Gross hearing intact bilaterally  CN IX:  Palate elevates symmetrically  CN X:  Palate elevates symmetrically  CN XI:  Shoulder shrug symmetric  CN XII:  Tongue is midline on protrusion    Motor: (strength out of 5:  1= minimal movement, 2 = movement in plane of gravity, 3 = movement against gravity, 4 = movement against some resistance, 5 = full strength)    -Right Upper Ext: Proximal: 5 Distal: 5 with slight drift  -Left Upper Ext: Proximal: 5 Distal: 5    -Right Lower Ext: Proximal: 5 Distal: 5  -Left Lower Ext: Proximal: 5 Distal: 5    DTR:  -Right   Bicep: 2+ Tricep: 2+ Brachioradialis: 2+   Patella: 2+ Ankle: 2+ Neg Babinski  -Left   Bicep: 2+ Tricep: 2+ Brachioradialis: 2+   Patella: 2+ Ankle: 2+ Neg Babinski    Sensory:  -Intact to light touch, pinprick, temperature, pain, and proprioception    Coordination:  -Finger to nose intact  -Heel  to shin intact  -No ataxia    Gait  -No signs of ataxia  -ambulates unassisted      Results Review:  Lab Results (last 24 hours)     Procedure Component Value Units Date/Time    COVID PRE-OP / PRE-PROCEDURE SCREENING ORDER (NO ISOLATION) - Swab, Nasal Cavity [[id number]]  (Normal) Collected: 07/23/21 1215    Specimen: Swab from Nasal Cavity Updated: 07/23/21 1259    Narrative:      The following orders were created for panel order COVID PRE-OP / PRE-PROCEDURE SCREENING ORDER (NO ISOLATION) - Swab, Nasal Cavity.  Procedure                               Abnormality         Status                     ---------                               -----------         ------                     COVID-19,[facility]...[[id number]]  Normal              Final result                 Please view results for these tests on the individual orders.    COVID-19,[facility] IN-HOUSE,Nasal Swab No Transport Media 3-4 HR TAT - Swab, Nasal Cavity [[id number]]  (Normal) Collected: 07/23/21 1215    Specimen: Swab from Nasal Cavity Updated: 07/23/21 1259     COVID19 Not Detected    Narrative:      Fact sheet for providers: https://www.fda.gov/media/123427/download     Fact sheet for patients: https://www.fda.gov/media/896062/download    Test performed by PCR.    Consider negative results in combination with clinical observations, patient history, and epidemiological information.    CK [[id number]]  (Abnormal) Collected: 07/23/21 1001    Specimen: Blood Updated: 07/23/21 1201     Creatine Kinase 417 U/L     Troponin [[id number]]  (Normal) Collected: 07/23/21 1001    Specimen: Blood Updated: 07/23/21 1041     Troponin T <0.010 ng/mL     Narrative:      Troponin T Reference Range:  <= 0.03 ng/mL-   Negative for AMI  >0.03 ng/mL-     Abnormal for myocardial necrosis.  Clinicians would have to utilize clinical acumen, EKG, Troponin and serial changes to determine if it is an Acute Myocardial Infarction or myocardial injury due to an underlying chronic  condition.       Results may be falsely decreased if patient taking Biotin.      Blood Gas, Arterial - [[id number]]  (Abnormal) Collected: 07/23/21 0918    Specimen: Arterial Blood Updated: 07/23/21 0924     Site Left Brachial     Rom's Test N/A     pH, Arterial 7.352 pH units      pCO2, Arterial 36.0 mm Hg      pO2, Arterial 75.1 mm Hg      Comment: 84 Value below reference range        HCO3, Arterial 20.0 mmol/L      Comment: 84 Value below reference range        Base Excess, Arterial -5.0 mmol/L      Comment: 84 Value below reference range        O2 Saturation, Arterial 94.7 %      Temperature 37.0 C      Barometric Pressure for Blood Gas 754 mmHg      Modality Nasal Cannula     Flow Rate 2.0 lpm      Ventilator Mode NA     Collected by [id number]     Comment: Meter: [id number]     :  [id number]        pCO2, Temperature Corrected 36.0 mm Hg      pH, Temp Corrected 7.352 pH Units      pO2, Temperature Corrected 75.1 mm Hg     TSH [[id number]]  (Normal) Collected: 07/23/21 0811    Specimen: Blood Updated: 07/23/21 0905     TSH 0.875 uIU/mL     Comprehensive Metabolic Panel [[id number]]  (Abnormal) Collected: 07/23/21 0811    Specimen: Blood Updated: 07/23/21 0901     Glucose 115 mg/dL      BUN 34 mg/dL      Creatinine 1.51 mg/dL      Sodium 128 mmol/L      Potassium 5.0 mmol/L      Chloride 96 mmol/L      CO2 19.0 mmol/L      Calcium 9.4 mg/dL      Total Protein 7.3 g/dL      Albumin 3.60 g/dL      ALT (SGPT) 17 U/L      AST (SGOT) 21 U/L      Alkaline Phosphatase 104 U/L      Total Bilirubin 0.3 mg/dL      eGFR Non African Amer 34 mL/min/1.73      Globulin 3.7 gm/dL      A/G Ratio 1.0 g/dL      BUN/Creatinine Ratio 22.5     Anion Gap 13.0 mmol/L     Narrative:      GFR Normal >60  Chronic Kidney Disease <60  Kidney Failure <15      Troponin [[id number]]  (Normal) Collected: 07/23/21 0811    Specimen: Blood Updated: 07/23/21 0858     Troponin T <0.010 ng/mL     Narrative:      Troponin T Reference Range:  <=  0.03 ng/mL-   Negative for AMI  >0.03 ng/mL-     Abnormal for myocardial necrosis.  Clinicians would have to utilize clinical acumen, EKG, Troponin and serial changes to determine if it is an Acute Myocardial Infarction or myocardial injury due to an underlying chronic condition.       Results may be falsely decreased if patient taking Biotin.      BNP [[id number]]  (Normal) Collected: 07/23/21 0811    Specimen: Blood Updated: 07/23/21 0858     proBNP 315.1 pg/mL     Narrative:      Among patients with dyspnea, NT-proBNP is highly sensitive for the detection of acute congestive heart failure. In addition NT-proBNP of <300 pg/ml effectively rules out acute congestive heart failure with 99% negative predictive value.    Results may be falsely decreased if patient taking Biotin.      D-dimer, Quantitative [[id number]]  (Normal) Collected: 07/23/21 0811    Specimen: Blood Updated: 07/23/21 0848     D-Dimer, Quantitative 0.25 mg/L (FEU)     Narrative:      Reference Range is 0-0.50 mg/L FEU. However, results <0.50 mg/L FEU tends to rule out DVT or PE. Results >0.50 mg/L FEU are not useful in predicting absence or presence of DVT or PE.      Protime-INR [[id number]]  (Abnormal) Collected: 07/23/21 0811    Specimen: Blood Updated: 07/23/21 0848     Protime 15.0 Seconds      INR 1.28    aPTT [[id number]]  (Abnormal) Collected: 07/23/21 0811    Specimen: Blood Updated: 07/23/21 0848     PTT 36.5 seconds     Urinalysis With Culture If Indicated - Urine, Catheter In/Out [[id number]]  (Abnormal) Collected: 07/23/21 0821    Specimen: Urine, Catheter In/Out Updated: 07/23/21 0840     Color, UA Yellow     Appearance, UA Cloudy     pH, UA <=5.0     Specific Gravity, UA 1.015     Glucose, UA Negative     Ketones, UA Negative     Bilirubin, UA Negative     Blood, UA Negative     Protein, UA Negative     Leuk Esterase, UA Large (3+)     Nitrite, UA Negative     Urobilinogen, UA 0.2 E.U./dL    Urinalysis, Microscopic Only - Urine, Catheter  In/Out [[id number]]  (Abnormal) Collected: 07/23/21 0821    Specimen: Urine, Catheter In/Out Updated: 07/23/21 0840     RBC, UA 0-2 /HPF      WBC, UA Too Numerous to Count /HPF      Bacteria, UA None Seen /HPF      Squamous Epithelial Cells, UA 7-12 /HPF      Hyaline Casts, UA 7-12 /LPF      Methodology Automated Microscopy    Urine Culture - Urine, Urine, Catheter In/Out [[id number]] Collected: 07/23/21 0821    Specimen: Urine, Catheter In/Out Updated: 07/23/21 0840    CBC & Differential [[id number]]  (Abnormal) Collected: 07/23/21 0811    Specimen: Blood Updated: 07/23/21 0835    Narrative:      The following orders were created for panel order CBC & Differential.  Procedure                               Abnormality         Status                     ---------                               -----------         ------                     CBC Auto Differential[[id number]]        Abnormal            Final result                 Please view results for these tests on the individual orders.    CBC Auto Differential [[id number]]  (Abnormal) Collected: 07/23/21 0811    Specimen: Blood Updated: 07/23/21 0835     WBC 9.84 10*3/mm3      RBC 3.59 10*6/mm3      Hemoglobin 12.5 g/dL      Hematocrit 34.8 %      MCV 96.9 fL      MCH 34.8 pg      MCHC 35.9 g/dL      RDW 13.0 %      RDW-SD 44.3 fl      MPV 9.3 fL      Platelets 356 10*3/mm3      Neutrophil % 73.7 %      Lymphocyte % 18.0 %      Monocyte % 7.0 %      Eosinophil % 0.2 %      Basophil % 0.5 %      Immature Grans % 0.6 %      Neutrophils, Absolute 7.25 10*3/mm3      Lymphocytes, Absolute 1.77 10*3/mm3      Monocytes, Absolute 0.69 10*3/mm3      Eosinophils, Absolute 0.02 10*3/mm3      Basophils, Absolute 0.05 10*3/mm3      Immature Grans, Absolute 0.06 10*3/mm3      nRBC 0.0 /100 WBC           .  Imaging Results (Last 24 Hours)     Procedure Component Value Units Date/Time    US Carotid Bilateral [[id number]] Collected: 07/23/21 1524     Updated: 07/23/21 1528    Narrative:       History: Stroke       Impression:      Impression:  1. There is less than 50% stenosis of the right internal carotid artery.  2. There is less than 50% stenosis of the left internal carotid artery.  3. Antegrade flow is demonstrated in bilateral vertebral arteries.     Comments: Bilateral carotid vertebral arterial duplex scan was  performed.     Grayscale imaging shows intimal thickening and calcified elements at the  carotid bifurcation. The right internal carotid artery peak systolic  velocity is 90.8 cm/sec. The end-diastolic velocity is 22.6 cm/sec. The  right ICA/CCA ratio is approximately 1.2 . These findings correlate with  less than 50% stenosis of the right internal carotid artery.     Grayscale imaging shows intimal thickening and calcified elements at the  carotid bifurcation. The left internal carotid artery peak systolic  velocity is 90.6 cm/sec. The end-diastolic velocity is 24.5 cm/sec. The  left ICA/CCA ratio is approximately 1.0 . These findings correlate with  less than 50% stenosis of the left internal carotid artery.     Antegrade flow is demonstrated in bilateral vertebral arteries.     This report was finalized on 07/23/2021 15:25 by Dr. Nicholas Leahy MD.    CT Head Without Contrast [[id number]] Collected: 07/23/21 0936     Updated: 07/23/21 0945    Narrative:      EXAMINATION: CT HEAD WO CONTRAST-  7/23/2021 9:36 AM CDT     CT SCAN OF THE HEAD, WITHOUT CONTRAST:      HISTORY: Syncope, mental status changes     COMPARISONS: Head CT imaging dated  1/22/2019 and 4/6/2017. Brain MR  imaging dated 7/3/2019.     TECHNIQUE:     Radiation dose equals  mGy-cm.  Automated exposure control dose  reduction technique was implemented.        CT evaluation of the head without intravenous contrast. 5 mm transaxial  images were obtained.   2-D sagittal and coronal reconstruction images  were generated.     FINDINGS:      There is confluent low attenuation in the periventricular and  subcortical  white matter observed previously compatible with advanced  chronic ischemic changes.     Old right basal ganglia lacunar infarct identified in the genu of the  internal capsule.     There is interval development of low attenuation in the left occipital  lobe compatible with an area of infarction, age uncertain, subacute or  chronic suspected. Correlate with clinical findings.     There is no intra or extra-axial hemorrhage.     There is no mass effect or midline shift.     There is no developing hydrocephalus     Paranasal sinuses imaged part are clear.     Hyperostosis frontalis interna identified. There is no skull fracture or  acute calvarial abnormality.             Impression:      1. Interval development of low attenuation in the left occipital lobe,  ischemic change/infarction, age-indeterminate, subacute or chronic  considered. Correlate with patient presentation.  2. Advanced chronic ischemic changes.           This report was finalized on 07/23/2021 09:42 by Dr. Marcello Rutledge MD.    XR Chest 1 View [[id number]] Collected: 07/23/21 0811     Updated: 07/23/21 0814    Narrative:      EXAM: XR CHEST 1 VW- - 7/23/2021 7:52 AM CDT     HISTORY: Syncope       COMPARISON: 8/16/2020.      TECHNIQUE:  1 images.  Frontal view of the chest.     FINDINGS:    No pneumothorax, pleural effusion or focal consolidation. Minimal linear  atelectasis or scarring at the left lung base. Cardiac mediastinal  silhouette within normal limits. Calcified aortic atherosclerosis. No  acute bony finding.          Impression:      1. No acute cardiopulmonary findings.  This report was finalized on 07/23/2021 08:11 by Dr Ally Tierney MD.            CT head personally reviewed by me showing chronic microvascular disease and subacute left occipital ischemia.     Active Hospital Problems    Diagnosis  POA   • **Recent cerebrovascular accident (CVA) [Z86.73]  Yes   • Bilateral carotid artery stenosis [I65.23]  Yes   • Fall [W19.XXXA]  Yes    • Obesity (BMI 30-39.9) [E66.9]  Yes   • PAF (paroxysmal atrial fibrillation) (CMS/HCC) [I48.0]  Yes   • Anticoagulated [Z79.01]  Not Applicable   • Syncope and collapse [R55]  Yes   • Essential hypertension [I10]  Yes       Impression  1. Syncope. Patient found on the floor at [facility]. Staff had reported that patient was unresponsive and patient denies losing consciousness.   2. CT head showing subacute left occipital stroke. IV TPA not considered as last known well unknown and patient on chronic anticoagulation with Eliquis.  3. Paroxysmal atrial fibrillation. Patient had LINQ placed in 2017.   4. History of episode of altered consciousness. EMU in 2019 showed slowing left temporal lobe and patient had been prescribed Keppra 500 mg BID since.   5. Hyponatremia. Defer to attending.   6. Hypertension  7. History of memory impairment.   8. Malignant left upper lobe lesion s/p completion of radiation therapy per Dr. Rosen note on 6/23/2021.   9. shingles      Plan  1. MRI brain with and without. Patient did have MRI without and MRI tech contacted me that unable to do MRI with secondary to contrast allergy. Will order contrast allergy protocal and plan for MRI brain with contrast tomorrow. I discussed this with MRI tech.   2. Carotid duplex scan.  3. Transthoracic echo.  4. Lipid panel, A1C, B12, B1, B6, magnesium (will add this to existing specimen).  5. Continue Keppra 500 mg BID>  6. Continue ASA 81 mg daily.  7. If MRI brain ok will need to continue home doses of Eliquis.  8. OT/PT. NPO until bedside swallow screen done and passed.   9. SCD for DVT prophylaxis.  10. Cardiac telemetry.  11. Check orthostatic BP      I discussed the patients findings and my recommendations with patient and primary care team    Margarita Samuel, APRN  07/23/21  15:35 CDT

## 2021-07-24 NOTE — PROGRESS NOTES
Neurology Progress Note      Date of admission: 7/23/2021  7:20 AM  Date of visit: 7/24/2021    Chief Complaint:  F/u brain mass    Subjective     Subjective:    Patient is awake and alert today Oriented to person, place, city state, floor, season, county, president  She thought it was Friday instead of Saturday  Apparently her lunch tray was taken before she got to eat and so she would like a tray.     She states she fell as her legs began to shake when she stood  She was not orthostatic:  Heart Rate 95 102 105   /56 119/69 121/61   Patient Position Lying Sitting Standing       Medications:  Current Facility-Administered Medications   Medication Dose Route Frequency Provider Last Rate Last Admin   • aluminum-magnesium hydroxide-simethicone (MAALOX MAX) 400-400-40 MG/5ML suspension 7.5 mL  7.5 mL Oral Q4H PRN Milo White DO       • amLODIPine (NORVASC) tablet 5 mg  5 mg Oral Daily Milo White DO   5 mg at 07/24/21 1037   • apixaban (ELIQUIS) tablet 5 mg  5 mg Oral Q12H Milo White DO   5 mg at 07/24/21 1037   • aspirin EC tablet 81 mg  81 mg Oral Daily Milo White DO   81 mg at 07/24/21 1038   • atorvastatin (LIPITOR) tablet 80 mg  80 mg Oral Nightly Milo White DO   80 mg at 07/23/21 2050   • bisacodyl (DULCOLAX) suppository 10 mg  10 mg Rectal Daily PRN Milo White DO       • budesonide-formoterol (SYMBICORT) 80-4.5 MCG/ACT inhaler 2 puff  2 puff Inhalation BID - RT Milo White DO   2 puff at 07/24/21 0646   • dexamethasone (DECADRON) tablet 4 mg  4 mg Oral Q8H Dhaval Urena MD       • furosemide (LASIX) tablet 40 mg  40 mg Oral Daily Milo White DO   40 mg at 07/24/21 0600   • gabapentin (NEURONTIN) capsule 200 mg  200 mg Oral TID Milo White DO   200 mg at 07/24/21 1038   • ipratropium (ATROVENT) nebulizer solution 0.5 mg  0.5 mg Nebulization 4x Daily - RT Milo White DO   0.5 mg at 07/24/21 1153   • levETIRAcetam  (KEPPRA) tablet 500 mg  500 mg Oral BID Milo White DO   500 mg at 07/24/21 1036   • lisinopril (PRINIVIL,ZESTRIL) tablet 20 mg  20 mg Oral Daily Milo White DO   20 mg at 07/24/21 1037   • metoprolol succinate XL (TOPROL-XL) 24 hr tablet 50 mg  50 mg Oral Daily Milo White DO   50 mg at 07/24/21 1037   • ondansetron (ZOFRAN) injection 4 mg  4 mg Intravenous Q6H PRN Milo White DO       • pantoprazole (PROTONIX) EC tablet 40 mg  40 mg Oral Q AM Milo White DO   40 mg at 07/24/21 0600   • polyethylene glycol (MIRALAX) packet 17 g  17 g Oral Daily PRN Milo White DO       • sodium chloride 0.9 % flush 10 mL  10 mL Intravenous Q12H Milo White DO       • sodium chloride 0.9 % flush 10 mL  10 mL Intravenous PRN Milo White DO       • sodium chloride 0.9 % infusion  100 mL/hr Intravenous Continuous Milo White  mL/hr at 07/24/21 0601 100 mL/hr at 07/24/21 0601   • spironolactone (ALDACTONE) tablet 25 mg  25 mg Oral Daily Milo White DO   25 mg at 07/24/21 1037       Review of Systems:   -A 14 point review of systems is completed and is negative except for c/o missing her lunch    Objective     Objective      Vital Signs  Temp:  [98 °F (36.7 °C)-98.5 °F (36.9 °C)] 98.3 °F (36.8 °C)  Heart Rate:  [] 89  Resp:  [12-18] 16  BP: ()/(47-71) 116/54    Physical Exam:    HEENT:  Neck supple  CVS:  Regular rate and rhythm.  No murmurs  Carotid Examination:  No bruits  Lungs:  Clear to auscultation  Abdomen:  Non-tender, Non-distended  Extremities:  No signs of peripheral edema    Neurologic Exam:    -Awake, Alert, Oriented X 3  -No word finding difficulties  -No aphasia  -No dysarthria  -Follows simple and complex commands    Cranial nerves II through XII intact.    Motor: (strength out of 5:  1= minimal movement, 2 = movement in plane of gravity, 3 = movement against gravity, 4 = movement against some resistance, 5 = full  strength)    -Right Upper Ext: Proximal: 5 Distal: 5  -Left Upper Ext: Proximal: 5 Distal: 5    -Right Lower Ext: Proximal: 5 Distal: 5  -Left Lower Ext: Proximal: 5 Distal: 5    DTR:  2+ throughout in all four extremities    Sensory:  -Intact to light touch, pinprick, temperature, pain, and proprioception    Coordination/Gait:  -No ataxia     Results Review:    I reviewed the patient's new clinical results.    Lab Results (last 24 hours)     Procedure Component Value Units Date/Time    Urine Culture - Urine, Urine, Catheter In/Out [[id number]]  (Normal) Collected: 07/23/21 0821    Specimen: Urine, Catheter In/Out Updated: 07/24/21 1234     Urine Culture No growth    POC Glucose Once [[id number]]  (Abnormal) Collected: 07/24/21 1128    Specimen: Blood Updated: 07/24/21 1218     Glucose 162 mg/dL      Comment: : [id number] [employer] Ladonna[employer]eter ID: [id number]       POC Glucose Once [[id number]]  (Abnormal) Collected: 07/24/21 0740    Specimen: Blood Updated: 07/24/21 0823     Glucose 143 mg/dL      Comment: : [id number] [employer] Ladonna[employer]eter ID: [id number]       Basic Metabolic Panel [[id number]]  (Abnormal) Collected: 07/24/21 0312    Specimen: Blood Updated: 07/24/21 0809     Glucose 170 mg/dL      BUN 27 mg/dL      Creatinine 0.96 mg/dL      Sodium 132 mmol/L      Potassium 4.6 mmol/L      Chloride 104 mmol/L      CO2 17.0 mmol/L      Calcium 8.8 mg/dL      eGFR Non African Amer 57 mL/min/1.73      BUN/Creatinine Ratio 28.1     Anion Gap 11.0 mmol/L     Narrative:      GFR Normal >60  Chronic Kidney Disease <60  Kidney Failure <15      POC Glucose Once [[id number]]  (Abnormal) Collected: 07/24/21 0648    Specimen: Blood Updated: 07/24/21 0659     Glucose 142 mg/dL      Comment: : [id number] Queensalexandra Moreira  [employer] ID: [id number]       Lipid Panel [[id number]]  (Abnormal) Collected: 07/24/21 0312    Specimen: Blood Updated: 07/24/21 0408     Total Cholesterol 119 mg/dL      Triglycerides 56 mg/dL      HDL  Cholesterol 34 mg/dL      LDL Cholesterol  73 mg/dL      VLDL Cholesterol 12 mg/dL      LDL/HDL Ratio 2.17    Narrative:      Cholesterol Reference Ranges  (U.S. Department of Health and Human Services ATP III Classifications)    Desirable          <200 mg/dL  Borderline High    200-239 mg/dL  High Risk          >240 mg/dL      Triglyceride Reference Ranges  (U.S. Department of Health and Human Services ATP III Classifications)    Normal           <150 mg/dL  Borderline High  150-199 mg/dL  High             200-499 mg/dL  Very High        >500 mg/dL    HDL Reference Ranges  (U.S. Department of Health and Human Services ATP III Classifcations)    Low     <40 mg/dl (major risk factor for CHD)  High    >60 mg/dl ('negative' risk factor for CHD)        LDL Reference Ranges  (U.S. Department of Health and Human Services ATP III Classifcations)    Optimal          <100 mg/dL  Near Optimal     100-129 mg/dL  Borderline High  130-159 mg/dL  High             160-189 mg/dL  Very High        >189 mg/dL    Hemoglobin A1c [[id number]]  (Abnormal) Collected: 07/24/21 0312    Specimen: Blood Updated: 07/24/21 0345     Hemoglobin A1C 6.50 %     Narrative:      Hemoglobin A1C Ranges:    Increased Risk for Diabetes  5.7% to 6.4%  Diabetes                     >= 6.5%  Diabetic Goal                < 7.0%    Vitamin B12 [[id number]]  (Normal) Collected: 07/23/21 1657    Specimen: Blood Updated: 07/24/21 0130     Vitamin B-12 414 pg/mL     Narrative:      Results may be falsely increased if patient taking Biotin.      POC Glucose Once [[id number]]  (Abnormal) Collected: 07/24/21 0020    Specimen: Blood Updated: 07/24/21 0031     Glucose 147 mg/dL      Comment: : [id number] Mason Moreira  [employer] ID: [id number]           Imaging Results (Last 24 Hours)     Procedure Component Value Units Date/Time    MRI Brain With Contrast [[id number]] Collected: 07/24/21 0925     Updated: 07/24/21 0933    Narrative:      EXAMINATION:   MRI BRAIN W  CONTRAST-  7/24/2021 9:25 AM CDT     HISTORY: MRI BRAIN W CONTRAST- 7/24/2021 8:40 AM CDT     HISTORY: abnormal CT head/mri brain, hx lung cancer;  E87.5-Lsqi-owzofdudpa and hyponatremia; N17.9-Acute kidney failure,  unspecified     COMPARISON: MR brain July 23, 2021       TECHNIQUE: Multiplanar imaging of the brain was performed in a routine  fashion before and after the intravenous injection of gadolinium  contrast.     FINDINGS:   Diffusion: No restriction of diffusion to suggest acute ischemia.     Midline structures: Nondisplaced.     Ventricles: Normal in configuration and symmetric in size.        Basilar cisterns: Maintained.     Extra axial space: No abnormal extra-axial fluid.     Gray-white matter signal: Extensive chronic microvascular ischemic  changes noted in the periventricular white matter. FLAIR signal is  present in the left occipital lobe in the region of the 2 enhancing  lesions..     Cerebellum: Normal.     Brainstem: Normal.     Enhancement: There are 2 focal regions of abnormal enhancement in the  left occipital lobe. This is consistent with metastatic disease. One is  just inferior and medial to previously described lesion in the left  occipital lobe. The second is just superior and lateral views of the  cystic lesion.     Other: Proximal cervical spinal cord is normal. Bilateral globes and  orbits are normal in appearance. Normal cerebrovascular flow voids  noted. No abnormal signal in the mastoid air cells or paranasal sinuses.       Impression:      1. 2 small enhancing lesions left occipital lobe one is inferior and  medial to the cystic lesion. A second is superior and lateral to the  cystic lesion. These rim-like enhancing lesions are most consistent with  metastatic disease.  2. Extensive changes of chronic microvascular ischemic disease are noted  in the periventricular white         This report was finalized on 07/24/2021 09:30 by Dr. Vini Dyer MD.        Personal review of MRI  does show 2 lesions around cystic lesion in the left occipital lobe that enhanced c/w mets  Results for orders placed during the hospital encounter of 07/23/21    Adult Transthoracic Echo Complete W/ Cont if Necessary Per Protocol    Interpretation Summary  · Hyperdynamic right ventricular systolic function noted.  · Left ventricular ejection fraction appears to be 66 - 70%. Left ventricular systolic function is normal.  · Left ventricular diastolic function is consistent with age.        Assessment/Plan     Hospital Problem List      Brain mass    Syncope and collapse    Essential hypertension    Anticoagulated    PAF (paroxysmal atrial fibrillation) (CMS/HCC)    Obesity (BMI 30-39.9)    Fall    Bilateral carotid artery stenosis    Impression:  1. Left occipital lobe mets likely from lung primary  2. Substantial white matter changes on MRI that have been there in 2017  3. Seizure disorder but no clear seizures  4. H/O cognitive impairment but she did well on questioning today  5. Hyponatremia, improved  Plan:  Continue the higher dose of Keppra of 750 mg BID        Tiara Gaming MD  07/24/21  13:14 CDT

## 2021-07-24 NOTE — PLAN OF CARE
Goal Outcome Evaluation:  Plan of Care Reviewed With: patient, daughter        Progress: no change  Outcome Summary: Patient tranferred from another room on the unit due to recent shingles case on stomach and back, right side only.  Pre meds started for MRI head with contrast in AM.  VSS will continue to monitor. Susan Weeks RN

## 2021-07-24 NOTE — PLAN OF CARE
Goal Outcome Evaluation:  Plan of Care Reviewed With: patient        Progress: improving  Outcome Summary: N O C/O PAIN OFFERED , TURNED AND POSITIONED , NO CANELO CHANGES , CONT TO MONITOR  NO FALLS OR INJURIES .

## 2021-07-24 NOTE — PLAN OF CARE
Goal Outcome Evaluation:  Plan of Care Reviewed With: patient        Progress: no change  Outcome Summary: OT eval completed.  Pt alert and oriented x4.  Follows commands WFL.  Came to EOB with SBA.  Transfers from bed and commode with SBA-CGA and rw/grab bars.  Pt ambulated in hallway and to BR with 1 sitting rest break x1-2 min.  Pt has full AROM in BUE with 4-/5 strength.  She doffs socks independently and is dependent to don socks.  S for toileting, hygiene and clothing mgmt.  She has decreased safety habits i.e. does not turn fully around before transferring onto commode and chair.  She reaches for sink instead of positioning herself in front of sink for hand washing.  OT will continue to work with pt to increase her strength, balance, safety and endurance for ADL.  Pt planning to return to SNF, OT in agreement.

## 2021-07-24 NOTE — PLAN OF CARE
Goal Outcome Evaluation:  Plan of Care Reviewed With: patient           Outcome Summary: PT eval complete. She is alert and oriented x 4. She reports generalized weakness in B LE. She stood and ambulated w CGA/min assist with a RW,. She ambulated 50 ft x 2 reps needing 1 sitting rest break due to SOB and weakness in B LE. PT will cont to progress gait, balance and strength. Anticiapte she will return to SNF w continued rehab.

## 2021-07-24 NOTE — CONSULTS
[facility] Oncology/Hematology Services  CONSULT NOTE    PATIENT NAME:  Josi Mohamud  YOB: 1945  PATIENT MRN:  [id number]    Date of Admission:  7/23/2021  Consultation Date:  7/24/2021  Referring Provider: Milo White DO    Subjective     Reason for Consultation: Brain metastasis.    History of present illness: Josi Mohamud is a pleasant 75 y.o. female, at least 60-pack-year smoking history, who is seen on consultation for brain metastasis.  She is seen with nurse Cortney, daughter Franc, sister Judi and niece.  History from the chart, patient, and family.    Patient is a resident of [facility] for the past year.    She presented with change in mental status.    CBC on admission revealed hemoglobin 12.5.  CMP remarkable for sodium of 128 and GFR 34.  Normal D-dimer, proBNP and troponin.  PT 15 and PTT 36.5.  Normal B12.    CT head 07/23/2021.  Interval development of low attenuation in the left occipital lobe, ischemic change/infarction, age-indeterminate, subacute or chronic considered. Correlate with patient presentation    Brain MRI 07/23/2021 without contrast. There is a cystic mass involving the left occipital lobe with adjacent vasogenic edema. Given the history I would favor a metastatic lesion. Abscess is also in the differential but considered less likely. There is some associated diffusion restriction within the wall of the lesion. No additional discrete lesions are appreciated but postcontrast imaging is recommended.  Atrophy with moderate to severe small vessel ischemic disease. Remote lacunar infarcts involving the right basal ganglia are present with focal encephalomalacia and gliosis.  No additional foci of diffusion restriction are demonstrated. There  is no radiographic evidence of acute ischemic stroke on today's exam.    Brain MRI with contrast 07/24/2021.2 small enhancing lesions left occipital lobe one is inferior and  medial to the cystic  lesion. A second is superior and lateral to the cystic lesion. These rim-like enhancing lesions are most consistent with metastatic disease.    Patient is currently awake, alert, and oriented by 3.    She has history of Hodgkin's lymphoma treated by Dr. Dowell in the 80s.        DIAGNOSTIC ABNORMALITIES: Left upper lobe lung nodules.  CT chest 06/13/2019.  1.2 cm from 1 cm left upper lobe pulmonary nodule.  Left upper lobe 0.9 cm pulmonary nodule from 0.4 cm on 07/06/2018.  2 cm nodule right thyroid.  Seen by Dr. Noel 070 2019.  Not amenable to needle biopsy.  High false-negative result with navigational bronchoscopy.  She was followed along due to her age and comorbidities.  CT chest 09/17/2019.  2 nodules again noted in the left lung.  PET 09/23/2019.  Right-sided nodule SUV 5.4 and left-sided nodule SUV 6.5.  2 additional right parathyroid nodules more inferiorly.  Radiotracer uptake left tonsil 4.5 SUV.  The 0.9 cm with SUV of 2.3 and the 1.7 cm with SUV of 2.2.  Seen by Dr. Noel 09/25/2019.  Patient referred to radiation oncology.  Thyroid biopsy 10/14/2019 by Dr. Se Mcnulty.  Pathology showed follicular lesion of uncertain significance.  CT of the chest 01/30/2020 showed slightly decreased size of the larger left upper lobe pulmonary nodules.  1.7 cm centimeters low-density lesion.  Aspect right kidney  CT chest 05/29/2020.  Previously treated left upper lobe pulmonary nodule stable in size.  2.3 cm right renal cyst  CT chest 02/23/2021.Decreased size of spiculated lingular pulmonary nodule now measuring 0.9 cm, previously 1.5 cm on 5/29/2020.  CT chest 05/24/2021.Spiculated left upper lobe nodule measures 9 mm, similar to the 2/23/2021 exam. A previous spiculated 7 mm left apical nodule seen on 5/29/2020 is no longer visualized, however there are similar ill-defined densities within the left lung apex when compared to 2/23/2021 which may represent radiation change. No new lung nodules.        PREVIOUS  INTERVENTIONS:  Patient was treated with 5000 cGy left upper lobe via SBRT 11/12/2019 through 11/25/2019.      Past Medical History:  Past Medical History:   Diagnosis Date   • Anticoagulated 7/21/2018   • Bilateral carotid artery stenosis 6/8/2021   • Bronchitis    • Cancer (CMS/HCC)     lung   • COPD (chronic obstructive pulmonary disease) (CMS/HCC)    • COPD, moderate (CMS/HCC) 7/8/2019   • Coronary artery disease    • Coronary artery disease involving native coronary artery of native heart without angina pectoris 5/19/2017   • Essential hypertension 5/19/2017   • Gastroesophageal reflux disease 7/21/2018   • GERD (gastroesophageal reflux disease)    • History of transfusion    • Hx of Hodgkin's lymphoma 10/11/2019   • Hyperlipidemia    • Hypertension    • Malignant neoplasm of upper lobe of left lung (CMS/HCC) 7/8/2019   • Mixed hyperlipidemia 6/8/2021   • PAF (paroxysmal atrial fibrillation) (CMS/HCC)    • Parotid neoplasm 7/21/2018   • Persistent asthma without complication 7/8/2019   • Renal cyst 2/24/2020   • Seizures (CMS/HCC)    • Status post stereotactic radiosurgery 2/24/2020   • Stented coronary artery 5/19/2017   • Thyroid nodule 7/21/2018     Prior Surgeries:  Past Surgical History:   Procedure Laterality Date   • ANKLE OPEN REDUCTION INTERNAL FIXATION Left 8/15/2020    Procedure: ANKLE OPEN REDUCTION INTERNAL FIXATION;  Surgeon: Keyshawn Medrano MD;  Location: [facility] OR;  Service: Orthopedics;  Laterality: Left;   • APPENDECTOMY     • APPENDECTOMY     • CARDIAC CATHETERIZATION     • CARDIAC ELECTROPHYSIOLOGY PROCEDURE N/A 5/23/2017    Procedure: Loop insertion; LINQ;  Surgeon: Jose Monteiro MD;  Location: [facility] CATH INVASIVE LOCATION;  Service:    • CHOLECYSTECTOMY     • CORONARY ANGIOPLASTY WITH STENT PLACEMENT      X 1    • INCISION AND DRAINAGE OF WOUND Left 8/15/2020    Procedure: INCISION AND DRAINAGE WOUND;  Surgeon: Keyshawn Medrano MD;  Location: [facility] OR;  Service: Orthopedics;   Laterality: Left;   • LIVER BIOPSY     • MYRINGOTOMY W/ TUBES Left 11/22/2019    Procedure: LEFT MYRINGOTOMY WITH INSERTION OF EAR TUBE WITH RIGHT EAR EXAM UNDER ANESTHESIA;  Surgeon: eS Mcnulty MD;  Location: [facility];  Service: ENT   • SPLENECTOMY     • US GUIDED FINE NEEDLE ASPIRATION  8/7/2018        Allergies:Contrast dye and Penicillins  PTA Meds:  Medications Prior to Admission   Medication Sig Dispense Refill Last Dose   • acetaminophen (TYLENOL) 325 MG tablet Take 2 tablets by mouth Every 4 (Four) Hours As Needed for Mild Pain .      • albuterol sulfate  (90 Base) MCG/ACT inhaler Inhale 1 puff Daily. 1 inhaler 6    • amLODIPine (NORVASC) 5 MG tablet Take 5 mg by mouth Daily.      • apixaban (ELIQUIS) 5 MG tablet tablet Take 1 tablet by mouth Every 12 (Twelve) Hours. 60 tablet 11    • aspirin 81 MG EC tablet Take 81 mg by mouth Daily.      • Calcium Carbonate-Vitamin D (CALTRATE 600+D PO) Take 1 tablet by mouth 2 (two) times a day.      • docusate sodium 100 MG capsule Take 100 mg by mouth 2 (Two) Times a Day As Needed for Constipation.      • fluticasone-salmeterol (ADVAIR) 100-50 MCG/DOSE DISKUS Inhale 2 (Two) Times a Day.      • furosemide (LASIX) 20 MG tablet Take 20 mg by mouth Daily. At bedtime      • gabapentin (NEURONTIN) 100 MG capsule Take 200 mg by mouth 3 (Three) Times a Day.      • guaiFENesin (MUCINEX) 600 MG 12 hr tablet Take 2 tablets by mouth 2 (Two) Times a Day As Needed for Cough. (Patient taking differently: Take 1,200 mg by mouth 2 (Two) Times a Day.) 15 tablet 0    • levETIRAcetam (KEPPRA) 500 MG tablet Take 500 mg by mouth 2 (Two) Times a Day.      • lidocaine (LIDODERM) 5 % Place 1 patch on the skin as directed by provider Daily As Needed for Mild Pain , Moderate Pain  or Severe Pain . Remove & Discard patch within 12 hours or as directed by MD      • lisinopril (PRINIVIL,ZESTRIL) 20 MG tablet Take 20 mg by mouth Daily.      • LORazepam (ATIVAN) 0.5 MG tablet Take 1  tablet by mouth 2 (Two) Times a Day As Needed.      • metoprolol succinate XL (TOPROL-XL) 50 MG 24 hr tablet Take 50 mg by mouth Daily.      • nitroglycerin (NITROSTAT) 0.4 MG SL tablet Place 0.4 mg under the tongue Every 5 (Five) Minutes As Needed for Chest Pain. Take no more than 3 doses in 15 minutes.      • omeprazole (priLOSEC) 40 MG capsule Take 40 mg by mouth Daily.  5    • ondansetron (ZOFRAN) 4 MG tablet Take 1 tablet by mouth Every 6 (Six) Hours As Needed.      • oxybutynin (DITROPAN) 5 MG tablet Take 1 tablet by mouth 2 (Two) Times a Day.      • polyethylene glycol (MIRALAX) 17 g packet Take 17 g by mouth Daily As Needed (constipation).      • simvastatin (ZOCOR) 20 MG tablet Take 20 mg by mouth Daily.      • spironolactone (ALDACTONE) 25 MG tablet Take 25 mg by mouth Daily.      • tiotropium bromide monohydrate (SPIRIVA RESPIMAT) 2.5 MCG/ACT aerosol solution inhaler Inhale 2 puffs Daily. 3 inhaler 0    • traMADol (ULTRAM) 50 MG tablet Take 1 tablet by mouth Every 6 (Six) Hours As Needed.      • furosemide (LASIX) 40 MG tablet Take 40 mg by mouth Daily. Morning         Current Meds:   Current Facility-Administered Medications   Medication Dose Route Frequency Provider Last Rate Last Admin   • aluminum-magnesium hydroxide-simethicone (MAALOX MAX) 400-400-40 MG/5ML suspension 7.5 mL  7.5 mL Oral Q4H PRN Milo White, DO       • amLODIPine (NORVASC) tablet 5 mg  5 mg Oral Daily Milo White DO   5 mg at 07/24/21 1037   • apixaban (ELIQUIS) tablet 5 mg  5 mg Oral Q12H Milo White DO   5 mg at 07/24/21 1037   • aspirin EC tablet 81 mg  81 mg Oral Daily Milo White DO   81 mg at 07/24/21 1038   • atorvastatin (LIPITOR) tablet 80 mg  80 mg Oral Nightly Milo White DO   80 mg at 07/23/21 2050   • bisacodyl (DULCOLAX) suppository 10 mg  10 mg Rectal Daily PRN Milo White, DO       • budesonide-formoterol (SYMBICORT) 80-4.5 MCG/ACT inhaler 2 puff  2 puff Inhalation  BID - RT Milo White DO   2 puff at 07/24/21 0646   • furosemide (LASIX) tablet 40 mg  40 mg Oral Daily Milo White DO   40 mg at 07/24/21 0600   • gabapentin (NEURONTIN) capsule 200 mg  200 mg Oral TID Milo White DO   200 mg at 07/24/21 1038   • ipratropium (ATROVENT) nebulizer solution 0.5 mg  0.5 mg Nebulization 4x Daily - RT Milo White DO   0.5 mg at 07/24/21 0645   • levETIRAcetam (KEPPRA) tablet 500 mg  500 mg Oral BID Milo White DO   500 mg at 07/24/21 1036   • lisinopril (PRINIVIL,ZESTRIL) tablet 20 mg  20 mg Oral Daily Milo White DO   20 mg at 07/24/21 1037   • metoprolol succinate XL (TOPROL-XL) 24 hr tablet 50 mg  50 mg Oral Daily Milo White DO   50 mg at 07/24/21 1037   • ondansetron (ZOFRAN) injection 4 mg  4 mg Intravenous Q6H PRN Milo White DO       • pantoprazole (PROTONIX) EC tablet 40 mg  40 mg Oral Q AM Milo White DO   40 mg at 07/24/21 0600   • polyethylene glycol (MIRALAX) packet 17 g  17 g Oral Daily PRN Milo White DO       • sodium chloride 0.9 % flush 10 mL  10 mL Intravenous Q12H Milo White DO       • sodium chloride 0.9 % flush 10 mL  10 mL Intravenous PRN Milo White DO       • sodium chloride 0.9 % infusion  100 mL/hr Intravenous Continuous Milo White  mL/hr at 07/24/21 0601 100 mL/hr at 07/24/21 0601   • spironolactone (ALDACTONE) tablet 25 mg  25 mg Oral Daily Milo White DO   25 mg at 07/24/21 1037       Family History:family history includes Esophageal cancer in her brother; Lung cancer in her brother.   Social History: reports that she quit smoking about 11 months ago. She started smoking about 60 years ago. She has a 29.00 pack-year smoking history. She has never used smokeless tobacco. She reports that she does not drink alcohol and does not use drugs.    Review of Systems  Review of Systems   Constitutional: Positive for fatigue. Negative  for chills and fever.   HENT: Negative for congestion and facial swelling.    Eyes: Negative for redness and visual disturbance.   Respiratory: Negative for cough and shortness of breath.    Cardiovascular: Negative for chest pain and palpitations.   Gastrointestinal: Negative for abdominal pain, nausea and vomiting.   Endocrine: Negative for cold intolerance and heat intolerance.   Genitourinary: Negative for dysuria and flank pain.   Musculoskeletal: Positive for gait problem.   Skin: Positive for pallor.   Allergic/Immunologic: Negative for immunocompromised state.   Neurological: Negative for facial asymmetry and speech difficulty.   Hematological: Negative for adenopathy.   Psychiatric/Behavioral: Negative for agitation, confusion and hallucinations.         Objective      Vital Signs   Temp:  [98 °F (36.7 °C)-98.5 °F (36.9 °C)] 98.2 °F (36.8 °C)  Heart Rate:  [] 105  Resp:  [12-16] 16  BP: ()/(47-71) 121/61    Physical Exam  Vitals and nursing note reviewed.   Constitutional:       General: She is not in acute distress.  HENT:      Head: Normocephalic and atraumatic.   Eyes:      General: No scleral icterus.  Cardiovascular:      Rate and Rhythm: Normal rate and regular rhythm.   Pulmonary:      Effort: No respiratory distress.      Breath sounds: No wheezing or rales.   Abdominal:      General: Bowel sounds are normal. There is no distension.      Palpations: Abdomen is soft.   Musculoskeletal:         General: No swelling.      Cervical back: Neck supple.   Skin:     General: Skin is warm.      Coloration: Skin is pale.   Neurological:      Mental Status: She is alert and oriented to person, place, and time.   Psychiatric:         Mood and Affect: Mood normal.         Behavior: Behavior normal.         Results from last 7 days   Lab Units 07/23/21  0811   WBC 10*3/mm3 9.84   HEMOGLOBIN g/dL 12.5   HEMATOCRIT % 34.8   PLATELETS 10*3/mm3 356        Results from last 7 days   Lab Units  07/24/21  0312 07/23/21  0811   SODIUM mmol/L 132* 128*   POTASSIUM mmol/L 4.6 5.0   CHLORIDE mmol/L 104 96*   CO2 mmol/L 17.0* 19.0*   BUN mg/dL 27* 34*   CREATININE mg/dL 0.96 1.51*   CALCIUM mg/dL 8.8 9.4   BILIRUBIN mg/dL  --  0.3   ALK PHOS U/L  --  104   ALT (SGPT) U/L  --  17   AST (SGOT) U/L  --  21   GLUCOSE mg/dL 170* 115*       ABG:    pH, Arterial   Date Value Ref Range Status   07/23/2021 7.352 7.350 - 7.450 pH units Final     pO2, Arterial   Date Value Ref Range Status   07/23/2021 75.1 (L) 83.0 - 108.0 mm Hg Final     Comment:     84 Value below reference range     pCO2, Arterial   Date Value Ref Range Status   07/23/2021 36.0 35.0 - 45.0 mm Hg Final       Culture Data:   No results found for: BLOODCX, URINECX, WOUNDCX, MRSACX, RESPCX, STOOLCX    Radiology:   Imaging Results (Last 7 Days)     Procedure Component Value Units Date/Time    MRI Brain With Contrast [[id number]] Collected: 07/24/21 0925     Updated: 07/24/21 0933    Narrative:      EXAMINATION:   MRI BRAIN W CONTRAST-  7/24/2021 9:25 AM CDT     HISTORY: MRI BRAIN W CONTRAST- 7/24/2021 8:40 AM CDT     HISTORY: abnormal CT head/mri brain, hx lung cancer;  E87.0-[place]-juza[place]ut and hyponatremia; N17.9-Acute kidney failure,  unspecified     COMPARISON: MR brain July 23, 2021       TECHNIQUE: Multiplanar imaging of the brain was performed in a routine  fashion before and after the intravenous injection of gadolinium  contrast.     FINDINGS:   Diffusion: No restriction of diffusion to suggest acute ischemia.     Midline structures: Nondisplaced.     Ventricles: Normal in configuration and symmetric in size.        Basilar cisterns: Maintained.     Extra axial space: No abnormal extra-axial fluid.     Gray-white matter signal: Extensive chronic microvascular ischemic  changes noted in the periventricular white matter. FLAIR signal is  present in the left occipital lobe in the region of the 2 enhancing  lesions..     Cerebellum: Normal.      Brainstem: Normal.     Enhancement: There are 2 focal regions of abnormal enhancement in the  left occipital lobe. This is consistent with metastatic disease. One is  just inferior and medial to previously described lesion in the left  occipital lobe. The second is just superior and lateral views of the  cystic lesion.     Other: Proximal cervical spinal cord is normal. Bilateral globes and  orbits are normal in appearance. Normal cerebrovascular flow voids  noted. No abnormal signal in the mastoid air cells or paranasal sinuses.       Impression:      1. 2 small enhancing lesions left occipital lobe one is inferior and  medial to the cystic lesion. A second is superior and lateral to the  cystic lesion. These rim-like enhancing lesions are most consistent with  metastatic disease.  2. Extensive changes of chronic microvascular ischemic disease are noted  in the periventricular white         This report was finalized on 07/24/2021 09:30 by Dr. Vini Dyer MD.    MRI Brain Without Contrast [[id number]] Collected: 07/23/21 1702     Updated: 07/23/21 1713    Narrative:      EXAMINATION: MRI brain without contrast 7/23/2021     HISTORY: Evaluate for stroke.     FINDINGS: Today's exam is compared to a previous MRI of the brain dated  7/3/2019. Multiplanar fast spin echo sequences were obtained of the  brain on a high-field magnet without gadolinium enhancement. There is a  predominantly cystic lesion involving the left occipital lobe which is  well-circumscribed measuring 2.1 x 1.6 cm in size with surrounding  vasogenic edema. There is some restriction of diffusion within the wall  of this lesion. Given the history of lung carcinoma I would favor that  this represents a metastatic focus. A cerebral abscess would also be in  the differential but considered less likely given the location,  appearance and history. No additional foci of diffusion restriction are  present.     There is atrophy of the brain with prominence  of the subarachnoid spaces  and ventricular enlargement. There is evidence of previous lacunar  infarcts involving the right basal ganglia with focal encephalomalacia  and gliosis. Moderate to severe small vessel ischemic changes are noted  involving the periventricular and higher white matter tracts. No  additional discrete intra-axial lesions are appreciated. Postcontrast  imaging would be recommended to assess for additional more subtle  lesions. Gradient imaging demonstrates no evidence of blooming to  suggest acute or chronic hemorrhage.       Impression:      1.. There is a cystic mass involving the left occipital lobe with  adjacent vasogenic edema. Given the history I would favor a metastatic  lesion. Abscess is also in the differential but considered less likely.  There is some associated diffusion restriction within the wall of the  lesion. No additional discrete lesions are appreciated but postcontrast  imaging is recommended.  2. Atrophy with moderate to severe small vessel ischemic disease. Remote  lacunar infarcts involving the right basal ganglia are present with  focal encephalomalacia and gliosis.  3. No additional foci of diffusion restriction are demonstrated. There  is no radiographic evidence of acute ischemic stroke on today's exam.  This report was finalized on 07/23/2021 17:10 by Dr. Aric Rosen MD.    US Carotid Bilateral [[id number]] Collected: 07/23/21 1524     Updated: 07/23/21 1528    Narrative:      History: Stroke       Impression:      Impression:  1. There is less than 50% stenosis of the right internal carotid artery.  2. There is less than 50% stenosis of the left internal carotid artery.  3. Antegrade flow is demonstrated in bilateral vertebral arteries.     Comments: Bilateral carotid vertebral arterial duplex scan was  performed.     Grayscale imaging shows intimal thickening and calcified elements at the  carotid bifurcation. The right internal carotid artery peak  systolic  velocity is 90.8 cm/sec. The end-diastolic velocity is 22.6 cm/sec. The  right ICA/CCA ratio is approximately 1.2 . These findings correlate with  less than 50% stenosis of the right internal carotid artery.     Grayscale imaging shows intimal thickening and calcified elements at the  carotid bifurcation. The left internal carotid artery peak systolic  velocity is 90.6 cm/sec. The end-diastolic velocity is 24.5 cm/sec. The  left ICA/CCA ratio is approximately 1.0 . These findings correlate with  less than 50% stenosis of the left internal carotid artery.     Antegrade flow is demonstrated in bilateral vertebral arteries.     This report was finalized on 07/23/2021 15:25 by Dr. Nicholas Leahy MD.    CT Head Without Contrast [[id number]] Collected: 07/23/21 0936     Updated: 07/23/21 0945    Narrative:      EXAMINATION: CT HEAD WO CONTRAST-  7/23/2021 9:36 AM CDT     CT SCAN OF THE HEAD, WITHOUT CONTRAST:      HISTORY: Syncope, mental status changes     COMPARISONS: Head CT imaging dated  1/22/2019 and 4/6/2017. Brain MR  imaging dated 7/3/2019.     TECHNIQUE:     Radiation dose equals  mGy-cm.  Automated exposure control dose  reduction technique was implemented.        CT evaluation of the head without intravenous contrast. 5 mm transaxial  images were obtained.   2-D sagittal and coronal reconstruction images  were generated.     FINDINGS:      There is confluent low attenuation in the periventricular and  subcortical white matter observed previously compatible with advanced  chronic ischemic changes.     Old right basal ganglia lacunar infarct identified in the genu of the  internal capsule.     There is interval development of low attenuation in the left occipital  lobe compatible with an area of infarction, age uncertain, subacute or  chronic suspected. Correlate with clinical findings.     There is no intra or extra-axial hemorrhage.     There is no mass effect or midline shift.     There is  no developing hydrocephalus     Paranasal sinuses imaged part are clear.     Hyperostosis frontalis interna identified. There is no skull fracture or  acute calvarial abnormality.             Impression:      1. Interval development of low attenuation in the left occipital lobe,  ischemic change/infarction, age-indeterminate, subacute or chronic  considered. Correlate with patient presentation.  2. Advanced chronic ischemic changes.           This report was finalized on 07/23/2021 09:42 by Dr. Marcello Rutledge MD.    XR Chest 1 View [[id number]] Collected: 07/23/21 0811     Updated: 07/23/21 0814    Narrative:      EXAM: XR CHEST 1 VW- - 7/23/2021 7:52 AM CDT     HISTORY: Syncope       COMPARISON: 8/16/2020.      TECHNIQUE:  1 images.  Frontal view of the chest.     FINDINGS:    No pneumothorax, pleural effusion or focal consolidation. Minimal linear  atelectasis or scarring at the left lung base. Cardiac mediastinal  silhouette within normal limits. Calcified aortic atherosclerosis. No  acute bony finding.          Impression:      1. No acute cardiopulmonary findings.  This report was finalized on 07/23/2021 08:11 by Dr Ally Tierney MD.               Assessment/Plan        ASSESSMENT:   1.  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  2.  Neoplasm left upper lobe 0.9 cm and a 1.7 cm more inferior lateral left upper lobe lesion on 09/2019.  Treatment status: Post SBRT, left upper lobe, 5000 cGy 11/12/2019 through 11/25/2019.  3.  Performance status of 3.  4.  Chronic obstructive pulmonary disease.  5.  Coronary artery disease.  6.  History of Hodgkin's disease, post chemotherapy by Dr. Dowell in the 80s.      PLAN:  1.   Re: The reason for the consult.  2.  Start Decadron 4 mg every 8 hours with gastric prophylaxis.  3.  Consult radiation oncology.  4.  CT chest, abdomen and pelvis for staging as outpatient.  The patient is not a candidate for active palliative chemotherapy due to her  performance status, age and multiple comorbidities.  5.  Clinic appointment 07/28/2021 at 9 AM.  6.  I will sign off.  Please call if needed.          I discussed the patients findings and my recommendations with patient, nurse Cortney and family.  All verbalized understanding.    Dhaval Urena MD  07/24/21  11:14 CDT    I spent 49 total minutes, face-to-face, caring for Josi akins.  Greater than 50% of this time involved counseling and/or coordination of care as documented within this note regarding the patient's illness(es), pros and cons of various treatment options, instructions and/or risk reduction.    Thank you for allowing me to participate in the care of Ms. Josi ORR Cade

## 2021-07-24 NOTE — PLAN OF CARE
Problem: Adult Inpatient Plan of Care  Goal: Plan of Care Review  Outcome: Ongoing, Progressing  Flowsheets (Taken 7/24/2021 1430)  Progress: (Eval) --  Plan of Care Reviewed With: (RN, Cortney)   patient   other (see comments)  Outcome Summary:  Clinical bedside swallowing evaluation completed per SLP secondary to failed swallowing screening on 07/23/21. SLP unable to eval pt at that time secondary to pt being off floor for MRI. Visited this AM, again was off floor for testing, yet reason for SLP visit and plan was reviewed with daughter who was present at the bedside at that time. This PM, pt was alert, cooperative, reported occasional dysphagia with solids, reporting the feeling of material sticking in the upper esophageal region at the base of the pharynx, with report of expectoration of congealed food particles at times, which has even been multiple hours following PO intake. She reported this typically occurs roughly 1x/week, c/o hoarse vocal quality (which was observed) and stated this has been present for approximately six months with intermittent associated dull pain. She does take medication for reflux. Oral mech exam revealed labial and lingual fasciculations, with decreased lingual strength and ROM, especially to the L side during lateralization attempts. She was presented a full range of consistencies excluding mechanical soft. She appeared to have functional oral prep, though decreased rotary chew with solid which was felt to be mildly prolonged. She exhibited an audible swallow with both nectar thick and thin liquids, raising concern for decreased pharyngeal control with these consistencies. She was observed to have intermittent vocal quality changes, though, given baseline hoarse vocal quality, pt's voice varied even prior to PO trials. No further concerns observed. Considering vocal quality concerns, pt is receptive to ENT consult. She is aware of SLP plan to monitor diet toleration. She was  presented the option for soft solids, given pt c/o sore gums which she attributed to her dentures, however, she verbalized desire to continue on a regular solid diet consistency diet at this time.   RECS:    Continue current diet of regular solid consistency with thin liquids   meds whole with thin liquids   RN to monitor for increased lung congestion   General feeding/aspiration precautions   ENT consult for chronic hoarse vocal quality with intermittent pain, would likely benefit from endoscopy given complaints also consistent with esophageal dysphagia. ST to monitor diet toleration, may benefit from VFSS in Radiology if concerns increase. Thanks!

## 2021-07-24 NOTE — THERAPY EVALUATION
Acute Care - Occupational Therapy Initial Evaluation  [facility]     Patient Name: Josi Mohamud  : 1945  MRN: [id number]  Today's Date: 2021  Onset of Illness/Injury or Date of Surgery: 21  Date of Referral to OT: 21  Referring Physician: Dr. White    Admit Date: 2021       ICD-10-CM ICD-9-CM   1. Hyponatremia  E87.1 276.1   2. CECY (acute kidney injury) (CMS/HCC)  N17.9 584.9   3. Decreased activities of daily living (ADL)  Z78.9 V49.89     Patient Active Problem List   Diagnosis   • Syncope and collapse   • Essential hypertension   • Anticoagulated   • PAF (paroxysmal atrial fibrillation) (CMS/HCC)   • Obesity (BMI 30-39.9)   • Fall   • Bilateral carotid artery stenosis   • Brain mass     Past Medical History:   Diagnosis Date   • Anticoagulated 2018   • Bilateral carotid artery stenosis 2021   • Bronchitis    • Cancer (CMS/HCC)     lung   • COPD (chronic obstructive pulmonary disease) (CMS/HCC)    • COPD, moderate (CMS/HCC) 2019   • Coronary artery disease    • Coronary artery disease involving native coronary artery of native heart without angina pectoris 2017   • Essential hypertension 2017   • Gastroesophageal reflux disease 2018   • GERD (gastroesophageal reflux disease)    • History of transfusion    • Hx of Hodgkin's lymphoma 10/11/2019   • Hyperlipidemia    • Hypertension    • Malignant neoplasm of upper lobe of left lung (CMS/HCC) 2019   • Mixed hyperlipidemia 2021   • PAF (paroxysmal atrial fibrillation) (CMS/HCC)    • Parotid neoplasm 2018   • Persistent asthma without complication 2019   • Renal cyst 2020   • Seizures (CMS/HCC)    • Status post stereotactic radiosurgery 2020   • Stented coronary artery 2017   • Thyroid nodule 2018     Past Surgical History:   Procedure Laterality Date   • ANKLE OPEN REDUCTION INTERNAL FIXATION Left 8/15/2020    Procedure: ANKLE OPEN REDUCTION INTERNAL FIXATION;  Surgeon:  Keyshawn Medrano MD;  Location: [facility] OR;  Service: Orthopedics;  Laterality: Left;   • APPENDECTOMY     • APPENDECTOMY     • CARDIAC CATHETERIZATION     • CARDIAC ELECTROPHYSIOLOGY PROCEDURE N/A 5/23/2017    Procedure: Loop insertion; LINQ;  Surgeon: Jose Monteiro MD;  Location: [facility] CATH INVASIVE LOCATION;  Service:    • CHOLECYSTECTOMY     • CORONARY ANGIOPLASTY WITH STENT PLACEMENT      X 1    • INCISION AND DRAINAGE OF WOUND Left 8/15/2020    Procedure: INCISION AND DRAINAGE WOUND;  Surgeon: Keyshawn Medrano MD;  Location: [facility] OR;  Service: Orthopedics;  Laterality: Left;   • LIVER BIOPSY     • MYRINGOTOMY W/ TUBES Left 11/22/2019    Procedure: LEFT MYRINGOTOMY WITH INSERTION OF EAR TUBE WITH RIGHT EAR EXAM UNDER ANESTHESIA;  Surgeon: Se Mcnulty MD;  Location: [facility] OR;  Service: ENT   • SPLENECTOMY     • US GUIDED FINE NEEDLE ASPIRATION  8/7/2018            OT ASSESSMENT FLOWSHEET (last 12 hours)      OT Evaluation and Treatment     Row Name 07/24/21 7741                   OT Time and Intention    Subjective Information  complains of;weakness;fatigue  -AC        Document Type  evaluation  -AC        Mode of Treatment  occupational therapy  -AC        Patient Effort  good  -AC           General Information    Patient Profile Reviewed  yes  -AC        Onset of Illness/Injury or Date of Surgery  07/23/21  -AC        Referring Physician  Dr. White  -AC        General Observations of Patient  lying in fowlers in bed, eating lunch, no apparent distress  -AC        Prior Level of Function  independent:;feeding;grooming;min assist:;all household mobility;gait;transfer;bed mobility;dressing;bathing  -AC        Equipment Currently Used at Home  rollator;wheelchair;cane, straight;shower chair  -AC        Pertinent History of Current Functional Problem  fall, AMS; Dx: known lung ca now with occipital lobe brain mets  -AC        Existing Precautions/Restrictions  fall shingles  -AC         Barriers to Rehab  cognitive status  -           Living Environment    Lives With  facility resident  -           Cognition    Orientation Status (Cognition)  oriented to;oriented x 4  -AC        Follows Commands (Cognition)  WFL  -        Personal Safety Interventions  fall prevention program maintained;gait belt;muscle strengthening facilitated;nonskid shoes/slippers when out of bed;supervised activity  -           Pain Assessment    Additional Documentation  Pain Scale: Numbers Pre/Post-Treatment (Group)  -           Pain Scale: Numbers Pre/Post-Treatment    Pretreatment Pain Rating  0/10 - no pain  -           Range of Motion Comprehensive    Comment, General Range of Motion  WFL AROM BUE  -           Strength Comprehensive (MMT)    Comment, General Manual Muscle Testing (MMT) Assessment  4-/5 BUE  -           Bed Mobility    Bed Mobility  supine-sit  -        Supine-Sit [place] (Bed Mobility)  standby assist  -        Assistive Device (Bed Mobility)  bed rails;head of bed elevated  -           Functional Mobility    Functional Mobility- Ind. Level  contact guard assist  -        Functional Mobility- Device  rolling walker  -        Functional Mobility- Comment  in hallway, BR, back to chair, 1 seated rest x2 min in hallway  -           Transfer Assessment/Treatment    Transfers  sit-stand transfer;stand-sit transfer;toilet transfer  -           Transfers    Sit-Stand [place] (Transfers)  contact guard  -        Stand-Sit [place] (Transfers)  contact guard  -        [place] Level (Toilet Transfer)  contact guard  -        Assistive Device (Toilet Transfer)  commode;grab bars/safety frame  -           Sit-Stand Transfer    Assistive Device (Sit-Stand Transfers)  walker, front-wheeled  -           Stand-Sit Transfer    Assistive Device (Stand-Sit Transfers)  walker, front-wheeled  -           Toilet Transfer    Type (Toilet Transfer)   sit-stand;stand-sit  -AC           Balance    Balance Assessment  sitting static balance;sitting dynamic balance;standing static balance;standing dynamic balance  -AC        Static Sitting Balance  WFL;supported  -AC        Dynamic Sitting Balance  WFL;supported  -AC        Static Standing Balance  mild impairment;supported  -AC        Dynamic Standing Balance  mild impairment;supported  -AC           Activities of Daily Living    BADL Assessment/Intervention  lower body dressing;toileting;grooming  -AC           Lower Body Dressing Assessment/Training    [place] Level (Lower Body Dressing)  doff;socks;standby assist;don;dependent (less than 25% patient effort)  -AC        Position (Lower Body Dressing)  edge of bed sitting  -AC           Grooming Assessment/Training    [place] Level (Grooming)  wash face, hands;contact guard assist  -AC        Position (Grooming)  sink side  -AC        Comment (Grooming)  needs vc's to step in front of sink instead of reaching for sink while too far away  -AC           Toileting Assessment/Training    [place] Level (Toileting)  toileting skills;adjust/manage clothing;perform perineal hygiene;supervision;contact guard assist  -AC        Assistive Devices (Toileting)  commode;grab bar/safety frame  -AC        Position (Toileting)  supported sitting;supported standing  -AC           BADL Safety/Performance    Impairments, BADL Safety/Performance  balance;endurance/activity tolerance;strength  -AC           OT Goals    Transfer Goal Selection (OT)  transfer, OT goal 1  -AC        Dressing Goal Selection (OT)  dressing, OT goal 1  -AC        Strength Goal Selection (OT)  strength, OT goal 1  -AC        Endurance Goal Selection (OT)  --  -AC           Transfer Goal 1 (OT)    Activity/Assistive Device (Transfer Goal 1, OT)  toilet;shower chair;walker, rolling  -        [place] Level/Cues Needed (Transfer Goal 1, OT)  modified independence  -AC        Time Frame  (Transfer Goal 1, OT)  long term goal (LTG);10 days  -AC        Progress/Outcome (Transfer Goal 1, OT)  goal ongoing  -AC           Dressing Goal 1 (OT)    Activity/Device (Dressing Goal 1, OT)  lower body dressing  -AC        [place]/Cues Needed (Dressing Goal 1, OT)  minimum assist (75% or more patient effort)  -AC        Time Frame (Dressing Goal 1, OT)  long term goal (LTG);10 days  -AC        Progress/Outcome (Dressing Goal 1, OT)  goal ongoing  -AC           Strength Goal 1 (OT)    Strength Goal 1 (OT)  Increased BUE strength to 4+/5 to increase strength and endurance for ADL  -AC        Time Frame (Strength Goal 1, OT)  long term goal (LTG);10 days  -AC        Progress/Outcome (Strength Goal 1, OT)  goal ongoing  -AC           Positioning and Restraints    Pre-Treatment Position  in bed  -AC        Post Treatment Position  chair  -AC        In Chair  reclined;call light within reach;encouraged to call for assist;legs elevated  -AC           Therapy Assessment/Plan (OT)    Date of Referral to OT  07/23/21  -AC        OT Diagnosis  decreased adl  -AC        Rehab Potential (OT)  good, to achieve stated therapy goals  -AC        Criteria for Skilled Therapeutic Interventions Met (OT)  yes;meets criteria;skilled treatment is necessary  -AC        Therapy Frequency (OT)  5 times/wk  -AC        Predicted Duration of Therapy Intervention (OT)  10 days  -AC        Planned Therapy Interventions (OT)  activity tolerance training;BADL retraining;functional balance retraining;occupation/activity based interventions;patient/caregiver education/training;strengthening exercise;transfer/mobility retraining  -AC           Therapy Plan Review/Discharge Plan (OT)    Anticipated Discharge Disposition (OT)  skilled nursing facility  -AC          User Key  (r) = Recorded By, (t) = Taken By, (c) = Cosigned By    Initials Name Effective Dates    AC Ant Cedillo, OTR/L, CNT 04/09/19 -            Occupational Therapy  Education                 Title: PT OT SLP Therapies (Done)     Topic: Occupational Therapy (Done)     Point: ADL training (Done)     Description:   Instruct learner(s) on proper safety adaptation and remediation techniques during self care or transfers.   Instruct in proper use of assistive devices.              Learning Progress Summary           Patient Acceptance, E, VU,NR by  at 7/24/2021 1405                   Point: Home exercise program (Done)     Description:   Instruct learner(s) on appropriate technique for monitoring, assisting and/or progressing therapeutic exercises/activities.              Learning Progress Summary           Patient Acceptance, E, VU,NR by  at 7/24/2021 1405                   Point: Precautions (Done)     Description:   Instruct learner(s) on prescribed precautions during self-care and functional transfers.              Learning Progress Summary           Patient Acceptance, E, VU,NR by  at 7/24/2021 1405                   Point: Body mechanics (Done)     Description:   Instruct learner(s) on proper positioning and spine alignment during self-care, functional mobility activities and/or exercises.              Learning Progress Summary           Patient Acceptance, E, VU,NR by  at 7/24/2021 1405                               User Key     Initials Effective Dates Name Provider Type Discipline     04/09/19 -  Ant Cedillo, OTR/L, CNT Occupational Therapist OT                  OT Recommendation and Plan  Planned Therapy Interventions (OT): activity tolerance training, BADL retraining, functional balance retraining, occupation/activity based interventions, patient/caregiver education/training, strengthening exercise, transfer/mobility retraining  Therapy Frequency (OT): 5 times/wk  Plan of Care Review  Plan of Care Reviewed With: patient  Progress: no change  Outcome Summary: OT eval completed.  Pt alert and oriented x4.  Follows commands WFL.  Came to EOB with SBA.   Transfers from bed and commode with SBA-CGA and rw/grab bars.  Pt ambulated in hallway and to BR with 1 sitting rest break x1-2 min.  Pt has full AROM in BUE with 4-/5 strength.  She doffs socks independently and is dependent to don socks.  S for toileting, hygiene and clothing mgmt.  She has decreased safety habits i.e. does not turn fully around before transferring onto commode and chair.  She reaches for sink instead of positioning herself in front of sink for hand washing.  OT will continue to work with pt to increase her strength, balance, safety and endurance for ADL.  Pt planning to return to SNF, OT in agreement.  Plan of Care Reviewed With: patient  Outcome Summary: OT rosie completed.  Pt alert and oriented x4.  Follows commands WFL.  Came to EOB with SBA.  Transfers from bed and commode with SBA-CGA and rw/grab bars.  Pt ambulated in hallway and to BR with 1 sitting rest break x1-2 min.  Pt has full AROM in BUE with 4-/5 strength.  She doffs socks independently and is dependent to don socks.  S for toileting, hygiene and clothing mgmt.  She has decreased safety habits i.e. does not turn fully around before transferring onto commode and chair.  She reaches for sink instead of positioning herself in front of sink for hand washing.  OT will continue to work with pt to increase her strength, balance, safety and endurance for ADL.  Pt planning to return to SNF, OT in agreement.    Outcome Measures     Row Name 07/24/21 1610             How much help from another is currently needed...    Putting on and taking off regular lower body clothing?  2  -AC      Bathing (including washing, rinsing, and drying)  3  -AC      Toileting (which includes using toilet bed pan or urinal)  3  -AC      Putting on and taking off regular upper body clothing  4  -AC      Taking care of personal grooming (such as brushing teeth)  4  -AC      Eating meals  4  -AC      AM-PAC 6 Clicks Score (OT)  20  -AC         Functional Assessment     Outcome Measure Options  AM-PAC 6 Clicks Daily Activity (OT)  -AC        User Key  (r) = Recorded By, (t) = Taken By, (c) = Cosigned By    Initials Name Provider Type    Ant Graham OTR/L, NADJA Occupational Therapist          Time Calculation:   Time Calculation- OT     Row Name 07/24/21 1405             Time Calculation- OT    OT Start Time  1303  -AC      OT Stop Time  1405  -AC      OT Time Calculation (min)  62 min  -      OT Received On  07/24/21  -      OT Goal Re-Cert Due Date  08/03/21  -        User Key  (r) = Recorded By, (t) = Taken By, (c) = Cosigned By    Initials Name Provider Type    Ant Graham OTR/L, NADJA Occupational Therapist        Therapy Charges for Today     Code Description Service Date Service Provider Modifiers Qty    67987913120 HC OT EVAL MOD COMPLEXITY 4 7/24/2021 Ant Cedillo OTR/L, NADJA GO 1               KADY Storey/L, CNT  7/24/2021

## 2021-07-24 NOTE — DISCHARGE SUMMARY
[facility] Medicine Services  DISCHARGE SUMMARY       Date of Admission: 7/23/2021  Date of Discharge:  7/24/2021  Primary Care Physician: Cayetano Crews MD    Discharge Diagnoses:  Active Hospital Problems    Diagnosis    • **Brain mass    • Bilateral carotid artery stenosis    • Fall    • Obesity (BMI 30-39.9)    • PAF (paroxysmal atrial fibrillation) (CMS/HCC)    • Anticoagulated    • Syncope and collapse    • Essential hypertension          Presenting Problem/History of Present Illness:  Hyponatremia [E87.1]     Chief Complaint on Day of Discharge:   No complaint    History of Present Illness on Day of Discharge:   The patient is at her baseline this morning.  She has had no further episodes of loss of consciousness and no obvious seizure activity.  Contrast MRI scan of the brain reveals 2 focal enhancing lesions in the left occipital lobe what is inferior and medial to the cystic lesion with a second superior and lateral to the cystic lesion.  Both of these have rim-like enhancing characteristics and are consistent with metastatic disease.  The patient has been seen by oncology.  No systemic chemotherapy is planned given the patient's multiple comorbidities, age and poor performance status.  Recommendation was for referral to radiation oncology for radiation of the brain, likely palliative in nature.  The patient is appropriate for discharge home and for follow-up with radiation oncology, Dr. Rosen, early next week.    Hospital Course  This 75-year-old female nursing home resident was transported by EMS to [facility] emergency department after being found in the floor at the nursing facility.  The patient states that she was getting out of bed this morning and when she stood her legs gave out.  She denies pain as a result of the fall.  She was subsequently brought to the emergency department by EMS for work-up.  CT scan of the head was done showing a  subacute to chronic hypodensity in the left occipital lobe with an old right basal ganglia stroke.  Further evaluation with MRI scan of the brain shows that the left occipital hypodensity is consistent with a neoplasm.  The patient has a history of Hodgkin's lymphoma and lung cancer.  She completed treatment for both cancers successfully.  She states that her lung cancer treatment was completed about 5 years ago based on her recollection.     Further work-up in the emergency department reveals negative Covid swab, troponin within normal limits.  Magnesium within normal limits.  Unremarkable blood gas.  CK elevated at 417 likely secondary to her fall.  Urinalysis shows TNTC WBCs but no bacteria consistent with sterile pyuria.  CMP shows creatinine slightly elevated at 1.51 above her baseline of 1.2.  Sodium is 128.  IV fluids of normal saline have been started.  BMP, TSH, D-dimer within normal limits.  White blood cell count also within normal limits with hemoglobin 12.5.        Consults:   Neurology:  75 year old female with H/O Hodgkin's lymphoma, hypertension, hypothyroid, CAD, DM, previous smoker, some cognitive impairment/memory loss, severe cerebral microvascular disease, osteoporosis, PAF on Eliquis, possible seizures treated with Keppra, who was found down at the nursing home. She has had 2 previous documented events like this with LOC and urinary and fecal  incontinence and confusion with one EEG showing unilateral slowing on the left.   Patient has a H/O frequent falls one resulting in a fracture and uses assistance to get around-walker and at times wheelchair but her legs are too weak to support her.  She had her first syncopal event in 2017  Patient had 4 carotid ultrasounds since 6/2020 all showing < 50 % stenosis of the bilateral ICA  She was admitted through our ED with a staph infection (but is coag neg) hyponatremia of 128, with wheezing, and tachycardia   She had a loop recorder placed in the past    Now she shows a left occipital lesion on MRI suspicious for metastatic lesion Also shows an older stroke right basal ganglion.  Urinalysis reveal a UTI which could lower seizure threshold  Will need MRI of brain with contrast  Can increase Keppra to 750 mg every 12 hours.      Oncology:  ASSESSMENT:   1.  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  2.  Neoplasm left upper lobe 0.9 cm and a 1.7 cm more inferior lateral left upper lobe lesion on 09/2019.  Treatment status: Post SBRT, left upper lobe, 5000 cGy 11/12/2019 through 11/25/2019.  3.  Performance status of 3.  4.  Chronic obstructive pulmonary disease.  5.  Coronary artery disease.  6.  History of Hodgkin's disease, post chemotherapy by Dr. Dowell in the 80s.        PLAN:  1.   Re: The reason for the consult.  2.  Start Decadron 4 mg every 8 hours with gastric prophylaxis.  3.  Consult radiation oncology.  4.  CT chest, abdomen and pelvis for staging as outpatient.  The patient is not a candidate for active palliative chemotherapy due to her performance status, age and multiple comorbidities.  5.  Clinic appointment 07/28/2021 at 9 AM.  6.  I will sign off.  Please call if needed.              I discussed the patients findings and my recommendations with patient, nurse Cortney and family.  All verbalized understanding.     Dhaval Urena MD    Pertinent Test Results:   Lab Results (last 7 days)     Procedure Component Value Units Date/Time    Urine Culture - Urine, Urine, Catheter In/Out [[id number]]  (Normal) Collected: 07/23/21 0821    Specimen: Urine, Catheter In/Out Updated: 07/24/21 1234     Urine Culture No growth    POC Glucose Once [[id number]]  (Abnormal) Collected: 07/24/21 1128    Specimen: Blood Updated: 07/24/21 1218     Glucose 162 mg/dL      Comment: : [id number] [facility]: [id number]       POC Glucose Once [[id number]]  (Abnormal) Collected: 07/24/21 0740    Specimen: Blood Updated: 07/24/21 0823      Glucose 143 mg/dL      Comment: : [id number] Zach Aminter ID: [id number]       Basic Metabolic Panel [[id number]]  (Abnormal) Collected: 07/24/21 0312    Specimen: Blood Updated: 07/24/21 0809     Glucose 170 mg/dL      BUN 27 mg/dL      Creatinine 0.96 mg/dL      Sodium 132 mmol/L      Potassium 4.6 mmol/L      Chloride 104 mmol/L      CO2 17.0 mmol/L      Calcium 8.8 mg/dL      eGFR Non African Amer 57 mL/min/1.73      BUN/Creatinine Ratio 28.1     Anion Gap 11.0 mmol/L     Narrative:      GFR Normal >60  Chronic Kidney Disease <60  Kidney Failure <15      POC Glucose Once [[id number]]  (Abnormal) Collected: 07/24/21 0648    Specimen: Blood Updated: 07/24/21 0659     Glucose 142 mg/dL      Comment: : [id number] Mason Boogie ID: [id number]       Lipid Panel [[id number]]  (Abnormal) Collected: 07/24/21 0312    Specimen: Blood Updated: 07/24/21 0408     Total Cholesterol 119 mg/dL      Triglycerides 56 mg/dL      HDL Cholesterol 34 mg/dL      LDL Cholesterol  73 mg/dL      VLDL Cholesterol 12 mg/dL      LDL/HDL Ratio 2.17    Narrative:      Cholesterol Reference Ranges  (U.S. Department of Health and Human Services ATP III Classifications)    Desirable          <200 mg/dL  Borderline High    200-239 mg/dL  High Risk          >240 mg/dL      Triglyceride Reference Ranges  (U.S. Department of Health and Human Services ATP III Classifications)    Normal           <150 mg/dL  Borderline High  150-199 mg/dL  High             200-499 mg/dL  Very High        >500 mg/dL    HDL Reference Ranges  (U.S. Department of Health and Human Services ATP III Classifcations)    Low     <40 mg/dl (major risk factor for CHD)  High    >60 mg/dl ('negative' risk factor for CHD)        LDL Reference Ranges  (U.S. Department of Health and Human Services ATP III Classifcations)    Optimal          <100 mg/dL  Near Optimal     100-129 mg/dL  Borderline High  130-159 mg/dL  High             160-189 mg/dL  Very High         >189 mg/dL    Hemoglobin A1c [[id number]]  (Abnormal) Collected: 07/24/21 0312    Specimen: Blood Updated: 07/24/21 0345     Hemoglobin A1C 6.50 %     Narrative:      Hemoglobin A1C Ranges:    Increased Risk for Diabetes  5.7% to 6.4%  Diabetes                     >= 6.5%  Diabetic Goal                < 7.0%    Vitamin B12 [[id number]]  (Normal) Collected: 07/23/21 1657    Specimen: Blood Updated: 07/24/21 0130     Vitamin B-12 414 pg/mL     Narrative:      Results may be falsely increased if patient taking Biotin.      POC Glucose Once [[id number]]  (Abnormal) Collected: 07/24/21 0020    Specimen: Blood Updated: 07/24/21 0031     Glucose 147 mg/dL      Comment: : [id number] Mason Cedar Rapidscarly Boogie ID: [id number]       Vitamin B6 [[id number]] Collected: 07/23/21 1657    Specimen: Blood Updated: 07/23/21 1702    Vitamin B1, Whole Blood [[id number]] Collected: 07/23/21 1657    Specimen: Blood Updated: 07/23/21 1702    Magnesium [[id number]]  (Normal) Collected: 07/23/21 1001    Specimen: Blood Updated: 07/23/21 1604     Magnesium 1.9 mg/dL     POC Glucose Once [[id number]]  (Normal) Collected: 07/23/21 1550    Specimen: Blood Updated: 07/23/21 1551     Glucose 90 mg/dL      Comment: : [id number] Zach Bassett ID: [id number]       COVID PRE-OP / PRE-PROCEDURE SCREENING ORDER (NO ISOLATION) - Swab, Nasal Cavity [[id number]]  (Normal) Collected: 07/23/21 1215    Specimen: Swab from Nasal Cavity Updated: 07/23/21 1259    Narrative:      The following orders were created for panel order COVID PRE-OP / PRE-PROCEDURE SCREENING ORDER (NO ISOLATION) - Swab, Nasal Cavity.  Procedure                               Abnormality         Status                     ---------                               -----------         ------                     COVID-19,[facility] [employer] IN-[facility]...[[id number]]  Normal              Final result                 Please view results for these tests on the individual orders.    COVID-19,[facility] [employer]  IN-HOUSE,Nasal Swab No Transport Media 3-4 HR TAT - Swab, Nasal Cavity [[id number]]  (Normal) Collected: 07/23/21 1215    Specimen: Swab from Nasal Cavity Updated: 07/23/21 1259     COVID19 Not Detected    Narrative:      Fact sheet for providers: https://www.fda.gov/media/804702/download     Fact sheet for patients: https://www.fda.gov/media/405169/download    Test performed by PCR.    Consider negative results in combination with clinical observations, patient history, and epidemiological information.    CK [[id number]]  (Abnormal) Collected: 07/23/21 1001    Specimen: Blood Updated: 07/23/21 1201     Creatine Kinase 417 U/L     Troponin [[id number]]  (Normal) Collected: 07/23/21 1001    Specimen: Blood Updated: 07/23/21 1041     Troponin T <0.010 ng/mL     Narrative:      Troponin T Reference Range:  <= 0.03 ng/mL-   Negative for AMI  >0.03 ng/mL-     Abnormal for myocardial necrosis.  Clinicians would have to utilize clinical acumen, EKG, Troponin and serial changes to determine if it is an Acute Myocardial Infarction or myocardial injury due to an underlying chronic condition.       Results may be falsely decreased if patient taking Biotin.      Blood Gas, Arterial - [[id number]]  (Abnormal) Collected: 07/23/21 0918    Specimen: Arterial Blood Updated: 07/23/21 0924     Site Left Brachial     Rom's Test N/A     pH, Arterial 7.352 pH units      pCO2, Arterial 36.0 mm Hg      pO2, Arterial 75.1 mm Hg      Comment: 84 Value below reference range        HCO3, Arterial 20.0 mmol/L      Comment: 84 Value below reference range        Base Excess, Arterial -5.0 mmol/L      Comment: 84 Value below reference range        O2 Saturation, Arterial 94.7 %      Temperature 37.0 C      Barometric Pressure for Blood Gas 754 mmHg      Modality Nasal Cannula     Flow Rate 2.0 lpm      Ventilator Mode NA     Collected by [id number]     Comment: Meter: [id number]     :  [id number]        pCO2, Temperature Corrected 36.0 mm Hg       pH, Temp Corrected 7.352 pH Units      pO2, Temperature Corrected 75.1 mm Hg     TSH [[id number]]  (Normal) Collected: 07/23/21 0811    Specimen: Blood Updated: 07/23/21 0905     TSH 0.875 uIU/mL     Comprehensive Metabolic Panel [[id number]]  (Abnormal) Collected: 07/23/21 0811    Specimen: Blood Updated: 07/23/21 0901     Glucose 115 mg/dL      BUN 34 mg/dL      Creatinine 1.51 mg/dL      Sodium 128 mmol/L      Potassium 5.0 mmol/L      Chloride 96 mmol/L      CO2 19.0 mmol/L      Calcium 9.4 mg/dL      Total Protein 7.3 g/dL      Albumin 3.60 g/dL      ALT (SGPT) 17 U/L      AST (SGOT) 21 U/L      Alkaline Phosphatase 104 U/L      Total Bilirubin 0.3 mg/dL      eGFR Non African Amer 34 mL/min/1.73      Globulin 3.7 gm/dL      A/G Ratio 1.0 g/dL      BUN/Creatinine Ratio 22.5     Anion Gap 13.0 mmol/L     Narrative:      GFR Normal >60  Chronic Kidney Disease <60  Kidney Failure <15      Troponin [[id number]]  (Normal) Collected: 07/23/21 0811    Specimen: Blood Updated: 07/23/21 0858     Troponin T <0.010 ng/mL     Narrative:      Troponin T Reference Range:  <= 0.03 ng/mL-   Negative for AMI  >0.03 ng/mL-     Abnormal for myocardial necrosis.  Clinicians would have to utilize clinical acumen, EKG, Troponin and serial changes to determine if it is an Acute Myocardial Infarction or myocardial injury due to an underlying chronic condition.       Results may be falsely decreased if patient taking Biotin.      BNP [[id number]]  (Normal) Collected: 07/23/21 0811    Specimen: Blood Updated: 07/23/21 0858     proBNP 315.1 pg/mL     Narrative:      Among patients with dyspnea, NT-proBNP is highly sensitive for the detection of acute congestive heart failure. In addition NT-proBNP of <300 pg/ml effectively rules out acute congestive heart failure with 99% negative predictive value.    Results may be falsely decreased if patient taking Biotin.      D-dimer, Quantitative [[id number]]  (Normal) Collected: 07/23/21 0811     Specimen: Blood Updated: 07/23/21 0848     D-Dimer, Quantitative 0.25 mg/L (FEU)     Narrative:      Reference Range is 0-0.50 mg/L FEU. However, results <0.50 mg/L FEU tends to rule out DVT or PE. Results >0.50 mg/L FEU are not useful in predicting absence or presence of DVT or PE.      Protime-INR [[id number]]  (Abnormal) Collected: 07/23/21 0811    Specimen: Blood Updated: 07/23/21 0848     Protime 15.0 Seconds      INR 1.28    aPTT [[id number]]  (Abnormal) Collected: 07/23/21 0811    Specimen: Blood Updated: 07/23/21 0848     PTT 36.5 seconds     Urinalysis With Culture If Indicated - Urine, Catheter In/Out [[id number]]  (Abnormal) Collected: 07/23/21 0821    Specimen: Urine, Catheter In/Out Updated: 07/23/21 0840     Color, UA Yellow     Appearance, UA Cloudy     pH, UA <=5.0     Specific Gravity, UA 1.015     Glucose, UA Negative     Ketones, UA Negative     Bilirubin, UA Negative     Blood, UA Negative     Protein, UA Negative     Leuk Esterase, UA Large (3+)     Nitrite, UA Negative     Urobilinogen, UA 0.2 E.U./dL    Urinalysis, Microscopic Only - Urine, Catheter In/Out [[id number]]  (Abnormal) Collected: 07/23/21 0821    Specimen: Urine, Catheter In/Out Updated: 07/23/21 0840     RBC, UA 0-2 /HPF      WBC, UA Too Numerous to Count /HPF      Bacteria, UA None Seen /HPF      Squamous Epithelial Cells, UA 7-12 /HPF      Hyaline Casts, UA 7-12 /LPF      Methodology Automated Microscopy    CBC & Differential [[id number]]  (Abnormal) Collected: 07/23/21 0811    Specimen: Blood Updated: 07/23/21 0835    Narrative:      The following orders were created for panel order CBC & Differential.  Procedure                               Abnormality         Status                     ---------                               -----------         ------                     CBC Auto Differential[[id number]]        Abnormal            Final result                 Please view results for these tests on the individual orders.    CBC  Auto Differential [[id number]]  (Abnormal) Collected: 07/23/21 0811    Specimen: Blood Updated: 07/23/21 0835     WBC 9.84 10*3/mm3      RBC 3.59 10*6/mm3      Hemoglobin 12.5 g/dL      Hematocrit 34.8 %      MCV 96.9 fL      MCH 34.8 pg      MCHC 35.9 g/dL      RDW 13.0 %      RDW-SD 44.3 fl      MPV 9.3 fL      Platelets 356 10*3/mm3      Neutrophil % 73.7 %      Lymphocyte % 18.0 %      Monocyte % 7.0 %      Eosinophil % 0.2 %      Basophil % 0.5 %      Immature Grans % 0.6 %      Neutrophils, Absolute 7.25 10*3/mm3      Lymphocytes, Absolute 1.77 10*3/mm3      Monocytes, Absolute 0.69 10*3/mm3      Eosinophils, Absolute 0.02 10*3/mm3      Basophils, Absolute 0.05 10*3/mm3      Immature Grans, Absolute 0.06 10*3/mm3      nRBC 0.0 /100 WBC         Imaging Results (Last 7 Days)     Procedure Component Value Units Date/Time    MRI Brain With Contrast [[id number]] Collected: 07/24/21 0925     Updated: 07/24/21 0933    Narrative:      EXAMINATION:   MRI BRAIN W CONTRAST-  7/24/2021 9:25 AM CDT     HISTORY: MRI BRAIN W CONTRAST- 7/24/2021 8:40 AM CDT     HISTORY: abnormal CT head/mri brain, hx lung cancer;  E87.2-Xqqs-sjwtifpfak and hyponatremia; N17.9-Acute kidney failure,  unspecified     COMPARISON: MR brain July 23, 2021       TECHNIQUE: Multiplanar imaging of the brain was performed in a routine  fashion before and after the intravenous injection of gadolinium  contrast.     FINDINGS:   Diffusion: No restriction of diffusion to suggest acute ischemia.     Midline structures: Nondisplaced.     Ventricles: Normal in configuration and symmetric in size.        Basilar cisterns: Maintained.     Extra axial space: No abnormal extra-axial fluid.     Gray-white matter signal: Extensive chronic microvascular ischemic  changes noted in the periventricular white matter. FLAIR signal is  present in the left occipital lobe in the region of the 2 enhancing  lesions..     Cerebellum: Normal.     Brainstem: Normal.      Enhancement: There are 2 focal regions of abnormal enhancement in the  left occipital lobe. This is consistent with metastatic disease. One is  just inferior and medial to previously described lesion in the left  occipital lobe. The second is just superior and lateral views of the  cystic lesion.     Other: Proximal cervical spinal cord is normal. Bilateral globes and  orbits are normal in appearance. Normal cerebrovascular flow voids  noted. No abnormal signal in the mastoid air cells or paranasal sinuses.       Impression:      1. 2 small enhancing lesions left occipital lobe one is inferior and  medial to the cystic lesion. A second is superior and lateral to the  cystic lesion. These rim-like enhancing lesions are most consistent with  metastatic disease.  2. Extensive changes of chronic microvascular ischemic disease are noted  in the periventricular white         This report was finalized on 07/24/2021 09:30 by Dr. Vini Dyer MD.    MRI Brain Without Contrast [[id number]] Collected: 07/23/21 1702     Updated: 07/23/21 1713    Narrative:      EXAMINATION: MRI brain without contrast 7/23/2021     HISTORY: Evaluate for stroke.     FINDINGS: Today's exam is compared to a previous MRI of the brain dated  7/3/2019. Multiplanar fast spin echo sequences were obtained of the  brain on a high-field magnet without gadolinium enhancement. There is a  predominantly cystic lesion involving the left occipital lobe which is  well-circumscribed measuring 2.1 x 1.6 cm in size with surrounding  vasogenic edema. There is some restriction of diffusion within the wall  of this lesion. Given the history of lung carcinoma I would favor that  this represents a metastatic focus. A cerebral abscess would also be in  the differential but considered less likely given the location,  appearance and history. No additional foci of diffusion restriction are  present.     There is atrophy of the brain with prominence of the subarachnoid  spaces  and ventricular enlargement. There is evidence of previous lacunar  infarcts involving the right basal ganglia with focal encephalomalacia  and gliosis. Moderate to severe small vessel ischemic changes are noted  involving the periventricular and higher white matter tracts. No  additional discrete intra-axial lesions are appreciated. Postcontrast  imaging would be recommended to assess for additional more subtle  lesions. Gradient imaging demonstrates no evidence of blooming to  suggest acute or chronic hemorrhage.       Impression:      1.. There is a cystic mass involving the left occipital lobe with  adjacent vasogenic edema. Given the history I would favor a metastatic  lesion. Abscess is also in the differential but considered less likely.  There is some associated diffusion restriction within the wall of the  lesion. No additional discrete lesions are appreciated but postcontrast  imaging is recommended.  2. Atrophy with moderate to severe small vessel ischemic disease. Remote  lacunar infarcts involving the right basal ganglia are present with  focal encephalomalacia and gliosis.  3. No additional foci of diffusion restriction are demonstrated. There  is no radiographic evidence of acute ischemic stroke on today's exam.  This report was finalized on 07/23/2021 17:10 by Dr. Aric Rosen MD.    US Carotid Bilateral [[id number]] Collected: 07/23/21 1524     Updated: 07/23/21 1528    Narrative:      History: Stroke       Impression:      Impression:  1. There is less than 50% stenosis of the right internal carotid artery.  2. There is less than 50% stenosis of the left internal carotid artery.  3. Antegrade flow is demonstrated in bilateral vertebral arteries.     Comments: Bilateral carotid vertebral arterial duplex scan was  performed.     Grayscale imaging shows intimal thickening and calcified elements at the  carotid bifurcation. The right internal carotid artery peak systolic  velocity is 90.8  cm/sec. The end-diastolic velocity is 22.6 cm/sec. The  right ICA/CCA ratio is approximately 1.2 . These findings correlate with  less than 50% stenosis of the right internal carotid artery.     Grayscale imaging shows intimal thickening and calcified elements at the  carotid bifurcation. The left internal carotid artery peak systolic  velocity is 90.6 cm/sec. The end-diastolic velocity is 24.5 cm/sec. The  left ICA/CCA ratio is approximately 1.0 . These findings correlate with  less than 50% stenosis of the left internal carotid artery.     Antegrade flow is demonstrated in bilateral vertebral arteries.     This report was finalized on 07/23/2021 15:25 by Dr. Nicholas Leahy MD.    CT Head Without Contrast [[id number]] Collected: 07/23/21 0936     Updated: 07/23/21 0945    Narrative:      EXAMINATION: CT HEAD WO CONTRAST-  7/23/2021 9:36 AM CDT     CT SCAN OF THE HEAD, WITHOUT CONTRAST:      HISTORY: Syncope, mental status changes     COMPARISONS: Head CT imaging dated  1/22/2019 and 4/6/2017. Brain MR  imaging dated 7/3/2019.     TECHNIQUE:     Radiation dose equals  mGy-cm.  Automated exposure control dose  reduction technique was implemented.        CT evaluation of the head without intravenous contrast. 5 mm transaxial  images were obtained.   2-D sagittal and coronal reconstruction images  were generated.     FINDINGS:      There is confluent low attenuation in the periventricular and  subcortical white matter observed previously compatible with advanced  chronic ischemic changes.     Old right basal ganglia lacunar infarct identified in the genu of the  internal capsule.     There is interval development of low attenuation in the left occipital  lobe compatible with an area of infarction, age uncertain, subacute or  chronic suspected. Correlate with clinical findings.     There is no intra or extra-axial hemorrhage.     There is no mass effect or midline shift.     There is no developing  "hydrocephalus     Paranasal sinuses imaged part are clear.     Hyperostosis frontalis interna identified. There is no skull fracture or  acute calvarial abnormality.             Impression:      1. Interval development of low attenuation in the left occipital lobe,  ischemic change/infarction, age-indeterminate, subacute or chronic  considered. Correlate with patient presentation.  2. Advanced chronic ischemic changes.           This report was finalized on 07/23/2021 09:42 by Dr. Marcello Rutledge MD.    XR Chest 1 View [[id number]] Collected: 07/23/21 0811     Updated: 07/23/21 0814    Narrative:      EXAM: XR CHEST 1 VW- - 7/23/2021 7:52 AM CDT     HISTORY: Syncope       COMPARISON: 8/16/2020.      TECHNIQUE:  1 images.  Frontal view of the chest.     FINDINGS:    No pneumothorax, pleural effusion or focal consolidation. Minimal linear  atelectasis or scarring at the left lung base. Cardiac mediastinal  silhouette within normal limits. Calcified aortic atherosclerosis. No  acute bony finding.          Impression:      1. No acute cardiopulmonary findings.  This report was finalized on 07/23/2021 08:11 by Dr Ally Tierney MD.            Condition on Discharge:    Stable    Physical Exam on Discharge:  /46 (BP Location: Right arm, Patient Position: Sitting)   Pulse 97   Temp 98.5 °F (36.9 °C) (Oral)   Resp 16   Ht 149.9 cm (59.02\")   Wt 86.7 kg (191 lb 2.2 oz)   LMP  (LMP Unknown)   SpO2 100%   BMI 38.58 kg/m²   Physical Exam     Constitutional:       General: She is not in acute distress.     Appearance: Normal appearance. She is normal weight.   HENT:      Head: Normocephalic and atraumatic.      Right Ear: External ear normal.      Left Ear: External ear normal.      Nose: Nose normal.      Mouth/Throat:      Mouth: Mucous membranes are moist.      Pharynx: Oropharynx is clear.   Eyes:      General: No scleral icterus.     Extraocular Movements: Extraocular movements intact.      Conjunctiva/sclera: " Conjunctivae normal.      Pupils: Pupils are equal, round, and reactive to light.   Cardiovascular:      Rate and Rhythm: Normal rate and regular rhythm.      Pulses: Normal pulses.      Heart sounds: Normal heart sounds.   Pulmonary:      Effort: Pulmonary effort is normal. No respiratory distress.      Breath sounds: Normal breath sounds.   Abdominal:      General: Abdomen is flat. Bowel sounds are normal.      Palpations: Abdomen is soft. There is no mass.   Musculoskeletal:         General: Normal range of motion.      Right lower leg: No edema.      Left lower leg: Edema present.   Skin:     General: Skin is warm and dry.      Coloration: Skin is not pale.   Neurological:      General: No focal deficit present.      Mental Status: She is alert and oriented to person, place, and time. Mental status is at baseline.      Motor: Weakness (Generalized without focal deficit) present.   Psychiatric:         Mood and Affect: Mood normal.         Judgment: Judgment normal.       Discharge Disposition:  Home or Self Care    Discharge Medications:     Discharge Medications      Changes to Medications      Instructions Start Date   guaiFENesin 600 MG 12 hr tablet  Commonly known as: MUCINEX  What changed: when to take this   1,200 mg, Oral, 2 Times Daily PRN         Continue These Medications      Instructions Start Date   acetaminophen 325 MG tablet  Commonly known as: TYLENOL   650 mg, Oral, Every 4 Hours PRN      albuterol sulfate  (90 Base) MCG/ACT inhaler  Commonly known as: PROVENTIL HFA;VENTOLIN HFA;PROAIR HFA   1 puff, Inhalation, Daily      amLODIPine 5 MG tablet  Commonly known as: NORVASC   5 mg, Oral, Daily      apixaban 5 MG tablet tablet  Commonly known as: ELIQUIS   5 mg, Oral, Every 12 Hours Scheduled      aspirin 81 MG EC tablet   81 mg, Oral, Daily      CALTRATE 600+D PO   1 tablet, Oral, 2 times daily      docusate sodium 100 MG capsule   100 mg, Oral, 2 Times Daily PRN      fluticasone-salmeterol  100-50 MCG/DOSE DISKUS  Commonly known as: ADVAIR   Inhalation, 2 Times Daily - RT      furosemide 20 MG tablet  Commonly known as: LASIX   20 mg, Oral, Daily, At bedtime       furosemide 40 MG tablet  Commonly known as: LASIX   40 mg, Oral, Daily, Morning      gabapentin 100 MG capsule  Commonly known as: NEURONTIN   200 mg, Oral, 3 Times Daily      levETIRAcetam 500 MG tablet  Commonly known as: KEPPRA   500 mg, Oral, 2 Times Daily      lidocaine 5 %  Commonly known as: LIDODERM   1 patch, Transdermal, Daily PRN, Remove & Discard patch within 12 hours or as directed by MD       lisinopril 20 MG tablet  Commonly known as: PRINIVIL,ZESTRIL   20 mg, Oral, Daily      LORazepam 0.5 MG tablet  Commonly known as: ATIVAN   1 tablet, Oral, 2 Times Daily PRN      metoprolol succinate XL 50 MG 24 hr tablet  Commonly known as: TOPROL-XL   50 mg, Oral, Daily      nitroglycerin 0.4 MG SL tablet  Commonly known as: NITROSTAT   0.4 mg, Sublingual, Every 5 Minutes PRN, Take no more than 3 doses in 15 minutes.      omeprazole 40 MG capsule  Commonly known as: priLOSEC   40 mg, Oral, Daily      ondansetron 4 MG tablet  Commonly known as: ZOFRAN   1 tablet, Oral, Every 6 Hours PRN      oxybutynin 5 MG tablet  Commonly known as: DITROPAN   1 tablet, Oral, 2 Times Daily      polyethylene glycol 17 g packet  Commonly known as: MIRALAX   17 g, Oral, Daily PRN      simvastatin 20 MG tablet  Commonly known as: ZOCOR   20 mg, Oral, Daily      spironolactone 25 MG tablet  Commonly known as: ALDACTONE   25 mg, Oral, Daily      tiotropium bromide monohydrate 2.5 MCG/ACT aerosol solution inhaler  Commonly known as: SPIRIVA RESPIMAT   2 puffs, Inhalation, Daily - RT      traMADol 50 MG tablet  Commonly known as: ULTRAM   1 tablet, Oral, Every 6 Hours PRN             Discharge Diet:   Diet Instructions     Diet: Regular; Thin      Discharge Diet: Regular    Fluid Consistency: Thin          Discharge Care Plan / Instructions:   Discharge to  skilled nursing facility    Activity at Discharge:   Activity Instructions     Activity as Tolerated            Follow-up Appointments:  Follow-up with radiation oncology next week       Electronically signed by Milo White DO, 07/24/21, 15:54 CDT.    Time: Discharge Less than 30 min    Part of this note may be an electronic transcription/translation of spoken language to printed text using the Dragon Dictation system.

## 2021-07-24 NOTE — CASE MANAGEMENT/SOCIAL WORK
Discharge Planning Assessment  [facility]     Patient Name: Josi Mohamud  MRN: [id number]  Today's Date: 7/24/2021    Admit Date: 7/23/2021    Discharge Needs Assessment     Row Name 07/24/21 1547       Living Environment    Lives With  facility resident    Current Living Arrangements  residential facility    Primary Care Provided by  -- facility provides care    Provides Primary Care For  no one    Quality of Family Relationships  helpful;involved;supportive    Able to Return to Prior Arrangements  yes       Resource/Environmental Concerns    Resource/Environmental Concerns  none    Transportation Concerns  car, none       Transition Planning    Patient/Family Anticipates Transition to  long-term care facility    Patient/Family Anticipated Services at Transition  skilled nursing    Transportation Anticipated  agency       Discharge Needs Assessment    Readmission Within the Last 30 Days  no previous admission in last 30 days    Equipment Currently Used at Home  -- facility provides all that is needed    Concerns to be Addressed  no discharge needs identified;denies needs/concerns at this time    Anticipated Changes Related to Illness  none    Equipment Needed After Discharge  none    Discharge Coordination/Progress  Pt has RX coverage and a PCP. Pt plans on returning back to [facility] at discharge which pt is able to per weekend RN at [facility]. SW will follow for needs        Discharge Plan    No documentation.       Continued Care and Services - Admitted Since 7/23/2021     Destination Coordination complete.    Service Provider Request Status Selected Services Address Phone Fax Patient Preferred    [facility]   Selected Skilled Nursing [address] , [facility] [address] [phone] --                Demographic Summary    No documentation.       Functional Status    No documentation.       Psychosocial    No documentation.       Abuse/Neglect    No documentation.       Legal    No  documentation.       Substance Abuse    No documentation.       Patient Forms    No documentation.           Angela Delgadillo

## 2021-07-24 NOTE — THERAPY EVALUATION
Acute Care - Speech Language Pathology   Swallow Initial Evaluation [facility]     Patient Name: Josi Mohamud  : 1945  MRN: [id number]  Today's Date: 2021  Onset of Illness/Injury or Date of Surgery: 21     Referring Physician: Dr. White      Admit Date: 2021     Clinical bedside swallowing evaluation completed per SLP secondary to failed swallowing screening on 21. SLP unable to eval pt at that time secondary to pt being off floor for MRI. Visited this AM, again was off floor for testing, yet reason for SLP visit and plan was reviewed with daughter who was present at the bedside at that time. This PM, pt was alert, cooperative, reported occasional dysphagia with solids, reporting the feeling of material sticking in the upper esophageal region at the base of the pharynx, with report of expectoration of congealed food particles at times, which has even been multiple hours following PO intake. She reported this typically occurs roughly 1x/week, c/o hoarse vocal quality (which was observed) and stated this has been present for approximately six months with intermittent associated dull pain. She does take medication for reflux. Oral mech exam revealed labial and lingual fasciculations, with decreased lingual strength and ROM, especially to the L side during lateralization attempts. She was presented a full range of consistencies excluding mechanical soft. She appeared to have functional oral prep, though decreased rotary chew with solid which was felt to be mildly prolonged. She exhibited an audible swallow with both nectar thick and thin liquids, raising concern for decreased pharyngeal control with these consistencies. She was observed to have intermittent vocal quality changes, though, given baseline hoarse vocal quality, pt's voice varied even prior to PO trials. No further concerns observed. Considering vocal quality concerns, pt is receptive to ENT consult. She is aware of SLP  plan to monitor diet toleration. She was presented the option for soft solids, given pt c/o sore gums which she attributed to her dentures, however, she verbalized desire to continue on a regular solid diet consistency diet at this time.   RECS:   • Continue current diet of regular solid consistency with thin liquids  • meds whole with thin liquids  • RN to monitor for increased lung congestion  • General feeding/aspiration precautions  • ENT consult for chronic hoarse vocal quality with intermittent pain, would likely benefit from endoscopy given complaints also consistent with esophageal dysphagia. ST to monitor diet toleration, may benefit from VFSS in Radiology if concerns increase. Thanks!  Suzette Haynes, CCC-SLP 7/24/2021 15:30 CDT    Visit Dx:     ICD-10-CM ICD-9-CM   1. Hyponatremia  E87.1 276.1   2. CECY (acute kidney injury) (CMS/[facility])  N17.9 584.9   3. Decreased activities of daily living (ADL)  Z78.9 V49.89   4. Dysphagia, unspecified type  R13.10 787.20     Patient Active Problem List   Diagnosis   • Syncope and collapse   • Essential hypertension   • Anticoagulated   • PAF (paroxysmal atrial fibrillation) (CMS/[facility])   • Obesity (BMI 30-39.9)   • Fall   • Bilateral carotid artery stenosis   • Brain mass     Past Medical History:   Diagnosis Date   • Anticoagulated 7/21/2018   • Bilateral carotid artery stenosis 6/8/2021   • Bronchitis    • Cancer (CMS/[facility])     lung   • COPD (chronic obstructive pulmonary disease) (CMS/[facility])    • COPD, moderate (CMS/[facility]) 7/8/2019   • Coronary artery disease    • Coronary artery disease involving native coronary artery of native heart without angina pectoris 5/19/2017   • Essential hypertension 5/19/2017   • Gastroesophageal reflux disease 7/21/2018   • GERD (gastroesophageal reflux disease)    • History of transfusion    • Hx of Hodgkin's lymphoma 10/11/2019   • Hyperlipidemia    • Hypertension    • Malignant neoplasm of upper lobe of left lung (CMS/HCC) 7/8/2019   • Mixed  hyperlipidemia 6/8/2021   • PAF (paroxysmal atrial fibrillation) (CMS/HCC)    • Parotid neoplasm 7/21/2018   • Persistent asthma without complication 7/8/2019   • Renal cyst 2/24/2020   • Seizures (CMS/HCC)    • Status post stereotactic radiosurgery 2/24/2020   • Stented coronary artery 5/19/2017   • Thyroid nodule 7/21/2018     Past Surgical History:   Procedure Laterality Date   • ANKLE OPEN REDUCTION INTERNAL FIXATION Left 8/15/2020    Procedure: ANKLE OPEN REDUCTION INTERNAL FIXATION;  Surgeon: Keyshawn Medrano MD;  Location: [facility] OR;  Service: Orthopedics;  Laterality: Left;   • APPENDECTOMY     • APPENDECTOMY     • CARDIAC CATHETERIZATION     • CARDIAC ELECTROPHYSIOLOGY PROCEDURE N/A 5/23/2017    Procedure: Loop insertion; LINQ;  Surgeon: Jose Monteiro MD;  Location: [facility] CATH INVASIVE LOCATION;  Service:    • CHOLECYSTECTOMY     • CORONARY ANGIOPLASTY WITH STENT PLACEMENT      X 1    • INCISION AND DRAINAGE OF WOUND Left 8/15/2020    Procedure: INCISION AND DRAINAGE WOUND;  Surgeon: Keyshawn Medrano MD;  Location: [facility] OR;  Service: Orthopedics;  Laterality: Left;   • LIVER BIOPSY     • MYRINGOTOMY W/ TUBES Left 11/22/2019    Procedure: LEFT MYRINGOTOMY WITH INSERTION OF EAR TUBE WITH RIGHT EAR EXAM UNDER ANESTHESIA;  Surgeon: Se Mcnulty MD;  Location: [facility] OR;  Service: ENT   • SPLENECTOMY     • US GUIDED FINE NEEDLE ASPIRATION  8/7/2018       SLP Recommendation and Plan  SLP Swallowing Diagnosis: mild, functional oral phase, R/O pharyngeal dysphagia, esophageal dysphagia  SLP Diet Recommendation: regular textures, thin liquids  Recommended Precautions and Strategies: upright posture during/after eating, small bites of food and sips of liquid  SLP Rec. for Method of Medication Administration: meds whole, with thin liquids     Monitor for Signs of Aspiration: yes, notify SLP if any concerns, cough, gurgly voice, throat clearing, pneumonia, right lower lobe infiltrates  Recommended  Diagnostics: other (see comments) (ENT consult for hoarse vocal quality )  Swallow Criteria for Skilled Therapeutic Interventions Met: demonstrates skilled criteria  Anticipated Discharge Disposition (SLP): home  Rehab Potential/Prognosis, Swallowing: good, to achieve stated therapy goals  Therapy Frequency (Swallow): at least, 3 days per week  Predicted Duration Therapy Intervention (Days): until discharge                         Plan of Care Reviewed With: patient, other (see comments) (RNCortney)  Progress:  (Eval)  Outcome Summary: Clinical bedside swallowing evaluation completed per SLP secondary to failed swallowing screening on 07/23/21. SLP unable to eval pt at that time secondary to pt being off floor for MRI. Visited this AM, again was off floor for testing, yet reason for SLP visit and plan was reviewed with daughter who was present at the bedside at that time. This PM, pt was alert, cooperative, reported occasional dysphagia with solids, reporting the feeling of material sticking in the upper esophageal region at the base of the pharynx, with report of expectoration of congealed food particles at times, which has even been multiple hours following PO intake. She reported this typically occurs roughly 1x/week, c/o hoarse vocal quality (which was observed) and stated this has been present for approximately six months with intermittent associated dull pain. She does take medication for reflux. Oral mech exam revealed labial and lingual fasciculations, with decreased lingual strength and ROM, especially to the L side during lateralization attempts. She was presented a full range of consistencies excluding mechanical soft. She appeared to have functional oral prep, though decreased rotary chew with solid which was felt to be mildly prolonged. She exhibited an audible swallow with both nectar thick and thin liquids, raising concern for decreased pharyngeal control with these consistencies. She was observed  to have intermittent vocal quality changes, though, given baseline hoarse vocal quality, pt's voice varied even prior to PO trials. No further concerns observed. Considering vocal quality concerns, pt is receptive to ENT consult. She is aware of SLP plan to monitor diet toleration. She was presented the option for soft solids, given pt c/o sore gums which she attributed to her dentures, however, she verbalized desire to continue on a regular solid diet consistency diet at this time. RECS: Continue current diet of regular solid consistency with thin liquids; meds whole with thin liquids; RN to monitor for increased lung congestion; General feeding/aspiration precautions; ENT consult for chronic hoarse vocal quality with intermittent pain, would likely benefit from endoscopy given complaints also consistent with esophageal dysphagia. ST to monitor diet toleration, may benefit from VFSS in Radiology if concerns increase. Thanks!         SWALLOW EVALUATION (last 72 hours)      SLP Adult Swallow Evaluation     Row Name 07/24/21 5711                   Rehab Evaluation    Document Type  evaluation  -TM        Subjective Information  complains of mild dysphagia, hoarse vocal quality   -TM        Patient Observations  alert;cooperative  -TM        Patient/Family/Caregiver Comments/Observations  Upright in chair at time of eval. No family present.  -TM        Care Plan Review  evaluation/treatment results reviewed;care plan/treatment goals reviewed;risks/benefits reviewed;current/potential barriers reviewed;patient/other agree to care plan Cortney MCKENZIE   -        Patient Effort  adequate  -TM           General Information    Patient Profile Reviewed  yes  -TM        Pertinent History Of Current Problem  Acute gait difficulty, falls, L occipital density consistent with neoplasm. Hx of Hodgkin's lymphoma, L lung CA, CAD, COPD, bronchitis, HTN, GERD, hyperlipidemia, renal cyst, parotid neoplasm, thyroid nodule, stented coronary  artery. CXR 07/23/21 showed no acute process.   -TM        Current Method of Nutrition  regular textures;thin liquids  -TM        Precautions/Limitations, Vision  WFL  -TM        Precautions/Limitations, Hearing  WFL  -TM        Prior Level of Function-Communication  WFL  -TM        Prior Level of Function-Swallowing  no diet consistency restrictions  -TM        Plans/Goals Discussed with  patient;other (see comments) RNCortney  -TM        Barriers to Rehab  medically complex  -TM        Patient's Goals for Discharge  patient did not state  -TM           Pain    Additional Documentation  Pain Scale: FACES Pre/Post-Treatment (Group)  -TM           Pain Scale: FACES Pre/Post-Treatment    Pain: FACES Scale, Pretreatment  0-->no hurt  -TM        Posttreatment Pain Rating  0-->no hurt  -TM           Oral Motor Structure and Function    Dentition Assessment  upper dentures/partial in place;lower dentures/partial in place;other (see comments) c/o sore gums which pt attributed to ill-fitting dentures  -TM        Secretion Management  WNL/WFL  -TM        Mucosal Quality  moist, healthy  -TM        Volitional Swallow  WFL  -TM        Volitional Cough  weak  -TM           Oral Musculature and Cranial Nerve Assessment    Oral Motor General Assessment  generalized oral motor weakness  -TM        Mandibular Impairment Detail, Cranial Nerve V (Trigeminal)  CN5: motor impairment;reduced mandibular ROM;reduced strength bilaterally  -TM        Oral Labial or Buccal Impairment, Detail, Cranial Nerve VII (Facial):  CN7: Motor Impairment;reduced ROM;reduced strength bilaterally  -TM        Lingual Impairment, Detail. Cranial Nerves IX, XII (Glossopharyngeal and Hypoglossal)  CN12: Motor Impairment;reduced lingual ROM;reduced strength  -TM        Vocal Impairment, Detail. Cranial Nerve X (Vagus)  CN10: Motor;vocal quality abnormality (see comments);impaired throat clear/cough (see comments);other (see comments) Hoarse vocal quality    -TM           General Eating/Swallowing Observations    Respiratory Support Currently in Use  room air  -TM        Eating/Swallowing Skills  fed by SLP  -TM        Positioning During Eating  upright 90 degree;upright in chair  -TM        Utensils Used  spoon;straw  -TM        Consistencies Trialed  pudding thick;honey-thick liquids;nectar/syrup-thick liquids;thin liquids;regular textures  -TM           Respiratory    Respiratory Status  WFL  -TM           Clinical Swallow Eval    Oral Prep Phase  WFL  -TM        Oral Transit  WFL  -TM        Oral Residue  WFL  -TM        Pharyngeal Phase  suspected pharyngeal impairment  -TM        Esophageal Phase  suspected esophageal impairment  -TM        Clinical Swallow Evaluation Summary  See note.  -TM           Pharyngeal Phase Concerns    Pharyngeal Phase Concerns  other (see comments) Vocal inconsistencies at times, audible swallows   -TM           Esophageal Phase Concerns    Esophageal Phase Concerns  other (see comments) recent hx of sensation of material sticking, expectoration   -TM           Clinical Impression    SLP Swallowing Diagnosis  mild;functional oral phase;R/O pharyngeal dysphagia;esophageal dysphagia  -TM        Functional Impact  risk of aspiration/pneumonia  -TM        Rehab Potential/Prognosis, Swallowing  good, to achieve stated therapy goals  -TM        Swallow Criteria for Skilled Therapeutic Interventions Met  demonstrates skilled criteria  -TM           Recommendations    Therapy Frequency (Swallow)  at least;3 days per week  -TM        Predicted Duration Therapy Intervention (Days)  until discharge  -TM        SLP Diet Recommendation  regular textures;thin liquids  -TM        Recommended Diagnostics  other (see comments) ENT consult for hoarse vocal quality   -TM        Recommended Precautions and Strategies  upright posture during/after eating;small bites of food and sips of liquid  -TM        Oral Care Recommendations  Oral Care BID/PRN  -TM         SLP Rec. for Method of Medication Administration  meds whole;with thin liquids  -TM        Monitor for Signs of Aspiration  yes;notify SLP if any concerns;cough;gurgly voice;throat clearing;pneumonia;right lower lobe infiltrates  -TM        Anticipated Discharge Disposition (SLP)  home  -TM           Swallow Goals (SLP)    Oral Nutrition/Hydration Goal Selection (SLP)  oral nutrition/hydration, SLP goal 1  -TM        Lingual Strengthening Goal Selection (SLP)  lingual strengthening, SLP goal 1  -TM        Additional Documentation  lingual strengthening goal selection (SLP)  -TM           Oral Nutrition/Hydration Goal 1 (SLP)    Oral Nutrition/Hydration Goal 1, SLP  Pt will tolerate LRD without overt s/s of aspiration.  -TM        Time Frame (Oral Nutrition/Hydration Goal 1, SLP)  by discharge  -TM        Barriers (Oral Nutrition/Hydration Goal 1, SLP)  n/a  -TM        Progress/Outcomes (Oral Nutrition/Hydration Goal 1, SLP)  goal ongoing  -TM           Lingual Strengthening Goal 1 (SLP)    Activity (Lingual Strengthening Goal 1, SLP)  increase lingual tone/sensation/control/coordination/movement  -TM        Increase Lingual Tone/Sensation/Control/Coordination/Movement  lingual movement exercises  -TM        [place]/Accuracy (Lingual Strengthening Goal 1, SLP)  independently (over 90% accuracy)  -TM        Time Frame (Lingual Strengthening Goal 1, SLP)  by discharge  -TM        Barriers (Lingual Strengthening Goal 1, SLP)  n/a  -TM        Progress/Outcomes (Lingual Strengthening Goal 1, SLP)  goal ongoing  -TM          User Key  (r) = Recorded By, (t) = Taken By, (c) = Cosigned By    Initials Name Effective Dates    TM Suzette Haynes CCC-SLP 06/16/21 -           EDUCATION  The patient has been educated in the following areas:   Dysphagia (Swallowing Impairment).       SLP GOALS     Row Name 07/24/21 3821             Oral Nutrition/Hydration Goal 1 (SLP)    Oral Nutrition/Hydration Goal 1, SLP  Pt will  tolerate LRD without overt s/s of aspiration.  -TM      Time Frame (Oral Nutrition/Hydration Goal 1, SLP)  by discharge  -TM      Barriers (Oral Nutrition/Hydration Goal 1, SLP)  n/a  -TM      Progress/Outcomes (Oral Nutrition/Hydration Goal 1, SLP)  goal ongoing  -TM         Lingual Strengthening Goal 1 (SLP)    Activity (Lingual Strengthening Goal 1, SLP)  increase lingual tone/sensation/control/coordination/movement  -TM      Increase Lingual Tone/Sensation/Control/Coordination/Movement  lingual movement exercises  -TM      [place]/Accuracy (Lingual Strengthening Goal 1, SLP)  independently (over 90% accuracy)  -TM      Time Frame (Lingual Strengthening Goal 1, SLP)  by discharge  -TM      Barriers (Lingual Strengthening Goal 1, SLP)  n/a  -TM      Progress/Outcomes (Lingual Strengthening Goal 1, SLP)  goal ongoing  -TM        User Key  (r) = Recorded By, (t) = Taken By, (c) = Cosigned By    Initials Name Provider Type     Suzette Haynes CCC-SLP Speech and Language Pathologist             Time Calculation:   Time Calculation- SLP     Row Name 07/24/21 1529             Time Calculation- SLP    SLP Start Time  1430  -TM      SLP Stop Time  1525  -TM      SLP Time Calculation (min)  55 min  -TM      SLP Received On  07/24/21  -TM      SLP Goal Re-Cert Due Date  08/03/21  -TM         Untimed Charges    SLP Eval/Re-eval   ST Eval Oral Pharyng Swallow - 36349  -TM      12868-LC Eval Oral Pharyng Swallow Minutes  55  -TM         Total Minutes    Untimed Charges Total Minutes  55  -TM       Total Minutes  55  -TM        User Key  (r) = Recorded By, (t) = Taken By, (c) = Cosigned By    Initials Name Provider Type     Suzette Haynes CCC-SLP Speech and Language Pathologist          Therapy Charges for Today     Code Description Service Date Service Provider Modifiers Qty    [id number] HC ST EVAL ORAL PHARYNG SWALLOW 4 7/24/2021 Suzette Haynes CCC-SLP GN 1               NIKI Keith  7/24/2021

## 2021-07-24 NOTE — THERAPY EVALUATION
Patient Name: Josi Mohamud  : 1945    MRN: [id number]                              Today's Date: 2021       Admit Date: 2021    Visit Dx:     ICD-10-CM ICD-9-CM   1. Hyponatremia  E87.1 276.1   2. CECY (acute kidney injury) (CMS/[facility])  N17.9 584.9   3. Decreased activities of daily living (ADL)  Z78.9 V49.89   4. Dysphagia, unspecified type  R13.10 787.20   5. Impaired mobility  Z74.09 799.89     Patient Active Problem List   Diagnosis   • Syncope and collapse   • Essential hypertension   • Anticoagulated   • PAF (paroxysmal atrial fibrillation) (CMS/[facility])   • Obesity (BMI 30-39.9)   • Fall   • Bilateral carotid artery stenosis   • Brain mass     Past Medical History:   Diagnosis Date   • Anticoagulated 2018   • Bilateral carotid artery stenosis 2021   • Bronchitis    • Cancer (CMS/[facility])     lung   • COPD (chronic obstructive pulmonary disease) (CMS/[facility])    • COPD, moderate (CMS/HCC) 2019   • Coronary artery disease    • Coronary artery disease involving native coronary artery of native heart without angina pectoris 2017   • Essential hypertension 2017   • Gastroesophageal reflux disease 2018   • GERD (gastroesophageal reflux disease)    • History of transfusion    • Hx of Hodgkin's lymphoma 10/11/2019   • Hyperlipidemia    • Hypertension    • Malignant neoplasm of upper lobe of left lung (CMS/HCC) 2019   • Mixed hyperlipidemia 2021   • PAF (paroxysmal atrial fibrillation) (CMS/[facility])    • Parotid neoplasm 2018   • Persistent asthma without complication 2019   • Renal cyst 2020   • Seizures (CMS/[facility])    • Status post stereotactic radiosurgery 2020   • Stented coronary artery 2017   • Thyroid nodule 2018     Past Surgical History:   Procedure Laterality Date   • ANKLE OPEN REDUCTION INTERNAL FIXATION Left 8/15/2020    Procedure: ANKLE OPEN REDUCTION INTERNAL FIXATION;  Surgeon: Keyshawn Medrano MD;  Location: [facility] OR;  Service:  Orthopedics;  Laterality: Left;   • APPENDECTOMY     • APPENDECTOMY     • CARDIAC CATHETERIZATION     • CARDIAC ELECTROPHYSIOLOGY PROCEDURE N/A 5/23/2017    Procedure: Loop insertion; LINQ;  Surgeon: Jose Monteiro MD;  Location:  [facility] CATH INVASIVE LOCATION;  Service:    • CHOLECYSTECTOMY     • CORONARY ANGIOPLASTY WITH STENT PLACEMENT      X 1    • INCISION AND DRAINAGE OF WOUND Left 8/15/2020    Procedure: INCISION AND DRAINAGE WOUND;  Surgeon: Keyshawn Medrano MD;  Location:  [facility] OR;  Service: Orthopedics;  Laterality: Left;   • LIVER BIOPSY     • MYRINGOTOMY W/ TUBES Left 11/22/2019    Procedure: LEFT MYRINGOTOMY WITH INSERTION OF EAR TUBE WITH RIGHT EAR EXAM UNDER ANESTHESIA;  Surgeon: Se Mcnulty MD;  Location:  [facility] OR;  Service: ENT   • SPLENECTOMY     • US GUIDED FINE NEEDLE ASPIRATION  8/7/2018     General Information     Row Name 07/24/21 1302          Physical Therapy Time and Intention    Document Type  evaluation Found in floor at NH. Dx: Syncope and collapse, subacute L occipital CVA, 2 small enhancing lesions L occipital lobe concerning for metastatic disease.  -SUBHASH     Mode of Treatment  physical therapy  -SUBHASH     Row Name 07/24/21 1302          General Information    Patient Profile Reviewed  yes  -SUBHASH     Prior Level of Function  min assist:;bed mobility;transfer;gait  -SUBHASH     Existing Precautions/Restrictions  (S) fall Shingles  -SUBHASH     Barriers to Rehab  medically complex  -SUBHASH     Row Name 07/24/21 1302          Living Environment    Lives With  facility resident  -SUBHASH     Row Name 07/24/21 1302          Home Main Entrance    Number of Stairs, Main Entrance  none  -SUBHASH     Row Name 07/24/21 1302          Stairs Within Home, Primary    Number of Stairs, Within Home, Primary  none  -SUBHASH     Row Name 07/24/21 1302          Cognition    Orientation Status (Cognition)  oriented x 4  -SUBHASH     Row Name 07/24/21 1302          Safety Issues, Functional Mobility    Impairments Affecting  Function (Mobility)  balance;endurance/activity tolerance;strength  -SUBHASH     Row Name 07/24/21 1302          Relationship/Environment    Name(s) of Who Lives With Patient  Kristal GARCIA  -SUBHASH       User Key  (r) = Recorded By, (t) = Taken By, (c) = Cosigned By    Initials Name Provider Type    Alexandre Francis, PT DPT Physical Therapist        Mobility     Row Name 07/24/21 1302          Bed Mobility    Bed Mobility  supine-sit  -SUBHASH     Supine-Sit [place] (Bed Mobility)  standby assist  -SUBHASH     Assistive Device (Bed Mobility)  bed rails;head of bed elevated  -SUBHASH     Row Name 07/24/21 1302          Sit-Stand Transfer    Sit-Stand [place] (Transfers)  contact guard  -SUBHASH     Assistive Device (Sit-Stand Transfers)  walker, front-wheeled  -SUBHASH     Row Name 07/24/21 1302          Gait/Stairs (Locomotion)    [place] Level (Gait)  contact guard;minimum assist (75% patient effort)  -SUBHASH     Assistive Device (Gait)  walker, front-wheeled  -SUBHASH     Distance in Feet (Gait)  50 ft x 2 reps, 1 sitting rest due to weakness in B LE  -SUBHASH     Deviations/Abnormal Patterns (Gait)  stacey decreased;gait speed decreased  -SUBHASH     Bilateral Gait Deviations  forward flexed posture  -SUBHASH       User Key  (r) = Recorded By, (t) = Taken By, (c) = Cosigned By    Initials Name Provider Type    Alexandre Francis, PT DPT Physical Therapist        Obj/Interventions     Row Name 07/24/21 1302          Range of Motion Comprehensive    Comment, General Range of Motion  B LE AROM WFL  -SUBHASH     Row Name 07/24/21 1302          Strength Comprehensive (MMT)    Comment, General Manual Muscle Testing (MMT) Assessment  B LE grossly 4-/5  -SUBHASH     Row Name 07/24/21 1302          Balance    Balance Assessment  sitting static balance;standing static balance  -SUBHASH     Static Sitting Balance  WFL;unsupported;sitting, edge of bed  -SUBHSAH     Static Standing Balance  mild impairment;supported;standing w RW  -SUBHASH     Dynamic Standing Balance  mild  impairment;supported;standing w RW  -SUBHASH     Row Name 07/24/21 1302          Sensory Assessment (Somatosensory)    Sensory Assessment (Somatosensory)  LE sensation intact  -SUBHASH       User Key  (r) = Recorded By, (t) = Taken By, (c) = Cosigned By    Initials Name Provider Type    Alexandre Francis PT DPT Physical Therapist        Goals/Plan     Row Name 07/24/21 1302          Bed Mobility Goal 1 (PT)    Activity/Assistive Device (Bed Mobility Goal 1, PT)  sit to supine/supine to sit  -SUBHASH     [employer] Level/Cues Needed (Bed Mobility Goal 1, PT)  standby assist  -SUBHASH     Time Frame (Bed Mobility Goal 1, PT)  long term goal (LTG);10 days  -SUBHASH     Progress/Outcomes (Bed Mobility Goal 1, PT)  goal ongoing  -SUBHASH     Row Name 07/24/21 1302          Transfer Goal 1 (PT)    Activity/Assistive Device (Transfer Goal 1, PT)  sit-to-stand/stand-to-sit;bed-to-chair/chair-to-bed;walker, rolling  -SUBHASH     [employer] Level/Cues Needed (Transfer Goal 1, PT)  contact guard assist  -SUBHASH     Time Frame (Transfer Goal 1, PT)  long term goal (LTG);10 days  -SUBHASH     Progress/Outcome (Transfer Goal 1, PT)  goal ongoing  -SUBHASH     Row Name 07/24/21 1302          Gait Training Goal 1 (PT)    Activity/Assistive Device (Gait Training Goal 1, PT)  gait (walking locomotion);assistive device use;decrease fall risk;improve balance and speed;increase endurance/gait distance  -SUBHASH     [employer] Level (Gait Training Goal 1, PT)  contact guard assist  -SUBHASH     Distance (Gait Training Goal 1, PT)  100 ft  -SUBHASH     Time Frame (Gait Training Goal 1, PT)  long term goal (LTG);10 days  -SUBHASH     Progress/Outcome (Gait Training Goal 1, PT)  goal ongoing  -SUBHASH       User Key  (r) = Recorded By, (t) = Taken By, (c) = Cosigned By    Initials Name Provider Type    Alexandre Francis PT DPT Physical Therapist        Clinical Impression     Row Name 07/24/21 1302          Pain    Additional Documentation  Pain Scale: Numbers Pre/Post-Treatment (Group)  -SUBHASH     Row  Name 07/24/21 1302          Pain Scale: Numbers Pre/Post-Treatment    Pretreatment Pain Rating  0/10 - no pain  -SUBHASH     Row Name 07/24/21 1302          Plan of Care Review    Plan of Care Reviewed With  patient  -SUBHASH     Outcome Summary  PT eval complete. She is alert and oriented x 4. She reports generalized weakness in B LE. She stood and ambulated w CGA/min assist with a RW,. She ambulated 50 ft x 2 reps needing 1 sitting rest break due to SOB and weakness in B LE. PT will cont to progress gait, balance and strength. Anticiapte she will return to SNF w continued rehab.  -SUBHASH     Row Name 07/24/21 1302          Therapy Assessment/Plan (PT)    Patient/Family Therapy Goals Statement (PT)  return to SNF  -SUBHASH     Rehab Potential (PT)  fair, will monitor progress closely  -SUBHASH     Criteria for Skilled Interventions Met (PT)  yes;meets criteria;skilled treatment is necessary  -SUBHASH     Predicted Duration of Therapy Intervention (PT)  untl d.c  -SUBHASH     Row Name 07/24/21 1302          Positioning and Restraints    Pre-Treatment Position  in bed  -SUBHASH     Post Treatment Position  chair  -SUBHASH     In Chair  notified nsg;reclined;call light within reach;encouraged to call for assist  -SUBHASH       User Key  (r) = Recorded By, (t) = Taken By, (c) = Cosigned By    Initials Name Provider Type    Alexandre Francis, PT DPT Physical Therapist        Outcome Measures     Row Name 07/24/21 1302          How much help from another person do you currently need...    Turning from your back to your side while in flat bed without using bedrails?  3  -SUBHASH     Moving from lying on back to sitting on the side of a flat bed without bedrails?  3  -SUBHASH     Moving to and from a bed to a chair (including a wheelchair)?  3  -SUBHASH     Standing up from a chair using your arms (e.g., wheelchair, bedside chair)?  3  -SUBHASH     Climbing 3-5 steps with a railing?  2  -SUBHASH     To walk in hospital room?  3  -SUBHASH     AM-PAC 6 Clicks Score (PT)  17  -SUBHASH     Row Name  07/24/21 1303 07/24/21 1302       Functional Assessment    Outcome Measure Options  AM-PAC 6 Clicks Daily Activity (OT)  -AC  AM-PAC 6 Clicks Basic Mobility (PT)  -SUBHASH      User Key  (r) = Recorded By, (t) = Taken By, (c) = Cosigned By    Initials Name Provider Type    AC Ant Cedillo, OTR/L, CNT Occupational Therapist    Alexandre Francis, PT DPT Physical Therapist                       Physical Therapy Education                 Title: PT OT SLP Therapies (In Progress)     Topic: Physical Therapy (In Progress)     Point: Mobility training (Done)     Learning Progress Summary           Patient Acceptance, E, VU,DU by SUBHASH at 7/24/2021 1302    Comment: benefits of activity, progression of PT POC                   Point: Home exercise program (Not Started)     Learner Progress:  Not documented in this visit.          Point: Body mechanics (Not Started)     Learner Progress:  Not documented in this visit.          Point: Precautions (Not Started)     Learner Progress:  Not documented in this visit.                      User Key     Initials Effective Dates Name Provider Type Discipline    SUBHASH 08/02/16 -  Alexandre Moe, PT DPT Physical Therapist PT              PT Recommendation and Plan  Planned Therapy Interventions (PT): bed mobility training, transfer training, gait training, balance training, home exercise program, patient/family education, postural re-education, strengthening  Plan of Care Reviewed With: patient  Outcome Summary: PT eval complete. She is alert and oriented x 4. She reports generalized weakness in B LE. She stood and ambulated w CGA/min assist with a RW,. She ambulated 50 ft x 2 reps needing 1 sitting rest break due to SOB and weakness in B LE. PT will cont to progress gait, balance and strength. Anticiapte she will return to SNF w continued rehab.     Time Calculation:   PT Charges     Row Name 07/24/21 8856             Time Calculation    Start Time  1302  -SUBHASH      Stop Time  1403  -SUBHASH       Time Calculation (min)  61 min  -SUBHASH      PT Received On  07/24/21  -SUBHASH      PT Goal Re-Cert Due Date  08/03/21  -SUBHASH        User Key  (r) = Recorded By, (t) = Taken By, (c) = Cosigned By    Initials Name Provider Type    Alexandre Francis, PT DPT Physical Therapist        Therapy Charges for Today     Code Description Service Date Service Provider Modifiers Qty    66680004725 HC PT EVAL MOD COMPLEXITY 4 7/24/2021 Alexandre Moe, PT DPT GP 1          PT G-Codes  Outcome Measure Options: AM-PAC 6 Clicks Daily Activity (OT)  AM-PAC 6 Clicks Score (PT): 17  AM-PAC 6 Clicks Score (OT): 20    Alexandre Moe, PT DPT  7/24/2021

## 2021-07-25 NOTE — THERAPY DISCHARGE NOTE
Acute Care - Physical Therapy Discharge Summary  [facility]       Patient Name: Josi Mohamud  : 1945  MRN: [id number]    Today's Date: 2021  Onset of Illness/Injury or Date of Surgery: 21       Referring Physician: Dr. White      Admit Date: 2021      PT Recommendation and Plan    Visit Dx:    ICD-10-CM ICD-9-CM   1. Hyponatremia  E87.1 276.1   2. CECY (acute kidney injury) (CMS/[facility])  N17.9 584.9   3. Decreased activities of daily living (ADL)  Z78.9 V49.89   4. Dysphagia, unspecified type  R13.10 787.20   5. Impaired mobility  Z74.09 799.89   6. Brain mass  G93.89 348.89       Outcome Measures     Row Name 21 1303             How much help from another is currently needed...    Putting on and taking off regular lower body clothing?  2  -AC      Bathing (including washing, rinsing, and drying)  3  -AC      Toileting (which includes using toilet bed pan or urinal)  3  -AC      Putting on and taking off regular upper body clothing  4  -AC      Taking care of personal grooming (such as brushing teeth)  4  -AC      Eating meals  4  -AC      AM-PAC 6 Clicks Score (OT)  20  -AC         Functional Assessment    Outcome Measure Options  AM-PAC 6 Clicks Daily Activity (OT)  -AC        User Key  (r) = Recorded By, (t) = Taken By, (c) = Cosigned By    Initials Name Provider Type    AC Ant Cedillo, OTR/L, CNT Occupational Therapist              PT Rehab Goals     Row Name 21 0700             Bed Mobility Goal 1 (PT)    [place] Level/Cues Needed (Bed Mobility Goal 1, PT)  -- Goal:SBA. Not attempted  -CARMEN      Progress/Outcomes (Bed Mobility Goal 1, PT)  goal not met  -CARMEN         Transfer Goal 1 (PT)    [place] Level/Cues Needed (Transfer Goal 1, PT)  contact guard assist;minimum assist (75% or more patient effort) Goal:CGA  -CARMEN      Progress/Outcome (Transfer Goal 1, PT)  goal not met  -CARMEN         Gait Training Goal 1 (PT)    [place] Level (Gait Training Goal 1, PT)   minimum assist (75% or more patient effort) Goal:CGA  -CARMEN      Distance (Gait Training Goal 1, PT)  2 x50  -CARMEN      Progress/Outcome (Gait Training Goal 1, PT)  goal not met  -CARMEN        User Key  (r) = Recorded By, (t) = Taken By, (c) = Cosigned By    Initials Name Provider Type Discipline    Bernarda Thompson, PTA Physical Therapy Assistant PT              PT Discharge Summary  Anticipated Discharge Disposition (PT): skilled nursing facility  Reason for Discharge: Discharge from facility  Outcomes Achieved: Discharge from facility occurred on same date as evluation  Discharge Destination: SNF      Bernarda Marin PTA   7/25/2021

## 2021-07-25 NOTE — THERAPY DISCHARGE NOTE
Acute Care - Occupational Therapy Discharge Summary  [facility]     Patient Name: Josi Mohamud  : 1945  MRN: [id number]    Today's Date: 2021  Onset of Illness/Injury or Date of Surgery: 21    Date of Referral to OT: 21  Referring Physician: Dr. White      Admit Date: 2021        OT Recommendation and Plan    Visit Dx:    ICD-10-CM ICD-9-CM   1. Hyponatremia  E87.1 276.1   2. CECY (acute kidney injury) (CMS/HCC)  N17.9 584.9   3. Decreased activities of daily living (ADL)  Z78.9 V49.89   4. Dysphagia, unspecified type  R13.10 787.20   5. Impaired mobility  Z74.09 799.89   6. Brain mass  G93.89 348.89               OT Rehab Goals     Row Name 21 0822             Transfer Goal 1 (OT)    Activity/Assistive Device (Transfer Goal 1, OT)  toilet;shower chair;walker, rolling  -MT      [place] Level/Cues Needed (Transfer Goal 1, OT)  modified independence  -MT      Time Frame (Transfer Goal 1, OT)  long term goal (LTG);10 days  -MT      Progress/Outcome (Transfer Goal 1, OT)  goal not met  -MT         Dressing Goal 1 (OT)    Activity/Device (Dressing Goal 1, OT)  lower body dressing  -MT      [place]/Cues Needed (Dressing Goal 1, OT)  minimum assist (75% or more patient effort)  -MT      Time Frame (Dressing Goal 1, OT)  long term goal (LTG);10 days  -MT      Progress/Outcome (Dressing Goal 1, OT)  goal not met  -MT         Strength Goal 1 (OT)    Strength Goal 1 (OT)  Increased BUE strength to 4+/5 to increase strength and endurance for ADL  -MT      Time Frame (Strength Goal 1, OT)  long term goal (LTG);10 days  -MT      Progress/Outcome (Strength Goal 1, OT)  goal not met  -MT        User Key  (r) = Recorded By, (t) = Taken By, (c) = Cosigned By    Initials Name Provider Type Discipline    MT Juan Carlos, Mar, TREVIZO/L Occupational Therapy Assistant OT          Outcome Measures     Row Name 21 1303             How much help from another is currently needed...     Putting on and taking off regular lower body clothing?  2  -AC      Bathing (including washing, rinsing, and drying)  3  -AC      Toileting (which includes using toilet bed pan or urinal)  3  -AC      Putting on and taking off regular upper body clothing  4  -AC      Taking care of personal grooming (such as brushing teeth)  4  -AC      Eating meals  4  -AC      AM-PAC 6 Clicks Score (OT)  20  -AC         Functional Assessment    Outcome Measure Options  AM-PAC 6 Clicks Daily Activity (OT)  -AC        User Key  (r) = Recorded By, (t) = Taken By, (c) = Cosigned By    Initials Name Provider Type    AC Ant Cedillo, OTR/L, CNT Occupational Therapist                  OT Discharge Summary  Reason for Discharge: Discharge from facility  Outcomes Achieved: Refer to plan of care for updates on goals achieved  Discharge Destination: [facility]      BETHANY Keen  7/25/2021

## 2021-07-26 NOTE — PROGRESS NOTES
[facility] ONC [facility] GROUP HEMATOLOGY AND ONCOLOGY  [address]  [place] [address] [phone]    Patient Name: Josi Mohamud  Encounter Date: 07/28/2021  YOB: 1945  Patient Number: [id number]      REASON FOR FOLLOW-UP: Josi Mohamud is a pleasant 75 y.o.  female, resident of [facility], who is seen on followup for brain metastasis.  She is seen with daughter, Franc.  She is marginal historian.       DIAGNOSTIC ABNORMALITIES:Brain metastasis.  She presented with change in mental status.  CBC 07/23/2021 revealed hemoglobin 12.5.  CMP remarkable for sodium of 128 and GFR 34.  Normal D-dimer, proBNP and troponin.  PT 15 and PTT 36.5.  Normal B12.  CT head 07/23/2021.  Interval development of low attenuation in the left occipital lobe, ischemic change/infarction, age-indeterminate, subacute or chronic considered. Correlate with patient presentation  Brain MRI 07/23/2021 without contrast. There is a cystic mass involving the left occipital lobe with adjacent vasogenic edema. Given the history I would favor a metastatic lesion. Abscess is also in the differential but considered less likely. There is some associated diffusion restriction within the wall of the lesion. No additional discrete lesions are appreciated but postcontrast imaging is recommended.  Atrophy with moderate to severe small vessel ischemic disease. Remote lacunar infarcts involving the right basal ganglia are present with focal encephalomalacia and gliosis.  No additional foci of diffusion restriction are demonstrated. There  is no radiographic evidence of acute ischemic stroke on today's exam.  Brain MRI with contrast 07/24/2021.2 small enhancing lesions left occipital lobe one is inferior and  medial to the cystic lesion. A second is superior and lateral to the cystic lesion. These rim-like enhancing lesions are most consistent with metastatic  disease.      PREVIOUS INTERVENTIONS:None.        DIAGNOSTIC ABNORMALITIES: Left upper lobe lung nodules.  CT chest 06/13/2019.  1.2 cm from 1 cm left upper lobe pulmonary nodule.  Left upper lobe 0.9 cm pulmonary nodule from 0.4 cm on 07/06/2018.  2 cm nodule right thyroid.  Seen by Dr. Noel 070 2019.  Not amenable to needle biopsy.  High false-negative result with navigational bronchoscopy.  She was followed along due to her age and comorbidities.  CT chest 09/17/2019.  2 nodules again noted in the left lung.  PET 09/23/2019.  Right-sided nodule SUV 5.4 and left-sided nodule SUV 6.5.  2 additional right parathyroid nodules more inferiorly.  Radiotracer uptake left tonsil 4.5 SUV.  The 0.9 cm with SUV of 2.3 and the 1.7 cm with SUV of 2.2.  Seen by Dr. Noel 09/25/2019.  Patient referred to radiation oncology.  Thyroid biopsy 10/14/2019 by Dr. Se Mcnulty.  Pathology showed follicular lesion of uncertain significance.  CT of the chest 01/30/2020 showed slightly decreased size of the larger left upper lobe pulmonary nodules.  1.7 cm centimeters low-density lesion.  Aspect right kidney  CT chest 05/29/2020.  Previously treated left upper lobe pulmonary nodule stable in size.  2.3 cm right renal cyst  CT chest 02/23/2021.Decreased size of spiculated lingular pulmonary nodule now measuring 0.9 cm, previously 1.5 cm on 5/29/2020.  CT chest 05/24/2021.Spiculated left upper lobe nodule measures 9 mm, similar to the 2/23/2021 exam. A previous spiculated 7 mm left apical nodule seen on 5/29/2020 is no longer visualized, however there are similar ill-defined densities within the left lung apex when compared to 2/23/2021 which may represent radiation change. No new lung nodules.           PREVIOUS INTERVENTIONS:  Patient was treated with 5000 cGy left upper lobe via SBRT 11/12/2019 through 11/25/2019.        Oncology/Hematology History    No history exists.       PAST MEDICAL HISTORY:  ALLERGIES:  Allergies   Allergen  Reactions   • Contrast Dye Itching     Contrast reaction occurred with pruritus and erythema of the upper  and lower extremities. No difficulties swallowing or breathing. Patient  received 50 mg Benadryl orally to improve symptomatology. Patient should  be pretreated with steroids and Benadryl prior to any future IV contrast  Administration. 05-     • Penicillins Rash     CURRENT MEDICATIONS:  Outpatient Encounter Medications as of 7/28/2021   Medication Sig Dispense Refill   • acetaminophen (TYLENOL) 325 MG tablet Take 2 tablets by mouth Every 4 (Four) Hours As Needed for Mild Pain .     • albuterol sulfate  (90 Base) MCG/ACT inhaler Inhale 1 puff Daily. 1 inhaler 6   • amLODIPine (NORVASC) 5 MG tablet Take 5 mg by mouth Daily.     • apixaban (ELIQUIS) 5 MG tablet tablet Take 1 tablet by mouth Every 12 (Twelve) Hours. 60 tablet 11   • aspirin 81 MG EC tablet Take 81 mg by mouth Daily.     • Calcium Carbonate-Vitamin D (CALTRATE 600+D PO) Take 1 tablet by mouth 2 (two) times a day.     • docusate sodium 100 MG capsule Take 100 mg by mouth 2 (Two) Times a Day As Needed for Constipation.     • fluticasone-salmeterol (ADVAIR) 100-50 MCG/DOSE DISKUS Inhale 2 (Two) Times a Day.     • furosemide (LASIX) 20 MG tablet Take 20 mg by mouth Daily. At bedtime     • furosemide (LASIX) 40 MG tablet Take 40 mg by mouth Daily. Morning     • gabapentin (NEURONTIN) 100 MG capsule Take 2 capsules by mouth 3 (Three) Times a Day. 9 capsule 0   • guaiFENesin (MUCINEX) 600 MG 12 hr tablet Take 2 tablets by mouth 2 (Two) Times a Day As Needed for Cough. (Patient taking differently: Take 1,200 mg by mouth 2 (Two) Times a Day.) 15 tablet 0   • levETIRAcetam (KEPPRA) 500 MG tablet Take 500 mg by mouth 2 (Two) Times a Day.     • lidocaine (LIDODERM) 5 % Place 1 patch on the skin as directed by provider Daily As Needed for Mild Pain , Moderate Pain  or Severe Pain . Remove & Discard patch within 12 hours or as directed by MD     •  lisinopril (PRINIVIL,ZESTRIL) 20 MG tablet Take 20 mg by mouth Daily.     • LORazepam (ATIVAN) 0.5 MG tablet Take 1 tablet by mouth 2 (Two) Times a Day As Needed for Anxiety. 6 tablet 0   • metoprolol succinate XL (TOPROL-XL) 50 MG 24 hr tablet Take 50 mg by mouth Daily.     • nitroglycerin (NITROSTAT) 0.4 MG SL tablet Place 0.4 mg under the tongue Every 5 (Five) Minutes As Needed for Chest Pain. Take no more than 3 doses in 15 minutes.     • omeprazole (priLOSEC) 40 MG capsule Take 40 mg by mouth Daily.  5   • ondansetron (ZOFRAN) 4 MG tablet Take 1 tablet by mouth Every 6 (Six) Hours As Needed.     • oxybutynin (DITROPAN) 5 MG tablet Take 1 tablet by mouth 2 (Two) Times a Day.     • polyethylene glycol (MIRALAX) 17 g packet Take 17 g by mouth Daily As Needed (constipation).     • simvastatin (ZOCOR) 20 MG tablet Take 20 mg by mouth Daily.     • spironolactone (ALDACTONE) 25 MG tablet Take 25 mg by mouth Daily.     • tiotropium bromide monohydrate (SPIRIVA RESPIMAT) 2.5 MCG/ACT aerosol solution inhaler Inhale 2 puffs Daily. 3 inhaler 0   • traMADol (ULTRAM) 50 MG tablet Take 1 tablet by mouth Every 6 (Six) Hours As Needed.     • diphenhydrAMINE (BENADRYL) 50 MG capsule Take with last dose of prednisone 1 hour prior to CT scan 1 capsule 0   • predniSONE (DELTASONE) 50 MG tablet Take one tab 13 hours prior, one tab 7 hours prior and one tab 1 hour prior to CT scans 3 tablet 0     No facility-administered encounter medications on file as of 7/28/2021.     ADULT ILLNESSES:  Patient Active Problem List   Diagnosis Code   • Syncope and collapse R55   • Essential hypertension I10   • Anticoagulated Z79.01   • PAF (paroxysmal atrial fibrillation) (CMS/HCC) I48.0   • Obesity (BMI 30-39.9) E66.9   • Fall W19.XXXA   • Bilateral carotid artery stenosis I65.23   • Brain mass G93.89     SURGERIES:  Past Surgical History:   Procedure Laterality Date   • ANKLE OPEN REDUCTION INTERNAL FIXATION Left 8/15/2020    Procedure: ANKLE  OPEN REDUCTION INTERNAL FIXATION;  Surgeon: Keyshawn Medrano MD;  Location: [facility] OR;  Service: Orthopedics;  Laterality: Left;   • APPENDECTOMY     • APPENDECTOMY     • CARDIAC CATHETERIZATION     • CARDIAC ELECTROPHYSIOLOGY PROCEDURE N/A 5/23/2017    Procedure: Loop insertion; LINQ;  Surgeon: Jose Monteiro MD;  Location: [facility] CATH INVASIVE LOCATION;  Service:    • CHOLECYSTECTOMY     • CORONARY ANGIOPLASTY WITH STENT PLACEMENT      X 1    • INCISION AND DRAINAGE OF WOUND Left 8/15/2020    Procedure: INCISION AND DRAINAGE WOUND;  Surgeon: Keyshawn Medrano MD;  Location: [facility] OR;  Service: Orthopedics;  Laterality: Left;   • LIVER BIOPSY     • MYRINGOTOMY W/ TUBES Left 11/22/2019    Procedure: LEFT MYRINGOTOMY WITH INSERTION OF EAR TUBE WITH RIGHT EAR EXAM UNDER ANESTHESIA;  Surgeon: Se Mcnulty MD;  Location: [facility] OR;  Service: ENT   • SPLENECTOMY     • US GUIDED FINE NEEDLE ASPIRATION  8/7/2018     HEALTH MAINTENANCE ITEMS:  Health Maintenance Due   Topic Date Due   • ZOSTER VACCINE (2 of 3) 12/30/2015   • Pneumococcal Vaccine 65+ (1 of 2 - PPSV23) 12/09/2016   • ANNUAL WELLNESS VISIT  05/27/2021       <no information>  Last Completed Colonoscopy     This patient has no relevant Health Maintenance data.        Immunization History   Administered Date(s) Administered   • FLUAD TRI 65YR+ 10/17/2019   • Flu Vaccine Quad PF >18YRS 09/24/2015, 10/05/2017   • Fluzone High Dose =>65 Years ([employer] ONLY) 10/28/2016, 10/17/2019   • Influenza, Unspecified 10/28/2016   • Pneumococcal Conjugate 13-Valent (PCV13) 06/30/2016, 10/14/2016   • Tdap 08/15/2020   • Zostavax 11/04/2015     Last Completed Mammogram          MAMMOGRAM (Every 2 Years) Next due on 8/27/2021 08/27/2019  Mammo Screening Digital Tomosynthesis Bilateral With CAD    08/24/2018  Mammo screening digital tomosynthesis bilateral w CAD    08/23/2016  Mammo screening bilateral w CAD    08/26/2015  MAMMOGRAPHY SCREENING BILATERAL     2014  MAMMOGRAPHY SCREENING BILATERAL    Only the first 5 history entries have been loaded, but more history exists.                  FAMILY HISTORY:  Family History   Problem Relation Age of Onset   • Esophageal cancer Brother    • Lung cancer Brother    • Breast cancer Neg Hx      SOCIAL HISTORY:  Social History     Socioeconomic History   • Marital status:      Spouse name: Not on file   • Number of children: 1   • Years of education: Not on file   • Highest education level: Not on file   Tobacco Use   • Smoking status: Former Smoker     Packs/day: 0.50     Years: 58.00     Pack years: 29.00     Start date:      Quit date: 2020     Years since quittin.9   • Smokeless tobacco: Never Used   Substance and Sexual Activity   • Alcohol use: No   • Drug use: No   • Sexual activity: Defer       REVIEW OF SYSTEMS:    Review of Systems   Constitutional: Positive for fatigue. Negative for chills and fever.        She needs to sit most of the daytime. She needs assistance to ambulate.   HENT: Positive for voice change. Negative for hearing loss and trouble swallowing.    Eyes: Negative for redness and visual disturbance.   Respiratory: Negative for wheezing.         Chronic cough and shortness of air.   Cardiovascular: Negative for chest pain and palpitations.   Gastrointestinal: Negative for nausea and vomiting.   Endocrine: Negative for cold intolerance and heat intolerance.   Genitourinary: Negative for difficulty urinating, dysuria and flank pain.   Musculoskeletal: Positive for gait problem. Negative for neck stiffness.   Skin: Positive for pallor.   Allergic/Immunologic: Negative for food allergies.   Neurological: Negative for dizziness, speech difficulty and weakness.   Hematological: Negative for adenopathy.   Psychiatric/Behavioral: Negative for agitation and confusion. The patient is not nervous/anxious.        VITAL SIGNS: /58   Pulse 66   Temp 97.9 °F (36.6 °C)   Resp 18   Ht  "149.9 cm (59.02\")   Wt 87.5 kg (193 lb)   LMP  (LMP Unknown)   SpO2 93%   Breastfeeding No   BMI 38.95 kg/m²   Pain Score    07/28/21 0852   PainSc:   2       PHYSICAL EXAMINATION:     Physical Exam  Vitals reviewed.   Constitutional:       Appearance: She is ill-appearing.      Comments:  She arrived in the exam room in a wheelchair.   HENT:      Head: Normocephalic and atraumatic.   Eyes:      General: No scleral icterus.  Cardiovascular:      Rate and Rhythm: Normal rate and regular rhythm.   Pulmonary:      Effort: No respiratory distress.      Breath sounds: No wheezing or rales.   Abdominal:      General: Bowel sounds are normal.      Palpations: Abdomen is soft.      Tenderness: There is no abdominal tenderness.   Musculoskeletal:         General: No swelling.      Cervical back: Neck supple.   Skin:     Coloration: Skin is not pale.   Neurological:      Mental Status: She is oriented to person, place, and time.   Psychiatric:         Mood and Affect: Mood normal.         Behavior: Behavior normal.         Thought Content: Thought content normal.         Judgment: Judgment normal.         LABS    Lab Results - Last 18 Months   Lab Units 07/23/21  0811 05/11/21  0300 10/22/20  0330 10/12/20  1300 08/21/20  0300 08/17/20  0439 08/16/20  0507 08/15/20  0835 07/28/20  1236 07/28/20  1236 03/10/20  0614 03/09/20  0428 03/08/20  0522 03/08/20  0522   HEMOGLOBIN g/dL 12.5 12.2 11.5* 12.7 11.1* 11.9*   < > 15.0   < > 15.2   < > 13.0   < > 13.5   HEMATOCRIT % 34.8 36.5 34.9 38.0 32.9* 35.1   < > 43.3   < > 45.0   < > 37.1   < > 39.1   MCV fL 96.9 98.6* 101.5* 100.5* 99.7* 100.3*  --  97.7*   < > 102.0*   < > 100.5*   < > 101.0*   WBC 10*3/mm3 9.84 6.07 4.13 7.25 5.48 10.24  --  6.96   < > 5.42   < > 14.16*   < > 11.32*   RDW % 13.0 12.5 13.6 13.1 13.5 13.7  --  13.8   < > 12.9   < > 13.1   < > 13.3   MPV fL 9.3 10.2 10.3 11.1 10.1 10.2  --  10.2   < > 11.2   < > 9.9   < > 10.2   PLATELETS 10*3/mm3 356 318 306 294 " 296 227  --  274   < > 276   < > 307   < > 305   IMM GRAN % % 0.6*  --   --  0.4  --   --   --  0.1  --  0.2  --  1.1*  --  0.6*   NEUTROS ABS 10*3/mm3 7.25* 1.83 1.16* 4.14 2.85  --   --  5.67   < > 2.20  --  12.12*   < > 8.52*   LYMPHS ABS 10*3/mm3 1.77  --   --  2.35  --   --   --  0.86  --  2.46  --  1.26  --  1.73   MONOS ABS 10*3/mm3 0.69  --   --  0.56  --   --   --  0.39  --  0.61  --  0.61  --  0.96*   EOS ABS 10*3/mm3 0.02 0.06 0.09 0.12 0.05  --   --  0.00   < > 0.10  --  0.00   < > 0.02   BASOS ABS 10*3/mm3 0.05  --  0.09 0.05  --   --   --  0.03  --  0.04  --  0.02   < > 0.02   IMMATURE GRANS (ABS) 10*3/mm3 0.06*  --   --  0.03  --   --   --  0.01  --  0.01  --  0.15*  --  0.07*   NRBC /100 WBC 0.0  --   --  0.6*  --   --   --  0.0  --  0.0  --  0.0  --  0.0   NEUTROPHIL % %  --  30.2* 25.0*  --  50.0  --   --   --   --   --   --   --   --   --    MONOCYTES % %  --  4.2* 11.5  --  5.1  --   --   --   --   --   --   --   --   --    BASOPHIL % %  --   --  2.1*  --   --   --   --   --   --   --   --   --   --   --    ATYP LYMPH % %  --  5.2*  --   --   --   --   --   --   --   --   --   --   --   --    ANISOCYTOSIS   --  Slight/1+  --   --  Slight/1+  --   --   --   --   --   --   --   --   --    GIANT PLT   --   --  Slight/1+  --  Slight/1+  --   --   --   --   --   --   --   --   --     < > = values in this interval not displayed.       Lab Results - Last 18 Months   Lab Units 07/24/21  0312 07/23/21  0811 05/24/21  1440 05/11/21  0300 04/16/21  1300 04/12/21  1245 02/23/21  1558 10/22/20  0330 10/12/20  1300 08/21/20  0300 08/16/20  0507 08/15/20  0915 07/28/20  1236 07/28/20  1236 05/27/20  1220 05/27/20  1220   GLUCOSE mg/dL 170* 115*  --  95 101* 136*  --  109*   < > 93   < > 131*   < > 87   < > 95   SODIUM mmol/L 132* 128*  --  135* 134* 130*  --  139   < > 141   < > 133*   < > 139   < > 139   POTASSIUM mmol/L 4.6 5.0  --  4.0 5.3* 5.2  --  4.2   < > 3.7   < > 4.3   < > 3.9   < > 4.1   CO2 mmol/L  17.0* 19.0*  --  23.0 22.0 21.0*  --  27.0   < > 31.0*   < > 26.0   < > 28.3   < > 26.9   CHLORIDE mmol/L 104 96*  --  100 99 97*  --  107   < > 100   < > 97*   < > 101   < > 101   ANION GAP mmol/L 11.0 13.0  --  12.0 13.0 12.0  --  5.0   < > 10.0   < > 10.0   < > 9.7   < > 11.1   CREATININE mg/dL 0.96 1.51* 1.30 1.05* 1.07* 1.23*   < > 0.92   < > 0.75   < > 1.16*   < > 0.89   < > 0.66   BUN mg/dL 27* 34*  --  21 30* 34*  --  14   < > 22   < > 21   < > 16   < > 19   BUN / CREAT RATIO  28.1* 22.5  --  20.0 28.0* 27.6*  --  15.2   < > 29.3*   < > 18.1   < > 18.0   < > 28.8*   CALCIUM mg/dL 8.8 9.4  --  9.0 9.0 8.9  --  8.8   < > 8.3*   < > 9.1   < > 10.0   < > 9.1   EGFR IF NONAFRICN AM mL/min/1.73 57* 34*  --  51* 50* 42*  --  60*   < > 75   < > 46*   < > 62   < > 87   ALK PHOS U/L  --  104  --   --  134*  --   --  86  --  135*  --  100  --  93   < > 94   TOTAL PROTEIN g/dL  --  7.3  --   --  6.9  --   --  5.7*  --  5.2*  --  7.0  --  7.5   < > 7.2   ALT (SGPT) U/L  --  17  --   --  23  --   --  10  --  18  --  13  --  14   < > 16   AST (SGOT) U/L  --  21  --   --  23  --   --  14  --  44*  --  23  --  21   < > 24   BILIRUBIN mg/dL  --  0.3  --   --  0.2  --   --  0.2  --  0.4  --  0.3  --  0.3   < > 0.2   ALBUMIN g/dL  --  3.60  --   --  3.80  --   --  3.30*  --  2.90*  --  3.80  --  4.00   < > 4.00   GLOBULIN gm/dL  --  3.7  --   --  3.1  --   --  2.4  --   --   --  3.2  --  3.5  --  3.2    < > = values in this interval not displayed.       No results for input(s): MSPIKE, KAPPALAMB, IGLFLC, URICACID, FREEKAPPAL, CEA, LDH, REFLABREPO in the last 17885 hours.    Lab Results - Last 18 Months   Lab Units 07/23/21  0811 08/21/20  0300 05/27/20  1220 03/07/20  1755   TSH uIU/mL 0.875 2.550 1.670 1.970       Josi DOMINGA Mohamud reports a pain score of 2.  Given her pain assessment as noted, treatment options were discussed and the following options were decided upon as a follow-up plan to address the patient's pain:  "continuation of current treatment plan for pain.        ASSESSMENT:   1.  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  2.  Neoplasm left upper lobe 0.9 cm and a 1.7 cm more inferior lateral left upper lobe lesion on 09/2019.  Treatment status: Post SBRT, left upper lobe, 5000 cGy 11/12/2019 through 11/25/2019.  3.  Performance status of 3.  4.  Chronic obstructive pulmonary disease.  5.  Coronary artery disease.  6.  History of Hodgkin's disease, post chemotherapy by Dr. Dowell in the 80s.        PLAN:  1.   Re: The reason for follow-up.  2.   Re: Palliative radiation by Dr. Rosen.  3.   Re: Staging work-up.  4.  Blood for CBC with differential and CMP.  5.  Schedule CT of the chest, abdomen and pelvis for staging lung cancer.  Patient stated likely no biopsy if a target lesion is found. \"I can't have chemo.\"  6.  eRx Decadron 4 mg po every 8 hours, #60 with 2 refills if needed.  7.  The patient is not a candidate for active palliative chemotherapy due to her performance status, age and multiple comorbidities. \"Right. I need to be in tip top shape to get chemo.\"  8.  Continue care per primary care physician and other specialists.  9.  Plan of care discussed with patient and daughter. Understand expressed.  Patient agreeable to proceed.  10.  Refer to Dr. Rosen for palliative brain radiation.  11.  Further recommendations pending.  12.  Advance Care Planning   ACP discussion was held with the patient during this visit. Patient has an advance directive (not in EMR), copy requested.  13.  Return to office in 4 weeks.      I have reviewed the assessment and plan and verified the accuracy of it. No changes to assessment and plan since the information was documented. Dhaval Urena MD 07/28/21       I have reviewed the assessment and plan and verified the accuracy of it. No changes to assessment and plan since the information was documented. Dhaval Urena MD 07/28/21         I " spent 32 total minutes, face-to-face, caring for Josi akins.  Greater than 50% of this time involved counseling and/or coordination of care as documented within this note regarding the patient's illness(es), pros and cons of various treatment options, instructions and/or risk reduction.      MD Cayetano Horner MD Michelle Carbonell Smith, APRN

## 2021-07-27 NOTE — TELEPHONE ENCOUNTER
Caller: [place]    Relationship to patient: DAUGHTER    Best call back number: [phone]    Patient is needing: TO CLARIFY IF SHE SHOULD COME INTO HER APPT TOMORROW. DR. VIDES WILL NOT SEE PT WITHOUT REFERRAL. PLEASE CALL PT TO VERIFY TODAY, 7-. PT WAS INPT AND DR. SANABRIA SAW HER THERE AND TOLD HER TO SEE DR. VIDES.

## 2021-07-27 NOTE — THERAPY DISCHARGE NOTE
Acute Care - Speech Language Pathology Discharge Summary  [facility]       Patient Name: Josi Mohamud  : 1945  MRN: [id number]    Today's Date: 2021  Onset of Illness/Injury or Date of Surgery: 21       Referring Physician: Dr. White        Admit Date: 2021      SLP Recommendation and Plan  Regular solids and thin liquids    Visit Dx:    ICD-10-CM ICD-9-CM   1. Hyponatremia  E87.1 276.1   2. CECY (acute kidney injury) (CMS/HCC)  N17.9 584.9   3. Decreased activities of daily living (ADL)  Z78.9 V49.89   4. Dysphagia, unspecified type  R13.10 787.20   5. Impaired mobility  Z74.09 799.89   6. Brain mass  G93.89 348.89               SLP GOALS     Row Name 21 1500             Oral Nutrition/Hydration Goal 1 (SLP)    Oral Nutrition/Hydration Goal 1, SLP  Pt will tolerate LRD without overt s/s of aspiration.  -MB      Time Frame (Oral Nutrition/Hydration Goal 1, SLP)  by discharge  -MB      Barriers (Oral Nutrition/Hydration Goal 1, SLP)  n/a  -MB      Progress/Outcomes (Oral Nutrition/Hydration Goal 1, SLP)  goal not met  -MB         Lingual Strengthening Goal 1 (SLP)    Activity (Lingual Strengthening Goal 1, SLP)  increase lingual tone/sensation/control/coordination/movement  -MB      Increase Lingual Tone/Sensation/Control/Coordination/Movement  lingual movement exercises  -MB      [place]/Accuracy (Lingual Strengthening Goal 1, SLP)  independently (over 90% accuracy)  -MB      Time Frame (Lingual Strengthening Goal 1, SLP)  by discharge  -MB      Barriers (Lingual Strengthening Goal 1, SLP)  n/a  -MB      Progress/Outcomes (Lingual Strengthening Goal 1, SLP)  goal not met  -MB        User Key  (r) = Recorded By, (t) = Taken By, (c) = Cosigned By    Initials Name Provider Type    Marito Brown, CCC-SLP Speech and Language Pathologist                  SLP Discharge Summary  Anticipated Discharge Disposition (SLP): home  Reason for Discharge: discharge from this  facility  Progress Toward Achieving Short/long Term Goals: goals not met within established timelines  Discharge Destination: SNF      Marito Pulliam CCC-SLP  7/27/2021

## 2021-07-28 NOTE — TELEPHONE ENCOUNTER
Per lab results, Dr Urena wants CT scans changed to without IV contrast.  She can do oral contrast, but no IV.

## 2021-07-29 NOTE — TELEPHONE ENCOUNTER
Case presented at tumor board 07/29/2021.  Proceed with palliative radiation to the brain metastasis.  Patient is not amenable for aggressive systemic therapy.  CT chest, abdomen pelvis were ordered to assess for other potential sites of disease.  No tissue diagnosis due to her poor overall condition.

## 2021-08-03 NOTE — PROGRESS NOTES
[facility] P.STristenMD Felecia Ortiz BSN, PA-C  _______________________________________________  [facility]  Department of Radiation Oncology  [address] [place] [id number]  Office: [phone]  Fax: [phone]    DATE:  08/04/2021  PATIENT:   Josi Mohamud 1945                         MEDICAL RECORD #:  [id number]    Chief Complaint:   Chief Complaint   Patient presents with   • Appointment     Malignant neoplasm of upper lobe of left lung (CMS/HCC)/ brain lesions                                                  REASON FOR CONSULTATION:  Josi Mohamud is a 75 y.o. female that has been referred to our office for consideration of radiotherapy to the brain. Reports frequent falls, cough, shortness of breath, wears oxygen at nighttime, dizziness and headaches. Denies fever, chills, unexpected weight loss.     History of Present Illness:  Diagnosed in September 2019 with malignant neoplasm of the left lung, CELI nodule, 0.9 cm and larger, more inferior lateral CELI, 1.7 cm. Treated with SBRT, received 5000 cGy in 5 fractions to lung lesions with completion on 11/25/2019.  · Progression: 07/24/2021 MRI brain revealed 2 small lesions in left occipital lobe. Follows Dr. Patel, referred to radiation oncology.     Diagnosed in September 2019 with malignant neoplasm of upper lobe of left lung, CELI nodule, 0.9 cm and larger, more inferior lateral CELI, 1.7 cm. Treated with SBRT, received 5000 cGy in 5 fractions to lung lesions with completion on 11/25/2019.     06/13/2019 -CT Chest without contrast:  • Left upper lobe spiculated pulmonary nodule measures 1.2 cm on axial series 4, image 69, previously 1.0 cm.   • The left upper lobe 0.9 cm pulmonary nodule on series 4 image 27, previously 0.4 cm on 07/06/2018.  • There is no mediastinal or hilar lymphadenopathy by CT size criteria.   • Small hiatal hernia.   • There appears to be a 2 cm low-density nodule in  the right thyroid.   • Probable implanted cardiac loop recorder at the left midline chest.      07/08/2019 - Appointment with :  • She did have evidence of slight enlargement of her 2 lung nodules in the left upper lobe.    • One is located more peripherally in the lingula and the other is located more superiorly and medially in the upper division.    • I did advise her that these could represent slow-growing malignant lesions.    Recommendations:  • I do not think a needle biopsy would be good of option because the risk of pneumothorax although the lingular lesion would probably be accessible.    • Navigational bronchoscopy would be another option but there would be a chance of a false negative result.    • The definitive procedure would be thoracic surgery referral for resection with most likely a left upper lobectomy and she is not interested in surgery.  Based on her age and other comorbidities I think that is reasonable to avoid surgery.    • I told her my recommendation would be a follow-up CT short-term in 3 months and if the lesions increase in size then consider empiric radiation therapy and she and her daughter are in total agreement with this plan.     09/17/2019 - CT Chest without contrast:  • 2 subcentimeter nodules again noted in the right lobe of thyroid gland..  • Centrilobular emphysematous changes noted in the right and left lung.   • 2 nodules are again noted in the left lung.   • On image 27 9 mm nodule is noted.   • On image 66 and 1.2 mm nodule is present. This slightly increased in size and neoplasm should be excluded. This is not amenable to percutaneous biopsy.  • No pleural effusion is present.     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  • Right-sided nodule displays SUV max of 5.4 with the left-sided nodule displaying SUV max of 6.5. 2 additional right parotid nodules more inferiorly, displacing a  more low level radiotracer uptake.   • Asymmetric radiotracer uptake at the left tonsil, measuring 4.5 cm SUV max.   • The more cranial and medial left upper lobe pulmonary nodule measures up to 0.9 cm, displacing measured SUV max 2.3.   • The larger, more inferior lateral left upper lobe nodule displays SUV max 2.2. This measures approximately 1.7 cm.   • Review of the mediastinum reveals no hypermetabolic lymphadenopathy.      09/25/2019 - Documentation per :  • I received a PET scan report indicating activity in the patient's lung nodules.    • My plan is been to get a follow-up chest CT and if they were increasing in size consider empiric radiation therapy.    • She is scheduled follow up in early October and again I think the options if she is going to consider treatment would be empiric radiation.    09/25/2019 - Documentation per :  • The patient's daughter Franc called back and I reviewed the results of the PET scan with her.    • In reviewing epic, it appears that Dr. Mcnulty is aware of the PET findings and the patient is being worked up in regards to the parotid nodules and the tonsillar uptake.    • I recommend a referral to Dr. Rosen regarding the pulmonary nodules and the patient and her daughter are agreeable.    • They will keep follow-up with me on 10-8-19.    10/08/2019 - Appointment with :  • There were multiple bilateral hypermetabolic parotid nodules and she is scheduled for a biopsy with ENT later this month.    • Education on injecting Lovenox was provided to the patient and her niece Kathryn.   • All of her pulmonary medications were refilled and I recommend she start back on Mucinex twice a day.    • A prescription for doxycycline was sent to the patient's pharmacy and she also received a Depo-Medrol injection in the office today.   •  She is scheduled to see Dr. Rosen this Thursday regarding possible radiation treatment based on the results of her recent PET scan  from 9-23-19.    • Return to clinic to see Dr. Frazier in approximately 3 months.     10/14/2019 - US guided thyroid bilateral biopsy per .    11/12/2019 - 11/25/2019  Radiation Oncology Treatment Course  • Received 5000 cGy in 5 fractions to left upper lobe lung via SBRT.    01/30/2020 CT Chest:  • Slightly decreased size of larger left upper lobe pulmonary nodules. Recommend follow-up per oncology/radiation oncology.  • Right kidney with a 1.7 cm low-density lesion at the inferior aspect, which is outside the field-of-view on comparisons. This is most commonly a cyst, but incompletely evaluated on this exam. Recommend correlation with outside imaging. If none available, consider renal ultrasound for further evaluation    02/24/2020 - Appointment with :  • Return to Dr. Rosen in 3 months with repeat CT chest  • Follow with Dr. Frazier as scheduled    05/29/2020 - CT Chest:  • Previously treated left upper lobe pulmonary nodules are stable in size. No new or enlarging nodules identified.  • Centrilobular emphysema.  • Advanced coronary atheromatous calcification.  • No suspicious adenopathy in the chest.  • 2.3 cm right renal cyst was shown to be a simple cyst on recent ultrasound.    06/03/2020 - Appointment with :  • CT chest schedule for late August 2020, they will call you  • Return to Dr. Rosen in 6 months    02/23/2021 - CT Chest with contrast:  • The central airways are midline and patent.   • Emphysema.  • There is predominantly linear opacity tracking along the posterior aspect left upper lobe along the fissure. This is new compared to 5/29/2020.   • The previously demonstrated pulmonary nodule is not discretely identified on today's exam.  • Lingula with a 0.9 cm spiculated pulmonary nodule on axial series 3, image 62, previously up to 1.5 cm on 5/29/2020.  • There is no mediastinal or hilar lymphadenopathy by CT size criteria.   • Esophagus has normal course, caliber and wall  thickness.   • Small hiatal hernia.  • Diffusely heterogeneous thyroid.   • No supraclavicular or axillary adenopathy.  • Coarse calcifications in the bilateral breasts.  • There is wedge-shaped peripheral hyperdensity in the right liver lobe on axial image 108.   • Intrahepatic bile duct dilation appears mildly increased compared to 5/29/2020.   • Cholecystectomy clips on the prior exam.   • Changes of splenectomy with residual splenosis.   • Adrenal glands appear within normal limits.   • Partially visualized pancreatic tail within normal limits.  • Severe height loss of T4, new compared to 5/29/2019.    Impression:  • Peripheral consolidative opacity at the left upper lobe, which could represent postradiation changes. Correlate with patient history. The previously described left apical pulmonary nodule is not discretely demonstrated on today's exam.  • Decreased size of spiculated lingular pulmonary nodule now measuring 0.9 cm, previously 1.5 cm on 5/29/2020.  • New severe height loss of T4 compared to 5/29/2019.  • Peripheral wedge-shaped hyperdensity in the right liver lobe, favored to represent transient hepatic attenuation difference (YON).  • Splenectomy with residual splenosis.    02/24/2021 - Appointment with :  • Return in about 4 months (around 6/24/2021).  • I have ordered a CT scan of the chest, they will call you to schedule this.   • Continue to follow with your other physicians.    05/24/2021 - CT Chest with contrast:  • Spiculated left upper lobe nodule measures 9 mm, similar to the 2/23/2021 exam. A previous spiculated 7 mm left apical nodule seen on 5/29/2020 is no longer visualized, however there are similar ill-defined densities within the left lung apex when compared to 2/23/2021 which may represent radiation change. No new lung nodules.  • Moderate emphysema.  • Similar T4 compression deformity.  • Prior splenectomy with similar left subphrenic and left. Adrenal nodularity likely  due to splenosis.  • Enhancing foci along the peripheral aspect of the left and right hepatic lobes may be due to transient hepatic attenuation defects or focal fatty sparing with hepatic steatosis.    07/23/2021 - Chest x-ray:  • No acute cardiopulmonary findings.    07/23/2021 - CT Head without contrast:  • There is confluent low attenuation in the periventricular and subcortical white matter observed previously compatible with advanced chronic ischemic changes.  • Old right basal ganglia lacunar infarct identified in the genu of the internal capsule.  • There is interval development of low attenuation in the left occipital lobe compatible with an area of infarction, age uncertain, subacute or chronic suspected. Correlate with clinical findings.  Impression:  • Interval development of low attenuation in the left occipital lobe, ischemic change/infarction, age-indeterminate, subacute or chronic considered. Correlate with patient presentation.  • Advanced chronic ischemic changes.     07/23/2021 - MRI Brain without contrast:  • Multiplanar fast spin echo sequences were obtained of the brain on a high-field magnet without gadolinium enhancement.  • There is a predominantly cystic lesion involving the left occipital lobe which is well-circumscribed measuring 2.1 x 1.6 cm in size with surrounding vasogenic edema. There is some restriction of diffusion within the wall of this lesion. Given the history of lung carcinoma I would favor that this represents a metastatic focus.   • A cerebral abscess would also be in the differential but considered less likely given the location, appearance and history. No additional foci of diffusion restriction are present.  • There is atrophy of the brain with prominence of the subarachnoid spaces and ventricular enlargement.   • There is evidence of previous lacunar infarcts involving the right basal ganglia with focal encephalomalacia and gliosis.  • Moderate to severe small vessel  ischemic changes are noted involving the periventricular and higher white matter tracts.  • No additional discrete intra-axial lesions are appreciated.   • Postcontrast imaging would be recommended to assess for additional more subtle lesions. Gradient imaging demonstrates no evidence of blooming to suggest acute or chronic hemorrhage.  Impression:  • There is a cystic mass involving the left occipital lobe with adjacent vasogenic edema. Given the history I would favor a metastatic lesion. Abscess is also in the differential but considered less likely. There is some associated diffusion restriction within the wall of the lesion. No additional discrete lesions are appreciated but postcontrast imaging is recommended.  • Atrophy with moderate to severe small vessel ischemic disease. Remote lacunar infarcts involving the right basal ganglia are present with focal encephalomalacia and gliosis.  • No additional foci of diffusion restriction are demonstrated. There is no radiographic evidence of acute ischemic stroke on today's exam.    07/24/2021 - MRI Brain with contrast:  • Extensive chronic microvascular ischemic changes noted in the periventricular white matter.   • FLAIR signal is present in the left occipital lobe in the region of the 2 enhancing lesions..  • There are 2 focal regions of abnormal enhancement in the left occipital lobe. This is consistent with metastatic disease. One is just inferior and medial to previously described lesion in the left occipital lobe. The second is just superior and lateral views of the cystic lesion.  Impression:  • 2 small enhancing lesions left occipital lobe one is inferior and medial to the cystic lesion. A second is superior and lateral to the cystic lesion. These rim-like enhancing lesions are most consistent with metastatic disease.  • Extensive changes of chronic microvascular ischemic disease are noted in the periventricular white    07/28/2021 - Appointment with  ":  ASSESSMENT:   • Left occipital lobe metastatic disease likely from lung primary.  • Neoplasm left upper lobe 0.9 cm and a 1.7 cm more inferior lateral left upper lobe lesion on 09/2019.  o Treatment status: Post SBRT, left upper lobe, 5000 cGy 11/12/2019 through 11/25/2019.  • Performance status of 3.  • Chronic obstructive pulmonary disease.  • Coronary artery disease.  • History of Hodgkin's disease, post chemotherapy by Dr. Dowell in the 80s.  PLAN:  •  Re: The reason for follow-up.  •  Re: Palliative radiation by Dr. Rosen.  •  Re: Staging work-up.  • Blood for CBC with differential and CMP.  • Schedule CT of the chest, abdomen and pelvis for staging lung cancer.  Patient stated likely no biopsy if a target lesion is found. \"I can't have chemo.\"  • eRx Decadron 4 mg po every 8 hours, #60 with 2 refills if needed.  • The patient is not a candidate for active palliative chemotherapy due to her performance status, age and multiple comorbidities. \"Right. I need to be in tip top shape to get chemo.\"  • Continue care per primary care physician and other specialists.  • Plan of care discussed with patient and daughter. Understand expressed.  Patient agreeable to proceed.  • Refer to Dr. Rosen for palliative brain radiation.  • Further recommendations pending.  • Advance Care Planning  o ACP discussion was held with the patient during this visit. Patient has an advance directive (not in EMR), copy requested.  • Return to office in 4 weeks.    07/29/2021 - Documentation per :  • Case presented at tumor board 07/29/2021.    • Proceed with palliative radiation to the brain metastasis.    • Patient is not amenable for aggressive systemic therapy.    • CT chest, abdomen pelvis were ordered to assess for other potential sites of disease.    • No tissue diagnosis due to her poor overall condition    History obtained from  PATIENT and CHART    PAST MEDICAL HISTORY  Past Medical History: "   Diagnosis Date   • Anticoagulated 7/21/2018   • Bilateral carotid artery stenosis 6/8/2021   • Bronchitis    • Cancer (CMS/HCC)     lung   • COPD (chronic obstructive pulmonary disease) (CMS/HCC)    • COPD, moderate (CMS/HCC) 7/8/2019   • Coronary artery disease    • Coronary artery disease involving native coronary artery of native heart without angina pectoris 5/19/2017   • Essential hypertension 5/19/2017   • Gastroesophageal reflux disease 7/21/2018   • GERD (gastroesophageal reflux disease)    • History of transfusion    • Hx of Hodgkin's lymphoma 10/11/2019   • Hyperlipidemia    • Hypertension    • Malignant neoplasm of upper lobe of left lung (CMS/HCC) 7/8/2019   • Mixed hyperlipidemia 6/8/2021   • PAF (paroxysmal atrial fibrillation) (CMS/HCC)    • Parotid neoplasm 7/21/2018   • Persistent asthma without complication 7/8/2019   • Renal cyst 2/24/2020   • Seizures (CMS/HCC)    • Status post stereotactic radiosurgery 2/24/2020   • Stented coronary artery 5/19/2017   • Thyroid nodule 7/21/2018     PAST SURGICAL HISTORY  Past Surgical History:   Procedure Laterality Date   • ANKLE OPEN REDUCTION INTERNAL FIXATION Left 8/15/2020    Procedure: ANKLE OPEN REDUCTION INTERNAL FIXATION;  Surgeon: Keyshawn Medrano MD;  Location: [facility] OR;  Service: Orthopedics;  Laterality: Left;   • APPENDECTOMY     • APPENDECTOMY     • CARDIAC CATHETERIZATION     • CARDIAC ELECTROPHYSIOLOGY PROCEDURE N/A 5/23/2017    Procedure: Loop insertion; LINQ;  Surgeon: Jose Monteiro MD;  Location: [facility] CATH INVASIVE LOCATION;  Service:    • CHOLECYSTECTOMY     • CORONARY ANGIOPLASTY WITH STENT PLACEMENT      X 1    • INCISION AND DRAINAGE OF WOUND Left 8/15/2020    Procedure: INCISION AND DRAINAGE WOUND;  Surgeon: Keyshawn Medrano MD;  Location: [facility] OR;  Service: Orthopedics;  Laterality: Left;   • LIVER BIOPSY     • MYRINGOTOMY W/ TUBES Left 11/22/2019    Procedure: LEFT MYRINGOTOMY WITH INSERTION OF EAR TUBE WITH RIGHT EAR  EXAM UNDER ANESTHESIA;  Surgeon: Se Mcnulty MD;  Location: [facility];  Service: ENT   • SPLENECTOMY     • US GUIDED FINE NEEDLE ASPIRATION  2018      FAMILY HISTORY  family history includes Esophageal cancer in her brother; Lung cancer in her brother.    SOCIAL HISTORY  Social History     Tobacco Use   • Smoking status: Former Smoker     Packs/day: 0.50     Years: 58.00     Pack years: 29.00     Start date:      Quit date: 2020     Years since quittin.9   • Smokeless tobacco: Never Used   Substance Use Topics   • Alcohol use: No   • Drug use: No     ALLERGIES  Contrast dye and Penicillins     MEDICATIONS    Current Outpatient Medications:   •  acetaminophen (TYLENOL) 325 MG tablet, Take 2 tablets by mouth Every 4 (Four) Hours As Needed for Mild Pain ., Disp: , Rfl:   •  albuterol sulfate  (90 Base) MCG/ACT inhaler, Inhale 1 puff Daily., Disp: 1 inhaler, Rfl: 6  •  amLODIPine (NORVASC) 5 MG tablet, Take 5 mg by mouth Daily., Disp: , Rfl:   •  apixaban (ELIQUIS) 5 MG tablet tablet, Take 1 tablet by mouth Every 12 (Twelve) Hours., Disp: 60 tablet, Rfl: 11  •  aspirin 81 MG EC tablet, Take 81 mg by mouth Daily., Disp: , Rfl:   •  Calcium Carbonate-Vitamin D (CALTRATE 600+D PO), Take 1 tablet by mouth 2 (two) times a day., Disp: , Rfl:   •  diphenhydrAMINE (BENADRYL) 50 MG capsule, Take with last dose of prednisone 1 hour prior to CT scan, Disp: 1 capsule, Rfl: 0  •  docusate sodium 100 MG capsule, Take 100 mg by mouth 2 (Two) Times a Day As Needed for Constipation., Disp: , Rfl:   •  famotidine (PEPCID) 20 MG tablet, Take 1 tablet by mouth Daily., Disp: , Rfl:   •  fluticasone (FLONASE) 50 MCG/ACT nasal spray, into the nostril(s) as directed by provider Daily As Needed., Disp: , Rfl:   •  fluticasone-salmeterol (ADVAIR) 100-50 MCG/DOSE DISKUS, Inhale 2 (Two) Times a Day., Disp: , Rfl:   •  furosemide (LASIX) 40 MG tablet, Take 40 mg by mouth Daily. Morning, Disp: , Rfl:   •  gabapentin  (NEURONTIN) 100 MG capsule, Take 2 capsules by mouth 3 (Three) Times a Day., Disp: 9 capsule, Rfl: 0  •  guaiFENesin (MUCINEX) 600 MG 12 hr tablet, Take 2 tablets by mouth 2 (Two) Times a Day As Needed for Cough. (Patient taking differently: Take 1,200 mg by mouth 2 (Two) Times a Day.), Disp: 15 tablet, Rfl: 0  •  levETIRAcetam (KEPPRA) 500 MG tablet, Take 500 mg by mouth 2 (Two) Times a Day., Disp: , Rfl:   •  lidocaine (LIDODERM) 5 %, Place 1 patch on the skin as directed by provider Daily As Needed for Mild Pain , Moderate Pain  or Severe Pain . Remove & Discard patch within 12 hours or as directed by MD, Disp: , Rfl:   •  lisinopril (PRINIVIL,ZESTRIL) 20 MG tablet, Take 20 mg by mouth Daily., Disp: , Rfl:   •  LORazepam (ATIVAN) 0.5 MG tablet, Take 1 tablet by mouth 2 (Two) Times a Day As Needed for Anxiety., Disp: 6 tablet, Rfl: 0  •  metoprolol succinate XL (TOPROL-XL) 50 MG 24 hr tablet, Take 50 mg by mouth Daily., Disp: , Rfl:   •  nitroglycerin (NITROSTAT) 0.4 MG SL tablet, Place 0.4 mg under the tongue Every 5 (Five) Minutes As Needed for Chest Pain. Take no more than 3 doses in 15 minutes., Disp: , Rfl:   •  omeprazole (priLOSEC) 40 MG capsule, Take 40 mg by mouth Daily., Disp: , Rfl: 5  •  ondansetron (ZOFRAN) 4 MG tablet, Take 1 tablet by mouth Every 6 (Six) Hours As Needed., Disp: , Rfl:   •  oxybutynin (DITROPAN) 5 MG tablet, Take 1 tablet by mouth 2 (Two) Times a Day., Disp: , Rfl:   •  polyethylene glycol (MIRALAX) 17 g packet, Take 17 g by mouth Daily As Needed (constipation)., Disp: , Rfl:   •  simvastatin (ZOCOR) 20 MG tablet, Take 20 mg by mouth Daily., Disp: , Rfl:   •  spironolactone (ALDACTONE) 25 MG tablet, Take 25 mg by mouth Daily., Disp: , Rfl:   •  tiotropium bromide monohydrate (SPIRIVA RESPIMAT) 2.5 MCG/ACT aerosol solution inhaler, Inhale 2 puffs Daily., Disp: 3 inhaler, Rfl: 0  •  traMADol (ULTRAM) 50 MG tablet, Take 1 tablet by mouth Every 6 (Six) Hours As Needed., Disp: , Rfl:   •   furosemide (LASIX) 20 MG tablet, Take 20 mg by mouth Daily. At bedtime, Disp: , Rfl:   •  gabapentin (NEURONTIN) 300 MG capsule, Take 1 capsule by mouth 3 (Three) Times a Day., Disp: , Rfl:   •  predniSONE (DELTASONE) 50 MG tablet, Take one tab 13 hours prior, one tab 7 hours prior and one tab 1 hour prior to CT scans, Disp: 3 tablet, Rfl: 0  •  Spiriva Respimat 1.25 MCG/ACT aerosol solution inhaler, Inhale 1 puff Daily., Disp: , Rfl:     Current outpatient and discharge medications have been reconciled for the patient.  Reviewed by: Sean Rosen III, MD    The following portions of the patient's history were reviewed and updated as appropriate: allergies, current medications, past family history, past medical history, past social history, past surgical history and problem list.    REVIEW OF SYSTEMS  Review of Systems   Constitutional: Positive for activity change, appetite change and fatigue. Negative for chills, diaphoresis, fever and unexpected weight change.   HENT:        Seasonal allergies   Eyes: Negative.    Respiratory: Positive for cough, shortness of breath and wheezing. Negative for apnea, choking, chest tightness and stridor.         Oxygen at night  Has inhalers   Cardiovascular: Positive for leg swelling. Negative for chest pain and palpitations.   Gastrointestinal: Negative.    Endocrine: Negative.    Genitourinary: Negative.         Overactive bladder   Musculoskeletal: Negative.         Has bilateral boots on  Has fracture in left foot  Right boot in boot to help with mobilization   Skin: Negative.         Has had shingles, drying up now (on Right side of chest/abdomen)   Allergic/Immunologic: Negative.    Neurological: Positive for dizziness, seizures, light-headedness and headaches. Negative for tremors, syncope, facial asymmetry, speech difficulty, weakness and numbness.   Hematological: Negative.    Psychiatric/Behavioral: Negative.      PHYSICAL EXAM  VITAL SIGNS:   Vitals:    08/04/21 1510  "  BP: 107/52   Pulse: 80   Resp: 20   SpO2: 94%  Comment: Room Air   Weight: 86.2 kg (190 lb)  Comment: Stated   Height: 149.9 cm (59\")   PainSc:   6   PainLoc: Chest  Comment: Right side of chest/abd     Physical Exam  General:  Alert and oriented, in no acute distress, well-developed, vitals reviewed.  Head:  Normocephalic, without obvious abnormality    Nose/Sinuses:  Nares normal externally, no sinus tenderness.  Mouth/Throat:  Mucosa moist, without erythema  Neck:  supple, No evidence of adenopathy in the cervical or supraclavicular areas.  Eyes: No gross abnormalities   Ears: Ears intact with no external abnormalities noted  Chest:  Respiratory efforts are normal and unlabored, chest is clear to auscultation.  Cardiovascular: Regular rate and rhythm without murmurs, rubs, or gallops.   Abdomen:  Soft, non-tender, normal bowel sounds;   Extremities:  Uses wheelchair for assistance, warm to touch, no cyanosis or edema.  Skin: No suspicious lesions or rashes of concern  Neurologic: non focal exam, strength and sensation grossly equal  Psych: Mood and affect are appropriate    Performance Status: ECOG (1) Restricted in physically strenuous activity, ambulatory and able to do work of light nature    Clinical Quality Measures  -Pain Documented by Standardized Tool, FPS  Josi Mohamud reports a pain score of 6. Given her pain assessment as noted, treatment options were discussed and the following options were decided upon as a follow-up plan to address the patient's pain: continuation of current treatment plan for pain.   Pain Medications             acetaminophen (TYLENOL) 325 MG tablet Take 2 tablets by mouth Every 4 (Four) Hours As Needed for Mild Pain .    aspirin 81 MG EC tablet Take 81 mg by mouth Daily.    gabapentin (NEURONTIN) 100 MG capsule Take 2 capsules by mouth 3 (Three) Times a Day.    levETIRAcetam (KEPPRA) 500 MG tablet Take 500 mg by mouth 2 (Two) Times a Day.    traMADol (ULTRAM) 50 MG tablet Take " 1 tablet by mouth Every 6 (Six) Hours As Needed.    gabapentin (NEURONTIN) 300 MG capsule Take 1 capsule by mouth 3 (Three) Times a Day.    predniSONE (DELTASONE) 50 MG tablet Take one tab 13 hours prior, one tab 7 hours prior and one tab 1 hour prior to CT scans        -Advanced Care Planning   This patient's ACP documentation is up to date, and there's nothing further left to document.    -Body Mass Index Screening and Follow-Up Plan Patient's Body mass index is 38.38 kg/m². indicating that she is obese (BMI >30). No BMI plan given.     -Tobacco Use: Screening and Cessation Intervention Social History    Tobacco Use      Smoking status: Former Smoker        Packs/day: 0.50        Years: 58.00        Pack years: 29        Start date:         Quit date: 2020        Years since quittin.9      Smokeless tobacco: Never Used    ASSESSMENT AND PLAN  1. Brain mass    2. Malignant neoplasm of upper lobe of left lung (CMS/HCC)    3. COPD, moderate (CMS/HCC)    4. Hx of Hodgkin's lymphoma    5. Status post stereotactic radiosurgery    6. Current every day smoker      RECOMMENDATIONS: Josi Mohamud is a very pleasant female diagnosed in 2019 with malignant neoplasm of the left lung, CELI nodule, 0.9 cm and larger, more inferior lateral CELI, 1.7 cm. Treated with SBRT, received 5000 cGy in 5 fractions to lung lesions with completion on 2019. Progression: 2021 MRI brain revealed 2 small lesions in left occipital lobe. Follows Dr. Patel, referred to radiation oncology.     We discussed the role of radiation therapy with this diagnosis as well as the indications and rationale according to the NCCN Guidelines. We have reviewed the risks and benefits of stereotactic radiosurgery vs whole brain radiation, I have extensively reviewed the risks, benefits and alternatives of therapy. Risks include headaches, hair loss in the area treated, nausea, vomiting, fatigue, hearing loss, skin and scalp  changes, trouble with memory and speech, seizures and progression of disease in spite of therapy with either local or systemic failure.  I have seen, examined and reviewed this patient's medication list, appropriate labs and imaging studies, reviewed relevant medical records and discussed the goals/plans of care with the patient and family and answered all questions.     Referral to neurosurgeon for evaluation of brain lesions. We will order a PET scan for further disease staging. The patient and family verbalize understanding of this discussion, voice no further questions and wish to proceed with recommended plan.    We will simulate treatment fields to initiate the treatment planning. Continue ongoing management per primary care physician and other specialists. Thank you for allowing me to assist in this patients care.      Time Spent: I spent 52 minutes caring for Josi on this date of service. This time includes time spent by me in the following activities: preparing for the visit, reviewing tests, obtaining and/or reviewing a separately obtained history, performing a medically appropriate examination and/or evaluation, counseling and educating the patient/family/caregiver, ordering medications, tests, or procedures, referring and communicating with other health care professionals, documenting information in the medical record and independently interpreting results and communicating that information with the patient/family/caregiver.   Sean Rosen III, MD  08/04/2021

## 2021-08-03 NOTE — ED PROVIDER NOTES
Subjective   History of Present Illness    Ms. Mohamud is a 75-year-old female who presents to the ED with complaints of bilateral foot pain following a fall on Sunday. She reports that she was up walking with her walker when she slipped and fell.  She reports falling on her bottom, denies loss of consciousness or hitting her head. She states that she has not been able to walk since the fall.  She has been relying on help from nursing aids, as she lives in an assisted living facility.  Patient is a poor historian due to communication disorder related to brain cancer that has not yet been treated. She states she does take a blood thinner but is unsure of which one.  She denies head, neck, back pain.  She denies any hip or other leg pain.  She has a history of left bimalleolar fracture with surgical intervention a year ago.    Review of Systems   Constitutional: Positive for activity change.   Cardiovascular: Negative for chest pain.   Gastrointestinal: Negative for abdominal pain.   Musculoskeletal: Positive for gait problem. Negative for back pain and neck pain.   Neurological: Negative for syncope and light-headedness.       Past Medical History:   Diagnosis Date   • Anticoagulated 7/21/2018   • Bilateral carotid artery stenosis 6/8/2021   • Bronchitis    • Cancer (CMS/HCC)     lung   • COPD (chronic obstructive pulmonary disease) (CMS/HCC)    • COPD, moderate (CMS/HCC) 7/8/2019   • Coronary artery disease    • Coronary artery disease involving native coronary artery of native heart without angina pectoris 5/19/2017   • Essential hypertension 5/19/2017   • Gastroesophageal reflux disease 7/21/2018   • GERD (gastroesophageal reflux disease)    • History of transfusion    • Hx of Hodgkin's lymphoma 10/11/2019   • Hyperlipidemia    • Hypertension    • Malignant neoplasm of upper lobe of left lung (CMS/HCC) 7/8/2019   • Mixed hyperlipidemia 6/8/2021   • PAF (paroxysmal atrial fibrillation) (CMS/[facility])    • Parotid  neoplasm 7/21/2018   • Persistent asthma without complication 7/8/2019   • Renal cyst 2/24/2020   • Seizures (CMS/HCC)    • Status post stereotactic radiosurgery 2/24/2020   • Stented coronary artery 5/19/2017   • Thyroid nodule 7/21/2018       Allergies   Allergen Reactions   • Contrast Dye Itching     Contrast reaction occurred with pruritus and erythema of the upper  and lower extremities. No difficulties swallowing or breathing. Patient  received 50 mg Benadryl orally to improve symptomatology. Patient should  be pretreated with steroids and Benadryl prior to any future IV contrast  Administration. 05-     • Penicillins Rash       Past Surgical History:   Procedure Laterality Date   • ANKLE OPEN REDUCTION INTERNAL FIXATION Left 8/15/2020    Procedure: ANKLE OPEN REDUCTION INTERNAL FIXATION;  Surgeon: Keyshawn Medrano MD;  Location: [facility] OR;  Service: Orthopedics;  Laterality: Left;   • APPENDECTOMY     • APPENDECTOMY     • CARDIAC CATHETERIZATION     • CARDIAC ELECTROPHYSIOLOGY PROCEDURE N/A 5/23/2017    Procedure: Loop insertion; LINQ;  Surgeon: Jose Monteiro MD;  Location: [facility] CATH INVASIVE LOCATION;  Service:    • CHOLECYSTECTOMY     • CORONARY ANGIOPLASTY WITH STENT PLACEMENT      X 1    • INCISION AND DRAINAGE OF WOUND Left 8/15/2020    Procedure: INCISION AND DRAINAGE WOUND;  Surgeon: Keyshawn Medrano MD;  Location: [facility] OR;  Service: Orthopedics;  Laterality: Left;   • LIVER BIOPSY     • MYRINGOTOMY W/ TUBES Left 11/22/2019    Procedure: LEFT MYRINGOTOMY WITH INSERTION OF EAR TUBE WITH RIGHT EAR EXAM UNDER ANESTHESIA;  Surgeon: Se Mcnulty MD;  Location: [facility] OR;  Service: ENT   • SPLENECTOMY     • US GUIDED FINE NEEDLE ASPIRATION  8/7/2018       Family History   Problem Relation Age of Onset   • Esophageal cancer Brother    • Lung cancer Brother    • Breast cancer Neg Hx        Social History     Socioeconomic History   • Marital status:      Spouse name: Not on  file   • Number of children: 1   • Years of education: Not on file   • Highest education level: Not on file   Tobacco Use   • Smoking status: Former Smoker     Packs/day: 0.50     Years: 58.00     Pack years: 29.00     Start date:      Quit date: 2020     Years since quittin.9   • Smokeless tobacco: Never Used   Substance and Sexual Activity   • Alcohol use: No   • Drug use: No   • Sexual activity: Defer       Prior to Admission medications    Medication Sig Start Date End Date Taking? Authorizing Provider   acetaminophen (TYLENOL) 325 MG tablet Take 2 tablets by mouth Every 4 (Four) Hours As Needed for Mild Pain . 20   Milo White DO   albuterol sulfate  (90 Base) MCG/ACT inhaler Inhale 1 puff Daily. 20   Jeremías Hernandez APRN   amLODIPine (NORVASC) 5 MG tablet Take 5 mg by mouth Daily.    ProviderTriny MD   apixaban (ELIQUIS) 5 MG tablet tablet Take 1 tablet by mouth Every 12 (Twelve) Hours. 19   Jose Monteiro MD   aspirin 81 MG EC tablet Take 81 mg by mouth Daily.    ProviderTriny MD   Calcium Carbonate-Vitamin D (CALTRATE 600+D PO) Take 1 tablet by mouth 2 (two) times a day.    Triny Zapata MD   diphenhydrAMINE (BENADRYL) 50 MG capsule Take with last dose of prednisone 1 hour prior to CT scan 21   Dhaval Urena MD   docusate sodium 100 MG capsule Take 100 mg by mouth 2 (Two) Times a Day As Needed for Constipation. 20   Milo White DO   fluticasone-salmeterol (ADVAIR) 100-50 MCG/DOSE DISKUS Inhale 2 (Two) Times a Day.    Triny Zapata MD   furosemide (LASIX) 20 MG tablet Take 20 mg by mouth Daily. At bedtime    Triny Zapata MD   furosemide (LASIX) 40 MG tablet Take 40 mg by mouth Daily. Morning    Triny Zapata MD   gabapentin (NEURONTIN) 100 MG capsule Take 2 capsules by mouth 3 (Three) Times a Day. 21   Milo White DO   guaiFENesin (MUCINEX) 600 MG 12 hr tablet Take 2 tablets  by mouth 2 (Two) Times a Day As Needed for Cough.  Patient taking differently: Take 1,200 mg by mouth 2 (Two) Times a Day. 4/10/17   Angy Peña MD   levETIRAcetam (KEPPRA) 500 MG tablet Take 500 mg by mouth 2 (Two) Times a Day.    Triny Zapata MD   lidocaine (LIDODERM) 5 % Place 1 patch on the skin as directed by provider Daily As Needed for Mild Pain , Moderate Pain  or Severe Pain . Remove & Discard patch within 12 hours or as directed by MD    Triny Zapata MD   lisinopril (PRINIVIL,ZESTRIL) 20 MG tablet Take 20 mg by mouth Daily.    Triny Zapata MD   LORazepam (ATIVAN) 0.5 MG tablet Take 1 tablet by mouth 2 (Two) Times a Day As Needed for Anxiety. 7/24/21   Milo White DO   metoprolol succinate XL (TOPROL-XL) 50 MG 24 hr tablet Take 50 mg by mouth Daily.    Triny Zapata MD   nitroglycerin (NITROSTAT) 0.4 MG SL tablet Place 0.4 mg under the tongue Every 5 (Five) Minutes As Needed for Chest Pain. Take no more than 3 doses in 15 minutes.    Triny Zapata MD   omeprazole (priLOSEC) 40 MG capsule Take 40 mg by mouth Daily. 9/10/19   Triny Zapata MD   ondansetron (ZOFRAN) 4 MG tablet Take 1 tablet by mouth Every 6 (Six) Hours As Needed. 1/15/21   Triny Zapata MD   oxybutynin (DITROPAN) 5 MG tablet Take 1 tablet by mouth 2 (Two) Times a Day. 11/28/20   Triny Zapata MD   polyethylene glycol (MIRALAX) 17 g packet Take 17 g by mouth Daily As Needed (constipation).    Triny Zapata MD   predniSONE (DELTASONE) 50 MG tablet Take one tab 13 hours prior, one tab 7 hours prior and one tab 1 hour prior to CT scans 7/28/21   Dhaval Urena MD   simvastatin (ZOCOR) 20 MG tablet Take 20 mg by mouth Daily.    Triny Zapata MD   spironolactone (ALDACTONE) 25 MG tablet Take 25 mg by mouth Daily.    Triny Zapata MD   tiotropium bromide monohydrate (SPIRIVA RESPIMAT) 2.5 MCG/ACT aerosol solution inhaler Inhale 2 puffs Daily.  "8/12/20   Jeremías Hernandez APRN   traMADol (ULTRAM) 50 MG tablet Take 1 tablet by mouth Every 6 (Six) Hours As Needed. 1/7/21   Provider, MD Triny       Medications   sodium chloride 0.9 % flush 10 mL (10 mL Intravenous Given 8/3/21 1834)   sodium chloride 0.9 % infusion (100 mL/hr Intravenous New Bag 8/3/21 1833)   Morphine sulfate (PF) injection 2 mg (2 mg Intravenous Given 8/3/21 1834)   ondansetron (ZOFRAN) injection 4 mg (4 mg Intravenous Given 8/3/21 1834)       /51 (BP Location: Left arm, Patient Position: Lying)   Pulse 85   Temp 99.3 °F (37.4 °C) (Oral)   Resp 22   Ht 149.9 cm (59\")   Wt 86.2 kg (190 lb)   LMP  (LMP Unknown)   SpO2 95%   BMI 38.38 kg/m²       Objective   Physical Exam  HENT:      Head: Normocephalic.   Cardiovascular:      Pulses: Normal pulses.           Dorsalis pedis pulses are 2+ on the right side and 2+ on the left side.        Posterior tibial pulses are 2+ on the right side and 2+ on the left side.   Musculoskeletal:         General: Tenderness present.      Right shoulder: Normal.      Left shoulder: Normal.      Right elbow: Normal.      Left elbow: Normal.      Right wrist: Normal.      Left wrist: Normal.      Right hand: Normal.      Left hand: Normal.      Cervical back: Normal.      Thoracic back: Normal.      Lumbar back: Normal.      Right hip: No tenderness.      Left hip: No tenderness.      Right knee: Tenderness present.      Left knee: Tenderness present.      Right ankle: Tenderness present.      Left ankle: Tenderness present.      Right foot: Tenderness present.      Left foot: Tenderness present.      Comments: Kyphotic    Patient does not demonstrate any discomfort at all with movement to her bilateral upper extremities.    With her bilateral lower extremities, she has mild discomfort as I palpate to the anterior lateral aspect of her bilateral hips, knee, right foot.  She does have moderate tenderness on the lateral and anterior aspect " of her right ankle with Achilles tendon still intact.  She has marked tenderness as I palpate her left midfoot.  Her right lower extremity is shortened and externally rotated.  She does have palpable pulses bilaterally.   Neurological:      Mental Status: She is alert.         Procedures         Lab Results (last 24 hours)     Procedure Component Value Units Date/Time    CBC & Differential [[id number]]  (Abnormal) Collected: 08/03/21 1722    Specimen: Blood Updated: 08/03/21 1738    Narrative:      The following orders were created for panel order CBC & Differential.  Procedure                               Abnormality         Status                     ---------                               -----------         ------                     CBC Auto Differential[[id number]]        Abnormal            Final result                 Please view results for these tests on the individual orders.    Comprehensive Metabolic Panel [[id number]]  (Abnormal) Collected: 08/03/21 1722    Specimen: Blood Updated: 08/03/21 1904     Glucose 165 mg/dL      BUN 19 mg/dL      Creatinine 1.04 mg/dL      Sodium 132 mmol/L      Potassium 4.6 mmol/L      Chloride 96 mmol/L      CO2 25.0 mmol/L      Calcium 9.1 mg/dL      Total Protein 6.8 g/dL      Albumin 3.60 g/dL      ALT (SGPT) 71 U/L      AST (SGOT) 26 U/L      Alkaline Phosphatase 150 U/L      Total Bilirubin 0.2 mg/dL      eGFR Non African Amer 52 mL/min/1.73      Globulin 3.2 gm/dL      A/G Ratio 1.1 g/dL      BUN/Creatinine Ratio 18.3     Anion Gap 11.0 mmol/L     Narrative:      GFR Normal >60  Chronic Kidney Disease <60  Kidney Failure <15      Protime-INR [[id number]]  (Abnormal) Collected: 08/03/21 1722    Specimen: Blood Updated: 08/03/21 1751     Protime 14.5 Seconds      INR 1.22    aPTT [[id number]]  (Abnormal) Collected: 08/03/21 1722    Specimen: Blood Updated: 08/03/21 1751     PTT 35.3 seconds     COVID PRE-OP / PRE-PROCEDURE SCREENING ORDER (NO ISOLATION) - Swab, Nasal  Cavity [[id number]]  (Normal) Collected: 08/03/21 1722    Specimen: Swab from Nasal Cavity Updated: 08/03/21 1824    Narrative:      The following orders were created for panel order COVID PRE-OP / PRE-PROCEDURE SCREENING ORDER (NO ISOLATION) - Swab, Nasal Cavity.  Procedure                               Abnormality         Status                     ---------                               -----------         ------                     COVID-19,[facility]...[[id number]]  Normal              Final result                 Please view results for these tests on the individual orders.    CBC Auto Differential [[id number]]  (Abnormal) Collected: 08/03/21 1722    Specimen: Blood Updated: 08/03/21 1738     WBC 6.70 10*3/mm3      RBC 3.31 10*6/mm3      Hemoglobin 11.6 g/dL      Hematocrit 32.8 %      MCV 99.1 fL      MCH 35.0 pg      MCHC 35.4 g/dL      RDW 13.2 %      RDW-SD 46.8 fl      MPV 9.5 fL      Platelets 349 10*3/mm3      Neutrophil % 49.8 %      Lymphocyte % 32.7 %      Monocyte % 14.8 %      Eosinophil % 1.6 %      Basophil % 0.7 %      Immature Grans % 0.4 %      Neutrophils, Absolute 3.33 10*3/mm3      Lymphocytes, Absolute 2.19 10*3/mm3      Monocytes, Absolute 0.99 10*3/mm3      Eosinophils, Absolute 0.11 10*3/mm3      Basophils, Absolute 0.05 10*3/mm3      Immature Grans, Absolute 0.03 10*3/mm3      nRBC 0.0 /100 WBC     COVID-19,[facility],Nasal Swab No Transport Media 3-4 HR TAT - Swab, Nasal Cavity [[id number]]  (Normal) Collected: 08/03/21 1722    Specimen: Swab from Nasal Cavity Updated: 08/03/21 1824     COVID19 Not Detected    Narrative:      Fact sheet for providers: https://www.fda.gov/media/665568/download     Fact sheet for patients: https://www.fda.gov/media/706920/download    Test performed by PCR.    Consider negative results in combination with clinical observations, patient history, and epidemiological information.          Adult Transthoracic Echo Complete W/ Cont if Necessary Per  Protocol    Result Date: 7/24/2021  Narrative: · Hyperdynamic right ventricular systolic function noted. · Left ventricular ejection fraction appears to be 66 - 70%. Left ventricular systolic function is normal. · Left ventricular diastolic function is consistent with age.      CT Head Without Contrast    Result Date: 7/23/2021  Narrative: EXAMINATION: CT HEAD WO CONTRAST-  7/23/2021 9:36 AM CDT  CT SCAN OF THE HEAD, WITHOUT CONTRAST:  HISTORY: Syncope, mental status changes  COMPARISONS: Head CT imaging dated  1/22/2019 and 4/6/2017. Brain MR imaging dated 7/3/2019.  TECHNIQUE:  Radiation dose equals  mGy-cm.  Automated exposure control dose reduction technique was implemented.   CT evaluation of the head without intravenous contrast. 5 mm transaxial images were obtained.   2-D sagittal and coronal reconstruction images were generated.  FINDINGS:  There is confluent low attenuation in the periventricular and subcortical white matter observed previously compatible with advanced chronic ischemic changes.  Old right basal ganglia lacunar infarct identified in the genu of the internal capsule.  There is interval development of low attenuation in the left occipital lobe compatible with an area of infarction, age uncertain, subacute or chronic suspected. Correlate with clinical findings.  There is no intra or extra-axial hemorrhage.  There is no mass effect or midline shift.  There is no developing hydrocephalus  Paranasal sinuses imaged part are clear.  Hyperostosis frontalis interna identified. There is no skull fracture or acute calvarial abnormality.           Impression: 1. Interval development of low attenuation in the left occipital lobe, ischemic change/infarction, age-indeterminate, subacute or chronic considered. Correlate with patient presentation. 2. Advanced chronic ischemic changes.    This report was finalized on 07/23/2021 09:42 by Dr. Marcello Rutledge MD.    MRI Brain Without Contrast    Result Date:  7/23/2021  Narrative: EXAMINATION: MRI brain without contrast 7/23/2021  HISTORY: Evaluate for stroke.  FINDINGS: Today's exam is compared to a previous MRI of the brain dated 7/3/2019. Multiplanar fast spin echo sequences were obtained of the brain on a high-field magnet without gadolinium enhancement. There is a predominantly cystic lesion involving the left occipital lobe which is well-circumscribed measuring 2.1 x 1.6 cm in size with surrounding vasogenic edema. There is some restriction of diffusion within the wall of this lesion. Given the history of lung carcinoma I would favor that this represents a metastatic focus. A cerebral abscess would also be in the differential but considered less likely given the location, appearance and history. No additional foci of diffusion restriction are present.  There is atrophy of the brain with prominence of the subarachnoid spaces and ventricular enlargement. There is evidence of previous lacunar infarcts involving the right basal ganglia with focal encephalomalacia and gliosis. Moderate to severe small vessel ischemic changes are noted involving the periventricular and higher white matter tracts. No additional discrete intra-axial lesions are appreciated. Postcontrast imaging would be recommended to assess for additional more subtle lesions. Gradient imaging demonstrates no evidence of blooming to suggest acute or chronic hemorrhage.      Impression: 1.. There is a cystic mass involving the left occipital lobe with adjacent vasogenic edema. Given the history I would favor a metastatic lesion. Abscess is also in the differential but considered less likely. There is some associated diffusion restriction within the wall of the lesion. No additional discrete lesions are appreciated but postcontrast imaging is recommended. 2. Atrophy with moderate to severe small vessel ischemic disease. Remote lacunar infarcts involving the right basal ganglia are present with focal  encephalomalacia and gliosis. 3. No additional foci of diffusion restriction are demonstrated. There is no radiographic evidence of acute ischemic stroke on today's exam. This report was finalized on 07/23/2021 17:10 by Dr. Aric Rosen MD.    MRI Brain With Contrast    Result Date: 7/24/2021  Narrative: EXAMINATION:   MRI BRAIN W CONTRAST-  7/24/2021 9:25 AM CDT  HISTORY: MRI BRAIN W CONTRAST- 7/24/2021 8:40 AM CDT  HISTORY: abnormal CT head/mri brain, hx lung cancer; E87.9-[id number]-[id number] and hyponatremia; N17.9-Acute kidney failure, unspecified  COMPARISON: MR brain July 23, 2021   TECHNIQUE: Multiplanar imaging of the brain was performed in a routine fashion before and after the intravenous injection of gadolinium contrast.  FINDINGS: Diffusion: No restriction of diffusion to suggest acute ischemia.  Midline structures: Nondisplaced.  Ventricles: Normal in configuration and symmetric in size.   Basilar cisterns: Maintained.  Extra axial space: No abnormal extra-axial fluid.  Gray-white matter signal: Extensive chronic microvascular ischemic changes noted in the periventricular white matter. FLAIR signal is present in the left occipital lobe in the region of the 2 enhancing lesions..  Cerebellum: Normal.  Brainstem: Normal.  Enhancement: There are 2 focal regions of abnormal enhancement in the left occipital lobe. This is consistent with metastatic disease. One is just inferior and medial to previously described lesion in the left occipital lobe. The second is just superior and lateral views of the cystic lesion.  Other: Proximal cervical spinal cord is normal. Bilateral globes and orbits are normal in appearance. Normal cerebrovascular flow voids noted. No abnormal signal in the mastoid air cells or paranasal sinuses.      Impression: 1. 2 small enhancing lesions left occipital lobe one is inferior and medial to the cystic lesion. A second is superior and lateral to the cystic lesion. These rim-like  enhancing lesions are most consistent with metastatic disease. 2. Extensive changes of chronic microvascular ischemic disease are noted in the periventricular white   This report was finalized on 07/24/2021 09:30 by Dr. Vini Dyer MD.    XR Knee 4+ View Bilateral    Result Date: 8/3/2021  Narrative: EXAMINATION:  XR KNEE 4+ VW BILATERAL-  8/3/2021 5:30 PM CDT  HISTORY: The patient fell. Bilateral knee pain.  COMPARISON: No comparison study.  TECHNIQUE: 4 views were obtained of each knee.  BILATERAL KNEES:  There is severe narrowing of the medial compartment right knee. There is moderate narrowing of the medial compartment left knee. There is patellofemoral joint space narrowing bilaterally. There is mild lateral compartment narrowing bilaterally. No displaced fracture is seen. There may be a slight cortical step-off of the lateral tibial plateau left knee. No joint effusion is seen. Vascular calcification is noted.      Impression: 1. No displaced fracture is seen. There may be a slight cortical step-off of the lateral tibial plateau on the left. A nondisplaced fracture in this area is not fully excluded. 2. Bilateral joint space narrowing, as described.  This report was finalized on 08/03/2021 18:41 by Dr. Justice Lo MD.    XR Chest 1 View    Result Date: 7/23/2021  Narrative: EXAM: XR CHEST 1 VW- - 7/23/2021 7:52 AM CDT  HISTORY: Syncope   COMPARISON: 8/16/2020.  TECHNIQUE:  1 images.  Frontal view of the chest.  FINDINGS:  No pneumothorax, pleural effusion or focal consolidation. Minimal linear atelectasis or scarring at the left lung base. Cardiac mediastinal silhouette within normal limits. Calcified aortic atherosclerosis. No acute bony finding.       Impression: 1. No acute cardiopulmonary findings. This report was finalized on 07/23/2021 08:11 by Dr Ally Tierney MD.    US Carotid Bilateral    Result Date: 7/23/2021  Narrative: History: Stroke      Impression: Impression: 1. There is less than 50%  stenosis of the right internal carotid artery. 2. There is less than 50% stenosis of the left internal carotid artery. 3. Antegrade flow is demonstrated in bilateral vertebral arteries.  Comments: Bilateral carotid vertebral arterial duplex scan was performed.  Grayscale imaging shows intimal thickening and calcified elements at the carotid bifurcation. The right internal carotid artery peak systolic velocity is 90.8 cm/sec. The end-diastolic velocity is 22.6 cm/sec. The right ICA/CCA ratio is approximately 1.2 . These findings correlate with less than 50% stenosis of the right internal carotid artery.  Grayscale imaging shows intimal thickening and calcified elements at the carotid bifurcation. The left internal carotid artery peak systolic velocity is 90.6 cm/sec. The end-diastolic velocity is 24.5 cm/sec. The left ICA/CCA ratio is approximately 1.0 . These findings correlate with less than 50% stenosis of the left internal carotid artery.  Antegrade flow is demonstrated in bilateral vertebral arteries.  This report was finalized on 07/23/2021 15:25 by Dr. Nicholas Leahy MD.    XR Ankle 3+ View Bilateral    Result Date: 8/3/2021  Narrative: EXAMINATION:  XR ANKLE 3+ VW BILATERAL-  8/3/2021 5:31 PM CDT  HISTORY: The patient fell. Bilateral ankle pain.  COMPARISON: No comparison study.  TECHNIQUE: 3 views were obtained of each ankle.  RIGHT ANKLE: 2 AP images in the lateral view were obtained of the right ankle. No acute right ankle fracture is seen. There is a small plantar calcaneal spur. There appears to be flat foot deformity.  LEFT ANKLE: There has been prior fracture of the lateral malleolus with plate fixation. There is a corticated bone fragment inferior to the medial malleolus likely an old injury. The tibiotalar joint is maintained. No acute fracture is seen.      Impression: As above.   This report was finalized on 08/03/2021 18:43 by Dr. Justice Lo MD.    XR Foot 3+ View Bilateral    Result Date:  8/3/2021  Narrative: EXAMINATION:  XR FOOT 3+ VW BILATERAL-  8/3/2021 5:32 PM CDT  HISTORY: The patient fell. Bilateral foot pain.  COMPARISON: Right foot 3/7/2020.  TECHNIQUE: 3 views were obtained of each foot.  FINDINGS:  There is flat foot deformity of the right foot. There is a small plantar calcaneal spur on the right. There is hallux valgus deformity bilaterally. This is more severe on the left. There is bunion formation along the distal medial first metatarsals bilaterally. There is a cortical step-off of the distal metaphysis of the right fourth metatarsal. It is difficult to tell if this is acute or chronic in age. No other acute fracture is identified.      Impression: 1. Deformity and possible cortical step off of the distal fourth metatarsal on the right. Difficult to determine if this is acute or chronic in age. Correlate with pain in this location. This is not present on the previous right foot x-ray study. 2. No other acute fracture is seen. 3. Other findings, as discussed above.  This report was finalized on 08/03/2021 18:51 by Dr. Jutsice Lo MD.    XR Hips Bilateral With or Without Pelvis 5 View    Result Date: 8/3/2021  Narrative: EXAMINATION:  XR HIPS BILATERAL W OR WO PELVIS 5 VIEW-  8/3/2021 5:31 PM CDT  HISTORY: The patient fell. Hip pain and leg pain.  COMPARISON: No comparison study.  TECHNIQUE: AP and frog-leg views of both hips were supplemented with an AP image of the pelvis.  FINDINGS: There is no evidence of pelvic or hip fracture. There is no bone destruction or other significant bony abnormality. The hip joint spaces are well-maintained. There are degenerative changes of the visualized lumbar spine.      Impression: No acute bony abnormality.. This report was finalized on 08/03/2021 18:38 by Dr. Justice Lo MD.      ED Course  ED Course as of Aug 04 0909   Tue Aug 03, 2021   1906 Patient's foot x-ray as well as left knee x-ray shows an abnormality.  Patient does have some  discomfort. We will proceed with extremity CT of her hips and her left knee.    [TK]   1949 I reviewed this case with YSABEL Gabriel, who will be assuming the patient's care.     [TK]   2126 XR Ankle 3+ View Bilateral [LF]      ED Course User Index  [LF] Sol Nixon, APRN  [TK] Orlin Turner PA          [facility]    Final diagnoses:   None          Orlin Turner PA  08/04/21 0909

## 2021-08-03 NOTE — ED NOTES
This RN called [facility] for pt's current med list. Facility will fax as soon as they can.     Gramlisch, Robert, RN  08/03/21 1780

## 2021-08-04 NOTE — DISCHARGE INSTRUCTIONS
Please follow-up with orthopedics as soon as possible.  Return to the ER for any new or worsening symptoms.  May use Tylenol as needed for pain control.

## 2021-08-04 NOTE — ED NOTES
This RN attempted to call [facility] for report, no answer.     Gramlisch, Robert, RN  08/03/21 8726

## 2021-08-10 NOTE — ED PROVIDER NOTES
Subjective   History of Present Illness    Review of Systems    Past Medical History:   Diagnosis Date   • Anticoagulated 7/21/2018   • Bilateral carotid artery stenosis 6/8/2021   • Bronchitis    • Cancer (CMS/HCC)     lung   • COPD (chronic obstructive pulmonary disease) (CMS/HCC)    • COPD, moderate (CMS/HCC) 7/8/2019   • Coronary artery disease    • Coronary artery disease involving native coronary artery of native heart without angina pectoris 5/19/2017   • Essential hypertension 5/19/2017   • Gastroesophageal reflux disease 7/21/2018   • GERD (gastroesophageal reflux disease)    • History of transfusion    • Hx of Hodgkin's lymphoma 10/11/2019   • Hyperlipidemia    • Hypertension    • Malignant neoplasm of upper lobe of left lung (CMS/HCC) 7/8/2019   • Mixed hyperlipidemia 6/8/2021   • PAF (paroxysmal atrial fibrillation) (CMS/HCC)    • Parotid neoplasm 7/21/2018   • Persistent asthma without complication 7/8/2019   • Renal cyst 2/24/2020   • Seizures (CMS/HCC)    • Status post stereotactic radiosurgery 2/24/2020   • Stented coronary artery 5/19/2017   • Thyroid nodule 7/21/2018       Allergies   Allergen Reactions   • Contrast Dye Itching     Contrast reaction occurred with pruritus and erythema of the upper  and lower extremities. No difficulties swallowing or breathing. Patient  received 50 mg Benadryl orally to improve symptomatology. Patient should  be pretreated with steroids and Benadryl prior to any future IV contrast  Administration. 05-     • Penicillins Rash       Past Surgical History:   Procedure Laterality Date   • ANKLE OPEN REDUCTION INTERNAL FIXATION Left 8/15/2020    Procedure: ANKLE OPEN REDUCTION INTERNAL FIXATION;  Surgeon: Keyshawn Medrano MD;  Location: [facility];  Service: Orthopedics;  Laterality: Left;   • APPENDECTOMY     • APPENDECTOMY     • CARDIAC CATHETERIZATION     • CARDIAC ELECTROPHYSIOLOGY PROCEDURE N/A 5/23/2017    Procedure: Loop insertion; LINQ;  Surgeon:  Jose Monteiro MD;  Location:  [facility];  Service:    • CHOLECYSTECTOMY     • CORONARY ANGIOPLASTY WITH STENT PLACEMENT      X 1    • INCISION AND DRAINAGE OF WOUND Left 8/15/2020    Procedure: INCISION AND DRAINAGE WOUND;  Surgeon: Keyshawn Medrano MD;  Location: [facility] OR;  Service: Orthopedics;  Laterality: Left;   • LIVER BIOPSY     • MYRINGOTOMY W/ TUBES Left 2019    Procedure: LEFT MYRINGOTOMY WITH INSERTION OF EAR TUBE WITH RIGHT EAR EXAM UNDER ANESTHESIA;  Surgeon: Se Mcnulty MD;  Location: [facility] OR;  Service: ENT   • SPLENECTOMY     • US GUIDED FINE NEEDLE ASPIRATION  2018       Family History   Problem Relation Age of Onset   • Esophageal cancer Brother    • Lung cancer Brother    • Breast cancer Neg Hx        Social History     Socioeconomic History   • Marital status:      Spouse name: Not on file   • Number of children: 1   • Years of education: Not on file   • Highest education level: Not on file   Tobacco Use   • Smoking status: Former Smoker     Packs/day: 0.50     Years: 58.00     Pack years: 29.00     Start date:      Quit date: 2020     Years since quittin.9   • Smokeless tobacco: Never Used   Substance and Sexual Activity   • Alcohol use: No   • Drug use: No   • Sexual activity: Defer           Objective   Physical Exam    Procedures           ED Course  ED Course as of Aug 10 2033   Tue Aug 03, 2021   1906 Patient's foot x-ray as well as left knee x-ray shows an abnormality.  Patient does have some discomfort. We will proceed with extremity CT of her hips and her left knee.    [TK]    I reviewed this case with YSABEL Gabriel, who will be assuming the patient's care.     [TK]    XR Ankle 3+ View Bilateral [LF]      ED Course User Index  [LF] Sol Nixon, APRN  [TK] Orlin Turner PA                                   CT Pelvis Without Contrast   Final Result   1. No acute osseous injury or  malalignment.           This report was finalized on 08/03/2021 20:43 by Dr Rizwan Rodriguez, .      CT Lower Extremity Left Without Contrast   Final Result   1. No acute osseous injury or malalignment.   2. Grade 2 osteoarthritis at the knee. Additional patellar   chondromalacia.   3. Diffuse muscle atrophy.           This report was finalized on 08/03/2021 20:36 by Dr Rizwan Rodriguez, .      XR Foot 3+ View Bilateral   Final Result   1. Deformity and possible cortical step off of the distal fourth   metatarsal on the right. Difficult to determine if this is acute or   chronic in age. Correlate with pain in this location. This is not   present on the previous right foot x-ray study.   2. No other acute fracture is seen.   3. Other findings, as discussed above.       This report was finalized on 08/03/2021 18:51 by Dr. Justice Lo MD.      XR Knee 4+ View Bilateral   Final Result   1. No displaced fracture is seen. There may be a slight cortical   step-off of the lateral tibial plateau on the left. A nondisplaced   fracture in this area is not fully excluded.   2. Bilateral joint space narrowing, as described.       This report was finalized on 08/03/2021 18:41 by Dr. Justice Lo MD.      XR Ankle 3+ View Bilateral   Final Result   As above.           This report was finalized on 08/03/2021 18:43 by Dr. Justice Lo MD.      XR Hips Bilateral With or Without Pelvis 5 View   Final Result   No acute bony abnormality..   This report was finalized on 08/03/2021 18:38 by Dr. Justice Lo MD.        Labs Reviewed   COMPREHENSIVE METABOLIC PANEL - Abnormal; Notable for the following components:       Result Value    Glucose 165 (*)     Creatinine 1.04 (*)     Sodium 132 (*)     Chloride 96 (*)     ALT (SGPT) 71 (*)     Alkaline Phosphatase 150 (*)     eGFR Non  Amer 52 (*)     All other components within normal limits    Narrative:     GFR Normal >60  Chronic Kidney Disease <60  Kidney Failure <15      PROTIME-INR - Abnormal; Notable for the following components:    Protime 14.5 (*)     INR 1.22 (*)     All other components within normal limits   APTT - Abnormal; Notable for the following components:    PTT 35.3 (*)     All other components within normal limits   CBC WITH AUTO DIFFERENTIAL - Abnormal; Notable for the following components:    RBC 3.31 (*)     Hemoglobin 11.6 (*)     Hematocrit 32.8 (*)     MCV 99.1 (*)     MCH 35.0 (*)     Monocyte % 14.8 (*)     Monocytes, Absolute 0.99 (*)     All other components within normal limits   COVID-19,[facility],NASAL SWAB NO TRANSPORT MEDIA 2 HR TAT - Normal    Narrative:     Fact sheet for providers: https://www.fda.gov/media/756793/download     Fact sheet for patients: https://www.fda.gov/media/834878/download    Test performed by PCR.    Consider negative results in combination with clinical observations, patient history, and epidemiological information.   COVID PRE-OP / PRE-PROCEDURE SCREENING ORDER (NO ISOLATION)    Narrative:     The following orders were created for panel order COVID PRE-OP / PRE-PROCEDURE SCREENING ORDER (NO ISOLATION) - Swab, Nasal Cavity.  Procedure                               Abnormality         Status                     ---------                               -----------         ------                     COVID-19,[facility]...[[id number]]  Normal              Final result                 Please view results for these tests on the individual orders.   CBC AND DIFFERENTIAL    Narrative:     The following orders were created for panel order CBC & Differential.  Procedure                               Abnormality         Status                     ---------                               -----------         ------                     CBC Auto Differential[[id number]]        Abnormal            Final result                 Please view results for these tests on the individual orders.             MDM  Number of Diagnoses or  Management Options  Closed fracture of right foot, initial encounter: new and requires workup  Sprain of left foot, initial encounter: new and requires workup  Sprain of left knee, unspecified ligament, initial encounter: new and requires workup  Diagnosis management comments: Addendum: Final diagnosis closed fracture of right foot, sprain of left foot, sprain of left knee.  Patient was placed in bilateral walking boots.  This was due to the findings on x-rays.  This was discussed with Dr. Lr.  Patient was advised of all results and will be discharged back to the nursing home at this time in stable condition advised to follow-up with orthopedics.  Does have a walker and wheelchair at the nursing home that she can use.       Amount and/or Complexity of Data Reviewed  Clinical lab tests: reviewed  Tests in the radiology section of CPT®: reviewed  Decide to obtain previous medical records or to obtain history from someone other than the patient: yes  Discuss the patient with other providers: yes    Patient Progress  Patient progress: stable      Final diagnoses:   Closed fracture of right foot, initial encounter   Sprain of left foot, initial encounter   Sprain of left knee, unspecified ligament, initial encounter       ED Disposition  ED Disposition     ED Disposition Condition Comment    Discharge Stable           Cayetano Crews MD  [address]  [place] [address]  [phone]    Call in 1 day      Da Machado MD  [address]  [place] [address]  [phone]    Call in 1 day           Medication List      Changed    guaiFENesin 600 MG 12 hr tablet  Commonly known as: MUCINEX  Take 2 tablets by mouth 2 (Two) Times a Day As Needed for Cough.  What changed: when to take this             Sol Nixon, ORLIN  08/10/21 0856       Sol Nixon, ORLIN  08/10/21 203

## 2021-08-11 NOTE — PROCEDURES
Pulmonary Function Test  Performed by: Gabriella Chris, RRT  Authorized by: Jeremías Hernandez APRN      Pre Drug % Predicted    FVC: 95%   FEV1: 81%   FEF 25-75%: 44%   FEV1/FVC: 66.54%   DLCO: 73%   D/VAsb: 82%    Interpretation   Spirometry There is reduced midflow suggesting small airway/airflow obstruction.   Diffusion Capacity  The patient's diffusion capacity is mildly reduced.  Diffusion capacity is normal when corrected for alveolar volume.   Overall comments: FEV1 is 81% predicted.  Actual FEV1/FVC is 66.54.  Overall, her lung function has remained stable over the last couple of years.

## 2021-08-11 NOTE — TELEPHONE ENCOUNTER
I forgot to put on her checkout that her follow-up should be with Dr. Frazier (she used to see him and I had promised her that she could follow-up with him next year).  Please schedule her May 2022 appointment with him instead of me.  Thank you.

## 2021-08-11 NOTE — PROGRESS NOTES
"Chief Complaint  COPD (moderate)    Subjective    History of Present Illness      Josi Mohamud presents to [facility] for:    History of Present Illness   Annual visit for management of COPD, emphysema and nocturnal hypoxia.  She quit smoking last year when she had to go reside at [facility].  She comes in today accompanied by her granddaughter.  The patient tells me she has had a really bad year and she currently is nonweightbearing with 2 orthopedic boots in place and is in a wheelchair.  She initially broken ankle and then had a another fall at the nursing home and had a fracture to the other leg.  She states that her breathing is \"mediocre\".  I reviewed her med list from [facility] and she continues on Advair, Spiriva, Mucinex and albuterol.  She continues to utilize and benefit from nighttime oxygen.  She has a history of lung cancer with metastasis to the brain and she completed some radiation for that.  She is due for a PET scan that has been arranged through Dr. Urena next week.  She is up-to-date on all vaccines.  She has had updated PFTs done today that shows stability in her lung function over the past couple of years.  Objective   Vital Signs:   /78   Pulse 111   Ht 149.9 cm (59\")   Wt 86.6 kg (191 lb)   SpO2 93% Comment: RA  BMI 38.58 kg/m²     Physical Exam  Vitals reviewed.   Constitutional:       General: She is not in acute distress.     Appearance: Normal appearance. She is ill-appearing (Chronically).   HENT:      Head: Normocephalic and atraumatic.      Comments: Wearing a mask  Cardiovascular:      Rate and Rhythm: Normal rate and regular rhythm.   Pulmonary:      Breath sounds: Decreased breath sounds present.   Musculoskeletal:         General: Deformity (Arthritic changes to fingers) and signs of injury (Orthopedic boots bilaterally) present.      Cervical back: Normal range of motion.      Comments: Patient is " currently wheelchair-bound and nonweightbearing   Skin:     General: Skin is warm and dry.   Neurological:      Mental Status: She is alert and oriented to person, place, and time.   Psychiatric:         Mood and Affect: Mood normal.         Behavior: Behavior normal.        Result Review :     Results for orders placed in visit on 08/11/21    Pulmonary Function Test    Narrative  Pulmonary Function Test  Performed by: Gabriella Chris, RRT  Authorized by: Jeremías Hernandez APRN    Pre Drug % Predicted  FVC: 95%  FEV1: 81%  FEF 25-75%: 44%  FEV1/FVC: 66.54%  DLCO: 73%  D/VAsb: 82%    Interpretation  Spirometry There is reduced midflow suggesting small airway/airflow obstruction.  Diffusion Capacity  The patient's diffusion capacity is mildly reduced.  Diffusion capacity is normal when corrected for alveolar volume.  Overall comments: FEV1 is 81% predicted.  Actual FEV1/FVC is 66.54.  Overall, her lung function has remained stable over the last couple of years.               Assessment and Plan      Diagnoses and all orders for this visit:    1. COPD, mild (CMS/HCC) (Primary)  Comments:  Stable.  Continue on Spiriva, Advair, Mucinex, albuterol.  Orders:  -     Pulmonary Function Test    2. Nocturnal hypoxia  Comments:  Continue oxygen at night.  Patient is wearing and benefiting from it.    3. Pulmonary emphysema, unspecified emphysema type (CMS/HCC)  Comments:  Continue current pulmonary regimen.  She has been applauded for smoking cessation.    4. Malignant neoplasm of upper lobe of left lung (CMS/HCC)  Comments:  Continue follow-up with Dr. Urena.  Awaiting PET scan next week.    5. Former smoker  Comments:  She quit smoking in August 2020.        ORLIN Wilkinson  8/11/2021  17:53 CDT    Follow Up   Return in about 9 months (around 5/11/2022).    Patient was given instructions and counseling regarding her condition or for health maintenance advice. Please see specific information pulled  into the AVS if appropriate.

## 2021-08-16 NOTE — PROGRESS NOTES
[facility] ON[facility] GROUP HEMATOLOGY AND ONCOLOGY  [address]  [place] [address] [phone]    Patient Name: Josi Mohamud  Encounter Date: 08/25/2021  YOB: 1945  Patient Number: [id number]      REASON FOR FOLLOW-UP: Josi Mohamud is a pleasant 75 y.o.  female, resident of [facility], who is seen on followup for brain metastasis.  She is seen with daughter, Franc.  She is marginal historian.        DIAGNOSTIC ABNORMALITIES:Brain metastasis.  She presented with change in mental status.  CBC 07/23/2021 revealed hemoglobin 12.5.  CMP remarkable for sodium of 128 and GFR 34.  Normal D-dimer, proBNP and troponin.  PT 15 and PTT 36.5.  Normal B12.  CT head 07/23/2021.  Interval development of low attenuation in the left occipital lobe, ischemic change/infarction, age-indeterminate, subacute or chronic considered. Correlate with patient presentation  Brain MRI 07/23/2021 without contrast. There is a cystic mass involving the left occipital lobe with adjacent vasogenic edema. Given the history I would favor a metastatic lesion. Abscess is also in the differential but considered less likely. There is some associated diffusion restriction within the wall of the lesion. No additional discrete lesions are appreciated but postcontrast imaging is recommended.  Atrophy with moderate to severe small vessel ischemic disease. Remote lacunar infarcts involving the right basal ganglia are present with focal encephalomalacia and gliosis.  No additional foci of diffusion restriction are demonstrated. There  is no radiographic evidence of acute ischemic stroke on today's exam.  Brain MRI with contrast 07/24/2021.2 small enhancing lesions left occipital lobe one is inferior and  medial to the cystic lesion. A second is superior and lateral to the cystic lesion. These rim-like enhancing lesions are most consistent with metastatic  disease.          PREVIOUS INTERVENTIONS:None.         DIAGNOSTIC ABNORMALITIES: Left upper lobe lung nodules.  CT chest 06/13/2019.  1.2 cm from 1 cm left upper lobe pulmonary nodule.  Left upper lobe 0.9 cm pulmonary nodule from 0.4 cm on 07/06/2018.  2 cm nodule right thyroid.  Seen by Dr. Noel 070 2019.  Not amenable to needle biopsy.  High false-negative result with navigational bronchoscopy.  She was followed along due to her age and comorbidities.  CT chest 09/17/2019.  2 nodules again noted in the left lung.  PET 09/23/2019.  Right-sided nodule SUV 5.4 and left-sided nodule SUV 6.5.  2 additional right parathyroid nodules more inferiorly.  Radiotracer uptake left tonsil 4.5 SUV.  The 0.9 cm with SUV of 2.3 and the 1.7 cm with SUV of 2.2.  Seen by Dr. Noel 09/25/2019.  Patient referred to radiation oncology.  Thyroid biopsy 10/14/2019 by Dr. Se Mcnulty.  Pathology showed follicular lesion of uncertain significance.  CT of the chest 01/30/2020 showed slightly decreased size of the larger left upper lobe pulmonary nodules.  1.7 cm centimeters low-density lesion.  Aspect right kidney  CT chest 05/29/2020.  Previously treated left upper lobe pulmonary nodule stable in size.  2.3 cm right renal cyst  CT chest 02/23/2021.Decreased size of spiculated lingular pulmonary nodule now measuring 0.9 cm, previously 1.5 cm on 5/29/2020.  CT chest 05/24/2021.Spiculated left upper lobe nodule measures 9 mm, similar to the 2/23/2021 exam. A previous spiculated 7 mm left apical nodule seen on 5/29/2020 is no longer visualized, however there are similar ill-defined densities within the left lung apex when compared to 2/23/2021 which may represent radiation change. No new lung nodules.           PREVIOUS INTERVENTIONS:  Patient was treated with 5000 cGy left upper lobe via SBRT 11/12/2019 through 11/25/2019.        Oncology/Hematology History Overview Note     CT chest 06/13/2019.  1.2 cm from 1 cm left upper lobe pulmonary  nodule.  Left upper lobe 0.9 cm pulmonary nodule from 0.4 cm on 07/06/2018.  2 cm nodule right thyroid.  Seen by Dr. Noel 070 2019.  Not amenable to needle biopsy.  High false-negative result with navigational bronchoscopy.  She was followed along due to her age and comorbidities.  CT chest 09/17/2019.  2 nodules again noted in the left lung.  PET 09/23/2019.  Right-sided nodule SUV 5.4 and left-sided nodule SUV 6.5.  2 additional right parathyroid nodules more inferiorly.  Radiotracer uptake left tonsil 4.5 SUV.  The 0.9 cm with SUV of 2.3 and the 1.7 cm with SUV of 2.2.  Seen by Dr. Noel 09/25/2019.  Patient referred to radiation oncology.  Thyroid biopsy 10/14/2019 by Dr. Se Mcnulty.  Pathology showed follicular lesion of uncertain significance.  CT of the chest 01/30/2020 showed slightly decreased size of the larger left upper lobe pulmonary nodules.  1.7 cm centimeters low-density lesion.  Aspect right kidney  CT chest 05/29/2020.  Previously treated left upper lobe pulmonary nodule stable in size.  2.3 cm right renal cyst  CT chest 02/23/2021.Decreased size of spiculated lingular pulmonary nodule now measuring 0.9 cm, previously 1.5 cm on 5/29/2020.  CT chest 05/24/2021.Spiculated left upper lobe nodule measures 9 mm, similar to the 2/23/2021 exam. A previous spiculated 7 mm left apical nodule seen on 5/29/2020 is no longer visualized, however there are similar ill-defined densities within the left lung apex when compared to 2/23/2021 which may represent radiation change. No new lung nodules.         Brain mass    Initial Diagnosis    Brain mass     7/6/2018 Imaging    Low Dose CT Chest:  IMPRESSION:  1. There is a 1 cm ill-defined left upper lobe pulmonary nodule that  needs further evaluation with PET/CT. This is very concerning for a  small primary neoplasm.  2. Lung-RADS 4: Suspicious.  3. There is a 3.7 x 1 cm radiopaque density in the medial right breast  could be a foreign body/retained surgical  drain. This is not present on  a mammogram from 2016. Correlate with any surgical intervention in this area.  4. Possible dominant nodule in the right thyroid lobe versus artifact  due to the low-dose protocol. Consider follow-up ultrasound of the  thyroid gland.  5. Atheromatous disease of the thoracic aorta and coronary arteries     7/19/2018 Imaging    PET/CT:  IMPRESSION:  1. Some increased uptake within the previously described left upper lobe nodule, suspicious for malignancy.  2. Abnormal uptake in the posterior left oropharynx, etiology unclear.  Recommend direct visualization for further evaluation.  3. Increased uptake in the parotid glands bilaterally with underlying  nodules possibly reflecting lymph nodes. A neoplastic process cannot be excluded.     6/13/2019 Imaging    CT Chest:  IMPRESSION:  1. There are 2 pulmonary nodules increased in size. While these could be infectious/inflammatory, they are concerning for malignancy. If not  previously performed, consider tissue diagnosis.  2. There is a 2 cm low-density lesion in the right thyroid. If not  previously performed an outside facility, consider dedicated thyroid  ultrasound.  3. Coronary artery calcifications.     9/17/2019 Imaging    CT Chest:  IMPRESSION:  1. Continued gradual increase in size of the 12 mm nodule on image 66. A neoplasm should be excluded. PET CT scan may be suggested  2. 9 mm nodule left upper lobe is stable from 06/13/2019  3. Centrilobular emphysematous change  4. 2 cm nodule right lobe thyroid gland ultrasound may be considered     9/23/2019 Imaging    PET/CT:  IMPRESSION:  1. Known left upper lobe pulmonary nodules both display FDG uptake, with both being suspicious for malignancy.  2. No hypermetabolic adenopathy.  3. 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  4. Asymmetric radiotracer uptake in the left tonsil. Consider direct  visualization to help exclude  oropharyngeal squamous carcinoma.     11/12/2019 - 11/25/2019 Radiation    Radiation OncologyTreatment Course:  Josi Mohamud received 5000 cGy in 5 fractions to CELI via SBRT.     1/30/2020 Imaging    CT Chest:  IMPRESSION:  1. Slightly decreased size of larger left upper lobe pulmonary nodules.  Recommend follow-up per oncology/radiation oncology.  2. Right kidney with a 1.7 cm low-density lesion at the inferior aspect,  which is outside the field-of-view on comparisons. This is most commonly a cyst, but incompletely evaluated on this exam. Recommend correlation with outside imaging. If none available, consider renal ultrasound for further evaluation.     3/2/2020 Imaging    Bilateral Renal Ultrasound:  IMPRESSION:  1. Hypoechoic cyst lower pole right kidney is noted.  2. The left renal contour is unremarkable.        5/29/2020 Imaging    CT Chest:  IMPRESSION:  1. Previously treated left upper lobe pulmonary nodules are stable in  size. No new or enlarging nodules identified.  2. Centrilobular emphysema.  3. Advanced coronary atheromatous calcification.  4. No suspicious adenopathy in the chest.  5. 2.3 cm right renal cyst was shown to be a simple cyst on recent  ultrasound.     2/23/2021 Imaging    CT Chest:  IMPRESSION:  1. Peripheral consolidative opacity at the left upper lobe, which could  represent postradiation changes. Correlate with patient history. The  previously described left apical pulmonary nodule is not discretely  demonstrated on today's exam.  2. Decreased size of spiculated lingular pulmonary nodule now measuring 0.9 cm, previously 1.5 cm on 5/29/2020.  3. New severe height loss of T4 compared to 5/29/2019.  4. Peripheral wedge-shaped hyperdensity in the right liver lobe, favored  to represent transient hepatic attenuation difference (YON).  5. Splenectomy with residual splenosis.     5/24/2021 Imaging    CT Chest:  IMPRESSION:  1. Spiculated left upper lobe nodule measures 9 mm, similar to  the  2/23/2021 exam. A previous spiculated 7 mm left apical nodule seen on  5/29/2020 is no longer visualized, however there are similar ill-defined  densities within the left lung apex when compared to 2/23/2021 which may represent radiation change. No new lung nodules.  2. Moderate emphysema.  3. Similar T4 compression deformity.  4. Prior splenectomy with similar left subphrenic and left. Adrenal  nodularity likely due to splenosis.  4. Enhancing foci along the peripheral aspect of the left and right  hepatic lobes may be due to transient hepatic attenuation defects or  focal fatty sparing with hepatic steatosis.  5. Contrast reaction occurred with pruritus and erythema of the upper  and lower extremities. No difficulties swallowing or breathing. Patient  received 50 mg Benadryl orally to improve symptomatology. Patient should be pretreated with steroids and Benadryl prior to any future IV contrast administration.     7/23/2021 Imaging    CT Head:  IMPRESSION:  1. Interval development of low attenuation in the left occipital lobe,  ischemic change/infarction, age-indeterminate, subacute or chronic  considered. Correlate with patient presentation.  2. Advanced chronic ischemic changes.    MRI Brain w/o contrast:  IMPRESSION:  1.. There is a cystic mass involving the left occipital lobe with  adjacent vasogenic edema. Given the history I would favor a metastatic  lesion. Abscess is also in the differential but considered less likely.  There is some associated diffusion restriction within the wall of the  lesion. No additional discrete lesions are appreciated but postcontrast  imaging is recommended.  2. Atrophy with moderate to severe small vessel ischemic disease. Remote lacunar infarcts involving the right basal ganglia are present with focal encephalomalacia and gliosis.  3. No additional foci of diffusion restriction are demonstrated. There  is no radiographic evidence of acute ischemic stroke on today's  exam.     7/24/2021 Imaging    MRI Brain with contrast:  IMPRESSION:  1. 2 small enhancing lesions left occipital lobe one is inferior and  medial to the cystic lesion. A second is superior and lateral to the  cystic lesion. These rim-like enhancing lesions are most consistent with  metastatic disease.  2. Extensive changes of chronic microvascular ischemic disease are noted in the periventricular white          PAST MEDICAL HISTORY:  ALLERGIES:  Allergies   Allergen Reactions   • Contrast Dye Itching     Contrast reaction occurred with pruritus and erythema of the upper  and lower extremities. No difficulties swallowing or breathing. Patient  received 50 mg Benadryl orally to improve symptomatology. Patient should  be pretreated with steroids and Benadryl prior to any future IV contrast  Administration. 05-     • Penicillins Rash     CURRENT MEDICATIONS:  Outpatient Encounter Medications as of 8/25/2021   Medication Sig Dispense Refill   • acetaminophen (TYLENOL) 325 MG tablet Take 2 tablets by mouth Every 4 (Four) Hours As Needed for Mild Pain .     • albuterol sulfate  (90 Base) MCG/ACT inhaler Inhale 1 puff Daily. 1 inhaler 6   • amLODIPine (NORVASC) 5 MG tablet Take 5 mg by mouth Daily.     • apixaban (ELIQUIS) 5 MG tablet tablet Take 1 tablet by mouth Every 12 (Twelve) Hours. 60 tablet 11   • aspirin 81 MG EC tablet Take 81 mg by mouth Daily.     • Calcium Carbonate-Vitamin D (CALTRATE 600+D PO) Take 1 tablet by mouth 2 (two) times a day.     • diphenhydrAMINE (BENADRYL) 50 MG capsule Take with last dose of prednisone 1 hour prior to CT scan 1 capsule 0   • docusate sodium 100 MG capsule Take 100 mg by mouth 2 (Two) Times a Day As Needed for Constipation.     • famotidine (PEPCID) 20 MG tablet Take 1 tablet by mouth Daily.     • fluticasone (FLONASE) 50 MCG/ACT nasal spray into the nostril(s) as directed by provider Daily As Needed.     • fluticasone-salmeterol (ADVAIR) 100-50 MCG/DOSE DISKUS  Inhale 2 (Two) Times a Day.     • furosemide (LASIX) 20 MG tablet Take 20 mg by mouth Daily. At bedtime     • furosemide (LASIX) 40 MG tablet Take 40 mg by mouth Daily. Morning     • gabapentin (NEURONTIN) 100 MG capsule Take 2 capsules by mouth 3 (Three) Times a Day. 9 capsule 0   • gabapentin (NEURONTIN) 300 MG capsule Take 1 capsule by mouth 3 (Three) Times a Day.     • guaiFENesin (MUCINEX) 600 MG 12 hr tablet Take 2 tablets by mouth 2 (Two) Times a Day As Needed for Cough. (Patient taking differently: Take 1,200 mg by mouth 2 (Two) Times a Day.) 15 tablet 0   • levETIRAcetam (KEPPRA) 500 MG tablet Take 500 mg by mouth 2 (Two) Times a Day.     • lidocaine (LIDODERM) 5 % Place 1 patch on the skin as directed by provider Daily As Needed for Mild Pain , Moderate Pain  or Severe Pain . Remove & Discard patch within 12 hours or as directed by MD     • lisinopril (PRINIVIL,ZESTRIL) 20 MG tablet Take 20 mg by mouth Daily.     • LORazepam (ATIVAN) 0.5 MG tablet Take 1 tablet by mouth 2 (Two) Times a Day As Needed for Anxiety. 6 tablet 0   • metoprolol succinate XL (TOPROL-XL) 50 MG 24 hr tablet Take 50 mg by mouth Daily.     • nitroglycerin (NITROSTAT) 0.4 MG SL tablet Place 0.4 mg under the tongue Every 5 (Five) Minutes As Needed for Chest Pain. Take no more than 3 doses in 15 minutes.     • omeprazole (priLOSEC) 40 MG capsule Take 40 mg by mouth Daily.  5   • ondansetron (ZOFRAN) 4 MG tablet Take 1 tablet by mouth Every 6 (Six) Hours As Needed.     • oxybutynin (DITROPAN) 5 MG tablet Take 1 tablet by mouth 2 (Two) Times a Day.     • polyethylene glycol (MIRALAX) 17 g packet Take 17 g by mouth Daily As Needed (constipation).     • predniSONE (DELTASONE) 50 MG tablet Take one tab 13 hours prior, one tab 7 hours prior and one tab 1 hour prior to CT scans 3 tablet 0   • simvastatin (ZOCOR) 20 MG tablet Take 20 mg by mouth Daily.     • Spiriva Respimat 1.25 MCG/ACT aerosol solution inhaler Inhale 1 puff Daily.     •  spironolactone (ALDACTONE) 25 MG tablet Take 25 mg by mouth Daily.     • tiotropium bromide monohydrate (SPIRIVA RESPIMAT) 2.5 MCG/ACT aerosol solution inhaler Inhale 2 puffs Daily. 3 inhaler 0   • traMADol (ULTRAM) 50 MG tablet Take 1 tablet by mouth Every 6 (Six) Hours As Needed.     • [] fludeoxyglucose F18 injection 1 dose        No facility-administered encounter medications on file as of 2021.     ADULT ILLNESSES:  Patient Active Problem List   Diagnosis Code   • Syncope and collapse R55   • Essential hypertension I10   • Anticoagulated Z79.01   • PAF (paroxysmal atrial fibrillation) (CMS/HCC) I48.0   • Obesity (BMI 30-39.9) E66.9   • Fall W19.XXXA   • Bilateral carotid artery stenosis I65.23   • Brain mass G93.89   • Brain metastasis (CMS/HCC) C79.31   • Non-smoker Z78.9     SURGERIES:  Past Surgical History:   Procedure Laterality Date   • ANKLE OPEN REDUCTION INTERNAL FIXATION Left 8/15/2020    Procedure: ANKLE OPEN REDUCTION INTERNAL FIXATION;  Surgeon: Keyshawn Medrano MD;  Location: [facility] OR;  Service: Orthopedics;  Laterality: Left;   • APPENDECTOMY     • APPENDECTOMY     • CARDIAC CATHETERIZATION     • CARDIAC ELECTROPHYSIOLOGY PROCEDURE N/A 2017    Procedure: Loop insertion; LINQ;  Surgeon: Jose Monteiro MD;  Location: [facility] CATH INVASIVE LOCATION;  Service:    • CHOLECYSTECTOMY     • CORONARY ANGIOPLASTY WITH STENT PLACEMENT      X 1    • INCISION AND DRAINAGE OF WOUND Left 8/15/2020    Procedure: INCISION AND DRAINAGE WOUND;  Surgeon: Keyshawn Medrano MD;  Location: [facility] OR;  Service: Orthopedics;  Laterality: Left;   • LIVER BIOPSY     • MYRINGOTOMY W/ TUBES Left 2019    Procedure: LEFT MYRINGOTOMY WITH INSERTION OF EAR TUBE WITH RIGHT EAR EXAM UNDER ANESTHESIA;  Surgeon: Se Mcnulty MD;  Location: [facility] OR;  Service: ENT   • SPLENECTOMY     • US GUIDED FINE NEEDLE ASPIRATION  2018     HEALTH MAINTENANCE ITEMS:  Health Maintenance Due   Topic Date  Due   • ZOSTER VACCINE (2 of 3) 2015   • Pneumococcal Vaccine 65+ (1 of 2 - PPSV23) 2016   • COVID-19 Vaccine (3 - Pfizer risk 3-dose series) 2021   • ANNUAL WELLNESS VISIT  2021       <no information>  Last Completed Colonoscopy     This patient has no relevant Health Maintenance data.        Immunization History   Administered Date(s) Administered   • COVID-19 (PFIZER) 2021, 2021   • FLUAD TRI 65YR+ 10/17/2019   • Flu Vaccine Quad PF >18YRS 2015, 10/05/2017   • Fluzone High Dose =>65 Years ([employer] ONLY) 10/28/2016, 10/17/2019, 10/01/2020   • Influenza, Unspecified 10/28/2016   • Pneumococcal Conjugate 13-Valent (PCV13) 2016, 10/14/2016   • Tdap 08/15/2020   • Zostavax 2015     Last Completed Mammogram          MAMMOGRAM (Every 2 Years) Next due on 2019  Mammo Screening Digital Tomosynthesis Bilateral With CAD    2018  Mammo screening digital tomosynthesis bilateral w CAD    2016  Mammo screening bilateral w CAD    2015  MAMMOGRAPHY SCREENING BILATERAL    2014  MAMMOGRAPHY SCREENING BILATERAL    Only the first 5 history entries have been loaded, but more history exists.                  FAMILY HISTORY:  Family History   Problem Relation Age of Onset   • Esophageal cancer Brother    • Lung cancer Brother    • Breast cancer Neg Hx      SOCIAL HISTORY:  Social History     Socioeconomic History   • Marital status:      Spouse name: Not on file   • Number of children: 1   • Years of education: Not on file   • Highest education level: Not on file   Tobacco Use   • Smoking status: Former Smoker     Packs/day: 0.50     Years: 58.00     Pack years: 29.00     Start date:      Quit date: 2020     Years since quittin.0   • Smokeless tobacco: Never Used   Vaping Use   • Vaping Use: Never used   Substance and Sexual Activity   • Alcohol use: No   • Drug use: No   • Sexual activity: Defer       REVIEW OF  "SYSTEMS:    Review of Systems   Constitutional: Positive for fatigue. Negative for chills and fever.        \"Hoarseness.\"   HENT: Positive for voice change. Negative for facial swelling.    Eyes: Negative for redness and visual disturbance.   Respiratory: Negative for shortness of breath and wheezing.    Cardiovascular: Negative for chest pain.   Gastrointestinal: Negative for nausea and vomiting.   Endocrine: Negative for cold intolerance and heat intolerance.   Genitourinary: Negative for difficulty urinating, dysuria and flank pain.   Musculoskeletal: Negative for neck stiffness.   Skin: Positive for pallor.   Allergic/Immunologic: Negative for food allergies.   Neurological: Positive for headaches. Negative for weakness.        \"Forgetfullness.\" Per PCP.   Hematological: Negative for adenopathy.   Psychiatric/Behavioral: Negative for agitation and hallucinations.       VITAL SIGNS: /62   Pulse 50   Temp 98.3 °F (36.8 °C)   Resp 18   Ht 149.9 cm (59\")   Wt 86.2 kg (190 lb)   LMP  (LMP Unknown)   SpO2 95% Comment: without mask on  Breastfeeding No   BMI 38.38 kg/m²   Pain Score    08/25/21 1134   PainSc:   2       PHYSICAL EXAMINATION:     Physical Exam  Vitals reviewed.   Constitutional:       Appearance: She is ill-appearing.      Comments: She arrived in the exam room in a wheelchair.    HENT:      Head: Normocephalic and atraumatic.   Eyes:      General: No scleral icterus.  Cardiovascular:      Rate and Rhythm: Normal rate and regular rhythm.   Pulmonary:      Effort: No respiratory distress.      Breath sounds: No wheezing or rales.   Abdominal:      General: Bowel sounds are normal.      Palpations: Abdomen is soft.   Musculoskeletal:      Cervical back: Neck supple.      Comments: Bilateral boots. \"Non weight bearing right now.\"   Skin:     General: Skin is warm and dry.      Coloration: Skin is pale.   Neurological:      Mental Status: She is alert and oriented to person, place, and time. "   Psychiatric:         Mood and Affect: Mood normal.         Behavior: Behavior normal.         LABS    Lab Results - Last 18 Months   Lab Units 08/03/21  1722 07/28/21  0942 07/23/21  0811 05/11/21  0300 10/22/20  0330 10/12/20  1300 08/21/20  0300 08/21/20  0300 08/16/20  0507 08/15/20  0835 07/28/20  1236 07/28/20  1236   HEMOGLOBIN g/dL 11.6* 12.7 12.5 12.2 11.5* 12.7   < > 11.1*   < > 15.0   < > 15.2   HEMATOCRIT % 32.8* 37.2 34.8 36.5 34.9 38.0   < > 32.9*   < > 43.3   < > 45.0   MCV fL 99.1* 100.3* 96.9 98.6* 101.5* 100.5*   < > 99.7*   < > 97.7*   < > 102.0*   WBC 10*3/mm3 6.70 7.09 9.84 6.07 4.13 7.25   < > 5.48   < > 6.96   < > 5.42   RDW % 13.2 13.3 13.0 12.5 13.6 13.1   < > 13.5   < > 13.8   < > 12.9   MPV fL 9.5 9.3 9.3 10.2 10.3 11.1   < > 10.1   < > 10.2   < > 11.2   PLATELETS 10*3/mm3 349 385 356 318 306 294   < > 296   < > 274   < > 276   IMM GRAN % % 0.4 0.6* 0.6*  --   --  0.4  --   --   --  0.1  --  0.2   NEUTROS ABS 10*3/mm3 3.33 4.00 7.25* 1.83 1.16* 4.14  --  2.85  --  5.67   < > 2.20   LYMPHS ABS 10*3/mm3 2.19 2.09 1.77  --   --  2.35  --   --   --  0.86  --  2.46   MONOS ABS 10*3/mm3 0.99* 0.78 0.69  --   --  0.56  --   --   --  0.39  --  0.61   EOS ABS 10*3/mm3 0.11 0.14 0.02 0.06 0.09 0.12  --  0.05  --  0.00   < > 0.10   BASOS ABS 10*3/mm3 0.05 0.04 0.05  --  0.09 0.05  --   --   --  0.03   < > 0.04   IMMATURE GRANS (ABS) 10*3/mm3 0.03 0.04 0.06*  --   --  0.03  --   --   --  0.01  --  0.01   NRBC /100 WBC 0.0 0.0 0.0  --   --  0.6*  --   --   --  0.0  --  0.0   NEUTROPHIL % %  --   --   --  30.2* 25.0*  --   --  50.0  --   --   --   --    MONOCYTES % %  --   --   --  4.2* 11.5  --   --  5.1  --   --   --   --    BASOPHIL % %  --   --   --   --  2.1*  --   --   --   --   --   --   --    ATYP LYMPH % %  --   --   --  5.2*  --   --   --   --   --   --   --   --    ANISOCYTOSIS   --   --   --  Slight/1+  --   --   --  Slight/1+  --   --   --   --    GIANT PLT   --   --   --   --  Slight/1+   --   --  Slight/1+  --   --   --   --     < > = values in this interval not displayed.       Lab Results - Last 18 Months   Lab Units 08/03/21  1722 07/28/21  0942 07/24/21  0312 07/23/21  0811 05/24/21  1440 05/11/21  0300 04/16/21  1300 04/16/21  1300 02/23/21  1558 10/22/20  0330 10/12/20  1300 08/21/20  0300 08/16/20  0507 08/15/20  0915   GLUCOSE mg/dL 165* 116* 170* 115*  --  95  --  101*   < > 109*   < > 93   < > 131*   SODIUM mmol/L 132* 133* 132* 128*  --  135*  --  134*   < > 139   < > 141   < > 133*   POTASSIUM mmol/L 4.6 4.6 4.6 5.0  --  4.0  --  5.3*   < > 4.2   < > 3.7   < > 4.3   CO2 mmol/L 25.0 24.0 17.0* 19.0*  --  23.0  --  22.0   < > 27.0   < > 31.0*   < > 26.0   CHLORIDE mmol/L 96* 100 104 96*  --  100  --  99   < > 107   < > 100   < > 97*   ANION GAP mmol/L 11.0 9.0 11.0 13.0  --  12.0  --  13.0   < > 5.0   < > 10.0   < > 10.0   CREATININE mg/dL 1.04* 1.13* 0.96 1.51* 1.30 1.05*   < > 1.07*   < > 0.92   < > 0.75   < > 1.16*   BUN mg/dL 19 30* 27* 34*  --  21  --  30*   < > 14   < > 22   < > 21   BUN / CREAT RATIO  18.3 26.5* 28.1* 22.5  --  20.0  --  28.0*   < > 15.2   < > 29.3*   < > 18.1   CALCIUM mg/dL 9.1 9.4 8.8 9.4  --  9.0  --  9.0   < > 8.8   < > 8.3*   < > 9.1   EGFR IF NONAFRICN AM mL/min/1.73 52* 47* 57* 34*  --  51*  --  50*   < > 60*   < > 75   < > 46*   ALK PHOS U/L 150* 98  --  104  --   --   --  134*  --  86  --  135*  --  100   TOTAL PROTEIN g/dL 6.8 7.5  --  7.3  --   --   --  6.9  --  5.7*  --  5.2*  --  7.0   ALT (SGPT) U/L 71* 20  --  17  --   --   --  23  --  10  --  18  --  13   AST (SGOT) U/L 26 16  --  21  --   --   --  23  --  14  --  44*  --  23   BILIRUBIN mg/dL 0.2 0.2  --  0.3  --   --   --  0.2  --  0.2  --  0.4  --  0.3   ALBUMIN g/dL 3.60 4.00  --  3.60  --   --   --  3.80  --  3.30*  --  2.90*  --  3.80   GLOBULIN gm/dL 3.2 3.5  --  3.7  --   --   --  3.1  --  2.4  --   --   --  3.2    < > = values in this interval not displayed.       No results for input(s):  MSPIKE, KAPPALAMB, IGLFLC, URICACID, FREEKAPPAL, CEA, LDH, REFLABREPO in the last [id number] hours.    Lab Results - Last 18 Months   Lab Units 07/23/21  0811 08/21/20  0300 05/27/20  1220 03/07/20  1755   TSH uIU/mL 0.875 2.550 1.670 1.970       Josi Mohamud reports a pain score of 2.  Given her pain assessment as noted, treatment options were discussed and the following options were decided upon as a follow-up plan to address the patient's pain: continuation of current treatment plan for pain.        ASSESSMENT:   1.  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   Treatment status: pending.   2.  Neoplasm left upper lobe 0.9 cm and a 1.7 cm more inferior lateral left upper lobe lesion on 09/2019.  Treatment status: Post SBRT, left upper lobe, 5000 cGy 11/12/2019 through 11/25/2019.  3.  Performance status of 3.  4.  Chronic obstructive pulmonary disease.  5.  Hoarseness, no unifying diagnosis.  To see ENT.   6.  History of Hodgkin's disease, post chemotherapy by Dr. Dowell in the 80s.        PLAN:  1.   Re:  Note from Dr. Rosen 08/08/2021.  Discussed role of radiation therapy.  PET ordered for staging.  2.   Re:  PET report 08/16/2021. Abnormal PET/CT, there is a small hypermetabolic nodule within each  parotid gland, possibly due to metastatic intraparotid lymph nodes, less likely primary parotid tumors. If clinically indicated, ultrasound-guided FNA could probably be performed on the largest of the intraparotid nodules, which measures 10 mm and is seen on the left.  Equivocal focus of increased FDG activity at the junction of the right first rib with the manubrium with a maximum SUV of 3.1. This could be a solitary osseous metastasis or related to degenerative change. There is no definite evidence of a lytic or sclerotic lesion in this location on the fusion CT.  3.   Re: Heme status 07/20/2021.  WBC 7, hemoglobin 12.7, .3 and platelet 85.  GFR 47 mL/min.  4.  Order  "sonogram guided biopsy, left parotid gland, PET positive.   5.  Await SBRT to brain by Dr. Rosen.  6.  eRx Decadron 4 mg po every 8 hours, #60 with 2 refills if needed.  7.  The patient is not a candidate for active palliative chemotherapy due to her performance status, age and multiple comorbidities. \"I know I can't have it.\"  8.  Continue care per primary care physician and other specialists.  9.  Plan of care discussed with patient and daughter. Understand expressed.  Patient agreeable to proceed.  10. Advance Care Planning   ACP discussion was held with the patient during this visit. Patient has an advance directive (not in EMR), copy requested.  11.  Refer to ENT for hoarseness.  12.  Return to office in 4 weeks.           I have reviewed the assessment and plan and verified the accuracy of it. No changes to assessment and plan since the information was documented. Dhaval Urena MD 08/25/21        I spent 34 total minutes, face-to-face, caring for Josi akins.  Greater than 50% of this time involved counseling and/or coordination of care as documented within this note regarding the patient's illness(es), pros and cons of various treatment options, instructions and/or risk reduction.          (Sean Rosen MD)  Cayetano Crews MD  (ORLIN Wadsworth)  "

## 2021-08-19 PROBLEM — Z78.9 NON-SMOKER: Status: ACTIVE | Noted: 2021-01-01

## 2021-08-19 PROBLEM — C79.31 BRAIN METASTASIS: Status: ACTIVE | Noted: 2021-01-01

## 2021-08-19 NOTE — PATIENT INSTRUCTIONS
"PATIENT TO CONTINUE TO FOLLOW UP WITH HER PRIMARY CARE PROVIDER FOR YEARLY PHYSICAL EXAMS TO ENSURE COMPLETE HEALTH MAINTENANCE        BMI for Adults  What is BMI?  Body mass index (BMI) is a number that is calculated from a person's weight and height. BMI can help estimate how much of a person's weight is composed of fat. BMI does not measure body fat directly. Rather, it is an alternative to procedures that directly measure body fat, which can be difficult and expensive.  BMI can help identify people who may be at higher risk for certain medical problems.  What are BMI measurements used for?  BMI is used as a screening tool to identify possible weight problems. It helps determine whether a person is obese, overweight, a healthy weight, or underweight.  BMI is useful for:  · Identifying a weight problem that may be related to a medical condition or may increase the risk for medical problems.  · Promoting changes, such as changes in diet and exercise, to help reach a healthy weight. BMI screening can be repeated to see if these changes are working.  How is BMI calculated?  BMI involves measuring your weight in relation to your height. Both height and weight are measured, and the BMI is calculated from those numbers. This can be done either in English (U[place] or metric measurements. Note that charts and online BMI calculators are available to help you find your BMI quickly and easily without having to do these calculations yourself.  To calculate your BMI in English (U[place] measurements:    1. Measure your weight in pounds (lb).  2. Multiply the number of pounds by 703.  ? For example, for a person who weighs 180 lb, multiply that number by 703, which equals 126,540.  3. Measure your height in inches. Then multiply that number by itself to get a measurement called \"inches squared.\"  ? For example, for a person who is 70 inches tall, the \"inches squared\" measurement is 70 inches x 70 inches, which equals 4,900 inches " "squared.  4. Divide the total from step 2 (number of lb x 703) by the total from step 3 (inches squared): 126,540 ÷ 4,900 = 25.8. This is your BMI.  To calculate your BMI in metric measurements:  1. Measure your weight in kilograms (kg).  2. Measure your height in meters (m). Then multiply that number by itself to get a measurement called \"meters squared.\"  ? For example, for a person who is 1.75 m tall, the \"meters squared\" measurement is 1.75 m x 1.75 m, which is equal to 3.1 meters squared.  3. Divide the number of kilograms (your weight) by the meters squared number. In this example: 70 ÷ 3.1 = 22.6. This is your BMI.  What do the results mean?  BMI charts are used to identify whether you are underweight, normal weight, overweight, or obese. The following guidelines will be used:  · Underweight: BMI less than 18.5.  · Normal weight: BMI between 18.5 and 24.9.  · Overweight: BMI between 25 and 29.9.  · Obese: BMI of 30 or above.  Keep these notes in mind:  · Weight includes both fat and muscle, so someone with a muscular build, such as an athlete, may have a BMI that is higher than 24.9. In cases like these, BMI is not an accurate measure of body fat.  · To determine if excess body fat is the cause of a BMI of 25 or higher, further assessments may need to be done by a health care provider.  · BMI is usually interpreted in the same way for men and women.  Where to find more information  For more information about BMI, including tools to quickly calculate your BMI, go to these websites:  · Centers for Disease Control and Prevention: www.cdc.gov  · American Heart Association: www.heart.org  · National Heart, Lung, and Blood [place]: www.nhlbi.nih.gov  Summary  · Body mass index (BMI) is a number that is calculated from a person's weight and height.  · BMI may help estimate how much of a person's weight is composed of fat. BMI can help identify those who may be at higher risk for certain medical problems.  · BMI " "can be measured using English measurements or metric measurements.  · BMI charts are used to identify whether you are underweight, normal weight, overweight, or obese.  This information is not intended to replace advice given to you by your health care provider. Make sure you discuss any questions you have with your health care provider.  Document Revised: 09/09/2020 Document Reviewed: 07/17/2020  [employer] Patient Education © 2021 [employer]      https://www.nhlbi.nih.gov/files/docs/public/heart/dash_brief.pdf\">   DASH Eating Plan  DASH stands for Dietary Approaches to Stop Hypertension. The DASH eating plan is a healthy eating plan that has been shown to:  · Reduce high blood pressure (hypertension).  · Reduce your risk for type 2 diabetes, heart disease, and stroke.  · Help with weight loss.  What are tips for following this plan?  Reading food labels  · Check food labels for the amount of salt (sodium) per serving. Choose foods with less than 5 percent of the Daily Value of sodium. Generally, foods with less than 300 milligrams (mg) of sodium per serving fit into this eating plan.  · To find whole grains, look for the word \"whole\" as the first word in the ingredient list.  Shopping  · Buy products labeled as \"low-sodium\" or \"no salt added.\"  · Buy fresh foods. Avoid canned foods and pre-made or frozen meals.  Cooking  · Avoid adding salt when cooking. Use salt-free seasonings or herbs instead of table salt or sea salt. Check with your health care provider or pharmacist before using salt substitutes.  · Do not allison foods. Cook foods using healthy methods such as baking, boiling, grilling, roasting, and broiling instead.  · Cook with heart-healthy oils, such as olive, canola, avocado, soybean, or sunflower oil.  Meal planning    · Eat a balanced diet that includes:  ? 4 or more servings of fruits and 4 or more servings of vegetables each day. Try to fill one-half of your plate with fruits and vegetables.  ? 6-8 " servings of whole grains each day.  ? Less than 6 oz (170 g) of lean meat, poultry, or fish each day. A 3-oz (85-g) serving of meat is about the same size as a deck of cards. One egg equals 1 oz (28 g).  ? 2-3 servings of low-fat dairy each day. One serving is 1 cup (237 mL).  ? 1 serving of nuts, seeds, or beans 5 times each week.  ? 2-3 servings of heart-healthy fats. Healthy fats called omega-3 fatty acids are found in foods such as walnuts, flaxseeds, fortified milks, and eggs. These fats are also found in cold-water fish, such as sardines, salmon, and mackerel.  · Limit how much you eat of:  ? Canned or prepackaged foods.  ? Food that is high in trans fat, such as some fried foods.  ? Food that is high in saturated fat, such as fatty meat.  ? Desserts and other sweets, sugary drinks, and other foods with added sugar.  ? Full-fat dairy products.  · Do not salt foods before eating.  · Do not eat more than 4 egg yolks a week.  · Try to eat at least 2 vegetarian meals a week.  · Eat more home-cooked food and less restaurant, buffet, and fast food.  Lifestyle  · When eating at a restaurant, ask that your food be prepared with less salt or no salt, if possible.  · If you drink alcohol:  ? Limit how much you use to:  § 0-1 drink a day for women who are not pregnant.  § 0-2 drinks a day for men.  ? Be aware of how much alcohol is in your drink. In the [place] one drink equals one 12 oz bottle of beer (355 mL), one 5 oz glass of wine (148 mL), or one 1½ oz glass of hard liquor (44 mL).  General information  · Avoid eating more than 2,300 mg of salt a day. If you have hypertension, you may need to reduce your sodium intake to 1,500 mg a day.  · Work with your health care provider to maintain a healthy body weight or to lose weight. Ask what an ideal weight is for you.  · Get at least 30 minutes of exercise that causes your heart to beat faster (aerobic exercise) most days of the week. Activities may include walking,  swimming, or biking.  · Work with your health care provider or dietitian to adjust your eating plan to your individual calorie needs.  What foods should I eat?  Fruits  All fresh, dried, or frozen fruit. Canned fruit in natural juice (without added sugar).  Vegetables  Fresh or frozen vegetables (raw, steamed, roasted, or grilled). Low-sodium or reduced-sodium tomato and vegetable juice. Low-sodium or reduced-sodium tomato sauce and tomato paste. Low-sodium or reduced-sodium canned vegetables.  Grains  Whole-grain or whole-wheat bread. Whole-grain or whole-wheat pasta. Brown rice. Oatmeal. Quinoa. Bulgur. Whole-grain and low-sodium cereals. Mariella bread. Low-fat, low-sodium crackers. Whole-wheat flour tortillas.  Meats and other proteins  Skinless chicken or turkey. Ground chicken or turkey. Pork with fat trimmed off. Fish and seafood. Egg whites. Dried beans, peas, or lentils. Unsalted nuts, nut butters, and seeds. Unsalted canned beans. Lean cuts of beef with fat trimmed off. Low-sodium, lean precooked or cured meat, such as sausages or meat loaves.  Dairy  Low-fat (1%) or fat-free (skim) milk. Reduced-fat, low-fat, or fat-free cheeses. Nonfat, low-sodium ricotta or cottage cheese. Low-fat or nonfat yogurt. Low-fat, low-sodium cheese.  Fats and oils  Soft margarine without trans fats. Vegetable oil. Reduced-fat, low-fat, or light mayonnaise and salad dressings (reduced-sodium). Canola, safflower, olive, avocado, soybean, and sunflower oils. Avocado.  Seasonings and condiments  Herbs. Spices. Seasoning mixes without salt.  Other foods  Unsalted popcorn and pretzels. Fat-free sweets.  The items listed above may not be a complete list of foods and beverages you can eat. Contact a dietitian for more information.  What foods should I avoid?  Fruits  Canned fruit in a light or heavy syrup. Fried fruit. Fruit in cream or butter sauce.  Vegetables  Creamed or fried vegetables. Vegetables in a cheese sauce. Regular canned  vegetables (not low-sodium or reduced-sodium). Regular canned tomato sauce and paste (not low-sodium or reduced-sodium). Regular tomato and vegetable juice (not low-sodium or reduced-sodium). Pickles. Olives.  Grains  Baked goods made with fat, such as croissants, muffins, or some breads. Dry pasta or rice meal packs.  Meats and other proteins  Fatty cuts of meat. Ribs. Fried meat. Steve. Bologna, salami, and other precooked or cured meats, such as sausages or meat loaves. Fat from the back of a pig (fatback). Bratwurst. Salted nuts and seeds. Canned beans with added salt. Canned or smoked fish. Whole eggs or egg yolks. Chicken or turkey with skin.  Dairy  Whole or 2% milk, cream, and half-and-half. Whole or full-fat cream cheese. Whole-fat or sweetened yogurt. Full-fat cheese. Nondairy creamers. Whipped toppings. Processed cheese and cheese spreads.  Fats and oils  Butter. Stick margarine. Lard. Shortening. Ghee. Steve fat. Tropical oils, such as coconut, palm kernel, or palm oil.  Seasonings and condiments  Onion salt, garlic salt, seasoned salt, table salt, and sea salt. Worcestershire sauce. Tartar sauce. Barbecue sauce. Teriyaki sauce. Soy sauce, including reduced-sodium. Steak sauce. Canned and packaged gravies. Fish sauce. Oyster sauce. Cocktail sauce. Store-bought horseradish. Ketchup. Mustard. Meat flavorings and tenderizers. Bouillon cubes. Hot sauces. Pre-made or packaged marinades. Pre-made or packaged taco seasonings. Relishes. Regular salad dressings.  Other foods  Salted popcorn and pretzels.  The items listed above may not be a complete list of foods and beverages you should avoid. Contact a dietitian for more information.  Where to find more information  · National Heart, Lung, and Blood [place]: www.nhlbi.nih.gov  · American Heart Association: www.heart.org  · Academy of Nutrition and Dietetics: www.eatright.org  · National Kidney Foundation: www.kidney.org  Summary  · The DASH eating plan is a  healthy eating plan that has been shown to reduce high blood pressure (hypertension). It may also reduce your risk for type 2 diabetes, heart disease, and stroke.  · When on the DASH eating plan, aim to eat more fresh fruits and vegetables, whole grains, lean proteins, low-fat dairy, and heart-healthy fats.  · With the DASH eating plan, you should limit salt (sodium) intake to 2,300 mg a day. If you have hypertension, you may need to reduce your sodium intake to 1,500 mg a day.  · Work with your health care provider or dietitian to adjust your eating plan to your individual calorie needs.  This information is not intended to replace advice given to you by your health care provider. Make sure you discuss any questions you have with your health care provider.  Document Revised: 11/20/2020 Document Reviewed: 11/20/2020  Elsevier Patient Education © 2021 [employer]

## 2021-08-19 NOTE — PROGRESS NOTES
Patient: Josi Mohamud  : 1945    Primary Care Provider: Cayetano Crews MD    Requesting Provider: Jair Clarke APRN        History    Chief Complaint: Brain metastasis  Chief Complaint   Patient presents with   • Brain Tumor     patient with brain mets (lung); patient had a brain MRI @ [facility] on 21 and is here to discuss SRS.       History of Present Illness: 75-year-old female with a known diagnosis of lung cancer presents after an MRI of the brain which demonstrates a left occipital cystic enhancing mass likely consistent with her malignant lung cancer.  She lives at [facility].  She is been there for over a year after a orthopedic injury.  She failed to recover to the point where she cannot live independently.  She recently broke both of her feet last week and is in walking boots.  The patient relates no vision changes, no headaches, no nausea no vomiting, she does have a difficulty with walking and balance and has several falls.  She also has some difficulty with cognition and memory.    Review of Systems   All other systems reviewed and are negative.      Past Medical History:   Past Medical History:   Diagnosis Date   • Anticoagulated 2018   • Bilateral carotid artery stenosis 2021   • Bronchitis    • Cancer (CMS/HCC)     lung   • COPD (chronic obstructive pulmonary disease) (CMS/HCC)    • COPD, moderate (CMS/HCC) 2019   • Coronary artery disease    • Coronary artery disease involving native coronary artery of native heart without angina pectoris 2017   • Essential hypertension 2017   • Gastroesophageal reflux disease 2018   • GERD (gastroesophageal reflux disease)    • History of transfusion    • Hx of Hodgkin's lymphoma 10/11/2019   • Hyperlipidemia    • Hypertension    • Malignant neoplasm of upper lobe of left lung (CMS/HCC) 2019   • Mixed hyperlipidemia 2021   • PAF (paroxysmal atrial fibrillation) (CMS/HCC)    • Parotid neoplasm  7/21/2018   • Persistent asthma without complication 7/8/2019   • Renal cyst 2/24/2020   • Seizures (CMS/HCC)    • Status post stereotactic radiosurgery 2/24/2020   • Stented coronary artery 5/19/2017   • Thyroid nodule 7/21/2018       Past Surgical History:   Past Surgical History:   Procedure Laterality Date   • ANKLE OPEN REDUCTION INTERNAL FIXATION Left 8/15/2020    Procedure: ANKLE OPEN REDUCTION INTERNAL FIXATION;  Surgeon: Keyshawn Medrano MD;  Location: [facility] OR;  Service: Orthopedics;  Laterality: Left;   • APPENDECTOMY     • APPENDECTOMY     • CARDIAC CATHETERIZATION     • CARDIAC ELECTROPHYSIOLOGY PROCEDURE N/A 5/23/2017    Procedure: Loop insertion; LINQ;  Surgeon: Jose Monteiro MD;  Location:  [facility]ATH INVASIVE LOCATION;  Service:    • CHOLECYSTECTOMY     • CORONARY ANGIOPLASTY WITH STENT PLACEMENT      X 1    • INCISION AND DRAINAGE OF WOUND Left 8/15/2020    Procedure: INCISION AND DRAINAGE WOUND;  Surgeon: Keyshawn Medrano MD;  Location: [facility] OR;  Service: Orthopedics;  Laterality: Left;   • LIVER BIOPSY     • MYRINGOTOMY W/ TUBES Left 11/22/2019    Procedure: LEFT MYRINGOTOMY WITH INSERTION OF EAR TUBE WITH RIGHT EAR EXAM UNDER ANESTHESIA;  Surgeon: Se Mcnulty MD;  Location: [facility] OR;  Service: ENT   • SPLENECTOMY     • US GUIDED FINE NEEDLE ASPIRATION  8/7/2018       Family History: family history includes Esophageal cancer in her brother; Lung cancer in her brother.    Social History:  reports that she quit smoking about a year ago. She started smoking about 60 years ago. She has a 29.00 pack-year smoking history. She has never used smokeless tobacco. She reports that she does not drink alcohol and does not use drugs.    Medications:  (Not in a hospital admission)      Allergies:  Contrast dye and Penicillins    Physical Exam:     Physical Exam  Eyes:      Extraocular Movements: EOM normal.      Pupils: Pupils are equal, round, and reactive to light.   Cardiovascular:       Rate and Rhythm: Normal rate and regular rhythm.   Pulmonary:      Effort: No respiratory distress.      Breath sounds: Normal breath sounds.   Abdominal:      General: There is no distension.      Palpations: Abdomen is soft.   Neurological:      Mental Status: She is oriented to person, place, and time.      Coordination: Finger-Nose-Finger Test normal.      Gait: Gait is intact.      Deep Tendon Reflexes: Strength normal.   Psychiatric:         Speech: Speech normal.         Neurologic Exam     Mental Status   Oriented to person, place, and time.   Attention: normal.   Speech: speech is normal   Level of consciousness: alert  Knowledge: good.     Cranial Nerves     CN II   Visual fields full to confrontation.     CN III, IV, VI   Pupils are equal, round, and reactive to light.  Extraocular motions are normal.     CN V   Facial sensation intact.     CN VII   Facial expression full, symmetric.     CN VIII   CN VIII normal.     CN IX, X   CN IX normal.   CN X normal.     CN XI   CN XI normal.     CN XII   CN XII normal.     Motor Exam   Muscle bulk: normal  Overall muscle tone: normal  Right arm pronator drift: absent  Left arm pronator drift: absent    Strength   Strength 5/5 throughout.     Sensory Exam   Light touch normal.   Pinprick normal.     Gait, Coordination, and Reflexes     Gait  Gait: normal    Coordination   Finger to nose coordination: normal    Tremor   Resting tremor: absent  Intention tremor: absent  Action tremor: absent    Reflexes   Reflexes 2+ except as noted.         Independent Review of Radiographic Studies:       ASSESSMENT/PLAN: MRI of the brain demonstrates a left occipital enhancing mass with a cystic component.  This is likely consistent with metastatic lung cancer.  I would recommend treatment with focal beam with stereotactic radiation.  We discussed this at length.  We reviewed the risks and benefits.  We will get her plan and schedule Dr. Rosen and follow-up with repeat imaging  in about 4 months or so.  1. Brain metastasis (CMS/HCC)    2. Non-smoker    3. Obesity (BMI 30-39.9)          No follow-ups on file.      Quique Ochoa MD

## 2021-09-01 NOTE — PROGRESS NOTES
September 1, 2021    Hello, may I speak with Josi Mohamud?    My name is Cherelle.      I am  with [facility]  [facility]  [address], [address]  [facility] [place] [facility] [address] [phone].    Before we get started may I verify your date of birth? 1945    I am calling to officially welcome you to our practice and ask about your recent visit. Is this a good time to talk? yes    Tell me about your visit with us. What things went well?  Overall the visit went well. We didn't get the answers that we wanted so we are not sure why we had this visit.        We're always looking for ways to make our patients' experiences even better. Do you have recommendations on ways we may improve?  no    Overall were you satisfied with your first visit to our practice? yes       I appreciate you taking the time to speak with me today. Is there anything else I can do for you? no      Thank you, and have a great day.

## 2021-09-10 NOTE — PROGRESS NOTES
YOB: 1945  Location: [facility]  Location Address: [address] [place] [address], [facility] [address], [address] [place] [address]  Location Phone: [phone]    Chief Complaint   Patient presents with   • Hoarse       History of Present Illness  Josi Mohamud is a 75 y.o. female.  Josi Mohamud is here for evaluation of ENT complaints. The patient has had problems with hoarseness, dysphagia, acid reflux, a globus sensation, cough and throat clearing  The symptoms are not localized to a particular location. The patient has had moderate symptoms. The symptoms have been present for the last year There have been no identified factors that aggravate the symptoms. There have been no factors that have improved the symptoms. Patient has had recent radiation to the brain. Patient has parotid biopsy scheduled tomorrow.     I have personally reviewed the information imported into the chart during this visit.      I have personally reviewed the review of systems.    PET scan was reviewed.         Past Medical History:   Diagnosis Date   • Anticoagulated 2018   • Bilateral carotid artery stenosis 2021   • Bronchitis    • Cancer (CMS/HCC)     lung   • COPD (chronic obstructive pulmonary disease) (CMS/HCC)    • COPD, moderate (CMS/HCC) 2019   • Coronary artery disease    • Coronary artery disease involving native coronary artery of native heart without angina pectoris 2017   • COVID-19    • COVID-19 vaccine series completed    • Essential hypertension 2017   • Gastroesophageal reflux disease 2018   • GERD (gastroesophageal reflux disease)    • History of transfusion    • Hx of Hodgkin's lymphoma 10/11/2019   • Hyperlipidemia    • Hypertension    • Malignant neoplasm of upper lobe of left lung (CMS/HCC) 2019   • Mixed hyperlipidemia 2021   • PAF (paroxysmal atrial fibrillation) (CMS/HCC)    • Parotid neoplasm 2018   • Persistent asthma without complication 2019   • Renal cyst 2020   •  Seizures (CMS/HCC)    • Status post stereotactic radiosurgery 2/24/2020   • Stented coronary artery 5/19/2017   • Thyroid nodule 7/21/2018       Past Surgical History:   Procedure Laterality Date   • ANKLE OPEN REDUCTION INTERNAL FIXATION Left 8/15/2020    Procedure: ANKLE OPEN REDUCTION INTERNAL FIXATION;  Surgeon: Keyshawn Medrano MD;  Location: [facility] OR;  Service: Orthopedics;  Laterality: Left;   • APPENDECTOMY     • APPENDECTOMY     • CARDIAC CATHETERIZATION     • CARDIAC ELECTROPHYSIOLOGY PROCEDURE N/A 5/23/2017    Procedure: Loop insertion; LINQ;  Surgeon: Jose Monteiro MD;  Location: [facility] CATH INVASIVE LOCATION;  Service:    • CHOLECYSTECTOMY     • CORONARY ANGIOPLASTY WITH STENT PLACEMENT      X 1    • INCISION AND DRAINAGE OF WOUND Left 8/15/2020    Procedure: INCISION AND DRAINAGE WOUND;  Surgeon: Keyshawn Medrano MD;  Location: [facility] OR;  Service: Orthopedics;  Laterality: Left;   • LIVER BIOPSY     • MYRINGOTOMY W/ TUBES Left 11/22/2019    Procedure: LEFT MYRINGOTOMY WITH INSERTION OF EAR TUBE WITH RIGHT EAR EXAM UNDER ANESTHESIA;  Surgeon: Se Mcnulty MD;  Location: [facility] OR;  Service: ENT   • SPLENECTOMY     • US GUIDED FINE NEEDLE ASPIRATION  8/7/2018       Outpatient Medications Marked as Taking for the 9/13/21 encounter (Office Visit) with Se Mcnulty MD   Medication Sig Dispense Refill   • acetaminophen (TYLENOL) 325 MG tablet Take 2 tablets by mouth Every 4 (Four) Hours As Needed for Mild Pain .     • albuterol sulfate  (90 Base) MCG/ACT inhaler Inhale 1 puff Daily. 1 inhaler 6   • amLODIPine (NORVASC) 5 MG tablet Take 5 mg by mouth Daily.     • aspirin 81 MG EC tablet Take 81 mg by mouth Daily.     • Calcium Carbonate-Vitamin D (CALTRATE 600+D PO) Take 1 tablet by mouth 2 (two) times a day.     • diphenhydrAMINE (BENADRYL) 50 MG capsule Take with last dose of prednisone 1 hour prior to CT scan 1 capsule 0   • docusate sodium 100 MG capsule Take 100 mg by  mouth 2 (Two) Times a Day As Needed for Constipation.     • famotidine (PEPCID) 20 MG tablet Take 1 tablet by mouth Daily.     • fluticasone (FLONASE) 50 MCG/ACT nasal spray into the nostril(s) as directed by provider Daily As Needed.     • fluticasone-salmeterol (ADVAIR) 100-50 MCG/DOSE DISKUS Inhale 2 (Two) Times a Day.     • furosemide (LASIX) 20 MG tablet Take 20 mg by mouth Daily. At bedtime     • furosemide (LASIX) 40 MG tablet Take 40 mg by mouth Daily. Morning     • gabapentin (NEURONTIN) 100 MG capsule Take 2 capsules by mouth 3 (Three) Times a Day. 9 capsule 0   • gabapentin (NEURONTIN) 300 MG capsule Take 1 capsule by mouth 3 (Three) Times a Day.     • guaiFENesin (MUCINEX) 600 MG 12 hr tablet Take 2 tablets by mouth 2 (Two) Times a Day As Needed for Cough. (Patient taking differently: Take 1,200 mg by mouth 2 (Two) Times a Day.) 15 tablet 0   • levETIRAcetam (KEPPRA) 500 MG tablet Take 500 mg by mouth 2 (Two) Times a Day.     • lidocaine (LIDODERM) 5 % Place 1 patch on the skin as directed by provider Daily As Needed for Mild Pain , Moderate Pain  or Severe Pain . Remove & Discard patch within 12 hours or as directed by MD     • lisinopril (PRINIVIL,ZESTRIL) 20 MG tablet Take 20 mg by mouth Daily.     • LORazepam (ATIVAN) 0.5 MG tablet Take 1 tablet by mouth 2 (Two) Times a Day As Needed for Anxiety. 6 tablet 0   • metoprolol succinate XL (TOPROL-XL) 50 MG 24 hr tablet Take 50 mg by mouth Daily.     • nitroglycerin (NITROSTAT) 0.4 MG SL tablet Place 0.4 mg under the tongue Every 5 (Five) Minutes As Needed for Chest Pain. Take no more than 3 doses in 15 minutes.     • omeprazole (priLOSEC) 40 MG capsule Take 40 mg by mouth Daily.  5   • ondansetron (ZOFRAN) 4 MG tablet Take 1 tablet by mouth Every 6 (Six) Hours As Needed.     • oxybutynin (DITROPAN) 5 MG tablet Take 1 tablet by mouth 2 (Two) Times a Day.     • polyethylene glycol (MIRALAX) 17 g packet Take 17 g by mouth Daily As Needed (constipation).      • predniSONE (DELTASONE) 50 MG tablet Take one tab 13 hours prior, one tab 7 hours prior and one tab 1 hour prior to CT scans 3 tablet 0   • simvastatin (ZOCOR) 20 MG tablet Take 20 mg by mouth Daily.     • Spiriva Respimat 1.25 MCG/ACT aerosol solution inhaler Inhale 1 puff Daily.     • spironolactone (ALDACTONE) 25 MG tablet Take 25 mg by mouth Daily.     • tiotropium bromide monohydrate (SPIRIVA RESPIMAT) 2.5 MCG/ACT aerosol solution inhaler Inhale 2 puffs Daily. 3 inhaler 0   • traMADol (ULTRAM) 50 MG tablet Take 1 tablet by mouth Every 6 (Six) Hours As Needed.         Contrast dye and Penicillins    Family History   Problem Relation Age of Onset   • Esophageal cancer Brother    • Lung cancer Brother    • Breast cancer Neg Hx        Social History     Socioeconomic History   • Marital status:      Spouse name: Not on file   • Number of children: 1   • Years of education: Not on file   • Highest education level: Not on file   Tobacco Use   • Smoking status: Former Smoker     Packs/day: 0.50     Years: 58.00     Pack years: 29.00     Start date:      Quit date: 2020     Years since quittin.0   • Smokeless tobacco: Never Used   Vaping Use   • Vaping Use: Never used   Substance and Sexual Activity   • Alcohol use: No   • Drug use: No   • Sexual activity: Defer       Review of Systems   Constitutional: Positive for activity change and fatigue.   HENT: Positive for trouble swallowing and voice change.         Globus sensation, frequent throat clearing   Eyes: Negative.    Respiratory: Positive for cough.    Cardiovascular: Negative.    Gastrointestinal:        Reflux   Endocrine: Negative.    Genitourinary: Negative.    Musculoskeletal: Negative.    Skin: Negative.    Allergic/Immunologic: Negative.    Neurological: Negative.    Hematological: Negative.    Psychiatric/Behavioral: Positive for behavioral problems.       Vitals:    21 1440   BP: 127/66   Pulse: 106   Temp: 98.9 °F (37.2 °C)    SpO2: 92%       Body mass index is 38.38 kg/m².    Objective     Physical Exam  CONSTITUTIONAL: well nourished, well-developed, alert, oriented, in no acute distress     COMMUNICATION AND VOICE: able to communicate normally, normal voice quality    HEAD: normocephalic, no lesions, atraumatic, no tenderness, no masses     FACE: appearance normal, no lesions, no tenderness, no deformities, facial motion symmetric  No palpable masses are noted in the parotids bilaterally.    EYES: ocular motility normal, eyelids normal, orbits normal, no proptosis, conjunctiva normal , pupils equal, round     EARS:  Hearing: hearing to conversational voice intact bilaterally   External Ears: normal bilaterally, no lesions    NOSE:  External Nose: external nasal structure normal, no tenderness on palpation, no nasal discharge, no lesions, no evidence of trauma, nostrils patent     ORAL:  Lips: upper and lower lips without lesion -mild mucosal dryness is noted in the oral cavity or oropharynx.    LARYNX:  See endoscopy    NECK:  Inspection and Palpation: neck appearance normal, no masses or tenderness    CHEST/RESPIRATORY: normal respiratory effort     CARDIOVASCULAR: no cyanosis or edema     NEUROLOGICAL/PSYCHIATRIC: oriented to time, place and person, mood normal, affect appropriate, CN II-XII intact grossly    Assessment/Plan   Diagnoses and all orders for this visit:    1. Unilateral complete paralysis of vocal cord (Primary)  Comments:  Left vocal fold is densely paretic with near complete paralysis  Orders:  -     FL Video Swallow With Speech Single Contrast    2. Parotid neoplasm  -     FL Video Swallow With Speech Single Contrast    3. Brain metastasis (CMS/HCC)  -     FL Video Swallow With Speech Single Contrast    4. Pharyngoesophageal dysphagia      * Surgery not found *  Orders Placed This Encounter   Procedures   • FL Video Swallow With Speech Single Contrast     Order Specific Question:   Reason for Exam:     Answer:    dysphagia     Return in about 6 weeks (around 10/25/2021) for Recheck.       Patient Instructions   Follow-up after parotid biopsy  Would recommend dysphagia study unless otherwise contraindicated to evaluate swallowing safety with a unilateral paralysis in this patient.  Follow-up in 6 to 8 weeks

## 2021-09-13 PROBLEM — R13.14 PHARYNGOESOPHAGEAL DYSPHAGIA: Status: ACTIVE | Noted: 2021-01-01

## 2021-09-13 PROBLEM — J38.01 UNILATERAL COMPLETE PARALYSIS OF VOCAL CORD: Status: ACTIVE | Noted: 2021-01-01

## 2021-09-13 NOTE — PATIENT INSTRUCTIONS
Follow-up after parotid biopsy  Would recommend dysphagia study unless otherwise contraindicated to evaluate swallowing safety with a unilateral paralysis in this patient.  Follow-up in 6 to 8 weeks

## 2021-09-13 NOTE — PROGRESS NOTES
OPERATIVE NOTE:  Josi ORR Cade    DATE OF PROCEDURE: 9/13/2021    PROCEDURE:   Flexible Fiberoptic Laryngoscopy    ANESTHESIA:  None    REASON FOR PROCEDURE:  Procedure was recommend for persistant hoarseness and suspicious clinical behavior  Risks, benefits and alternatives were discussed.      DETAILS of OPERATION:  The patient was seated in the exam chair.  A flexible fiberoptic laryngoscopy was performed through the oral cavity.  The scope was introduced into the right nasal cavity and directed to the level of the glottis, examining the structures of the oropharynx, base of tongue, vallecula, supraglottic larynx, glottic larynx, and hypopharynx.      FINDINGS:  Mucosal surfaces:   The mucosal surfaces demonstrated normal mucosa surfaces with moderate inflammation    Base of tongue:  The base of tongue was found to have no mass or lesion.    Epiglottis:  The epiglottis was found to have no mass or lesion.    Aryepligottic fold:  The AE folds were found to have no mass or lesion.    False Vocal Fold:  The false cords were found to have no mass or lesion.    True Vocal Cord:  The true vocal cords were found to have no mass or lesion.  Left true vocal cord was densely paretic with some mild abduction on inspiration.  No significant adduction was noted on the left.      Arytenoid:   The arytenoids were found to have no mass or lesion.    Hypopharynx:  The hypopharynx was found to have no mass or lesion.    The patient tolerated procedure well.

## 2021-09-14 NOTE — PROGRESS NOTES
[facility] ONC [facility] GROUP HEMATOLOGY AND ONCOLOGY  [address]  [facility] [address] [phone]    Patient Name: Josi Mohamud  Encounter Date: 09/24/2021  YOB: 1945  Patient Number: [id number]      REASON FOR FOLLOW-UP: Josi Mohamud is a pleasant 75 y.o.  female, resident of [facility], who is seen on followup for brain metastasis post SBRT completed 09/09/2021.  She is also seen for parotid mass.  She is seen with daughter, Franc.  She is marginal historian.           DIAGNOSTIC ABNORMALITIES:Brain metastasis.  She presented with change in mental status.  CBC 07/23/2021 revealed hemoglobin 12.5.  CMP remarkable for sodium of 128 and GFR 34.  Normal D-dimer, proBNP and troponin.  PT 15 and PTT 36.5.  Normal B12.  CT head 07/23/2021.  Interval development of low attenuation in the left occipital lobe, ischemic change/infarction, age-indeterminate, subacute or chronic considered. Correlate with patient presentation  Brain MRI 07/23/2021 without contrast. There is a cystic mass involving the left occipital lobe with adjacent vasogenic edema. Given the history I would favor a metastatic lesion. Abscess is also in the differential but considered less likely. There is some associated diffusion restriction within the wall of the lesion. No additional discrete lesions are appreciated but postcontrast imaging is recommended.  Atrophy with moderate to severe small vessel ischemic disease. Remote lacunar infarcts involving the right basal ganglia are present with focal encephalomalacia and gliosis.  No additional foci of diffusion restriction are demonstrated. There  is no radiographic evidence of acute ischemic stroke on today's exam.  Brain MRI with contrast 07/24/2021.2 small enhancing lesions left occipital lobe one is inferior and  medial to the cystic lesion. A second is superior and lateral to the cystic lesion. These  rim-like enhancing lesions are most consistent with metastatic disease.  PET report 08/16/2021. Abnormal PET/CT, there is a small hypermetabolic nodule within each  parotid gland, possibly due to metastatic intraparotid lymph nodes, less likely primary parotid tumors. If clinically indicated, ultrasound-guided FNA could probably be performed on the largest of the intraparotid nodules, which measures 10 mm and is seen on the left.  Equivocal focus of increased FDG activity at the junction of the right first rib with the manubrium with a maximum SUV of 3.1. This could be a solitary osseous metastasis or related to degenerative change. There is no definite evidence of a lytic or sclerotic lesion in this location on the fusion CT.          PREVIOUS INTERVENTIONS:  Post SBRT to brain metastasis 08/31/2021 through 09/09/2021, 3 out of 3, 2700 cGy.         DIAGNOSTIC ABNORMALITIES: Left upper lobe lung nodules.  CT chest 06/13/2019.  1.2 cm from 1 cm left upper lobe pulmonary nodule.  Left upper lobe 0.9 cm pulmonary nodule from 0.4 cm on 07/06/2018.  2 cm nodule right thyroid.  Seen by Dr. oNel 070 2019.  Not amenable to needle biopsy.  High false-negative result with navigational bronchoscopy.  She was followed along due to her age and comorbidities.  CT chest 09/17/2019.  2 nodules again noted in the left lung.  PET 09/23/2019.  Right-sided nodule SUV 5.4 and left-sided nodule SUV 6.5.  2 additional right parathyroid nodules more inferiorly.  Radiotracer uptake left tonsil 4.5 SUV.  The 0.9 cm with SUV of 2.3 and the 1.7 cm with SUV of 2.2.  Seen by Dr. Noel 09/25/2019.  Patient referred to radiation oncology.  Thyroid biopsy 10/14/2019 by Dr. Se Mcnulty.  Pathology showed follicular lesion of uncertain significance.  CT of the chest 01/30/2020 showed slightly decreased size of the larger left upper lobe pulmonary nodules.  1.7 cm centimeters low-density lesion.  Aspect right kidney  CT chest 05/29/2020.  Previously  treated left upper lobe pulmonary nodule stable in size.  2.3 cm right renal cyst  CT chest 02/23/2021.Decreased size of spiculated lingular pulmonary nodule now measuring 0.9 cm, previously 1.5 cm on 5/29/2020.  CT chest 05/24/2021.Spiculated left upper lobe nodule measures 9 mm, similar to the 2/23/2021 exam. A previous spiculated 7 mm left apical nodule seen on 5/29/2020 is no longer visualized, however there are similar ill-defined densities within the left lung apex when compared to 2/23/2021 which may represent radiation change. No new lung nodules.           PREVIOUS INTERVENTIONS:  Patient was treated with 5000 cGy left upper lobe via SBRT 11/12/2019 through 11/25/2019.          Oncology/Hematology History Overview Note     CT chest 06/13/2019.  1.2 cm from 1 cm left upper lobe pulmonary nodule.  Left upper lobe 0.9 cm pulmonary nodule from 0.4 cm on 07/06/2018.  2 cm nodule right thyroid.  Seen by Dr. Noel 070 2019.  Not amenable to needle biopsy.  High false-negative result with navigational bronchoscopy.  She was followed along due to her age and comorbidities.  CT chest 09/17/2019.  2 nodules again noted in the left lung.  PET 09/23/2019.  Right-sided nodule SUV 5.4 and left-sided nodule SUV 6.5.  2 additional right parathyroid nodules more inferiorly.  Radiotracer uptake left tonsil 4.5 SUV.  The 0.9 cm with SUV of 2.3 and the 1.7 cm with SUV of 2.2.  Seen by Dr. Noel 09/25/2019.  Patient referred to radiation oncology.  Thyroid biopsy 10/14/2019 by Dr. Se Mcnulty.  Pathology showed follicular lesion of uncertain significance.  CT of the chest 01/30/2020 showed slightly decreased size of the larger left upper lobe pulmonary nodules.  1.7 cm centimeters low-density lesion.  Aspect right kidney  CT chest 05/29/2020.  Previously treated left upper lobe pulmonary nodule stable in size.  2.3 cm right renal cyst  CT chest 02/23/2021.Decreased size of spiculated lingular pulmonary nodule now measuring 0.9  cm, previously 1.5 cm on 5/29/2020.  CT chest 05/24/2021.Spiculated left upper lobe nodule measures 9 mm, similar to the 2/23/2021 exam. A previous spiculated 7 mm left apical nodule seen on 5/29/2020 is no longer visualized, however there are similar ill-defined densities within the left lung apex when compared to 2/23/2021 which may represent radiation change. No new lung nodules.         Brain mass    Initial Diagnosis    Brain mass     7/6/2018 Imaging    Low Dose CT Chest:  IMPRESSION:  1. There is a 1 cm ill-defined left upper lobe pulmonary nodule that  needs further evaluation with PET/CT. This is very concerning for a  small primary neoplasm.  2. Lung-RADS 4: Suspicious.  3. There is a 3.7 x 1 cm radiopaque density in the medial right breast  could be a foreign body/retained surgical drain. This is not present on  a mammogram from 2016. Correlate with any surgical intervention in this area.  4. Possible dominant nodule in the right thyroid lobe versus artifact  due to the low-dose protocol. Consider follow-up ultrasound of the  thyroid gland.  5. Atheromatous disease of the thoracic aorta and coronary arteries     7/19/2018 Imaging    PET/CT:  IMPRESSION:  1. Some increased uptake within the previously described left upper lobe nodule, suspicious for malignancy.  2. Abnormal uptake in the posterior left oropharynx, etiology unclear.  Recommend direct visualization for further evaluation.  3. Increased uptake in the parotid glands bilaterally with underlying  nodules possibly reflecting lymph nodes. A neoplastic process cannot be excluded.     6/13/2019 Imaging    CT Chest:  IMPRESSION:  1. There are 2 pulmonary nodules increased in size. While these could be infectious/inflammatory, they are concerning for malignancy. If not  previously performed, consider tissue diagnosis.  2. There is a 2 cm low-density lesion in the right thyroid. If not  previously performed an outside facility, consider dedicated  thyroid  ultrasound.  3. Coronary artery calcifications.     9/17/2019 Imaging    CT Chest:  IMPRESSION:  1. Continued gradual increase in size of the 12 mm nodule on image 66. A neoplasm should be excluded. PET CT scan may be suggested  2. 9 mm nodule left upper lobe is stable from 06/13/2019  3. Centrilobular emphysematous change  4. 2 cm nodule right lobe thyroid gland ultrasound may be considered     9/23/2019 Imaging    PET/CT:  IMPRESSION:  1. Known left upper lobe pulmonary nodules both display FDG uptake, with both being suspicious for malignancy.  2. No hypermetabolic adenopathy.  3. 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  4. Asymmetric radiotracer uptake in the left tonsil. Consider direct  visualization to help exclude oropharyngeal squamous carcinoma.     11/12/2019 - 11/25/2019 Radiation    Radiation OncologyTreatment Course:  Josi Mohamud received 5000 cGy in 5 fractions to CELI via SBRT.     1/30/2020 Imaging    CT Chest:  IMPRESSION:  1. Slightly decreased size of larger left upper lobe pulmonary nodules.  Recommend follow-up per oncology/radiation oncology.  2. Right kidney with a 1.7 cm low-density lesion at the inferior aspect,  which is outside the field-of-view on comparisons. This is most commonly a cyst, but incompletely evaluated on this exam. Recommend correlation with outside imaging. If none available, consider renal ultrasound for further evaluation.     3/2/2020 Imaging    Bilateral Renal Ultrasound:  IMPRESSION:  1. Hypoechoic cyst lower pole right kidney is noted.  2. The left renal contour is unremarkable.        5/29/2020 Imaging    CT Chest:  IMPRESSION:  1. Previously treated left upper lobe pulmonary nodules are stable in  size. No new or enlarging nodules identified.  2. Centrilobular emphysema.  3. Advanced coronary atheromatous calcification.  4. No suspicious adenopathy in the chest.  5. 2.3 cm right renal cyst  was shown to be a simple cyst on recent  ultrasound.     2/23/2021 Imaging    CT Chest:  IMPRESSION:  1. Peripheral consolidative opacity at the left upper lobe, which could  represent postradiation changes. Correlate with patient history. The  previously described left apical pulmonary nodule is not discretely  demonstrated on today's exam.  2. Decreased size of spiculated lingular pulmonary nodule now measuring 0.9 cm, previously 1.5 cm on 5/29/2020.  3. New severe height loss of T4 compared to 5/29/2019.  4. Peripheral wedge-shaped hyperdensity in the right liver lobe, favored  to represent transient hepatic attenuation difference (YON).  5. Splenectomy with residual splenosis.     5/24/2021 Imaging    CT Chest:  IMPRESSION:  1. Spiculated left upper lobe nodule measures 9 mm, similar to the  2/23/2021 exam. A previous spiculated 7 mm left apical nodule seen on  5/29/2020 is no longer visualized, however there are similar ill-defined  densities within the left lung apex when compared to 2/23/2021 which may represent radiation change. No new lung nodules.  2. Moderate emphysema.  3. Similar T4 compression deformity.  4. Prior splenectomy with similar left subphrenic and left. Adrenal  nodularity likely due to splenosis.  4. Enhancing foci along the peripheral aspect of the left and right  hepatic lobes may be due to transient hepatic attenuation defects or  focal fatty sparing with hepatic steatosis.  5. Contrast reaction occurred with pruritus and erythema of the upper  and lower extremities. No difficulties swallowing or breathing. Patient  received 50 mg Benadryl orally to improve symptomatology. Patient should be pretreated with steroids and Benadryl prior to any future IV contrast administration.     7/23/2021 Imaging    CT Head:  IMPRESSION:  1. Interval development of low attenuation in the left occipital lobe,  ischemic change/infarction, age-indeterminate, subacute or chronic  considered. Correlate with  patient presentation.  2. Advanced chronic ischemic changes.    MRI Brain w/o contrast:  IMPRESSION:  1.. There is a cystic mass involving the left occipital lobe with  adjacent vasogenic edema. Given the history I would favor a metastatic  lesion. Abscess is also in the differential but considered less likely.  There is some associated diffusion restriction within the wall of the  lesion. No additional discrete lesions are appreciated but postcontrast  imaging is recommended.  2. Atrophy with moderate to severe small vessel ischemic disease. Remote lacunar infarcts involving the right basal ganglia are present with focal encephalomalacia and gliosis.  3. No additional foci of diffusion restriction are demonstrated. There  is no radiographic evidence of acute ischemic stroke on today's exam.     7/24/2021 Imaging    MRI Brain with contrast:  IMPRESSION:  1. 2 small enhancing lesions left occipital lobe one is inferior and  medial to the cystic lesion. A second is superior and lateral to the  cystic lesion. These rim-like enhancing lesions are most consistent with  metastatic disease.  2. Extensive changes of chronic microvascular ischemic disease are noted in the periventricular white          PAST MEDICAL HISTORY:  ALLERGIES:  Allergies   Allergen Reactions   • Contrast Dye Itching     Contrast reaction occurred with pruritus and erythema of the upper  and lower extremities. No difficulties swallowing or breathing. Patient  received 50 mg Benadryl orally to improve symptomatology. Patient should  be pretreated with steroids and Benadryl prior to any future IV contrast  Administration. 05-     • Penicillins Rash     CURRENT MEDICATIONS:  Outpatient Encounter Medications as of 9/24/2021   Medication Sig Dispense Refill   • acetaminophen (TYLENOL) 325 MG tablet Take 2 tablets by mouth Every 4 (Four) Hours As Needed for Mild Pain .     • albuterol sulfate  (90 Base) MCG/ACT inhaler Inhale 1 puff Daily. 1  inhaler 6   • amLODIPine (NORVASC) 5 MG tablet Take 5 mg by mouth Daily.     • apixaban (ELIQUIS) 5 MG tablet tablet Take 1 tablet by mouth Every 12 (Twelve) Hours. 60 tablet 11   • aspirin 81 MG EC tablet Take 81 mg by mouth Daily.     • Calcium Carbonate-Vitamin D (CALTRATE 600+D PO) Take 1 tablet by mouth 2 (two) times a day.     • diphenhydrAMINE (BENADRYL) 50 MG capsule Take with last dose of prednisone 1 hour prior to CT scan 1 capsule 0   • docusate sodium 100 MG capsule Take 100 mg by mouth 2 (Two) Times a Day As Needed for Constipation.     • famotidine (PEPCID) 20 MG tablet Take 1 tablet by mouth Daily.     • fluticasone (FLONASE) 50 MCG/ACT nasal spray into the nostril(s) as directed by provider Daily As Needed.     • fluticasone-salmeterol (ADVAIR) 100-50 MCG/DOSE DISKUS Inhale 2 (Two) Times a Day.     • furosemide (LASIX) 20 MG tablet Take 20 mg by mouth Daily. At bedtime     • furosemide (LASIX) 40 MG tablet Take 40 mg by mouth Daily. Morning     • gabapentin (NEURONTIN) 100 MG capsule Take 2 capsules by mouth 3 (Three) Times a Day. 9 capsule 0   • gabapentin (NEURONTIN) 300 MG capsule Take 1 capsule by mouth 3 (Three) Times a Day.     • guaiFENesin (MUCINEX) 600 MG 12 hr tablet Take 2 tablets by mouth 2 (Two) Times a Day As Needed for Cough. (Patient taking differently: Take 1,200 mg by mouth 2 (Two) Times a Day.) 15 tablet 0   • levETIRAcetam (KEPPRA) 500 MG tablet Take 500 mg by mouth 2 (Two) Times a Day.     • lidocaine (LIDODERM) 5 % Place 1 patch on the skin as directed by provider Daily As Needed for Mild Pain , Moderate Pain  or Severe Pain . Remove & Discard patch within 12 hours or as directed by MD     • lisinopril (PRINIVIL,ZESTRIL) 20 MG tablet Take 20 mg by mouth Daily.     • LORazepam (ATIVAN) 0.5 MG tablet Take 1 tablet by mouth 2 (Two) Times a Day As Needed for Anxiety. 6 tablet 0   • metoprolol succinate XL (TOPROL-XL) 50 MG 24 hr tablet Take 50 mg by mouth Daily.     • nitroglycerin  (NITROSTAT) 0.4 MG SL tablet Place 0.4 mg under the tongue Every 5 (Five) Minutes As Needed for Chest Pain. Take no more than 3 doses in 15 minutes.     • omeprazole (priLOSEC) 40 MG capsule Take 40 mg by mouth Daily.  5   • ondansetron (ZOFRAN) 4 MG tablet Take 1 tablet by mouth Every 6 (Six) Hours As Needed.     • oxybutynin (DITROPAN) 5 MG tablet Take 1 tablet by mouth 2 (Two) Times a Day.     • polyethylene glycol (MIRALAX) 17 g packet Take 17 g by mouth Daily As Needed (constipation).     • predniSONE (DELTASONE) 50 MG tablet Take one tab 13 hours prior, one tab 7 hours prior and one tab 1 hour prior to CT scans 3 tablet 0   • simvastatin (ZOCOR) 20 MG tablet Take 20 mg by mouth Daily.     • Spiriva Respimat 1.25 MCG/ACT aerosol solution inhaler Inhale 1 puff Daily.     • spironolactone (ALDACTONE) 25 MG tablet Take 25 mg by mouth Daily.     • tiotropium bromide monohydrate (SPIRIVA RESPIMAT) 2.5 MCG/ACT aerosol solution inhaler Inhale 2 puffs Daily. 3 inhaler 0   • traMADol (ULTRAM) 50 MG tablet Take 1 tablet by mouth Every 6 (Six) Hours As Needed.       No facility-administered encounter medications on file as of 9/24/2021.     ADULT ILLNESSES:  Patient Active Problem List   Diagnosis Code   • Syncope and collapse R55   • Essential hypertension I10   • Anticoagulated Z79.01   • PAF (paroxysmal atrial fibrillation) (CMS/HCC) I48.0   • Obesity (BMI 30-39.9) E66.9   • Fall W19.XXXA   • Bilateral carotid artery stenosis I65.23   • Brain mass G93.89   • Brain metastasis (CMS/HCC) C79.31   • Non-smoker Z78.9   • Unilateral complete paralysis of vocal cord J38.01   • Pharyngoesophageal dysphagia R13.14     SURGERIES:  Past Surgical History:   Procedure Laterality Date   • ANKLE OPEN REDUCTION INTERNAL FIXATION Left 8/15/2020    Procedure: ANKLE OPEN REDUCTION INTERNAL FIXATION;  Surgeon: Keyshawn Medrano MD;  Location: [facility] OR;  Service: Orthopedics;  Laterality: Left;   • APPENDECTOMY     • APPENDECTOMY      • CARDIAC CATHETERIZATION     • CARDIAC ELECTROPHYSIOLOGY PROCEDURE N/A 5/23/2017    Procedure: Loop insertion; LINQ;  Surgeon: Jose Monteiro MD;  Location:  [facility] CATH INVASIVE LOCATION;  Service:    • CHOLECYSTECTOMY     • CORONARY ANGIOPLASTY WITH STENT PLACEMENT      X 1    • INCISION AND DRAINAGE OF WOUND Left 8/15/2020    Procedure: INCISION AND DRAINAGE WOUND;  Surgeon: Keyshawn Medrano MD;  Location:  [facility] OR;  Service: Orthopedics;  Laterality: Left;   • LIVER BIOPSY     • MYRINGOTOMY W/ TUBES Left 11/22/2019    Procedure: LEFT MYRINGOTOMY WITH INSERTION OF EAR TUBE WITH RIGHT EAR EXAM UNDER ANESTHESIA;  Surgeon: Se Mcnulty MD;  Location: [facility] OR;  Service: ENT   • SPLENECTOMY     • US GUIDED FINE NEEDLE ASPIRATION  8/7/2018     HEALTH MAINTENANCE ITEMS:  Health Maintenance Due   Topic Date Due   • URINE MICROALBUMIN  Never done   • COLORECTAL CANCER SCREENING  Never done   • ZOSTER VACCINE (2 of 3) 12/30/2015   • Pneumococcal Vaccine 65+ (1 of 2 - PPSV23) 12/09/2016   • ANNUAL WELLNESS VISIT  Never done   • DIABETIC EYE EXAM  Never done   • COVID-19 Vaccine (3 - Pfizer risk 3-dose series) 03/01/2021   • MAMMOGRAM  08/27/2021   • DXA SCAN  08/27/2021       <no information>  Last Completed Colonoscopy     This patient has no relevant Health Maintenance data.        Immunization History   Administered Date(s) Administered   • COVID-19 (PFIZER) 01/11/2021, 02/01/2021   • FLUAD TRI 65YR+ 10/17/2019   • Flu Vaccine Quad PF >18YRS 09/24/2015, 10/05/2017   • Fluzone High Dose =>65 Years ([employer] ONLY) 10/28/2016, 10/17/2019, 10/01/2020   • Influenza, Unspecified 10/28/2016   • Pneumococcal Conjugate 13-Valent (PCV13) 06/30/2016, 10/14/2016   • Tdap 08/15/2020   • Zostavax 11/04/2015     Last Completed Mammogram     This patient has no relevant Health Maintenance data.            FAMILY HISTORY:  Family History   Problem Relation Age of Onset   • Esophageal cancer Brother    • Lung cancer  "Brother    • Breast cancer Neg Hx      SOCIAL HISTORY:  Social History     Socioeconomic History   • Marital status:      Spouse name: Not on file   • Number of children: 1   • Years of education: Not on file   • Highest education level: Not on file   Tobacco Use   • Smoking status: Former Smoker     Packs/day: 0.50     Years: 58.00     Pack years: 29.00     Start date:      Quit date: 2020     Years since quittin.0   • Smokeless tobacco: Never Used   Vaping Use   • Vaping Use: Never used   Substance and Sexual Activity   • Alcohol use: No   • Drug use: No   • Sexual activity: Defer       REVIEW OF SYSTEMS:    Review of Systems   Constitutional: Positive for fatigue. Negative for chills and fever.        \"Nausea, headache and episodes of confusion after brain radiation.\"   HENT: Negative for congestion, mouth sores and trouble swallowing.    Eyes: Negative for discharge and redness.   Respiratory: Positive for cough. Negative for shortness of breath and wheezing.         \"They tested me for Covid, I am negative.\"   Cardiovascular: Negative for chest pain and palpitations.   Gastrointestinal: Positive for nausea. Negative for abdominal pain, constipation, diarrhea and vomiting.   Endocrine: Negative for cold intolerance and heat intolerance.   Genitourinary: Negative for difficulty urinating, dysuria and flank pain.   Musculoskeletal: Negative for neck stiffness.   Skin: Positive for pallor.   Allergic/Immunologic: Negative for food allergies.   Neurological: Positive for headaches. Negative for speech difficulty and weakness.   Hematological: Negative for adenopathy. Does not bruise/bleed easily.   Psychiatric/Behavioral: Negative for agitation and hallucinations. The patient is not nervous/anxious.        VITAL SIGNS: /72   Pulse 72   Temp 97.6 °F (36.4 °C) (Temporal)   Resp 18   Ht 149.9 cm (59\")   Wt 86.2 kg (190 lb) Comment: IN W/C  LMP  (LMP Unknown)   SpO2 97%   BMI 38.38 " kg/m²   Pain Score    09/24/21 1051   PainSc: 0-No pain       PHYSICAL EXAMINATION:     Physical Exam  Vitals reviewed.   Constitutional:       Appearance: She is ill-appearing.      Comments: She arrived in the exam room in a wheelchair.   HENT:      Head: Normocephalic and atraumatic.   Eyes:      General: No scleral icterus.  Cardiovascular:      Rate and Rhythm: Normal rate and regular rhythm.   Pulmonary:      Effort: No respiratory distress.      Breath sounds: No wheezing or rales.   Abdominal:      General: Bowel sounds are normal.      Palpations: Abdomen is soft.      Tenderness: There is no abdominal tenderness.   Musculoskeletal:      Cervical back: Neck supple.      Comments: Bilateral lower extremity boots.   Skin:     General: Skin is warm.      Coloration: Skin is pale.   Neurological:      Mental Status: She is alert and oriented to person, place, and time.   Psychiatric:         Mood and Affect: Mood normal.         Behavior: Behavior normal.         Thought Content: Thought content normal.         Judgment: Judgment normal.         LABS    Lab Results - Last 18 Months   Lab Units 08/03/21  1722 07/28/21  0942 07/23/21  0811 05/11/21  0300 10/22/20  0330 10/12/20  1300 08/21/20  0300 08/21/20  0300 08/16/20  0507 08/15/20  0835 07/28/20  1236 07/28/20  1236   HEMOGLOBIN g/dL 11.6* 12.7 12.5 12.2 11.5* 12.7   < > 11.1*   < > 15.0   < > 15.2   HEMATOCRIT % 32.8* 37.2 34.8 36.5 34.9 38.0   < > 32.9*   < > 43.3   < > 45.0   MCV fL 99.1* 100.3* 96.9 98.6* 101.5* 100.5*   < > 99.7*   < > 97.7*   < > 102.0*   WBC 10*3/mm3 6.70 7.09 9.84 6.07 4.13 7.25   < > 5.48   < > 6.96   < > 5.42   RDW % 13.2 13.3 13.0 12.5 13.6 13.1   < > 13.5   < > 13.8   < > 12.9   MPV fL 9.5 9.3 9.3 10.2 10.3 11.1   < > 10.1   < > 10.2   < > 11.2   PLATELETS 10*3/mm3 349 385 356 318 306 294   < > 296   < > 274   < > 276   IMM GRAN % % 0.4 0.6* 0.6*  --   --  0.4  --   --   --  0.1  --  0.2   NEUTROS ABS 10*3/mm3 3.33 4.00 7.25*  1.83 1.16* 4.14  --  2.85  --  5.67   < > 2.20   LYMPHS ABS 10*3/mm3 2.19 2.09 1.77  --   --  2.35  --   --   --  0.86  --  2.46   MONOS ABS 10*3/mm3 0.99* 0.78 0.69  --   --  0.56  --   --   --  0.39  --  0.61   EOS ABS 10*3/mm3 0.11 0.14 0.02 0.06 0.09 0.12  --  0.05  --  0.00   < > 0.10   BASOS ABS 10*3/mm3 0.05 0.04 0.05  --  0.09 0.05  --   --   --  0.03   < > 0.04   IMMATURE GRANS (ABS) 10*3/mm3 0.03 0.04 0.06*  --   --  0.03  --   --   --  0.01  --  0.01   NRBC /100 WBC 0.0 0.0 0.0  --   --  0.6*  --   --   --  0.0  --  0.0   NEUTROPHIL % %  --   --   --  30.2* 25.0*  --   --  50.0  --   --   --   --    MONOCYTES % %  --   --   --  4.2* 11.5  --   --  5.1  --   --   --   --    BASOPHIL % %  --   --   --   --  2.1*  --   --   --   --   --   --   --    ATYP LYMPH % %  --   --   --  5.2*  --   --   --   --   --   --   --   --    ANISOCYTOSIS   --   --   --  Slight/1+  --   --   --  Slight/1+  --   --   --   --    GIANT PLT   --   --   --   --  Slight/1+  --   --  Slight/1+  --   --   --   --     < > = values in this interval not displayed.       Lab Results - Last 18 Months   Lab Units 08/03/21  1722 07/28/21  0942 07/24/21  0312 07/23/21  0811 05/24/21  1440 05/11/21  0300 04/16/21  1300 04/16/21  1300 02/23/21  1558 10/22/20  0330 10/12/20  1300 08/21/20  0300 08/16/20  0507 08/15/20  0915   GLUCOSE mg/dL 165* 116* 170* 115*  --  95  --  101*   < > 109*   < > 93   < > 131*   SODIUM mmol/L 132* 133* 132* 128*  --  135*  --  134*   < > 139   < > 141   < > 133*   POTASSIUM mmol/L 4.6 4.6 4.6 5.0  --  4.0  --  5.3*   < > 4.2   < > 3.7   < > 4.3   CO2 mmol/L 25.0 24.0 17.0* 19.0*  --  23.0  --  22.0   < > 27.0   < > 31.0*   < > 26.0   CHLORIDE mmol/L 96* 100 104 96*  --  100  --  99   < > 107   < > 100   < > 97*   ANION GAP mmol/L 11.0 9.0 11.0 13.0  --  12.0  --  13.0   < > 5.0   < > 10.0   < > 10.0   CREATININE mg/dL 1.04* 1.13* 0.96 1.51* 1.30 1.05*   < > 1.07*   < > 0.92   < > 0.75   < > 1.16*   BUN mg/dL 19  30* 27* 34*  --  21  --  30*   < > 14   < > 22   < > 21   BUN / CREAT RATIO  18.3 26.5* 28.1* 22.5  --  20.0  --  28.0*   < > 15.2   < > 29.3*   < > 18.1   CALCIUM mg/dL 9.1 9.4 8.8 9.4  --  9.0  --  9.0   < > 8.8   < > 8.3*   < > 9.1   EGFR IF NONAFRICN AM mL/min/1.73 52* 47* 57* 34*  --  51*  --  50*   < > 60*   < > 75   < > 46*   ALK PHOS U/L 150* 98  --  104  --   --   --  134*  --  86  --  135*  --  100   TOTAL PROTEIN g/dL 6.8 7.5  --  7.3  --   --   --  6.9  --  5.7*  --  5.2*  --  7.0   ALT (SGPT) U/L 71* 20  --  17  --   --   --  23  --  10  --  18  --  13   AST (SGOT) U/L 26 16  --  21  --   --   --  23  --  14  --  44*  --  23   BILIRUBIN mg/dL 0.2 0.2  --  0.3  --   --   --  0.2  --  0.2  --  0.4  --  0.3   ALBUMIN g/dL 3.60 4.00  --  3.60  --   --   --  3.80  --  3.30*  --  2.90*  --  3.80   GLOBULIN gm/dL 3.2 3.5  --  3.7  --   --   --  3.1  --  2.4  --   --   --  3.2    < > = values in this interval not displayed.       No results for input(s): MSPIKE, KAPPALAMB, IGLFLC, URICACID, FREEKAPPAL, CEA, LDH, REFLABREPO in the last [id number] hours.    Lab Results - Last 18 Months   Lab Units 07/23/21  0811 08/21/20  0300 05/27/20  1220   TSH uIU/mL 0.875 2.550 1.670       Josi A Cade reports a pain score of 0.         ASSESSMENT:   1.  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   Treatment status: Post SBRT 08/31/2021 through 09/09/2021, 3 out of 3, 2700 cGy.   2.  Neoplasm left upper lobe 0.9 cm and a 1.7 cm more inferior lateral left upper lobe lesion on 09/2019.  Treatment status: Post SBRT, left upper lobe, 5000 cGy 11/12/2019 through 11/25/2019.  3.  Performance status of 3.  4.  Chronic obstructive pulmonary disease.  5.  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 No malignancy.   6.  History of Hodgkin's disease, post chemotherapy by Dr. Dowell in the 80s.        PLAN:  1.   Re:  Note from Dr. Se Mcnulty 09/13/2021.  Patient has problems with  "hoarseness, dysphagia, acid reflux, globus sensation, cough and throat clearing.   Flexible fiber optic laryngoscopy on 09/13/2021.  No mass or lesion noted.  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  Plan for dysphagia study.  Follow-up in 6 to 8 weeks.  2.   Re:  Post SBRT to the brain by Dr. Rosen.  3.   Re:  Pathology report, left parotid mass.  Cytologic changes of malignancy are not seen in this sample.  4.   Re:  Stable for observation.   5.  She will be seen in 3 months with restaging CT scans. .  6.  eRx Decadron 4 mg po every 8 hours, #60 with 2 refills if needed.  7.  The patient is not a candidate for active palliative chemotherapy due to her performance status, age and multiple comorbidities. \"I was so miserable.  I can't do that anymore.\"  8.  Continue care per primary care physician and other specialists.  9.  Plan of care discussed with patient and daughter. Understand expressed.  Patient agreeable to proceed.  10. Advance Care Planning   ACP discussion was held with the patient during this visit. Patient has an advance directive (not in EMR), copy requested.  11.  Return to office in 3 months with preoffice CBC with differential, CMP, CT of the chest, abdomen and pelvis.         I have reviewed the assessment and plan and verified the accuracy of it. No changes to assessment and plan since the information was documented. Dhaval Urena MD 09/24/21         I spent 32 total minutes, face-to-face, caring for Josi akins.  Greater than 50% of this time involved counseling and/or coordination of care as documented within this note regarding the patient's illness(es), pros and cons of various treatment options, instructions and/or risk reduction.           MD Cayetano Horner MD  (Quique Ochoa MD)  Se Mcnulty MD  (ORLIN Wadsworth)    "

## 2021-09-26 PROBLEM — A41.9 SEPSIS (HCC): Status: ACTIVE | Noted: 2021-01-01

## 2021-09-26 PROBLEM — J18.9 PNEUMONIA OF LEFT UPPER LOBE DUE TO INFECTIOUS ORGANISM: Status: ACTIVE | Noted: 2021-01-01

## 2021-09-26 PROBLEM — N17.9 ACUTE KIDNEY INJURY (HCC): Status: ACTIVE | Noted: 2021-01-01

## 2021-10-02 NOTE — THERAPY DISCHARGE NOTE
Acute Care - Speech Language Pathology Discharge Summary  [facility]       Patient Name: Josi Mohamud  : 1945  MRN: [id number]    Today's Date: 10/2/2021                   Admit Date: 2021      SLP Recommendation and Plan  Mechanical soft diet with honey thick liquids per VFSS.     Visit Dx:    ICD-10-CM ICD-9-CM   1. Pneumonia of left upper lobe due to infectious organism  J18.9 486   2. Primary malignant neoplasm of lung metastatic to other site, unspecified laterality (HCC)  C34.90 162.9   3. Elevated troponin  R77.8 790.6   4. Acute renal failure, unspecified acute renal failure type (HCC)  N17.9 584.9   5. Impaired mobility and ADLs  Z74.09 V49.89    Z78.9    6. Oropharyngeal dysphagia  R13.12 787.22   7. Decreased activities of daily living (ADL)  Z78.9 V49.89               SLP GOALS     Row Name 10/02/21 1325             Oral Nutrition/Hydration Goal 1 (SLP)    Oral Nutrition/Hydration Goal 1, SLP  Patient will tolerate LRD without s/s of aspiration.  -MM      Time Frame (Oral Nutrition/Hydration Goal 1, SLP)  by discharge  -MM      Barriers (Oral Nutrition/Hydration Goal 1, SLP)  cognition  -MM      Progress/Outcomes (Oral Nutrition/Hydration Goal 1, SLP)  discharged from facility;goal partially met  -MM         Lingual Strengthening Goal 1 (SLP)    Activity (Lingual Strengthening Goal 1, SLP)  increase lingual tone/sensation/control/coordination/movement;increase tongue back strength  -MM      Increase Lingual Tone/Sensation/Control/Coordination/Movement  lingual movement exercises  -MM      Increase Tongue Back Strength  lingual resistance exercises  -MM      [place]/Accuracy (Lingual Strengthening Goal 1, SLP)  with minimal cues (75-90% accuracy)  -MM      Time Frame (Lingual Strengthening Goal 1, SLP)  by discharge  -MM      Barriers (Lingual Strengthening Goal 1, SLP)  cognition  -MM      Progress/Outcomes (Lingual Strengthening Goal 1, SLP)  discharged from facility;goal partially  met  -MM         Pharyngeal Strengthening Exercise Goal 1 (SLP)    Activity (Pharyngeal Strengthening Goal 1, SLP)  increase superior movement of the hyolaryngeal complex;increase anterior movement of the hyolaryngeal complex;increase tongue base retraction;increase closure at entrance to airway/closure of airway at glottis  -MM      Increase Superior Movement of the Hyolaryngeal Complex  [place]elsohn;falsetto  -MM      Increase Anterior Movement of the Hyolaryngeal Complex  shaker  -MM      Increase Closure at Entrance to Airway/Closure of Airway at Glottis  supraglottic swallow;super-supraglottic swallow  -MM      Increase Tongue Base Retraction  dale  -MM      [place]/Accuracy (Pharyngeal Strengthening Goal 1, SLP)  with minimal cues (75-90% accuracy)  -MM      Time Frame (Pharyngeal Strengthening Goal 1, SLP)  by discharge  -MM      Barriers (Pharyngeal Strengthening Goal 1, SLP)  cognition  -MM      Progress/Outcomes (Pharyngeal Strengthening Goal 1, SLP)  discharged from facility;goal partially met  -MM        User Key  (r) = Recorded By, (t) = Taken By, (c) = Cosigned By    Initials Name Provider Type    Jing Jorge MS CCC-SLP Speech and Language Pathologist                  SLP Discharge Summary  Anticipated Discharge Disposition (SLP): skilled nursing facility  Reason for Discharge: discharge from this facility  Progress Toward Achieving Short/long Term Goals: goals partially met within established timelines  Discharge Destination:       Jing Ramirez MS CCC-SLP  10/2/2021

## 2021-10-22 NOTE — ANESTHESIA PROCEDURE NOTES
Airway  Urgency: elective    Date/Time: 10/22/2021 11:44 AM  Airway not difficult    General Information and Staff    Patient location during procedure: OR  CRNA: Jose Wen CRNA    Indications and Patient Condition  Indications for airway management: airway protection    Preoxygenated: yes  Mask difficulty assessment: 1 - vent by mask    Final Airway Details  Final airway type: supraglottic airway      Successful airway: classic      Number of attempts at approach: 1  Assessment: lips, teeth, and gum same as pre-op

## 2021-10-22 NOTE — ANESTHESIA POSTPROCEDURE EVALUATION
"Patient: Josi Mohamud    Procedure Summary     Date: 10/22/21 Room / Location:  [facility] OR  /  [facility] OR    Anesthesia Start: 1139 Anesthesia Stop: 1231    Procedure: INCISION AND DRAINAGE ANKLE HEMATOMA PRIMARY CLOSURE LACERATION with splint (Left ) Diagnosis:       Hematoma of left ankle      Laceration of left medial ankle      (hematoma ankle, laceration left ankle)    Surgeons: Randall Prasad DPM Provider: Jose Wen CRNA    Anesthesia Type: general ASA Status: 3          Anesthesia Type: general    Vitals  Vitals Value Taken Time   /40 10/22/21 1249   Temp 97.4 °F (36.3 °C) 10/22/21 1245   Pulse 65 10/22/21 1249   Resp 20 10/22/21 1245   SpO2 95 % 10/22/21 1248   Vitals shown include unvalidated device data.        Post Anesthesia Care and Evaluation    Patient location during evaluation: PACU  Patient participation: complete - patient participated  Level of consciousness: awake and alert  Pain management: adequate  Airway patency: patent  Anesthetic complications: No anesthetic complications    Cardiovascular status: acceptable  Respiratory status: acceptable  Hydration status: acceptable    Comments: Blood pressure 115/40, pulse 64, temperature 97.4 °F (36.3 °C), temperature source Temporal, resp. rate 16, height 160 cm (63\"), weight 92.1 kg (203 lb), SpO2 96 %, not currently breastfeeding.    Pt discharged from PACU based on bessie score >8  No anesthesia care post op    "

## 2021-11-02 NOTE — PROGRESS NOTES
[facility]ST[facility]risten Rosen MD      Jair Clarke, APRN  _______________________________________________  [facility]  Department of Radiation Oncology  [address] [place] [id number]  Office: [phone]  Fax: [phone]    DATE:  11/03/2021  PATIENT: Josi Mohamud  1945                         MEDICAL RECORD #:  [id number]                                                       REASON FOR VISIT:    Chief Complaint   Patient presents with   • Lung Cancer     brain mets     Ms. Mohamud is a very pleasant 75 y.o. patient that has completed radiation therapy to the brain and returns to the clinic today for inital follow up exam. Reports activity change, fatigue, visual disturbance, cough, SOB, constipation, nausea/vomiting, gait problem, dizziness, tremors, headaches, and confusion. Denies appetite change, unexpected weight change,diarrhea, light-headedness, weakness, and numbness. She continues to follow .     HISTORY OF PRESENT ILLNESS  Diagnosed in September 2019 with Malignant Neoplasm of the lung, CELI, 1.7 cm. Treated with SBRT, received 5000 cGy in 5 fractions to lung lesions, completed on 11/25/2019.   • Progression: 07/24/2021 MRI brain revealed 2 small lesions in left occipital lobe. Treated with SRS, she received 2700 cGy in 3 fractions, completed on 9/9/2021.     06/13/2019 -CT Chest without contrast:  • Left upper lobe spiculated pulmonary nodule measures 1.2 cm on axial series 4, image 69, previously 1.0 cm.   • The left upper lobe 0.9 cm pulmonary nodule on series 4 image 27, previously 0.4 cm on 07/06/2018.  • There is no mediastinal or hilar lymphadenopathy by CT size criteria.   • Small hiatal hernia.   • There appears to be a 2 cm low-density nodule in the right thyroid.   • Probable implanted cardiac loop recorder at the left midline chest.      07/08/2019 - Appointment with :  • She did have evidence of slight enlargement of her 2  lung nodules in the left upper lobe.    • One is located more peripherally in the lingula and the other is located more superiorly and medially in the upper division.    • I did advise her that these could represent slow-growing malignant lesions.    Recommendations:  • I do not think a needle biopsy would be good of option because the risk of pneumothorax although the lingular lesion would probably be accessible.    • Navigational bronchoscopy would be another option but there would be a chance of a false negative result.    • The definitive procedure would be thoracic surgery referral for resection with most likely a left upper lobectomy and she is not interested in surgery.  Based on her age and other comorbidities I think that is reasonable to avoid surgery.    • I told her my recommendation would be a follow-up CT short-term in 3 months and if the lesions increase in size then consider empiric radiation therapy and she and her daughter are in total agreement with this plan.     09/17/2019 - CT Chest without contrast:  • 2 subcentimeter nodules again noted in the right lobe of thyroid gland..  • Centrilobular emphysematous changes noted in the right and left lung.   • 2 nodules are again noted in the left lung.   • On image 27 9 mm nodule is noted.   • On image 66 and 1.2 mm nodule is present. This slightly increased in size and neoplasm should be excluded. This is not amenable to percutaneous biopsy.  • No pleural effusion is present.     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  • Right-sided nodule displays SUV max of 5.4 with the left-sided nodule displaying SUV max of 6.5. 2 additional right parotid nodules more inferiorly, displacing a more low level radiotracer uptake.   • Asymmetric radiotracer uptake at the left tonsil, measuring 4.5 cm SUV max.   • The more cranial and medial left upper lobe pulmonary nodule measures up to  0.9 cm, displacing measured SUV max 2.3.   • The larger, more inferior lateral left upper lobe nodule displays SUV max 2.2. This measures approximately 1.7 cm.   • Review of the mediastinum reveals no hypermetabolic lymphadenopathy.      09/25/2019 - Documentation per :  • I received a PET scan report indicating activity in the patient's lung nodules.    • My plan is been to get a follow-up chest CT and if they were increasing in size consider empiric radiation therapy.    • She is scheduled follow up in early October and again I think the options if she is going to consider treatment would be empiric radiation.    09/25/2019 - Documentation per :  • The patient's daughter Lagay called back and I reviewed the results of the PET scan with her.    • In reviewing epic, it appears that Dr. Mcnulty is aware of the PET findings and the patient is being worked up in regards to the parotid nodules and the tonsillar uptake.    • I recommend a referral to Dr. Rosen regarding the pulmonary nodules and the patient and her daughter are agreeable.    • They will keep follow-up with me on 10-8-19.    10/08/2019 - Appointment with :  • There were multiple bilateral hypermetabolic parotid nodules and she is scheduled for a biopsy with ENT later this month.    • Education on injecting Lovenox was provided to the patient and her niece Kathryn.   • All of her pulmonary medications were refilled and I recommend she start back on Mucinex twice a day.    • A prescription for doxycycline was sent to the patient's pharmacy and she also received a Depo-Medrol injection in the office today.   •  She is scheduled to see Dr. Rosen this Thursday regarding possible radiation treatment based on the results of her recent PET scan from 9-23-19.    • Return to clinic to see Dr. Frazier in approximately 3 months.     10/14/2019 - US guided thyroid bilateral biopsy per .    11/12/2019 - 11/25/2019  Radiation Oncology  Treatment Course  • Received 5000 cGy in 5 fractions to left upper lobe lung via SBRT.    01/30/2020 CT Chest:  • Slightly decreased size of larger left upper lobe pulmonary nodules. Recommend follow-up per oncology/radiation oncology.  • Right kidney with a 1.7 cm low-density lesion at the inferior aspect, which is outside the field-of-view on comparisons. This is most commonly a cyst, but incompletely evaluated on this exam. Recommend correlation with outside imaging. If none available, consider renal ultrasound for further evaluation    02/24/2020 - Appointment with :  • Return to Dr. Rosen in 3 months with repeat CT chest  • Follow with Dr. Frazier as scheduled    05/29/2020 - CT Chest:  • Previously treated left upper lobe pulmonary nodules are stable in size. No new or enlarging nodules identified.  • Centrilobular emphysema.  • Advanced coronary atheromatous calcification.  • No suspicious adenopathy in the chest.  • 2.3 cm right renal cyst was shown to be a simple cyst on recent ultrasound.    06/03/2020 - Appointment with :  • CT chest schedule for late August 2020, they will call you  • Return to Dr. Rosen in 6 months    02/23/2021 - CT Chest with contrast:  • The central airways are midline and patent.   • Emphysema.  • There is predominantly linear opacity tracking along the posterior aspect left upper lobe along the fissure. This is new compared to 5/29/2020.   • The previously demonstrated pulmonary nodule is not discretely identified on today's exam.  • Lingula with a 0.9 cm spiculated pulmonary nodule on axial series 3, image 62, previously up to 1.5 cm on 5/29/2020.  • There is no mediastinal or hilar lymphadenopathy by CT size criteria.   • Esophagus has normal course, caliber and wall thickness.   • Small hiatal hernia.  • Diffusely heterogeneous thyroid.   • No supraclavicular or axillary adenopathy.  • Coarse calcifications in the bilateral breasts.  • There is wedge-shaped  peripheral hyperdensity in the right liver lobe on axial image 108.   • Intrahepatic bile duct dilation appears mildly increased compared to 5/29/2020.   • Cholecystectomy clips on the prior exam.   • Changes of splenectomy with residual splenosis.   • Adrenal glands appear within normal limits.   • Partially visualized pancreatic tail within normal limits.  • Severe height loss of T4, new compared to 5/29/2019.    Impression:  • Peripheral consolidative opacity at the left upper lobe, which could represent postradiation changes. Correlate with patient history. The previously described left apical pulmonary nodule is not discretely demonstrated on today's exam.  • Decreased size of spiculated lingular pulmonary nodule now measuring 0.9 cm, previously 1.5 cm on 5/29/2020.  • New severe height loss of T4 compared to 5/29/2019.  • Peripheral wedge-shaped hyperdensity in the right liver lobe, favored to represent transient hepatic attenuation difference (YON).  • Splenectomy with residual splenosis.    02/24/2021 - Appointment with :  • Return in about 4 months (around 6/24/2021).  • I have ordered a CT scan of the chest, they will call you to schedule this.   • Continue to follow with your other physicians.    05/24/2021 - CT Chest with contrast:  • Spiculated left upper lobe nodule measures 9 mm, similar to the 2/23/2021 exam. A previous spiculated 7 mm left apical nodule seen on 5/29/2020 is no longer visualized, however there are similar ill-defined densities within the left lung apex when compared to 2/23/2021 which may represent radiation change. No new lung nodules.  • Moderate emphysema.  • Similar T4 compression deformity.  • Prior splenectomy with similar left subphrenic and left. Adrenal nodularity likely due to splenosis.  • Enhancing foci along the peripheral aspect of the left and right hepatic lobes may be due to transient hepatic attenuation defects or focal fatty sparing with hepatic  steatosis.    07/23/2021 - Chest x-ray:  • No acute cardiopulmonary findings.    07/23/2021 - CT Head without contrast:  • There is confluent low attenuation in the periventricular and subcortical white matter observed previously compatible with advanced chronic ischemic changes.  • Old right basal ganglia lacunar infarct identified in the genu of the internal capsule.  • There is interval development of low attenuation in the left occipital lobe compatible with an area of infarction, age uncertain, subacute or chronic suspected. Correlate with clinical findings.  Impression:  • Interval development of low attenuation in the left occipital lobe, ischemic change/infarction, age-indeterminate, subacute or chronic considered. Correlate with patient presentation.  • Advanced chronic ischemic changes.     07/23/2021 - MRI Brain without contrast:  • Multiplanar fast spin echo sequences were obtained of the brain on a high-field magnet without gadolinium enhancement.  • There is a predominantly cystic lesion involving the left occipital lobe which is well-circumscribed measuring 2.1 x 1.6 cm in size with surrounding vasogenic edema. There is some restriction of diffusion within the wall of this lesion. Given the history of lung carcinoma I would favor that this represents a metastatic focus.   • A cerebral abscess would also be in the differential but considered less likely given the location, appearance and history. No additional foci of diffusion restriction are present.  • There is atrophy of the brain with prominence of the subarachnoid spaces and ventricular enlargement.   • There is evidence of previous lacunar infarcts involving the right basal ganglia with focal encephalomalacia and gliosis.  • Moderate to severe small vessel ischemic changes are noted involving the periventricular and higher white matter tracts.  • No additional discrete intra-axial lesions are appreciated.   • Postcontrast imaging would be  recommended to assess for additional more subtle lesions. Gradient imaging demonstrates no evidence of blooming to suggest acute or chronic hemorrhage.  Impression:  • There is a cystic mass involving the left occipital lobe with adjacent vasogenic edema. Given the history I would favor a metastatic lesion. Abscess is also in the differential but considered less likely. There is some associated diffusion restriction within the wall of the lesion. No additional discrete lesions are appreciated but postcontrast imaging is recommended.  • Atrophy with moderate to severe small vessel ischemic disease. Remote lacunar infarcts involving the right basal ganglia are present with focal encephalomalacia and gliosis.  • No additional foci of diffusion restriction are demonstrated. There is no radiographic evidence of acute ischemic stroke on today's exam.    07/24/2021 - MRI Brain with contrast:  • Extensive chronic microvascular ischemic changes noted in the periventricular white matter.   • FLAIR signal is present in the left occipital lobe in the region of the 2 enhancing lesions..  • There are 2 focal regions of abnormal enhancement in the left occipital lobe. This is consistent with metastatic disease. One is just inferior and medial to previously described lesion in the left occipital lobe. The second is just superior and lateral views of the cystic lesion.  Impression:  • 2 small enhancing lesions left occipital lobe one is inferior and medial to the cystic lesion. A second is superior and lateral to the cystic lesion. These rim-like enhancing lesions are most consistent with metastatic disease.  • Extensive changes of chronic microvascular ischemic disease are noted in the periventricular white    07/28/2021 - Appointment with :  • Left occipital lobe metastatic disease likely from lung primary.  • Neoplasm left upper lobe 0.9 cm and a 1.7 cm more inferior lateral left upper lobe lesion on 09/2019.  o Treatment  "status: Post SBRT, left upper lobe, 5000 cGy 11/12/2019 through 11/25/2019.  • Performance status of 3.  • Chronic obstructive pulmonary disease.  • Coronary artery disease.  • History of Hodgkin's disease, post chemotherapy by Dr. Dowell in the 80s.  PLAN:  •  Re: The reason for follow-up.  •  Re: Palliative radiation by Dr. Rosen.  •  Re: Staging work-up.  • Blood for CBC with differential and CMP.  • Schedule CT of the chest, abdomen and pelvis for staging lung cancer.  Patient stated likely no biopsy if a target lesion is found. \"I can't have chemo.\"  • eRx Decadron 4 mg po every 8 hours, #60 with 2 refills if needed.  • The patient is not a candidate for active palliative chemotherapy due to her performance status, age and multiple comorbidities. \"Right. I need to be in tip top shape to get chemo.\"  • Continue care per primary care physician and other specialists.  • Plan of care discussed with patient and daughter. Understand expressed.  Patient agreeable to proceed.  • Refer to Dr. Rosen for palliative brain radiation.  • Further recommendations pending.  • Advance Care Planning  o ACP discussion was held with the patient during this visit. Patient has an advance directive (not in EMR), copy requested.  • Return to office in 4 weeks.    07/29/2021 - Documentation per :  • Case presented at tumor board 07/29/2021.    • Proceed with palliative radiation to the brain metastasis.    • Patient is not amenable for aggressive systemic therapy.    • CT chest, abdomen pelvis were ordered to assess for other potential sites of disease.    • No tissue diagnosis due to her poor overall condition    08/04/2021 - Consult with :  • Referral to neurosurgeon for evaluation of brain lesions. We will order a PET scan for further disease staging. The patient and family verbalize understanding of this discussion, voice no further questions and wish to proceed with recommended plan.  • We " will simulate treatment fields to initiate the treatment planning. Continue ongoing management per primary care physician and other specialists. Thank you for allowing me to assist in this patients care.    08/16/2021 - PET Scan:  • Abnormal PET/CT, there is a small hypermetabolic nodule within each parotid gland, possibly due to metastatic intraparotid lymph nodes, less likely primary parotid tumors. If clinically indicated, ultrasound-guided FNA could probably be performed on the largest of the intraparotid nodules, which measures 10 mm and is seen on the left.  • Equivocal focus of increased FDG activity at the junction of the right first rib with the manubrium with a maximum SUV of 3.1. This could be a solitary osseous metastasis or related to degenerative change. There is no definite evidence of a lytic or sclerotic lesion in this location on the fusion CT.    08/31/2021 - 09/09/2021 - Completed radiation course:  • Received 2700 cGy in 3 fractions to the brain.     09/13/2021 - Appointment with :  • Return in about 6 weeks (around 10/25/2021) for Recheck  • Follow-up after parotid biopsy  • Would recommend dysphagia study unless otherwise contraindicated to evaluate swallowing safety with a unilateral paralysis in this patient.  • Follow-up in 6 to 8 weeks     09/14/2021 - Left parotid mass, fine-needle aspiration, smears (2) and ThinPrep preparation (1):  • Groups of oncocytic cells.  • A few background lymphocytes.  • Macrophages.  • Proteinaceous material and blood.    09/24/2021 - Appointment with :  • Left occipital lobe metastatic disease likely from lung primary.   o Treatment status: Post SBRT 08/31/2021 through 09/09/2021, 3 out of 3, 2700 cGy.   • Neoplasm left upper lobe 0.9 cm and a 1.7 cm more inferior lateral left upper lobe lesion on 09/2019.  o Treatment status: Post SBRT, left upper lobe, 5000 cGy 11/12/2019 through 11/25/2019.  • Performance status of 3.  • Chronic obstructive  "pulmonary disease.  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 No malignancy.   • History of Hodgkin's disease, post chemotherapy by Dr. Dowell in the 80s.  PLAN:  •  Re:  Note from Dr. Se Mcnulty 09/13/2021.  Patient has problems with hoarseness, dysphagia, acid reflux, globus sensation, cough and throat clearing.   Flexible fiber optic laryngoscopy on 09/13/2021.  No mass or lesion noted.  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  Plan for dysphagia study.  Follow-up in 6 to 8 weeks.  •  Re:  Post SBRT to the brain by Dr. Rosen.  •  Re:  Pathology report, left parotid mass.  Cytologic changes of malignancy are not seen in this sample.  •  Re:  Stable for observation.   • She will be seen in 3 months with restaging CT scans. .  • eRx Decadron 4 mg po every 8 hours, #60 with 2 refills if needed.  • The patient is not a candidate for active palliative chemotherapy due to her performance status, age and multiple comorbidities. \"I was so miserable.  I can't do that anymore.\"  • Continue care per primary care physician and other specialists.  • Plan of care discussed with patient and daughter. Understand expressed.  Patient agreeable to proceed.  • Advance Care Planning  • ACP discussion was held with the patient during this visit. Patient has an advance directive (not in EMR), copy requested.  • Return to office in 3 months with preoffice CBC with differential, CMP, CT of the chest, abdomen and pelvis.       09/26/2021 - CT Chest without contrast:  • New reticulonodular opacities identified in the left upper lobe and superior segment left lower lobe, favoring infectious bronchiolitis.  • There is an additional 9 mm left upper lobe pulmonary nodule which is been stable across several recent CT scans dating back to February 2021.  • Bilateral mild dependent lung atelectasis.  • Diffuse osteopenia with exaggerated kyphosis and old T5 compression " deformity.    09/26/2021 - CT Head without contrast:  • Patient has actually had recent stereotactic radiosurgery for 2 left occipital lobe metastatic lesions. Comparing the recent CT scan back to the pre-radiosurgery MRI dated 7/24/2021, the extent of edema in the occipital lobe is stable. This DOES NOT reflect an actual cortical infarct, reflecting only the vasogenic edema associated with these 2 metastatic lesions. I do not see any evidence of increased edema following radiosurgery, and there is no associated peritumoral hemorrhage.    History obtained from  PATIENT, FAMILY, and CHART    PAST MEDICAL HISTORY  Past Medical History:   Diagnosis Date   • Anticoagulated 7/21/2018   • Bilateral carotid artery stenosis 6/8/2021   • Bronchitis    • Cancer (HCC)     lung   • COPD (chronic obstructive pulmonary disease) (HCC)    • COPD, moderate (HCC) 7/8/2019   • Coronary artery disease    • Coronary artery disease involving native coronary artery of native heart without angina pectoris 5/19/2017   • COVID-19    • COVID-19 vaccine series completed    • Essential hypertension 5/19/2017   • Gastroesophageal reflux disease 7/21/2018   • GERD (gastroesophageal reflux disease)    • History of transfusion    • Hx of Hodgkin's lymphoma 10/11/2019   • Hyperlipidemia    • Hypertension    • Malignant neoplasm of upper lobe of left lung (HCC) 7/8/2019   • Mixed hyperlipidemia 6/8/2021   • PAF (paroxysmal atrial fibrillation) (HCC)    • Parotid neoplasm 7/21/2018   • Persistent asthma without complication 7/8/2019   • Renal cyst 2/24/2020   • Seizures (HCC)    • Status post stereotactic radiosurgery 2/24/2020   • Stented coronary artery 5/19/2017   • Thyroid nodule 7/21/2018      PAST SURGICAL HISTORY  Past Surgical History:   Procedure Laterality Date   • ANKLE OPEN REDUCTION INTERNAL FIXATION Left 8/15/2020    Procedure: ANKLE OPEN REDUCTION INTERNAL FIXATION;  Surgeon: Keyshawn Medrano MD;  Location: [facility] OR;  Service:  Orthopedics;  Laterality: Left;   • APPENDECTOMY     • APPENDECTOMY     • CARDIAC CATHETERIZATION     • CARDIAC ELECTROPHYSIOLOGY PROCEDURE N/A 2017    Procedure: Loop insertion; LINQ;  Surgeon: Jose Monteiro MD;  Location:  [facility] CATH INVASIVE LOCATION;  Service:    • CHOLECYSTECTOMY     • CORONARY ANGIOPLASTY WITH STENT PLACEMENT      X 1    • INCISION AND DRAINAGE LEG Left 10/22/2021    Procedure: INCISION AND DRAINAGE ANKLE HEMATOMA PRIMARY CLOSURE LACERATION with splint;  Surgeon: Randall Prasad DPM;  Location:  [facility] OR;  Service: Podiatry;  Laterality: Left;   • INCISION AND DRAINAGE OF WOUND Left 8/15/2020    Procedure: INCISION AND DRAINAGE WOUND;  Surgeon: Keyshawn Medrano MD;  Location:  [facility] OR;  Service: Orthopedics;  Laterality: Left;   • LIVER BIOPSY     • MYRINGOTOMY W/ TUBES Left 2019    Procedure: LEFT MYRINGOTOMY WITH INSERTION OF EAR TUBE WITH RIGHT EAR EXAM UNDER ANESTHESIA;  Surgeon: Se Mcnulty MD;  Location:  [facility] OR;  Service: ENT   • SPLENECTOMY     • US GUIDED FINE NEEDLE ASPIRATION  2018      FAMILY HISTORY  family history includes Esophageal cancer in her brother; Lung cancer in her brother.    SOCIAL HISTORY  Social History     Socioeconomic History   • Marital status:    • Number of children: 1   Tobacco Use   • Smoking status: Former Smoker     Packs/day: 0.50     Years: 58.00     Pack years: 29.00     Start date:      Quit date: 2020     Years since quittin.2   • Smokeless tobacco: Never Used   Vaping Use   • Vaping Use: Never used   Substance and Sexual Activity   • Alcohol use: No   • Drug use: No   • Sexual activity: Defer      ALLERGIES  Contrast dye and Penicillins     MEDICATIONS  Current Outpatient Medications   Medication Sig Dispense Refill   • acetaminophen (TYLENOL) 325 MG tablet Take 650 mg by mouth Every 4 (Four) Hours As Needed for Mild Pain  or Fever.     • albuterol sulfate  (90 Base) MCG/ACT inhaler Inhale  1 puff Daily. 1 inhaler 6   • apixaban (ELIQUIS) 5 MG tablet tablet Take 1 tablet by mouth Every 12 (Twelve) Hours. 60 tablet 11   • aspirin 81 MG EC tablet Take 81 mg by mouth Daily.     • calcium carbonate (TUMS) 500 MG chewable tablet Chew 2 tablets Every 4 (Four) Hours As Needed for Indigestion.     • Calcium Carbonate-Vitamin D (CALTRATE 600+D PO) Take 1 tablet by mouth 2 (two) times a day.     • dexamethasone (DECADRON) 4 MG tablet Take 1 tablet by mouth 3 (Three) Times a Day With Meals.     • docusate sodium 100 MG capsule Take 100 mg by mouth 2 (Two) Times a Day As Needed for Constipation.     • DPH-Lido-AlHydr-MgHydr-Simeth (First Mouthwash, Magic Mouthwash,) suspension Swish and spit 10 mL Every 8 (Eight) Hours As Needed (Mouth pain).     • famotidine (PEPCID) 20 MG tablet Take 1 tablet by mouth Every Night.     • fluticasone-salmeterol (ADVAIR) 100-50 MCG/DOSE DISKUS Inhale 1 puff 2 (Two) Times a Day.     • furosemide (LASIX) 40 MG tablet Take 40 mg by mouth Every Morning.     • gabapentin (NEURONTIN) 100 MG capsule Take 1 capsule by mouth 3 (Three) Times a Day. 9 capsule 0   • guaiFENesin (MUCINEX) 600 MG 12 hr tablet Take 2 tablets by mouth 2 (Two) Times a Day As Needed for Cough. 15 tablet 0   • ipratropium-albuterol (DUO-NEB) 0.5-2.5 mg/3 ml nebulizer Take 3 mL by nebulization 4 (Four) Times a Day As Needed for Wheezing or Shortness of Air. 360 mL    • levETIRAcetam (KEPPRA) 500 MG tablet Take 500 mg by mouth 2 (Two) Times a Day.     • lidocaine (LIDODERM) 5 % Place 1 patch on the skin as directed by provider Daily As Needed for Mild Pain , Moderate Pain  or Severe Pain . Apply to right posterior flank. Remove & Discard patch within 12 hours or as directed by MD     • lisinopril (PRINIVIL,ZESTRIL) 20 MG tablet Take 20 mg by mouth Daily.     • LORazepam (ATIVAN) 0.5 MG tablet Take 1 tablet by mouth 2 (Two) Times a Day As Needed for Anxiety. 6 tablet 0   • metoprolol succinate XL (TOPROL-XL) 50 MG 24 hr  "tablet Take 50 mg by mouth Daily.     • nitroglycerin (NITROSTAT) 0.4 MG SL tablet Place 0.4 mg under the tongue Every 5 (Five) Minutes As Needed for Chest Pain. Take no more than 3 doses in 15 minutes.     • omeprazole (priLOSEC) 40 MG capsule Take 40 mg by mouth Daily.  5   • ondansetron (ZOFRAN) 4 MG tablet Take 1 tablet by mouth Every 6 (Six) Hours As Needed for Nausea or Vomiting.     • oxybutynin (DITROPAN) 5 MG tablet Take 1 tablet by mouth 2 (Two) Times a Day.     • simvastatin (ZOCOR) 20 MG tablet Take 20 mg by mouth Every Night.     • tiotropium bromide monohydrate (SPIRIVA RESPIMAT) 2.5 MCG/ACT aerosol solution inhaler Inhale 2 puffs Daily. 3 inhaler 0   • traMADol (ULTRAM) 50 MG tablet Take 1 tablet by mouth Every 6 (Six) Hours As Needed for Moderate Pain . 12 tablet 0     No current facility-administered medications for this visit.      The following portions of the patient's history were reviewed and updated as appropriate: allergies, current medications, past family history, past medical history, past social history, past surgical history and problem list.    REVIEW OF SYSTEMS  Review of Systems   Constitutional: Positive for activity change (fractures bilateral feet, nonweight bearing due to recent surgery on right foot) and fatigue. Negative for appetite change and unexpected weight change.   Eyes: Positive for visual disturbance (since brain radiation).   Respiratory: Positive for cough (coughs up \"very little\" sputum) and shortness of breath (stable for patient).         Hx: COPD   Cardiovascular: Negative.    Gastrointestinal: Positive for constipation (alternates with diarrhea, present for a while per daughter), nausea (present since radiation) and vomiting.   Endocrine: Negative.    Musculoskeletal: Positive for gait problem (non weightbearing due to recent surgery).   Skin: Negative.    Allergic/Immunologic: Negative.    Neurological: Positive for dizziness (present prior to brain radiation, " "with position changes), tremors (bilateral hands, \"noticed more over the last couple of months\" per daughter) and headaches (present prior to SRS treatment to brain, mostly in frontal area).   Hematological: Negative.    Psychiatric/Behavioral: Positive for confusion (\"accelerated more\").        \"searching for words more\"       PHYSICAL EXAM  VITAL SIGNS:   Vitals:    11/03/21 1016   BP: 106/50   Pulse: 94   SpO2: 95%  Comment: room air   Weight: 92.1 kg (203 lb)   Height: 160 cm (63\")   PainSc:   6   PainLoc: Foot  Comment: recent surgery to right foot      Physical Exam   General Appearance:  awake, alert, oriented, in no acute distress.  Head: Normocephalic  Eyes: Conjunctiva pink, pupils equal and reactive.   Ears:  Normal externally.  Hearing- normal to conversation  Nose/Sinuses:  Mucosa normal. No drainage or sinus tenderness.  Mouth/Throat:  Mucosa moist, no lesions; pharynx without erythema, edema or exudate.   Neck: Supple, no mass, non-tender  Back:  Symmetric, no curvature, ROM normal, no CVA tenderness   Lungs:  Normal expansion.  Clear to auscultation.  No rales, rhonchi, or wheezing.  Heart:  Heart sounds are normal.  Regular rate and rhythm without murmur, gallop or rub.  Abdomen:  Soft, non-tender, normal bowel sounds; no bruits, organomegaly or masses.  Extremities: Warm to touch, pink, with no edema. Pulses 2+ bilaterally  Musculoskeletal: strength and sensation grossly normal  Neurologic:  Alert and oriented,  non-focal exam, using wheelchair for assistance, walking boot in place RLE  Psych exam: normal situational behavior   Skin:  Warm and moist. No suspicious lesions or rashes of concern    Clinical Quality Measures  -Pain Documented by Standardized Tool, FPS Josi Mohamud reports a pain score of 6. Given her pain assessment as noted, treatment options were discussed and the following options were decided upon as a follow-up plan to address the patient's pain: continuation of current " treatment plan for pain and use of non-medical modalities (ice, heat, stretching and/or behavior modifications).   Pain Medications             acetaminophen (TYLENOL) 325 MG tablet Take 650 mg by mouth Every 4 (Four) Hours As Needed for Mild Pain  or Fever.    aspirin 81 MG EC tablet Take 81 mg by mouth Daily.    dexamethasone (DECADRON) 4 MG tablet Take 1 tablet by mouth 3 (Three) Times a Day With Meals.    gabapentin (NEURONTIN) 100 MG capsule Take 1 capsule by mouth 3 (Three) Times a Day.    levETIRAcetam (KEPPRA) 500 MG tablet Take 500 mg by mouth 2 (Two) Times a Day.    traMADol (ULTRAM) 50 MG tablet Take 1 tablet by mouth Every 6 (Six) Hours As Needed for Moderate Pain .        -Advanced Care Planning -  Advance Care Planning   ACP discussion was held with the patient during this visit. Patient does not have an advance directive, information provided.     -Body Mass Index Screening and Follow-Up Plan Patient's Body mass index is 35.96 kg/m². indicating that she is obese (BMI >30). Obesity-related health conditions include the following: none.     -Tobacco Use: Screening and Cessation Intervention Social History    Tobacco Use      Smoking status: Former Smoker        Packs/day: 0.50        Years: 58.00        Pack years: 29        Start date:         Quit date: 2020        Years since quittin.2      Smokeless tobacco: Never Used    ASSESSMENT AND PLAN  1. Malignant neoplasm of upper lobe of left lung (HCC)    2. Brain mass    3. COPD, moderate (HCC)    4. Hx of Hodgkin's lymphoma    5. Status post stereotactic radiosurgery    6. Former smoker      Orders Placed This Encounter   Procedures   • CT Chest Without Contrast     Standing Status:   Future     Standing Expiration Date:   11/3/2022     Order Specific Question:   Release to patient     Answer:   Immediate     RECOMMENDATIONS:  Josi Mohamud returns to our clinic today for follow up evaluation after completion of radiation to the brain.  Diagnosed in September 2019 with Malignant Neoplasm of the lung, CELI, 1.7 cm. Treated with SBRT, received 5000 cGy in 5 fractions to lung lesions, completed on 11/25/2019.   • Progression: 07/24/2021 MRI brain revealed 2 small lesions in left occipital lobe. Treated with SRS, she received 2700 cGy in 3 fractions, completed on 9/9/2021.     NCCN guidelines for surveillance post treatment have been discussed. There is no clinical evidence suggestive of local recurrence of disease on exam at this time. We will schedule a routine follow up with her in March 2022 or sooner if needed, will continue to follow with other physicians per their scheduling. Continue ongoing management per primary care physician and other specialists.    Patient Instructions   1) Return to Dr. Rosen in March 2022 with a chest CT before.  2) Continue to follow with Dr. Ochoa for your brain spots  3) Follow Dr. Mcnulty for your parotid      Time Spent: I spent 32 minutes caring for Josi on this date of service. This time includes time spent by me in the following activities: preparing for the visit, reviewing tests, obtaining and/or reviewing a separately obtained history, performing a medically appropriate examination and/or evaluation, counseling and educating the patient/family/caregiver, ordering medications, tests, or procedures, referring and communicating with other health care professionals, documenting information in the medical record and independently interpreting results and communicating that information with the patient/family/caregiver.   Sean Rosen III, MD  11/03/2021

## 2021-11-03 NOTE — PATIENT INSTRUCTIONS
1) Return to Dr. Rosen in March 2022 with a chest CT before.  2) Continue to follow with Dr. Ochoa for your brain spots  3) Follow Dr. Mcnulty for your parotid

## 2021-11-09 PROBLEM — I95.9 HYPOTENSION: Status: ACTIVE | Noted: 2021-01-01

## 2021-11-09 PROBLEM — R00.1 BRADYCARDIA: Status: ACTIVE | Noted: 2021-01-01

## 2021-11-09 NOTE — TELEPHONE ENCOUNTER
Provider: DAO LUGO MD    Caller: DENICE WHITE    Relationship to Patient: DAUGHTER    Phone Number: [phone]    Reason for Call: PT IS CURRENTLY IN  PAD IN CCU. SHE HAS A F/U APPT AT 3:30  W/ DR LUGO. PT'S DAUGHTERS IS WANTING TO KNOW IF THE DOCTOR COULD DO THE VISIT IN THE CCU OR IF SHE WOULD NEED TO RESCHEDULE THE APPT.

## 2021-11-09 NOTE — H&P
[facility] Medicine Services  HISTORY AND PHYSICAL    Date of Admission: 11/9/2021  Primary Care Physician: Cayetano Crews MD    Subjective     Chief Complaint:   Low heart rate, low blood pressure    History of Present Illness    This 75-year-old female was sent to the ED by the nursing home because of low heart rate and low blood pressure.  She experienced hypotension with blood pressure as low as 72/54.  Heart rate was noted to be in the 20s and 30s.  Patient received 0.5 mg of atropine without response.  She subsequently was given 0.5 mg of epinephrine with improvement in heart rate up to mid 50s.  She is currently on a dopamine drip to maintain blood pressure and heart rate.  The patient is a very poor historian and does not add much to the history during the interview.  She appears to be at her cognitive baseline.  EKG was of poor quality but a junctional bradycardia is noted.  Her history includes lung cancer with metastasis to the brain, COPD, CAD, essential hypertension, GERD and PAF.    Work-up in the emergency department reveals lactic acid 2.7, negative Covid swab, BNP 3851.  CMP shows glucose 394, BUN 40, albumin 2.70.  D-dimer, troponin, procalcitonin, CK all within normal limits.  White blood cell count within normal limits with hemoglobin 11.0.  TSH low at 0.064.    Review of Systems   Constitutional: Positive for activity change.   HENT: Negative.    Eyes: Negative.    Respiratory: Negative.  Negative for shortness of breath.    Cardiovascular: Positive for leg swelling.   Gastrointestinal: Negative.    Endocrine: Negative.    Genitourinary: Negative.    Musculoskeletal: Positive for arthralgias.   Skin: Negative.    Allergic/Immunologic: Negative.    Neurological: Negative.    Hematological: Negative.    Psychiatric/Behavioral: Negative.         Otherwise complete ROS reviewed and negative except as mentioned in the HPI.      Past Medical History:     Past  Medical History:   Diagnosis Date   • Anticoagulated 7/21/2018   • Bilateral carotid artery stenosis 6/8/2021   • Bronchitis    • Cancer (HCC)     lung   • COPD (chronic obstructive pulmonary disease) (HCC)    • COPD, moderate (HCC) 7/8/2019   • Coronary artery disease    • Coronary artery disease involving native coronary artery of native heart without angina pectoris 5/19/2017   • COVID-19    • COVID-19 vaccine series completed    • Essential hypertension 5/19/2017   • Gastroesophageal reflux disease 7/21/2018   • GERD (gastroesophageal reflux disease)    • History of transfusion    • Hx of Hodgkin's lymphoma 10/11/2019   • Hyperlipidemia    • Hypertension    • Malignant neoplasm of upper lobe of left lung (HCC) 7/8/2019   • Mixed hyperlipidemia 6/8/2021   • Obesity (BMI 30-39.9) 3/9/2020   • PAF (paroxysmal atrial fibrillation) (HCC)    • Parotid neoplasm 7/21/2018   • Persistent asthma without complication 7/8/2019   • Renal cyst 2/24/2020   • Seizures (HCC)    • Status post stereotactic radiosurgery 2/24/2020   • Stented coronary artery 5/19/2017   • Thyroid nodule 7/21/2018   • Unilateral complete paralysis of vocal cord 9/13/2021       Past Surgical History:  Past Surgical History:   Procedure Laterality Date   • ANKLE OPEN REDUCTION INTERNAL FIXATION Left 8/15/2020    Procedure: ANKLE OPEN REDUCTION INTERNAL FIXATION;  Surgeon: Keyshawn Medrano MD;  Location: [facility] OR;  Service: Orthopedics;  Laterality: Left;   • APPENDECTOMY     • APPENDECTOMY     • CARDIAC CATHETERIZATION     • CARDIAC ELECTROPHYSIOLOGY PROCEDURE N/A 5/23/2017    Procedure: Loop insertion; LINQ;  Surgeon: Jose Monteiro MD;  Location: [facility] CATH INVASIVE LOCATION;  Service:    • CHOLECYSTECTOMY     • CORONARY ANGIOPLASTY WITH STENT PLACEMENT      X 1    • INCISION AND DRAINAGE LEG Left 10/22/2021    Procedure: INCISION AND DRAINAGE ANKLE HEMATOMA PRIMARY CLOSURE LACERATION with splint;  Surgeon: Randall Prasad DPM;  Location:   [facility] OR;  Service: Podiatry;  Laterality: Left;   • INCISION AND DRAINAGE OF WOUND Left 8/15/2020    Procedure: INCISION AND DRAINAGE WOUND;  Surgeon: Keyshawn Medrano MD;  Location:  [facility] OR;  Service: Orthopedics;  Laterality: Left;   • LIVER BIOPSY     • MYRINGOTOMY W/ TUBES Left 11/22/2019    Procedure: LEFT MYRINGOTOMY WITH INSERTION OF EAR TUBE WITH RIGHT EAR EXAM UNDER ANESTHESIA;  Surgeon: Se Mcnulty MD;  Location:  [facility] OR;  Service: ENT   • SPLENECTOMY     • US GUIDED FINE NEEDLE ASPIRATION  8/7/2018       Family History:   family history includes Esophageal cancer in her brother; Lung cancer in her brother.    Social History:    reports that she quit smoking about 14 months ago. She started smoking about 60 years ago. She has a 29.00 pack-year smoking history. She has never used smokeless tobacco. She reports that she does not drink alcohol and does not use drugs.    Medications:  Prior to Admission medications    Medication Sig Start Date End Date Taking? Authorizing Provider   acetaminophen (TYLENOL) 325 MG tablet Take 650 mg by mouth Every 4 (Four) Hours As Needed for Mild Pain  or Fever.    ProviderTriny MD   albuterol sulfate  (90 Base) MCG/ACT inhaler Inhale 1 puff Daily. 8/12/20   Jeremías Hernandez APRN   apixaban (ELIQUIS) 5 MG tablet tablet Take 1 tablet by mouth Every 12 (Twelve) Hours. 12/6/19   Jose Monteiro MD   aspirin 81 MG EC tablet Take 81 mg by mouth Daily.    ProviderTriny MD   calcium carbonate (TUMS) 500 MG chewable tablet Chew 2 tablets Every 4 (Four) Hours As Needed for Indigestion.    Triny Zapata MD   Calcium Carbonate-Vitamin D (CALTRATE 600+D PO) Take 1 tablet by mouth 2 (two) times a day.    Triny Zapata MD   dexamethasone (DECADRON) 4 MG tablet Take 1 tablet by mouth 3 (Three) Times a Day With Meals. 9/29/21   Rhonda Rudolph APRN   docusate sodium 100 MG capsule Take 100 mg by mouth 2 (Two) Times a Day As  Needed for Constipation. 8/20/20   Milo White S, DO   DPH-Lido-AlHydr-MgHydr-Simeth (First Mouthwash, Magic Mouthwash,) suspension Swish and spit 10 mL Every 8 (Eight) Hours As Needed (Mouth pain). 9/29/21   Rhonda Rudolph APRN   famotidine (PEPCID) 20 MG tablet Take 1 tablet by mouth Every Night. 7/26/21   Triny Zapata MD   fluticasone-salmeterol (ADVAIR) 100-50 MCG/DOSE DISKUS Inhale 1 puff 2 (Two) Times a Day.    Triny Zapata MD   furosemide (LASIX) 40 MG tablet Take 40 mg by mouth Every Morning.    Triny Zapata MD   gabapentin (NEURONTIN) 100 MG capsule Take 1 capsule by mouth 3 (Three) Times a Day. 9/29/21   Rhonda Rudolph APRN   guaiFENesin (MUCINEX) 600 MG 12 hr tablet Take 2 tablets by mouth 2 (Two) Times a Day As Needed for Cough. 4/10/17   Angy Peña MD   ipratropium-albuterol (DUO-NEB) 0.5-2.5 mg/3 ml nebulizer Take 3 mL by nebulization 4 (Four) Times a Day As Needed for Wheezing or Shortness of Air. 9/29/21   Rhonda Rudolph APRN   levETIRAcetam (KEPPRA) 500 MG tablet Take 500 mg by mouth 2 (Two) Times a Day.    Triny Zapata MD   lidocaine (LIDODERM) 5 % Place 1 patch on the skin as directed by provider Daily As Needed for Mild Pain , Moderate Pain  or Severe Pain . Apply to right posterior flank. Remove & Discard patch within 12 hours or as directed by MD    Triny Zapata MD   lisinopril (PRINIVIL,ZESTRIL) 20 MG tablet Take 20 mg by mouth Daily.    Triny Zapata MD   LORazepam (ATIVAN) 0.5 MG tablet Take 1 tablet by mouth 2 (Two) Times a Day As Needed for Anxiety. 9/29/21   Rhonda Rudolph APRN   metoprolol succinate XL (TOPROL-XL) 50 MG 24 hr tablet Take 50 mg by mouth Daily.    Triny Zapata MD   nitroglycerin (NITROSTAT) 0.4 MG SL tablet Place 0.4 mg under the tongue Every 5 (Five) Minutes As Needed for Chest Pain. Take no more than 3 doses in 15 minutes.    Provider, MD Triny   omeprazole (priLOSEC)  "40 MG capsule Take 40 mg by mouth Daily. 9/10/19   Triny Zapata MD   ondansetron (ZOFRAN) 4 MG tablet Take 1 tablet by mouth Every 6 (Six) Hours As Needed for Nausea or Vomiting. 1/15/21   Triny Zapata MD   oxybutynin (DITROPAN) 5 MG tablet Take 1 tablet by mouth 2 (Two) Times a Day. 11/28/20   Triny Zapata MD   simvastatin (ZOCOR) 20 MG tablet Take 20 mg by mouth Every Night.    Triny Zapata MD   tiotropium bromide monohydrate (SPIRIVA RESPIMAT) 2.5 MCG/ACT aerosol solution inhaler Inhale 2 puffs Daily. 8/12/20   Jeremías Hernandez APRN   traMADol (ULTRAM) 50 MG tablet Take 1 tablet by mouth Every 6 (Six) Hours As Needed for Moderate Pain . 9/29/21   Rhonda Rudolph APRN       Allergies:  Allergies   Allergen Reactions   • Contrast Dye Itching     Contrast reaction occurred with pruritus and erythema of the upper  and lower extremities. No difficulties swallowing or breathing. Patient  received 50 mg Benadryl orally to improve symptomatology. Patient should  be pretreated with steroids and Benadryl prior to any future IV contrast  Administration. 05-     • Penicillins Rash       Objective     Vital Signs:   /61   Pulse 52   Temp 97.7 °F (36.5 °C) (Oral)   Resp 15   Ht 162.6 cm (64\")   Wt 87.5 kg (192 lb 12.8 oz)   LMP  (LMP Unknown)   SpO2 96%   BMI 33.09 kg/m²     Physical Exam  Constitutional:       Appearance: She is well-developed and overweight. She is ill-appearing.      Comments: Disheveled appearing.   HENT:      Head: Normocephalic and atraumatic.      Right Ear: External ear normal.      Left Ear: External ear normal.      Nose: Nose normal.      Mouth/Throat:      Mouth: Mucous membranes are moist.      Pharynx: Oropharynx is clear.   Eyes:      General: No scleral icterus.     Extraocular Movements: Extraocular movements intact.      Conjunctiva/sclera: Conjunctivae normal.      Pupils: Pupils are equal, round, and reactive to light. "   Cardiovascular:      Rate and Rhythm: Regular rhythm. Bradycardia present.      Pulses: Normal pulses.      Heart sounds: Normal heart sounds. No murmur heard.      Pulmonary:      Effort: Pulmonary effort is normal. No respiratory distress.      Breath sounds: Wheezing (Bilaterally) and rhonchi (Occasional bilateral) present.   Abdominal:      General: Abdomen is flat. Bowel sounds are normal.      Palpations: Abdomen is soft. There is no mass.   Musculoskeletal:         General: Normal range of motion.      Right lower leg: Edema present.      Left lower leg: Edema present.   Skin:     General: Skin is warm and dry.      Coloration: Skin is not pale.      Comments: Unna boot left lower extremity with bleedthrough noted in the mid tibial portion.   Neurological:      General: No focal deficit present.      Mental Status: She is alert and oriented to person, place, and time. Mental status is at baseline.      Cranial Nerves: No cranial nerve deficit.   Psychiatric:         Mood and Affect: Mood normal.         Judgment: Judgment normal.           Results Reviewed:  Lab Results (last 24 hours)     Procedure Component Value Units Date/Time    Manual Differential [[id number]]  (Abnormal) Collected: 11/09/21 0457    Specimen: Blood Updated: 11/09/21 0638     Neutrophil % 78.4 %      Lymphocyte % 14.4 %      Monocyte % 4.1 %      Bands %  2.1 %      Myelocyte % 1.0 %      Neutrophils Absolute 8.40 10*3/mm3      Lymphocytes Absolute 1.50 10*3/mm3      Monocytes Absolute 0.43 10*3/mm3      Anisocytosis Slight/1+     Poikilocytes Slight/1+     Polychromasia Slight/1+     WBC Morphology Normal     Platelet Morphology Normal    CBC & Differential [[id number]]  (Abnormal) Collected: 11/09/21 0457    Specimen: Blood Updated: 11/09/21 0638    Narrative:      The following orders were created for panel order CBC & Differential.  Procedure                               Abnormality         Status                     ---------                                -----------         ------                     CBC Auto Differential[[id number]]        Abnormal            Final result                 Please view results for these tests on the individual orders.    CBC Auto Differential [[id number]]  (Abnormal) Collected: 11/09/21 0457    Specimen: Blood Updated: 11/09/21 0638     WBC 10.44 10*3/mm3      RBC 3.15 10*6/mm3      Hemoglobin 11.0 g/dL      Hematocrit 33.4 %      .0 fL      MCH 34.9 pg      MCHC 32.9 g/dL      RDW 15.3 %      RDW-SD 59.2 fl      MPV 10.4 fL      Platelets 242 10*3/mm3     Narrative:      The previously reported component NRBC is no longer being reported. Previous result was 0.4 /100 WBC (Reference Range: 0.0-0.2 /100 WBC) on 11/9/2021 at 0612 CST.    COVID PRE-OP / PRE-PROCEDURE SCREENING ORDER (NO ISOLATION) - Swab, Nasal Cavity [[id number]]  (Normal) Collected: 11/09/21 0517    Specimen: Swab from Nasal Cavity Updated: 11/09/21 0626    Narrative:      The following orders were created for panel order COVID PRE-OP / PRE-PROCEDURE SCREENING ORDER (NO ISOLATION) - Swab, Nasal Cavity.  Procedure                               Abnormality         Status                     ---------                               -----------         ------                     COVID-19,[facility]...[[id number]]  Normal              Final result                 Please view results for these tests on the individual orders.    COVID-19,[facility] [employer] IN-HOUSE,Nasal Swab No Transport Media 3-4 HR TAT - Swab, Nasal Cavity [[id number]]  (Normal) Collected: 11/09/21 0517    Specimen: Swab from Nasal Cavity Updated: 11/09/21 0626     COVID19 Not Detected    Narrative:      Fact sheet for providers: https://www.fda.gov/media/583282/download     Fact sheet for patients: https://www.fda.gov/media/356456/download    Test performed by PCR.    Consider negative results in combination with clinical observations, patient history, and epidemiological information.  "   TSH [[id number]]  (Abnormal) Collected: 11/09/21 0457    Specimen: Blood Updated: 11/09/21 0624     TSH 0.064 uIU/mL     Procalcitonin [[id number]]  (Normal) Collected: 11/09/21 0457    Specimen: Blood Updated: 11/09/21 0623     Procalcitonin 0.14 ng/mL     Narrative:      As a Marker for Sepsis (Non-Neonates):     1. <0.5 ng/mL represents a low risk of severe sepsis and/or septic shock.  2. >2 ng/mL represents a high risk of severe sepsis and/or septic shock.    As a Marker for Lower Respiratory Tract Infections that require antibiotic therapy:  PCT on Admission     Antibiotic Therapy             6-12 Hrs later  >0.5                          Strongly Recommended            >0.25 - <0.5             Recommended  0.1 - 0.25                  Discouraged                       Remeasure/reassess PCT  <0.1                         Strongly Discouraged         Remeasure/reassess PCT      As 28 day mortality risk marker: \"Change in Procalcitonin Result\" (>80% or <=80%) if Day 0 (or Day 1) and Day 4 values are available. Refer to http://www.[employer]-pct-calculator.com/    Change in PCT <=80 %   A decrease of PCT levels below or equal to 80% defines a positive change in PCT test result representing a higher risk for 28-day all-cause mortality of patients diagnosed with severe sepsis or septic shock.    Change in PCT >80 %   A decrease of PCT levels of more than 80% defines a negative change in PCT result representing a lower risk for 28-day all-cause mortality of patients diagnosed with severe sepsis or septic shock.                Comprehensive Metabolic Panel [[id number]]  (Abnormal) Collected: 11/09/21 0457    Specimen: Blood Updated: 11/09/21 0622     Glucose 394 mg/dL      BUN 40 mg/dL      Creatinine 0.82 mg/dL      Sodium 137 mmol/L      Potassium 4.8 mmol/L      Comment: Slight hemolysis detected by analyzer. Results may be affected.        Chloride 100 mmol/L      CO2 26.0 mmol/L      Calcium 8.7 mg/dL      Total " Protein 5.5 g/dL      Albumin 2.70 g/dL      ALT (SGPT) 31 U/L      AST (SGOT) 19 U/L      Comment: Slight hemolysis detected by analyzer. Results may be affected.        Alkaline Phosphatase 66 U/L      Total Bilirubin 0.2 mg/dL      eGFR Non African Amer 68 mL/min/1.73      Globulin 2.8 gm/dL      A/G Ratio 1.0 g/dL      BUN/Creatinine Ratio 48.8     Anion Gap 11.0 mmol/L     Narrative:      GFR Normal >60  Chronic Kidney Disease <60  Kidney Failure <15      CK [[id number]]  (Normal) Collected: 11/09/21 0457    Specimen: Blood Updated: 11/09/21 0622     Creatine Kinase 22 U/L     Lactic Acid, Plasma [[id number]]  (Abnormal) Collected: 11/09/21 0521    Specimen: Blood Updated: 11/09/21 0620     Lactate 2.7 mmol/L     Troponin [[id number]]  (Normal) Collected: 11/09/21 0457    Specimen: Blood Updated: 11/09/21 0618     Troponin T <0.010 ng/mL     Narrative:      Troponin T Reference Range:  <= 0.03 ng/mL-   Negative for AMI  >0.03 ng/mL-     Abnormal for myocardial necrosis.  Clinicians would have to utilize clinical acumen, EKG, Troponin and serial changes to determine if it is an Acute Myocardial Infarction or myocardial injury due to an underlying chronic condition.       Results may be falsely decreased if patient taking Biotin.      BNP [[id number]]  (Abnormal) Collected: 11/09/21 0457    Specimen: Blood Updated: 11/09/21 0617     proBNP 3,851.0 pg/mL     Narrative:      Among patients with dyspnea, NT-proBNP is highly sensitive for the detection of acute congestive heart failure. In addition NT-proBNP of <300 pg/ml effectively rules out acute congestive heart failure with 99% negative predictive value.    Results may be falsely decreased if patient taking Biotin.      D-dimer, Quantitative [[id number]]  (Normal) Collected: 11/09/21 0457    Specimen: Blood Updated: 11/09/21 0611     D-Dimer, Quantitative <0.22 mg/L (FEU)     Narrative:      Reference Range is 0-0.50 mg/L FEU. However, results <0.50 mg/L FEU tends to  rule out DVT or PE. Results >0.50 mg/L FEU are not useful in predicting absence or presence of DVT or PE.      [place] Draw [[id number]] Collected: 11/09/21 0457    Specimen: Blood Updated: 11/09/21 0601    Narrative:      The following orders were created for panel order [place] Draw.  Procedure                               Abnormality         Status                     ---------                               -----------         ------                     Green Top (Gel)[[id number]]                                  Final result               Lavender Top[[id number]]                                     Final result               Red Top[[id number]]                                          Final result               Light Blue Top[[id number]]                                   Final result                 Please view results for these tests on the individual orders.    Light Blue Top [[id number]] Collected: 11/09/21 0457    Specimen: Blood Updated: 11/09/21 0601     Extra Tube hold for add-on     Comment: Auto resulted       Lavender Top [[id number]] Collected: 11/09/21 0457    Specimen: Blood Updated: 11/09/21 0601     Extra Tube hold for add-on     Comment: Auto resulted       Red Top [[id number]] Collected: 11/09/21 0457    Specimen: Blood Updated: 11/09/21 0601     Extra Tube Hold for add-ons.     Comment: Auto resulted.       Green Top (Gel) [[id number]] Collected: 11/09/21 0457    Specimen: Blood Updated: 11/09/21 0601     Extra Tube Hold for add-ons.     Comment: Auto resulted.             XR Tibia Fibula 2 View Left    Result Date: 10/22/2021  Narrative: EXAMINATION: XR TIBIA FIBULA 2 VW LEFT- 10/22/2021 8:28 AM CDT  HISTORY: Injury, hematoma.  REPORT: AP and lateral views of the left lower leg were obtained.  COMPARISON: X-rays of the left lower leg 8/15/2020.  There is diffuse osteopenia. Previous internal fixation of the distal tibia is noted. No acute fractures identified. Osseous alignment is normal. There is  mild narrowing of the ankle joint as well as narrowing of the lateral compartment of the knee. This may be exaggerated by positioning. There is diffuse soft tissue swelling, greatest at the ankle, laterally.      Impression: No acute fracture or dislocation. Diffuse osteopenia. Interval open reduction internal fixation of the distal fibula. This report was finalized on 10/22/2021 08:30 by Dr. Singh Ackerman MD.    XR Foot 3+ View Left    Result Date: 10/22/2021  Narrative: EXAMINATION:  XR FOOT 3+ VW LEFT-  HISTORY: hematoma  COMPARISON: 9/27/2021  TECHNIQUE: 3 views: AP lateral oblique projection imaging  FINDINGS: Fractures involving the Third and fourth metatarsals are observed with transversely oriented fracture lines with associated sclerosis and periostitis related to the fractures.  There is a fracture of the proximal shaft of the first metatarsal with partial transversely oriented fracture line visualized with surrounding sclerosis and periostitis.  These findings are compatible with subacute injuries., Stress related fractures suspected.  There is marked osteoporosis.  Changes from previous fibular plating identified.  There is marked hallucis valgus deformity with associated degenerative changes.  There are hammertoe deformities.  There is extensive soft tissue swelling along the dorsum of the mid and forefoot.      Impression: 1. Transversely oriented fractures of the proximal first and distal third and fourth metatarsals. 2. Severe osteoporosis. 3. Marked hallucis fibrous deformity hammertoe deformities.. 4. Extensive soft tissue swelling dorsal mid and forefoot. This report was finalized on 10/22/2021 08:31 by Dr. Marcello Rutledge MD.    CT Chest Without Contrast Diagnostic    Result Date: 10/26/2021  Narrative: CT CHEST without contrast dated 10/26/2021 12:28 PM CDT  HISTORY: Non-small cell lung cancer, monitor  COMPARISON: 9/26/2021  DLP: 360 mGy cm. Automated exposure control was utilized to diminish  patient radiation dose.  TECHNIQUE: Serial helical tomographic images of the chest were acquired. Bone and soft tissue algorithms were provided. Coronal reformatted images were also provided for review.  FINDINGS: There is again some heterogeneity and nodularity associated with the right lobe of the thyroid gland stable from the previous exam..  There are changes of centrilobular emphysema. There is some scarring within the left lung apex medially showing slight interval improvement from the previous exam. Previously described 9 mm soft tissue nodule within the left upper lobe is stable from the previous exam.. Small pleural effusions are present with bibasilar atelectasis. No evidence of acute consolidative pneumonia.. The trachea and bronchial tree are patent.  No evidence of cardiomegaly. Mitral annulus calcifications are present. There are heavy coronary calcifications... There is no pericardial effusion. No enlarged axillary or mediastinal lymph nodes are present.  There is an accentuated thoracic kyphosis with a chronic compression deformity at T5 with a near vertebral plana deformity. No new fractures or suspicious lesions are identified..  The imaged portion of the upper abdomen is unremarkable.      Impression: 1. There has been development of small effusions with bibasilar atelectasis. There are changes of centrilobular emphysema with a stable pulmonary nodule within the left upper lobe from the previous study. 2. Patchy disease involving the superior segment of the left lower lobe and medial aspect of the left upper lobe demonstrates interval improvement from the previous exam and is likely inflammatory in nature and/or represents residual parenchymal scarring. 3. Heavy coronary calcifications are present. No evidence of cardiomegaly or pericardial effusion..   This report was finalized on 10/26/2021 16:46 by Dr. Aric Rosen MD.    XR Chest 1 View    Result Date: 11/9/2021  Narrative: EXAMINATION:   XR CHEST 1 VW-  11/9/2021 5:25 AM CST  HISTORY: R00.1-Bradycardia, unspecified  COMPARISON: 9/26/2021.  FINDINGS:  There is no dense infiltrate or effusion. There is stable bronchial wall thickening. Heart size is upper limits of normal. The thoracic aorta is atheromatous. There is a loop recorder projected over the left chest.      Impression: 1. No focal infiltrate or effusion. 2. Stable bronchial wall thickening.   This report was finalized on 11/09/2021 06:50 by Dr. Justice Lo MD.     I have personally reviewed and interpreted the radiology studies and ECG obtained at time of admission.     Assessment / Plan      Assessment & Plan  Active Hospital Problems    Diagnosis    • **Junctional bradycardia    • Hypotension    • Brain metastasis (HCC)    • Brain mass    • PAF (paroxysmal atrial fibrillation) (HCC)    • Anticoagulated    • Essential hypertension        PLAN:   Admit to CCU  Continue dopamine drip  Cardiology consultation  Clarify med list (Toprol-XL appears on current list but unsure if list is accurate)  Discontinue Ditropan as anticholinergics are contraindicated at her age  Clarify dexamethasone dosing prior to reinitiation  Continue Eliquis and aspirin    Code Status:   No CPR, limited interventions  Surrogate decision maker is the patient's daughter     I discussed the patient's findings and my recommendations with: The patient    Estimated length of stay: Unknown    Electronically signed by Milo White DO, 11/09/21, 08:21 CST.    Approximately 45 minutes of critical care time were spent managing the patient exclusive of billable procedures.

## 2021-11-09 NOTE — CONSULTS
[facility], [facility] Consult Note    Referring Provider: Christopher  Reason for Consultation: bradycardia    Patient Care Team:  Cayetano Crews MD as PCP - General (Internal Medicine)  Jose Monteiro MD as Cardiologist (Cardiology)  Se Mcnulty MD as Consulting Physician (Otolaryngology)  [facility]  Jaden Frazier MD as Consulting Physician (Pulmonary Disease)  Jeremías Hernandez APRN as Nurse Practitioner (Pulmonary Disease)  Dhaval Urena MD as Consulting Physician (Hematology and Oncology)  Sean Rosen III, MD as Consulting Physician (Radiation Oncology)    Chief complaint Low HR, SOB    Subjective .     History of present illness:  This patient is a 74 y/o wf with h/o CAD, s/p PCI in the past.  I do not have record of this.  It was reportedly in the remote past.  She also has PAF and is anticoagulated with Eliquis.  She has a Linq recorder that was placed for syncope.  She presents to ER for slow HR, dizziness, and SOB.  She denies CP or syncope.    Review of Systems  A 10-point review of systems is obtained and negative except for otherwise mentioned above.    History  Past Medical History:   Diagnosis Date   • Anticoagulated 7/21/2018   • Bilateral carotid artery stenosis 6/8/2021   • Bronchitis    • Cancer (HCC)     lung   • COPD (chronic obstructive pulmonary disease) (HCC)    • COPD, moderate (HCC) 7/8/2019   • Coronary artery disease    • Coronary artery disease involving native coronary artery of native heart without angina pectoris 5/19/2017   • COVID-19    • COVID-19 vaccine series completed    • Essential hypertension 5/19/2017   • Gastroesophageal reflux disease 7/21/2018   • GERD (gastroesophageal reflux disease)    • History of transfusion    • Hx of Hodgkin's lymphoma 10/11/2019   • Hyperlipidemia    • Hypertension    • Malignant neoplasm of upper lobe of left lung (HCC) 7/8/2019   • Mixed hyperlipidemia 6/8/2021   •  Obesity (BMI 30-39.9) 3/9/2020   • PAF (paroxysmal atrial fibrillation) (HCC)    • Parotid neoplasm 7/21/2018   • Persistent asthma without complication 7/8/2019   • Renal cyst 2/24/2020   • Seizures (HCC)    • Status post stereotactic radiosurgery 2/24/2020   • Stented coronary artery 5/19/2017   • Thyroid nodule 7/21/2018   • Unilateral complete paralysis of vocal cord 9/13/2021   ,   Past Surgical History:   Procedure Laterality Date   • ANKLE OPEN REDUCTION INTERNAL FIXATION Left 8/15/2020    Procedure: ANKLE OPEN REDUCTION INTERNAL FIXATION;  Surgeon: Keyshawn Medrano MD;  Location:  [facility] OR;  Service: Orthopedics;  Laterality: Left;   • APPENDECTOMY     • APPENDECTOMY     • CARDIAC CATHETERIZATION     • CARDIAC ELECTROPHYSIOLOGY PROCEDURE N/A 5/23/2017    Procedure: Loop insertion; LINQ;  Surgeon: Jose Monteiro MD;  Location:  [facility] CATH INVASIVE LOCATION;  Service:    • CHOLECYSTECTOMY     • CORONARY ANGIOPLASTY WITH STENT PLACEMENT      X 1    • INCISION AND DRAINAGE LEG Left 10/22/2021    Procedure: INCISION AND DRAINAGE ANKLE HEMATOMA PRIMARY CLOSURE LACERATION with splint;  Surgeon: Randall Prasad DPM;  Location:  [facility] OR;  Service: Podiatry;  Laterality: Left;   • INCISION AND DRAINAGE OF WOUND Left 8/15/2020    Procedure: INCISION AND DRAINAGE WOUND;  Surgeon: Keyshawn Medrano MD;  Location:  [facility] OR;  Service: Orthopedics;  Laterality: Left;   • LIVER BIOPSY     • MYRINGOTOMY W/ TUBES Left 11/22/2019    Procedure: LEFT MYRINGOTOMY WITH INSERTION OF EAR TUBE WITH RIGHT EAR EXAM UNDER ANESTHESIA;  Surgeon: Se Mcnulty MD;  Location: [facility];  Service: ENT   • SPLENECTOMY     • US GUIDED FINE NEEDLE ASPIRATION  8/7/2018   ,   Family History   Problem Relation Age of Onset   • Esophageal cancer Brother    • Lung cancer Brother    • Breast cancer Neg Hx    ,   Social History     Tobacco Use   • Smoking status: Former Smoker     Packs/day: 0.50     Years: 58.00     Pack years:  2900     Start date:      Quit date: 2020     Years since quittin.2   • Smokeless tobacco: Never Used   Vaping Use   • Vaping Use: Never used   Substance Use Topics   • Alcohol use: No   • Drug use: No   ,   Medications Prior to Admission   Medication Sig Dispense Refill Last Dose   • acetaminophen (TYLENOL) 325 MG tablet Take 650 mg by mouth Every 4 (Four) Hours As Needed for Mild Pain  or Fever.      • albuterol sulfate  (90 Base) MCG/ACT inhaler Inhale 1 puff Daily. 1 inhaler 6    • apixaban (ELIQUIS) 5 MG tablet tablet Take 1 tablet by mouth Every 12 (Twelve) Hours. 60 tablet 11    • aspirin 81 MG EC tablet Take 81 mg by mouth Daily.      • calcium carbonate (TUMS) 500 MG chewable tablet Chew 2 tablets Every 4 (Four) Hours As Needed for Indigestion.      • Calcium Carbonate-Vitamin D (CALTRATE 600+D PO) Take 1 tablet by mouth 2 (two) times a day.      • dexamethasone (DECADRON) 4 MG tablet Take 1 tablet by mouth 3 (Three) Times a Day With Meals.      • docusate sodium 100 MG capsule Take 100 mg by mouth 2 (Two) Times a Day As Needed for Constipation.      • DPH-Lido-AlHydr-MgHydr-Simeth (First Mouthwash, Magic Mouthwash,) suspension Swish and spit 10 mL Every 8 (Eight) Hours As Needed (Mouth pain).      • famotidine (PEPCID) 20 MG tablet Take 1 tablet by mouth Every Night.      • fluticasone-salmeterol (ADVAIR) 100-50 MCG/DOSE DISKUS Inhale 1 puff 2 (Two) Times a Day.      • furosemide (LASIX) 40 MG tablet Take 40 mg by mouth Every Morning.      • gabapentin (NEURONTIN) 100 MG capsule Take 1 capsule by mouth 3 (Three) Times a Day. 9 capsule 0    • guaiFENesin (MUCINEX) 600 MG 12 hr tablet Take 2 tablets by mouth 2 (Two) Times a Day As Needed for Cough. 15 tablet 0    • ipratropium-albuterol (DUO-NEB) 0.5-2.5 mg/3 ml nebulizer Take 3 mL by nebulization 4 (Four) Times a Day As Needed for Wheezing or Shortness of Air. 360 mL     • levETIRAcetam (KEPPRA) 500 MG tablet Take 500 mg by mouth 2  "(Two) Times a Day.      • lidocaine (LIDODERM) 5 % Place 1 patch on the skin as directed by provider Daily As Needed for Mild Pain , Moderate Pain  or Severe Pain . Apply to right posterior flank. Remove & Discard patch within 12 hours or as directed by MD      • lisinopril (PRINIVIL,ZESTRIL) 20 MG tablet Take 20 mg by mouth Daily.      • LORazepam (ATIVAN) 0.5 MG tablet Take 1 tablet by mouth 2 (Two) Times a Day As Needed for Anxiety. 6 tablet 0    • metoprolol succinate XL (TOPROL-XL) 50 MG 24 hr tablet Take 50 mg by mouth Daily.      • nitroglycerin (NITROSTAT) 0.4 MG SL tablet Place 0.4 mg under the tongue Every 5 (Five) Minutes As Needed for Chest Pain. Take no more than 3 doses in 15 minutes.      • omeprazole (priLOSEC) 40 MG capsule Take 40 mg by mouth Daily.  5    • ondansetron (ZOFRAN) 4 MG tablet Take 1 tablet by mouth Every 6 (Six) Hours As Needed for Nausea or Vomiting.      • oxybutynin (DITROPAN) 5 MG tablet Take 1 tablet by mouth 2 (Two) Times a Day.      • simvastatin (ZOCOR) 20 MG tablet Take 20 mg by mouth Every Night.      • tiotropium bromide monohydrate (SPIRIVA RESPIMAT) 2.5 MCG/ACT aerosol solution inhaler Inhale 2 puffs Daily. 3 inhaler 0    • traMADol (ULTRAM) 50 MG tablet Take 1 tablet by mouth Every 6 (Six) Hours As Needed for Moderate Pain . 12 tablet 0     and Allergies:  Contrast dye and Penicillins    Objective     Vital Sign Min/Max for last 24 hours  Temp  Min: 97.7 °F (36.5 °C)  Max: 97.7 °F (36.5 °C)   BP  Min: 72/54  Max: 159/79   Pulse  Min: 30  Max: 131   Resp  Min: 15  Max: 20   SpO2  Min: 95 %  Max: 98 %   No data recorded   Weight  Min: 87.5 kg (192 lb 12.8 oz)  Max: 88 kg (194 lb 0.1 oz)     Flowsheet Rows      First Filed Value   Admission Height 162.6 cm (64\") Documented at 11/09/2021 0446   Admission Weight 87.5 kg (192 lb 12.8 oz) Documented at 11/09/2021 0446             Ejection Fraction  No results found for: EF    Echo EF Estimated  Lab Results   Component Value " Date    ECHOEFEST 60 12/17/2019       Physical Exam:     General Appearance:    Alert, cooperative, in no acute distress   Head:    Normocephalic, without obvious abnormality, atraumatic   Eyes:            Lids and lashes normal, conjunctivae and sclerae normal, no   icterus, PERRLA, EOMI   Throat:   Oral mucosa pink and moist   Neck:   No adenopathy, supple, trachea midline, no thyromegaly, no   carotid bruit, no JVD   Lungs:     Clear to auscultation bilaterally,respirations regular, even     and unlabored    Heart:    Regular rhythm and tachycardic, normal S1 and S2, no            murmur, no gallop, no rub, no click   Chest Wall:    No abnormalities observed   Abdomen:     Normal bowel sounds present in all four quadrants, no       masses, no organomegaly, soft non-tender, non-distended    Extremities:   No edema, no cyanosis, no clubbing   Pulses:   Pulses palpable and equal bilaterally   Skin:   No bleeding, bruising or rash   Psychiatric:   Displays appropriate mood and affect           Results Review:    I reviewed the patient's new clinical results.    Results from last 7 days   Lab Units 11/09/21  0457   WBC 10*3/mm3 10.44   HEMOGLOBIN g/dL 11.0*   HEMATOCRIT % 33.4*   PLATELETS 10*3/mm3 242     Results from last 7 days   Lab Units 11/09/21  0457   SODIUM mmol/L 137   POTASSIUM mmol/L 4.8   CHLORIDE mmol/L 100   CO2 mmol/L 26.0   BUN mg/dL 40*   CREATININE mg/dL 0.82   GLUCOSE mg/dL 394*   CALCIUM mg/dL 8.7     Results from last 7 days   Lab Units 11/09/21  0457   SODIUM mmol/L 137   POTASSIUM mmol/L 4.8   CHLORIDE mmol/L 100   CO2 mmol/L 26.0   BUN mg/dL 40*   CREATININE mg/dL 0.82   CALCIUM mg/dL 8.7   BILIRUBIN mg/dL 0.2   ALK PHOS U/L 66   ALT (SGPT) U/L 31   AST (SGOT) U/L 19   GLUCOSE mg/dL 394*     Results from last 7 days   Lab Units 11/09/21  0825 11/09/21 0457   CK TOTAL U/L  --  22   TROPONIN T ng/mL 0.221* <0.010     Results from last 7 days   Lab Units 11/09/21  0457   TSH uIU/mL 0.064*          Assessment/Plan     Symptomatic high grade bradycardia  Sinus tachycardia  Elevated troponin    Essential hypertension    Anticoagulated    PAF (paroxysmal atrial fibrillation) (HCC)    Brain mass    Brain metastasis (HCC)    Hypotension  Dizziness  SOB    Agree with holding rate-limiting drugs for now.  Patient is now in ST.  She will likely need pacemaker tomorrow. Will place orders and hold NPO after midnight. Continue to monitor troponin.  Pt denies any CP.  Check limited echo to evaluate LV function.  Continue anticoagulation.  Continue statin.  Further recommendations to follow from Dr. Carrera.  Thank you for consult.  Cardiology will gladly follow with you.    I discussed the patient's findings and my recommendations with patient, family and nursing staff    Kellie Kim PA-C  11/09/21  10:32 CST

## 2021-11-09 NOTE — CASE MANAGEMENT/SOCIAL WORK
Discharge Planning Assessment  [facility]     Patient Name: Josi Mohamud  MRN: [id number]  Today's Date: 11/9/2021    Admit Date: 11/9/2021     Discharge Needs Assessment     Row Name 11/09/21 0939       Living Environment    Lives With facility resident    Name(s) of Who Lives With Patient [facility]    Current Living Arrangements extended care facility    Able to Return to Prior Arrangements yes       Resource/Environmental Concerns    Resource/Environmental Concerns none       Transition Planning    Patient/Family Anticipates Transition to long-term care facility    Patient/Family Anticipated Services at Transition skilled nursing    Transportation Anticipated family or friend will provide; health plan transportation       Discharge Needs Assessment    Readmission Within the Last 30 Days no previous admission in last 30 days    Concerns to be Addressed care coordination/care conferences; discharge planning    Outpatient/Agency/Support Group Needs skilled nursing facility    Discharge Facility/Level of Care Needs nursing facility, skilled    Discharge Coordination/Progress Patient admitted from [facility].  Patient does not have a bed hold but can return to [facility] pending bed availability.                 Discharge Plan    No documentation.               Continued Care and Services - Admitted Since 11/9/2021    Coordination has not been started for this encounter.          Demographic Summary    No documentation.                Functional Status    No documentation.                Psychosocial    No documentation.                Abuse/Neglect    No documentation.                Legal    No documentation.                Substance Abuse    No documentation.                Patient Forms    No documentation.                   CARLOS Mccall

## 2021-11-09 NOTE — ED PROVIDER NOTES
Subjective   Patient is a 75-year-old female with dementia brought in from skilled nursing facility with bradycardia and hypotension.  Symptoms developed this morning and heart rates have been in 20s to 30s.  Upon arrival the patient is mentating she is alert however she is unable to give any history.  It sounds as though the patient is at her chronic baseline.  Patient's heart rate is 30 with a junctional rhythm.  Patient denies any chest pain or shortness of breath.          Review of Systems   Constitutional: Negative for chills, fatigue and fever.   HENT: Negative for congestion and facial swelling.    Eyes: Negative for photophobia, discharge and visual disturbance.   Respiratory: Negative for cough, shortness of breath and wheezing.    Cardiovascular: Negative for chest pain, palpitations and leg swelling.        Slow heart rate   Gastrointestinal: Negative for abdominal pain, diarrhea, nausea and vomiting.   Endocrine: Negative for cold intolerance and heat intolerance.   Genitourinary: Negative for difficulty urinating and urgency.   Musculoskeletal: Negative for arthralgias, joint swelling and myalgias.   Skin: Negative for color change and pallor.   Neurological: Negative for dizziness and light-headedness.   Hematological: Negative for adenopathy. Does not bruise/bleed easily.   Psychiatric/Behavioral: Negative for agitation, behavioral problems and confusion.       Past Medical History:   Diagnosis Date   • Anticoagulated 7/21/2018   • Bilateral carotid artery stenosis 6/8/2021   • Bronchitis    • Cancer (HCC)     lung   • COPD (chronic obstructive pulmonary disease) (HCC)    • COPD, moderate (HCC) 7/8/2019   • Coronary artery disease    • Coronary artery disease involving native coronary artery of native heart without angina pectoris 5/19/2017   • COVID-19    • COVID-19 vaccine series completed    • Essential hypertension 5/19/2017   • Gastroesophageal reflux disease 7/21/2018   • GERD (gastroesophageal  reflux disease)    • History of transfusion    • Hx of Hodgkin's lymphoma 10/11/2019   • Hyperlipidemia    • Hypertension    • Malignant neoplasm of upper lobe of left lung (HCC) 7/8/2019   • Mixed hyperlipidemia 6/8/2021   • Obesity (BMI 30-39.9) 3/9/2020   • PAF (paroxysmal atrial fibrillation) (HCC)    • Parotid neoplasm 7/21/2018   • Persistent asthma without complication 7/8/2019   • Renal cyst 2/24/2020   • Seizures (HCC)    • Status post stereotactic radiosurgery 2/24/2020   • Stented coronary artery 5/19/2017   • Thyroid nodule 7/21/2018   • Unilateral complete paralysis of vocal cord 9/13/2021       Allergies   Allergen Reactions   • Contrast Dye Itching     Contrast reaction occurred with pruritus and erythema of the upper  and lower extremities. No difficulties swallowing or breathing. Patient  received 50 mg Benadryl orally to improve symptomatology. Patient should  be pretreated with steroids and Benadryl prior to any future IV contrast  Administration. 05-     • Penicillins Rash       Past Surgical History:   Procedure Laterality Date   • ANKLE OPEN REDUCTION INTERNAL FIXATION Left 8/15/2020    Procedure: ANKLE OPEN REDUCTION INTERNAL FIXATION;  Surgeon: Keyshawn Medrano MD;  Location: [facility] OR;  Service: Orthopedics;  Laterality: Left;   • APPENDECTOMY     • APPENDECTOMY     • CARDIAC CATHETERIZATION     • CARDIAC ELECTROPHYSIOLOGY PROCEDURE N/A 5/23/2017    Procedure: Loop insertion; LINQ;  Surgeon: Jose Monteiro MD;  Location: [facility] CATH INVASIVE LOCATION;  Service:    • CHOLECYSTECTOMY     • CORONARY ANGIOPLASTY WITH STENT PLACEMENT      X 1    • INCISION AND DRAINAGE LEG Left 10/22/2021    Procedure: INCISION AND DRAINAGE ANKLE HEMATOMA PRIMARY CLOSURE LACERATION with splint;  Surgeon: Randall Prasad DPM;  Location: [facility] OR;  Service: Podiatry;  Laterality: Left;   • INCISION AND DRAINAGE OF WOUND Left 8/15/2020    Procedure: INCISION AND DRAINAGE WOUND;  Surgeon: Mitchell  Keyshawn Clark MD;  Location: [facility] OR;  Service: Orthopedics;  Laterality: Left;   • LIVER BIOPSY     • MYRINGOTOMY W/ TUBES Left 2019    Procedure: LEFT MYRINGOTOMY WITH INSERTION OF EAR TUBE WITH RIGHT EAR EXAM UNDER ANESTHESIA;  Surgeon: Se Mcnulty MD;  Location: [facility] OR;  Service: ENT   • SPLENECTOMY     • US GUIDED FINE NEEDLE ASPIRATION  2018       Family History   Problem Relation Age of Onset   • Esophageal cancer Brother    • Lung cancer Brother    • Breast cancer Neg Hx        Social History     Socioeconomic History   • Marital status:    • Number of children: 1   Tobacco Use   • Smoking status: Former Smoker     Packs/day: 0.50     Years: 58.00     Pack years: 29.00     Start date:      Quit date: 2020     Years since quittin.2   • Smokeless tobacco: Never Used   Vaping Use   • Vaping Use: Never used   Substance and Sexual Activity   • Alcohol use: No   • Drug use: No   • Sexual activity: Defer           Objective   Physical Exam  Vitals and nursing note reviewed.   Constitutional:       Appearance: Normal appearance. She is well-developed.   HENT:      Head: Normocephalic and atraumatic.   Eyes:      Extraocular Movements: Extraocular movements intact.      Conjunctiva/sclera: Conjunctivae normal.      Pupils: Pupils are equal, round, and reactive to light.   Cardiovascular:      Rate and Rhythm: Regular rhythm. Bradycardia present.      Heart sounds: Normal heart sounds.   Pulmonary:      Effort: Pulmonary effort is normal.      Breath sounds: Normal breath sounds.   Abdominal:      General: Bowel sounds are normal. There is no distension.      Palpations: Abdomen is soft.   Musculoskeletal:         General: Normal range of motion.      Cervical back: Normal range of motion and neck supple.   Skin:     General: Skin is warm and dry.   Neurological:      General: No focal deficit present.      Mental Status: She is alert and oriented to person, place, and  time.      Cranial Nerves: No cranial nerve deficit.   Psychiatric:         Behavior: Behavior normal.         Thought Content: Thought content normal.         ECG 12 Lead      Date/Time: 11/9/2021 5:05 AM  Performed by: Darnell Reyes MD  Authorized by: Darnell Reyes MD   Interpreted by physician  Rhythm: sinus bradycardia  Rate: normal  QRS axis: normal  Clinical impression: abnormal ECG              Lab Results (last 24 hours)     Procedure Component Value Units Date/Time    SCANNED - LABS [[id number]] Resulted: 11/09/21     Updated: 11/05/21 1440    CBC & Differential [[id number]]  (Abnormal) Collected: 11/09/21 0457    Specimen: Blood Updated: 11/09/21 0638    Narrative:      The following orders were created for panel order CBC & Differential.  Procedure                               Abnormality         Status                     ---------                               -----------         ------                     CBC Auto Differential[[id number]]        Abnormal            Final result                 Please view results for these tests on the individual orders.    Comprehensive Metabolic Panel [[id number]]  (Abnormal) Collected: 11/09/21 0457    Specimen: Blood Updated: 11/09/21 0622     Glucose 394 mg/dL      BUN 40 mg/dL      Creatinine 0.82 mg/dL      Sodium 137 mmol/L      Potassium 4.8 mmol/L      Comment: Slight hemolysis detected by analyzer. Results may be affected.        Chloride 100 mmol/L      CO2 26.0 mmol/L      Calcium 8.7 mg/dL      Total Protein 5.5 g/dL      Albumin 2.70 g/dL      ALT (SGPT) 31 U/L      AST (SGOT) 19 U/L      Comment: Slight hemolysis detected by analyzer. Results may be affected.        Alkaline Phosphatase 66 U/L      Total Bilirubin 0.2 mg/dL      eGFR Non African Amer 68 mL/min/1.73      Globulin 2.8 gm/dL      A/G Ratio 1.0 g/dL      BUN/Creatinine Ratio 48.8     Anion Gap 11.0 mmol/L     Narrative:      GFR Normal >60  Chronic Kidney Disease <60  Kidney  Failure <15      BNP [[id number]]  (Abnormal) Collected: 11/09/21 0457    Specimen: Blood Updated: 11/09/21 0617     proBNP 3,851.0 pg/mL     Narrative:      Among patients with dyspnea, NT-proBNP is highly sensitive for the detection of acute congestive heart failure. In addition NT-proBNP of <300 pg/ml effectively rules out acute congestive heart failure with 99% negative predictive value.    Results may be falsely decreased if patient taking Biotin.      D-dimer, Quantitative [[id number]]  (Normal) Collected: 11/09/21 0457    Specimen: Blood Updated: 11/09/21 0611     D-Dimer, Quantitative <0.22 mg/L (FEU)     Narrative:      Reference Range is 0-0.50 mg/L FEU. However, results <0.50 mg/L FEU tends to rule out DVT or PE. Results >0.50 mg/L FEU are not useful in predicting absence or presence of DVT or PE.      Troponin [[id number]]  (Normal) Collected: 11/09/21 0457    Specimen: Blood Updated: 11/09/21 0618     Troponin T <0.010 ng/mL     Narrative:      Troponin T Reference Range:  <= 0.03 ng/mL-   Negative for AMI  >0.03 ng/mL-     Abnormal for myocardial necrosis.  Clinicians would have to utilize clinical acumen, EKG, Troponin and serial changes to determine if it is an Acute Myocardial Infarction or myocardial injury due to an underlying chronic condition.       Results may be falsely decreased if patient taking Biotin.      Procalcitonin [[id number]]  (Normal) Collected: 11/09/21 0457    Specimen: Blood Updated: 11/09/21 0623     Procalcitonin 0.14 ng/mL     Narrative:      As a Marker for Sepsis (Non-Neonates):     1. <0.5 ng/mL represents a low risk of severe sepsis and/or septic shock.  2. >2 ng/mL represents a high risk of severe sepsis and/or septic shock.    As a Marker for Lower Respiratory Tract Infections that require antibiotic therapy:  PCT on Admission     Antibiotic Therapy             6-12 Hrs later  >0.5                          Strongly Recommended            >0.25 - <0.5              "Recommended  0.1 - 0.25                  Discouraged                       Remeasure/reassess PCT  <0.1                         Strongly Discouraged         Remeasure/reassess PCT      As 28 day mortality risk marker: \"Change in Procalcitonin Result\" (>80% or <=80%) if Day 0 (or Day 1) and Day 4 values are available. Refer to http://www.[facility]-pct-calculator.com/    Change in PCT <=80 %   A decrease of PCT levels below or equal to 80% defines a positive change in PCT test result representing a higher risk for 28-day all-cause mortality of patients diagnosed with severe sepsis or septic shock.    Change in PCT >80 %   A decrease of PCT levels of more than 80% defines a negative change in PCT result representing a lower risk for 28-day all-cause mortality of patients diagnosed with severe sepsis or septic shock.                CK [[id number]]  (Normal) Collected: 11/09/21 0457    Specimen: Blood Updated: 11/09/21 0622     Creatine Kinase 22 U/L     TSH [[id number]]  (Abnormal) Collected: 11/09/21 0457    Specimen: Blood Updated: 11/09/21 0624     TSH 0.064 uIU/mL     CBC Auto Differential [[id number]]  (Abnormal) Collected: 11/09/21 0457    Specimen: Blood Updated: 11/09/21 0638     WBC 10.44 10*3/mm3      RBC 3.15 10*6/mm3      Hemoglobin 11.0 g/dL      Hematocrit 33.4 %      .0 fL      MCH 34.9 pg      MCHC 32.9 g/dL      RDW 15.3 %      RDW-SD 59.2 fl      MPV 10.4 fL      Platelets 242 10*3/mm3     Narrative:      The previously reported component NRBC is no longer being reported. Previous result was 0.4 /100 WBC (Reference Range: 0.0-0.2 /100 WBC) on 11/9/2021 at 0612 CST.    Manual Differential [[id number]]  (Abnormal) Collected: 11/09/21 0457    Specimen: Blood Updated: 11/09/21 0638     Neutrophil % 78.4 %      Lymphocyte % 14.4 %      Monocyte % 4.1 %      Bands %  2.1 %      Myelocyte % 1.0 %      Neutrophils Absolute 8.40 10*3/mm3      Lymphocytes Absolute 1.50 10*3/mm3      Monocytes Absolute 0.43 " 10*3/mm3      Anisocytosis Slight/1+     Poikilocytes Slight/1+     Polychromasia Slight/1+     WBC Morphology Normal     Platelet Morphology Normal    COVID PRE-OP / PRE-PROCEDURE SCREENING ORDER (NO ISOLATION) - Swab, Nasal Cavity [[id number]]  (Normal) Collected: 11/09/21 0517    Specimen: Swab from Nasal Cavity Updated: 11/09/21 0626    Narrative:      The following orders were created for panel order COVID PRE-OP / PRE-PROCEDURE SCREENING ORDER (NO ISOLATION) - Swab, Nasal Cavity.  Procedure                               Abnormality         Status                     ---------                               -----------         ------                     COVID-19,[facility]...[[id number]]  Normal              Final result                 Please view results for these tests on the individual orders.    COVID-19,[facility] IN-HOUSE,Nasal Swab No Transport Media 3-4 HR TAT - Swab, Nasal Cavity [[id number]]  (Normal) Collected: 11/09/21 0517    Specimen: Swab from Nasal Cavity Updated: 11/09/21 0626     COVID19 Not Detected    Narrative:      Fact sheet for providers: https://www.fda.gov/media/804233/download     Fact sheet for patients: https://www.fda.gov/media/786225/download    Test performed by PCR.    Consider negative results in combination with clinical observations, patient history, and epidemiological information.    Lactic Acid, Plasma [[id number]]  (Abnormal) Collected: 11/09/21 0521    Specimen: Blood Updated: 11/09/21 0620     Lactate 2.7 mmol/L       XR Chest 1 View    Result Date: 11/9/2021  EXAMINATION:  XR CHEST 1 VW-  11/9/2021 5:25 AM CST  HISTORY: R00.1-Bradycardia, unspecified  COMPARISON: 9/26/2021.  FINDINGS:  There is no dense infiltrate or effusion. There is stable bronchial wall thickening. Heart size is upper limits of normal. The thoracic aorta is atheromatous. There is a loop recorder projected over the left chest.      1. No focal infiltrate or effusion. 2. Stable bronchial wall  thickening.   This report was finalized on 11/09/2021 06:50 by Dr. Justice Lo MD.        ED Course  ED Course as of 11/09/21 0739   Tue Nov 09, 2021   0505 Patient care will be turned over to Dr. Rosales awaiting lab results. [RW]   0522 After giving the patient atropine 0.5 mg there was no response in heart rate.  Because of this we gave epinephrine 0.5 mg with subsequent improvement in heart rate up to the mid 50s. [RW]   0656 I received signout from Dr. Reyes.  Patient is a 75-year-old female with history of lung cancer with mets to the brain, COPD, coronary disease, hypertension, GERD and PAF who presents to the emergency department from nursing home with complaint of bradycardia.  On arrival to the hospital, heart rate 30, blood pressure was 114/49.    On arrival she received atropine with no improvement.  She got 0.5 mg epi that did show some improvement.  Dr. Reyes placed the patient on a dopamine drip and has since had a heart rate in the 40s to 50s with normal blood pressure.    Patient is a full code.  Discussed case with Dr. Hammonds with cardiology regarding possible pacemaker placement, given the patient's past medical history of lung cancer with mets to the brain would like the patient admitted to the hospitalist service but cardiology will consult.   [AW]      ED Course User Index  [AW] Irineo Rosales MD  [RW] Darnell Reyes MD                                           MDM  Number of Diagnoses or Management Options  Symptomatic bradycardia: new and does not require workup     Amount and/or Complexity of Data Reviewed  Clinical lab tests: reviewed  Tests in the radiology section of CPT®: reviewed  Tests in the medicine section of CPT®: reviewed    Risk of Complications, Morbidity, and/or Mortality  Presenting problems: high  Diagnostic procedures: high  Management options: high    Patient Progress  Patient progress: stable      Final diagnoses:   Symptomatic bradycardia       ED  Disposition  ED Disposition     ED Disposition Condition Comment    Decision to Admit  Level of Care: Critical Care [6]   Diagnosis: Symptomatic bradycardia [922226]   Admitting Physician: MORRIS BLANCA [[id number]]   Attending Physician: MORRIS BLANCA [[id number]]   Isolate for COVID?: No [0]   Certification: I Certify That Inpatient Hospital Services Are Medically Necessary For Greater Than 2 Midnights            No follow-up provider specified.       Medication List      No changes were made to your prescriptions during this visit.          Irineo Rosales MD  11/09/21 0701

## 2021-11-10 PROBLEM — E44.0 MODERATE MALNUTRITION (HCC): Status: ACTIVE | Noted: 2021-01-01

## 2021-11-10 NOTE — PROGRESS NOTES
LOS: 1 day   Patient Care Team:  Cayetano Crews MD as PCP - General (Internal Medicine)  Jose Monteiro MD as Cardiologist (Cardiology)  Se Mcnulty MD as Consulting Physician (Otolaryngology)  [facility]  Jaden Frazier MD as Consulting Physician (Pulmonary Disease)  Jeremías Hernandez APRN as Nurse Practitioner (Pulmonary Disease)  Dhaval Urena MD as Consulting Physician (Hematology and Oncology)  Sean Rosen III, MD as Consulting Physician (Radiation Oncology)    Chief Complaint:      Subjective    Josi Mohamud is a 75 y.o. female who is being seen in follow-up.  Overnight feels well  No chest pain  No palpitation  No presyncope  No syncope  No orthopnea  No paroxysmal nocturnal dyspnea  Hemodynamically stable  Labs reviewed  No new issues or events  Tolerating current medications well  Satisfactory bowel and bladder activity  Satisfactory oral intake  Resting well     Telemetry: Currently in atrial fibrillation with rapid ventricular response.  No pauses  Bradycardia is resolved on discontinuation of rate control agents    Review of Systems   Constitutional: No chills   Has fatigue   No fever.   HENT: Negative.    Eyes: Negative.    Respiratory: Negative for cough,   No chest wall soreness,   Shortness of breath,   no wheezing, no stridor.    Cardiovascular: As above  Gastrointestinal: Negative for abdominal distention,  No abdominal pain,   No blood in stool,   No constipation,   No diarrhea,   No nausea   No vomiting.   Endocrine: Negative.    Genitourinary: Negative for difficulty urinating, dysuria, flank pain and hematuria.   Musculoskeletal: Negative.    Skin: Negative for rash and wound.   Allergic/Immunologic: Negative.    Neurological: Negative for dizziness, syncope, weakness,   No light-headedness  No  headaches.   Hematological: Does not bruise/bleed easily.   Psychiatric/Behavioral: Negative for agitation or behavioral problems,   No confusion,   the  patient is  nervous/anxious.       History:   Past Medical History:   Diagnosis Date   • Anticoagulated 7/21/2018   • Bilateral carotid artery stenosis 6/8/2021   • Bronchitis    • Cancer (HCC)     lung   • COPD (chronic obstructive pulmonary disease) (HCC)    • COPD, moderate (HCC) 7/8/2019   • Coronary artery disease    • Coronary artery disease involving native coronary artery of native heart without angina pectoris 5/19/2017   • COVID-19    • COVID-19 vaccine series completed    • Essential hypertension 5/19/2017   • Gastroesophageal reflux disease 7/21/2018   • GERD (gastroesophageal reflux disease)    • History of transfusion    • Hx of Hodgkin's lymphoma 10/11/2019   • Hyperlipidemia    • Hypertension    • Malignant neoplasm of upper lobe of left lung (HCC) 7/8/2019   • Mixed hyperlipidemia 6/8/2021   • Obesity (BMI 30-39.9) 3/9/2020   • PAF (paroxysmal atrial fibrillation) (HCC)    • Parotid neoplasm 7/21/2018   • Persistent asthma without complication 7/8/2019   • Renal cyst 2/24/2020   • Seizures (HCC)    • Status post stereotactic radiosurgery 2/24/2020   • Stented coronary artery 5/19/2017   • Thyroid nodule 7/21/2018   • Unilateral complete paralysis of vocal cord 9/13/2021     Past Surgical History:   Procedure Laterality Date   • ANKLE OPEN REDUCTION INTERNAL FIXATION Left 8/15/2020    Procedure: ANKLE OPEN REDUCTION INTERNAL FIXATION;  Surgeon: Keyshawn Medrano MD;  Location: [facility] OR;  Service: Orthopedics;  Laterality: Left;   • APPENDECTOMY     • APPENDECTOMY     • CARDIAC CATHETERIZATION     • CARDIAC ELECTROPHYSIOLOGY PROCEDURE N/A 5/23/2017    Procedure: Loop insertion; LINQ;  Surgeon: Jose Monteiro MD;  Location: [facility] CATH INVASIVE LOCATION;  Service:    • CHOLECYSTECTOMY     • CORONARY ANGIOPLASTY WITH STENT PLACEMENT      X 1    • INCISION AND DRAINAGE LEG Left 10/22/2021    Procedure: INCISION AND DRAINAGE ANKLE HEMATOMA PRIMARY CLOSURE LACERATION with splint;  Surgeon: Osmar  Randall WINSTON DPM;  Location: [facility] OR;  Service: Podiatry;  Laterality: Left;   • INCISION AND DRAINAGE OF WOUND Left 8/15/2020    Procedure: INCISION AND DRAINAGE WOUND;  Surgeon: Keyshawn Medrano MD;  Location: [facility] OR;  Service: Orthopedics;  Laterality: Left;   • LIVER BIOPSY     • MYRINGOTOMY W/ TUBES Left 2019    Procedure: LEFT MYRINGOTOMY WITH INSERTION OF EAR TUBE WITH RIGHT EAR EXAM UNDER ANESTHESIA;  Surgeon: Se Mcnulty MD;  Location: [facility] OR;  Service: ENT   • SPLENECTOMY     • US GUIDED FINE NEEDLE ASPIRATION  2018     Social History     Socioeconomic History   • Marital status:    • Number of children: 1   Tobacco Use   • Smoking status: Former Smoker     Packs/day: 0.50     Years: 58.00     Pack years: 29.00     Start date:      Quit date: 2020     Years since quittin.2   • Smokeless tobacco: Never Used   Vaping Use   • Vaping Use: Never used   Substance and Sexual Activity   • Alcohol use: No   • Drug use: No   • Sexual activity: Defer     Family History   Problem Relation Age of Onset   • Esophageal cancer Brother    • Lung cancer Brother    • Breast cancer Neg Hx        Labs:  WBC WBC   Date Value Ref Range Status   11/10/2021 10.17 3.40 - 10.80 10*3/mm3 Final   2021 10.44 3.40 - 10.80 10*3/mm3 Final      HGB Hemoglobin   Date Value Ref Range Status   11/10/2021 11.0 (L) 12.0 - 15.9 g/dL Final   2021 11.0 (L) 12.0 - 15.9 g/dL Final      HCT Hematocrit   Date Value Ref Range Status   11/10/2021 33.3 (L) 34.0 - 46.6 % Final   2021 33.4 (L) 34.0 - 46.6 % Final      Platelets Platelets   Date Value Ref Range Status   11/10/2021 244 140 - 450 10*3/mm3 Final   2021 242 140 - 450 10*3/mm3 Final      MCV MCV   Date Value Ref Range Status   11/10/2021 106.1 (H) 79.0 - 97.0 fL Final   2021 106.0 (H) 79.0 - 97.0 fL Final        Results from last 7 days   Lab Units 11/10/21  0344 21  0457   SODIUM mmol/L 137 137   POTASSIUM  mmol/L 3.8 4.8   CHLORIDE mmol/L 97* 100   CO2 mmol/L 32.0* 26.0   BUN mg/dL 42* 40*   CREATININE mg/dL 0.85 0.82   CALCIUM mg/dL 8.0* 8.7   BILIRUBIN mg/dL  --  0.2   ALK PHOS U/L  --  66   ALT (SGPT) U/L  --  31   AST (SGOT) U/L  --  19   GLUCOSE mg/dL 47* 394*     Lab Results   Component Value Date    CKTOTAL 22 11/09/2021    TROPONINI 0.018 01/22/2019    TROPONINT 0.304 (C) 11/09/2021     PT/INR:  No results found for: PROTIME/No results found for: INR    Imaging Results (Last 72 Hours)     Procedure Component Value Units Date/Time    XR Chest 1 View [[id number]] Collected: 11/09/21 0649     Updated: 11/09/21 0653    Narrative:      EXAMINATION:  XR CHEST 1 VW-  11/9/2021 5:25 AM CST     HISTORY: R00.1-Bradycardia, unspecified     COMPARISON: 9/26/2021.     FINDINGS:  There is no dense infiltrate or effusion. There is stable  bronchial wall thickening. Heart size is upper limits of normal. The  thoracic aorta is atheromatous. There is a loop recorder projected over  the left chest.       Impression:      1. No focal infiltrate or effusion.  2. Stable bronchial wall thickening.        This report was finalized on 11/09/2021 06:50 by Dr. Justice Lo MD.          Objective     Allergies   Allergen Reactions   • Contrast Dye Itching     Contrast reaction occurred with pruritus and erythema of the upper  and lower extremities. No difficulties swallowing or breathing. Patient  received 50 mg Benadryl orally to improve symptomatology. Patient should  be pretreated with steroids and Benadryl prior to any future IV contrast  Administration. 05-     • Penicillins Rash       Medication Review: Performed  Current Facility-Administered Medications   Medication Dose Route Frequency Provider Last Rate Last Admin   • acetaminophen (TYLENOL) tablet 650 mg  650 mg Oral Q4H PRN Milo White, DO       • aluminum-magnesium hydroxide-simethicone (MAALOX MAX) 400-400-40 MG/5ML suspension 15 mL  15 mL Oral Q6H PRN  Milo White DO       • apixaban (ELIQUIS) tablet 5 mg  5 mg Oral Q12H Milo White DO   5 mg at 11/09/21 1040   • aspirin EC tablet 81 mg  81 mg Oral Daily Milo White DO   81 mg at 11/10/21 0845   • atorvastatin (LIPITOR) tablet 10 mg  10 mg Oral Nightly Milo White DO   10 mg at 11/09/21 2034   • benzonatate (TESSALON) capsule 200 mg  200 mg Oral Q8H PRN Milo White DO   200 mg at 11/09/21 1617   • budesonide-formoterol (SYMBICORT) 80-4.5 MCG/ACT inhaler 2 puff  2 puff Inhalation BID - RT Milo White DO   2 puff at 11/10/21 0737   • calcium carbonate (TUMS) chewable tablet 500 mg (200 mg elemental)  2 tablet Oral Q4H PRN Milo White DO       • dexamethasone (DECADRON) tablet 4 mg  4 mg Oral TID With Meals Milo White DO   4 mg at 11/10/21 0846   • dextrose (D50W) (25 g/50 mL) IV injection 25 g  25 g Intravenous Q15 Min PRN Milo White DO   25 g at 11/10/21 0525   • dextrose (GLUTOSE) oral gel 15 g  15 g Oral Q15 Min PRN Milo White DO       • DOPamine 400 mg in 250 mL D5W infusion  2-20 mcg/kg/min Intravenous Titrated Milo White DO   Stopped at 11/09/21 0900   • furosemide (LASIX) tablet 40 mg  40 mg Oral Daily Milo White DO   40 mg at 11/10/21 0846   • gabapentin (NEURONTIN) capsule 100 mg  100 mg Oral TID Milo White DO   100 mg at 11/10/21 0844   • glucagon (human recombinant) (GLUCAGEN DIAGNOSTIC) injection 1 mg  1 mg Subcutaneous Q15 Min PRN Milo White DO       • insulin lispro (humaLOG) injection 0-14 Units  0-14 Units Subcutaneous 4x Daily With Meals & Nightly Milo White DO   3 Units at 11/10/21 0844   • ipratropium-albuterol (DUO-NEB) nebulizer solution 3 mL  3 mL Nebulization 4x Daily PRN Milo White DO       • levETIRAcetam (KEPPRA) tablet 500 mg  500 mg Oral BID Milo White DO   500 mg at 11/10/21 0846   • norepinephrine (LEVOPHED) 8 mg in 250 mL NS  "infusion (premix)  0.02-0.3 mcg/kg/min Intravenous Titrated Steven Prieto MD 9.9 mL/hr at 11/10/21 0704 0.06 mcg/kg/min at 11/10/21 0704   • nystatin (MYCOSTATIN) 717792 UNIT/GM cream 1 application  1 application Topical Q12H Milo White DO   1 application at 11/10/21 0847   • ondansetron (ZOFRAN) injection 4 mg  4 mg Intravenous Q6H PRN Milo White DO       • ondansetron (ZOFRAN) tablet 4 mg  4 mg Oral Q6H PRN Milo White DO       • pantoprazole (PROTONIX) EC tablet 40 mg  40 mg Oral QAM Milo White DO   40 mg at 11/09/21 1155   • sodium chloride 0.9 % flush 10 mL  10 mL Intravenous PRN Milo White DO       • sodium chloride 0.9 % flush 10 mL  10 mL Intravenous Q12H Milo White DO   10 mL at 11/10/21 0847   • sodium chloride 0.9 % flush 10 mL  10 mL Intravenous PRN Milo White DO       • traMADol (ULTRAM) tablet 50 mg  50 mg Oral Q6H PRN Milo White DO           Vital Sign Min/Max for last 24 hours  Temp  Min: 97.6 °F (36.4 °C)  Max: 98.6 °F (37 °C)   BP  Min: 57/40  Max: 141/66   Pulse  Min: 92  Max: 150   Resp  Min: 16  Max: 22   SpO2  Min: 91 %  Max: 99 %   No data recorded   Weight  Min: 85.3 kg (188 lb)  Max: 85.3 kg (188 lb 0.8 oz)     Flowsheet Rows      First Filed Value   Admission Height 162.6 cm (64\") Documented at 11/09/2021 0446   Admission Weight 87.5 kg (192 lb 12.8 oz) Documented at 11/09/2021 0446          Results for orders placed during the hospital encounter of 07/23/21    Adult Transthoracic Echo Complete W/ Cont if Necessary Per Protocol    Interpretation Summary  · Hyperdynamic right ventricular systolic function noted.  · Left ventricular ejection fraction appears to be 66 - 70%. Left ventricular systolic function is normal.  · Left ventricular diastolic function is consistent with age.      Physical Exam:    General Appearance: Awake, alert, in no acute distress  Eyes: Pupils equal and reactive    Ears: " Appear intact with no abnormalities noted  Nose: Nares normal, no drainage  Neck: supple, trachea midline, no carotid bruit and no JVD  Back: no kyphosis present,    Lungs: respirations regular, respirations even and respirations unlabored  Heart: normal S1, S2, no significant murmurs   No gallops or rubs  no rub and no click  Abdomen: normal bowel sounds, no tenderness   Skin: no bleeding, bruising or rash  Extremities: no cyanosis  Psychiatric/Behavioral: Negative for agitation, behavioral problems, confusion, the patient does  appear to be nervous/anxious.       Results Review:   I reviewed the patient's new clinical results.  I reviewed the patient's new imaging results and agree with the interpretation.  I reviewed the patient's other test results and agree with the interpretation  I personally viewed and interpreted the patient's EKG/Telemetry data    Discussed with patient  Updated patient regarding any new or relevant abnormalities on review of records or any new findings on physical exam.   Mentioned to patient about purpose of visit and desirable health short and long term goals and objectives.     Reviewed available prior notes, consults, prior visits, laboratory findings, radiology and cardiology relevant reports.   Updated chart as applicable.   I have reviewed the patient's medical history in detail and updated the computerized patient record as relevant.          Assessment/Plan       Junctional bradycardia    Essential hypertension    Anticoagulated    PAF (paroxysmal atrial fibrillation) (HCC)    Brain mass    Brain metastasis (HCC)    Hypotension      Plan    Patient is now in atrial fibrillation with rates between 110-130 bpm  Continues on low-dose pressors  We will get neurology input regarding feasibility of anticoagulation given brain mets and prior history of stereotactic brain surgery  Currently does not need a pacemaker  Once blood pressures normalize can start on low-dose rate control  agents  Discussed with RN Tayla  Await results of limited echocardiogram    Telemetry  Deep vein thrombosis prophylaxis/precautions [SCD]  Appropriate diet, fluid, sodium, caffeine, stimulants intake   Questions were encouraged, asked and answered to the patient's  understanding and satisfaction.  Compliance to diet and medications       Jose Monteiro MD  11/10/21  09:42 CST    EMR Dragon/Transcription was used to dictate part of this note

## 2021-11-10 NOTE — PROGRESS NOTES
Cleared for full anticoagulation per neurosurgery for A. fib.  No clinical data to support increased risk of hemorrhage and brain tumors outside of melanoma or renal cell carcinoma

## 2021-11-10 NOTE — PLAN OF CARE
Goal Outcome Evaluation:  Plan of Care Reviewed With: patient, daughter        Progress: no change  Outcome Summary: Initial RDN eval. Malnutrition assessment completed, see RD progress note for details. Pt now on Cardiac/CCHO diet with No Caffeine restrictions. Encouraged intake, interviewed for preferences, and added Boost Glucose Control BID. Will continue to follow.

## 2021-11-10 NOTE — PLAN OF CARE
Goal Outcome Evaluation:  Plan of Care Reviewed With: patient, daughter           Outcome Summary: OT eval completed. Pt is alert and oriented to self. Pt recently underwent I&D and primary closure of distal L LE on 10/22. Per daughter report at last office visit on 11/5 with Dr. Prasad pt is now WBAT LLE. Today, she demonstrates decreased strength, balance, activity tolerance, and cognition. She was able to bring self to sitting at EOB with Max Ax2. She demonstrates a strong R lateral lean in sitting that required Mod A to correct. She was able to tolerate static sitting at EOB approx 5 minutes. She fatigues quickly with activity. She would benefit from skilled OT services to address these deficits. Recommend d/c back to SNF.

## 2021-11-10 NOTE — PROGRESS NOTES
Malnutrition Severity Assessment    Patient Name:  Josi Mohamud  YOB: 1945  MRN: [id number]  Admit Date:  11/9/2021    Patient meets criteria for : Moderate (non-severe) Malnutrition (other indication of malnutrition: decreased skin turgor and presence of nonhealing wounds, dry mucous membranes)      Malnutrition Severity Assessment  Malnutrition Type: Chronic Disease - Related Malnutrition  Malnutrition Type (last 8 hours)     Malnutrition Severity Assessment     Row Name 11/10/21 1133       Malnutrition Severity Assessment    Malnutrition Type Chronic Disease - Related Malnutrition    Row Name 11/10/21 1133       Muscle Loss    Loss of Muscle Mass Findings Moderate    [place] Region Moderate - slight depression    Clavicle Bone Region Moderate - some protrusion in females, visible in males  loss of muscle tone palpable    Acromion Bone Region Moderate - acromion may slightly protrude  loss of muscle tone palpable    Row Name 11/10/21 1133       Fat Loss    Subcutaneous Fat Loss Findings Moderate    Orbital Region  Moderate -  somewhat hollowness, slightly dark circles    Row Name 11/10/21 1133       Fluid Accumulation (Edema)    Fluid Acumulation Findings Moderate    Fluid Accumulation  Moderate equals 2+ pitting edema    Row Name 11/10/21 1133       Declining Functional Status    Declining Functional Status Findings Measurably Reduced    Row Name 11/10/21 1133       Criteria Met (Must meet criteria for severity in at least 2 of these categories: M Wasting, Fat Loss, Fluid, Secondary Signs, Wt. Status, Intake)    Patient meets criteria for  Moderate (non-severe) Malnutrition  other indication of malnutrition: decreased skin turgor and presence of nonhealing wounds, dry mucous membranes                Electronically signed by:  Carol Ann Gerardo RDN, LD  11/10/21 11:44 CST

## 2021-11-10 NOTE — CASE MANAGEMENT/SOCIAL WORK
Continued Stay Note   [place]     Patient Name: Josi Mohamud  MRN: [id number]  Today's Date: 11/10/2021    Admit Date: 11/9/2021     Discharge Plan     Row Name 11/10/21 1125       Plan    Plan Referral pending to [facility]    Provided Post Acute Provider List? N/A    N/A Provider List Comment Family requested referral.    Provided Post Acute Provider Quality & Resource List? N/A    N/A Quality & Resource List Comment Family requested referral.    Patient/Family in Agreement with Plan yes    Plan Comments MIKEY spoke with patient's daughter, Ms. Egan, who has requested a referral to [facility].  Peggy with [facility] is aware of referral.  Awaiting decision from [facility].               Discharge Codes    No documentation.                     CARLOS Mccall

## 2021-11-10 NOTE — PROGRESS NOTES
[facility]  INPATIENT PROGRESS NOTE    Length of Stay: 1  Date of Admission: 11/9/2021  Primary Care Physician: Cayetano Crews MD    Subjective     Chief Complaint:     Bradycardia, hypotension.    HPI     The patient's rate is varying from 110s-140s today.  She is in atrial fibrillation.  Dr. Thania lopez seen and evaluated the patient and has some concerns about anticoagulation.  I will ask neurosurgery to render an opinion regarding the risk versus benefit of continuing anticoagulation given the patient's evidence of brain metastasis from likely lung cancer origin.  She remains on low-dose pressors.  Hopefully Levophed can be weaned today and rate controlling agents restarted.  The patient is a large hematoma noted on the left lower extremity in the mid tibial region.  It was previously seen and evaluated by Dr. Prasad who placed sutures.  The sutures have since torn away and the hematoma has undermined tissue.  There is a significant residual of devitalized skin noted with suture material present.  Using sharp dissection and sterile technique, the dead skin was debrided from the edges of the hematoma.  All suture material was removed.  The hematoma measured roughly 25 x 18 cm.  Sterile gauze was placed over the top.  Wound care has seen the patient and will make continuing wound care recommendations and will dress the wound appropriately.  BMP this a.m. showed glucose 47.  Troponin was elevated at 0.304 which is likely a type II leak.  Hemoglobin 11.0.  Flores catheter remains in place and is draining dark yolanda urine.    Review of Systems     All pertinent negatives and positives are as above. All other systems have been reviewed and are negative unless otherwise stated.     Objective    Temp:  [97.6 °F (36.4 °C)-98.6 °F (37 °C)] 98.6 °F (37 °C)  Heart Rate:  [] 150  Resp:  [16-22] 18  BP: ()/(40-97) 108/61    Lab Results (last 24 hours)      Procedure Component Value Units Date/Time    POC Glucose Once [[id number]]  (Abnormal) Collected: 11/10/21 0750    Specimen: Blood Updated: 11/10/21 0801     Glucose 160 mg/dL      Comment: : [id number] Breann JordanMeter ID: [id number]       POC Glucose Once [[id number]]  (Abnormal) Collected: 11/10/21 0543    Specimen: Blood Updated: 11/10/21 0554     Glucose 141 mg/dL      Comment: : [id number] Osbaldo Tian ID: [id number]       POC Glucose Once [[id number]]  (Abnormal) Collected: 11/10/21 0522    Specimen: Blood Updated: 11/10/21 0533     Glucose 65 mg/dL      Comment: : [id number] Lian DamicoaMeter ID: [id number]       Basic Metabolic Panel [[id number]]  (Abnormal) Collected: 11/10/21 0344    Specimen: Blood Updated: 11/10/21 0521     Glucose 47 mg/dL      BUN 42 mg/dL      Creatinine 0.85 mg/dL      Sodium 137 mmol/L      Potassium 3.8 mmol/L      Chloride 97 mmol/L      CO2 32.0 mmol/L      Calcium 8.0 mg/dL      eGFR Non African Amer 65 mL/min/1.73      BUN/Creatinine Ratio 49.4     Anion Gap 8.0 mmol/L     Narrative:      GFR Normal >60  Chronic Kidney Disease <60  Kidney Failure <15      CBC Auto Differential [[id number]]  (Abnormal) Collected: 11/10/21 0344    Specimen: Blood Updated: 11/10/21 0444     WBC 10.17 10*3/mm3      RBC 3.14 10*6/mm3      Hemoglobin 11.0 g/dL      Hematocrit 33.3 %      .1 fL      MCH 35.0 pg      MCHC 33.0 g/dL      RDW 14.9 %      RDW-SD 57.9 fl      MPV 10.4 fL      Platelets 244 10*3/mm3      Neutrophil % 72.7 %      Lymphocyte % 19.7 %      Monocyte % 4.5 %      Eosinophil % 0.1 %      Basophil % 0.4 %      Neutrophils, Absolute 7.40 10*3/mm3      Lymphocytes, Absolute 2.00 10*3/mm3      Monocytes, Absolute 0.46 10*3/mm3      Eosinophils, Absolute 0.01 10*3/mm3      Basophils, Absolute 0.04 10*3/mm3     POC Glucose Once [[id number]]  (Abnormal) Collected: 11/09/21 2033    Specimen: Blood Updated: 11/09/21 2044     Glucose 275 mg/dL      Comment:  : [id number] Osbaldo Tian ID: [id number]       POC Glucose Once [[id number]]  (Abnormal) Collected: 11/09/21 1746    Specimen: Blood Updated: 11/09/21 1757     Glucose 255 mg/dL      Comment: : [id number] Jim Turner ID: [id number]       Troponin [[id number]]  (Abnormal) Collected: 11/09/21 1550    Specimen: Blood Updated: 11/09/21 1648     Troponin T 0.304 ng/mL     Narrative:      Troponin T Reference Range:  <= 0.03 ng/mL-   Negative for AMI  >0.03 ng/mL-     Abnormal for myocardial necrosis.  Clinicians would have to utilize clinical acumen, EKG, Troponin and serial changes to determine if it is an Acute Myocardial Infarction or myocardial injury due to an underlying chronic condition.       Results may be falsely decreased if patient taking Biotin.      Urinalysis, Microscopic Only - Urine, Catheter [[id number]]  (Abnormal) Collected: 11/09/21 1158    Specimen: Urine, Catheter Updated: 11/09/21 1258     RBC, UA None Seen /HPF      WBC, UA 6-12 /HPF      Bacteria, UA None Seen /HPF      Squamous Epithelial Cells, UA None Seen /HPF      Hyaline Casts, UA None Seen /LPF      Triple Phosphate Crystals, UA Moderate/2+ /HPF      Amorphous Crystals, UA Moderate/2+ /HPF      Methodology Manual Light Microscopy    Urinalysis With Culture If Indicated - Urine, Catheter [[id number]]  (Abnormal) Collected: 11/09/21 1158    Specimen: Urine, Catheter Updated: 11/09/21 1258     Color, UA Yellow     Appearance, UA Turbid     pH, UA 8.5     Specific Gravity, UA 1.021     Glucose,  mg/dL (Trace)     Ketones, UA Negative     Bilirubin, UA Negative     Blood, UA Negative     Protein, UA Trace     Leuk Esterase, UA Moderate (2+)     Nitrite, UA Negative     Urobilinogen, UA 1.0 E.U./dL    Urine Culture - Urine, Urine, Catheter [[id number]] Collected: 11/09/21 1158    Specimen: Urine, Catheter Updated: 11/09/21 1258    POC Glucose Once [[id number]]  (Abnormal) Collected: 11/09/21 1155    Specimen:  Blood Updated: 11/09/21 1206     Glucose 252 mg/dL      Comment: : [id number] Jim Turner ID: [id number]       Troponin [[id number]]  (Abnormal) Collected: 11/09/21 1141    Specimen: Blood Updated: 11/09/21 1206     Troponin T 0.326 ng/mL     Narrative:      Troponin T Reference Range:  <= 0.03 ng/mL-   Negative for AMI  >0.03 ng/mL-     Abnormal for myocardial necrosis.  Clinicians would have to utilize clinical acumen, EKG, Troponin and serial changes to determine if it is an Acute Myocardial Infarction or myocardial injury due to an underlying chronic condition.       Results may be falsely decreased if patient taking Biotin.            Imaging Results (Last 24 Hours)     ** No results found for the last 24 hours. **             Intake/Output Summary (Last 24 hours) at 11/10/2021 1030  Last data filed at 11/10/2021 0800  Gross per 24 hour   Intake 240 ml   Output 1050 ml   Net -810 ml       Physical Exam  Constitutional:       Appearance: She is well-developed and overweight. She is ill-appearing.   HENT:      Head: Normocephalic and atraumatic.      Right Ear: External ear normal.      Left Ear: External ear normal.      Nose: Nose normal.      Mouth: Mucous membranes are moist.   Eyes:      General: No scleral icterus.      Conjunctiva/sclera: Conjunctivae normal. .   Cardiovascular:      Rate and Rhythm: Regular rhythm. Bradycardia present.      Pulses: Normal pulses.      Heart sounds: Normal heart sounds. No murmur heard.  Pulmonary:      Effort: Pulmonary effort is normal. No respiratory distress.      Breath sounds: Rhonchi (Occasional bilateral) present.   Abdominal:      General: Abdomen is flat. Bowel sounds are normal.      Palpations: Abdomen is soft. There is no mass.   Musculoskeletal:         General: Normal range of motion.      Right lower leg: Edema present.      Left lower leg: Edema present.   Skin:     General: Skin is warm and dry.      Coloration: Skin is not pale.      Comments:  Large area of skin sloughing noted left lower extremity.  This was sharply debrided back to vital tissue.  Wound care was present after debridement for application of wound dressing.  Neurological:      General: No focal deficit present.      Mental Status: She is alert and oriented to person, place, and time. Mental status is at baseline.   Psychiatric:         Mood and Affect: Mood normal.         Judgment: Judgment normal.     Results Review:  I have reviewed the labs, radiology results, and diagnostic studies since my last progress note and made treatment changes reflective of the results.   I have reviewed the current medications.    Assessment/Plan     Active Hospital Problems    Diagnosis    • **Junctional bradycardia    • Hypotension    • Brain metastasis (HCC)    • Brain mass    • PAF (paroxysmal atrial fibrillation) (HCC)    • Anticoagulated    • Essential hypertension        PLAN:  Continue pressors with attempt to wean today  Restart rate controlling medications after pressors can be weaned  Neurosurgery consultation regarding the appropriateness of continuing anticoagulation in the face of brain metastasis.  Sharp debridement of left lower extremity wound, done.    Electronically signed by Milo White DO, 11/10/21, 10:30 CST.

## 2021-11-10 NOTE — CONSULTS
[facility]  INPATIENT WOUND CONSULTATION    Today's Date: 11/10/21    Patient Name: Josi Mohmaud  MRN: [id number]  CSN: [id number]  PCP: Cayetano Crews MD  Referring Provider: Pradip Carrera*   Attending Provider: Milo White DO  Length of Stay: 1    SUBJECTIVE   Chief Complaint: Wound of left lower leg    HPI: Josi Mohamud, a 75 y.o.female, presents with a past medical history of coronary artery disease, COPD, and malignant neoplasm of lung.  A full past medical history is listed below.  Patient presents with junctional bradycardia and is admitted to the unit for pressor support.      INpatient wound care is consulted due to a wound of the left lower leg.  The patient's daughter is at the bedside and assists with providing history.  The patient had fallen causing this wound.  Sutures are present where a physician attempted to suture the wound closed.  A hematoma is present with an open ulceration.        Visit Dx:    ICD-10-CM ICD-9-CM   1. Symptomatic bradycardia  R00.1 427.89   2. Decreased activities of daily living (ADL)  Z78.9 V49.89   3. Impaired mobility  Z74.09 799.89     Patient Active Problem List   Diagnosis   • Essential hypertension   • Anticoagulated   • PAF (paroxysmal atrial fibrillation) (HCC)   • Brain mass   • Brain metastasis (HCC)   • Junctional bradycardia   • Hypotension       History:   Past Medical History:   Diagnosis Date   • Anticoagulated 7/21/2018   • Bilateral carotid artery stenosis 6/8/2021   • Bronchitis    • Cancer (HCC)     lung   • COPD (chronic obstructive pulmonary disease) (HCC)    • COPD, moderate (HCC) 7/8/2019   • Coronary artery disease    • Coronary artery disease involving native coronary artery of native heart without angina pectoris 5/19/2017   • COVID-19    • COVID-19 vaccine series completed    • Essential hypertension 5/19/2017   • Gastroesophageal reflux disease 7/21/2018   • GERD (gastroesophageal reflux disease)    •  History of transfusion    • Hx of Hodgkin's lymphoma 10/11/2019   • Hyperlipidemia    • Hypertension    • Malignant neoplasm of upper lobe of left lung (HCC) 7/8/2019   • Mixed hyperlipidemia 6/8/2021   • Obesity (BMI 30-39.9) 3/9/2020   • PAF (paroxysmal atrial fibrillation) (HCC)    • Parotid neoplasm 7/21/2018   • Persistent asthma without complication 7/8/2019   • Renal cyst 2/24/2020   • Seizures (HCC)    • Status post stereotactic radiosurgery 2/24/2020   • Stented coronary artery 5/19/2017   • Thyroid nodule 7/21/2018   • Unilateral complete paralysis of vocal cord 9/13/2021     Past Surgical History:   Procedure Laterality Date   • ANKLE OPEN REDUCTION INTERNAL FIXATION Left 8/15/2020    Procedure: ANKLE OPEN REDUCTION INTERNAL FIXATION;  Surgeon: Keyshawn Medrano MD;  Location: [facility] OR;  Service: Orthopedics;  Laterality: Left;   • APPENDECTOMY     • APPENDECTOMY     • CARDIAC CATHETERIZATION     • CARDIAC ELECTROPHYSIOLOGY PROCEDURE N/A 5/23/2017    Procedure: Loop insertion; LINQ;  Surgeon: Jose Monteiro MD;  Location:  [facility] CATH INVASIVE LOCATION;  Service:    • CHOLECYSTECTOMY     • CORONARY ANGIOPLASTY WITH STENT PLACEMENT      X 1    • INCISION AND DRAINAGE LEG Left 10/22/2021    Procedure: INCISION AND DRAINAGE ANKLE HEMATOMA PRIMARY CLOSURE LACERATION with splint;  Surgeon: Randall Prasad DPM;  Location: [facility] OR;  Service: Podiatry;  Laterality: Left;   • INCISION AND DRAINAGE OF WOUND Left 8/15/2020    Procedure: INCISION AND DRAINAGE WOUND;  Surgeon: Keyshawn Medrano MD;  Location:  [facility] OR;  Service: Orthopedics;  Laterality: Left;   • LIVER BIOPSY     • MYRINGOTOMY W/ TUBES Left 11/22/2019    Procedure: LEFT MYRINGOTOMY WITH INSERTION OF EAR TUBE WITH RIGHT EAR EXAM UNDER ANESTHESIA;  Surgeon: Se Mcnulty MD;  Location: [facility] OR;  Service: ENT   • SPLENECTOMY     • US GUIDED FINE NEEDLE ASPIRATION  8/7/2018     Social History     Socioeconomic History   • Marital  status:    • Number of children: 1   Tobacco Use   • Smoking status: Former Smoker     Packs/day: 0.50     Years: 58.00     Pack years: 29.00     Start date:      Quit date: 2020     Years since quittin.2   • Smokeless tobacco: Never Used   Vaping Use   • Vaping Use: Never used   Substance and Sexual Activity   • Alcohol use: No   • Drug use: No   • Sexual activity: Defer     Family History   Problem Relation Age of Onset   • Esophageal cancer Brother    • Lung cancer Brother    • Breast cancer Neg Hx        Allergies:  Allergies   Allergen Reactions   • Contrast Dye Itching     Contrast reaction occurred with pruritus and erythema of the upper  and lower extremities. No difficulties swallowing or breathing. Patient  received 50 mg Benadryl orally to improve symptomatology. Patient should  be pretreated with steroids and Benadryl prior to any future IV contrast  Administration. 2021     • Penicillins Rash       Medications:    Current Facility-Administered Medications:   •  acetaminophen (TYLENOL) tablet 650 mg, 650 mg, Oral, Q4H PRN, Milo White DO  •  aluminum-magnesium hydroxide-simethicone (MAALOX MAX) 400-400-40 MG/5ML suspension 15 mL, 15 mL, Oral, Q6H PRN, Milo White DO  •  apixaban (ELIQUIS) tablet 5 mg, 5 mg, Oral, Q12H, Milo White DO, 5 mg at 21 1040  •  aspirin EC tablet 81 mg, 81 mg, Oral, Daily, Milo White DO, 81 mg at 11/10/21 0845  •  atorvastatin (LIPITOR) tablet 10 mg, 10 mg, Oral, Nightly, Milo White DO, 10 mg at 21 2034  •  benzonatate (TESSALON) capsule 200 mg, 200 mg, Oral, Q8H PRN, Milo White DO, 200 mg at 21 1617  •  budesonide-formoterol (SYMBICORT) 80-4.5 MCG/ACT inhaler 2 puff, 2 puff, Inhalation, BID - RT, Milo White DO, 2 puff at 11/10/21 0737  •  calcium carbonate (TUMS) chewable tablet 500 mg (200 mg elemental), 2 tablet, Oral, Q4H PRN, Milo White,   •   dexamethasone (DECADRON) tablet 4 mg, 4 mg, Oral, TID With Meals, Milo White DO, 4 mg at 11/10/21 0846  •  dextrose (D50W) (25 g/50 mL) IV injection 25 g, 25 g, Intravenous, Q15 Min PRN, Milo White DO, 25 g at 11/10/21 0525  •  dextrose (GLUTOSE) oral gel 15 g, 15 g, Oral, Q15 Min PRN, Milo White DO  •  DOPamine 400 mg in 250 mL D5W infusion, 2-20 mcg/kg/min, Intravenous, Titrated, Milo White DO, Stopped at 11/09/21 0900  •  furosemide (LASIX) tablet 40 mg, 40 mg, Oral, Daily, Milo White DO, 40 mg at 11/10/21 0846  •  gabapentin (NEURONTIN) capsule 100 mg, 100 mg, Oral, TID, Milo White DO, 100 mg at 11/10/21 0844  •  glucagon (human recombinant) (GLUCAGEN DIAGNOSTIC) injection 1 mg, 1 mg, Subcutaneous, Q15 Min PRN, Milo White DO  •  insulin lispro (humaLOG) injection 0-14 Units, 0-14 Units, Subcutaneous, 4x Daily With Meals & Nightly, Milo White DO, 3 Units at 11/10/21 0844  •  ipratropium-albuterol (DUO-NEB) nebulizer solution 3 mL, 3 mL, Nebulization, 4x Daily PRN, Milo White DO  •  levETIRAcetam (KEPPRA) tablet 500 mg, 500 mg, Oral, BID, Milo White DO, 500 mg at 11/10/21 0846  •  norepinephrine (LEVOPHED) 8 mg in 250 mL NS infusion (premix), 0.02-0.3 mcg/kg/min, Intravenous, Titrated, Steven Prieto MD, Last Rate: 6.6 mL/hr at 11/10/21 0940, 0.04 mcg/kg/min at 11/10/21 0940  •  nystatin (MYCOSTATIN) 164119 UNIT/GM cream 1 application, 1 application, Topical, Q12H, Milo White DO, 1 application at 11/10/21 0847  •  ondansetron (ZOFRAN) injection 4 mg, 4 mg, Intravenous, Q6H PRN, Milo White,   •  ondansetron (ZOFRAN) tablet 4 mg, 4 mg, Oral, Q6H PRN, Milo White DO  •  pantoprazole (PROTONIX) EC tablet 40 mg, 40 mg, Oral, QAM, Milo White, , 40 mg at 11/09/21 1155  •  sodium chloride 0.9 % flush 10 mL, 10 mL, Intravenous, PRN, Milo White,   •  sodium  chloride 0.9 % flush 10 mL, 10 mL, Intravenous, Q12H, Milo White DO, 10 mL at 11/10/21 0847  •  sodium chloride 0.9 % flush 10 mL, 10 mL, Intravenous, PRN, Milo White DO  •  traMADol (ULTRAM) tablet 50 mg, 50 mg, Oral, Q6H PRN, Milo White DO    Review of Systems:  Review of Systems   Constitutional: Positive for fatigue. Negative for chills and fever.   HENT: Negative for rhinorrhea and sinus pressure.    Respiratory: Negative for cough and shortness of breath.    Cardiovascular: Positive for leg swelling. Negative for chest pain and palpitations.   Gastrointestinal: Negative for diarrhea, nausea and vomiting.   Genitourinary: Negative for frequency and urgency.   Musculoskeletal: Positive for gait problem and myalgias.   Skin: Positive for color change and wound.   Neurological: Negative for dizziness and headaches.   Psychiatric/Behavioral: Negative for agitation and behavioral problems.             OBJECTIVE     Vitals:    11/10/21 1100   BP: 121/59   Pulse: (!) 148   Resp:    Temp:    SpO2: 98%       PHYSICAL EXAM  Physical Exam  Vitals and nursing note reviewed.   Constitutional:       General: She is awake.      Appearance: She is obese.   HENT:      Head: Normocephalic and atraumatic.   Eyes:      General: Lids are normal. Gaze aligned appropriately.   Cardiovascular:      Rate and Rhythm: Normal rate and regular rhythm.   Pulmonary:      Effort: Pulmonary effort is normal. No respiratory distress.   Abdominal:      General: Abdomen is protuberant. There is no distension.   Musculoskeletal:      Cervical back: No edema or erythema.   Skin:     General: Skin is warm.      Findings: Wound present.      Comments: Large hematoma of the left leg with open ulceration measuring 9.8cm x 8.2cm post debridement by Dr. White.  Wound bed is moist with remaining fragments of hematoma.  The edges are open and irregular.  The periwound area is purple and bruised.    Neurological:      Mental  Status: She is alert and oriented to person, place, and time.   Psychiatric:         Attention and Perception: She is inattentive.         Behavior: Behavior is cooperative.        Results Review:  Lab Results (last 48 hours)     Procedure Component Value Units Date/Time    POC Glucose Once [[id number]]  (Abnormal) Collected: 11/10/21 0750    Specimen: Blood Updated: 11/10/21 0801     Glucose 160 mg/dL      Comment: : [id number] Button JordanMeter ID: [id number]       POC Glucose Once [[id number]]  (Abnormal) Collected: 11/10/21 0543    Specimen: Blood Updated: 11/10/21 0554     Glucose 141 mg/dL      Comment: : [id number] Cobtolu Huynh EMeter ID: [id number]       POC Glucose Once [[id number]]  (Abnormal) Collected: 11/10/21 0522    Specimen: Blood Updated: 11/10/21 0533     Glucose 65 mg/dL      Comment: : [id number] Lian LauraMeter ID: [id number]       Basic Metabolic Panel [[id number]]  (Abnormal) Collected: 11/10/21 0344    Specimen: Blood Updated: 11/10/21 0521     Glucose 47 mg/dL      BUN 42 mg/dL      Creatinine 0.85 mg/dL      Sodium 137 mmol/L      Potassium 3.8 mmol/L      Chloride 97 mmol/L      CO2 32.0 mmol/L      Calcium 8.0 mg/dL      eGFR Non African Amer 65 mL/min/1.73      BUN/Creatinine Ratio 49.4     Anion Gap 8.0 mmol/L     Narrative:      GFR Normal >60  Chronic Kidney Disease <60  Kidney Failure <15      CBC Auto Differential [[id number]]  (Abnormal) Collected: 11/10/21 0344    Specimen: Blood Updated: 11/10/21 0444     WBC 10.17 10*3/mm3      RBC 3.14 10*6/mm3      Hemoglobin 11.0 g/dL      Hematocrit 33.3 %      .1 fL      MCH 35.0 pg      MCHC 33.0 g/dL      RDW 14.9 %      RDW-SD 57.9 fl      MPV 10.4 fL      Platelets 244 10*3/mm3      Neutrophil % 72.7 %      Lymphocyte % 19.7 %      Monocyte % 4.5 %      Eosinophil % 0.1 %      Basophil % 0.4 %      Neutrophils, Absolute 7.40 10*3/mm3      Lymphocytes, Absolute 2.00 10*3/mm3      Monocytes, Absolute 0.46 10*3/mm3       Eosinophils, Absolute 0.01 10*3/mm3      Basophils, Absolute 0.04 10*3/mm3     POC Glucose Once [[id number]]  (Abnormal) Collected: 11/09/21 2033    Specimen: Blood Updated: 11/09/21 2044     Glucose 275 mg/dL      Comment: : [id number] Osbaldo Tian ID: [id number]       POC Glucose Once [[id number]]  (Abnormal) Collected: 11/09/21 1746    Specimen: Blood Updated: 11/09/21 1757     Glucose 255 mg/dL      Comment: : [id number] Jim Turner ID: [id number]       Troponin [[id number]]  (Abnormal) Collected: 11/09/21 1550    Specimen: Blood Updated: 11/09/21 1648     Troponin T 0.304 ng/mL     Narrative:      Troponin T Reference Range:  <= 0.03 ng/mL-   Negative for AMI  >0.03 ng/mL-     Abnormal for myocardial necrosis.  Clinicians would have to utilize clinical acumen, EKG, Troponin and serial changes to determine if it is an Acute Myocardial Infarction or myocardial injury due to an underlying chronic condition.       Results may be falsely decreased if patient taking Biotin.      Urinalysis, Microscopic Only - Urine, Catheter [[id number]]  (Abnormal) Collected: 11/09/21 1158    Specimen: Urine, Catheter Updated: 11/09/21 1258     RBC, UA None Seen /HPF      WBC, UA 6-12 /HPF      Bacteria, UA None Seen /HPF      Squamous Epithelial Cells, UA None Seen /HPF      Hyaline Casts, UA None Seen /LPF      Triple Phosphate Crystals, UA Moderate/2+ /HPF      Amorphous Crystals, UA Moderate/2+ /HPF      Methodology Manual Light Microscopy    Urinalysis With Culture If Indicated - Urine, Catheter [[id number]]  (Abnormal) Collected: 11/09/21 1158    Specimen: Urine, Catheter Updated: 11/09/21 1258     Color, UA Yellow     Appearance, UA Turbid     pH, UA 8.5     Specific Gravity, UA 1.021     Glucose,  mg/dL (Trace)     Ketones, UA Negative     Bilirubin, UA Negative     Blood, UA Negative     Protein, UA Trace     Leuk Esterase, UA Moderate (2+)     Nitrite, UA Negative     Urobilinogen, UA  1.0 E.U./dL    Urine Culture - Urine, Urine, Catheter [[id number]] Collected: 11/09/21 1158    Specimen: Urine, Catheter Updated: 11/09/21 1258    POC Glucose Once [[id number]]  (Abnormal) Collected: 11/09/21 1155    Specimen: Blood Updated: 11/09/21 1206     Glucose 252 mg/dL      Comment: : [id number] Jim Turner ID: [id number]       Troponin [[id number]]  (Abnormal) Collected: 11/09/21 1141    Specimen: Blood Updated: 11/09/21 1206     Troponin T 0.326 ng/mL     Narrative:      Troponin T Reference Range:  <= 0.03 ng/mL-   Negative for AMI  >0.03 ng/mL-     Abnormal for myocardial necrosis.  Clinicians would have to utilize clinical acumen, EKG, Troponin and serial changes to determine if it is an Acute Myocardial Infarction or myocardial injury due to an underlying chronic condition.       Results may be falsely decreased if patient taking Biotin.      STAT Lactic Acid, Reflex [[id number]]  (Abnormal) Collected: 11/09/21 0825    Specimen: Blood Updated: 11/09/21 0857     Lactate 2.5 mmol/L     Troponin [[id number]]  (Abnormal) Collected: 11/09/21 0825    Specimen: Blood Updated: 11/09/21 0857     Troponin T 0.221 ng/mL     Narrative:      Troponin T Reference Range:  <= 0.03 ng/mL-   Negative for AMI  >0.03 ng/mL-     Abnormal for myocardial necrosis.  Clinicians would have to utilize clinical acumen, EKG, Troponin and serial changes to determine if it is an Acute Myocardial Infarction or myocardial injury due to an underlying chronic condition.       Results may be falsely decreased if patient taking Biotin.      Manual Differential [[id number]]  (Abnormal) Collected: 11/09/21 0457    Specimen: Blood Updated: 11/09/21 0638     Neutrophil % 78.4 %      Lymphocyte % 14.4 %      Monocyte % 4.1 %      Bands %  2.1 %      Myelocyte % 1.0 %      Neutrophils Absolute 8.40 10*3/mm3      Lymphocytes Absolute 1.50 10*3/mm3      Monocytes Absolute 0.43 10*3/mm3      Anisocytosis Slight/1+     Poikilocytes Slight/1+      Polychromasia Slight/1+     WBC Morphology Normal     Platelet Morphology Normal    CBC & Differential [[id number]]  (Abnormal) Collected: 11/09/21 0457    Specimen: Blood Updated: 11/09/21 0638    Narrative:      The following orders were created for panel order CBC & Differential.  Procedure                               Abnormality         Status                     ---------                               -----------         ------                     CBC Auto Differential[[id number]]        Abnormal            Final result                 Please view results for these tests on the individual orders.    CBC Auto Differential [[id number]]  (Abnormal) Collected: 11/09/21 0457    Specimen: Blood Updated: 11/09/21 0638     WBC 10.44 10*3/mm3      RBC 3.15 10*6/mm3      Hemoglobin 11.0 g/dL      Hematocrit 33.4 %      .0 fL      MCH 34.9 pg      MCHC 32.9 g/dL      RDW 15.3 %      RDW-SD 59.2 fl      MPV 10.4 fL      Platelets 242 10*3/mm3     Narrative:      The previously reported component NRBC is no longer being reported. Previous result was 0.4 /100 WBC (Reference Range: 0.0-0.2 /100 WBC) on 11/9/2021 at 0612 CST.    COVID PRE-OP / PRE-PROCEDURE SCREENING ORDER (NO ISOLATION) - Swab, Nasal Cavity [[id number]]  (Normal) Collected: 11/09/21 0517    Specimen: Swab from Nasal Cavity Updated: 11/09/21 0626    Narrative:      The following orders were created for panel order COVID PRE-OP / PRE-PROCEDURE SCREENING ORDER (NO ISOLATION) - Swab, Nasal Cavity.  Procedure                               Abnormality         Status                     ---------                               -----------         ------                     COVID-19,[facility]...[[id number]]  Normal              Final result                 Please view results for these tests on the individual orders.    COVID-19,[facility] IN-HOUSE,Nasal Swab No Transport Media 3-4 HR TAT - Swab, Nasal Cavity [[id number]]  (Normal) Collected: 11/09/21 0517  "   Specimen: Swab from Nasal Cavity Updated: 11/09/21 0626     COVID19 Not Detected    Narrative:      Fact sheet for providers: https://www.fda.gov/media/316849/download     Fact sheet for patients: https://www.fda.gov/media/328639/download    Test performed by PCR.    Consider negative results in combination with clinical observations, patient history, and epidemiological information.    TSH [[id number]]  (Abnormal) Collected: 11/09/21 0457    Specimen: Blood Updated: 11/09/21 0624     TSH 0.064 uIU/mL     Procalcitonin [[id number]]  (Normal) Collected: 11/09/21 0457    Specimen: Blood Updated: 11/09/21 0623     Procalcitonin 0.14 ng/mL     Narrative:      As a Marker for Sepsis (Non-Neonates):     1. <0.5 ng/mL represents a low risk of severe sepsis and/or septic shock.  2. >2 ng/mL represents a high risk of severe sepsis and/or septic shock.    As a Marker for Lower Respiratory Tract Infections that require antibiotic therapy:  PCT on Admission     Antibiotic Therapy             6-12 Hrs later  >0.5                          Strongly Recommended            >0.25 - <0.5             Recommended  0.1 - 0.25                  Discouraged                       Remeasure/reassess PCT  <0.1                         Strongly Discouraged         Remeasure/reassess PCT      As 28 day mortality risk marker: \"Change in Procalcitonin Result\" (>80% or <=80%) if Day 0 (or Day 1) and Day 4 values are available. Refer to http://www.[employer]-pct-calculator.com/    Change in PCT <=80 %   A decrease of PCT levels below or equal to 80% defines a positive change in PCT test result representing a higher risk for 28-day all-cause mortality of patients diagnosed with severe sepsis or septic shock.    Change in PCT >80 %   A decrease of PCT levels of more than 80% defines a negative change in PCT result representing a lower risk for 28-day all-cause mortality of patients diagnosed with severe sepsis or septic shock.                Comprehensive " Metabolic Panel [[id number]]  (Abnormal) Collected: 11/09/21 0457    Specimen: Blood Updated: 11/09/21 0622     Glucose 394 mg/dL      BUN 40 mg/dL      Creatinine 0.82 mg/dL      Sodium 137 mmol/L      Potassium 4.8 mmol/L      Comment: Slight hemolysis detected by analyzer. Results may be affected.        Chloride 100 mmol/L      CO2 26.0 mmol/L      Calcium 8.7 mg/dL      Total Protein 5.5 g/dL      Albumin 2.70 g/dL      ALT (SGPT) 31 U/L      AST (SGOT) 19 U/L      Comment: Slight hemolysis detected by analyzer. Results may be affected.        Alkaline Phosphatase 66 U/L      Total Bilirubin 0.2 mg/dL      eGFR Non African Amer 68 mL/min/1.73      Globulin 2.8 gm/dL      A/G Ratio 1.0 g/dL      BUN/Creatinine Ratio 48.8     Anion Gap 11.0 mmol/L     Narrative:      GFR Normal >60  Chronic Kidney Disease <60  Kidney Failure <15      CK [[id number]]  (Normal) Collected: 11/09/21 0457    Specimen: Blood Updated: 11/09/21 0622     Creatine Kinase 22 U/L     Lactic Acid, Plasma [[id number]]  (Abnormal) Collected: 11/09/21 0521    Specimen: Blood Updated: 11/09/21 0620     Lactate 2.7 mmol/L     Troponin [[id number]]  (Normal) Collected: 11/09/21 0457    Specimen: Blood Updated: 11/09/21 0618     Troponin T <0.010 ng/mL     Narrative:      Troponin T Reference Range:  <= 0.03 ng/mL-   Negative for AMI  >0.03 ng/mL-     Abnormal for myocardial necrosis.  Clinicians would have to utilize clinical acumen, EKG, Troponin and serial changes to determine if it is an Acute Myocardial Infarction or myocardial injury due to an underlying chronic condition.       Results may be falsely decreased if patient taking Biotin.      BNP [[id number]]  (Abnormal) Collected: 11/09/21 0457    Specimen: Blood Updated: 11/09/21 0617     proBNP 3,851.0 pg/mL     Narrative:      Among patients with dyspnea, NT-proBNP is highly sensitive for the detection of acute congestive heart failure. In addition NT-proBNP of <300 pg/ml effectively rules out  acute congestive heart failure with 99% negative predictive value.    Results may be falsely decreased if patient taking Biotin.      D-dimer, Quantitative [[id number]]  (Normal) Collected: 11/09/21 0457    Specimen: Blood Updated: 11/09/21 0611     D-Dimer, Quantitative <0.22 mg/L (FEU)     Narrative:      Reference Range is 0-0.50 mg/L FEU. However, results <0.50 mg/L FEU tends to rule out DVT or PE. Results >0.50 mg/L FEU are not useful in predicting absence or presence of DVT or PE.      [facility] Draw [[id number]] Collected: 11/09/21 0457    Specimen: Blood Updated: 11/09/21 0601    Narrative:      The following orders were created for panel order [facility] Draw.  Procedure                               Abnormality         Status                     ---------                               -----------         ------                     Green Top (Gel)[[id number]]                                  Final result               Lavender Top[[id number]]                                     Final result               Red Top[[id number]]                                          Final result               Light Blue Top[[id number]]                                   Final result                 Please view results for these tests on the individual orders.    Light Blue Top [[id number]] Collected: 11/09/21 0457    Specimen: Blood Updated: 11/09/21 0601     Extra Tube hold for add-on     Comment: Auto resulted       Lavender Top [[id number]] Collected: 11/09/21 0457    Specimen: Blood Updated: 11/09/21 0601     Extra Tube hold for add-on     Comment: Auto resulted       Red Top [[id number]] Collected: 11/09/21 0457    Specimen: Blood Updated: 11/09/21 0601     Extra Tube Hold for add-ons.     Comment: Auto resulted.       Green Top (Gel) [[id number]] Collected: 11/09/21 0457    Specimen: Blood Updated: 11/09/21 0601     Extra Tube Hold for add-ons.     Comment: Auto resulted.           Imaging Results (Last 72 Hours)     Procedure  Component Value Units Date/Time    XR Chest 1 View [[id number]] Collected: 11/09/21 0649     Updated: 11/09/21 0653    Narrative:      EXAMINATION:  XR CHEST 1 VW-  11/9/2021 5:25 AM CST     HISTORY: R00.1-Bradycardia, unspecified     COMPARISON: 9/26/2021.     FINDINGS:  There is no dense infiltrate or effusion. There is stable  bronchial wall thickening. Heart size is upper limits of normal. The  thoracic aorta is atheromatous. There is a loop recorder projected over  the left chest.       Impression:      1. No focal infiltrate or effusion.  2. Stable bronchial wall thickening.        This report was finalized on 11/09/2021 06:50 by Dr. Justice Lo MD.             ASSESSMENT/PLAN       Examination and evaluation of wound(s) was performed.    DIAGNOSIS:   Traumatic hematoma of left leg  Moderate malnutrition  Impaired mobility and ADLs  Junctional bradycardia  Hypotension    PLAN:   Wound Locations Left anterior lower leg   Wound Care Instructions Clean with NS.  Apply Dakins soaked gauze to wound.  Cover with ABD pad and wrap with Kerlix.  Change every 12 hours.   Cleanse Normal Saline    Dakins Solution 1/4 Strength   Dressing: Abdominal Pad   Securement Roll Gauze         Discussed findings and treatment plan including risks, benefits, and treatment options with patient and patients daughter in detail. Patient agreed with treatment plan.      This document has been electronically signed by ORLIN Alexander on 11/10/2021 11:35 CST

## 2021-11-10 NOTE — CONSULTS
NEUROSURGERY INITIAL HOSPITAL ENCOUNTER    Assessment/Plan:   Josi Mohamud is a 75 y.o. 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  She presents from a long-term skilled nursing facility with a new problem of bradycardia (20-30) and hypotension with a systolic BP in the 70's. Physical exam findings of bright and awake, oriented x3, follows simple commands with minimal prompting, lower extremity weakness, gross hyperreflexia.  Their imaging, noncontrasted CT of the head shows an evolving left occipital lobe infarct, no acute blood products or ventriculomegaly.  Prior MRI of the brain from 7/23/2021 demonstrates a left occipital cystic enhancing mass likely consistent with her malignant lung cancer.    ECOG/WHO: (4) Completely Disabled, Unable to Carry Out Self Care & Confined to Bed or Chair  KPS: 20:  Hospitalization necessary;  requrires active supportive care    Differential Diagnosis:   Lung cancer with mets to the brain  Bradycardia and hypotension  New onset atrial fibrillation necessitating anticoagulation    Recommendations:  Full anticoagulation is not contraindicated in the presence of lesions alone without craniotomy.  Would caution in the setting of renal cell metastasis or melanoma as they have a higher rate of hemorrhage.    Consult at the request of Dr. White.    I discussed the patients findings and my recommendations with patient, family and consulting provider    Thank you very much for this interesting consult.     Level of Risk: Moderate due to: acute illness with sytemic symptoms  MDM: Moderate  Mod = 42864, Cyhq=19890  ___________________________________________________________________    Reason for consult: Recommendations for anticoagulation in the setting of brain  mets.    Chief Complaint:   Chief Complaint   Patient presents with   • Slow Heart Rate   • Hypertension     HPI: Josi Mohamud is a 75 y.o. 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      She presents to the ED on 11/9/2021 from [facility], with a new problem of bradycardia (20-30) and hypotension with systolic blood pressure in the 70s.  Work-up in the emergency department reveals lactic acid 2.7, negative Covid swab, BNP 3851.  CMP shows glucose 394, BUN 40, albumin 2.70.  D-dimer, troponin, procalcitonin, CK all within normal limits.  White blood cell count within normal limits with hemoglobin 11.0.  TSH low at 0.064.    The patient is a poor historian, however her daughter, Franc Egan, whom was at bedside reports approximately 3 months ago Ms. Mohamud broke both of her feet and has not ambulated since.  Family additionally reports Ms. Mohamud is undergone stereotactic radiosurgery per Dr. Rosen for intracranial lesion.  Family states she is capable of feeding herself, however requires near total care in all other aspects of her life.  Ms. Mohamud denies headaches or visual disturbances.  She has no additional complaints.    Review of Systems   HENT: Negative.    Eyes: Negative.    Respiratory: Negative.    Cardiovascular: Negative.    Gastrointestinal: Negative.    Endocrine: Negative.    Genitourinary: Negative.    Musculoskeletal: Negative.    Skin: Positive for wound.   Allergic/Immunologic: Negative.    Neurological: Positive for weakness.   Hematological: Negative.    Psychiatric/Behavioral: Negative.    All other systems reviewed and are negative.     Past Medical History:  has a past medical history of Anticoagulated (7/21/2018), Bilateral carotid artery  stenosis (6/8/2021), Bronchitis, Cancer (HCC), COPD (chronic obstructive pulmonary disease) (HCC), COPD, moderate (HCC) (7/8/2019), Coronary artery disease, Coronary artery disease involving native coronary artery of native heart without angina pectoris (5/19/2017), COVID-19, COVID-19 vaccine series completed, Essential hypertension (5/19/2017), Gastroesophageal reflux disease (7/21/2018), GERD (gastroesophageal reflux disease), History of transfusion, Hodgkin's lymphoma (10/11/2019), Hyperlipidemia, Hypertension, Malignant neoplasm of upper lobe of left lung (HCC) (7/8/2019), Mixed hyperlipidemia (6/8/2021), Obesity (BMI 30-39.9) (3/9/2020), PAF (paroxysmal atrial fibrillation) (HCC), Parotid neoplasm (7/21/2018), Persistent asthma without complication (7/8/2019), Renal cyst (2/24/2020), Seizures (HCC), Status post stereotactic radiosurgery (2/24/2020), Stented coronary artery (5/19/2017), Thyroid nodule (7/21/2018), and Unilateral complete paralysis of vocal cord (9/13/2021).    Past Surgical History:  has a past surgical history that includes Appendectomy; Cholecystectomy; Splenectomy, total; Liver biopsy; Appendectomy; Cardiac electrophysiology procedure (N/A, 5/23/2017); Cardiac catheterization; US Guided Fine Needle Aspiration (8/7/2018); Coronary angioplasty with stent; Myringotomy w/ tubes (Left, 11/22/2019); Ankle fracture surgery (Left, 8/15/2020); Incision and drainage of wound (Left, 8/15/2020); and incision and drainage leg (Left, 10/22/2021).    Family History: family history includes Esophageal cancer in her brother; Lung cancer in her brother.    Social History:  reports that she quit smoking about 14 months ago. She started smoking about 60 years ago. She has a 29.00 pack-year smoking history. She has never used smokeless tobacco. She reports that she does not drink alcohol and does not use drugs.    Allergies: Contrast dye and Penicillins    Home Medications:   Current Facility-Administered  Medications:   •  acetaminophen (TYLENOL) tablet 650 mg, 650 mg, Oral, Q4H PRN, Milo White DO  •  aluminum-magnesium hydroxide-simethicone (MAALOX MAX) 400-400-40 MG/5ML suspension 15 mL, 15 mL, Oral, Q6H PRN, Milo White DO  •  apixaban (ELIQUIS) tablet 5 mg, 5 mg, Oral, Q12H, Milo White DO, 5 mg at 11/09/21 1040  •  aspirin EC tablet 81 mg, 81 mg, Oral, Daily, Milo White DO, 81 mg at 11/10/21 0845  •  atorvastatin (LIPITOR) tablet 10 mg, 10 mg, Oral, Nightly, Milo White DO, 10 mg at 11/09/21 2034  •  benzonatate (TESSALON) capsule 200 mg, 200 mg, Oral, Q8H PRN, Milo White DO, 200 mg at 11/09/21 1617  •  budesonide-formoterol (SYMBICORT) 80-4.5 MCG/ACT inhaler 2 puff, 2 puff, Inhalation, BID - RT, Milo White DO, 2 puff at 11/10/21 0737  •  calcium carbonate (TUMS) chewable tablet 500 mg (200 mg elemental), 2 tablet, Oral, Q4H PRN, Milo White DO  •  dexamethasone (DECADRON) tablet 4 mg, 4 mg, Oral, TID With Meals, Milo White DO, 4 mg at 11/10/21 0846  •  dextrose (D50W) (25 g/50 mL) IV injection 25 g, 25 g, Intravenous, Q15 Min PRN, Milo White DO, 25 g at 11/10/21 0525  •  dextrose (GLUTOSE) oral gel 15 g, 15 g, Oral, Q15 Min PRN, Milo White DO  •  DOPamine 400 mg in 250 mL D5W infusion, 2-20 mcg/kg/min, Intravenous, Titrated, Milo White DO, Stopped at 11/09/21 0900  •  furosemide (LASIX) tablet 40 mg, 40 mg, Oral, Daily, Milo White DO, 40 mg at 11/10/21 0846  •  gabapentin (NEURONTIN) capsule 100 mg, 100 mg, Oral, TID, Milo White DO, 100 mg at 11/10/21 0844  •  glucagon (human recombinant) (GLUCAGEN DIAGNOSTIC) injection 1 mg, 1 mg, Subcutaneous, Q15 Min PRN, Milo White DO  •  insulin lispro (humaLOG) injection 0-14 Units, 0-14 Units, Subcutaneous, 4x Daily With Meals & Nightly, Milo White DO, 3 Units at 11/10/21 0844  •  ipratropium-albuterol (DUO-NEB)  nebulizer solution 3 mL, 3 mL, Nebulization, 4x Daily PRN, Milo White DO  •  levETIRAcetam (KEPPRA) tablet 500 mg, 500 mg, Oral, BID, Milo White DO, 500 mg at 11/10/21 0846  •  norepinephrine (LEVOPHED) 8 mg in 250 mL NS infusion (premix), 0.02-0.3 mcg/kg/min, Intravenous, Titrated, Stveen Prieto MD, Last Rate: 6.6 mL/hr at 11/10/21 0940, 0.04 mcg/kg/min at 11/10/21 0940  •  nystatin (MYCOSTATIN) 191436 UNIT/GM cream 1 application, 1 application, Topical, Q12H, Milo White DO, 1 application at 11/10/21 0847  •  ondansetron (ZOFRAN) injection 4 mg, 4 mg, Intravenous, Q6H PRN, Milo White DO  •  ondansetron (ZOFRAN) tablet 4 mg, 4 mg, Oral, Q6H PRN, Milo White DO  •  pantoprazole (PROTONIX) EC tablet 40 mg, 40 mg, Oral, QAM, Milo White DO, 40 mg at 11/09/21 1155  •  sodium chloride 0.9 % flush 10 mL, 10 mL, Intravenous, PRN, Milo White DO  •  sodium chloride 0.9 % flush 10 mL, 10 mL, Intravenous, Q12H, Milo White DO, 10 mL at 11/10/21 0847  •  sodium chloride 0.9 % flush 10 mL, 10 mL, Intravenous, PRN, Milo White DO  •  sodium hypochlorite (DAKIN'S 1/4 STRENGTH) 0.125 % topical solution 0.125% solution, , Topical, Q12H, Gabriella Sunshine APRN  •  traMADol (ULTRAM) tablet 50 mg, 50 mg, Oral, Q6H PRN, Milo White DO    Medications: Scheduled Meds:apixaban, 5 mg, Oral, Q12H  aspirin, 81 mg, Oral, Daily  atorvastatin, 10 mg, Oral, Nightly  budesonide-formoterol, 2 puff, Inhalation, BID - RT  dexamethasone, 4 mg, Oral, TID With Meals  furosemide, 40 mg, Oral, Daily  gabapentin, 100 mg, Oral, TID  insulin lispro, 0-14 Units, Subcutaneous, 4x Daily With Meals & Nightly  levETIRAcetam, 500 mg, Oral, BID  nystatin, 1 application, Topical, Q12H  pantoprazole, 40 mg, Oral, QAM  sodium chloride, 10 mL, Intravenous, Q12H  sodium hypochlorite, , Topical, Q12H      Continuous Infusions:DOPamine, 2-20 mcg/kg/min, Last Rate:  Stopped (11/09/21 0900)  norepinephrine, 0.02-0.3 mcg/kg/min, Last Rate: 0.04 mcg/kg/min (11/10/21 0940)      PRN Meds:.•  acetaminophen  •  aluminum-magnesium hydroxide-simethicone  •  benzonatate  •  calcium carbonate  •  dextrose  •  dextrose  •  glucagon (human recombinant)  •  ipratropium-albuterol  •  ondansetron  •  ondansetron  •  sodium chloride  •  sodium chloride  •  traMADol    Vital Signs  Temp:  [98.3 °F (36.8 °C)-98.6 °F (37 °C)] 98.6 °F (37 °C)  Heart Rate:  [] 148  Resp:  [16-22] 18  BP: ()/() 121/59    Physical Exam  Physical Exam  Vitals and nursing note reviewed.   Constitutional:       General: She is not in acute distress.     Appearance: Normal appearance. She is well-developed and well-groomed. She is obese. She is not ill-appearing, toxic-appearing or diaphoretic.          Comments: BMI 32.27   HENT:      Head: Normocephalic and atraumatic.      Right Ear: Hearing normal.      Left Ear: Hearing normal.   Eyes:      Extraocular Movements: EOM normal.      Conjunctiva/sclera: Conjunctivae normal.      Pupils: Pupils are equal, round, and reactive to light.   Neck:      Trachea: Trachea normal.   Cardiovascular:      Rate and Rhythm: Tachycardia present. Rhythm irregularly irregular.   Pulmonary:      Effort: Pulmonary effort is normal. No tachypnea, bradypnea, accessory muscle usage or respiratory distress.   Abdominal:      Palpations: Abdomen is soft.   Musculoskeletal:      Cervical back: Full passive range of motion without pain and neck supple.   Skin:     General: Skin is warm and dry.   Neurological:      Mental Status: She is alert and oriented to person, place, and time.      GCS: GCS eye subscore is 4. GCS verbal subscore is 5. GCS motor subscore is 6.      Deep Tendon Reflexes:      Reflex Scores:       Tricep reflexes are 0 on the right side and 0 on the left side.       Bicep reflexes are 0 on the right side and 0 on the left side.       Brachioradialis reflexes  are 0 on the right side and 0 on the left side.       Patellar reflexes are 0 on the right side and 0 on the left side.       Achilles reflexes are 0 on the right side and 0 on the left side.  Psychiatric:         Speech: Speech normal.         Behavior: Behavior normal. Behavior is cooperative.       Neurologic Exam     Mental Status   Oriented to person, place, and time.   Speech: speech is normal   Level of consciousness: alert  Able to name object.     Bright and awake.  Oriented x3.  Names objects.  Follows simple commands with minimal prompting.     Cranial Nerves     CN II   Visual fields full to confrontation.     CN III, IV, VI   Pupils are equal, round, and reactive to light.  Extraocular motions are normal.     CN V   Facial sensation intact.     CN VII   Facial expression full, symmetric.     CN VIII   CN VIII normal.     CN IX, X   CN IX normal.     CN XI   CN XI normal.     Motor Exam   Right arm tone: normal  Left arm tone: normal  Right leg tone: normal  Left leg tone: normal    Strength   Right deltoid: 5/5  Left deltoid: 5/5  Right biceps: 5/5  Left biceps: 5/5  Right triceps: 5/5  Left triceps: 5/5  Right wrist extension: 5/5  Left wrist extension: 5/5  Right iliopsoas: 3/5  Left iliopsoas: 3/5  Right quadriceps: 3/5  Left quadriceps: 3/5  Right anterior tibial: 2/5  Left anterior tibial: 2/5  Right gastroc: 2/5  Left gastroc: 2/5       Sensory Exam   Right arm light touch: normal  Left arm light touch: normal  Right leg light touch: normal  Left leg light touch: normal    Gait, Coordination, and Reflexes     Tremor   Resting tremor: absent  Intention tremor: absent  Action tremor: absent    Reflexes   Right brachioradialis: 0  Left brachioradialis: 0  Right biceps: 0  Left biceps: 0  Right triceps: 0  Left triceps: 0  Right patellar: 0  Left patellar: 0  Right achilles: 0  Left achilles: 0  Right plantar: equivocal  Left plantar: equivocal  Right Rodriguez: absent  Left Rodriguez: absent  Right ankle  clonus: absent  Left ankle clonus: absent  Right pendular knee jerk: absent  Left pendular knee jerk: absent  Nonambulatory     Results Review:   Independent review and interpretation of imaging  Imaging Results (Last 24 Hours)     ** No results found for the last 24 hours. **        MRI brain:  MRI spine:   CT Head:  CT c-spine:  CT t-spine:  CT l-spine:  X-ray:    I reviewed the patient's new clinical results.  Lab Results (last 24 hours)     Procedure Component Value Units Date/Time    Urine Culture - Urine, Urine, Catheter [[id number]]  (Abnormal) Collected: 11/09/21 1158    Specimen: Urine, Catheter Updated: 11/10/21 1208     Urine Culture >100,000 CFU/mL Gram Negative Bacilli    POC Glucose Once [[id number]]  (Abnormal) Collected: 11/10/21 0750    Specimen: Blood Updated: 11/10/21 0801     Glucose 160 mg/dL      Comment: : [id number] Button JordanMeter ID: [id number]       POC Glucose Once [[id number]]  (Abnormal) Collected: 11/10/21 0543    Specimen: Blood Updated: 11/10/21 0554     Glucose 141 mg/dL      Comment: : [id number] Cobble Felicia Amina EMeter ID: [id number]       POC Glucose Once [[id number]]  (Abnormal) Collected: 11/10/21 0522    Specimen: Blood Updated: 11/10/21 0533     Glucose 65 mg/dL      Comment: : [id number] Lian DamicoaMeter ID: [id number]       Basic Metabolic Panel [[id number]]  (Abnormal) Collected: 11/10/21 0344    Specimen: Blood Updated: 11/10/21 0521     Glucose 47 mg/dL      BUN 42 mg/dL      Creatinine 0.85 mg/dL      Sodium 137 mmol/L      Potassium 3.8 mmol/L      Chloride 97 mmol/L      CO2 32.0 mmol/L      Calcium 8.0 mg/dL      eGFR Non African Amer 65 mL/min/1.73      BUN/Creatinine Ratio 49.4     Anion Gap 8.0 mmol/L     Narrative:      GFR Normal >60  Chronic Kidney Disease <60  Kidney Failure <15      CBC Auto Differential [[id number]]  (Abnormal) Collected: 11/10/21 0344    Specimen: Blood Updated: 11/10/21 0444     WBC 10.17 10*3/mm3      RBC 3.14 10*6/mm3       Hemoglobin 11.0 g/dL      Hematocrit 33.3 %      .1 fL      MCH 35.0 pg      MCHC 33.0 g/dL      RDW 14.9 %      RDW-SD 57.9 fl      MPV 10.4 fL      Platelets 244 10*3/mm3      Neutrophil % 72.7 %      Lymphocyte % 19.7 %      Monocyte % 4.5 %      Eosinophil % 0.1 %      Basophil % 0.4 %      Neutrophils, Absolute 7.40 10*3/mm3      Lymphocytes, Absolute 2.00 10*3/mm3      Monocytes, Absolute 0.46 10*3/mm3      Eosinophils, Absolute 0.01 10*3/mm3      Basophils, Absolute 0.04 10*3/mm3     POC Glucose Once [[id number]]  (Abnormal) Collected: 11/09/21 2033    Specimen: Blood Updated: 11/09/21 2044     Glucose 275 mg/dL      Comment: : [id number] Osbaldo Tian ID: [id number]       POC Glucose Once [[id number]]  (Abnormal) Collected: 11/09/21 1746    Specimen: Blood Updated: 11/09/21 1757     Glucose 255 mg/dL      Comment: : [id number] Jim Turner ID: [id number]       Troponin [[id number]]  (Abnormal) Collected: 11/09/21 1550    Specimen: Blood Updated: 11/09/21 1648     Troponin T 0.304 ng/mL     Narrative:      Troponin T Reference Range:  <= 0.03 ng/mL-   Negative for AMI  >0.03 ng/mL-     Abnormal for myocardial necrosis.  Clinicians would have to utilize clinical acumen, EKG, Troponin and serial changes to determine if it is an Acute Myocardial Infarction or myocardial injury due to an underlying chronic condition.       Results may be falsely decreased if patient taking Biotin.      Urinalysis, Microscopic Only - Urine, Catheter [[id number]]  (Abnormal) Collected: 11/09/21 1158    Specimen: Urine, Catheter Updated: 11/09/21 1258     RBC, UA None Seen /HPF      WBC, UA 6-12 /HPF      Bacteria, UA None Seen /HPF      Squamous Epithelial Cells, UA None Seen /HPF      Hyaline Casts, UA None Seen /LPF      Triple Phosphate Crystals, UA Moderate/2+ /HPF      Amorphous Crystals, UA Moderate/2+ /HPF      Methodology Manual Light Microscopy    Urinalysis With Culture If Indicated -  Urine, Catheter [[id number]]  (Abnormal) Collected: 11/09/21 1158    Specimen: Urine, Catheter Updated: 11/09/21 1258     Color, UA Yellow     Appearance, UA Turbid     pH, UA 8.5     Specific Gravity, UA 1.021     Glucose,  mg/dL (Trace)     Ketones, UA Negative     Bilirubin, UA Negative     Blood, UA Negative     Protein, UA Trace     Leuk Esterase, UA Moderate (2+)     Nitrite, UA Negative     Urobilinogen, UA 1.0 E.U./dL        Matthias Mcfadden, APRN

## 2021-11-10 NOTE — THERAPY EVALUATION
Patient Name: Josi Mohamud  : 1945    MRN: [id number]                              Today's Date: 11/10/2021       Admit Date: 2021    Visit Dx:     ICD-10-CM ICD-9-CM   1. Symptomatic bradycardia  R00.1 427.89   2. Decreased activities of daily living (ADL)  Z78.9 V49.89   3. Impaired mobility  Z74.09 799.89     Patient Active Problem List   Diagnosis   • Essential hypertension   • Anticoagulated   • PAF (paroxysmal atrial fibrillation) (HCC)   • Brain mass   • Brain metastasis (HCC)   • Junctional bradycardia   • Hypotension     Past Medical History:   Diagnosis Date   • Anticoagulated 2018   • Bilateral carotid artery stenosis 2021   • Bronchitis    • Cancer (HCC)     lung   • COPD (chronic obstructive pulmonary disease) (HCC)    • COPD, moderate (HCC) 2019   • Coronary artery disease    • Coronary artery disease involving native coronary artery of native heart without angina pectoris 2017   • COVID-19    • COVID-19 vaccine series completed    • Essential hypertension 2017   • Gastroesophageal reflux disease 2018   • GERD (gastroesophageal reflux disease)    • History of transfusion    • Hx of Hodgkin's lymphoma 10/11/2019   • Hyperlipidemia    • Hypertension    • Malignant neoplasm of upper lobe of left lung (HCC) 2019   • Mixed hyperlipidemia 2021   • Obesity (BMI 30-39.9) 3/9/2020   • PAF (paroxysmal atrial fibrillation) (HCC)    • Parotid neoplasm 2018   • Persistent asthma without complication 2019   • Renal cyst 2020   • Seizures (HCC)    • Status post stereotactic radiosurgery 2020   • Stented coronary artery 2017   • Thyroid nodule 2018   • Unilateral complete paralysis of vocal cord 2021     Past Surgical History:   Procedure Laterality Date   • ANKLE OPEN REDUCTION INTERNAL FIXATION Left 8/15/2020    Procedure: ANKLE OPEN REDUCTION INTERNAL FIXATION;  Surgeon: Keyshawn Medrano MD;  Location: [facility] OR;  Service:  Orthopedics;  Laterality: Left;   • APPENDECTOMY     • APPENDECTOMY     • CARDIAC CATHETERIZATION     • CARDIAC ELECTROPHYSIOLOGY PROCEDURE N/A 5/23/2017    Procedure: Loop insertion; LINQ;  Surgeon: Jose Monteiro MD;  Location:  [facility] CATH INVASIVE LOCATION;  Service:    • CHOLECYSTECTOMY     • CORONARY ANGIOPLASTY WITH STENT PLACEMENT      X 1    • INCISION AND DRAINAGE LEG Left 10/22/2021    Procedure: INCISION AND DRAINAGE ANKLE HEMATOMA PRIMARY CLOSURE LACERATION with splint;  Surgeon: Randall Prasad DPM;  Location:  [facility] OR;  Service: Podiatry;  Laterality: Left;   • INCISION AND DRAINAGE OF WOUND Left 8/15/2020    Procedure: INCISION AND DRAINAGE WOUND;  Surgeon: Keyshawn Medrano MD;  Location:  [facility] OR;  Service: Orthopedics;  Laterality: Left;   • LIVER BIOPSY     • MYRINGOTOMY W/ TUBES Left 11/22/2019    Procedure: LEFT MYRINGOTOMY WITH INSERTION OF EAR TUBE WITH RIGHT EAR EXAM UNDER ANESTHESIA;  Surgeon: Se Mcnulty MD;  Location:  [facility] OR;  Service: ENT   • SPLENECTOMY     • US GUIDED FINE NEEDLE ASPIRATION  8/7/2018      General Information     Row Name 11/10/21 1019          Physical Therapy Time and Intention    Document Type evaluation   Pt presented with hypotension and bradycardia. Pt s/p I&D and primary wound closure of L LE on 10/22. Per Tommy; Dr Osmar shoemaker with wt bearing in L LE as of 11/5 office visit.. Dx: junctional bradycardia, hypotension.  -SUBHASH     Mode of Treatment physical therapy  -SUBHASH     Row Name 11/10/21 1019          General Information    Patient Profile Reviewed yes  -SUBHASH     Prior Level of Function --  pt is a poor historian. she reports independence at SNF with t/f to w/c.  -SUBHASH     Existing Precautions/Restrictions fall; weight bearing; oxygen therapy device and L/min   Per daughter, Dr Osmar shoemaker with wt bearing in L LE as of 11/5 office visit.  -SUBHASH     Barriers to Rehab medically complex; previous functional deficit; cognitive status  -SUBHASH     Row Name  11/10/21 1019          Living Environment    Lives With facility resident  -SUBHASH     Row Name 11/10/21 1019          Home Main Entrance    Number of Stairs, Main Entrance none  -SUBHASH     Row Name 11/10/21 1019          Stairs Within Home, Primary    Number of Stairs, Within Home, Primary none  -SUBHASH     Row Name 11/10/21 1019          Cognition    Orientation Status (Cognition) oriented to; person  -SUBHASH     Row Name 11/10/21 1019          Safety Issues, Functional Mobility    Impairments Affecting Function (Mobility) balance; endurance/activity tolerance; cognition; strength; postural/trunk control; pain  -SUBHASH           User Key  (r) = Recorded By, (t) = Taken By, (c) = Cosigned By    Initials Name Provider Type    Alexandre Francis, PT DPT Physical Therapist               Mobility     Row Name 11/10/21 1019          Bed Mobility    Bed Mobility supine-sit; sit-supine  -SUBHASH     Supine-Sit Baxter (Bed Mobility) maximum assist (25% patient effort); 2 person assist  -SUBHASH     Sit-Supine [place] (Bed Mobility) maximum assist (25% patient effort); 2 person assist  -SUBHASH     Assistive Device (Bed Mobility) head of bed elevated; draw sheet  -SUBHASH     Comment (Bed Mobility) pt tolerated sitting at EOB approx 5 minutes. R lateral lean that requires vcs and Mod A t to correct.  -SUBHASH           User Key  (r) = Recorded By, (t) = Taken By, (c) = Cosigned By    Initials Name Provider Type    Alexandre Francis PT DPT Physical Therapist               Obj/Interventions     Row Name 11/10/21 1019          Range of Motion Comprehensive    Comment, General Range of Motion B hip AROM impaired ~ 50%, B knee AROM WFL, B ankle AROM impaired 25%  -SUBHASH     Row Name 11/10/21 1019          Strength Comprehensive (MMT)    Comment, General Manual Muscle Testing (MMT) Assessment B LE grossly 3-/5  -SUBHASH     Row Name 11/10/21 1019          Balance    Balance Assessment sitting static balance  -SUBHASH     Static Sitting Balance moderate impairment;  supported; sitting, edge of bed  strong R lateral lean  -SUBHASH     Row Name 11/10/21 1019          Sensory Assessment (Somatosensory)    Sensory Assessment (Somatosensory) LE sensation intact  -SUBHASH           User Key  (r) = Recorded By, (t) = Taken By, (c) = Cosigned By    Initials Name Provider Type    Alexandre Francis, PT DPT Physical Therapist               Goals/Plan     Row Name 11/10/21 1019          Bed Mobility Goal 1 (PT)    Activity/Assistive Device (Bed Mobility Goal 1, PT) sit to supine/supine to sit  -SUBHASH     [place] Level/Cues Needed (Bed Mobility Goal 1, PT) moderate assist (50-74% patient effort); 2 person assist  -SUBHASH     Time Frame (Bed Mobility Goal 1, PT) long term goal (LTG); 10 days  -SUBHASH     Progress/Outcomes (Bed Mobility Goal 1, PT) goal ongoing  -SUBHASH     Row Name 11/10/21 1019          Transfer Goal 1 (PT)    Activity/Assistive Device (Transfer Goal 1, PT) sit-to-stand/stand-to-sit; bed-to-chair/chair-to-bed; wheelchair transfer  -SUBHASH     [place] Level/Cues Needed (Transfer Goal 1, PT) moderate assist (50-74% patient effort); 2 person assist  -SUBHASH     Time Frame (Transfer Goal 1, PT) long term goal (LTG); 10 days  -SUBHASH     Progress/Outcome (Transfer Goal 1, PT) goal ongoing  -SUBHASH           User Key  (r) = Recorded By, (t) = Taken By, (c) = Cosigned By    Initials Name Provider Type    Alexandre Francis PT DPT Physical Therapist               Clinical Impression     Row Name 11/10/21 1019          Pain    Additional Documentation Pain Scale: FACES Pre/Post-Treatment (Group)  -SUBHASH     Row Name 11/10/21 1019          Pain Scale: Numbers Pre/Post-Treatment    Pain Intervention(s) Repositioned; Ambulation/increased activity  -SUBHASH     Row Name 11/10/21 1019          Pain Scale: FACES Pre/Post-Treatment    Pain: FACES Scale, Pretreatment 2-->hurts little bit  -SUBHASH     Pain Location - Side Left  -SUBHASH     Pain Location - Orientation lower  -SUBHASH     Pain Location extremity  -[facility] 11/10/21  1019          Plan of Care Review    Plan of Care Reviewed With patient  -SUBHASH     Outcome Summary PT eval complete. She is alert and oriented to self only. DAughter present to provide history. Per daughter report at last office visit on 11/5 with Dr. Prasad, pt is now WBAT LLE. Her L medial distal LE has an open wound that is being dressed/treated by wound APRN. She needs max assist x 2 for all supine <> sit bed mobility. pt tolerated sitting at EOB approx 5 minutes. She has a R lateral lean that requires vcs and Mod A  to correct. She is weak in all extremities and core musculature. PT will cont with functional mobiilty and strengthening. Recommend d.c to SNF for cont rehab.  -SUBHASH     Row Name 11/10/21 1019          Therapy Assessment/Plan (PT)    Patient/Family Therapy Goals Statement (PT) return to [facility]  -SUBHASH     Rehab Potential (PT) good, to achieve stated therapy goals  -SUBHASH     Criteria for Skilled Interventions Met (PT) yes; meets criteria; skilled treatment is necessary  -SUBHASH     Predicted Duration of Therapy Intervention (PT) until d.c  -SUBHASH     Row Name 11/10/21 1019          Positioning and Restraints    Pre-Treatment Position in bed  -SUBHASH     Post Treatment Position bed  -SUBHASH     In Bed notified nsg; fowlers; call light within reach; encouraged to call for assist; with family/caregiver; legs elevated  -SUBHASH           User Key  (r) = Recorded By, (t) = Taken By, (c) = Cosigned By    Initials Name Provider Type    Alexandre Francis, PT DPT Physical Therapist               Outcome Measures     Row Name 11/10/21 1019          How much help from another person do you currently need...    Turning from your back to your side while in flat bed without using bedrails? 2  -SUBHASH     Moving from lying on back to sitting on the side of a flat bed without bedrails? 2  -SUBHASH     Moving to and from a bed to a chair (including a wheelchair)? 1  -SUBHASH     Standing up from a chair using your arms (e.g., wheelchair, bedside chair)? 1   -SUBHASH     Climbing 3-5 steps with a railing? 1  -SUBHASH     To walk in hospital room? 1  -SUBHASH     AM-PAC 6 Clicks Score (PT) 8  -SUBHASH     Row Name 11/10/21 1019 11/10/21 1016       Functional Assessment    Outcome Measure Options AM-PAC 6 Clicks Basic Mobility (PT)  -SUBHASH AM-PAC 6 Clicks Daily Activity (OT)  -MARTI          User Key  (r) = Recorded By, (t) = Taken By, (c) = Cosigned By    Initials Name Provider Type    Alexandre Francis, PT DPT Physical Therapist    Kita Rodriguez, OTR/L Occupational Therapist                             Physical Therapy Education                 Title: PT OT SLP Therapies (In Progress)     Topic: Physical Therapy (In Progress)     Point: Mobility training (Done)     Learning Progress Summary           Patient Acceptance, E, VU by SUBHASH at 11/10/2021 1019    Comment: progression of PT POC, benefits of activity   Family Acceptance, E, VU by SUBHASH at 11/10/2021 1019    Comment: progression of PT POC, benefits of activity                   Point: Home exercise program (Not Started)     Learner Progress:  Not documented in this visit.          Point: Body mechanics (Not Started)     Learner Progress:  Not documented in this visit.          Point: Precautions (Not Started)     Learner Progress:  Not documented in this visit.                      User Key     Initials Effective Dates Name Provider Type Chapo CULLEN 08/02/16 -  Alexandre Moe, PT DPT Physical Therapist PT              PT Recommendation and Plan  Planned Therapy Interventions (PT): bed mobility training, transfer training, gait training, balance training, home exercise program, patient/family education, postural re-education, ROM (range of motion), strengthening, wheelchair management/propulsion training  Plan of Care Reviewed With: patient  Outcome Summary: PT eval complete. She is alert and oriented to self only. DAughter present to provide history. Per daughter report at last office visit on 11/5 with Dr. Prasad, pt is now  WBAT LLE. Her L medial distal LE has an open wound that is being dressed/treated by wound APRN. She needs max assist x 2 for all supine <> sit bed mobility. pt tolerated sitting at EOB approx 5 minutes. She has a R lateral lean that requires vcs and Mod A  to correct. She is weak in all extremities and core musculature. PT will cont with functional mobiilty and strengthening. Recommend d.c to SNF for cont rehab.     Time Calculation:    PT Charges     Row Name 11/10/21 1131             Time Calculation    Start Time 1019  -SUBHASH      Stop Time 1113  -SUBHASH      Time Calculation (min) 54 min  -SUBHASH      PT Received On 11/10/21  -SUBHASH      PT Goal Re-Cert Due Date 11/20/21  -SUBHASH            User Key  (r) = Recorded By, (t) = Taken By, (c) = Cosigned By    Initials Name Provider Type    Alexandre Francis, PT DPT Physical Therapist              Therapy Charges for Today     Code Description Service Date Service Provider Modifiers Qty    47419168752 HC PT EVAL MOD COMPLEXITY 4 11/10/2021 Alexandre Moe, PT DPT GP 1          PT G-Codes  Outcome Measure Options: AM-PAC 6 Clicks Basic Mobility (PT)  AM-PAC 6 Clicks Score (PT): 8  AM-PAC 6 Clicks Score (OT): 13    Alexandre Moe PT DPT  11/10/2021

## 2021-11-10 NOTE — PLAN OF CARE
Goal Outcome Evaluation:  Plan of Care Reviewed With: patient           Outcome Summary: PT eval complete. She is alert and oriented to self only. DAughter present to provide history. Per daughter report at last office visit on 11/5 with Dr. Prasad, pt is now WBAT LLE. Her L medial distal LE has an open wound that is being dressed/treated by wound APRN. She needs max assist x 2 for all supine <> sit bed mobility. pt tolerated sitting at EOB approx 5 minutes. She has a R lateral lean that requires vcs and Mod A  to correct. She is weak in all extremities and core musculature. PT will cont with functional mobiilty and strengthening. Recommend d.c to SNF for cont rehab.

## 2021-11-10 NOTE — PLAN OF CARE
Goal Outcome Evaluation:      No S/S of pain this shift.. Patient alert and orientated this shift. As needed EKG done due to rhythm change, A- FIB. Patient has history of PAF. SBP soft and MAP below 60. MD called and orders received for LEVOPHED. Flores in place with adequate out put. Urine is dark, cloudy, sediment, and odorous.

## 2021-11-10 NOTE — THERAPY EVALUATION
Patient Name: Josi Mohamud  : 1945    MRN: [id number]                              Today's Date: 11/10/2021       Admit Date: 2021    Visit Dx:     ICD-10-CM ICD-9-CM   1. Symptomatic bradycardia  R00.1 427.89   2. Decreased activities of daily living (ADL)  Z78.9 V49.89   3. Impaired mobility  Z74.09 799.89     Patient Active Problem List   Diagnosis   • Essential hypertension   • Anticoagulated   • PAF (paroxysmal atrial fibrillation) (HCC)   • Brain mass   • Brain metastasis (HCC)   • Junctional bradycardia   • Hypotension     Past Medical History:   Diagnosis Date   • Anticoagulated 2018   • Bilateral carotid artery stenosis 2021   • Bronchitis    • Cancer (HCC)     lung   • COPD (chronic obstructive pulmonary disease) (HCC)    • COPD, moderate (HCC) 2019   • Coronary artery disease    • Coronary artery disease involving native coronary artery of native heart without angina pectoris 2017   • COVID-19    • COVID-19 vaccine series completed    • Essential hypertension 2017   • Gastroesophageal reflux disease 2018   • GERD (gastroesophageal reflux disease)    • History of transfusion    • Hx of Hodgkin's lymphoma 10/11/2019   • Hyperlipidemia    • Hypertension    • Malignant neoplasm of upper lobe of left lung (HCC) 2019   • Mixed hyperlipidemia 2021   • Obesity (BMI 30-39.9) 3/9/2020   • PAF (paroxysmal atrial fibrillation) (HCC)    • Parotid neoplasm 2018   • Persistent asthma without complication 2019   • Renal cyst 2020   • Seizures (HCC)    • Status post stereotactic radiosurgery 2020   • Stented coronary artery 2017   • Thyroid nodule 2018   • Unilateral complete paralysis of vocal cord 2021     Past Surgical History:   Procedure Laterality Date   • ANKLE OPEN REDUCTION INTERNAL FIXATION Left 8/15/2020    Procedure: ANKLE OPEN REDUCTION INTERNAL FIXATION;  Surgeon: Keyshawn Medrano MD;  Location: [facility] OR;  Service:  Orthopedics;  Laterality: Left;   • APPENDECTOMY     • APPENDECTOMY     • CARDIAC CATHETERIZATION     • CARDIAC ELECTROPHYSIOLOGY PROCEDURE N/A 5/23/2017    Procedure: Loop insertion; LINQ;  Surgeon: Jose Monteiro MD;  Location:  [facility] CATH INVASIVE LOCATION;  Service:    • CHOLECYSTECTOMY     • CORONARY ANGIOPLASTY WITH STENT PLACEMENT      X 1    • INCISION AND DRAINAGE LEG Left 10/22/2021    Procedure: INCISION AND DRAINAGE ANKLE HEMATOMA PRIMARY CLOSURE LACERATION with splint;  Surgeon: Randall Prasad DPM;  Location:  [facility] OR;  Service: Podiatry;  Laterality: Left;   • INCISION AND DRAINAGE OF WOUND Left 8/15/2020    Procedure: INCISION AND DRAINAGE WOUND;  Surgeon: Keyshawn Medrano MD;  Location:  [facility] OR;  Service: Orthopedics;  Laterality: Left;   • LIVER BIOPSY     • MYRINGOTOMY W/ TUBES Left 11/22/2019    Procedure: LEFT MYRINGOTOMY WITH INSERTION OF EAR TUBE WITH RIGHT EAR EXAM UNDER ANESTHESIA;  Surgeon: Se Mcnulty MD;  Location:  [facility] OR;  Service: ENT   • SPLENECTOMY     • US GUIDED FINE NEEDLE ASPIRATION  8/7/2018      General Information     Row Name 11/10/21 1016          OT Time and Intention    Document Type evaluation  Pt presented with hypotension and bradycardia. Pt s/p I&D and primary wound closure of L LE on 10/22. Per daughter report after appt with Osmar on 11/5, pt can WB through L LE.  Dx: junctional bradycardia.  -JJ     Mode of Treatment occupational therapy  -JJ     Row Name 11/10/21 1016          General Information    Patient Profile Reviewed yes  -JJ     Prior Level of Function independent:; all household mobility; ADL's; transfer  per pt reports, poor historian.  -JJ     Existing Precautions/Restrictions fall; weight bearing; oxygen therapy device and L/min   Per daughter, Dr Prasad ok with wt bearing in L LE as of 11/5 office visit.  -JJ     Barriers to Rehab previous functional deficit; medically complex  -JJ     Row Name 11/10/21 1016          Living  Environment    Lives With facility resident  -     Row Name 11/10/21 1016          Cognition    Orientation Status (Cognition) oriented to; person  -     Row Name 11/10/21 1016          Safety Issues, Functional Mobility    Impairments Affecting Function (Mobility) balance; endurance/activity tolerance; cognition; strength; postural/trunk control  -           User Key  (r) = Recorded By, (t) = Taken By, (c) = Cosigned By    Initials Name Provider Type    Kita Rodriguez OTR/L Occupational Therapist                 Mobility/ADL's     Row Name 11/10/21 1016          Bed Mobility    Bed Mobility supine-sit; sit-supine  -     Supine-Sit [place]ourt (Bed Mobility) maximum assist (25% patient effort); 2 person assist  -     Sit-Supine [place] (Bed Mobility) maximum assist (25% patient effort); 2 person assist  -     Bed Mobility, Safety Issues decreased use of arms for pushing/pulling; decreased use of legs for bridging/pushing  -     Assistive Device (Bed Mobility) head of bed elevated; draw sheet  -     Comment (Bed Mobility) pt tolerated sitting at EOB approx 5 minutes. R lateral lean that requires vcs and Mod A t to correct.  -     Row Name 11/10/21 1016          Functional Mobility    Functional Mobility- Comment pt reports using w/c at baseline.  -[facility] Name 11/10/21 1016          Activities of Daily Living    BADL Assessment/Intervention lower body dressing  -     Row Name 11/10/21 1016          Lower Body Dressing Assessment/Training    Harcourt Level (Lower Body Dressing) don; socks; maximum assist (25% patient effort)  -     Position (Lower Body Dressing) edge of bed sitting  -           User Key  (r) = Recorded By, (t) = Taken By, (c) = Cosigned By    Initials Name Provider Type    Kita Rodriguez OTR/L Occupational Therapist               Obj/Interventions     Row Name 11/10/21 1016          Sensory Assessment (Somatosensory)    Sensory Assessment  (Somatosensory) UE sensation intact  -     Row Name 11/10/21 1016          Range of Motion Comprehensive    General Range of Motion bilateral upper extremity ROM WFL  -     Row Name 11/10/21 1016          Strength Comprehensive (MMT)    Comment, General Manual Muscle Testing (MMT) Assessment B UE strength grossly 4-/5  -     Row Name 11/10/21 1016          Balance    Balance Assessment sitting static balance  -     Static Sitting Balance moderate impairment; supported; sitting, edge of bed  -     Comment, Balance Strong R lateral lean  -           User Key  (r) = Recorded By, (t) = Taken By, (c) = Cosigned By    Initials Name Provider Type     Kita Xiong, OTR/L Occupational Therapist               Goals/Plan     Row Name 11/10/21 1016          Transfer Goal 1 (OT)    Activity/Assistive Device (Transfer Goal 1, OT) commode, bedside without drop arms  -JJ     Island Level/Cues Needed (Transfer Goal 1, OT) minimum assist (75% or more patient effort)  -JJ     Time Frame (Transfer Goal 1, OT) long term goal (LTG); by discharge  -JJ     Progress/Outcome (Transfer Goal 1, OT) goal ongoing  -     Row Name 11/10/21 1016          Dressing Goal 1 (OT)    Activity/Device (Dressing Goal 1, OT) lower body dressing  -JJ     [place]land/Cues Needed (Dressing Goal 1, OT) moderate assist (50-74% patient effort)  -JJ     Time Frame (Dressing Goal 1, OT) long term goal (LTG); by discharge  -JJ     Progress/Outcome (Dressing Goal 1, OT) goal ongoing  -     Row Name 11/10/21 1016          Toileting Goal 1 (OT)    Activity/Device (Toileting Goal 1, OT) toileting skills, all  -JJ     [place] Level/Cues Needed (Toileting Goal 1, OT) minimum assist (75% or more patient effort)  -JJ     Time Frame (Toileting Goal 1, OT) long term goal (LTG); by discharge  -JJ     Progress/Outcome (Toileting Goal 1, OT) goal ongoing  -     Row Name 11/10/21 1016          Therapy Assessment/Plan (OT)    Planned Therapy  Interventions (OT) activity tolerance training; adaptive equipment training; BADL retraining; functional balance retraining; occupation/activity based interventions; patient/caregiver education/training; transfer/mobility retraining; strengthening exercise  -           User Key  (r) = Recorded By, (t) = Taken By, (c) = Cosigned By    Initials Name Provider Type    Kita Rodriguez, OTR/L Occupational Therapist               Clinical Impression     Row Name 11/10/21 1016          Pain Assessment    Additional Documentation Pain Scale: FACES Pre/Post-Treatment (Group)  -     Row Name 11/10/21 1016          Pain Scale: Numbers Pre/Post-Treatment    Pain Intervention(s) Repositioned; Ambulation/increased activity  -     Row Name 11/10/21 1016          Pain Scale: FACES Pre/Post-Treatment    Pain: FACES Scale, Pretreatment 2-->hurts little bit  -JJ     Pain Location - Side Left  -JJ     Pain Location - Orientation lower  -J     Pain Location extremity  -     Row Name 11/10/21 1016          Plan of Care Review    Plan of Care Reviewed With patient; daughter  -     Outcome Summary OT eval completed. Pt is alert and oriented to self. Pt recently underwent I&D and primary closure of distal L LE on 10/22. Per daughter report at last office visit on 11/5 with Dr. Prasad pt is now WBAT LLE. Today, she demonstrates decreased strength, balance, activity tolerance, and cognition. She was able to bring self to sitting at EOB with Max Ax2. She demonstrates a strong R lateral lean in sitting that required Mod A to correct. She was able to tolerate static sitting at EOB approx 5 minutes. She fatigues quickly with activity. She would benefit from skilled OT services to address these deficits. Recommend d/c back to SNF.  -     Row Name 11/10/21 1016          Therapy Assessment/Plan (OT)    Patient/Family Therapy Goal Statement (OT) not stated.  -     Rehab Potential (OT) good, to achieve stated therapy goals  -      Criteria for Skilled Therapeutic Interventions Met (OT) yes; skilled treatment is necessary  -     Therapy Frequency (OT) 5 times/wk  -     Predicted Duration of Therapy Intervention (OT) 10 days  -     Row Name 11/10/21 1016          Therapy Plan Review/Discharge Plan (OT)    Anticipated Discharge Disposition (OT) skilled nursing facility  -     Row Name 11/10/21 1016          Vital Signs    Pre Patient Position Supine  -JJ     Intra Patient Position Sitting  -JJ     Post Patient Position Supine  -     Row Name 11/10/21 1016          Positioning and Restraints    Pre-Treatment Position in bed  -JJ     Post Treatment Position bed  -JJ     In Bed notified nsg; fowlers; sitting; sitting EOB; call light within reach; encouraged to call for assist; patient within staff view; with family/caregiver; side rails up x3; RLE elevated; LLE elevated  -           User Key  (r) = Recorded By, (t) = Taken By, (c) = Cosigned By    Initials Name Provider Type    Kita Rodriguez, OTR/L Occupational Therapist               Outcome Measures     Row Name 11/10/21 1016          How much help from another is currently needed...    Putting on and taking off regular lower body clothing? 1  -JJ     Bathing (including washing, rinsing, and drying) 2  -JJ     Toileting (which includes using toilet bed pan or urinal) 2  -JJ     Putting on and taking off regular upper body clothing 2  -JJ     Taking care of personal grooming (such as brushing teeth) 3  -JJ     Eating meals 3  -JJ     AM-PAC 6 Clicks Score (OT) 13  -     Row Name 11/10/21 1019          How much help from another person do you currently need...    Turning from your back to your side while in flat bed without using bedrails? 2  -SUBHASH     Moving from lying on back to sitting on the side of a flat bed without bedrails? 2  -SUBHASH     Moving to and from a bed to a chair (including a wheelchair)? 1  -SUBHASH     Standing up from a chair using your arms (e.g., wheelchair,  bedside chair)? 1  -SUBHASH     Climbing 3-5 steps with a railing? 1  -SUBHASH     To walk in hospital room? 1  -SUBHASH     AM-PAC 6 Clicks Score (PT) 8  -SUBHASH     Row Name 11/10/21 1019 11/10/21 1016       Functional Assessment    Outcome Measure Options AM-PAC 6 Clicks Basic Mobility (PT)  -SUBHASH AM-PAC 6 Clicks Daily Activity (OT)  -          User Key  (r) = Recorded By, (t) = Taken By, (c) = Cosigned By    Initials Name Provider Type    Alexandre Francis, PT DPT Physical Therapist    Kita Rodriguez, OTR/L Occupational Therapist                Occupational Therapy Education                 Title: PT OT SLP Therapies (In Progress)     Topic: Occupational Therapy (In Progress)     Point: ADL training (Done)     Description:   Instruct learner(s) on proper safety adaptation and remediation techniques during self care or transfers.   Instruct in proper use of assistive devices.              Learning Progress Summary           Patient Acceptance, E, VU by JOE at 11/10/2021 1120                   Point: Home exercise program (Not Started)     Description:   Instruct learner(s) on appropriate technique for monitoring, assisting and/or progressing therapeutic exercises/activities.              Learner Progress:  Not documented in this visit.          Point: Precautions (Done)     Description:   Instruct learner(s) on prescribed precautions during self-care and functional transfers.              Learning Progress Summary           Patient Acceptance, E, VU by MARTI at 11/10/2021 1120                   Point: Body mechanics (Not Started)     Description:   Instruct learner(s) on proper positioning and spine alignment during self-care, functional mobility activities and/or exercises.              Learner Progress:  Not documented in this visit.                      User Key     Initials Effective Dates Name Provider Type Discipline     10/29/21 -  Kita Xiong, OTR/L Occupational Therapist OT              OT Recommendation and  Plan  Planned Therapy Interventions (OT): activity tolerance training, adaptive equipment training, BADL retraining, functional balance retraining, occupation/activity based interventions, patient/caregiver education/training, transfer/mobility retraining, strengthening exercise  Therapy Frequency (OT): 5 times/wk  Plan of Care Review  Plan of Care Reviewed With: patient, daughter  Outcome Summary: OT eval completed. Pt is alert and oriented to self. Pt recently underwent I&D and primary closure of distal L LE on 10/22. Per daughter report at last office visit on 11/5 with Dr. Prasad pt is now WBAT LLE. Today, she demonstrates decreased strength, balance, activity tolerance, and cognition. She was able to bring self to sitting at EOB with Max Ax2. She demonstrates a strong R lateral lean in sitting that required Mod A to correct. She was able to tolerate static sitting at EOB approx 5 minutes. She fatigues quickly with activity. She would benefit from skilled OT services to address these deficits. Recommend d/c back to SNF.     Time Calculation:    Time Calculation- OT     Row Name 11/10/21 1120             Time Calculation- OT    OT Start Time 1016  add 12 minutes for chart review  -      OT Stop Time 1100  -      OT Time Calculation (min) 44 min  -      OT Received On 11/10/21  -      OT Goal Re-Cert Due Date 11/20/21  -            User Key  (r) = Recorded By, (t) = Taken By, (c) = Cosigned By    Initials Name Provider Type    Kita Rodriguez OTR/L Occupational Therapist              Therapy Charges for Today     Code Description Service Date Service Provider Modifiers Qty    [id number]  OT EVAL MOD COMPLEXITY 4 11/10/2021 Kita Xiong OTR/L GO 1               KADY Newman/MARYJO  11/10/2021

## 2021-11-11 NOTE — PROGRESS NOTES
[facility] Medicine Services  INPATIENT PROGRESS NOTE    Length of Stay: 2  Date of Admission: 11/9/2021  Primary Care Physician: Cayetano Crews MD    Subjective     Chief Complaint:     Bradycardia, hypotension    HPI     Pressors have been weaned.  Heart rate is in better control.  Rate is varying between 90 and 115.  Anticoagulation continues with neurosurgery making the recommendation that anticoagulation is low risk in the context of brain metastases.  Left lower extremity hematoma is dressed and is being cared for by wound care.  Plan to bolus with 500 cc normal saline and started on IV today in order to support blood pressure.  She had some hypotension when she got out of bed with physical therapy.  Hopefully, she can be transferred to the floor later today if her blood pressure improves.      Review of Systems     All pertinent negatives and positives are as above. All other systems have been reviewed and are negative unless otherwise stated.     Objective    Temp:  [98.1 °F (36.7 °C)-98.5 °F (36.9 °C)] 98.5 °F (36.9 °C)  Heart Rate:  [] 108  Resp:  [18-21] 18  BP: ()/() 108/56    Lab Results (last 24 hours)     Procedure Component Value Units Date/Time    POC Glucose Once [[id number]]  (Abnormal) Collected: 11/11/21 0627    Specimen: Blood Updated: 11/11/21 0638     Glucose 132 mg/dL      Comment: : [id number] Cobtolu Sumnerre Amina FORDeter ID: [id number]       POC Glucose Once [[id number]]  (Abnormal) Collected: 11/10/21 2118    Specimen: Blood Updated: 11/10/21 2134     Glucose 324 mg/dL      Comment: : [id number] Cobble Felicia Amina EMeter ID: [id number]       POC Glucose Once [[id number]]  (Abnormal) Collected: 11/10/21 1624    Specimen: Blood Updated: 11/10/21 1654     Glucose 330 mg/dL      Comment: : [id number] Yo TRIPPeter ID: [id number]       Urine Culture - Urine, Urine, Catheter [[id number]]  (Abnormal) Collected:  11/09/21 1158    Specimen: Urine, Catheter Updated: 11/10/21 1208     Urine Culture >100,000 CFU/mL Gram Negative Bacilli          Imaging Results (Last 24 Hours)     ** No results found for the last 24 hours. **             Intake/Output Summary (Last 24 hours) at 11/11/2021 0939  Last data filed at 11/11/2021 0023  Gross per 24 hour   Intake 437.32 ml   Output 1175 ml   Net -737.68 ml       Physical Exam  Constitutional:       Appearance: She is well-developed and overweight. She appears brighter and more interactive today.  HENT:      Head: Normocephalic and atraumatic.      Right Ear: External ear normal.      Left Ear: External ear normal.      Nose: Nose normal.      Mouth: Mucous membranes are moist.   Eyes:      General: No scleral icterus.      Conjunctiva/sclera: Conjunctivae normal. .   Cardiovascular:      Rate and Rhythm: Irregularly irregular rhythm.  Tachycardia present.      Pulses: Normal pulses.      Heart sounds: Normal heart sounds. No murmur heard.  Pulmonary:      Effort: Pulmonary effort is normal. No respiratory distress.      Breath sounds: Rhonchi (Occasional bilateral) present.   Abdominal:      General: Abdomen is flat. Bowel sounds are normal.      Palpations: Abdomen is soft. There is no mass.   Musculoskeletal:         General: Normal range of motion.      Right lower leg: Edema present.      Left lower leg: Edema present.   Skin:     General: Skin is warm and dry.      Coloration: Skin is not pale.      Comments: Large area of skin sloughing noted left lower extremity.    Sharp debridement accomplished yesterday.  Dressing has been applied.  Wound care continues to follow.  Neurological:      General: No focal deficit present.      Mental Status: She is alert and oriented to person, place, and time. Mental status is at baseline.   Psychiatric:         Mood and Affect: Mood normal.         Judgment: Judgment normal.     Results Review:  I have reviewed the labs, radiology results, and  diagnostic studies since my last progress note and made treatment changes reflective of the results.   I have reviewed the current medications.    Assessment/Plan     Active Hospital Problems    Diagnosis    • **Junctional bradycardia    • Moderate malnutrition (CMS/HCC)    • Hypotension    • Brain metastasis (HCC)    • Brain mass    • PAF (paroxysmal atrial fibrillation) (HCC)    • Anticoagulated    • Essential hypertension        PLAN:  Normal saline 500 cc bolus x1 followed by normal saline at 100 cc/h  Unable to restart other rate controlling medication secondary to soft blood pressure  If the patient's blood pressure was fluid responsive, she can be transferred to the medical surgical floor.    Electronically signed by Milo White DO, 11/11/21, 09:39 CST.

## 2021-11-11 NOTE — PROGRESS NOTES
LOS: 2 days   Patient Care Team:  Cayetano Crews MD as PCP - General (Internal Medicine)  Jose Monteiro MD as Cardiologist (Cardiology)  Se Mcnulty MD as Consulting Physician (Otolaryngology)  [facility]  Jaden Frazier MD as Consulting Physician (Pulmonary Disease)  Jeremías Hernandez APRN as Nurse Practitioner (Pulmonary Disease)  Dhaval Urena MD as Consulting Physician (Hematology and Oncology)  Sean Rosen III, MD as Consulting Physician (Radiation Oncology)    Chief Complaint:      Subjective    Josi Mohamud is a 75 y.o. female who is being seen in follow-up.    Off pressors  Feeling better  Ventricular rates are better controlled and between  bpm in general  Blood pressure is 100/56 mmHg when I examined her  Denies any chest pain  No palpitation  No presyncope  No syncope  Latest test results reviewed  BNP is persistently elevated  No overt signs of florid heart failure currently  Hemodynamically stable  Saturating well  Neurosurgery has seen patient and approved to resume Eliquis which has been subsequently done    Telemetry: Currently in atrial fibrillation with rapid ventricular response.  No pauses  Bradycardia is resolved on discontinuation of rate control agents    Review of Systems   Constitutional: No chills   Has fatigue   No fever.   HENT: Negative.    Eyes: Negative.    Respiratory: Negative for cough,   No chest wall soreness,   Shortness of breath,   no wheezing, no stridor.    Cardiovascular: As above  Gastrointestinal: Negative for abdominal distention,  No abdominal pain,   No blood in stool,   No constipation,   No diarrhea,   No nausea   No vomiting.   Endocrine: Negative.    Genitourinary: Negative for difficulty urinating, dysuria, flank pain and hematuria.   Musculoskeletal: Negative.    Skin: Negative for rash and wound.   Allergic/Immunologic: Negative.    Neurological: Negative for dizziness, syncope, weakness,   No light-headedness  No   headaches.   Hematological: Does not bruise/bleed easily.   Psychiatric/Behavioral: Negative for agitation or behavioral problems,   No confusion,   the patient is  nervous/anxious.       History:   Past Medical History:   Diagnosis Date   • Anticoagulated 7/21/2018   • Bilateral carotid artery stenosis 6/8/2021   • Bronchitis    • Cancer (HCC)     lung   • COPD (chronic obstructive pulmonary disease) (HCC)    • COPD, moderate (HCC) 7/8/2019   • Coronary artery disease    • Coronary artery disease involving native coronary artery of native heart without angina pectoris 5/19/2017   • COVID-19    • COVID-19 vaccine series completed    • Essential hypertension 5/19/2017   • Gastroesophageal reflux disease 7/21/2018   • GERD (gastroesophageal reflux disease)    • History of transfusion    • Hx of Hodgkin's lymphoma 10/11/2019   • Hyperlipidemia    • Hypertension    • Malignant neoplasm of upper lobe of left lung (HCC) 7/8/2019   • Mixed hyperlipidemia 6/8/2021   • Obesity (BMI 30-39.9) 3/9/2020   • PAF (paroxysmal atrial fibrillation) (HCC)    • Parotid neoplasm 7/21/2018   • Persistent asthma without complication 7/8/2019   • Renal cyst 2/24/2020   • Seizures (HCC)    • Status post stereotactic radiosurgery 2/24/2020   • Stented coronary artery 5/19/2017   • Thyroid nodule 7/21/2018   • Unilateral complete paralysis of vocal cord 9/13/2021     Past Surgical History:   Procedure Laterality Date   • ANKLE OPEN REDUCTION INTERNAL FIXATION Left 8/15/2020    Procedure: ANKLE OPEN REDUCTION INTERNAL FIXATION;  Surgeon: Keyshawn Medrano MD;  Location: [facility] OR;  Service: Orthopedics;  Laterality: Left;   • APPENDECTOMY     • APPENDECTOMY     • CARDIAC CATHETERIZATION     • CARDIAC ELECTROPHYSIOLOGY PROCEDURE N/A 5/23/2017    Procedure: Loop insertion; LINQ;  Surgeon: Jose Monteiro MD;  Location:  [facility] CATH INVASIVE LOCATION;  Service:    • CHOLECYSTECTOMY     • CORONARY ANGIOPLASTY WITH STENT PLACEMENT      X 1    •  INCISION AND DRAINAGE LEG Left 10/22/2021    Procedure: INCISION AND DRAINAGE ANKLE HEMATOMA PRIMARY CLOSURE LACERATION with splint;  Surgeon: Randall Prasad DPM;  Location:  [facility] OR;  Service: Podiatry;  Laterality: Left;   • INCISION AND DRAINAGE OF WOUND Left 8/15/2020    Procedure: INCISION AND DRAINAGE WOUND;  Surgeon: Keyshawn Medrano MD;  Location:  [facility] OR;  Service: Orthopedics;  Laterality: Left;   • LIVER BIOPSY     • MYRINGOTOMY W/ TUBES Left 2019    Procedure: LEFT MYRINGOTOMY WITH INSERTION OF EAR TUBE WITH RIGHT EAR EXAM UNDER ANESTHESIA;  Surgeon: Se Mcnulty MD;  Location: [facility] OR;  Service: ENT   • SPLENECTOMY     • US GUIDED FINE NEEDLE ASPIRATION  2018     Social History     Socioeconomic History   • Marital status:    • Number of children: 1   Tobacco Use   • Smoking status: Former Smoker     Packs/day: 0.50     Years: 58.00     Pack years: 29.00     Start date:      Quit date: 2020     Years since quittin.2   • Smokeless tobacco: Never Used   Vaping Use   • Vaping Use: Never used   Substance and Sexual Activity   • Alcohol use: No   • Drug use: No   • Sexual activity: Defer     Family History   Problem Relation Age of Onset   • Esophageal cancer Brother    • Lung cancer Brother    • Breast cancer Neg Hx        Labs:  WBC WBC   Date Value Ref Range Status   11/10/2021 10.17 3.40 - 10.80 10*3/mm3 Final   2021 10.44 3.40 - 10.80 10*3/mm3 Final      HGB Hemoglobin   Date Value Ref Range Status   11/10/2021 11.0 (L) 12.0 - 15.9 g/dL Final   2021 11.0 (L) 12.0 - 15.9 g/dL Final      HCT Hematocrit   Date Value Ref Range Status   11/10/2021 33.3 (L) 34.0 - 46.6 % Final   2021 33.4 (L) 34.0 - 46.6 % Final      Platelets Platelets   Date Value Ref Range Status   11/10/2021 244 140 - 450 10*3/mm3 Final   2021 242 140 - 450 10*3/mm3 Final      MCV MCV   Date Value Ref Range Status   11/10/2021 106.1 (H) 79.0 - 97.0 fL Final    11/09/2021 106.0 (H) 79.0 - 97.0 fL Final        Results from last 7 days   Lab Units 11/10/21  0344 11/09/21  0457   SODIUM mmol/L 137 137   POTASSIUM mmol/L 3.8 4.8   CHLORIDE mmol/L 97* 100   CO2 mmol/L 32.0* 26.0   BUN mg/dL 42* 40*   CREATININE mg/dL 0.85 0.82   CALCIUM mg/dL 8.0* 8.7   BILIRUBIN mg/dL  --  0.2   ALK PHOS U/L  --  66   ALT (SGPT) U/L  --  31   AST (SGOT) U/L  --  19   GLUCOSE mg/dL 47* 394*     Lab Results   Component Value Date    CKTOTAL 22 11/09/2021    TROPONINI 0.018 01/22/2019    TROPONINT 0.304 (C) 11/09/2021     PT/INR:  No results found for: PROTIME/No results found for: INR    Imaging Results (Last 72 Hours)     Procedure Component Value Units Date/Time    XR Chest 1 View [[id number]] Collected: 11/09/21 0649     Updated: 11/09/21 0653    Narrative:      EXAMINATION:  XR CHEST 1 VW-  11/9/2021 5:25 AM CST     HISTORY: R00.1-Bradycardia, unspecified     COMPARISON: 9/26/2021.     FINDINGS:  There is no dense infiltrate or effusion. There is stable  bronchial wall thickening. Heart size is upper limits of normal. The  thoracic aorta is atheromatous. There is a loop recorder projected over  the left chest.       Impression:      1. No focal infiltrate or effusion.  2. Stable bronchial wall thickening.        This report was finalized on 11/09/2021 06:50 by Dr. Justice Lo MD.          Objective     Allergies   Allergen Reactions   • Contrast Dye Itching     Contrast reaction occurred with pruritus and erythema of the upper  and lower extremities. No difficulties swallowing or breathing. Patient  received 50 mg Benadryl orally to improve symptomatology. Patient should  be pretreated with steroids and Benadryl prior to any future IV contrast  Administration. 05-     • Penicillins Rash       Medication Review: Performed  Current Facility-Administered Medications   Medication Dose Route Frequency Provider Last Rate Last Admin   • acetaminophen (TYLENOL) tablet 650 mg  650 mg Oral  Q4H PRN Milo White DO       • aluminum-magnesium hydroxide-simethicone (MAALOX MAX) 400-400-40 MG/5ML suspension 15 mL  15 mL Oral Q6H PRN Milo White DO       • apixaban (ELIQUIS) tablet 5 mg  5 mg Oral Q12H Milo White DO   5 mg at 11/11/21 0816   • aspirin EC tablet 81 mg  81 mg Oral Daily Milo White DO   81 mg at 11/11/21 0816   • atorvastatin (LIPITOR) tablet 10 mg  10 mg Oral Nightly Milo White DO   10 mg at 11/10/21 2112   • benzonatate (TESSALON) capsule 200 mg  200 mg Oral Q8H PRN Milo White DO   200 mg at 11/11/21 0623   • budesonide-formoterol (SYMBICORT) 80-4.5 MCG/ACT inhaler 2 puff  2 puff Inhalation BID - RT Milo White DO   2 puff at 11/11/21 0659   • calcium carbonate (TUMS) chewable tablet 500 mg (200 mg elemental)  2 tablet Oral Q4H PRN Milo White DO       • cefTRIAXone (ROCEPHIN) 1 g in sodium chloride 0.9 % 100 mL IVPB-VTB  1 g Intravenous Q24H Milo White  mL/hr at 11/10/21 1809 1 g at 11/10/21 1809   • dexamethasone (DECADRON) tablet 4 mg  4 mg Oral TID With Meals Milo White DO   4 mg at 11/11/21 0817   • dextrose (D50W) (25 g/50 mL) IV injection 25 g  25 g Intravenous Q15 Min PRN Milo White DO   25 g at 11/10/21 0525   • dextrose (GLUTOSE) oral gel 15 g  15 g Oral Q15 Min PRN Milo White DO       • DOPamine 400 mg in 250 mL D5W infusion  2-20 mcg/kg/min Intravenous Titrated Milo White DO   Stopped at 11/09/21 0900   • furosemide (LASIX) tablet 40 mg  40 mg Oral Daily Milo White DO   40 mg at 11/10/21 0846   • gabapentin (NEURONTIN) capsule 100 mg  100 mg Oral TID Milo White DO   100 mg at 11/11/21 0817   • glucagon (human recombinant) (GLUCAGEN DIAGNOSTIC) injection 1 mg  1 mg Subcutaneous Q15 Min PRN Milo White DO       • insulin lispro (humaLOG) injection 0-14 Units  0-14 Units Subcutaneous 4x Daily With Meals & Nightly Christopher  "Milo ADAMES DO   10 Units at 11/10/21 2122   • ipratropium-albuterol (DUO-NEB) nebulizer solution 3 mL  3 mL Nebulization 4x Daily PRN Milo White DO       • levETIRAcetam (KEPPRA) tablet 500 mg  500 mg Oral BID Milo White DO   500 mg at 11/11/21 0817   • norepinephrine (LEVOPHED) 8 mg in 250 mL NS infusion (premix)  0.02-0.3 mcg/kg/min Intravenous Titrated Steven Prieto MD   Held at 11/11/21 0023   • nystatin (MYCOSTATIN) [id number] UNIT/GM cream 1 application  1 application Topical Q12H Milo White DO   1 application at 11/11/21 0819   • ondansetron (ZOFRAN) injection 4 mg  4 mg Intravenous Q6H PRN Milo White DO   4 mg at 11/11/21 0613   • ondansetron (ZOFRAN) tablet 4 mg  4 mg Oral Q6H PRN Milo White DO       • pantoprazole (PROTONIX) EC tablet 40 mg  40 mg Oral QAM Milo White DO   40 mg at 11/11/21 0615   • sodium chloride 0.9 % flush 10 mL  10 mL Intravenous PRN Milo White DO       • sodium chloride 0.9 % flush 10 mL  10 mL Intravenous Q12H Milo White DO   10 mL at 11/11/21 0820   • sodium chloride 0.9 % flush 10 mL  10 mL Intravenous PRN Milo White DO       • sodium hypochlorite (DAKIN'S 1/4 STRENGTH) 0.125 % topical solution 0.125% solution   Topical Q12H Gabriella Sunshine APRN   Given at 11/11/21 0819   • traMADol (ULTRAM) tablet 50 mg  50 mg Oral Q6H PRN Milo White DO           Vital Sign Min/Max for last 24 hours  Temp  Min: 98.1 °F (36.7 °C)  Max: 98.5 °F (36.9 °C)   BP  Min: 81/50  Max: 149/124   Pulse  Min: 88  Max: 168   Resp  Min: 18  Max: 21   SpO2  Min: 91 %  Max: 99 %   No data recorded   Weight  Min: 84.5 kg (186 lb 4.6 oz)  Max: 85.3 kg (188 lb)     Flowsheet Rows      First Filed Value   Admission Height 162.6 cm (64\") Documented at 11/09/2021 0446   Admission Weight 87.5 kg (192 lb 12.8 oz) Documented at 11/09/2021 0446          Results for orders placed during the hospital encounter of " 11/09/21    Adult Transthoracic Echo Limited W/ Cont if Necessary Per Protocol    Interpretation Summary  · Left ventricular ejection fraction appears to be 61 - 65%. Left ventricular systolic function is normal.  · Estimated right ventricular systolic pressure from tricuspid regurgitation is normal (<35 mmHg).  · Limited echocardiogram was ordered and performed      Physical Exam:    General Appearance: Awake, alert, in no acute distress  Eyes: Pupils equal and reactive    Ears: Appear intact with no abnormalities noted  Nose: Nares normal, no drainage  Neck: supple, trachea midline, no carotid bruit and no JVD  Back: no kyphosis present,    Lungs: respirations regular, respirations even and respirations unlabored  Heart: normal S1, S2, no significant murmurs   No gallops or rubs  no rub and no click  Abdomen: normal bowel sounds, no tenderness   Skin: no bleeding, bruising or rash  Extremities: no cyanosis  Psychiatric/Behavioral: Negative for agitation, behavioral problems, confusion, the patient does  appear to be nervous/anxious.       Results Review:   I reviewed the patient's new clinical results.  I reviewed the patient's new imaging results and agree with the interpretation.  I reviewed the patient's other test results and agree with the interpretation  I personally viewed and interpreted the patient's EKG/Telemetry data    Discussed with patient  Updated patient regarding any new or relevant abnormalities on review of records or any new findings on physical exam.   Mentioned to patient about purpose of visit and desirable health short and long term goals and objectives.     Reviewed available prior notes, consults, prior visits, laboratory findings, radiology and cardiology relevant reports.   Updated chart as applicable.   I have reviewed the patient's medical history in detail and updated the computerized patient record as relevant.          Assessment/Plan       Junctional bradycardia    Essential  hypertension    Anticoagulated    PAF (paroxysmal atrial fibrillation) (HCC)    Brain mass    Brain metastasis (HCC)    Hypotension    Moderate malnutrition (CMS/HCC)      Plan    Overall ventricular rates are under fair control and currently at rest between  bpm  Anticoagulation with Eliquis has been started  Off pressors now  Results of echocardiogram available reviewed and discussed with her  If continues to improve okay to send to telemetry floor from cardiology perspective  No plans for pacemaker implantation as has reversible cause of severe bradycardia which resolved on discontinuation of beta-blocker therapy  Minimize beta agonist  2-week outpatient cardiac telemetry on discharge  Follow-up with cardiology 2 weeks after discharge  Telemetry  Deep vein thrombosis prophylaxis/precautions [on anticoagulation with Eliquis]  Appropriate diet, fluid, sodium, caffeine, stimulants intake   Questions were encouraged, asked and answered to the patient's  understanding and satisfaction.  Compliance to diet and medications       Jose Monteiro MD  11/11/21  08:32 CST    EMR Dragon/Transcription was used to dictate part of this note

## 2021-11-11 NOTE — THERAPY TREATMENT NOTE
Patient Name: Josi Mohamud  : 1945    MRN: [id number]                              Today's Date: 2021       Admit Date: 2021    Visit Dx:     ICD-10-CM ICD-9-CM   1. Symptomatic bradycardia  R00.1 427.89   2. Decreased activities of daily living (ADL)  Z78.9 V49.89   3. Impaired mobility  Z74.09 799.89   4. Traumatic hematoma of lower leg, left, subsequent encounter  S80.12XD V58.89     924.10     Patient Active Problem List   Diagnosis   • Essential hypertension   • Anticoagulated   • PAF (paroxysmal atrial fibrillation) (HCC)   • Brain mass   • Brain metastasis (HCC)   • Junctional bradycardia   • Hypotension   • Moderate malnutrition (CMS/HCC)     Past Medical History:   Diagnosis Date   • Anticoagulated 2018   • Bilateral carotid artery stenosis 2021   • Bronchitis    • Cancer (HCC)     lung   • COPD (chronic obstructive pulmonary disease) (HCC)    • COPD, moderate (HCC) 2019   • Coronary artery disease    • Coronary artery disease involving native coronary artery of native heart without angina pectoris 2017   • COVID-19    • COVID-19 vaccine series completed    • Essential hypertension 2017   • Gastroesophageal reflux disease 2018   • GERD (gastroesophageal reflux disease)    • History of transfusion    • Hx of Hodgkin's lymphoma 10/11/2019   • Hyperlipidemia    • Hypertension    • Malignant neoplasm of upper lobe of left lung (HCC) 2019   • Mixed hyperlipidemia 2021   • Obesity (BMI 30-39.9) 3/9/2020   • PAF (paroxysmal atrial fibrillation) (HCC)    • Parotid neoplasm 2018   • Persistent asthma without complication 2019   • Renal cyst 2020   • Seizures (HCC)    • Status post stereotactic radiosurgery 2020   • Stented coronary artery 2017   • Thyroid nodule 2018   • Unilateral complete paralysis of vocal cord 2021     Past Surgical History:   Procedure Laterality Date   • ANKLE OPEN REDUCTION INTERNAL FIXATION Left  8/15/2020    Procedure: ANKLE OPEN REDUCTION INTERNAL FIXATION;  Surgeon: Keyshawn Medrano MD;  Location:  [facility] OR;  Service: Orthopedics;  Laterality: Left;   • APPENDECTOMY     • APPENDECTOMY     • CARDIAC CATHETERIZATION     • CARDIAC ELECTROPHYSIOLOGY PROCEDURE N/A 5/23/2017    Procedure: Loop insertion; LINQ;  Surgeon: Jose Monteiro MD;  Location:  [facility]ATH INVASIVE LOCATION;  Service:    • CHOLECYSTECTOMY     • CORONARY ANGIOPLASTY WITH STENT PLACEMENT      X 1    • INCISION AND DRAINAGE LEG Left 10/22/2021    Procedure: INCISION AND DRAINAGE ANKLE HEMATOMA PRIMARY CLOSURE LACERATION with splint;  Surgeon: Randall Prasad DPM;  Location:  [facility] OR;  Service: Podiatry;  Laterality: Left;   • INCISION AND DRAINAGE OF WOUND Left 8/15/2020    Procedure: INCISION AND DRAINAGE WOUND;  Surgeon: Keyshawn Medrano MD;  Location:  [facility] OR;  Service: Orthopedics;  Laterality: Left;   • LIVER BIOPSY     • MYRINGOTOMY W/ TUBES Left 11/22/2019    Procedure: LEFT MYRINGOTOMY WITH INSERTION OF EAR TUBE WITH RIGHT EAR EXAM UNDER ANESTHESIA;  Surgeon: Se Mcnulty MD;  [facility];  Service: ENT   • SPLENECTOMY     • US GUIDED FINE NEEDLE ASPIRATION  8/7/2018      General Information     Row Name 11/11/21 0739          OT Time and Intention    Document Type therapy note (daily note)  -MARTI (r) HUMAIRA (t) MARTI (c)     Mode of Treatment occupational therapy  -MARTI (r) HUMAIRA (t) MARTI (c)     Row Name 11/11/21 0739          General Information    Patient Profile Reviewed yes  -MARTI (r) HUMAIRA (t) MARTI (c)     Existing Precautions/Restrictions fall; weight bearing  WBAT L LE  -MARTI (r) HUMAIRA (t) MARTI (c)     Row Name 11/11/21 0739          Cognition    Orientation Status (Cognition) oriented x 3  -MARTI (r) HUMAIRA (t) MARTI (c)     Row Name 11/11/21 0739          Safety Issues, Functional Mobility    Impairments Affecting Function (Mobility) balance; endurance/activity tolerance; cognition; strength; postural/trunk control; pain  -MARTI (r)  HUMAIRA (t) JJ (c)           User Key  (r) = Recorded By, (t) = Taken By, (c) = Cosigned By    Initials Name Provider Type    Kita Rodriguez OTR/L, CSRS Occupational Therapist    Juan Chang, OT Student OT Student                 Mobility/ADL's     Row Name 11/11/21 0739          Bed Mobility    Bed Mobility supine-sit; sit-supine  -JJ (r) HUMAIRA (t) JJ (c)     Supine-Sit [place] (Bed Mobility) maximum assist (25% patient effort); 2 person assist  -JJ (r) HUMAIRA (t) JJ (c)     Sit-Supine [place] (Bed Mobility) maximum assist (25% patient effort); 2 person assist  -JJ (r) HUMAIRA (t) JJ (c)     Bed Mobility, Safety Issues decreased use of arms for pushing/pulling; decreased use of legs for bridging/pushing  -JJ (r) HUMAIRA (t) JJ (c)     Assistive Device (Bed Mobility) head of bed elevated; draw sheet; bed rails  -JJ (r) HUMAIRA (t) JJ (c)     Comment (Bed Mobility) pt sat EOB for roughly 6 min. She demonstrated a posterior lean requiring verbal cues and min A to correct  -JJ (r) HUMAIRA (t) JJ (c)     Row Name 11/11/21 0739          Transfers    Comment (Transfers) unable to perform due to BP drop  -JJ (r) HUMAIRA (t) JJ (c)     Row Name 11/11/21 0739          Activities of Daily Living    BADL Assessment/Intervention grooming  -JJ (r) HUMAIRA (t) JJ (c)     Row Name 11/11/21 0739          Grooming Assessment/Training    [place] Level (Grooming) grooming skills; hair care, combing/brushing; minimum assist (75% patient effort); wash face, hands; set up  washed hair mod A  -JJ (r) HUMAIRA (t) JJ (c)     Position (Grooming) sitting up in bed  -JJ (r) HUMAIRA (t) JJ (c)           User Key  (r) = Recorded By, (t) = Taken By, (c) = Cosigned By    Initials Name Provider Type    Kita Rodriguez OTR/L, CSRS Occupational Therapist    Juan Chang, OT Student OT Student               Obj/Interventions     Row Name 11/11/21 0739          Balance    Balance Assessment sitting static balance  -MARTI (r) HUMAIRA (t) MARTI (c)     Static Sitting  Balance moderate impairment; supported; sitting, edge of bed  -MARTI (r) HUMAIRA (t) MARTI (c)     Comment, Balance pt sat EOB for roughly 6 min. She demonstrated a posterior lean requiring verbal cues and min A to correct  -MARTI (r) HUMAIRA (t) MARTI (c)           User Key  (r) = Recorded By, (t) = Taken By, (c) = Cosigned By    Initials Name Provider Type    Kita Rodriguez, OTR/L, CSRS Occupational Therapist    Juan Chang, OT Student OT Student               Goals/Plan    No documentation.                Clinical Impression     Row Name 11/11/21 0739          Pain Assessment    Additional Documentation Pain Scale: Numbers Pre/Post-Treatment (Group)  -MARTI (r) HUMAIRA (t) MARTI (c)     Row Name 11/11/21 0739          Pain Scale: Numbers Pre/Post-Treatment    Pretreatment Pain Rating 0/10 - no pain  -MARTI (r) HUMAIRA (t) MARTI (c)     Posttreatment Pain Rating 0/10 - no pain  -MARTI (r) HUMAIRA (t) MARTI (c)     Row Name 11/11/21 0739          Plan of Care Review    Plan of Care Reviewed With patient; daughter  -MARTI (r) HUMAIRA (t) MARTI (c)     Progress improving  -MARTI (r) HUMAIRA (t) MARTI (c)     Outcome Summary OT tx completed today. A&Ox3. Pt c/o 0/10 pain. Pt required max A for supine<>sit due to weakness. pt sat EOB for roughly 6 min. She demonstrated a posterior lean requiring verbal cues and min A to correct. While sitting EOB, pt BP dropped to 40/60 and she was immediately laid back down. While laying supine, BP improved to 98/66. Pt completed washing hair with a shampoo cap with mod A due to fatigue. She completed brushing hair with min A due to fatigue. She completed washing face/hands with set up. Pt would benefit from skilled OT services to address these deficits. OT recommends SNF upon d/c.  -MARTI (r) HUMAIRA (t) MARTI (c)     Row Name 11/11/21 5094          Therapy Plan Review/Discharge Plan (OT)    Anticipated Discharge Disposition (OT) skilled nursing facility  -MARTI (r) HUMAIRA (t) MARTI (c)     Row Name 11/11/21 0788          Vital Signs    Pre Systolic BP Rehab 95   -JJ (r) HUMAIRA (t) JJ (c)     Pre Treatment Diastolic BP 84  -JJ (r) HUMAIRA (t) JJ (c)     Intra Systolic BP Rehab 38  -JJ (r) HUMAIRA (t) JJ (c)     Intra Treatment Diastolic BP 60  -JJ (r) HUMAIRA (t) JJ (c)     Post Systolic BP Rehab 98  -JJ (r) HUMAIRA (t) JJ (c)     Post Treatment Diastolic BP 66  -JJ (r) HUMAIRA (t) JJ (c)     Pretreatment Heart Rate (beats/min) 111  -JJ (r) HUMAIRA (t) JJ (c)     Intratreatment Heart Rate (beats/min) 125  -JJ (r) HUMAIRA (t) JJ (c)     Posttreatment Heart Rate (beats/min) 115  -JJ (r) HUMAIRA (t) JJ (c)     Pre SpO2 (%) 96  -JJ (r) HUMAIRA (t) JJ (c)     O2 Delivery Pre Treatment room air  -JJ (r) HUMAIRA (t) JJ (c)     Intra SpO2 (%) 92  -JJ (r) HUMAIRA (t) JJ (c)     O2 Delivery Intra Treatment room air  -JJ (r) HUMAIRA (t) JJ (c)     Post SpO2 (%) 94  -JJ (r) HUMAIRA (t) JJ (c)     O2 Delivery Post Treatment room air  -JJ (r) HUMAIRA (t) JJ (c)     Pre Patient Position Supine  -JJ (r) HUMAIRA (t) JJ (c)     Intra Patient Position Sitting  -JJ (r) HUMAIRA (t) JJ (c)     Post Patient Position Supine  -JJ (r) HUMAIRA (t) JJ (c)     Row Name 11/11/21 0739          Positioning and Restraints    Pre-Treatment Position in bed  -JJ (r) HUMAIRA (t) JJ (c)     Post Treatment Position bed  -JJ (r) HUMAIRA (t) JJ (c)     In Bed notified nsg; fowlers; call light within reach; encouraged to call for assist; patient within staff view; with family/caregiver; with nsg; side rails up x2; SCD pump applied; pillow between legs; legs elevated; heels elevated  -JJ (r) HUMAIRA (t) JJ (c)           User Key  (r) = Recorded By, (t) = Taken By, (c) = Cosigned By    Initials Name Provider Type    Kita Rodriguez, OTR/L, CSRS Occupational Therapist    Juan Chang, JAYESH Student OT Student               Outcome Measures     Row Name 11/11/21 0739          How much help from another is currently needed...    Putting on and taking off regular lower body clothing? 1  -JJOE (r) HUMAIRA (t) JJOE (c)     Bathing (including washing, rinsing, and drying) 2  -JJ (r) HUMAIRA (t) JJ (c)     Toileting (which includes  using toilet bed pan or urinal) 2  -JJ (r) HUMAIRA (t) JJ (c)     Putting on and taking off regular upper body clothing 2  -JJ (r) HUMAIRA (t) JJ (c)     Taking care of personal grooming (such as brushing teeth) 3  -JJ (r) HUMAIRA (t) JJ (c)     Eating meals 3  -JJ (r) HUMAIRA (t) JJ (c)     AM-PAC 6 Clicks Score (OT) 13  -JJ (r) HUMAIRA (t)     Row Name 11/11/21 0739          Functional Assessment    Outcome Measure Options AM-PAC 6 Clicks Daily Activity (OT)  -JJ (r) HUMAIRA (t) JJ (c)           User Key  (r) = Recorded By, (t) = Taken By, (c) = Cosigned By    Initials Name Provider Type    Kita Rodriguez, OTR/L, CSRS Occupational Therapist    Juan Chang, OT Student OT Student                Occupational Therapy Education                 Title: PT OT SLP Therapies (In Progress)     Topic: Occupational Therapy (In Progress)     Point: ADL training (Done)     Description:   Instruct learner(s) on proper safety adaptation and remediation techniques during self care or transfers.   Instruct in proper use of assistive devices.              Learning Progress Summary           Patient Acceptance, E, VU by HUMAIRA at 11/11/2021 1042    Acceptance, E, VU by MARTI at 11/10/2021 1120                   Point: Home exercise program (Not Started)     Description:   Instruct learner(s) on appropriate technique for monitoring, assisting and/or progressing therapeutic exercises/activities.              Learner Progress:  Not documented in this visit.          Point: Precautions (Done)     Description:   Instruct learner(s) on prescribed precautions during self-care and functional transfers.              Learning Progress Summary           Patient Acceptance, E, VU by HUMAIRA at 11/11/2021 1042    Acceptance, E, VU by MRATI at 11/10/2021 1120                   Point: Body mechanics (Not Started)     Description:   Instruct learner(s) on proper positioning and spine alignment during self-care, functional mobility activities and/or exercises.              Learner  Progress:  Not documented in this visit.                      User Key     Initials Effective Dates Name Provider Type Discipline     11/10/21 -  Kita Xiong OTR/L, ANA Occupational Therapist JAYESH GERARDO 08/04/21 -  Juan Golden OT Student OT Student OT              OT Recommendation and Plan     Plan of Care Review  Plan of Care Reviewed With: patient, daughter  Progress: improving  Outcome Summary: OT tx completed today. A&Ox3. Pt c/o 0/10 pain. Pt required max A for supine<>sit due to weakness. pt sat EOB for roughly 6 min. She demonstrated a posterior lean requiring verbal cues and min A to correct. While sitting EOB, pt BP dropped to 40/60 and she was immediately laid back down. While laying supine, BP improved to 98/66. Pt completed washing hair with a shampoo cap with mod A due to fatigue. She completed brushing hair with min A due to fatigue. She completed washing face/hands with set up. Pt would benefit from skilled OT services to address these deficits. OT recommends SNF upon d/c.     Time Calculation:    Time Calculation- OT     Row Name 11/11/21 0739             Time Calculation- OT    OT Start Time 0739  -JJ (r) HUMAIRA (t) JJ (c)      OT Stop Time 0818  -JJ (r) HUMAIRA (t) JJ (c)      OT Time Calculation (min) 39 min  -JJ (r) HUMAIRA (t)      Total Timed Code Minutes- OT 39 minute(s)  -JJ (r) HUMAIRA (t) JJ (c)      OT Received On 11/11/21  -JJ (r) HUMAIRA (t) JJ (c)            User Key  (r) = Recorded By, (t) = Taken By, (c) = Cosigned By    Initials Name Provider Type     Kita Xiong OTR/L, AINSLEYS Occupational Therapist    Juan Chang OT Student OT Student                       JAYESH Perez  11/11/2021

## 2021-11-11 NOTE — PROGRESS NOTES
[facility]  INPATIENT WOUND CARE    PROGRESS NOTE    Today's Date: 11/11/21    Patient Name: Josi Mohamud  MRN: [id number]  CSN: [id number]  PCP: Cayetano Crews MD  Referring Provider: Pradip Carrera*  Attending Provider: Milo White DO  Length of Stay: 2    SUBJECTIVE   Chief Complaint: Wound of left medial leg    HPI: Josi Mohamud continues care in CCU room 4.  Daughter is at the bedside.  Patient is talking on phone.  Dressing is removed by RN and wound is assessed in conjunction.  Wound appears clean with some remaining congealed blood in the wound bed.  Dr. White is completing the active wound care debridement.  Inpatient wound care is on for dressing suggestions only.      Visit Dx:    ICD-10-CM ICD-9-CM   1. Symptomatic bradycardia  R00.1 427.89   2. Decreased activities of daily living (ADL)  Z78.9 V49.89   3. Impaired mobility  Z74.09 799.89     Patient Active Problem List   Diagnosis   • Essential hypertension   • Anticoagulated   • PAF (paroxysmal atrial fibrillation) (HCC)   • Brain mass   • Brain metastasis (HCC)   • Junctional bradycardia   • Hypotension   • Moderate malnutrition (CMS/HCC)       History:   Past Medical History:   Diagnosis Date   • Anticoagulated 7/21/2018   • Bilateral carotid artery stenosis 6/8/2021   • Bronchitis    • Cancer (HCC)     lung   • COPD (chronic obstructive pulmonary disease) (HCC)    • COPD, moderate (HCC) 7/8/2019   • Coronary artery disease    • Coronary artery disease involving native coronary artery of native heart without angina pectoris 5/19/2017   • COVID-19    • COVID-19 vaccine series completed    • Essential hypertension 5/19/2017   • Gastroesophageal reflux disease 7/21/2018   • GERD (gastroesophageal reflux disease)    • History of transfusion    • Hx of Hodgkin's lymphoma 10/11/2019   • Hyperlipidemia    • Hypertension    • Malignant neoplasm of upper lobe of left lung (HCC) 7/8/2019   • Mixed hyperlipidemia  6/8/2021   • Obesity (BMI 30-39.9) 3/9/2020   • PAF (paroxysmal atrial fibrillation) (HCC)    • Parotid neoplasm 7/21/2018   • Persistent asthma without complication 7/8/2019   • Renal cyst 2/24/2020   • Seizures (HCC)    • Status post stereotactic radiosurgery 2/24/2020   • Stented coronary artery 5/19/2017   • Thyroid nodule 7/21/2018   • Unilateral complete paralysis of vocal cord 9/13/2021     Past Surgical History:   Procedure Laterality Date   • ANKLE OPEN REDUCTION INTERNAL FIXATION Left 8/15/2020    Procedure: ANKLE OPEN REDUCTION INTERNAL FIXATION;  Surgeon: Keyshawn Medrano MD;  Location:  [facility] OR;  Service: Orthopedics;  Laterality: Left;   • APPENDECTOMY     • APPENDECTOMY     • CARDIAC CATHETERIZATION     • CARDIAC ELECTROPHYSIOLOGY PROCEDURE N/A 5/23/2017    Procedure: Loop insertion; LINQ;  Surgeon: Jose Monteiro MD;  Location:  [facility] CATH INVASIVE LOCATION;  Service:    • CHOLECYSTECTOMY     • CORONARY ANGIOPLASTY WITH STENT PLACEMENT      X 1    • INCISION AND DRAINAGE LEG Left 10/22/2021    Procedure: INCISION AND DRAINAGE ANKLE HEMATOMA PRIMARY CLOSURE LACERATION with splint;  Surgeon: Randall Prasad DPM;  Location:  [facility] OR;  Service: Podiatry;  Laterality: Left;   • INCISION AND DRAINAGE OF WOUND Left 8/15/2020    Procedure: INCISION AND DRAINAGE WOUND;  Surgeon: Keyshawn Medrano MD;  Location:  [facility] OR;  Service: Orthopedics;  Laterality: Left;   • LIVER BIOPSY     • MYRINGOTOMY W/ TUBES Left 11/22/2019    Procedure: LEFT MYRINGOTOMY WITH INSERTION OF EAR TUBE WITH RIGHT EAR EXAM UNDER ANESTHESIA;  Surgeon: Se Mcnulty MD;  Location:  [facility] OR;  Service: ENT   • SPLENECTOMY     • US GUIDED FINE NEEDLE ASPIRATION  8/7/2018     Social History     Socioeconomic History   • Marital status:    • Number of children: 1   Tobacco Use   • Smoking status: Former Smoker     Packs/day: 0.50     Years: 58.00     Pack years: 29.00     Start date: 1961     Quit date:  2020     Years since quittin.2   • Smokeless tobacco: Never Used   Vaping Use   • Vaping Use: Never used   Substance and Sexual Activity   • Alcohol use: No   • Drug use: No   • Sexual activity: Defer       Allergies:  Allergies   Allergen Reactions   • Contrast Dye Itching     Contrast reaction occurred with pruritus and erythema of the upper  and lower extremities. No difficulties swallowing or breathing. Patient  received 50 mg Benadryl orally to improve symptomatology. Patient should  be pretreated with steroids and Benadryl prior to any future IV contrast  Administration. 2021     • Penicillins Rash       Medications:    Current Facility-Administered Medications:   •  acetaminophen (TYLENOL) tablet 650 mg, 650 mg, Oral, Q4H PRN, Milo White DO  •  aluminum-magnesium hydroxide-simethicone (MAALOX MAX) 400-400-40 MG/5ML suspension 15 mL, 15 mL, Oral, Q6H PRN, Milo White DO  •  apixaban (ELIQUIS) tablet 5 mg, 5 mg, Oral, Q12H, Milo White DO, 5 mg at 21 0816  •  aspirin EC tablet 81 mg, 81 mg, Oral, Daily, Milo White DO, 81 mg at 21 0816  •  atorvastatin (LIPITOR) tablet 10 mg, 10 mg, Oral, Nightly, Milo White DO, 10 mg at 11/10/21 2112  •  benzonatate (TESSALON) capsule 200 mg, 200 mg, Oral, Q8H PRN, Milo White DO, 200 mg at 21 0623  •  budesonide-formoterol (SYMBICORT) 80-4.5 MCG/ACT inhaler 2 puff, 2 puff, Inhalation, BID - RT, Milo White DO, 2 puff at 21 0659  •  calcium carbonate (TUMS) chewable tablet 500 mg (200 mg elemental), 2 tablet, Oral, Q4H PRN, Milo White DO  •  cefTRIAXone (ROCEPHIN) 1 g in sodium chloride 0.9 % 100 mL IVPB-VTB, 1 g, Intravenous, Q24H, Milo White DO, Last Rate: 200 mL/hr at 11/10/21 1809, 1 g at 11/10/21 1809  •  dexamethasone (DECADRON) tablet 4 mg, 4 mg, Oral, TID With Meals, Milo White DO, 4 mg at 21 0817  •  dextrose (D50W) (25 g/50 mL)  IV injection 25 g, 25 g, Intravenous, Q15 Min PRN, Milo White DO, 25 g at 11/10/21 0525  •  dextrose (GLUTOSE) oral gel 15 g, 15 g, Oral, Q15 Min PRN, Milo White DO  •  DOPamine 400 mg in 250 mL D5W infusion, 2-20 mcg/kg/min, Intravenous, Titrated, Milo White DO, Stopped at 11/09/21 0900  •  furosemide (LASIX) tablet 40 mg, 40 mg, Oral, Daily, Milo White DO, 40 mg at 11/10/21 0846  •  gabapentin (NEURONTIN) capsule 100 mg, 100 mg, Oral, TID, Milo White DO, 100 mg at 11/11/21 0817  •  glucagon (human recombinant) (GLUCAGEN DIAGNOSTIC) injection 1 mg, 1 mg, Subcutaneous, Q15 Min PRN, Milo White DO  •  insulin lispro (humaLOG) injection 0-14 Units, 0-14 Units, Subcutaneous, 4x Daily With Meals & Nightly, Milo White DO, 10 Units at 11/10/21 2122  •  ipratropium-albuterol (DUO-NEB) nebulizer solution 3 mL, 3 mL, Nebulization, 4x Daily PRN, Milo White DO  •  levETIRAcetam (KEPPRA) tablet 500 mg, 500 mg, Oral, BID, Milo White DO, 500 mg at 11/11/21 0817  •  norepinephrine (LEVOPHED) 8 mg in 250 mL NS infusion (premix), 0.02-0.3 mcg/kg/min, Intravenous, Titrated, Steven Prieto MD, Held at 11/11/21 0023  •  nystatin (MYCOSTATIN) 309584 UNIT/GM cream 1 application, 1 application, Topical, Q12H, Milo White DO, 1 application at 11/11/21 0819  •  ondansetron (ZOFRAN) injection 4 mg, 4 mg, Intravenous, Q6H PRN, Milo White DO, 4 mg at 11/11/21 0613  •  ondansetron (ZOFRAN) tablet 4 mg, 4 mg, Oral, Q6H PRN, Milo White DO  •  pantoprazole (PROTONIX) EC tablet 40 mg, 40 mg, Oral, QAM, Milo White DO, 40 mg at 11/11/21 0615  •  sodium chloride 0.9 % flush 10 mL, 10 mL, Intravenous, PRN, Milo White DO  •  sodium chloride 0.9 % flush 10 mL, 10 mL, Intravenous, Q12H, Milo White DO, 10 mL at 11/11/21 0820  •  sodium chloride 0.9 % flush 10 mL, 10 mL, Intravenous, PRN, Christopher,  Milo ADAMES,   •  sodium hypochlorite (DAKIN'S 1/4 STRENGTH) 0.125 % topical solution 0.125% solution, , Topical, Q12H, Gabriella Sunshine APRN, Given at 11/11/21 0819  •  traMADol (ULTRAM) tablet 50 mg, 50 mg, Oral, Q6H PRN, Milo White DO    Review of Systems:  Review of Systems   Constitutional: Positive for fatigue. Negative for chills and fever.   HENT: Negative for rhinorrhea and sinus pressure.    Respiratory: Negative for cough and shortness of breath.    Cardiovascular: Positive for leg swelling. Negative for chest pain and palpitations.   Gastrointestinal: Negative for diarrhea, nausea and vomiting.   Genitourinary: Negative for frequency and urgency.   Musculoskeletal: Positive for gait problem and myalgias.   Skin: Positive for color change and wound.   Neurological: Negative for dizziness and headaches.   Psychiatric/Behavioral: Negative for agitation and behavioral problems.         OBJECTIVE     Vitals:    11/11/21 0702   BP:    Pulse:    Resp: 18   Temp:    SpO2:        PHYSICAL EXAM  Physical Exam  Vitals and nursing note reviewed.   Constitutional:       General: She is awake.      Appearance: She is obese.   HENT:      Head: Normocephalic and atraumatic.   Eyes:      General: Lids are normal. Gaze aligned appropriately.   Cardiovascular:      Rate and Rhythm: Normal rate and regular rhythm.   Pulmonary:      Effort: Pulmonary effort is normal. No respiratory distress.   Abdominal:      General: Abdomen is protuberant. There is no distension.   Musculoskeletal:      Cervical back: No edema or erythema.   Skin:     General: Skin is warm.      Findings: Wound present.      Comments: Tramatic wound of the left lower leg is unchanged today.  Dr. White is completing debridement and active wound care to this wound.     Neurological:      Mental Status: She is alert and oriented to person, place, and time.   Psychiatric:         Attention and Perception: She is inattentive.         Behavior:  Behavior is cooperative.                      Results Review:  Lab Results (last 48 hours)     Procedure Component Value Units Date/Time    POC Glucose Once [[id number]]  (Abnormal) Collected: 11/11/21 0627    Specimen: Blood Updated: 11/11/21 0638     Glucose 132 mg/dL      Comment: : [id number] Osbaldo Tian ID: [id number]       POC Glucose Once [[id number]]  (Abnormal) Collected: 11/10/21 2118    Specimen: Blood Updated: 11/10/21 2134     Glucose 324 mg/dL      Comment: : [id number] Osbaldo FORDeter ID: [id number]       POC Glucose Once [[id number]]  (Abnormal) Collected: 11/10/21 1624    Specimen: Blood Updated: 11/10/21 1654     Glucose 330 mg/dL      Comment: : [id number] Yo Saxena  eter ID: [id number]       Urine Culture - Urine, Urine, Catheter [[id number]]  (Abnormal) Collected: 11/09/21 1158    Specimen: Urine, Catheter Updated: 11/10/21 1208     Urine Culture >100,000 CFU/mL Gram Negative Bacilli    POC Glucose Once [[id number]]  (Abnormal) Collected: 11/10/21 0750    Specimen: Blood Updated: 11/10/21 0801     Glucose 160 mg/dL      Comment: : [id number] Button JordanMeter ID: [id number]       POC Glucose Once [[id number]]  (Abnormal) Collected: 11/10/21 0543    Specimen: Blood Updated: 11/10/21 0554     Glucose 141 mg/dL      Comment: : [id number] Osbaldo FORDeter ID: [id number]       POC Glucose Once [[id number]]  (Abnormal) Collected: 11/10/21 0522    Specimen: Blood Updated: 11/10/21 0533     Glucose 65 mg/dL      Comment: : [id number] Lian Szymanski ID: [id number]       Basic Metabolic Panel [[id number]]  (Abnormal) Collected: 11/10/21 0344    Specimen: Blood Updated: 11/10/21 0521     Glucose 47 mg/dL      BUN 42 mg/dL      Creatinine 0.85 mg/dL      Sodium 137 mmol/L      Potassium 3.8 mmol/L      Chloride 97 mmol/L      CO2 32.0 mmol/L      Calcium 8.0 mg/dL      eGFR Non African Amer 65 mL/min/1.73      BUN/Creatinine Ratio 49.4      Anion Gap 8.0 mmol/L     Narrative:      GFR Normal >60  Chronic Kidney Disease <60  Kidney Failure <15      CBC Auto Differential [[id number]]  (Abnormal) Collected: 11/10/21 0344    Specimen: Blood Updated: 11/10/21 0444     WBC 10.17 10*3/mm3      RBC 3.14 10*6/mm3      Hemoglobin 11.0 g/dL      Hematocrit 33.3 %      .1 fL      MCH 35.0 pg      MCHC 33.0 g/dL      RDW 14.9 %      RDW-SD 57.9 fl      MPV 10.4 fL      Platelets 244 10*3/mm3      Neutrophil % 72.7 %      Lymphocyte % 19.7 %      Monocyte % 4.5 %      Eosinophil % 0.1 %      Basophil % 0.4 %      Neutrophils, Absolute 7.40 10*3/mm3      Lymphocytes, Absolute 2.00 10*3/mm3      Monocytes, Absolute 0.46 10*3/mm3      Eosinophils, Absolute 0.01 10*3/mm3      Basophils, Absolute 0.04 10*3/mm3     POC Glucose Once [[id number]]  (Abnormal) Collected: 11/09/21 2033    Specimen: Blood Updated: 11/09/21 2044     Glucose 275 mg/dL      Comment: : [id number] Cobtolu Tian ID: [id number]       POC Glucose Once [[id number]]  (Abnormal) Collected: 11/09/21 1746    Specimen: Blood Updated: 11/09/21 1757     Glucose 255 mg/dL      Comment: : [id number] Jim Turner ID: [id number]       Troponin [[id number]]  (Abnormal) Collected: 11/09/21 1550    Specimen: Blood Updated: 11/09/21 1648     Troponin T 0.304 ng/mL     Narrative:      Troponin T Reference Range:  <= 0.03 ng/mL-   Negative for AMI  >0.03 ng/mL-     Abnormal for myocardial necrosis.  Clinicians would have to utilize clinical acumen, EKG, Troponin and serial changes to determine if it is an Acute Myocardial Infarction or myocardial injury due to an underlying chronic condition.       Results may be falsely decreased if patient taking Biotin.      Urinalysis, Microscopic Only - Urine, Catheter [[id number]]  (Abnormal) Collected: 11/09/21 1158    Specimen: Urine, Catheter Updated: 11/09/21 1258     RBC, UA None Seen /HPF      WBC, UA 6-12 /HPF      Bacteria, UA None Seen /HPF       Squamous Epithelial Cells, UA None Seen /HPF      Hyaline Casts, UA None Seen /LPF      Triple Phosphate Crystals, UA Moderate/2+ /HPF      Amorphous Crystals, UA Moderate/2+ /HPF      Methodology Manual Light Microscopy    Urinalysis With Culture If Indicated - Urine, Catheter [[id number]]  (Abnormal) Collected: 11/09/21 1158    Specimen: Urine, Catheter Updated: 11/09/21 1258     Color, UA Yellow     Appearance, UA Turbid     pH, UA 8.5     Specific Gravity, UA 1.021     Glucose,  mg/dL (Trace)     Ketones, UA Negative     Bilirubin, UA Negative     Blood, UA Negative     Protein, UA Trace     Leuk Esterase, UA Moderate (2+)     Nitrite, UA Negative     Urobilinogen, UA 1.0 E.U./dL    POC Glucose Once [[id number]]  (Abnormal) Collected: 11/09/21 1155    Specimen: Blood Updated: 11/09/21 1206     Glucose 252 mg/dL      Comment: : [id number] Jim Limaeter ID: [id number]       Troponin [[id number]]  (Abnormal) Collected: 11/09/21 1141    Specimen: Blood Updated: 11/09/21 1206     Troponin T 0.326 ng/mL     Narrative:      Troponin T Reference Range:  <= 0.03 ng/mL-   Negative for AMI  >0.03 ng/mL-     Abnormal for myocardial necrosis.  Clinicians would have to utilize clinical acumen, EKG, Troponin and serial changes to determine if it is an Acute Myocardial Infarction or myocardial injury due to an underlying chronic condition.       Results may be falsely decreased if patient taking Biotin.          Imaging Results (Last 72 Hours)     Procedure Component Value Units Date/Time    XR Chest 1 View [[id number]] Collected: 11/09/21 0649     Updated: 11/09/21 0653    Narrative:      EXAMINATION:  XR CHEST 1 VW-  11/9/2021 5:25 AM CST     HISTORY: R00.1-Bradycardia, unspecified     COMPARISON: 9/26/2021.     FINDINGS:  There is no dense infiltrate or effusion. There is stable  bronchial wall thickening. Heart size is upper limits of normal. The  thoracic aorta is atheromatous. There is a loop recorder  projected over  the left chest.       Impression:      1. No focal infiltrate or effusion.  2. Stable bronchial wall thickening.        This report was finalized on 11/09/2021 06:50 by Dr. Justice Lo MD.             ASSESSMENT/PLAN       Examination and evaluation of wound(s) was performed.    DIAGNOSIS:   Traumatic hematoma of left leg  Moderate malnutrition  Impaired mobility and ADLs  Junctional bradycardia  Hypotension      PLAN:     Start     Ordered    11/11/21 2000  Wound Care  Every 12 Hours        Question Answer Comment   Wound Locations Left medial lower leg    Wound Care Instructions Clean with NS.  Apply saline moistened Puracol collagen dressing to wound bed.  Cover with ABD pad and wrap with Kerlix.    Cleanse Normal Saline    Intervention Other    Other Puracol    Moistened? Yes    Moisten With Normal Saline    Dressing: Abdominal Pad    Securement Roll Gauze        11/11/21 0903       Discontinue wound care using Dakin's today with next dressing at 2000 starting Puracol.                 Discussed findings and treatment plan including risks, benefits, and treatment options with patient and patient's daughter in detail. Patient agreed with treatment plan.      This document has been electronically signed by ORLIN Alexander on 11/11/2021 09:01 CST       Time spent in face-to-face evaluation, chart review, planning and education 35 minutes with greater than 50% of time spent with patient and in coordination of care.

## 2021-11-11 NOTE — PLAN OF CARE
Goal Outcome Evaluation:  Plan of Care Reviewed With: patient, daughter        Progress: improving  Outcome Summary: OT tx completed today. A&Ox3. Pt c/o 0/10 pain. Pt required max A for supine<>sit due to weakness. pt sat EOB for roughly 6 min. She demonstrated a posterior lean requiring verbal cues and min A to correct. While sitting EOB, pt BP dropped to 40/60 and she was immediately laid back down. While laying supine, BP improved to 98/66. Pt completed washing hair with a shampoo cap with mod A due to fatigue. She completed brushing hair with min A due to fatigue. She completed washing face/hands with set up. Pt would benefit from skilled OT services to address these deficits. OT recommends SNF upon d/c.

## 2021-11-11 NOTE — PLAN OF CARE
Goal Outcome Evaluation:      Patient was able to actively work through LE exercises. Performed 15-20 reps. R medial calf sensitive, reports only painful when touched at this time. Will continue to benefit from strengthening and bed mobility.

## 2021-11-12 NOTE — PLAN OF CARE
Goal Outcome Evaluation:  Plan of Care Reviewed With: patient        Progress: no change  Outcome Summary: Pt. agreeable to therapy. Pt. required Mod-Max x 2 assist for bed mobility. Pt. was able to sit EOB for about 12 minutes, but kept leaning over to R side for support. Pt. particpated with a few Legs ex's and reaching. Pt. very weak and unable to attempt transer at this time. Will continue to work on strengthening and mobility.

## 2021-11-12 NOTE — PLAN OF CARE
Goal Outcome Evaluation:  Plan of Care Reviewed With: patient        Progress: no change  Outcome Summary: Patient trasnferred from the unit this afternoon. VSS. No c/o pain. Patient plans to discharge to [facility]. Cont to monitor

## 2021-11-12 NOTE — CASE MANAGEMENT/SOCIAL WORK
Continued Stay Note   [place]     Patient Name: Josi Mohamud  MRN: [id number]  Today's Date: 11/12/2021    Admit Date: 11/9/2021     Discharge Plan     Row Name 11/12/21 1056       Plan    Plan [place]    Patient/Family in Agreement with Plan yes    Plan Comments Patient has received a bed offer from [facility].  SW attempted to call patient's daughter to advise and received voicemail stating it was full.  Tentative plan is for patient to go to [facility] upon discharge.  Will continue to attempt to reach patient's daughter to advise of bed offer.               Discharge Codes    No documentation.                     CARLOS Mccall

## 2021-11-12 NOTE — THERAPY TREATMENT NOTE
Acute Care - Physical Therapy Treatment Note  [facility]     Patient Name: Josi Mohamud  : 1945  MRN: [id number]  Today's Date: 2021      Visit Dx:     ICD-10-CM ICD-9-CM   1. Symptomatic bradycardia  R00.1 427.89   2. Decreased activities of daily living (ADL)  Z78.9 V49.89   3. Impaired mobility  Z74.09 799.89   4. Traumatic hematoma of lower leg, left, subsequent encounter  S80.12XD V58.89     924.10     Patient Active Problem List   Diagnosis   • Essential hypertension   • Anticoagulated   • PAF (paroxysmal atrial fibrillation) (HCC)   • Brain mass   • Brain metastasis (HCC)   • Junctional bradycardia   • Hypotension   • Moderate malnutrition (CMS/HCC)     Past Medical History:   Diagnosis Date   • Anticoagulated 2018   • Bilateral carotid artery stenosis 2021   • Bronchitis    • Cancer (HCC)     lung   • COPD (chronic obstructive pulmonary disease) (HCC)    • COPD, moderate (HCC) 2019   • Coronary artery disease    • Coronary artery disease involving native coronary artery of native heart without angina pectoris 2017   • COVID-19    • COVID-19 vaccine series completed    • Essential hypertension 2017   • Gastroesophageal reflux disease 2018   • GERD (gastroesophageal reflux disease)    • History of transfusion    • Hx of Hodgkin's lymphoma 10/11/2019   • Hyperlipidemia    • Hypertension    • Malignant neoplasm of upper lobe of left lung (HCC) 2019   • Mixed hyperlipidemia 2021   • Obesity (BMI 30-39.9) 3/9/2020   • PAF (paroxysmal atrial fibrillation) (HCC)    • Parotid neoplasm 2018   • Persistent asthma without complication 2019   • Renal cyst 2020   • Seizures (HCC)    • Status post stereotactic radiosurgery 2020   • Stented coronary artery 2017   • Thyroid nodule 2018   • Unilateral complete paralysis of vocal cord 2021     Past Surgical History:   Procedure Laterality Date   • ANKLE OPEN REDUCTION INTERNAL FIXATION Left  8/15/2020    Procedure: ANKLE OPEN REDUCTION INTERNAL FIXATION;  Surgeon: Keyshawn Medrano MD;  Location:  [facility] OR;  Service: Orthopedics;  Laterality: Left;   • APPENDECTOMY     • APPENDECTOMY     • CARDIAC CATHETERIZATION     • CARDIAC ELECTROPHYSIOLOGY PROCEDURE N/A 5/23/2017    Procedure: Loop insertion; LINQ;  Surgeon: Jose Monteiro MD;  Location:  [facility] CATH INVASIVE LOCATION;  Service:    • CHOLECYSTECTOMY     • CORONARY ANGIOPLASTY WITH STENT PLACEMENT      X 1    • INCISION AND DRAINAGE LEG Left 10/22/2021    Procedure: INCISION AND DRAINAGE ANKLE HEMATOMA PRIMARY CLOSURE LACERATION with splint;  Surgeon: Randall Prasad DPM;  Location:  [facility] OR;  Service: Podiatry;  Laterality: Left;   • INCISION AND DRAINAGE OF WOUND Left 8/15/2020    Procedure: INCISION AND DRAINAGE WOUND;  Surgeon: Keyshawn Medrano MD;  Location:  [facility] OR;  Service: Orthopedics;  Laterality: Left;   • LIVER BIOPSY     • MYRINGOTOMY W/ TUBES Left 11/22/2019    Procedure: LEFT MYRINGOTOMY WITH INSERTION OF EAR TUBE WITH RIGHT EAR EXAM UNDER ANESTHESIA;  Surgeon: Se Mcnulty MD;  Location: [facility] OR;  Service: ENT   • SPLENECTOMY     • US GUIDED FINE NEEDLE ASPIRATION  8/7/2018     PT Assessment (last 12 hours)     PT Evaluation and Treatment     Row Name 11/12/21 1104          Physical Therapy Time and Intention    Subjective Information complains of; pain  -     Document Type therapy note (daily note)  -     Mode of Treatment physical therapy  -     Row Name 11/12/21 1104          General Information    Existing Precautions/Restrictions fall  -     Row Name 11/12/21 1104          Pain Scale: Word Pre/Post-Treatment    Pain: Word Scale, Pretreatment 4 - moderate pain  -     Posttreatment Pain Rating 4 - moderate pain  -     Pain Location - Side Bilateral  -MF     Pain Location - Orientation lower  -     Pain Location extremity  -     Pain Intervention(s) Repositioned  -     Row Name 11/12/21  1104          Bed Mobility    Scooting/Bridging Grundy (Bed Mobility) dependent (less than 25% patient effort); 2 person assist  -     Supine-Sit [place] (Bed Mobility) verbal cues; maximum assist (25% patient effort); 1 person assist; 2 person assist  -     Sit-Supine [place] (Bed Mobility) verbal cues; moderate assist (50% patient effort); maximum assist (25% patient effort); 1 person assist; 2 person assist  -     Bed Mobility, Safety Issues decreased use of arms for pushing/pulling; decreased use of legs for bridging/pushing; impaired trunk control for bed mobility  -     Assistive Device (Bed Mobility) head of bed elevated; draw sheet; bed rails  -     Row Name 11/12/21 1104          Balance    Comment, Balance Pt. sat EOB for about 12 minutes. Pt. kept leaning onto R arm and over. CUes to sit up straight. Performed a few reps of reaching with B Ues.  -     Row Name 11/12/21 1104          Knee (Therapeutic Exercise)    Knee (Therapeutic Exercise) AROM (active range of motion)  -     Knee AROM (Therapeutic Exercise) bilateral; LAQ (long arc quad); sitting; 10 repetitions  -     Row Name 11/12/21 1104          Ankle (Therapeutic Exercise)    Ankle (Therapeutic Exercise) AROM (active range of motion)  -     Ankle AROM (Therapeutic Exercise) bilateral; dorsiflexion; sitting; 10 repetitions  -     Row Name             Wound 11/09/21 0900 Left lower leg    Wound - Properties Group Placement Date: 11/09/21  - Placement Time: 0900  -CARLOS EDUARDO Present on Hospital Admission: Y  -CARLOS EDUARDO Side: Left  -CARLOS EDUARDO Orientation: lower  -CARLOS EDUARDO Location: leg  -CARLOS EDUARDO     Retired Wound - Properties Group Date first assessed: 11/09/21  -CARLOS EDUARDO Time first assessed: 0900  -CARLOS EDUARDO Present on Hospital Admission: Y  -CARLOS EDUARDO Side: Left  -CARLOS EDUARDO Location: leg  -CARLOS EDUARDO     Row Name             Wound 11/09/21 0900 Left anterior knee    Wound - Properties Group Placement Date: 11/09/21  - Placement Time: 0900  -CARLOS EDUARDO Side: Left  -CARLOS EDUARDO Orientation:  anterior  -CARLOS EDUARDO Location: knee  -CARLOS EDUARDO     Retired Wound - Properties Group Date first assessed: 11/09/21  -CARLOS EDUARDO Time first assessed: 0900  -CARLOS EDUARDO Side: Left  -CARLOS EDUARDO Location: knee  -CARLOS EDUARDO     Row Name 11/12/21 1102          Plan of Care Review    Plan of Care Reviewed With patient  -MF     Progress no change  -MF     Outcome Summary Pt. agreeable to therapy. Pt. required Mod-Max x 2 assist for bed mobility. Pt. was able to sit EOB for about 12 minutes, but kept leaning over to R side for support. Pt. particpated with a few Legs ex's and reaching. Pt. very weak and unable to attempt transer at this time. Will continue to work on strengthening and mobility.  -     Row Name 11/12/21 1101          Positioning and Restraints    Pre-Treatment Position in bed  -     Post Treatment Position bed  -MF     In Bed notified nsg; fowlers; call light within reach; encouraged to call for assist; side rails up x2; R heel elevated; L heel elevated  -           User Key  (r) = Recorded By, (t) = Taken By, (c) = Cosigned By    Initials Name Provider Type    Marlena Sears, RN Registered Nurse    Adeola Hernandez, PTA Physical Therapy Assistant              Physical Therapy Education                 Title: PT OT SLP Therapies (In Progress)     Topic: Physical Therapy (In Progress)     Point: Mobility training (Done)     Learning Progress Summary           Patient Acceptance, E, VU by SUBHASH at 11/10/2021 1019    Comment: progression of PT POC, benefits of activity   Family Acceptance, E, VU by SUBHASH at 11/10/2021 1019    Comment: progression of PT POC, benefits of activity                   Point: Home exercise program (Not Started)     Learner Progress:  Not documented in this visit.          Point: Body mechanics (Not Started)     Learner Progress:  Not documented in this visit.          Point: Precautions (Not Started)     Learner Progress:  Not documented in this visit.                      User Key     Initials Effective Dates Name  Provider Type Discipline    SUBHASH 08/02/16 -  Alexandre Moe, PT DPT Physical Therapist PT              PT Recommendation and Plan     Plan of Care Reviewed With: patient  Progress: no change  Outcome Summary: Pt. agreeable to therapy. Pt. required Mod-Max x 2 assist for bed mobility. Pt. was able to sit EOB for about 12 minutes, but kept leaning over to R side for support. Pt. particpated with a few Legs ex's and reaching. Pt. very weak and unable to attempt transer at this time. Will continue to work on strengthening and mobility.       Time Calculation:    PT Charges     Row Name 11/12/21 1358             Time Calculation    Start Time 1104  -MF      Stop Time 1130  -      Time Calculation (min) 26 min  -MF      PT Received On 11/12/21  -              Time Calculation- PT    Total Timed Code Minutes- PT 26 minute(s)  -MF              Timed Charges    84939 - PT Therapeutic Activity Minutes 26  -MF              Total Minutes    Timed Charges Total Minutes 26  -MF       Total Minutes 26  -MF            User Key  (r) = Recorded By, (t) = Taken By, (c) = Cosigned By    Initials Name Provider Type     Adeola Nixon PTA Physical Therapy Assistant              Therapy Charges for Today     Code Description Service Date Service Provider Modifiers Qty    [id number] HC PT THERAPEUTIC ACT EA 15 MIN 11/12/2021 Adeola Nixon PTA GP 2          PT G-Codes  Outcome Measure Options: AM-PAC 6 Clicks Daily Activity (OT)  AM-PAC 6 Clicks Score (PT): 8  AM-PAC 6 Clicks Score (OT): 13    Adeola Nixon PTA  11/12/2021

## 2021-11-12 NOTE — PROGRESS NOTES
[facility] Medicine Services  INPATIENT PROGRESS NOTE    Length of Stay: 3  Date of Admission: 11/9/2021  Primary Care Physician: Cayetano Crews MD    Subjective     Chief Complaint:     Bradycardia, hypotension    HPI     Patient remains off pressors.  Heart rate is varying between 101 120.  She remains on anticoagulation.  She is stable for transfer to the medical surgical floor.  Blood pressure remains soft with MAP of 71 at the time of my evaluation.  I am hesitant to introduce rate controlling medications because of her soft blood pressure.  I will start her on digoxin 0.25 mg IV x1 dose followed by 0.125 mg p.o. daily beginning tomorrow.  Urine culture reveals Klebsiella sensitive to Rocephin.  IV Rocephin will be discontinued in favor of oral cefdinir starting tomorrow.  White blood cell count within normal limits.  Hemoglobin slightly lower today at 9.7 which is likely dilutional secondary to IV fluids.  She remains on IV fluids which can be discontinued today.  Renal function at baseline with sodium 135.  Glucose 243.  She continues on sliding scale insulin.      Review of Systems     All pertinent negatives and positives are as above. All other systems have been reviewed and are negative unless otherwise stated.     Objective    Temp:  [97.4 °F (36.3 °C)-98.1 °F (36.7 °C)] 97.4 °F (36.3 °C)  Heart Rate:  [] 96  Resp:  [16-27] 27  BP: ()/() 107/79    Lab Results (last 24 hours)     Procedure Component Value Units Date/Time    POC Glucose Once [[id number]]  (Abnormal) Collected: 11/12/21 0806    Specimen: Blood Updated: 11/12/21 0819     Glucose 201 mg/dL      Comment: : [id number] Moises (DeBoe) AutumnMeter ID: [id number]       Basic Metabolic Panel [[id number]]  (Abnormal) Collected: 11/12/21 0236    Specimen: Blood Updated: 11/12/21 0312     Glucose 243 mg/dL      BUN 32 mg/dL      Creatinine 0.46 mg/dL      Sodium 135 mmol/L      Potassium  4.2 mmol/L      Chloride 103 mmol/L      CO2 23.0 mmol/L      Calcium 7.8 mg/dL      eGFR Non African Amer 132 mL/min/1.73      BUN/Creatinine Ratio 69.6     Anion Gap 9.0 mmol/L     Narrative:      GFR Normal >60  Chronic Kidney Disease <60  Kidney Failure <15      CBC (No Diff) [[id number]]  (Abnormal) Collected: 11/12/21 0235    Specimen: Blood Updated: 11/12/21 0254     WBC 6.98 10*3/mm3      RBC 2.80 10*6/mm3      Hemoglobin 9.7 g/dL      Hematocrit 29.0 %      .6 fL      MCH 34.6 pg      MCHC 33.4 g/dL      RDW 14.9 %      RDW-SD 55.7 fl      MPV 10.2 fL      Platelets 224 10*3/mm3     POC Glucose Once [[id number]]  (Abnormal) Collected: 11/11/21 2024    Specimen: Blood Updated: 11/11/21 2035     Glucose 219 mg/dL      Comment: : [id number] Oc GenevaMeter ID: [id number]       POC Glucose Once [[id number]]  (Abnormal) Collected: 11/11/21 1659    Specimen: Blood Updated: 11/11/21 1710     Glucose 198 mg/dL      Comment: : [id number] Skylar CrabtreeMeter ID: [id number]       POC Glucose Once [[id number]]  (Abnormal) Collected: 11/11/21 1132    Specimen: Blood Updated: 11/11/21 1143     Glucose 211 mg/dL      Comment: : [id number] Skylar CrabtreeMeter ID: [id number]             Imaging Results (Last 24 Hours)     ** No results found for the last 24 hours. **             Intake/Output Summary (Last 24 hours) at 11/12/2021 1028  Last data filed at 11/12/2021 0827  Gross per 24 hour   Intake 3166.33 ml   Output 1220 ml   Net 1946.33 ml       Physical Exam  Constitutional:       Appearance: She is well-developed and overweight. She appears brighter and more interactive today.  HENT:      Head: Normocephalic and atraumatic.      Right Ear: External ear normal.      Left Ear: External ear normal.      Nose: Nose normal.      Mouth: Mucous membranes are moist.   Eyes:      General: No scleral icterus.      Conjunctiva/sclera: Conjunctivae normal. .   Cardiovascular:      Rate and Rhythm: Irregularly  irregular rhythm.  Tachycardia present.      Pulses: Normal pulses.      Heart sounds: Normal heart sounds. No murmur heard.  Pulmonary:      Effort: Pulmonary effort is normal. No respiratory distress.      Breath sounds: Clear this a.m.  Abdominal:      General: Abdomen is flat. Bowel sounds are normal.      Palpations: Abdomen is soft. There is no mass.   Musculoskeletal:         General: Normal range of motion.      Right lower leg: Edema present.      Left lower leg: Edema present.   Skin:     General: Skin is warm and dry.      Coloration: Skin is not pale.      Comments: Large area of skin sloughing noted left lower extremity.  Wound care continues to follow.  Neurological:      General: No focal deficit present.      Mental Status: She is alert and oriented to person, place, and time. Mental status is at baseline.   Psychiatric:         Mood and Affect: Mood normal.         Judgment: Judgment normal.     Results Review:  I have reviewed the labs, radiology results, and diagnostic studies since my last progress note and made treatment changes reflective of the results.   I have reviewed the current medications.    Assessment/Plan     Active Hospital Problems    Diagnosis    • **Junctional bradycardia    • Moderate malnutrition (CMS/HCC)    • Hypotension    • Brain metastasis (HCC)    • Brain mass    • PAF (paroxysmal atrial fibrillation) (HCC)    • Anticoagulated    • Essential hypertension        PLAN:  Digoxin 0.25 mg IV x1 followed by digoxin 0.125 mg p.o. daily.  Unable to restart other rate controlling medications secondary to soft blood pressures  Blood pressure improved and she can be transferred to the medical surgical floor.    Electronically signed by Milo White DO, 11/12/21, 10:28 CST.

## 2021-11-13 NOTE — PLAN OF CARE
Goal Outcome Evaluation:  Plan of Care Reviewed With: patient        Progress: improving  Outcome Summary: VSS.  [id number] w/PVS coup.  Patient slept for most of the night.  Refused to be repositioned, repositioned self independently.  External cath working well; pt remained dry all night.  No c/o pain.  A&Ox4 at beginning of shift; not oriented to place, time, or situation this morning.  Safety maintained.

## 2021-11-13 NOTE — DISCHARGE PLACEMENT REQUEST
"To: Amari Bravo    From: Antonina Perales [phone]        Kenyon Phillips (75 y.o. Female)             Date of Birth Social Security Number Address Home Phone MRN    1945  [place] [id number] [facility]  [address]  [facility] [address] [phone] [id number]    Amish Marital Status             Lutheran        Admission Date Admission Type Admitting Provider Attending Provider Department, Room/Bed    11/9/21 Emergency Milo White DO Robinson, Maurice S, DO [facility] 4B, 443/1    Discharge Date Discharge Disposition Discharge Destination           Home or Self Care              Attending Provider: Milo White DO    Allergies: Contrast Dye, Penicillins    Isolation: None   Infection: MRSA (09/26/21), COVID (rule out) (11/13/21)   Code Status: No CPR   Advance Care Planning Activity    Ht: 162.6 cm (64.02\")   Wt: 88.3 kg (194 lb 9.6 oz)    Admission Cmt: None   Principal Problem: Junctional bradycardia [R00.1]                 Active Insurance as of 11/9/2021     Primary Coverage     Payor Plan Insurance Group Employer/Plan Group    MEDICARE MEDICARE A & B      Payor Plan Address Payor Plan Phone Number Payor Plan Fax Number Effective Dates    [address] [phone]  12/1/2009 - None Entered    [facility] [id number]       Subscriber Name Subscriber Birth Date Member ID       KENYON PHILLIPS 1945 [id number]           Secondary Coverage     Payor Plan Insurance Group Employer/Plan Group    [place]      Payor Plan Address Payor Plan Phone Number Payor Plan Fax Number Effective Dates    [address] [phone]  2/23/2021 - None Entered    [place] [address]       Subscriber Name Subscriber Birth Date Member ID       KENYON PHILLIPS 1945 [id number]                 Emergency Contacts      (Rel.) Home Phone Work Phone Mobile Phone    Franc Egan (KENYON) (Daughter) [phone] -- [phone]    Francisco Corrales " (Relative) [phone] -- --    Mary Turner () [phone] -- --                 Discharge Summary      Milo White DO at 11/13/21 1444              [facility] Medicine Services  DISCHARGE SUMMARY       Date of Admission: 11/9/2021  Date of Discharge:  11/13/2021  Primary Care Physician: Cayetano Crews MD    Discharge Diagnoses:  Active Hospital Problems    Diagnosis    • **Junctional bradycardia    • Moderate malnutrition (CMS/HCC)    • Hypotension    • Brain metastasis (HCC)    • Brain mass    • PAF (paroxysmal atrial fibrillation) (HCC)    • Anticoagulated    • Essential hypertension          Presenting Problem/History of Present Illness:  Symptomatic bradycardia [R00.1]     Chief Complaint on Day of Discharge:   No complaint    History of Present Illness on Day of Discharge:   The patient is doing well today.  She has been accepted to skilled nursing facility for continuation of rehabilitation.  She will be sent to outpatient wound care for further treatment of the large hematoma that was unroofed and sharply debrided as an inpatient.    Hospital Course  This 75-year-old female was sent to the ED by the nursing home because of low heart rate and low blood pressure.  She experienced hypotension with blood pressure as low as 72/54.  Heart rate was noted to be in the 20s and 30s.  Patient received 0.5 mg of atropine without response.  She subsequently was given 0.5 mg of epinephrine with improvement in heart rate up to mid 50s.  She is currently on a dopamine drip to maintain blood pressure and heart rate.  The patient is a very poor historian and does not add much to the history during the interview.  She appears to be at her cognitive baseline.  EKG was of poor quality but a junctional bradycardia is noted.  Her history includes lung cancer with metastasis to the brain, COPD, CAD, essential hypertension, GERD and PAF.     Work-up in the emergency  department reveals lactic acid 2.7, negative Covid swab, BNP 3851.  CMP shows glucose 394, BUN 40, albumin 2.70.  D-dimer, troponin, procalcitonin, CK all within normal limits.  White blood cell count within normal limits with hemoglobin 11.0.  TSH low at 0.064.    PLAN:   Admit to CCU  Continue dopamine drip  Cardiology consultation  Clarify med list (Toprol-XL appears on current list but unsure if list is accurate)  Discontinue Ditropan as anticholinergics are contraindicated at her age  Clarify dexamethasone dosing prior to reinitiation  Continue Eliquis and aspirin    Rate limiting medications were held at the time of admission.  Cardiology was consulted regarding profound bradycardia.  Dr. Monteiro saw and evaluated the patient and noted that the patient did not require pacemaker.  The patient converted to atrial fibrillation with rates between 110 and 140.  Her blood pressure was soft and did not allow for reinitiation of rate limiting medications.  Subsequently, the patient was started on digoxin 0.25 mg IV x1 dose which was followed by 0.125 mg p.o. daily.  The patient was noted to have a large hematoma on the left lower extremity in the mid tibial area.  It was previously seen and evaluated by Dr. Prasad who placed sutures in the wound.  The sutures have since torn away and the hematoma was noted to have undermined tissue.  There was significant residual of devitalized skin noted peripherally.  On 11/6, using sharp dissection and sterile technique, the dead skin was debrided from the edges of the hematoma.  The hematoma measured approximately 2518 cm.  Sterile gauze was placed over the top and wound care was consulted and made wound care recommendations for follow-up.  Troponin was noted to be elevated at 0.304 at the time of admission which was likely a type II leak.  The patient notably has brain metastasis from likely a primary lung cancer.  Lung mass has not been biopsied.  The patient does not want to have  a biopsy of the mass.  Neurosurgery was consulted regarding the risks of anticoagulation.  They recommended that anticoagulation be initiated as benefit exceeded risk.  The patient's heart rate stabilized and she was transferred to the medical surgical floor.  She remained on digoxin as solo agent for rate control for the remainder of her hospital stay.  Blood pressure was well maintained on this regimen.  Urine culture returned revealing Klebsiella sensitive to Rocephin.  IV Rocephin which was initiated at the time of admission was discontinued in favor of oral cefdinir.  She was evaluated by PT and OT who recommended that she continue SNF rehab.  A bed has been offered and she is appropriate for discharge to SNF rehab today.      Consults:   Cardiology:  Assessment/Plan         Symptomatic high grade bradycardia  Sinus tachycardia  Elevated troponin    Essential hypertension    Anticoagulated    PAF (paroxysmal atrial fibrillation) (HCC)    Brain mass    Brain metastasis (HCC)    Hypotension  Dizziness  SOB     Agree with holding rate-limiting drugs for now.  Patient is now in ST.  She will likely need pacemaker tomorrow. Will place orders and hold NPO after midnight. Continue to monitor troponin.  Pt denies any CP.  Check limited echo to evaluate LV function.  Continue anticoagulation.  Continue statin.  Further recommendations to follow from Dr. Carrera.  Thank you for consult.  Cardiology will gladly follow with you.     I discussed the patient's findings and my recommendations with patient, family and nursing staff     Kellie Kim PA-C    Neurosurgery:  Differential Diagnosis:   Lung cancer with mets to the brain  Bradycardia and hypotension  New onset atrial fibrillation necessitating anticoagulation     Recommendations:  Full anticoagulation is not contraindicated in the presence of lesions alone without craniotomy.  Would caution in the setting of renal cell metastasis or melanoma as they have  a higher rate of hemorrhage.    Pertinent Test Results:    COVID-19,[facility] IN-HOUSE,Nasal Swab No Transport Media 3-4 HR TAT - Swab, Nasal Cavity [[id number]]  (Normal) Collected: 11/13/21 1350    Specimen: Swab from Nasal Cavity Updated: 11/13/21 1430     COVID19 Not Detected    Basic Metabolic Panel [[id number]]  (Abnormal) Collected: 11/12/21 0236    Specimen: Blood Updated: 11/12/21 0312     Glucose 243 mg/dL      BUN 32 mg/dL      Creatinine 0.46 mg/dL      Sodium 135 mmol/L      Potassium 4.2 mmol/L      Chloride 103 mmol/L      CO2 23.0 mmol/L      Calcium 7.8 mg/dL      eGFR Non African Amer 132 mL/min/1.73      BUN/Creatinine Ratio 69.6     Anion Gap 9.0 mmol/L     Narrative:      GFR Normal >60  Chronic Kidney Disease <60  Kidney Failure <15      CBC (No Diff) [[id number]]  (Abnormal) Collected: 11/12/21 0235    Specimen: Blood Updated: 11/12/21 0254     WBC 6.98 10*3/mm3      RBC 2.80 10*6/mm3      Hemoglobin 9.7 g/dL      Hematocrit 29.0 %      .6 fL      MCH 34.6 pg      MCHC 33.4 g/dL      RDW 14.9 %      RDW-SD 55.7 fl      MPV 10.2 fL      Platelets 224 10*3/mm3     Urine Culture - Urine, Urine, Catheter [[id number]]  (Abnormal)  (Susceptibility) Collected: 11/09/21 1158    Specimen: Urine, Catheter Updated: 11/11/21 0953     Urine Culture >100,000 CFU/mL Proteus mirabilis    Susceptibility      Proteus mirabilis     JAGDISH     Ampicillin Susceptible     Ampicillin + Sulbactam Susceptible     Cefazolin Susceptible     Cefepime Susceptible     Ceftazidime Susceptible     Ceftriaxone Susceptible     Gentamicin Susceptible     Levofloxacin Resistant     Nitrofurantoin Resistant     Piperacillin + Tazobactam Susceptible     Tetracycline Resistant     Trimethoprim + Sulfamethoxazole Resistant                  Linear View                   Basic Metabolic Panel [[id number]]  (Abnormal) Collected: 11/10/21 0344    Specimen: Blood Updated: 11/10/21 0521     Glucose 47 mg/dL      BUN 42 mg/dL      Creatinine  0.85 mg/dL      Sodium 137 mmol/L      Potassium 3.8 mmol/L      Chloride 97 mmol/L      CO2 32.0 mmol/L      Calcium 8.0 mg/dL      eGFR Non African Amer 65 mL/min/1.73      BUN/Creatinine Ratio 49.4     Anion Gap 8.0 mmol/L     Narrative:      GFR Normal >60  Chronic Kidney Disease <60  Kidney Failure <15      CBC Auto Differential [[id number]]  (Abnormal) Collected: 11/10/21 0344    Specimen: Blood Updated: 11/10/21 0444     WBC 10.17 10*3/mm3      RBC 3.14 10*6/mm3      Hemoglobin 11.0 g/dL      Hematocrit 33.3 %      .1 fL      MCH 35.0 pg      MCHC 33.0 g/dL      RDW 14.9 %      RDW-SD 57.9 fl      MPV 10.4 fL      Platelets 244 10*3/mm3      Neutrophil % 72.7 %      Lymphocyte % 19.7 %      Monocyte % 4.5 %      Eosinophil % 0.1 %      Basophil % 0.4 %      Neutrophils, Absolute 7.40 10*3/mm3      Lymphocytes, Absolute 2.00 10*3/mm3      Monocytes, Absolute 0.46 10*3/mm3      Eosinophils, Absolute 0.01 10*3/mm3      Basophils, Absolute 0.04 10*3/mm3     Troponin [[id number]]  (Abnormal) Collected: 11/09/21 1550    Specimen: Blood Updated: 11/09/21 1648     Troponin T 0.304 ng/mL     Urinalysis, Microscopic Only - Urine, Catheter [[id number]]  (Abnormal) Collected: 11/09/21 1158    Specimen: Urine, Catheter Updated: 11/09/21 1258     RBC, UA None Seen /HPF      WBC, UA 6-12 /HPF      Bacteria, UA None Seen /HPF      Squamous Epithelial Cells, UA None Seen /HPF      Hyaline Casts, UA None Seen /LPF      Triple Phosphate Crystals, UA Moderate/2+ /HPF      Amorphous Crystals, UA Moderate/2+ /HPF      Methodology Manual Light Microscopy    Urinalysis With Culture If Indicated - Urine, Catheter [[id number]]  (Abnormal) Collected: 11/09/21 1158    Specimen: Urine, Catheter Updated: 11/09/21 1258     Color, UA Yellow     Appearance, UA Turbid     pH, UA 8.5     Specific Gravity, UA 1.021     Glucose,  mg/dL (Trace)     Ketones, UA Negative     Bilirubin, UA Negative     Blood, UA Negative     Protein, UA  Trace     Leuk Esterase, UA Moderate (2+)     Nitrite, UA Negative     Urobilinogen, UA 1.0 E.U./dL    Troponin [[id number]]  (Abnormal) Collected: 11/09/21 1141    Specimen: Blood Updated: 11/09/21 1206     Troponin T 0.326 ng/mL     STAT Lactic Acid, Reflex [[id number]]  (Abnormal) Collected: 11/09/21 0825    Specimen: Blood Updated: 11/09/21 0857     Lactate 2.5 mmol/L     Troponin [[id number]]  (Abnormal) Collected: 11/09/21 0825    Specimen: Blood Updated: 11/09/21 0857     Troponin T 0.221 ng/mL     Manual Differential [[id number]]  (Abnormal) Collected: 11/09/21 0457    Specimen: Blood Updated: 11/09/21 0638     Neutrophil % 78.4 %      Lymphocyte % 14.4 %      Monocyte % 4.1 %      Bands %  2.1 %      Myelocyte % 1.0 %      Neutrophils Absolute 8.40 10*3/mm3      Lymphocytes Absolute 1.50 10*3/mm3      Monocytes Absolute 0.43 10*3/mm3      Anisocytosis Slight/1+     Poikilocytes Slight/1+     Polychromasia Slight/1+     WBC Morphology Normal     Platelet Morphology Normal    CBC Auto Differential [[id number]]  (Abnormal) Collected: 11/09/21 0457    Specimen: Blood Updated: 11/09/21 0638     WBC 10.44 10*3/mm3      RBC 3.15 10*6/mm3      Hemoglobin 11.0 g/dL      Hematocrit 33.4 %      .0 fL      MCH 34.9 pg      MCHC 32.9 g/dL      RDW 15.3 %      RDW-SD 59.2 fl      MPV 10.4 fL      Platelets 242 10*3/mm3     COVID-19,[facility] IN-HOUSE,Nasal Swab No Transport Media 3-4 HR TAT - Swab, Nasal Cavity [[id number]]  (Normal) Collected: 11/09/21 0517    Specimen: Swab from Nasal Cavity Updated: 11/09/21 0626     COVID19 Not Detected    TSH [[id number]]  (Abnormal) Collected: 11/09/21 0457    Specimen: Blood Updated: 11/09/21 0624     TSH 0.064 uIU/mL     Procalcitonin [[id number]]  (Normal) Collected: 11/09/21 0457    Specimen: Blood Updated: 11/09/21 0623     Procalcitonin 0.14 ng/mL     Comprehensive Metabolic Panel [[id number]]  (Abnormal) Collected: 11/09/21 0457    Specimen: Blood Updated: 11/09/21 0622      Glucose 394 mg/dL      BUN 40 mg/dL      Creatinine 0.82 mg/dL      Sodium 137 mmol/L      Potassium 4.8 mmol/L      Comment: Slight hemolysis detected by analyzer. Results may be affected.        Chloride 100 mmol/L      CO2 26.0 mmol/L      Calcium 8.7 mg/dL      Total Protein 5.5 g/dL      Albumin 2.70 g/dL      ALT (SGPT) 31 U/L      AST (SGOT) 19 U/L      Comment: Slight hemolysis detected by analyzer. Results may be affected.        Alkaline Phosphatase 66 U/L      Total Bilirubin 0.2 mg/dL      eGFR Non African Amer 68 mL/min/1.73      Globulin 2.8 gm/dL      A/G Ratio 1.0 g/dL      BUN/Creatinine Ratio 48.8     Anion Gap 11.0 mmol/L     Narrative:      GFR Normal >60  Chronic Kidney Disease <60  Kidney Failure <15      CK [[id number]]  (Normal) Collected: 11/09/21 0457    Specimen: Blood Updated: 11/09/21 0622     Creatine Kinase 22 U/L     Lactic Acid, Plasma [[id number]]  (Abnormal) Collected: 11/09/21 0521    Specimen: Blood Updated: 11/09/21 0620     Lactate 2.7 mmol/L     Troponin [[id number]]  (Normal) Collected: 11/09/21 0457    Specimen: Blood Updated: 11/09/21 0618     Troponin T <0.010 ng/mL     Narrative:      Troponin T Reference Range:  <= 0.03 ng/mL-   Negative for AMI  >0.03 ng/mL-     Abnormal for myocardial necrosis.  Clinicians would have to utilize clinical acumen, EKG, Troponin and serial changes to determine if it is an Acute Myocardial Infarction or myocardial injury due to an underlying chronic condition.       Results may be falsely decreased if patient taking Biotin.      BNP [[id number]]  (Abnormal) Collected: 11/09/21 0457    Specimen: Blood Updated: 11/09/21 0617     proBNP 3,851.0 pg/mL     Narrative:      Among patients with dyspnea, NT-proBNP is highly sensitive for the detection of acute congestive heart failure. In addition NT-proBNP of <300 pg/ml effectively rules out acute congestive heart failure with 99% negative predictive value.    Results may be falsely decreased if  "patient taking Biotin.      D-dimer, Quantitative [[id number]]  (Normal) Collected: 11/09/21 0457    Specimen: Blood Updated: 11/09/21 0611     D-Dimer, Quantitative <0.22 mg/L (FEU)      Imaging Results (Last 7 Days)     Procedure Component Value Units Date/Time    XR Chest 1 View [[id number]] Collected: 11/09/21 0649     Updated: 11/09/21 0653    Narrative:      EXAMINATION:  XR CHEST 1 VW-  11/9/2021 5:25 AM CST     HISTORY: R00.1-Bradycardia, unspecified     COMPARISON: 9/26/2021.     FINDINGS:  There is no dense infiltrate or effusion. There is stable  bronchial wall thickening. Heart size is upper limits of normal. The  thoracic aorta is atheromatous. There is a loop recorder projected over  the left chest.       Impression:      1. No focal infiltrate or effusion.  2. Stable bronchial wall thickening.        This report was finalized on 11/09/2021 06:50 by Dr. Justice Lo MD.            Condition on Discharge:    Stable    Physical Exam on Discharge:  /62 (BP Location: Left arm, Patient Position: Lying)   Pulse 110   Temp 97.9 °F (36.6 °C) (Oral)   Resp 18   Ht 162.6 cm (64.02\")   Wt 88.3 kg (194 lb 9.6 oz)   LMP  (LMP Unknown)   SpO2 93%   BMI 33.39 kg/m²   Physical Exam     Constitutional:       Appearance: She is well-developed and overweight. She appears brighter and more interactive today.  HENT:      Head: Normocephalic and atraumatic.      Right Ear: External ear normal.      Left Ear: External ear normal.      Nose: Nose normal.      Mouth: Mucous membranes are moist.   Eyes:      General: No scleral icterus.      Conjunctiva/sclera: Conjunctivae normal. .   Cardiovascular:      Rate and Rhythm: Irregularly irregular rhythm.       Pulses: Normal pulses.      Heart sounds: Normal heart sounds. No murmur heard.  Pulmonary:      Effort: Pulmonary effort is normal. No respiratory distress.      Breath sounds: Clear this a.m.  Abdominal:      General: Abdomen is flat. Bowel sounds " are normal.      Palpations: Abdomen is soft. There is no mass.   Musculoskeletal:         General: Normal range of motion.      Right lower leg: Edema present.      Left lower leg: Edema present.   Skin:     General: Skin is warm and dry.      Coloration: Skin is not pale.      Comments: Large area of skin sloughing noted left lower extremity.  Wound care continues to follow.  Neurological:      General: No focal deficit present.      Mental Status: She is alert and oriented to person, place, and time. Mental status is at baseline.   Psychiatric:         Mood and Affect: Mood normal.         Judgment: Judgment normal.     Discharge Disposition:  Home or Self Care    Discharge Medications:     Discharge Medications      New Medications      Instructions Start Date   cefdinir 300 MG capsule  Commonly known as: OMNICEF   300 mg, Oral, Every 24 Hours Scheduled   Start Date: November 14, 2021     digoxin 125 MCG tablet  Commonly known as: LANOXIN   125 mcg, Oral, Daily Digoxin   Start Date: November 14, 2021        Continue These Medications      Instructions Start Date   acetaminophen 325 MG tablet  Commonly known as: TYLENOL   650 mg, Oral, Every 4 Hours PRN      albuterol sulfate  (90 Base) MCG/ACT inhaler  Commonly known as: PROVENTIL HFA;VENTOLIN HFA;PROAIR HFA   1 puff, Inhalation, Daily      apixaban 5 MG tablet tablet  Commonly known as: ELIQUIS   5 mg, Oral, Every 12 Hours Scheduled      aspirin 81 MG EC tablet   81 mg, Oral, Daily      calcium carbonate 500 MG chewable tablet  Commonly known as: TUMS   2 tablets, Oral, Every 4 Hours PRN      CALTRATE 600+D PO   1 tablet, Oral, 2 times daily      dexamethasone 4 MG tablet  Commonly known as: DECADRON   4 mg, Oral, 3 Times Daily With Meals      docusate sodium 100 MG capsule   100 mg, Oral, 2 Times Daily PRN      famotidine 20 MG tablet  Commonly known as: PEPCID   1 tablet, Oral, Nightly      fluticasone-salmeterol 100-50 MCG/DOSE DISKUS  Commonly known  as: ADVAIR   1 puff, Inhalation, 2 Times Daily - RT      furosemide 40 MG tablet  Commonly known as: LASIX   40 mg, Oral, Every Morning      gabapentin 100 MG capsule  Commonly known as: NEURONTIN   100 mg, Oral, 3 Times Daily      guaiFENesin 600 MG 12 hr tablet  Commonly known as: MUCINEX   1,200 mg, Oral, 2 Times Daily PRN      ipratropium-albuterol 0.5-2.5 mg/3 ml nebulizer  Commonly known as: DUO-NEB   3 mL, Nebulization, Every 6 Hours PRN      levETIRAcetam 500 MG tablet  Commonly known as: KEPPRA   500 mg, Oral, 2 Times Daily      lisinopril 20 MG tablet  Commonly known as: PRINIVIL,ZESTRIL   20 mg, Oral, Daily      metoprolol succinate XL 50 MG 24 hr tablet  Commonly known as: TOPROL-XL   50 mg, Oral, Daily      nitroglycerin 0.4 MG SL tablet  Commonly known as: NITROSTAT   0.4 mg, Sublingual, Every 5 Minutes PRN, Take no more than 3 doses in 15 minutes.      omeprazole 40 MG capsule  Commonly known as: priLOSEC   40 mg, Oral, Daily      ondansetron 4 MG tablet  Commonly known as: ZOFRAN   1 tablet, Oral, Every 6 Hours PRN      simvastatin 20 MG tablet  Commonly known as: ZOCOR   20 mg, Oral, Nightly      tiotropium bromide monohydrate 2.5 MCG/ACT aerosol solution inhaler  Commonly known as: SPIRIVA RESPIMAT   2 puffs, Inhalation, Daily - RT         Stop These Medications    LORazepam 0.5 MG tablet  Commonly known as: ATIVAN     nystatin 100,000 unit/mL suspension  Commonly known as: MYCOSTATIN     oxybutynin 5 MG tablet  Commonly known as: DITROPAN     simethicone 125 MG chewable tablet  Commonly known as: MYLICON     traMADol 50 MG tablet  Commonly known as: ULTRAM            Discharge Diet:   Diet Instructions     Diet: Consistent Carbohydrate; Thin      Discharge Diet: Consistent Carbohydrate    Fluid Consistency: Thin          Discharge Care Plan / Instructions:   Discharge to skilled nursing facility to continue wound care and rehab    Activity at Discharge:   Activity Instructions     Activity as  Tolerated            Follow-up Appointments:  Follow-up with primary care physician at the time of discharge from skilled nursing facility.   Outpatient wound care as directed at the wound care center       Electronically signed by Milo White DO, 11/13/21, 14:44 CST.    Time: Discharge Over 30 min    Part of this note may be an electronic transcription/translation of spoken language to printed text using the Dragon Dictation system.        Electronically signed by Milo White DO at 11/13/21 1455         COVID PRE-OP / PRE-PROCEDURE SCREENING ORDER (NO ISOLATION) - Swab, Nasal Cavity [[id number]] (Order [id number])  Order  Date: 11/13/2021 Department: [facility] 4B Released By: Diana Randle RN (auto-released) Authorizing: Milo White DO       Linked Results    Procedure Abnormality Status   COVID-19,[facility] IN-HOUSE,Nasal Swab No Transport Media 3-4 HR TAT - Swab, Nasal Cavity Normal Final result     Reprint Order Requisition    COVID PRE-OP / PRE-PROCEDURE SCREENING ORDER (NO ISOLATION) - Swab, Nasal Cavity (Order #[id number]) on 11/13/21           COVID PRE-OP / PRE-PROCEDURE SCREENING ORDER (NO ISOLATION) - Swab, Nasal Cavity  Order: [id number]   Status: Final result       Visible to patient: No (not released)       Next appt: 12/13/2021 at 10:30 AM in Radiology ([facility] 2)      Specimen Information: Nasal Cavity; Swab         0 Result Notes              Specimen Collected: 11/13/21 13:50 Last Resulted: 11/13/21 14:30        Order Details        View Encounter        Lab and Collection Details        Routing        Result History               Result Care Coordination        Patient Communication     Not Released  Not seen Back to Top           COVID-19,[facility] IN-HOUSE,Nasal Swab No Transport Media 3-4 HR TAT - Swab, Nasal Cavity  Order: [id number] - Part of Panel Order [id number]   Status: Final result       Visible to patient: No (not released)       Next appt: 12/13/2021  at 10:30 AM in Radiology ([facility] CT 2)      Specimen Information: Nasal Cavity; Swab         0 Result Notes      Component   Ref Range & Units    COVID19   Not Detected - Ref. Range Not Detected    Resulting Agency  [facility]               Narrative  Performed by:  [facility]  Fact sheet for providers: https://www.fda.gov/media/444605/download     Fact sheet for patients: https://www.fda.gov/media/128633/download     Test performed by PCR.

## 2021-11-13 NOTE — DISCHARGE SUMMARY
[facility] Medicine Services  DISCHARGE SUMMARY       Date of Admission: 11/9/2021  Date of Discharge:  11/13/2021  Primary Care Physician: Cayetano Crews MD    Discharge Diagnoses:  Active Hospital Problems    Diagnosis    • **Junctional bradycardia    • Moderate malnutrition (CMS/HCC)    • Hypotension    • Brain metastasis (HCC)    • Brain mass    • PAF (paroxysmal atrial fibrillation) (HCC)    • Anticoagulated    • Essential hypertension          Presenting Problem/History of Present Illness:  Symptomatic bradycardia [R00.1]     Chief Complaint on Day of Discharge:   No complaint    History of Present Illness on Day of Discharge:   The patient is doing well today.  She has been accepted to skilled nursing facility for continuation of rehabilitation.  She will be sent to outpatient wound care for further treatment of the large hematoma that was unroofed and sharply debrided as an inpatient.    Hospital Course  This 75-year-old female was sent to the ED by the nursing home because of low heart rate and low blood pressure.  She experienced hypotension with blood pressure as low as 72/54.  Heart rate was noted to be in the 20s and 30s.  Patient received 0.5 mg of atropine without response.  She subsequently was given 0.5 mg of epinephrine with improvement in heart rate up to mid 50s.  She is currently on a dopamine drip to maintain blood pressure and heart rate.  The patient is a very poor historian and does not add much to the history during the interview.  She appears to be at her cognitive baseline.  EKG was of poor quality but a junctional bradycardia is noted.  Her history includes lung cancer with metastasis to the brain, COPD, CAD, essential hypertension, GERD and PAF.     Work-up in the emergency department reveals lactic acid 2.7, negative Covid swab, BNP 3851.  CMP shows glucose 394, BUN 40, albumin 2.70.  D-dimer, troponin, procalcitonin, CK all within normal  limits.  White blood cell count within normal limits with hemoglobin 11.0.  TSH low at 0.064.    PLAN:   Admit to CCU  Continue dopamine drip  Cardiology consultation  Clarify med list (Toprol-XL appears on current list but unsure if list is accurate)  Discontinue Ditropan as anticholinergics are contraindicated at her age  Clarify dexamethasone dosing prior to reinitiation  Continue Eliquis and aspirin    Rate limiting medications were held at the time of admission.  Cardiology was consulted regarding profound bradycardia.  Dr. Monteiro saw and evaluated the patient and noted that the patient did not require pacemaker.  The patient converted to atrial fibrillation with rates between 110 and 140.  Her blood pressure was soft and did not allow for reinitiation of rate limiting medications.  Subsequently, the patient was started on digoxin 0.25 mg IV x1 dose which was followed by 0.125 mg p.o. daily.  The patient was noted to have a large hematoma on the left lower extremity in the mid tibial area.  It was previously seen and evaluated by Dr. Prasad who placed sutures in the wound.  The sutures have since torn away and the hematoma was noted to have undermined tissue.  There was significant residual of devitalized skin noted peripherally.  On 11/6, using sharp dissection and sterile technique, the dead skin was debrided from the edges of the hematoma.  The hematoma measured approximately 2518 cm.  Sterile gauze was placed over the top and wound care was consulted and made wound care recommendations for follow-up.  Troponin was noted to be elevated at 0.304 at the time of admission which was likely a type II leak.  The patient notably has brain metastasis from likely a primary lung cancer.  Lung mass has not been biopsied.  The patient does not want to have a biopsy of the mass.  Neurosurgery was consulted regarding the risks of anticoagulation.  They recommended that anticoagulation be initiated as benefit exceeded risk.   The patient's heart rate stabilized and she was transferred to the medical surgical floor.  She remained on digoxin as solo agent for rate control for the remainder of her hospital stay.  Blood pressure was well maintained on this regimen.  Urine culture returned revealing Klebsiella sensitive to Rocephin.  IV Rocephin which was initiated at the time of admission was discontinued in favor of oral cefdinir.  She was evaluated by PT and OT who recommended that she continue SNF rehab.  A bed has been offered and she is appropriate for discharge to SNF rehab today.      Consults:   Cardiology:  Assessment/Plan         Symptomatic high grade bradycardia  Sinus tachycardia  Elevated troponin    Essential hypertension    Anticoagulated    PAF (paroxysmal atrial fibrillation) (HCC)    Brain mass    Brain metastasis (HCC)    Hypotension  Dizziness  SOB     Agree with holding rate-limiting drugs for now.  Patient is now in ST.  She will likely need pacemaker tomorrow. Will place orders and hold NPO after midnight. Continue to monitor troponin.  Pt denies any CP.  Check limited echo to evaluate LV function.  Continue anticoagulation.  Continue statin.  Further recommendations to follow from Dr. Carrera.  Thank you for consult.  Cardiology will gladly follow with you.     I discussed the patient's findings and my recommendations with patient, family and nursing staff     Kellie Kim PA-C    Neurosurgery:  Differential Diagnosis:   Lung cancer with mets to the brain  Bradycardia and hypotension  New onset atrial fibrillation necessitating anticoagulation     Recommendations:  Full anticoagulation is not contraindicated in the presence of lesions alone without craniotomy.  Would caution in the setting of renal cell metastasis or melanoma as they have a higher rate of hemorrhage.    Pertinent Test Results:    COVID-19,[facility] IN-HOUSE,Nasal Swab No Transport Media 3-4 HR TAT - Swab, Nasal Cavity [[id number]]  (Normal)  Collected: 11/13/21 1350    Specimen: Swab from Nasal Cavity Updated: 11/13/21 1430     COVID19 Not Detected    Basic Metabolic Panel [[id number]]  (Abnormal) Collected: 11/12/21 0236    Specimen: Blood Updated: 11/12/21 0312     Glucose 243 mg/dL      BUN 32 mg/dL      Creatinine 0.46 mg/dL      Sodium 135 mmol/L      Potassium 4.2 mmol/L      Chloride 103 mmol/L      CO2 23.0 mmol/L      Calcium 7.8 mg/dL      eGFR Non African Amer 132 mL/min/1.73      BUN/Creatinine Ratio 69.6     Anion Gap 9.0 mmol/L     Narrative:      GFR Normal >60  Chronic Kidney Disease <60  Kidney Failure <15      CBC (No Diff) [[id number]]  (Abnormal) Collected: 11/12/21 0235    Specimen: Blood Updated: 11/12/21 0254     WBC 6.98 10*3/mm3      RBC 2.80 10*6/mm3      Hemoglobin 9.7 g/dL      Hematocrit 29.0 %      .6 fL      MCH 34.6 pg      MCHC 33.4 g/dL      RDW 14.9 %      RDW-SD 55.7 fl      MPV 10.2 fL      Platelets 224 10*3/mm3     Urine Culture - Urine, Urine, Catheter [[id number]]  (Abnormal)  (Susceptibility) Collected: 11/09/21 1158    Specimen: Urine, Catheter Updated: 11/11/21 0953     Urine Culture >100,000 CFU/mL Proteus mirabilis    Susceptibility      Proteus mirabilis     JAGDISH     Ampicillin Susceptible     Ampicillin + Sulbactam Susceptible     Cefazolin Susceptible     Cefepime Susceptible     Ceftazidime Susceptible     Ceftriaxone Susceptible     Gentamicin Susceptible     Levofloxacin Resistant     Nitrofurantoin Resistant     Piperacillin + Tazobactam Susceptible     Tetracycline Resistant     Trimethoprim + Sulfamethoxazole Resistant                  Linear View                   Basic Metabolic Panel [[id number]]  (Abnormal) Collected: 11/10/21 0344    Specimen: Blood Updated: 11/10/21 0521     Glucose 47 mg/dL      BUN 42 mg/dL      Creatinine 0.85 mg/dL      Sodium 137 mmol/L      Potassium 3.8 mmol/L      Chloride 97 mmol/L      CO2 32.0 mmol/L      Calcium 8.0 mg/dL      eGFR Non African Amer 65 mL/min/1.73       BUN/Creatinine Ratio 49.4     Anion Gap 8.0 mmol/L     Narrative:      GFR Normal >60  Chronic Kidney Disease <60  Kidney Failure <15      CBC Auto Differential [[id number]]  (Abnormal) Collected: 11/10/21 0344    Specimen: Blood Updated: 11/10/21 0444     WBC 10.17 10*3/mm3      RBC 3.14 10*6/mm3      Hemoglobin 11.0 g/dL      Hematocrit 33.3 %      .1 fL      MCH 35.0 pg      MCHC 33.0 g/dL      RDW 14.9 %      RDW-SD 57.9 fl      MPV 10.4 fL      Platelets 244 10*3/mm3      Neutrophil % 72.7 %      Lymphocyte % 19.7 %      Monocyte % 4.5 %      Eosinophil % 0.1 %      Basophil % 0.4 %      Neutrophils, Absolute 7.40 10*3/mm3      Lymphocytes, Absolute 2.00 10*3/mm3      Monocytes, Absolute 0.46 10*3/mm3      Eosinophils, Absolute 0.01 10*3/mm3      Basophils, Absolute 0.04 10*3/mm3     Troponin [[id number]]  (Abnormal) Collected: 11/09/21 1550    Specimen: Blood Updated: 11/09/21 1648     Troponin T 0.304 ng/mL     Urinalysis, Microscopic Only - Urine, Catheter [[id number]]  (Abnormal) Collected: 11/09/21 1158    Specimen: Urine, Catheter Updated: 11/09/21 1258     RBC, UA None Seen /HPF      WBC, UA 6-12 /HPF      Bacteria, UA None Seen /HPF      Squamous Epithelial Cells, UA None Seen /HPF      Hyaline Casts, UA None Seen /LPF      Triple Phosphate Crystals, UA Moderate/2+ /HPF      Amorphous Crystals, UA Moderate/2+ /HPF      Methodology Manual Light Microscopy    Urinalysis With Culture If Indicated - Urine, Catheter [[id number]]  (Abnormal) Collected: 11/09/21 1158    Specimen: Urine, Catheter Updated: 11/09/21 1258     Color, UA Yellow     Appearance, UA Turbid     pH, UA 8.5     Specific Gravity, UA 1.021     Glucose,  mg/dL (Trace)     Ketones, UA Negative     Bilirubin, UA Negative     Blood, UA Negative     Protein, UA Trace     Leuk Esterase, UA Moderate (2+)     Nitrite, UA Negative     Urobilinogen, UA 1.0 E.U./dL    Troponin [[id number]]  (Abnormal) Collected: 11/09/21 1141    Specimen:  Blood Updated: 11/09/21 1206     Troponin T 0.326 ng/mL     STAT Lactic Acid, Reflex [[id number]]  (Abnormal) Collected: 11/09/21 0825    Specimen: Blood Updated: 11/09/21 0857     Lactate 2.5 mmol/L     Troponin [[id number]]  (Abnormal) Collected: 11/09/21 0825    Specimen: Blood Updated: 11/09/21 0857     Troponin T 0.221 ng/mL     Manual Differential [[id number]]  (Abnormal) Collected: 11/09/21 0457    Specimen: Blood Updated: 11/09/21 0638     Neutrophil % 78.4 %      Lymphocyte % 14.4 %      Monocyte % 4.1 %      Bands %  2.1 %      Myelocyte % 1.0 %      Neutrophils Absolute 8.40 10*3/mm3      Lymphocytes Absolute 1.50 10*3/mm3      Monocytes Absolute 0.43 10*3/mm3      Anisocytosis Slight/1+     Poikilocytes Slight/1+     Polychromasia Slight/1+     WBC Morphology Normal     Platelet Morphology Normal    CBC Auto Differential [[id number]]  (Abnormal) Collected: 11/09/21 0457    Specimen: Blood Updated: 11/09/21 0638     WBC 10.44 10*3/mm3      RBC 3.15 10*6/mm3      Hemoglobin 11.0 g/dL      Hematocrit 33.4 %      .0 fL      MCH 34.9 pg      MCHC 32.9 g/dL      RDW 15.3 %      RDW-SD 59.2 fl      MPV 10.4 fL      Platelets 242 10*3/mm3     COVID-19,[facility] IN-HOUSE,Nasal Swab No Transport Media 3-4 HR TAT - Swab, Nasal Cavity [[id number]]  (Normal) Collected: 11/09/21 0517    Specimen: Swab from Nasal Cavity Updated: 11/09/21 0626     COVID19 Not Detected    TSH [[id number]]  (Abnormal) Collected: 11/09/21 0457    Specimen: Blood Updated: 11/09/21 0624     TSH 0.064 uIU/mL     Procalcitonin [[id number]]  (Normal) Collected: 11/09/21 0457    Specimen: Blood Updated: 11/09/21 0623     Procalcitonin 0.14 ng/mL     Comprehensive Metabolic Panel [[id number]]  (Abnormal) Collected: 11/09/21 0457    Specimen: Blood Updated: 11/09/21 0622     Glucose 394 mg/dL      BUN 40 mg/dL      Creatinine 0.82 mg/dL      Sodium 137 mmol/L      Potassium 4.8 mmol/L      Comment: Slight hemolysis detected by analyzer. Results may  be affected.        Chloride 100 mmol/L      CO2 26.0 mmol/L      Calcium 8.7 mg/dL      Total Protein 5.5 g/dL      Albumin 2.70 g/dL      ALT (SGPT) 31 U/L      AST (SGOT) 19 U/L      Comment: Slight hemolysis detected by analyzer. Results may be affected.        Alkaline Phosphatase 66 U/L      Total Bilirubin 0.2 mg/dL      eGFR Non African Amer 68 mL/min/1.73      Globulin 2.8 gm/dL      A/G Ratio 1.0 g/dL      BUN/Creatinine Ratio 48.8     Anion Gap 11.0 mmol/L     Narrative:      GFR Normal >60  Chronic Kidney Disease <60  Kidney Failure <15      CK [[id number]]  (Normal) Collected: 11/09/21 0457    Specimen: Blood Updated: 11/09/21 0622     Creatine Kinase 22 U/L     Lactic Acid, Plasma [[id number]]  (Abnormal) Collected: 11/09/21 0521    Specimen: Blood Updated: 11/09/21 0620     Lactate 2.7 mmol/L     Troponin [[id number]]  (Normal) Collected: 11/09/21 0457    Specimen: Blood Updated: 11/09/21 0618     Troponin T <0.010 ng/mL     Narrative:      Troponin T Reference Range:  <= 0.03 ng/mL-   Negative for AMI  >0.03 ng/mL-     Abnormal for myocardial necrosis.  Clinicians would have to utilize clinical acumen, EKG, Troponin and serial changes to determine if it is an Acute Myocardial Infarction or myocardial injury due to an underlying chronic condition.       Results may be falsely decreased if patient taking Biotin.      BNP [[id number]]  (Abnormal) Collected: 11/09/21 0457    Specimen: Blood Updated: 11/09/21 0617     proBNP 3,851.0 pg/mL     Narrative:      Among patients with dyspnea, NT-proBNP is highly sensitive for the detection of acute congestive heart failure. In addition NT-proBNP of <300 pg/ml effectively rules out acute congestive heart failure with 99% negative predictive value.    Results may be falsely decreased if patient taking Biotin.      D-dimer, Quantitative [[id number]]  (Normal) Collected: 11/09/21 0457    Specimen: Blood Updated: 11/09/21 0611     D-Dimer, Quantitative <0.22 mg/L (FEU)   "    Imaging Results (Last 7 Days)     Procedure Component Value Units Date/Time    XR Chest 1 View [[id number]] Collected: 11/09/21 0649     Updated: 11/09/21 0653    Narrative:      EXAMINATION:  XR CHEST 1 VW-  11/9/2021 5:25 AM CST     HISTORY: R00.1-Bradycardia, unspecified     COMPARISON: 9/26/2021.     FINDINGS:  There is no dense infiltrate or effusion. There is stable  bronchial wall thickening. Heart size is upper limits of normal. The  thoracic aorta is atheromatous. There is a loop recorder projected over  the left chest.       Impression:      1. No focal infiltrate or effusion.  2. Stable bronchial wall thickening.        This report was finalized on 11/09/2021 06:50 by Dr. Justice Lo MD.            Condition on Discharge:    Stable    Physical Exam on Discharge:  /62 (BP Location: Left arm, Patient Position: Lying)   Pulse 110   Temp 97.9 °F (36.6 °C) (Oral)   Resp 18   Ht 162.6 cm (64.02\")   Wt 88.3 kg (194 lb 9.6 oz)   LMP  (LMP Unknown)   SpO2 93%   BMI 33.39 kg/m²   Physical Exam     Constitutional:       Appearance: She is well-developed and overweight. She appears brighter and more interactive today.  HENT:      Head: Normocephalic and atraumatic.      Right Ear: External ear normal.      Left Ear: External ear normal.      Nose: Nose normal.      Mouth: Mucous membranes are moist.   Eyes:      General: No scleral icterus.      Conjunctiva/sclera: Conjunctivae normal. .   Cardiovascular:      Rate and Rhythm: Irregularly irregular rhythm.       Pulses: Normal pulses.      Heart sounds: Normal heart sounds. No murmur heard.  Pulmonary:      Effort: Pulmonary effort is normal. No respiratory distress.      Breath sounds: Clear this a.m.  Abdominal:      General: Abdomen is flat. Bowel sounds are normal.      Palpations: Abdomen is soft. There is no mass.   Musculoskeletal:         General: Normal range of motion.      Right lower leg: Edema present.      Left lower leg: " Edema present.   Skin:     General: Skin is warm and dry.      Coloration: Skin is not pale.      Comments: Large area of skin sloughing noted left lower extremity.  Wound care continues to follow.  Neurological:      General: No focal deficit present.      Mental Status: She is alert and oriented to person, place, and time. Mental status is at baseline.   Psychiatric:         Mood and Affect: Mood normal.         Judgment: Judgment normal.     Discharge Disposition:  Home or Self Care    Discharge Medications:     Discharge Medications      New Medications      Instructions Start Date   cefdinir 300 MG capsule  Commonly known as: OMNICEF   300 mg, Oral, Every 24 Hours Scheduled   Start Date: November 14, 2021     digoxin 125 MCG tablet  Commonly known as: LANOXIN   125 mcg, Oral, Daily Digoxin   Start Date: November 14, 2021        Continue These Medications      Instructions Start Date   acetaminophen 325 MG tablet  Commonly known as: TYLENOL   650 mg, Oral, Every 4 Hours PRN      albuterol sulfate  (90 Base) MCG/ACT inhaler  Commonly known as: PROVENTIL HFA;VENTOLIN HFA;PROAIR HFA   1 puff, Inhalation, Daily      apixaban 5 MG tablet tablet  Commonly known as: ELIQUIS   5 mg, Oral, Every 12 Hours Scheduled      aspirin 81 MG EC tablet   81 mg, Oral, Daily      calcium carbonate 500 MG chewable tablet  Commonly known as: TUMS   2 tablets, Oral, Every 4 Hours PRN      CALTRATE 600+D PO   1 tablet, Oral, 2 times daily      dexamethasone 4 MG tablet  Commonly known as: DECADRON   4 mg, Oral, 3 Times Daily With Meals      docusate sodium 100 MG capsule   100 mg, Oral, 2 Times Daily PRN      famotidine 20 MG tablet  Commonly known as: PEPCID   1 tablet, Oral, Nightly      fluticasone-salmeterol 100-50 MCG/DOSE DISKUS  Commonly known as: ADVAIR   1 puff, Inhalation, 2 Times Daily - RT      furosemide 40 MG tablet  Commonly known as: LASIX   40 mg, Oral, Every Morning      gabapentin 100 MG capsule  Commonly known  as: NEURONTIN   100 mg, Oral, 3 Times Daily      guaiFENesin 600 MG 12 hr tablet  Commonly known as: MUCINEX   1,200 mg, Oral, 2 Times Daily PRN      ipratropium-albuterol 0.5-2.5 mg/3 ml nebulizer  Commonly known as: DUO-NEB   3 mL, Nebulization, Every 6 Hours PRN      levETIRAcetam 500 MG tablet  Commonly known as: KEPPRA   500 mg, Oral, 2 Times Daily      lisinopril 20 MG tablet  Commonly known as: PRINIVIL,ZESTRIL   20 mg, Oral, Daily      metoprolol succinate XL 50 MG 24 hr tablet  Commonly known as: TOPROL-XL   50 mg, Oral, Daily      nitroglycerin 0.4 MG SL tablet  Commonly known as: NITROSTAT   0.4 mg, Sublingual, Every 5 Minutes PRN, Take no more than 3 doses in 15 minutes.      omeprazole 40 MG capsule  Commonly known as: priLOSEC   40 mg, Oral, Daily      ondansetron 4 MG tablet  Commonly known as: ZOFRAN   1 tablet, Oral, Every 6 Hours PRN      simvastatin 20 MG tablet  Commonly known as: ZOCOR   20 mg, Oral, Nightly      tiotropium bromide monohydrate 2.5 MCG/ACT aerosol solution inhaler  Commonly known as: SPIRIVA RESPIMAT   2 puffs, Inhalation, Daily - RT         Stop These Medications    LORazepam 0.5 MG tablet  Commonly known as: ATIVAN     nystatin 100,000 unit/mL suspension  Commonly known as: MYCOSTATIN     oxybutynin 5 MG tablet  Commonly known as: DITROPAN     simethicone 125 MG chewable tablet  Commonly known as: MYLICON     traMADol 50 MG tablet  Commonly known as: ULTRAM            Discharge Diet:   Diet Instructions     Diet: Consistent Carbohydrate; Thin      Discharge Diet: Consistent Carbohydrate    Fluid Consistency: Thin          Discharge Care Plan / Instructions:   Discharge to skilled nursing facility to continue wound care and rehab    Activity at Discharge:   Activity Instructions     Activity as Tolerated            Follow-up Appointments:  Follow-up with primary care physician at the time of discharge from skilled nursing facility.   Outpatient wound care as directed at the  wound care center       Electronically signed by Milo White DO, 11/13/21, 14:44 CST.    Time: Discharge Over 30 min    Part of this note may be an electronic transcription/translation of spoken language to printed text using the Dragon Dictation system.

## 2021-11-13 NOTE — CASE MANAGEMENT/SOCIAL WORK
Continued Stay Note  [facility]     Patient Name: Josi Mohamud  MRN: [id number]  Today's Date: 11/13/2021    Admit Date: 11/9/2021     Discharge Plan     Row Name 11/13/21 1331       Plan    Plan [place]    Plan Comments Peggy with [facility] has advised they can accept patient today pending negative COVID test.  Nursing to call report to [phone].  DC summary and meds to be faxed to [phone].    3:00 PM:  Attempted to contact patient's daughter at [phone] to advise of discharge and received voicemail stating the mailbox was full and could not leave a message.  Discharge summary and negative COVID test faxed to [facility].               Discharge Codes    No documentation.                     ERIK MccallW

## 2021-11-14 NOTE — OUTREACH NOTE
Prep Survey      Responses   [facility] facility patient discharged from? [facility]   Is LACE score < 7 ? No   Emergency Room discharge w/ pulse ox? No   Eligibility Not Eligible   What are the reasons patient is not eligible? [facility]   Does the patient have one of the following disease processes/diagnoses(primary or secondary)? Other   Prep survey completed? Yes          Ofelia Lao RN

## 2021-11-14 NOTE — THERAPY DISCHARGE NOTE
Acute Care - Occupational Therapy Discharge Summary  [facility]     Patient Name: Josi Mohamud  : 1945  MRN: [id number]    Today's Date: 2021                 Admit Date: 2021        OT Recommendation and Plan    Visit Dx:    ICD-10-CM ICD-9-CM   1. Symptomatic bradycardia  R00.1 427.89   2. Decreased activities of daily living (ADL)  Z78.9 V49.89   3. Impaired mobility  Z74.09 799.89   4. Traumatic hematoma of lower leg, left, subsequent encounter  S80.12XD V58.89     924.10   5. Type 2 diabetes mellitus with diabetic neuropathy, unspecified whether long term insulin use (HCC)  E11.40 250.60     357.2                OT Rehab Goals     Row Name 21 1400             Transfer Goal 1 (OT)    Activity/Assistive Device (Transfer Goal 1, OT) commode, bedside without drop arms  -CS      [place] Level/Cues Needed (Transfer Goal 1, OT) minimum assist (75% or more patient effort)  -CS      Time Frame (Transfer Goal 1, OT) long term goal (LTG); by discharge  -CS      Progress/Outcome (Transfer Goal 1, OT) goal not met  -CS              Dressing Goal 1 (OT)    Activity/Device (Dressing Goal 1, OT) lower body dressing  -CS      [place]/Cues Needed (Dressing Goal 1, OT) moderate assist (50-74% patient effort)  -CS      Time Frame (Dressing Goal 1, OT) long term goal (LTG); by discharge  -CS      Progress/Outcome (Dressing Goal 1, OT) goal not met  -CS              Toileting Goal 1 (OT)    Activity/Device (Toileting Goal 1, OT) toileting skills, all  -CS      [place] Level/Cues Needed (Toileting Goal 1, OT) minimum assist (75% or more patient effort)  -CS      Time Frame (Toileting Goal 1, OT) long term goal (LTG); by discharge  -CS      Progress/Outcome (Toileting Goal 1, OT) goal not met  -CS            User Key  (r) = Recorded By, (t) = Taken By, (c) = Cosigned By    Initials Name Provider Type Discipline    CS Socorro Segundo, OTR/L, CNT Occupational Therapist OT                             OT Discharge Summary  Anticipated Discharge Disposition (OT): skilled nursing facility  Reason for Discharge: Discharge from facility  Outcomes Achieved: Refer to plan of care for updates on goals achieved  Discharge Destination: Home with assist      Socorro Segundo OTR/L, NADJA  11/14/2021

## 2021-11-15 NOTE — THERAPY DISCHARGE NOTE
Acute Care - Physical Therapy Discharge Summary  [facility]       Patient Name: Josi Mohamud  : 1945  MRN: [id number]    Today's Date: 11/15/2021                 Admit Date: 2021      PT Recommendation and Plan    Visit Dx:    ICD-10-CM ICD-9-CM   1. Symptomatic bradycardia  R00.1 427.89   2. Decreased activities of daily living (ADL)  Z78.9 V49.89   3. Impaired mobility  Z74.09 799.89   4. Traumatic hematoma of lower leg, left, subsequent encounter  S80.12XD V58.89     924.10   5. Type 2 diabetes mellitus with diabetic neuropathy, unspecified whether long term insulin use (HCC)  E11.40 250.60     357.2                PT Rehab Goals     Row Name 11/15/21 0806             Bed Mobility Goal 1 (PT)    Activity/Assistive Device (Bed Mobility Goal 1, PT) sit to supine/supine to sit  -MF      [place] Level/Cues Needed (Bed Mobility Goal 1, PT) moderate assist (50-74% patient effort); 2 person assist  -MF      Time Frame (Bed Mobility Goal 1, PT) long term goal (LTG); 10 days  -MF      Progress/Outcomes (Bed Mobility Goal 1, PT) goal not met  -MF              Transfer Goal 1 (PT)    Activity/Assistive Device (Transfer Goal 1, PT) sit-to-stand/stand-to-sit; bed-to-chair/chair-to-bed; wheelchair transfer  -MF      [place] Level/Cues Needed (Transfer Goal 1, PT) moderate assist (50-74% patient effort); 2 person assist  -MF      Time Frame (Transfer Goal 1, PT) long term goal (LTG); 10 days  -MF      Progress/Outcome (Transfer Goal 1, PT) goal not met  -MF            User Key  (r) = Recorded By, (t) = Taken By, (c) = Cosigned By    Initials Name Provider Type Discipline    Adeola Hernandez PTA Physical Therapy Assistant PT                    PT Discharge Summary  Anticipated Discharge Disposition (PT): home  Reason for Discharge: Discharge from facility  Outcomes Achieved: Unable to make functional progress toward goals at this time  Discharge Destination: Home      Adeola Nixon  PTA   11/15/2021

## 2021-12-13 ENCOUNTER — APPOINTMENT (OUTPATIENT)
Dept: CT IMAGING | Facility: HOSPITAL | Age: 76
End: 2021-12-13

## 2021-12-13 ENCOUNTER — APPOINTMENT (OUTPATIENT)
Dept: LAB | Facility: HOSPITAL | Age: 76
End: 2021-12-13

## 2021-12-21 ENCOUNTER — APPOINTMENT (OUTPATIENT)
Dept: MRI IMAGING | Facility: HOSPITAL | Age: 76
End: 2021-12-21

## 2022-06-02 ENCOUNTER — APPOINTMENT (OUTPATIENT)
Dept: ULTRASOUND IMAGING | Facility: HOSPITAL | Age: 77
End: 2022-06-02

## 2022-10-11 NOTE — PROGRESS NOTES
REVIEW OF SYSTEMS    Constitutional: []Fever []Sweat []Chills [] Recent Injury [x] Denies all unless marked  HEENT:[]Headache  [] Head Injury/Hearing Loss  [] Sore Throat  [] Ear Ache/Dizziness  [x] Denies all unless marked  Spine:  [] Neck pain  [x] Back pain  [x] Sciaticia  [] Denies all unless marked  Cardiovascular:[]Heart Disease []Chest Pain [] Palpitations  [] Denies all unless marked  Pulmonary: []Shortness of Breath []Cough   [x] Denies all unless marke  Gastrointestinal: []Nausea  []Vomiting  [x]Abdominal Pain  []Constipation  []Diarrhea  []Dark Bloody Stools  [] Denies all unless marked  Psychiatric/Behavioral:[] Depression [] Anxiety [x] Denies all unless marked  Genitourinary:   [] Frequency  [] Urgency  [] Incontinence [x] Pain with Urination  [] Denies all unless marked  Extremities: []Pain  []Swelling  [x] Denies all unless marked  Musculoskeletal: [] Muscle Pain  [] Joint Pain  [] Arthritis [] Muscle Cramps [] Muscle Twitches  [x] Denies all unless marked  Sleep: [] Insomnia [] Snoring [] Restless Legs [] Sleep Apnea  [] Daytime Sleepiness  [x] Denies all unless marked  Skin:[] Rash [] Skin Discoloration [x] Denies all unless marked   Neurological: []Visual Disturbance/Memory Loss [] Loss of Balance [] Slurred Speech/Weakness [] Seizures  [] Vertigo/Dizziness [x] Denies all unless marked No

## 2025-03-27 NOTE — ANESTHESIA PREPROCEDURE EVALUATION
Anesthesia Evaluation     Patient summary reviewed   NPO Solid Status: > 8 hours  NPO Liquid Status: > 8 hours           Airway   Mallampati: I  TM distance: >3 FB  Neck ROM: full  No difficulty expected  Dental    (+) upper dentures and lower dentures    Pulmonary - normal exam   (+) pneumonia resolved , lung cancer, COPD moderate, asthma,  Cardiovascular - normal exam  Exercise tolerance: poor (<4 METS)    (+) hypertension well controlled less than 2 medications, CAD, dysrhythmias Atrial Fib, hyperlipidemia,  carotid artery disease carotid bilateral      Neuro/Psych  (+) seizures well controlled, syncope,     GI/Hepatic/Renal/Endo    (+) obesity,  GERD poorly controlled,  renal disease ARF, thyroid problem   (-) morbid obesity    Musculoskeletal (-) negative ROS    Abdominal  - normal exam   Substance History - negative use     OB/GYN          Other      history of cancer remission                    Anesthesia Plan    ASA 3     general     intravenous induction     Anesthetic plan, all risks, benefits, and alternatives have been provided, discussed and informed consent has been obtained with: patient.       Subjective:      Patient ID: Idania Morales 

## (undated) DEVICE — SUT ETHLN 3/0 FS1 30IN 669H

## (undated) DEVICE — MONTR FOR LP RECORDER REVEAL LINQ

## (undated) DEVICE — BNDG ELAS W/CLIP 6IN 10YD LF STRL

## (undated) DEVICE — TUBING, SUCTION, 1/4" X 12', STRAIGHT: Brand: MEDLINE

## (undated) DEVICE — ANTIBACTERIAL UNDYED BRAIDED (POLYGLACTIN 910), SYNTHETIC ABSORBABLE SUTURE: Brand: COATED VICRYL

## (undated) DEVICE — GLV SURG BIOGEL LTX PF 8

## (undated) DEVICE — PK EXTRM 30

## (undated) DEVICE — CVR BRD ARM 13X30

## (undated) DEVICE — GLV SURG BIOGEL M LTX PF 7 1/2

## (undated) DEVICE — BIT DRL QC DIA 2.5X110MM

## (undated) DEVICE — DISPOSABLE TOURNIQUET CUFF SINGLE BLADDER, SINGLE PORT AND QUICK CONNECT CONNECTOR: Brand: COLOR CUFF

## (undated) DEVICE — UNDERCAST PADDING: Brand: DEROYAL

## (undated) DEVICE — SPNG GZ WOVN 4X4IN 12PLY 10/BX STRL

## (undated) DEVICE — BLD MYRNGTMY BEAVR LANCE/DWN/CUT NRW 45D

## (undated) DEVICE — HANDPIECE SET WITH HIGH FLOW TIP AND SUCTION TUBE: Brand: INTERPULSE

## (undated) DEVICE — 3M™ STERI-STRIP™ REINFORCED ADHESIVE SKIN CLOSURES, R1547, 1/2 IN X 4 IN (12 MM X 100 MM), 6 STRIPS/ENVELOPE: Brand: 3M™ STERI-STRIP™

## (undated) DEVICE — DRP C/ARMOR

## (undated) DEVICE — BANDAGE,GAUZE,BULKEE II,4.5"X4.1YD,STRL: Brand: MEDLINE

## (undated) DEVICE — BIT DRL QC W DEPTH MARK 2X140MM

## (undated) DEVICE — CVR UNIV C/ARM

## (undated) DEVICE — GLV SURG DERMASSURE GRN LF PF 8.0

## (undated) DEVICE — 4-PORT MANIFOLD: Brand: NEPTUNE 2

## (undated) DEVICE — PK TURNOVER RM ADV

## (undated) DEVICE — TOWEL,OR,DSP,ST,BLUE,STD,4/PK,20PK/CS: Brand: MEDLINE

## (undated) DEVICE — DRSNG WND GZ CURAD OIL EMULSION 3X8IN STRL PK/3

## (undated) DEVICE — SURGICAL SUCTION CONNECTING TUBE WITH MALE CONNECTOR AND SUCTION CLAMP, 2 FT. LONG (.6 M), 5 MM I.D.: Brand: CONMED

## (undated) DEVICE — STERILE COTTON BALLS LARGE 5/P: Brand: MEDLINE

## (undated) DEVICE — PENCL ES MEGADINE EZ/CLEAN BUTN W/HOLSTR 10FT

## (undated) DEVICE — GLV SURG SENSICARE PI ORTHO SZ8 LF STRL

## (undated) DEVICE — DRSNG GZ CURAD XEROFORM NONADHS 5X9IN STRL

## (undated) DEVICE — INTENDED FOR TISSUE SEPARATION, AND OTHER PROCEDURES THAT REQUIRE A SHARP SURGICAL BLADE TO PUNCTURE OR CUT.: Brand: BARD-PARKER ® STAINLESS STEEL BLADES

## (undated) DEVICE — BIT DRL PERC QC CALIB 2.8X200MM